# Patient Record
Sex: MALE | Race: WHITE | NOT HISPANIC OR LATINO | URBAN - METROPOLITAN AREA
[De-identification: names, ages, dates, MRNs, and addresses within clinical notes are randomized per-mention and may not be internally consistent; named-entity substitution may affect disease eponyms.]

---

## 2017-05-08 ENCOUNTER — OUTPATIENT (OUTPATIENT)
Dept: OUTPATIENT SERVICES | Facility: HOSPITAL | Age: 77
LOS: 1 days | Discharge: HOME | End: 2017-05-08

## 2017-06-28 DIAGNOSIS — N18.3 CHRONIC KIDNEY DISEASE, STAGE 3 (MODERATE): ICD-10-CM

## 2017-06-28 DIAGNOSIS — E11.22 TYPE 2 DIABETES MELLITUS WITH DIABETIC CHRONIC KIDNEY DISEASE: ICD-10-CM

## 2017-07-27 ENCOUNTER — OUTPATIENT (OUTPATIENT)
Dept: OUTPATIENT SERVICES | Facility: HOSPITAL | Age: 77
LOS: 1 days | Discharge: HOME | End: 2017-07-27

## 2017-07-27 DIAGNOSIS — Z79.899 OTHER LONG TERM (CURRENT) DRUG THERAPY: ICD-10-CM

## 2017-07-27 DIAGNOSIS — I26.99 OTHER PULMONARY EMBOLISM WITHOUT ACUTE COR PULMONALE: ICD-10-CM

## 2017-07-27 DIAGNOSIS — N17.9 ACUTE KIDNEY FAILURE, UNSPECIFIED: ICD-10-CM

## 2017-07-27 DIAGNOSIS — E78.00 PURE HYPERCHOLESTEROLEMIA, UNSPECIFIED: ICD-10-CM

## 2017-07-27 DIAGNOSIS — D68.9 COAGULATION DEFECT, UNSPECIFIED: ICD-10-CM

## 2017-07-27 DIAGNOSIS — I25.10 ATHEROSCLEROTIC HEART DISEASE OF NATIVE CORONARY ARTERY WITHOUT ANGINA PECTORIS: ICD-10-CM

## 2017-08-09 ENCOUNTER — OUTPATIENT (OUTPATIENT)
Dept: OUTPATIENT SERVICES | Facility: HOSPITAL | Age: 77
LOS: 1 days | Discharge: HOME | End: 2017-08-09

## 2017-08-09 DIAGNOSIS — R97.20 ELEVATED PROSTATE SPECIFIC ANTIGEN [PSA]: ICD-10-CM

## 2017-08-09 DIAGNOSIS — D68.9 COAGULATION DEFECT, UNSPECIFIED: ICD-10-CM

## 2017-08-09 DIAGNOSIS — I26.99 OTHER PULMONARY EMBOLISM WITHOUT ACUTE COR PULMONALE: ICD-10-CM

## 2017-08-09 DIAGNOSIS — N17.9 ACUTE KIDNEY FAILURE, UNSPECIFIED: ICD-10-CM

## 2017-09-20 ENCOUNTER — OUTPATIENT (OUTPATIENT)
Dept: OUTPATIENT SERVICES | Facility: HOSPITAL | Age: 77
LOS: 1 days | Discharge: HOME | End: 2017-09-20

## 2017-09-20 DIAGNOSIS — N17.9 ACUTE KIDNEY FAILURE, UNSPECIFIED: ICD-10-CM

## 2017-09-20 DIAGNOSIS — I26.99 OTHER PULMONARY EMBOLISM WITHOUT ACUTE COR PULMONALE: ICD-10-CM

## 2017-09-20 DIAGNOSIS — D68.9 COAGULATION DEFECT, UNSPECIFIED: ICD-10-CM

## 2017-09-20 DIAGNOSIS — N18.3 CHRONIC KIDNEY DISEASE, STAGE 3 (MODERATE): ICD-10-CM

## 2017-09-20 DIAGNOSIS — E11.22 TYPE 2 DIABETES MELLITUS WITH DIABETIC CHRONIC KIDNEY DISEASE: ICD-10-CM

## 2018-01-15 ENCOUNTER — TRANSCRIPTION ENCOUNTER (OUTPATIENT)
Age: 78
End: 2018-01-15

## 2018-01-20 ENCOUNTER — OUTPATIENT (OUTPATIENT)
Dept: OUTPATIENT SERVICES | Facility: HOSPITAL | Age: 78
LOS: 1 days | Discharge: HOME | End: 2018-01-20

## 2018-01-20 DIAGNOSIS — E78.00 PURE HYPERCHOLESTEROLEMIA, UNSPECIFIED: ICD-10-CM

## 2018-01-20 DIAGNOSIS — N18.3 CHRONIC KIDNEY DISEASE, STAGE 3 (MODERATE): ICD-10-CM

## 2018-01-20 DIAGNOSIS — I25.10 ATHEROSCLEROTIC HEART DISEASE OF NATIVE CORONARY ARTERY WITHOUT ANGINA PECTORIS: ICD-10-CM

## 2018-01-20 DIAGNOSIS — E78.4 OTHER HYPERLIPIDEMIA: ICD-10-CM

## 2018-01-20 DIAGNOSIS — E11.22 TYPE 2 DIABETES MELLITUS WITH DIABETIC CHRONIC KIDNEY DISEASE: ICD-10-CM

## 2018-01-20 DIAGNOSIS — Z79.899 OTHER LONG TERM (CURRENT) DRUG THERAPY: ICD-10-CM

## 2018-02-04 DIAGNOSIS — I26.99 OTHER PULMONARY EMBOLISM WITHOUT ACUTE COR PULMONALE: ICD-10-CM

## 2018-02-04 DIAGNOSIS — D68.9 COAGULATION DEFECT, UNSPECIFIED: ICD-10-CM

## 2018-02-04 DIAGNOSIS — N17.9 ACUTE KIDNEY FAILURE, UNSPECIFIED: ICD-10-CM

## 2018-02-20 ENCOUNTER — OUTPATIENT (OUTPATIENT)
Dept: OUTPATIENT SERVICES | Facility: HOSPITAL | Age: 78
LOS: 1 days | Discharge: HOME | End: 2018-02-20

## 2018-02-20 DIAGNOSIS — C61 MALIGNANT NEOPLASM OF PROSTATE: ICD-10-CM

## 2018-05-02 ENCOUNTER — OUTPATIENT (OUTPATIENT)
Dept: OUTPATIENT SERVICES | Facility: HOSPITAL | Age: 78
LOS: 1 days | Discharge: HOME | End: 2018-05-02

## 2018-05-02 DIAGNOSIS — N20.0 CALCULUS OF KIDNEY: ICD-10-CM

## 2018-05-02 DIAGNOSIS — N18.9 CHRONIC KIDNEY DISEASE, UNSPECIFIED: ICD-10-CM

## 2018-06-12 ENCOUNTER — OUTPATIENT (OUTPATIENT)
Dept: OUTPATIENT SERVICES | Facility: HOSPITAL | Age: 78
LOS: 1 days | Discharge: HOME | End: 2018-06-12

## 2018-06-12 DIAGNOSIS — N18.3 CHRONIC KIDNEY DISEASE, STAGE 3 (MODERATE): ICD-10-CM

## 2018-06-12 DIAGNOSIS — E11.22 TYPE 2 DIABETES MELLITUS WITH DIABETIC CHRONIC KIDNEY DISEASE: ICD-10-CM

## 2018-08-07 ENCOUNTER — OUTPATIENT (OUTPATIENT)
Dept: OUTPATIENT SERVICES | Facility: HOSPITAL | Age: 78
LOS: 1 days | Discharge: HOME | End: 2018-08-07

## 2018-08-07 DIAGNOSIS — N20.0 CALCULUS OF KIDNEY: ICD-10-CM

## 2018-08-17 ENCOUNTER — OUTPATIENT (OUTPATIENT)
Dept: OUTPATIENT SERVICES | Facility: HOSPITAL | Age: 78
LOS: 1 days | Discharge: HOME | End: 2018-08-17

## 2018-08-17 DIAGNOSIS — Z79.899 OTHER LONG TERM (CURRENT) DRUG THERAPY: ICD-10-CM

## 2018-08-17 DIAGNOSIS — I25.10 ATHEROSCLEROTIC HEART DISEASE OF NATIVE CORONARY ARTERY WITHOUT ANGINA PECTORIS: ICD-10-CM

## 2018-08-17 DIAGNOSIS — E78.00 PURE HYPERCHOLESTEROLEMIA, UNSPECIFIED: ICD-10-CM

## 2018-09-20 ENCOUNTER — OUTPATIENT (OUTPATIENT)
Dept: OUTPATIENT SERVICES | Facility: HOSPITAL | Age: 78
LOS: 1 days | Discharge: HOME | End: 2018-09-20

## 2018-09-20 DIAGNOSIS — E11.9 TYPE 2 DIABETES MELLITUS WITHOUT COMPLICATIONS: ICD-10-CM

## 2018-09-20 DIAGNOSIS — E56.9 VITAMIN DEFICIENCY, UNSPECIFIED: ICD-10-CM

## 2018-09-26 ENCOUNTER — OUTPATIENT (OUTPATIENT)
Dept: OUTPATIENT SERVICES | Facility: HOSPITAL | Age: 78
LOS: 1 days | Discharge: HOME | End: 2018-09-26

## 2018-09-26 DIAGNOSIS — I10 ESSENTIAL (PRIMARY) HYPERTENSION: ICD-10-CM

## 2018-10-17 ENCOUNTER — OUTPATIENT (OUTPATIENT)
Dept: OUTPATIENT SERVICES | Facility: HOSPITAL | Age: 78
LOS: 1 days | Discharge: HOME | End: 2018-10-17

## 2018-10-17 DIAGNOSIS — E11.22 TYPE 2 DIABETES MELLITUS WITH DIABETIC CHRONIC KIDNEY DISEASE: ICD-10-CM

## 2018-10-17 DIAGNOSIS — N18.3 CHRONIC KIDNEY DISEASE, STAGE 3 (MODERATE): ICD-10-CM

## 2018-10-17 DIAGNOSIS — E78.5 HYPERLIPIDEMIA, UNSPECIFIED: ICD-10-CM

## 2018-12-29 ENCOUNTER — TRANSCRIPTION ENCOUNTER (OUTPATIENT)
Age: 78
End: 2018-12-29

## 2019-01-14 ENCOUNTER — OUTPATIENT (OUTPATIENT)
Dept: OUTPATIENT SERVICES | Facility: HOSPITAL | Age: 79
LOS: 1 days | Discharge: HOME | End: 2019-01-14

## 2019-01-14 DIAGNOSIS — N20.0 CALCULUS OF KIDNEY: ICD-10-CM

## 2019-01-23 ENCOUNTER — OUTPATIENT (OUTPATIENT)
Dept: OUTPATIENT SERVICES | Facility: HOSPITAL | Age: 79
LOS: 1 days | Discharge: HOME | End: 2019-01-23

## 2019-01-23 DIAGNOSIS — I48.91 UNSPECIFIED ATRIAL FIBRILLATION: ICD-10-CM

## 2019-01-23 DIAGNOSIS — Z01.818 ENCOUNTER FOR OTHER PREPROCEDURAL EXAMINATION: ICD-10-CM

## 2019-02-19 ENCOUNTER — OUTPATIENT (OUTPATIENT)
Dept: OUTPATIENT SERVICES | Facility: HOSPITAL | Age: 79
LOS: 1 days | Discharge: HOME | End: 2019-02-19

## 2019-02-19 DIAGNOSIS — N20.0 CALCULUS OF KIDNEY: ICD-10-CM

## 2019-02-19 DIAGNOSIS — E11.22 TYPE 2 DIABETES MELLITUS WITH DIABETIC CHRONIC KIDNEY DISEASE: ICD-10-CM

## 2019-02-19 DIAGNOSIS — C61 MALIGNANT NEOPLASM OF PROSTATE: ICD-10-CM

## 2019-02-19 DIAGNOSIS — N18.3 CHRONIC KIDNEY DISEASE, STAGE 3 (MODERATE): ICD-10-CM

## 2019-04-02 ENCOUNTER — OUTPATIENT (OUTPATIENT)
Dept: OUTPATIENT SERVICES | Facility: HOSPITAL | Age: 79
LOS: 1 days | Discharge: HOME | End: 2019-04-02

## 2019-04-02 DIAGNOSIS — C61 MALIGNANT NEOPLASM OF PROSTATE: ICD-10-CM

## 2019-04-02 DIAGNOSIS — R97.20 ELEVATED PROSTATE SPECIFIC ANTIGEN [PSA]: ICD-10-CM

## 2019-04-02 DIAGNOSIS — N20.0 CALCULUS OF KIDNEY: ICD-10-CM

## 2019-04-12 ENCOUNTER — RECORD ABSTRACTING (OUTPATIENT)
Age: 79
End: 2019-04-12

## 2019-04-12 DIAGNOSIS — Z78.9 OTHER SPECIFIED HEALTH STATUS: ICD-10-CM

## 2019-04-12 DIAGNOSIS — I10 ESSENTIAL (PRIMARY) HYPERTENSION: ICD-10-CM

## 2019-04-12 RX ORDER — APIXABAN 5 MG/1
5 TABLET, FILM COATED ORAL
Refills: 0 | Status: ACTIVE | COMMUNITY

## 2019-04-25 ENCOUNTER — OUTPATIENT (OUTPATIENT)
Dept: OUTPATIENT SERVICES | Facility: HOSPITAL | Age: 79
LOS: 1 days | Discharge: HOME | End: 2019-04-25

## 2019-04-25 DIAGNOSIS — M54.2 CERVICALGIA: ICD-10-CM

## 2019-04-25 DIAGNOSIS — E11.65 TYPE 2 DIABETES MELLITUS WITH HYPERGLYCEMIA: ICD-10-CM

## 2019-04-25 DIAGNOSIS — N23 UNSPECIFIED RENAL COLIC: ICD-10-CM

## 2019-04-25 DIAGNOSIS — M18.9 OSTEOARTHRITIS OF FIRST CARPOMETACARPAL JOINT, UNSPECIFIED: ICD-10-CM

## 2019-05-06 ENCOUNTER — APPOINTMENT (OUTPATIENT)
Dept: CARDIOLOGY | Facility: CLINIC | Age: 79
End: 2019-05-06
Payer: MEDICARE

## 2019-05-06 PROCEDURE — 93306 TTE W/DOPPLER COMPLETE: CPT

## 2019-05-07 ENCOUNTER — LABORATORY RESULT (OUTPATIENT)
Age: 79
End: 2019-05-07

## 2019-05-07 ENCOUNTER — OUTPATIENT (OUTPATIENT)
Dept: OUTPATIENT SERVICES | Facility: HOSPITAL | Age: 79
LOS: 1 days | Discharge: HOME | End: 2019-05-07

## 2019-05-07 DIAGNOSIS — I25.10 ATHEROSCLEROTIC HEART DISEASE OF NATIVE CORONARY ARTERY WITHOUT ANGINA PECTORIS: ICD-10-CM

## 2019-05-07 DIAGNOSIS — Z79.899 OTHER LONG TERM (CURRENT) DRUG THERAPY: ICD-10-CM

## 2019-05-07 DIAGNOSIS — E78.00 PURE HYPERCHOLESTEROLEMIA, UNSPECIFIED: ICD-10-CM

## 2019-05-14 ENCOUNTER — APPOINTMENT (OUTPATIENT)
Dept: CARDIOLOGY | Facility: CLINIC | Age: 79
End: 2019-05-14
Payer: MEDICARE

## 2019-05-14 ENCOUNTER — OUTPATIENT (OUTPATIENT)
Dept: OUTPATIENT SERVICES | Facility: HOSPITAL | Age: 79
LOS: 1 days | Discharge: HOME | End: 2019-05-14

## 2019-05-14 DIAGNOSIS — Q61.01 CONGENITAL SINGLE RENAL CYST: ICD-10-CM

## 2019-05-14 DIAGNOSIS — C61 MALIGNANT NEOPLASM OF PROSTATE: ICD-10-CM

## 2019-05-14 DIAGNOSIS — N20.0 CALCULUS OF KIDNEY: ICD-10-CM

## 2019-05-14 PROCEDURE — 93000 ELECTROCARDIOGRAM COMPLETE: CPT

## 2019-05-14 PROCEDURE — 99214 OFFICE O/P EST MOD 30 MIN: CPT

## 2019-06-14 ENCOUNTER — OUTPATIENT (OUTPATIENT)
Dept: OUTPATIENT SERVICES | Facility: HOSPITAL | Age: 79
LOS: 1 days | Discharge: HOME | End: 2019-06-14

## 2019-06-14 DIAGNOSIS — N18.3 CHRONIC KIDNEY DISEASE, STAGE 3 (MODERATE): ICD-10-CM

## 2019-06-14 DIAGNOSIS — N25.81 SECONDARY HYPERPARATHYROIDISM OF RENAL ORIGIN: ICD-10-CM

## 2019-06-14 DIAGNOSIS — E11.22 TYPE 2 DIABETES MELLITUS WITH DIABETIC CHRONIC KIDNEY DISEASE: ICD-10-CM

## 2019-09-11 ENCOUNTER — OUTPATIENT (OUTPATIENT)
Dept: OUTPATIENT SERVICES | Facility: HOSPITAL | Age: 79
LOS: 1 days | Discharge: HOME | End: 2019-09-11

## 2019-09-11 DIAGNOSIS — N20.0 CALCULUS OF KIDNEY: ICD-10-CM

## 2019-09-11 DIAGNOSIS — N18.9 CHRONIC KIDNEY DISEASE, UNSPECIFIED: ICD-10-CM

## 2019-09-11 DIAGNOSIS — C61 MALIGNANT NEOPLASM OF PROSTATE: ICD-10-CM

## 2019-10-18 ENCOUNTER — APPOINTMENT (OUTPATIENT)
Dept: CARDIOLOGY | Facility: CLINIC | Age: 79
End: 2019-10-18
Payer: MEDICARE

## 2019-10-18 PROCEDURE — 93880 EXTRACRANIAL BILAT STUDY: CPT

## 2019-10-28 ENCOUNTER — OUTPATIENT (OUTPATIENT)
Dept: OUTPATIENT SERVICES | Facility: HOSPITAL | Age: 79
LOS: 1 days | Discharge: HOME | End: 2019-10-28

## 2019-10-28 DIAGNOSIS — N18.3 CHRONIC KIDNEY DISEASE, STAGE 3 (MODERATE): ICD-10-CM

## 2019-10-28 DIAGNOSIS — E11.22 TYPE 2 DIABETES MELLITUS WITH DIABETIC CHRONIC KIDNEY DISEASE: ICD-10-CM

## 2019-11-05 ENCOUNTER — OUTPATIENT (OUTPATIENT)
Dept: OUTPATIENT SERVICES | Facility: HOSPITAL | Age: 79
LOS: 1 days | Discharge: HOME | End: 2019-11-05

## 2019-11-05 ENCOUNTER — LABORATORY RESULT (OUTPATIENT)
Age: 79
End: 2019-11-05

## 2019-11-05 DIAGNOSIS — E78.00 PURE HYPERCHOLESTEROLEMIA, UNSPECIFIED: ICD-10-CM

## 2019-11-05 DIAGNOSIS — Z79.899 OTHER LONG TERM (CURRENT) DRUG THERAPY: ICD-10-CM

## 2019-11-05 DIAGNOSIS — I25.10 ATHEROSCLEROTIC HEART DISEASE OF NATIVE CORONARY ARTERY WITHOUT ANGINA PECTORIS: ICD-10-CM

## 2019-11-06 ENCOUNTER — APPOINTMENT (OUTPATIENT)
Dept: CARDIOLOGY | Facility: CLINIC | Age: 79
End: 2019-11-06

## 2019-11-12 ENCOUNTER — APPOINTMENT (OUTPATIENT)
Dept: CARDIOLOGY | Facility: CLINIC | Age: 79
End: 2019-11-12
Payer: MEDICARE

## 2019-11-12 PROCEDURE — 93000 ELECTROCARDIOGRAM COMPLETE: CPT

## 2019-11-12 PROCEDURE — 99214 OFFICE O/P EST MOD 30 MIN: CPT

## 2020-06-02 ENCOUNTER — RESULT REVIEW (OUTPATIENT)
Age: 80
End: 2020-06-02

## 2020-07-24 ENCOUNTER — RECORD ABSTRACTING (OUTPATIENT)
Age: 80
End: 2020-07-24

## 2020-07-27 ENCOUNTER — APPOINTMENT (OUTPATIENT)
Dept: CARDIOLOGY | Facility: CLINIC | Age: 80
End: 2020-07-27
Payer: MEDICARE

## 2020-07-27 ENCOUNTER — NON-APPOINTMENT (OUTPATIENT)
Age: 80
End: 2020-07-27

## 2020-07-27 PROCEDURE — 93306 TTE W/DOPPLER COMPLETE: CPT

## 2020-08-03 ENCOUNTER — LABORATORY RESULT (OUTPATIENT)
Age: 80
End: 2020-08-03

## 2020-08-11 ENCOUNTER — APPOINTMENT (OUTPATIENT)
Dept: CARDIOLOGY | Facility: CLINIC | Age: 80
End: 2020-08-11
Payer: MEDICARE

## 2020-08-11 VITALS
TEMPERATURE: 98.8 F | BODY MASS INDEX: 39.42 KG/M2 | WEIGHT: 291 LBS | HEIGHT: 72 IN | HEART RATE: 64 BPM | SYSTOLIC BLOOD PRESSURE: 140 MMHG | DIASTOLIC BLOOD PRESSURE: 80 MMHG

## 2020-08-11 PROCEDURE — 93000 ELECTROCARDIOGRAM COMPLETE: CPT

## 2020-08-11 PROCEDURE — 99214 OFFICE O/P EST MOD 30 MIN: CPT

## 2020-08-13 RX ORDER — ROSUVASTATIN CALCIUM 20 MG/1
20 TABLET, FILM COATED ORAL DAILY
Qty: 90 | Refills: 3 | Status: ACTIVE | COMMUNITY
Start: 2020-08-13 | End: 1900-01-01

## 2020-12-04 ENCOUNTER — RESULT REVIEW (OUTPATIENT)
Age: 80
End: 2020-12-04

## 2021-03-12 ENCOUNTER — INPATIENT (INPATIENT)
Facility: HOSPITAL | Age: 81
LOS: 38 days | Discharge: NURSING, REHAB OR LONG-TERM CARE | End: 2021-04-20
Attending: INTERNAL MEDICINE | Admitting: INTERNAL MEDICINE
Payer: MEDICARE

## 2021-03-12 VITALS
HEART RATE: 101 BPM | TEMPERATURE: 101 F | OXYGEN SATURATION: 95 % | SYSTOLIC BLOOD PRESSURE: 132 MMHG | DIASTOLIC BLOOD PRESSURE: 66 MMHG | RESPIRATION RATE: 20 BRPM

## 2021-03-12 DIAGNOSIS — Z96.653 PRESENCE OF ARTIFICIAL KNEE JOINT, BILATERAL: Chronic | ICD-10-CM

## 2021-03-12 DIAGNOSIS — Z90.6 ACQUIRED ABSENCE OF OTHER PARTS OF URINARY TRACT: Chronic | ICD-10-CM

## 2021-03-12 DIAGNOSIS — R09.89 OTHER SPECIFIED SYMPTOMS AND SIGNS INVOLVING THE CIRCULATORY AND RESPIRATORY SYSTEMS: ICD-10-CM

## 2021-03-12 DIAGNOSIS — Z96.643 PRESENCE OF ARTIFICIAL HIP JOINT, BILATERAL: Chronic | ICD-10-CM

## 2021-03-12 LAB
ALBUMIN SERPL ELPH-MCNC: 3.5 G/DL — SIGNIFICANT CHANGE UP (ref 3.5–5.2)
ALP SERPL-CCNC: 47 U/L — SIGNIFICANT CHANGE UP (ref 30–115)
ALT FLD-CCNC: 90 U/L — HIGH (ref 0–41)
ANION GAP SERPL CALC-SCNC: 14 MMOL/L — SIGNIFICANT CHANGE UP (ref 7–14)
APTT BLD: 35.1 SEC — SIGNIFICANT CHANGE UP (ref 27–39.2)
AST SERPL-CCNC: 86 U/L — HIGH (ref 0–41)
BASE EXCESS BLDV CALC-SCNC: 3.6 MMOL/L — HIGH (ref -2–2)
BASOPHILS # BLD AUTO: 0.02 K/UL — SIGNIFICANT CHANGE UP (ref 0–0.2)
BASOPHILS NFR BLD AUTO: 0.2 % — SIGNIFICANT CHANGE UP (ref 0–1)
BILIRUB SERPL-MCNC: 0.9 MG/DL — SIGNIFICANT CHANGE UP (ref 0.2–1.2)
BUN SERPL-MCNC: 33 MG/DL — HIGH (ref 10–20)
CA-I SERPL-SCNC: 1.18 MMOL/L — SIGNIFICANT CHANGE UP (ref 1.12–1.3)
CALCIUM SERPL-MCNC: 8.9 MG/DL — SIGNIFICANT CHANGE UP (ref 8.5–10.1)
CHLORIDE SERPL-SCNC: 97 MMOL/L — LOW (ref 98–110)
CO2 SERPL-SCNC: 25 MMOL/L — SIGNIFICANT CHANGE UP (ref 17–32)
CREAT SERPL-MCNC: 1.7 MG/DL — HIGH (ref 0.7–1.5)
D DIMER BLD IA.RAPID-MCNC: 1721 NG/ML DDU — HIGH (ref 0–230)
EOSINOPHIL # BLD AUTO: 0 K/UL — SIGNIFICANT CHANGE UP (ref 0–0.7)
EOSINOPHIL NFR BLD AUTO: 0 % — SIGNIFICANT CHANGE UP (ref 0–8)
GAS PNL BLDV: 133 MMOL/L — LOW (ref 136–145)
GAS PNL BLDV: SIGNIFICANT CHANGE UP
GLUCOSE BLDC GLUCOMTR-MCNC: 226 MG/DL — HIGH (ref 70–99)
GLUCOSE BLDC GLUCOMTR-MCNC: 285 MG/DL — HIGH (ref 70–99)
GLUCOSE SERPL-MCNC: 214 MG/DL — HIGH (ref 70–99)
HCO3 BLDV-SCNC: 29 MMOL/L — SIGNIFICANT CHANGE UP (ref 22–29)
HCT VFR BLD CALC: 45 % — SIGNIFICANT CHANGE UP (ref 42–52)
HCT VFR BLDA CALC: 48.1 % — HIGH (ref 34–44)
HGB BLD CALC-MCNC: 15.7 G/DL — SIGNIFICANT CHANGE UP (ref 14–18)
HGB BLD-MCNC: 14.3 G/DL — SIGNIFICANT CHANGE UP (ref 14–18)
IMM GRANULOCYTES NFR BLD AUTO: 0.6 % — HIGH (ref 0.1–0.3)
INR BLD: 1.49 RATIO — HIGH (ref 0.65–1.3)
LACTATE BLDV-MCNC: 2.5 MMOL/L — HIGH (ref 0.5–1.6)
LACTATE SERPL-SCNC: 1.8 MMOL/L — SIGNIFICANT CHANGE UP (ref 0.7–2)
LYMPHOCYTES # BLD AUTO: 0.63 K/UL — LOW (ref 1.2–3.4)
LYMPHOCYTES # BLD AUTO: 7.4 % — LOW (ref 20.5–51.1)
MCHC RBC-ENTMCNC: 27.4 PG — SIGNIFICANT CHANGE UP (ref 27–31)
MCHC RBC-ENTMCNC: 31.8 G/DL — LOW (ref 32–37)
MCV RBC AUTO: 86.2 FL — SIGNIFICANT CHANGE UP (ref 80–94)
MONOCYTES # BLD AUTO: 0.59 K/UL — SIGNIFICANT CHANGE UP (ref 0.1–0.6)
MONOCYTES NFR BLD AUTO: 6.9 % — SIGNIFICANT CHANGE UP (ref 1.7–9.3)
NEUTROPHILS # BLD AUTO: 7.23 K/UL — HIGH (ref 1.4–6.5)
NEUTROPHILS NFR BLD AUTO: 84.9 % — HIGH (ref 42.2–75.2)
NRBC # BLD: 0 /100 WBCS — SIGNIFICANT CHANGE UP (ref 0–0)
NT-PROBNP SERPL-SCNC: 173 PG/ML — SIGNIFICANT CHANGE UP (ref 0–300)
PCO2 BLDV: 47 MMHG — SIGNIFICANT CHANGE UP (ref 41–51)
PH BLDV: 7.4 — SIGNIFICANT CHANGE UP (ref 7.26–7.43)
PLATELET # BLD AUTO: 153 K/UL — SIGNIFICANT CHANGE UP (ref 130–400)
PO2 BLDV: 22 MMHG — SIGNIFICANT CHANGE UP (ref 20–40)
POTASSIUM BLDV-SCNC: 4 MMOL/L — SIGNIFICANT CHANGE UP (ref 3.3–5.6)
POTASSIUM SERPL-MCNC: 4.1 MMOL/L — SIGNIFICANT CHANGE UP (ref 3.5–5)
POTASSIUM SERPL-SCNC: 4.1 MMOL/L — SIGNIFICANT CHANGE UP (ref 3.5–5)
PROT SERPL-MCNC: 6.5 G/DL — SIGNIFICANT CHANGE UP (ref 6–8)
PROTHROM AB SERPL-ACNC: 17.1 SEC — HIGH (ref 9.95–12.87)
RBC # BLD: 5.22 M/UL — SIGNIFICANT CHANGE UP (ref 4.7–6.1)
RBC # FLD: 14.8 % — HIGH (ref 11.5–14.5)
SAO2 % BLDV: 35 % — SIGNIFICANT CHANGE UP
SARS-COV-2 RNA SPEC QL NAA+PROBE: DETECTED
SODIUM SERPL-SCNC: 136 MMOL/L — SIGNIFICANT CHANGE UP (ref 135–146)
TROPONIN T SERPL-MCNC: 0.02 NG/ML — HIGH
TROPONIN T SERPL-MCNC: <0.01 NG/ML — SIGNIFICANT CHANGE UP
WBC # BLD: 8.52 K/UL — SIGNIFICANT CHANGE UP (ref 4.8–10.8)
WBC # FLD AUTO: 8.52 K/UL — SIGNIFICANT CHANGE UP (ref 4.8–10.8)

## 2021-03-12 PROCEDURE — 71045 X-RAY EXAM CHEST 1 VIEW: CPT | Mod: 26

## 2021-03-12 PROCEDURE — 93010 ELECTROCARDIOGRAM REPORT: CPT

## 2021-03-12 PROCEDURE — 99291 CRITICAL CARE FIRST HOUR: CPT

## 2021-03-12 RX ORDER — AMLODIPINE BESYLATE 2.5 MG/1
10 TABLET ORAL DAILY
Refills: 0 | Status: DISCONTINUED | OUTPATIENT
Start: 2021-03-12 | End: 2021-03-29

## 2021-03-12 RX ORDER — CHLORHEXIDINE GLUCONATE 213 G/1000ML
1 SOLUTION TOPICAL
Refills: 0 | Status: DISCONTINUED | OUTPATIENT
Start: 2021-03-12 | End: 2021-04-20

## 2021-03-12 RX ORDER — INSULIN GLARGINE 100 [IU]/ML
40 INJECTION, SOLUTION SUBCUTANEOUS AT BEDTIME
Refills: 0 | Status: DISCONTINUED | OUTPATIENT
Start: 2021-03-12 | End: 2021-03-14

## 2021-03-12 RX ORDER — DEXAMETHASONE 0.5 MG/5ML
6 ELIXIR ORAL ONCE
Refills: 0 | Status: COMPLETED | OUTPATIENT
Start: 2021-03-12 | End: 2021-03-12

## 2021-03-12 RX ORDER — LANOLIN ALCOHOL/MO/W.PET/CERES
5 CREAM (GRAM) TOPICAL AT BEDTIME
Refills: 0 | Status: DISCONTINUED | OUTPATIENT
Start: 2021-03-12 | End: 2021-04-20

## 2021-03-12 RX ORDER — INFLUENZA VIRUS VACCINE 15; 15; 15; 15 UG/.5ML; UG/.5ML; UG/.5ML; UG/.5ML
0.5 SUSPENSION INTRAMUSCULAR ONCE
Refills: 0 | Status: DISCONTINUED | OUTPATIENT
Start: 2021-03-12 | End: 2021-04-20

## 2021-03-12 RX ORDER — INSULIN LISPRO 100/ML
5 VIAL (ML) SUBCUTANEOUS
Refills: 0 | Status: DISCONTINUED | OUTPATIENT
Start: 2021-03-12 | End: 2021-03-14

## 2021-03-12 RX ORDER — INSULIN LISPRO 100/ML
VIAL (ML) SUBCUTANEOUS
Refills: 0 | Status: DISCONTINUED | OUTPATIENT
Start: 2021-03-12 | End: 2021-04-20

## 2021-03-12 RX ORDER — ACETAMINOPHEN 500 MG
650 TABLET ORAL ONCE
Refills: 0 | Status: COMPLETED | OUTPATIENT
Start: 2021-03-12 | End: 2021-03-12

## 2021-03-12 RX ORDER — ATORVASTATIN CALCIUM 80 MG/1
80 TABLET, FILM COATED ORAL AT BEDTIME
Refills: 0 | Status: DISCONTINUED | OUTPATIENT
Start: 2021-03-12 | End: 2021-04-20

## 2021-03-12 RX ORDER — OXYCODONE HYDROCHLORIDE 5 MG/1
5 TABLET ORAL EVERY 6 HOURS
Refills: 0 | Status: DISCONTINUED | OUTPATIENT
Start: 2021-03-12 | End: 2021-03-18

## 2021-03-12 RX ORDER — METOPROLOL TARTRATE 50 MG
100 TABLET ORAL DAILY
Refills: 0 | Status: DISCONTINUED | OUTPATIENT
Start: 2021-03-12 | End: 2021-04-06

## 2021-03-12 RX ORDER — SODIUM CHLORIDE 9 MG/ML
1000 INJECTION, SOLUTION INTRAVENOUS ONCE
Refills: 0 | Status: COMPLETED | OUTPATIENT
Start: 2021-03-12 | End: 2021-03-12

## 2021-03-12 RX ORDER — APIXABAN 2.5 MG/1
5 TABLET, FILM COATED ORAL
Refills: 0 | Status: DISCONTINUED | OUTPATIENT
Start: 2021-03-12 | End: 2021-03-25

## 2021-03-12 RX ORDER — DEXAMETHASONE 0.5 MG/5ML
6 ELIXIR ORAL DAILY
Refills: 0 | Status: COMPLETED | OUTPATIENT
Start: 2021-03-13 | End: 2021-03-21

## 2021-03-12 RX ADMIN — Medication 101.2 MILLIGRAM(S): at 13:08

## 2021-03-12 RX ADMIN — INSULIN GLARGINE 40 UNIT(S): 100 INJECTION, SOLUTION SUBCUTANEOUS at 22:06

## 2021-03-12 RX ADMIN — Medication 650 MILLIGRAM(S): at 13:08

## 2021-03-12 RX ADMIN — SODIUM CHLORIDE 1000 MILLILITER(S): 9 INJECTION, SOLUTION INTRAVENOUS at 13:09

## 2021-03-12 RX ADMIN — APIXABAN 5 MILLIGRAM(S): 2.5 TABLET, FILM COATED ORAL at 22:04

## 2021-03-12 RX ADMIN — Medication 5 UNIT(S): at 17:20

## 2021-03-12 RX ADMIN — ATORVASTATIN CALCIUM 80 MILLIGRAM(S): 80 TABLET, FILM COATED ORAL at 22:04

## 2021-03-12 RX ADMIN — Medication 4: at 17:20

## 2021-03-12 NOTE — H&P ADULT - ASSESSMENT
#Acute hypoxemic respiratory failure secondary to COVID-19 pneumonia  - COVID-19 PCR positive 3/4/2021  - Isolation precautions (contact, droplet, airborne)  - Chest X-ray:  - Patient is saturating ___ % on ___ % FiO2 via ___  - Baseline CBC with diff, CMP, LDH, procalcitonin, creatine kinase, troponin, CRP, ferritin, D-dimer, PTT/PT/INR, ECG, and chest X-ray  - Repeat inflammatory markers (D-dimer, CRP, ferritin) Q48-72H if clinically worsening  - ID evaluation for possible remdesivir/plasma  - Active type and screen and COVID antibodies in case of plasma  - Incentive spirometry at bedside  - Start dexamethasone 6mg IV once daily for 10 days  - DVT prophylaxis per protocol  - Titrate supplemental O2 to maintain SpO2 92-96%  - If CRP >7.5 and procalcitonin <0.5,--> Tocilizumab x1 (Wt <65 kg= 400 mg, 65-90 kg =600 mg, >90 kg =800 mg)  Patient is a 81yo male with PMH of hypertension, hyperlipidemia, diabetes mellitus type 2, atrial fibrillation on Eliquis, CKD stage 3A, bladder cancer s/p cystectomy with urostomy, and bilateral hip and knee arthroplasties who was brought in by EMS for hypoxia. Patient had tested positive for COVID-19 on 3/4/2021, but symptoms did not begin until 3/6/2021. He was found to be hypoxic to 85% on room air.    #Acute hypoxemic respiratory failure secondary to COVID-19 pneumonia  - COVID-19 PCR positive 3/4/2021  - Isolation precautions (contact, droplet, airborne)  - Chest X-ray: some bibasilar opacities  - Patient is saturating 9% on 2L O2 via nasal cannula   - Baseline CBC with diff, CMP, LDH, procalcitonin, creatine kinase, troponin, CRP, ferritin, D-dimer, PTT/PT/INR, ECG, and chest X-ray  - Repeat inflammatory markers (D-dimer, CRP, ferritin) Q48-72H if clinically worsening  - Patient is outside the window for remdesivir  - Active type and screen and COVID antibodies in case of plasma  - Incentive spirometry at bedside  - Continue with dexamethasone 6mg IV once daily for 10 days  - DVT prophylaxis per protocol  - Titrate supplemental O2 to maintain SpO2 92-96%  - If CRP >7.5 and procalcitonin <0.5,--> Tocilizumab x1 (Wt <65 kg= 400 mg, 65-90 kg =600 mg, >90 kg =800 mg)     #Mild hepatocellular transaminitis  - Likely secondary to underlying COVID-19 infection  - Trend LFTs    #Lymphopenia  - Likely secondary to underlying COVID-19 infection    #Elevated D-dimer  - With history of bilateral TKR and THR, underlying COVID-19 infection, and worsening oxygen requirements, will order venous duplex    #Elevated troponin, likely type 2 demand ischemia secondary to hypoxia  - Outpatient nuclear scan 2019 showed EF 55% with no wall motion abnormalities or reversible defects    #Acute kidney injury on CKD stage 3A  - Baseline creatinine: 1.4 (as of 1/2021)  - Creatinine today: 1.7  - Likely prerenal azotemia in setting of COVID-19 infection  - Follow urinalysis, urine electrolytes, protein/creatinine ratio, and eosinophil count  - Avoid nephrotoxic agents  - Monitor BUN/creatinine   - Hold home medication benazepril given LORRAINE    #Lactic acidosis  - Lactate: 2.3  - S/p 1L bolus of LR in the ED  - Follow lactate in AM     #Paroxysmal atrial fibrillation  - ECG: normal sinus rhythm  - [id number]: 4 (age>75, DM, HTN)  - HAS-BLED: 1 (age>65)  - Continue with Eliquis 5mg PO twice daily and metoprolol succinate 100mg PO once daily     #Diabetes mellitus type 2  - Hemoglobin A1c 1/20/2021: 7.9  - Hold home medications glimepiride and Ozempic  - Continue with insulin glargine 40 units SQ QHS  - Start insulin lispro 5 units SQ TID  - Insulin sliding scale coverage  - Monitor fingerstick glucose, especially while on steroids    #Hyperlipidemia  - Convert home medication rosuvastatin to atorvastatin 80mg PO at bedtime     #Hypertension  - Continue with amlodipine 10mg PO once daily and metoprolol succinate 100mg PO once daily   - Hold home medication benazepril given LORRAINE    #S/p bilateral TKR and THR  - Continue with oxycodone IR 5mg PO Q6H PRN    #Bladder cancer s/p cystectomy with urostomy  - Follow outpatient with urology and oncology  - Urostomy care PRN    #Misc  - DVT Prophylaxis: Eliquis 5mg PO twice daily   - GI Prophylaxis: not indicated   - Diet: DASH/TLC, consistent carbohydrate  - Activity: increase as tolerated  - IV Fluids: not indicated   - Code Status: Full Code    Dispo: admit to medicine Patient is a 81yo male with PMH of hypertension, hyperlipidemia, diabetes mellitus type 2, atrial fibrillation on Eliquis, CKD stage 3A, bladder cancer s/p cystectomy with urostomy, and bilateral hip and knee arthroplasties who was brought in by EMS for hypoxia. Patient had tested positive for COVID-19 on 3/4/2021, but symptoms did not begin until 3/6/2021. He was found to be hypoxic to 85% on room air.    #Acute hypoxemic respiratory failure secondary to COVID-19 pneumonia  - COVID-19 PCR positive 3/4/2021  - Isolation precautions (contact, droplet, airborne)  - Chest X-ray: some bibasilar opacities  - Patient is saturating 9% on 2L O2 via nasal cannula   - Baseline CBC with diff, CMP, LDH, procalcitonin, creatine kinase, troponin, CRP, ferritin, D-dimer, PTT/PT/INR, ECG, and chest X-ray  - Repeat inflammatory markers (D-dimer, CRP, ferritin) Q48-72H if clinically worsening  - Patient is outside the window for remdesivir  - Active type and screen and COVID antibodies in case of plasma  - Incentive spirometry at bedside  - Continue with dexamethasone 6mg IV once daily for 10 days  - DVT prophylaxis per protocol  - Titrate supplemental O2 to maintain SpO2 92-96%  - If CRP >7.5 and procalcitonin <0.5,--> Tocilizumab x1 (Wt <65 kg= 400 mg, 65-90 kg =600 mg, >90 kg =800 mg)     #Mild hepatocellular transaminitis  - Likely secondary to underlying COVID-19 infection  - Trend LFTs    #Lymphopenia  - Likely secondary to underlying COVID-19 infection    #Elevated D-dimer  - With history of bilateral TKR and THR, underlying COVID-19 infection, and worsening oxygen requirements, will order venous duplex    #Elevated troponin, likely type 2 demand ischemia secondary to hypoxia  - Outpatient nuclear scan 2019 showed EF 55% with no wall motion abnormalities or reversible defects  - Trend LFTs    #Acute kidney injury on CKD stage 3A  - Baseline creatinine: 1.4 (as of 1/2021)  - Creatinine today: 1.7  - Likely prerenal azotemia in setting of COVID-19 infection  - Follow urinalysis, urine electrolytes, protein/creatinine ratio, and eosinophil count  - Avoid nephrotoxic agents  - Monitor BUN/creatinine   - Hold home medication benazepril given LORRAINE    #Lactic acidosis  - Lactate: 2.3  - S/p 1L bolus of LR in the ED  - Follow lactate in AM     #Paroxysmal atrial fibrillation  - ECG: normal sinus rhythm  - [id number]: 4 (age>75, DM, HTN)  - HAS-BLED: 1 (age>65)  - Continue with Eliquis 5mg PO twice daily and metoprolol succinate 100mg PO once daily     #Diabetes mellitus type 2  - Hemoglobin A1c 1/20/2021: 7.9  - Hold home medications glimepiride and Ozempic  - Continue with insulin glargine 40 units SQ QHS  - Start insulin lispro 5 units SQ TID  - Insulin sliding scale coverage  - Monitor fingerstick glucose, especially while on steroids    #Hyperlipidemia  - Convert home medication rosuvastatin to atorvastatin 80mg PO at bedtime     #Hypertension  - Continue with amlodipine 10mg PO once daily and metoprolol succinate 100mg PO once daily   - Hold home medication benazepril given LORRAINE    #S/p bilateral TKR and THR  - Continue with oxycodone IR 5mg PO Q6H PRN    #Bladder cancer s/p cystectomy with urostomy  - Follow outpatient with urology and oncology  - Urostomy care PRN    #Misc  - DVT Prophylaxis: Eliquis 5mg PO twice daily   - GI Prophylaxis: not indicated   - Diet: DASH/TLC, consistent carbohydrate  - Activity: increase as tolerated  - IV Fluids: not indicated   - Code Status: Full Code    Dispo: admit to medicine

## 2021-03-12 NOTE — H&P ADULT - NSHPOUTPATIENTPROVIDERS_GEN_ALL_CORE
Dr. Evan Bull PCP: Dr. Evan Bull  Nephrologist: Dr. Tony  Urologist: Dr. Banks  Cardiologist: Dr. Carey

## 2021-03-12 NOTE — H&P ADULT - HISTORY OF PRESENT ILLNESS
Patient is a 81yo male with PMH of hypertension, hyperlipidemia, diabetes mellitus type 2, atrial fibrillation on Eliquis, CKD stage 3A, bladder cancer s/p cystectomy with urostomy, and bilateral hip and knee arthroplasties who was brought in by EMS for hypoxia.  Patient is a 81yo male with PMH of hypertension, hyperlipidemia, diabetes mellitus type 2, atrial fibrillation on Eliquis, CKD stage 3A, bladder cancer s/p cystectomy with urostomy, and bilateral hip and knee arthroplasties who was brought in by EMS for hypoxia. The patient states that his daughter and her boyfriend who reside in the same home as him and his wife recently tested positive for COVID-19. He and his wife tested positive for COVID-19 on 3/4/2021, but their symptoms did not start until 3/6/2021. He started feeling "beat down" and "tired all the time" to the point where he had difficulty getting up. He also endorses cough, shortness of breath, and body aches. He denies chest pain, abdominal pain, nausea, vomiting, constipation, or diarrhea. He was told to come to the ED by his daughter after they noticed his wife was "not doing so well."    In the ED, vital signs were Tmax 100.9F, , /66, RR 20, and SpO2 95% on 4L. Labs were significant for creatinine 1.7 (baseline 1.4), troponin 0.02, lactate 2.3, elevated LFTs, and D-dimer 1721. Patient was given dexamethasone 6mg IV, 1L bolus of LR, and acetaminophen 650mg.

## 2021-03-12 NOTE — ED PROVIDER NOTE - PROGRESS NOTE DETAILS
DC: 5L NC, 95%. ATTENDING NOTE: I personally evaluated the patient. I reviewed the Resident’s note (as assigned above), and agree with the findings and plan except as documented in my note. 79 y/o M PMH AFib on Eliquis, bladder CA, HTN, DM, not immunized against Covid-19 now with + Covid test on March 4th. Pt’s wife is in the hospital for the same. Today, presents with fever, cough, SOB, and hypoxia, 84% on room air. On exam Pt is in mild respiratory distress. (+) Obese male. (+) Decreased breath sounds b/l. (+) Diffuse crackles, no edema. Plan: Labs, imaging and likely admit.

## 2021-03-12 NOTE — ED PROVIDER NOTE - NS ED ROS FT
Constitutional: + fevers.   Eyes:  No visual changes, eye pain or discharge.  ENMT:  No sore throat or runny nose.  Cardiac:  +SOB.   Respiratory: +Cough. +hypoxia.   GI:  No nausea, vomiting, diarrhea or abdominal pain.  :  +hx of cystectomy.   MS:  +myalgias.   Neuro:  +diffuse weakness.   Skin:  No skin rash.   Endocrine: hx of DM.

## 2021-03-12 NOTE — ED PROVIDER NOTE - CARE PLAN
Principal Discharge DX:	COVID-19  Secondary Diagnosis:	Hypoxia  Secondary Diagnosis:	Elevated troponin

## 2021-03-12 NOTE — ED ADULT NURSE NOTE - NSIMPLEMENTINTERV_GEN_ALL_ED
Implemented All Fall with Harm Risk Interventions:  [place] to call system. Call bell, personal items and telephone within reach. Instruct patient to call for assistance. Room bathroom lighting operational. Non-slip footwear when patient is off stretcher. Physically safe environment: no spills, clutter or unnecessary equipment. Stretcher in lowest position, wheels locked, appropriate side rails in place. Provide visual cue, wrist band, yellow gown, etc. Monitor gait and stability. Monitor for mental status changes and reorient to person, place, and time. Review medications for side effects contributing to fall risk. Reinforce activity limits and safety measures with patient and family. Provide visual clues: red socks.

## 2021-03-12 NOTE — ED PROVIDER NOTE - PHYSICAL EXAMINATION
CONSTITUTIONAL: Well-developed; obese; in no acute distress.   SKIN: warm, dry.  HEAD: Normocephalic; atraumatic.  EYES: PERRL, EOMI, no conjunctival erythema.  ENT: No nasal discharge; airway clear.  NECK: Supple; non tender.  CARD: S1, S2 normal; no murmurs, gallops, or rubs. tachycardic.   RESP: tachypneic to 50; crackles diffusely.   ABD: soft ntnd. urostomy in place without surrounding erythema.   EXT: Normal ROM.  No clubbing, cyanosis or edema.  NEURO: Alert, oriented, grossly unremarkable.  PSYCH: Cooperative, appropriate.

## 2021-03-12 NOTE — ED PROVIDER NOTE - OBJECTIVE STATEMENT
Patient is a 81 yo M w/ hx of A-fib on eliquis, bladder cancer s/p cystectomy, HLD, DM BIBEMS for hypoxia. Patient tested +COVID on 3/4; however, symptoms only began 2 days prior. Patient endorses fevers, cough, SOB, body aches x 2 days. Denies chest pain, N/V/D. Patient tachypneic and hypoxic to 84% with minimal exertion on assessment.

## 2021-03-12 NOTE — H&P ADULT - ATTENDING COMMENTS
HPI:  81 yo gentleman with a PMH of hypertension, hyperlipidemia, diabetes mellitus type 2, atrial fibrillation on Eliquis, CKD stage 3A, bladder cancer s/p cystectomy with urostomy, and bilateral hip and knee arthroplasties who was brought in by EMS for hypoxia. Family has COVID, he was diagnosed with COVID on 3/4, over the last couple of days he has noted severe shortness of breath with minimal exertion and his family sent him to ED. he also reports severe days of watery diarrhea with some decreased appetite.     REVIEW OF SYSTEMS:  CONSTITUTIONAL:  + weakness, malaise, body aches fevers, chills, no night sweats, weight loss  EYES/ENT: No visual changes. No vertigo or dysphagia  NECK: No neck pain or stiffness  RESPIRATORY: +cough, no wheezing, hemoptysis. +shortness of breath  CARDIOVASCULAR: No chest pain or palpitations. No lower extremity edema  GASTROINTESTINAL: No abdominal pain. No nausea, vomiting, diarrhea, or hematemesis  GENITOURINARY: No dysuria or hematuria   NEUROLOGICAL: No focal numbness or weakness  SKIN: No rashes or itching  HEMATOLOGIC: No easy bruising or prolonged bleeding.      PHYSICAL EXAM:  GENERAL: NAD, morbidly obese, Non-toxic, stated age   HEAD:  Atraumatic, Normocephalic  EYES: EOMI, Sclera White   NECK: Supple, No JVD  CHEST/LUNG: b/l crackles, No wheezing, rhonchi, or crackles  HEART: Regular rate and rhythm; s1, s2, No murmurs, rubs, or gallops  ABDOMEN: Soft, Nontender, Nondistended; Bowel sounds present, No rebound or guarding noted   EXTREMITIES:  No lower extremity edema or calf tenderness to palpation.  No clubbing or cyanosis  PSYCH: AAOx3, pleasant, cooperative, not anxious  NEUROLOGY: non-focal  SKIN: No rashes or lesions      ASSESSMENT AND PLAN:  Acute Hypoxic Respiratory Failure secondary to COVID-19 Pneumonia: SIRS present on admission. ID consulted, on Remdesivir 200mg x1, 100mg qD, Dexamethasone 6mg qD. Follow up inflammatory markers (Ferritin, LDH, CRP, ESR, D-Dimer) and procalcitonin. Doubt superimposed bacterial pneumonia at this time. Supplemental O2 as needed. Check type and screen and COVID antibodies for possible plasma. Consider Tocilizumab    Significantly elevated D-Dimer: Check LE venous duplex. On eliquis so risk of DVT is less.     LORRAINE on CKD III, suspected pre-renal:  IV fluids given in the ED. Trend Cr, monitor strict I/O, check Urine Na, Cl, urea, urine Pr:Cr, and Renal US. If no improvement may need a Nephrology consult. Avoid nephrotoxic medications.     Transaminitis: Likely secondary to COVID.     Elevated trop: likely demand from current illness + LORRAINE. Trend trops, AM EKG. Currently asymptomatic now.     Paroxsymal A-Fib on eliquis: currently rate controlled, cont with eliquis     HTN /HLD: cont home medications     Diabetes: Basal bolus insulin, keep fingerstick glucose <180.     Chronic Knee pain: cont home percocet.     Bladder cancer s/p cystoscopy: follow up with urology as out patient.     DVT ppx: on eliquis.   GI ppx: Not indicated  GOC: Full code.      My note supersedes the residents in the event of a discrepancy.

## 2021-03-12 NOTE — H&P ADULT - NSICDXPASTSURGICALHX_GEN_ALL_CORE_FT
PAST SURGICAL HISTORY:  History of total cystectomy with resultant urostomy    History of total hip replacement, bilateral     History of total knee replacement, bilateral

## 2021-03-12 NOTE — H&P ADULT - NSICDXPASTMEDICALHX_GEN_ALL_CORE_FT
PAST MEDICAL HISTORY:  Bladder cancer secondary to exposure at [employer] 9/11 s/p cystectomy with urostomy    Chronic kidney disease (CKD) stage 3A, baseline creatinine 1.4    Diabetes mellitus, type 2     History of atrial fibrillation     Hyperlipidemia     Hypertension

## 2021-03-12 NOTE — H&P ADULT - NSHPSOCIALHISTORY_GEN_ALL_CORE
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

## 2021-03-13 LAB
A1C WITH ESTIMATED AVERAGE GLUCOSE RESULT: 8.1 % — HIGH (ref 4–5.6)
ALBUMIN SERPL ELPH-MCNC: 3.3 G/DL — LOW (ref 3.5–5.2)
ALP SERPL-CCNC: 47 U/L — SIGNIFICANT CHANGE UP (ref 30–115)
ALT FLD-CCNC: 70 U/L — HIGH (ref 0–41)
ANION GAP SERPL CALC-SCNC: 11 MMOL/L — SIGNIFICANT CHANGE UP (ref 7–14)
AST SERPL-CCNC: 63 U/L — HIGH (ref 0–41)
BASOPHILS # BLD AUTO: 0.01 K/UL — SIGNIFICANT CHANGE UP (ref 0–0.2)
BASOPHILS NFR BLD AUTO: 0.1 % — SIGNIFICANT CHANGE UP (ref 0–1)
BILIRUB SERPL-MCNC: 0.9 MG/DL — SIGNIFICANT CHANGE UP (ref 0.2–1.2)
BLD GP AB SCN SERPL QL: SIGNIFICANT CHANGE UP
BUN SERPL-MCNC: 31 MG/DL — HIGH (ref 10–20)
CALCIUM SERPL-MCNC: 8.9 MG/DL — SIGNIFICANT CHANGE UP (ref 8.5–10.1)
CHLORIDE SERPL-SCNC: 97 MMOL/L — LOW (ref 98–110)
CK MB CFR SERPL CALC: 6.3 NG/ML — SIGNIFICANT CHANGE UP (ref 0.6–6.3)
CK SERPL-CCNC: 874 U/L — HIGH (ref 0–225)
CO2 SERPL-SCNC: 26 MMOL/L — SIGNIFICANT CHANGE UP (ref 17–32)
CREAT SERPL-MCNC: 1.3 MG/DL — SIGNIFICANT CHANGE UP (ref 0.7–1.5)
CRP SERPL-MCNC: 80 MG/L — HIGH
EOSINOPHIL # BLD AUTO: 0 K/UL — SIGNIFICANT CHANGE UP (ref 0–0.7)
EOSINOPHIL NFR BLD AUTO: 0 % — SIGNIFICANT CHANGE UP (ref 0–8)
ESTIMATED AVERAGE GLUCOSE: 186 MG/DL — HIGH (ref 68–114)
FERRITIN SERPL-MCNC: 535 NG/ML — HIGH (ref 30–400)
GLUCOSE BLDC GLUCOMTR-MCNC: 250 MG/DL — HIGH (ref 70–99)
GLUCOSE BLDC GLUCOMTR-MCNC: 255 MG/DL — HIGH (ref 70–99)
GLUCOSE BLDC GLUCOMTR-MCNC: 257 MG/DL — HIGH (ref 70–99)
GLUCOSE BLDC GLUCOMTR-MCNC: 297 MG/DL — HIGH (ref 70–99)
GLUCOSE BLDC GLUCOMTR-MCNC: 315 MG/DL — HIGH (ref 70–99)
GLUCOSE SERPL-MCNC: 258 MG/DL — HIGH (ref 70–99)
HCT VFR BLD CALC: 43.1 % — SIGNIFICANT CHANGE UP (ref 42–52)
HGB BLD-MCNC: 13.9 G/DL — LOW (ref 14–18)
IMM GRANULOCYTES NFR BLD AUTO: 0.8 % — HIGH (ref 0.1–0.3)
LYMPHOCYTES # BLD AUTO: 0.43 K/UL — LOW (ref 1.2–3.4)
LYMPHOCYTES # BLD AUTO: 4.5 % — LOW (ref 20.5–51.1)
MAGNESIUM SERPL-MCNC: 2 MG/DL — SIGNIFICANT CHANGE UP (ref 1.8–2.4)
MCHC RBC-ENTMCNC: 27.6 PG — SIGNIFICANT CHANGE UP (ref 27–31)
MCHC RBC-ENTMCNC: 32.3 G/DL — SIGNIFICANT CHANGE UP (ref 32–37)
MCV RBC AUTO: 85.5 FL — SIGNIFICANT CHANGE UP (ref 80–94)
MONOCYTES # BLD AUTO: 0.53 K/UL — SIGNIFICANT CHANGE UP (ref 0.1–0.6)
MONOCYTES NFR BLD AUTO: 5.6 % — SIGNIFICANT CHANGE UP (ref 1.7–9.3)
NEUTROPHILS # BLD AUTO: 8.41 K/UL — HIGH (ref 1.4–6.5)
NEUTROPHILS NFR BLD AUTO: 89 % — HIGH (ref 42.2–75.2)
NRBC # BLD: 0 /100 WBCS — SIGNIFICANT CHANGE UP (ref 0–0)
PHOSPHATE SERPL-MCNC: 3.4 MG/DL — SIGNIFICANT CHANGE UP (ref 2.1–4.9)
PLATELET # BLD AUTO: 161 K/UL — SIGNIFICANT CHANGE UP (ref 130–400)
POTASSIUM SERPL-MCNC: 5.1 MMOL/L — HIGH (ref 3.5–5)
POTASSIUM SERPL-SCNC: 5.1 MMOL/L — HIGH (ref 3.5–5)
PROCALCITONIN SERPL-MCNC: 0.26 NG/ML — HIGH (ref 0.02–0.1)
PROT SERPL-MCNC: 6.3 G/DL — SIGNIFICANT CHANGE UP (ref 6–8)
RBC # BLD: 5.04 M/UL — SIGNIFICANT CHANGE UP (ref 4.7–6.1)
RBC # FLD: 14.8 % — HIGH (ref 11.5–14.5)
SARS-COV-2 IGG SERPL QL IA: NEGATIVE — SIGNIFICANT CHANGE UP
SARS-COV-2 IGM SERPL IA-ACNC: 0.43 INDEX — SIGNIFICANT CHANGE UP
SODIUM SERPL-SCNC: 134 MMOL/L — LOW (ref 135–146)
TROPONIN T SERPL-MCNC: <0.01 NG/ML — SIGNIFICANT CHANGE UP
WBC # BLD: 9.46 K/UL — SIGNIFICANT CHANGE UP (ref 4.8–10.8)
WBC # FLD AUTO: 9.46 K/UL — SIGNIFICANT CHANGE UP (ref 4.8–10.8)

## 2021-03-13 PROCEDURE — 99223 1ST HOSP IP/OBS HIGH 75: CPT

## 2021-03-13 PROCEDURE — 93970 EXTREMITY STUDY: CPT | Mod: 26

## 2021-03-13 RX ORDER — SENNA PLUS 8.6 MG/1
2 TABLET ORAL AT BEDTIME
Refills: 0 | Status: DISCONTINUED | OUTPATIENT
Start: 2021-03-13 | End: 2021-04-20

## 2021-03-13 RX ORDER — ACETAMINOPHEN 500 MG
650 TABLET ORAL EVERY 6 HOURS
Refills: 0 | Status: DISCONTINUED | OUTPATIENT
Start: 2021-03-13 | End: 2021-03-29

## 2021-03-13 RX ORDER — INSULIN LISPRO 100/ML
4 VIAL (ML) SUBCUTANEOUS ONCE
Refills: 0 | Status: COMPLETED | OUTPATIENT
Start: 2021-03-13 | End: 2021-03-13

## 2021-03-13 RX ORDER — SODIUM POLYSTYRENE SULFONATE 4.1 MEQ/G
30 POWDER, FOR SUSPENSION ORAL ONCE
Refills: 0 | Status: COMPLETED | OUTPATIENT
Start: 2021-03-13 | End: 2021-03-13

## 2021-03-13 RX ORDER — GUAIFENESIN/DEXTROMETHORPHAN 600MG-30MG
10 TABLET, EXTENDED RELEASE 12 HR ORAL EVERY 6 HOURS
Refills: 0 | Status: DISCONTINUED | OUTPATIENT
Start: 2021-03-13 | End: 2021-04-20

## 2021-03-13 RX ADMIN — Medication 5 UNIT(S): at 12:07

## 2021-03-13 RX ADMIN — AMLODIPINE BESYLATE 10 MILLIGRAM(S): 2.5 TABLET ORAL at 06:01

## 2021-03-13 RX ADMIN — CHLORHEXIDINE GLUCONATE 1 APPLICATION(S): 213 SOLUTION TOPICAL at 06:02

## 2021-03-13 RX ADMIN — SODIUM POLYSTYRENE SULFONATE 30 GRAM(S): 4.1 POWDER, FOR SUSPENSION ORAL at 17:04

## 2021-03-13 RX ADMIN — Medication 5 MILLIGRAM(S): at 00:32

## 2021-03-13 RX ADMIN — Medication 8: at 12:08

## 2021-03-13 RX ADMIN — Medication 4: at 17:01

## 2021-03-13 RX ADMIN — APIXABAN 5 MILLIGRAM(S): 2.5 TABLET, FILM COATED ORAL at 17:04

## 2021-03-13 RX ADMIN — SENNA PLUS 2 TABLET(S): 8.6 TABLET ORAL at 22:29

## 2021-03-13 RX ADMIN — Medication 5 UNIT(S): at 17:01

## 2021-03-13 RX ADMIN — INSULIN GLARGINE 40 UNIT(S): 100 INJECTION, SOLUTION SUBCUTANEOUS at 22:29

## 2021-03-13 RX ADMIN — Medication 100 MILLIGRAM(S): at 06:01

## 2021-03-13 RX ADMIN — Medication 6: at 08:08

## 2021-03-13 RX ADMIN — Medication 4 UNIT(S): at 22:44

## 2021-03-13 RX ADMIN — Medication 5 UNIT(S): at 08:08

## 2021-03-13 RX ADMIN — Medication 5 MILLIGRAM(S): at 22:29

## 2021-03-13 RX ADMIN — ATORVASTATIN CALCIUM 80 MILLIGRAM(S): 80 TABLET, FILM COATED ORAL at 22:29

## 2021-03-13 RX ADMIN — Medication 6 MILLIGRAM(S): at 06:01

## 2021-03-13 RX ADMIN — APIXABAN 5 MILLIGRAM(S): 2.5 TABLET, FILM COATED ORAL at 06:01

## 2021-03-13 NOTE — CHART NOTE - NSCHARTNOTEFT_GEN_A_CORE
feels better while on the NC    vss  obese  ostomy  thick neck  rrr no murmur  bilateral rhonchi, on 6L NC  euvolemic appearing    81yo M c PMHx chronic afib on noac, htn, dm2, ckd3 bladder ca s/p urostomy, b/l hip/ knee replacements obesity, here with acute hypoxic respiratory failure 2/2 severe covid 19 viral pneumonia, loli on ckd3 now better, elevated ddimer    iv dexamethasone  c/w home noac  us duplex LE's  monitor o2 requirements which are climbing-> 6L now at rest  inflammatory markers and cxr  at high risk for ards and worsening respiratory failure/ CRS  monitor as an inpatient for now

## 2021-03-13 NOTE — CONSULT NOTE ADULT - SUBJECTIVE AND OBJECTIVE BOX
ROSA MARIA CASEY  80y, Male  Allergy: No Known Allergies      All historical available data reviewed.    HPI:  Patient is a 81yo male with PMH of hypertension, hyperlipidemia, diabetes mellitus type 2, atrial fibrillation on Eliquis, CKD stage 3A, bladder cancer s/p cystectomy with urostomy, and bilateral hip and knee arthroplasties who was brought in by EMS for hypoxia. The patient states that his daughter and her boyfriend who reside in the same home as him and his wife recently tested positive for COVID-19. He and his wife tested positive for COVID-19 on 3/4/2021, but their symptoms did not start until 3/6/2021. He started feeling "beat down" and "tired all the time" to the point where he had difficulty getting up. He also endorses cough, shortness of breath, and body aches. He denies chest pain, abdominal pain, nausea, vomiting, constipation, or diarrhea. He was told to come to the ED by his daughter after they noticed his wife was "not doing so well."    In the ED, vital signs were Tmax 100.9F, , /66, RR 20, and SpO2 95% on 4L. Labs were significant for creatinine 1.7 (baseline 1.4), troponin 0.02, lactate 2.3, elevated LFTs, and D-dimer 1721. Patient was given dexamethasone 6mg IV, 1L bolus of LR, and acetaminophen 650mg. (12 Mar 2021 15:20)    FAMILY HISTORY:    PAST MEDICAL & SURGICAL HISTORY:  Chronic kidney disease (CKD)  stage 3A, baseline creatinine 1.4    Bladder cancer  secondary to exposure at [employer] 9/11 s/p cystectomy with urostomy    History of atrial fibrillation    Diabetes mellitus, type 2    Hyperlipidemia    Hypertension    History of total cystectomy  with resultant urostomy    History of total knee replacement, bilateral    History of total hip replacement, bilateral          VITALS:  T(F): 96.5, Max: 100.9 (03-12-21 @ 12:11)  HR: 75  BP: 121/64  RR: 18Vital Signs Last 24 Hrs  T(C): 35.8 (13 Mar 2021 06:12), Max: 38.3 (12 Mar 2021 12:11)  T(F): 96.5 (13 Mar 2021 06:12), Max: 100.9 (12 Mar 2021 12:11)  HR: 75 (13 Mar 2021 06:12) (75 - 101)  BP: 121/64 (13 Mar 2021 06:12) (121/64 - 141/96)  BP(mean): --  RR: 18 (13 Mar 2021 06:12) (18 - 20)  SpO2: 93% (13 Mar 2021 06:12) (93% - 95%)    TESTS & MEASUREMENTS:                        13.9   9.46  )-----------( 161      ( 13 Mar 2021 05:29 )             43.1     03-13    134<L>  |  97<L>  |  31<H>  ----------------------------<  258<H>  5.1<H>   |  26  |  1.3    Ca    8.9      13 Mar 2021 05:29  Phos  3.4     03-13  Mg     2.0     03-13    TPro  6.3  /  Alb  3.3<L>  /  TBili  0.9  /  DBili  x   /  AST  63<H>  /  ALT  70<H>  /  AlkPhos  47  03-13    LIVER FUNCTIONS - ( 13 Mar 2021 05:29 )  Alb: 3.3 g/dL / Pro: 6.3 g/dL / ALK PHOS: 47 U/L / ALT: 70 U/L / AST: 63 U/L / GGT: x                   RADIOLOGY & ADDITIONAL TESTS:  Personal review of radiological diagnostics performed  Echo and EKG results noted when applicable.     MEDICATIONS:  acetaminophen   Tablet .. 650 milliGRAM(s) Oral every 6 hours PRN  acetaminophen   Tablet .. 650 milliGRAM(s) Oral every 6 hours PRN  amLODIPine   Tablet 10 milliGRAM(s) Oral daily  apixaban 5 milliGRAM(s) Oral two times a day  atorvastatin 80 milliGRAM(s) Oral at bedtime  chlorhexidine 4% Liquid 1 Application(s) Topical <User Schedule>  dexAMETHasone  Injectable 6 milliGRAM(s) IV Push daily  guaifenesin/dextromethorphan  Syrup 10 milliLiter(s) Oral every 6 hours PRN  influenza   Vaccine 0.5 milliLiter(s) IntraMuscular once  insulin glargine Injectable (LANTUS) 40 Unit(s) SubCutaneous at bedtime  insulin lispro (ADMELOG) corrective regimen sliding scale   SubCutaneous three times a day before meals  insulin lispro Injectable (ADMELOG) 5 Unit(s) SubCutaneous three times a day before meals  melatonin 5 milliGRAM(s) Oral at bedtime  metoprolol succinate  milliGRAM(s) Oral daily  oxyCODONE    IR 5 milliGRAM(s) Oral every 6 hours PRN      ANTIBIOTICS:

## 2021-03-13 NOTE — CONSULT NOTE ADULT - ASSESSMENT
79yo male with PMH of hypertension, hyperlipidemia, diabetes mellitus type 2, atrial fibrillation on Eliquis, CKD stage 3A, bladder cancer s/p cystectomy with urostomy, and bilateral hip and knee arthroplasties who was brought in by EMS for hypoxia. Patient had tested positive for COVID-19 on 3/4/2021, but symptoms did not begin until 3/6/2021. He was found to be hypoxic to 85% on room air.    IMPRESSION;  COVID 19 with severe illness. SpO2 < 94% on RA and need for supplemental O2.  Pt is in the late inflammatory response phase ot the illness based on the onset of symptoms.  CXR opacities b/l  Ddimers 362    RECOMMENDATIONS;  f/u ferritin, CRP, procalcitonin  Dexamethasone 6 mg iv q24h for 10 days.  Monitor for side effects: hyperglycemia, neurological ( agitation/confusion), adrenal suppression, bacterial and fungal infections  Could finish course of steroids with po prednisone 40 mg q24 for a total of 10 days  No RDV  Could transfer to [facility]  for further management.   recall prn please

## 2021-03-14 LAB
ALBUMIN SERPL ELPH-MCNC: 3.2 G/DL — LOW (ref 3.5–5.2)
ALP SERPL-CCNC: 43 U/L — SIGNIFICANT CHANGE UP (ref 30–115)
ALT FLD-CCNC: 53 U/L — HIGH (ref 0–41)
ANION GAP SERPL CALC-SCNC: 15 MMOL/L — HIGH (ref 7–14)
APPEARANCE UR: ABNORMAL
AST SERPL-CCNC: 45 U/L — HIGH (ref 0–41)
BACTERIA # UR AUTO: ABNORMAL
BASOPHILS # BLD AUTO: 0 K/UL — SIGNIFICANT CHANGE UP (ref 0–0.2)
BASOPHILS NFR BLD AUTO: 0 % — SIGNIFICANT CHANGE UP (ref 0–1)
BILIRUB SERPL-MCNC: 0.9 MG/DL — SIGNIFICANT CHANGE UP (ref 0.2–1.2)
BILIRUB UR-MCNC: NEGATIVE — SIGNIFICANT CHANGE UP
BUN SERPL-MCNC: 33 MG/DL — HIGH (ref 10–20)
CALCIUM SERPL-MCNC: 8.8 MG/DL — SIGNIFICANT CHANGE UP (ref 8.5–10.1)
CHLORIDE SERPL-SCNC: 103 MMOL/L — SIGNIFICANT CHANGE UP (ref 98–110)
CO2 SERPL-SCNC: 22 MMOL/L — SIGNIFICANT CHANGE UP (ref 17–32)
COLOR SPEC: YELLOW — SIGNIFICANT CHANGE UP
CREAT ?TM UR-MCNC: 96 MG/DL — SIGNIFICANT CHANGE UP
CREAT SERPL-MCNC: 1.1 MG/DL — SIGNIFICANT CHANGE UP (ref 0.7–1.5)
DIFF PNL FLD: SIGNIFICANT CHANGE UP
EOSINOPHIL # BLD AUTO: 0 K/UL — SIGNIFICANT CHANGE UP (ref 0–0.7)
EOSINOPHIL NFR BLD AUTO: 0 % — SIGNIFICANT CHANGE UP (ref 0–8)
EPI CELLS # UR: 0 /HPF — SIGNIFICANT CHANGE UP (ref 0–5)
GLUCOSE BLDC GLUCOMTR-MCNC: 213 MG/DL — HIGH (ref 70–99)
GLUCOSE BLDC GLUCOMTR-MCNC: 235 MG/DL — HIGH (ref 70–99)
GLUCOSE BLDC GLUCOMTR-MCNC: 270 MG/DL — HIGH (ref 70–99)
GLUCOSE BLDC GLUCOMTR-MCNC: 275 MG/DL — HIGH (ref 70–99)
GLUCOSE BLDC GLUCOMTR-MCNC: 311 MG/DL — HIGH (ref 70–99)
GLUCOSE SERPL-MCNC: 231 MG/DL — HIGH (ref 70–99)
GLUCOSE UR QL: NEGATIVE — SIGNIFICANT CHANGE UP
HCT VFR BLD CALC: 40.8 % — LOW (ref 42–52)
HGB BLD-MCNC: 13.3 G/DL — LOW (ref 14–18)
HYALINE CASTS # UR AUTO: 18 /LPF — HIGH (ref 0–7)
IMM GRANULOCYTES NFR BLD AUTO: 0.8 % — HIGH (ref 0.1–0.3)
KETONES UR-MCNC: NEGATIVE — SIGNIFICANT CHANGE UP
LEUKOCYTE ESTERASE UR-ACNC: ABNORMAL
LYMPHOCYTES # BLD AUTO: 0.41 K/UL — LOW (ref 1.2–3.4)
LYMPHOCYTES # BLD AUTO: 3.7 % — LOW (ref 20.5–51.1)
MAGNESIUM SERPL-MCNC: 2 MG/DL — SIGNIFICANT CHANGE UP (ref 1.8–2.4)
MCHC RBC-ENTMCNC: 27.7 PG — SIGNIFICANT CHANGE UP (ref 27–31)
MCHC RBC-ENTMCNC: 32.6 G/DL — SIGNIFICANT CHANGE UP (ref 32–37)
MCV RBC AUTO: 84.8 FL — SIGNIFICANT CHANGE UP (ref 80–94)
MONOCYTES # BLD AUTO: 0.55 K/UL — SIGNIFICANT CHANGE UP (ref 0.1–0.6)
MONOCYTES NFR BLD AUTO: 4.9 % — SIGNIFICANT CHANGE UP (ref 1.7–9.3)
NEUTROPHILS # BLD AUTO: 10.11 K/UL — HIGH (ref 1.4–6.5)
NEUTROPHILS NFR BLD AUTO: 90.6 % — HIGH (ref 42.2–75.2)
NITRITE UR-MCNC: NEGATIVE — SIGNIFICANT CHANGE UP
NRBC # BLD: 0 /100 WBCS — SIGNIFICANT CHANGE UP (ref 0–0)
OSMOLALITY UR: 571 MOS/KG — SIGNIFICANT CHANGE UP (ref 50–1200)
PH UR: 6.5 — SIGNIFICANT CHANGE UP (ref 5–8)
PLATELET # BLD AUTO: 202 K/UL — SIGNIFICANT CHANGE UP (ref 130–400)
POTASSIUM SERPL-MCNC: 4.2 MMOL/L — SIGNIFICANT CHANGE UP (ref 3.5–5)
POTASSIUM SERPL-SCNC: 4.2 MMOL/L — SIGNIFICANT CHANGE UP (ref 3.5–5)
POTASSIUM UR-SCNC: 22 MMOL/L — SIGNIFICANT CHANGE UP
PROT ?TM UR-MCNC: 39 MG/DLG/24H — SIGNIFICANT CHANGE UP
PROT SERPL-MCNC: 5.9 G/DL — LOW (ref 6–8)
PROT UR-MCNC: ABNORMAL
PROT/CREAT UR-RTO: 0.4 RATIO — HIGH (ref 0–0.2)
RBC # BLD: 4.81 M/UL — SIGNIFICANT CHANGE UP (ref 4.7–6.1)
RBC # FLD: 14.8 % — HIGH (ref 11.5–14.5)
RBC CASTS # UR COMP ASSIST: 5 /HPF — HIGH (ref 0–4)
SODIUM SERPL-SCNC: 140 MMOL/L — SIGNIFICANT CHANGE UP (ref 135–146)
SODIUM UR-SCNC: 26 MMOL/L — SIGNIFICANT CHANGE UP
SP GR SPEC: 1.02 — SIGNIFICANT CHANGE UP (ref 1.01–1.03)
UROBILINOGEN FLD QL: ABNORMAL
WBC # BLD: 11.16 K/UL — HIGH (ref 4.8–10.8)
WBC # FLD AUTO: 11.16 K/UL — HIGH (ref 4.8–10.8)
WBC UR QL: 90 /HPF — HIGH (ref 0–5)

## 2021-03-14 PROCEDURE — 99233 SBSQ HOSP IP/OBS HIGH 50: CPT

## 2021-03-14 RX ORDER — INSULIN LISPRO 100/ML
8 VIAL (ML) SUBCUTANEOUS
Refills: 0 | Status: DISCONTINUED | OUTPATIENT
Start: 2021-03-14 | End: 2021-03-29

## 2021-03-14 RX ORDER — INSULIN GLARGINE 100 [IU]/ML
45 INJECTION, SOLUTION SUBCUTANEOUS AT BEDTIME
Refills: 0 | Status: DISCONTINUED | OUTPATIENT
Start: 2021-03-14 | End: 2021-03-29

## 2021-03-14 RX ORDER — INSULIN LISPRO 100/ML
4 VIAL (ML) SUBCUTANEOUS ONCE
Refills: 0 | Status: COMPLETED | OUTPATIENT
Start: 2021-03-14 | End: 2021-03-14

## 2021-03-14 RX ADMIN — AMLODIPINE BESYLATE 10 MILLIGRAM(S): 2.5 TABLET ORAL at 05:56

## 2021-03-14 RX ADMIN — SENNA PLUS 2 TABLET(S): 8.6 TABLET ORAL at 21:17

## 2021-03-14 RX ADMIN — Medication 4 UNIT(S): at 01:54

## 2021-03-14 RX ADMIN — Medication 8: at 11:43

## 2021-03-14 RX ADMIN — INSULIN GLARGINE 45 UNIT(S): 100 INJECTION, SOLUTION SUBCUTANEOUS at 21:17

## 2021-03-14 RX ADMIN — APIXABAN 5 MILLIGRAM(S): 2.5 TABLET, FILM COATED ORAL at 17:05

## 2021-03-14 RX ADMIN — APIXABAN 5 MILLIGRAM(S): 2.5 TABLET, FILM COATED ORAL at 05:56

## 2021-03-14 RX ADMIN — Medication 5 MILLIGRAM(S): at 21:17

## 2021-03-14 RX ADMIN — Medication 100 MILLIGRAM(S): at 05:56

## 2021-03-14 RX ADMIN — CHLORHEXIDINE GLUCONATE 1 APPLICATION(S): 213 SOLUTION TOPICAL at 05:56

## 2021-03-14 RX ADMIN — Medication 8 UNIT(S): at 17:00

## 2021-03-14 RX ADMIN — Medication 6: at 17:00

## 2021-03-14 RX ADMIN — Medication 6 MILLIGRAM(S): at 05:56

## 2021-03-14 RX ADMIN — Medication 4: at 08:09

## 2021-03-14 RX ADMIN — Medication 8 UNIT(S): at 11:42

## 2021-03-14 RX ADMIN — ATORVASTATIN CALCIUM 80 MILLIGRAM(S): 80 TABLET, FILM COATED ORAL at 21:17

## 2021-03-14 NOTE — PROGRESS NOTE ADULT - SUBJECTIVE AND OBJECTIVE BOX
TIANNAROSA MARIA CUMMINS  80y  Male      Patient is a 80y old  Male who presents with a chief complaint of acute hypoxemic respiratory failure secondary to COVID-19 pneumonia (14 Mar 2021 07:11)      INTERVAL HPI/OVERNIGHT EVENTS: breathing is stable from yesterday to today. no new complaints      REVIEW OF SYSTEMS:  as above  All other review of systems negative    T(C): 35.7 (21 @ 14:26), Max: 36.1 (21 @ 20:48)  HR: 68 (21 @ 14:26) (68 - 71)  BP: 124/75 (21 @ 14:26) (123/81 - 126/76)  RR: 18 (21 @ 14:26) (18 - 18)  SpO2: 93% (21 @ 14:26) (93% - 96%)  Wt(kg): --Vital Signs Last 24 Hrs  T(C): 35.7 (14 Mar 2021 14:26), Max: 36.1 (13 Mar 2021 20:48)  T(F): 96.2 (14 Mar 2021 14:26), Max: 97 (14 Mar 2021 05:20)  HR: 68 (14 Mar 2021 14:26) (68 - 71)  BP: 124/75 (14 Mar 2021 14:26) (123/81 - 126/76)  BP(mean): --  RR: 18 (14 Mar 2021 14:26) (18 - 18)  SpO2: 93% (14 Mar 2021 14:26) (93% - 96%)      21 @ 06:01  -  21 @ 07:00  --------------------------------------------------------  IN: 0 mL / OUT: 1550 mL / NET: -1550 mL    21 @ 07:01  -  21 @ 15:28  --------------------------------------------------------  IN: 0 mL / OUT: 400 mL / NET: -400 mL        PHYSICAL EXAM:  GENERAL: NAD  HEEN thick neck  PSYCH: no agitation, baseline mentation  NERVOUS SYSTEM:  Alert & Oriented X3, no new focal deficits  PULMONARY: bilateral rhonchi no change from prior 6L NC  CARDIOVASCULAR: Regular rate and rhythm; No murmurs, rubs, or gallops  GI: Soft, Nontender, Nondistended; Bowel sounds present obese, ostomy cdi  EXTREMITIES:  2+ Peripheral Pulses, No clubbing, cyanosis, or edema    Consultant(s) Notes Reviewed:  [x ] YES  [ ] NO    Discussed with Consultants/Other Providers [ x] YES     LABS                          13.3   11.16 )-----------( 202      ( 14 Mar 2021 07:27 )             40.8     03-14    140  |  103  |  33<H>  ----------------------------<  231<H>  4.2   |  22  |  1.1    Ca    8.8      14 Mar 2021 07:27  Phos  3.4     03-13  Mg     2.0     03-14    TPro  5.9<L>  /  Alb  3.2<L>  /  TBili  0.9  /  DBili  x   /  AST  45<H>  /  ALT  53<H>  /  AlkPhos  43  03-14      Urinalysis Basic - ( 12 Mar 2021 16:38 )    Color: Yellow / Appearance: Slightly Turbid / S.020 / pH: x  Gluc: x / Ketone: Negative  / Bili: Negative / Urobili: 3 mg/dL   Blood: x / Protein: 30 mg/dL / Nitrite: Negative   Leuk Esterase: Moderate / RBC: 5 /HPF / WBC 90 /HPF   Sq Epi: x / Non Sq Epi: 0 /HPF / Bacteria: Many        Lactate Trend   @ 18:21 Lactate:1.8     CARDIAC MARKERS ( 13 Mar 2021 05:29 )  x     / <0.01 ng/mL / 874 U/L / x     / 6.3 ng/mL  CARDIAC MARKERS ( 12 Mar 2021 18:21 )  x     / <0.01 ng/mL / x     / x     / x          CAPILLARY BLOOD GLUCOSE      POCT Blood Glucose.: 311 mg/dL (14 Mar 2021 11:32)        RADIOLOGY & ADDITIONAL TESTS:    Imaging Personally Reviewed:  [ ] YES  [ ] NO    HEALTH ISSUES - PROBLEM Dx:  Suspected deep vein thrombosis (DVT)

## 2021-03-14 NOTE — PROGRESS NOTE ADULT - ASSESSMENT
Patient is a 81yo male with PMH of hypertension, hyperlipidemia, diabetes mellitus type 2, atrial fibrillation on Eliquis, CKD stage 3A, bladder cancer s/p cystectomy with urostomy, and bilateral hip and knee arthroplasties who was brought in by EMS for hypoxia. Patient had tested positive for COVID-19 on 3/4/2021, but symptoms did not begin until 3/6/2021. He was found to be hypoxic to 85% on room air.    #Acute hypoxemic respiratory failure secondary to COVID-19 pneumonia  - COVID-19 PCR positive 3/4/2021  - Chest X-ray: some bibasilar opacities  - Patient is saturating 96% on 6L NC. Try to wean off O2.    - Ferritin = 535; d-dimer = 1721; CRP = 80; Procal = 0.26 noted.  - Duplex negative for DVT.  - Repeat inflammatory markers (D-dimer, CRP, ferritin) Q48-72H if clinically worsening  - Patient is outside the window for remdesivir  - COVID ABs negative. will offer 1 unit convalescent plasma.  - Incentive spirometry at bedside  - Continue with dexamethasone 6mg IV once daily for 10 days (started on 3/12)  - DVT prophylaxis per protocol. on eliquis for Paroxysmal Afib  - Titrate supplemental O2 to maintain SpO2 92-96%  - Labs noted for CRP >7.5 and procalcitonin <0.5,--> Tocilizumab x1 (Wt <65 kg= 400 mg, 65-90 kg =600 mg, >90 kg =800 mg). ID f/u for ?Toci    #Mild hepatocellular transaminitis  - Likely secondary to underlying COVID-19 infection  - Trend LFTs    #Lymphopenia  - Likely secondary to underlying COVID-19 infection    #Elevated D-dimer  - With history of bilateral TKR and THR, underlying COVID-19 infection, and worsening oxygen requirements, will order venous duplex    #Elevated troponin, likely type 2 demand ischemia secondary to hypoxia  - Outpatient nuclear scan 2019 showed EF 55% with no wall motion abnormalities or reversible defects  - Trend LFTs    #Acute kidney injury on CKD stage 3A, resolved.  - Baseline creatinine: 1.4 (as of 1/2021)  - Creatinine today: 1.3 <- 1.7    #Lactic acidosis  - Lactate: 2.1 <- 2.3; improving.  - S/p 1L bolus of LR in the ED  - follow lactate level.    #Paroxysmal atrial fibrillation  - ECG: normal sinus rhythm  - [id number]: 4 (age>75, DM, HTN)  - HAS-BLED: 1 (age>65)  - Continue with Eliquis 5mg PO twice daily and metoprolol succinate 100mg PO once daily     #Diabetes mellitus type 2  - Hemoglobin A1c 1/20/2021: 7.9  - Hold home medications glimepiride and Ozempic  - Patient required total 26 units of corrective Lispro over past 24 hrs.  - will increase today insulin glargine to 45 units SQ QHS  - will increase today insulin lispro 8 units SQ TID  - Insulin sliding scale coverage  - Monitor fingerstick glucose, especially while on steroids    #Hyperlipidemia  - Convert home medication rosuvastatin to atorvastatin 80mg PO at bedtime     #Hypertension  - Continue with amlodipine 10mg PO once daily and metoprolol succinate 100mg PO once daily   - Hold home medication benazepril given LORRAINE    #S/p bilateral TKR and THR  - Continue with oxycodone IR 5mg PO Q6H PRN    #Bladder cancer s/p cystectomy with urostomy  - Follow outpatient with urology and oncology  - Urostomy care PRN    #Misc  - DVT Prophylaxis: Eliquis 5mg PO twice daily   - GI Prophylaxis: not indicated   - Diet: DASH/TLC, consistent carbohydrate  - Activity: increase as tolerated  - IV Fluids: not indicated   - Code Status: Full Code  - Dispo: Acute. Pending clinical improvement.

## 2021-03-14 NOTE — PROGRESS NOTE ADULT - ASSESSMENT
79yo M c PMHx chronic afib on noac, htn, dm2, ckd3 bladder ca s/p urostomy, b/l hip/ knee replacements obesity, here with acute hypoxic respiratory failure 2/2 severe covid 19 viral pneumonia, loli on ckd3 now better, elevated ddimer    iv dexamethasone  c/w home noac  us duplex LE's- prelim negative for DVT  monitor o2 requirements  6L now at rest  inflammatory markers and cxr  at high risk for ards and worsening respiratory failure/ CRS  after counselling- patient undecided about plasma at this moment  monitor as an inpatient for now    Provider Hand off  pending- us duplex final read, follow o2 requirements/ inflammatory markers  discussed case with patient who is aaox3, call made to [facility] service on patient's behalf  Dispo- o2 requirements too high for dispo planning at this moment

## 2021-03-14 NOTE — PROGRESS NOTE ADULT - SUBJECTIVE AND OBJECTIVE BOX
SUBJECTIVE:    Patient is a 80y old Male who presents with a chief complaint of acute hypoxemic respiratory failure secondary to COVID-19 pneumonia (13 Mar 2021 08:33)    Currently admitted to medicine with the primary diagnosis of COVID-19       Today is hospital day 2d. This morning he is resting comfortably in bed and reports no new issues or overnight events.     Admit Diagnosis:  COVID 19 HYPOXIA ELEVATED TROPONIN        PAST MEDICAL & SURGICAL HISTORY  Chronic kidney disease (CKD)  stage 3A, baseline creatinine 1.4    Bladder cancer  secondary to exposure at [employer] 9/11 s/p cystectomy with urostomy    History of atrial fibrillation    Diabetes mellitus, type 2    Hyperlipidemia    Hypertension    History of total cystectomy  with resultant urostomy    History of total knee replacement, bilateral    History of total hip replacement, bilateral    Chronic kidney disease (CKD)    Bladder cancer    History of atrial fibrillation    Diabetes mellitus, type 2    Hyperlipidemia    Hypertension    History of total cystectomy    History of total knee replacement, bilateral    History of total hip replacement, bilateral        SOCIAL HISTORY:  Negative for smoking/alcohol/drug use.     ALLERGIES:  No Known Allergies    MEDICATIONS:  STANDING MEDICATIONS  amLODIPine   Tablet 10 milliGRAM(s) Oral daily  apixaban 5 milliGRAM(s) Oral two times a day  atorvastatin 80 milliGRAM(s) Oral at bedtime  chlorhexidine 4% Liquid 1 Application(s) Topical <User Schedule>  dexAMETHasone  Injectable 6 milliGRAM(s) IV Push daily  influenza   Vaccine 0.5 milliLiter(s) IntraMuscular once  insulin glargine Injectable (LANTUS) 40 Unit(s) SubCutaneous at bedtime  insulin lispro (ADMELOG) corrective regimen sliding scale   SubCutaneous three times a day before meals  insulin lispro Injectable (ADMELOG) 5 Unit(s) SubCutaneous three times a day before meals  melatonin 5 milliGRAM(s) Oral at bedtime  metoprolol succinate  milliGRAM(s) Oral daily  senna 2 Tablet(s) Oral at bedtime    PRN MEDICATIONS  acetaminophen   Tablet .. 650 milliGRAM(s) Oral every 6 hours PRN  acetaminophen   Tablet .. 650 milliGRAM(s) Oral every 6 hours PRN  guaifenesin/dextromethorphan  Syrup 10 milliLiter(s) Oral every 6 hours PRN  oxyCODONE    IR 5 milliGRAM(s) Oral every 6 hours PRN    VITALS:   T(F): 97  HR: 71  BP: 123/81  RR: 18  SpO2: 96%    I&Os:  03-13-21 @ 06:01  -  03-14-21 @ 07:00  --------------------------------------------------------  IN: 0 mL / OUT: 1550 mL / NET: -1550 mL        PHYSICAL EXAM:  GEN: No acute distress  LUNGS: Clear to auscultation bilaterally   HEART: S1/S2  ABD: Soft, NT/ND. BS +  EXT: no cyanosis/edema  NEURO: AAOX3    LABS:                        13.9   9.46  )-----------( 161      ( 13 Mar 2021 05:29 )             43.1     03-13    134<L>  |  97<L>  |  31<H>  ----------------------------<  258<H>  5.1<H>   |  26  |  1.3    Ca    8.9      13 Mar 2021 05:29  Phos  3.4     03-13  Mg     2.0     03-13    TPro  6.3  /  Alb  3.3<L>  /  TBili  0.9  /  DBili  x   /  AST  63<H>  /  ALT  70<H>  /  AlkPhos  47  03-13    Creatinine trend: 1.3<--, 1.7<--  PT/INR - ( 12 Mar 2021 12:50 )   PT: 17.10 sec;   INR: 1.49 ratio         PTT - ( 12 Mar 2021 12:50 )  PTT:35.1 sec          CARDIAC MARKERS ( 13 Mar 2021 05:29 )  x     / <0.01 ng/mL / 874 U/L / x     / 6.3 ng/mL  CARDIAC MARKERS ( 12 Mar 2021 18:21 )  x     / <0.01 ng/mL / x     / x     / x      CARDIAC MARKERS ( 12 Mar 2021 12:50 )  x     / 0.02 ng/mL / x     / x     / x          COVID-19 PCR: Detected (03-12-21 @ 12:45)    Ferritin, Serum: 535 ng/mL (03-12-21 @ 12:50)    D-Dimer Assay, Quantitative: 1721 ng/mL DDU (03-12-21 @ 12:50)    C-Reactive Protein, Serum: 80 mg/L (03-12-21 @ 12:50)    Procalcitonin, Serum: 0.26 ng/mL (03-12-21 @ 12:50)    COVID-19 IgG Antibody Interpretation: Negative (03-13-21 @ 05:29)      RADIOLOGY:  < from: [facility] Duplex Lower Ext Vein Scan, Bilat (03.13.21 @ 12:31) >  Impression:    No evidence of deep venous thrombosis or superficial thrombophlebitis in the bilateral lower extremities.    < end of copied text >    < from: Xray Chest 1 View-PORTABLE IMMEDIATE (03.12.21 @ 14:53) >  IMPRESSION:    Increased bilateral interstitial opacities. Recommend continued follow-up.    < end of copied text >

## 2021-03-14 NOTE — CHART NOTE - NSCHARTNOTEFT_GEN_A_CORE
Discussed with patient in detail about convalescent plasma infusion. Explained benefits and risks in detail.  Patient wants to discuss plasma infusion treatment with his primary doctor, Dr. Evan Bull.    Dr. Bull's office called and left message for call back.

## 2021-03-15 LAB
ALBUMIN SERPL ELPH-MCNC: 3.3 G/DL — LOW (ref 3.5–5.2)
ALP SERPL-CCNC: 59 U/L — SIGNIFICANT CHANGE UP (ref 30–115)
ALT FLD-CCNC: 47 U/L — HIGH (ref 0–41)
ANION GAP SERPL CALC-SCNC: 13 MMOL/L — SIGNIFICANT CHANGE UP (ref 7–14)
AST SERPL-CCNC: 40 U/L — SIGNIFICANT CHANGE UP (ref 0–41)
BASOPHILS # BLD AUTO: 0.03 K/UL — SIGNIFICANT CHANGE UP (ref 0–0.2)
BASOPHILS NFR BLD AUTO: 0.3 % — SIGNIFICANT CHANGE UP (ref 0–1)
BILIRUB SERPL-MCNC: 0.9 MG/DL — SIGNIFICANT CHANGE UP (ref 0.2–1.2)
BUN SERPL-MCNC: 35 MG/DL — HIGH (ref 10–20)
CALCIUM SERPL-MCNC: 8.6 MG/DL — SIGNIFICANT CHANGE UP (ref 8.5–10.1)
CHLORIDE SERPL-SCNC: 102 MMOL/L — SIGNIFICANT CHANGE UP (ref 98–110)
CO2 SERPL-SCNC: 25 MMOL/L — SIGNIFICANT CHANGE UP (ref 17–32)
CREAT SERPL-MCNC: 1.1 MG/DL — SIGNIFICANT CHANGE UP (ref 0.7–1.5)
CRP SERPL-MCNC: 25 MG/L — HIGH
D DIMER BLD IA.RAPID-MCNC: 3761 NG/ML DDU — HIGH (ref 0–230)
EOSINOPHIL # BLD AUTO: 0 K/UL — SIGNIFICANT CHANGE UP (ref 0–0.7)
EOSINOPHIL NFR BLD AUTO: 0 % — SIGNIFICANT CHANGE UP (ref 0–8)
GLUCOSE BLDC GLUCOMTR-MCNC: 191 MG/DL — HIGH (ref 70–99)
GLUCOSE BLDC GLUCOMTR-MCNC: 221 MG/DL — HIGH (ref 70–99)
GLUCOSE BLDC GLUCOMTR-MCNC: 237 MG/DL — HIGH (ref 70–99)
GLUCOSE BLDC GLUCOMTR-MCNC: 253 MG/DL — HIGH (ref 70–99)
GLUCOSE SERPL-MCNC: 187 MG/DL — HIGH (ref 70–99)
HCT VFR BLD CALC: 42.3 % — SIGNIFICANT CHANGE UP (ref 42–52)
HGB BLD-MCNC: 13.5 G/DL — LOW (ref 14–18)
IMM GRANULOCYTES NFR BLD AUTO: 0.9 % — HIGH (ref 0.1–0.3)
LYMPHOCYTES # BLD AUTO: 0.64 K/UL — LOW (ref 1.2–3.4)
LYMPHOCYTES # BLD AUTO: 6.8 % — LOW (ref 20.5–51.1)
MAGNESIUM SERPL-MCNC: 2 MG/DL — SIGNIFICANT CHANGE UP (ref 1.8–2.4)
MCHC RBC-ENTMCNC: 27.2 PG — SIGNIFICANT CHANGE UP (ref 27–31)
MCHC RBC-ENTMCNC: 31.9 G/DL — LOW (ref 32–37)
MCV RBC AUTO: 85.3 FL — SIGNIFICANT CHANGE UP (ref 80–94)
MONOCYTES # BLD AUTO: 0.55 K/UL — SIGNIFICANT CHANGE UP (ref 0.1–0.6)
MONOCYTES NFR BLD AUTO: 5.9 % — SIGNIFICANT CHANGE UP (ref 1.7–9.3)
NEUTROPHILS # BLD AUTO: 8.05 K/UL — HIGH (ref 1.4–6.5)
NEUTROPHILS NFR BLD AUTO: 86.1 % — HIGH (ref 42.2–75.2)
NRBC # BLD: 0 /100 WBCS — SIGNIFICANT CHANGE UP (ref 0–0)
PLATELET # BLD AUTO: 241 K/UL — SIGNIFICANT CHANGE UP (ref 130–400)
POTASSIUM SERPL-MCNC: 4.1 MMOL/L — SIGNIFICANT CHANGE UP (ref 3.5–5)
POTASSIUM SERPL-SCNC: 4.1 MMOL/L — SIGNIFICANT CHANGE UP (ref 3.5–5)
PROCALCITONIN SERPL-MCNC: 0.07 NG/ML — SIGNIFICANT CHANGE UP (ref 0.02–0.1)
PROT SERPL-MCNC: 6.1 G/DL — SIGNIFICANT CHANGE UP (ref 6–8)
RBC # BLD: 4.96 M/UL — SIGNIFICANT CHANGE UP (ref 4.7–6.1)
RBC # FLD: 14.8 % — HIGH (ref 11.5–14.5)
SODIUM SERPL-SCNC: 140 MMOL/L — SIGNIFICANT CHANGE UP (ref 135–146)
WBC # BLD: 9.35 K/UL — SIGNIFICANT CHANGE UP (ref 4.8–10.8)
WBC # FLD AUTO: 9.35 K/UL — SIGNIFICANT CHANGE UP (ref 4.8–10.8)

## 2021-03-15 PROCEDURE — 71045 X-RAY EXAM CHEST 1 VIEW: CPT | Mod: 26

## 2021-03-15 RX ADMIN — Medication 4: at 18:17

## 2021-03-15 RX ADMIN — Medication 5 MILLIGRAM(S): at 21:29

## 2021-03-15 RX ADMIN — Medication 100 MILLIGRAM(S): at 05:33

## 2021-03-15 RX ADMIN — APIXABAN 5 MILLIGRAM(S): 2.5 TABLET, FILM COATED ORAL at 18:17

## 2021-03-15 RX ADMIN — SENNA PLUS 2 TABLET(S): 8.6 TABLET ORAL at 21:30

## 2021-03-15 RX ADMIN — INSULIN GLARGINE 45 UNIT(S): 100 INJECTION, SOLUTION SUBCUTANEOUS at 21:29

## 2021-03-15 RX ADMIN — ATORVASTATIN CALCIUM 80 MILLIGRAM(S): 80 TABLET, FILM COATED ORAL at 21:29

## 2021-03-15 RX ADMIN — Medication 8 UNIT(S): at 08:05

## 2021-03-15 RX ADMIN — Medication 6: at 11:15

## 2021-03-15 RX ADMIN — APIXABAN 5 MILLIGRAM(S): 2.5 TABLET, FILM COATED ORAL at 05:33

## 2021-03-15 RX ADMIN — Medication 2: at 08:05

## 2021-03-15 RX ADMIN — Medication 8 UNIT(S): at 11:15

## 2021-03-15 RX ADMIN — CHLORHEXIDINE GLUCONATE 1 APPLICATION(S): 213 SOLUTION TOPICAL at 05:33

## 2021-03-15 RX ADMIN — Medication 8 UNIT(S): at 18:18

## 2021-03-15 RX ADMIN — AMLODIPINE BESYLATE 10 MILLIGRAM(S): 2.5 TABLET ORAL at 05:33

## 2021-03-15 RX ADMIN — Medication 6 MILLIGRAM(S): at 05:33

## 2021-03-15 NOTE — PROGRESS NOTE ADULT - ASSESSMENT
79yo M c PMHx chronic afib on noac, htn, dm2, ckd3 bladder ca s/p urostomy, b/l hip/ knee replacements obesity, here with acute hypoxic respiratory failure 2/2 severe covid 19 viral pneumonia, loli on ckd3 now better, elevated ddimer    iv dexamethasone  us duplex LE's- prelim negative for DVT  monitor o2 requirements  6L now at rest 96% pulse ox  inflammatory markers and cxr  at high risk for ards and worsening respiratory failure/ CRS  monitor as an inpatient for now  ID consult  Rehab

## 2021-03-15 NOTE — CHART NOTE - NSCHARTNOTEFT_GEN_A_CORE
I made rounds today with the treatment team including the hospitalist, residents,  nurses and  and discussed the patient's current medical status and discharge  planning needs, and reviewed the chart.    T(C): 35.9 (03-15-21 @ 05:30), Max: 35.9 (03-14-21 @ 19:56)  HR: 62 (03-15-21 @ 05:30) (62 - 68)  BP: 150/68 (03-15-21 @ 05:30) (124/75 - 150/68)  RR: 18 (03-15-21 @ 05:30) (18 - 18)  SpO2: 96% (03-15-21 @ 05:30) (93% - 96%)          I reached out to the patient's health care proxy/ responsible family member-           [     ]  I reached                                     and discussed the patient's medical condition,                   family concerns, and discharge planning           [     ]  I left a message with family               [     ]  I personally participated in rounds with the medical team and my resident and discussed the case. My resident reached                   family member/ HCP                                under my direction and supervision  and we reviewed the conversation          [  x   ]  My resident tried to leave a message with family under my direction and supervision - Rain ( daughter ) [phone] - mailbox full               [     ]   My resident attended rounds and called the family     The following was discussed:          [ x    ] I spent 5-10 minutes on the above discussing medical issues with team members and family and/ or my resident    [     ] I spent 11-20 minutes on the above discussing medical issues with team members and family and/ or my resident    [     ] I spent 21-30 minutes on the above discussing medical issues with team members and family and/ or my resident

## 2021-03-15 NOTE — PROGRESS NOTE ADULT - SUBJECTIVE AND OBJECTIVE BOX
ROSA MARIA CASEY  80y  Male      Patient is a 80y old  Male who presents with a chief complaint of acute hypoxemic respiratory failure secondary to COVID-19 pneumonia (14 Mar 2021 07:11)      INTERVAL HPI/OVERNIGHT EVENTS: pt remains on 6 L NC, on IV DEXA    T:  96.7  HR:   62  BP:  150/68  RR: 18  Pulse ox:  96% NC        PHYSICAL EXAM:  GENERAL: A&O, chronically ill looking but in nAD  Neck:   thick neck, supple, no JVD, no bruit, no stridor  Lungs:  shallow resp, scattered rhonchi, no wheezing  Abd:  sl distended soft and benign, + BS  Urinary:  no phillips  Ext:  arthritic changes, moves all limbs  Skin:  no rash  Psych stable    LABS                          13.5  0.3)-----------( 241             42.3      140  |  102  |  35  ----------------------------<187  4.1   |  25  |  1.1  GFR 64  Ca    8.8       Phos  3.4      Mg     2.0        MB 63  trop <0.01 x2  ferritin 535  procal 0.26  TPro  5.9<L>  /  Alb  3.2<L>  /  TBili  0.9  /  DBili  x   /  AST  45<H>  /  ALT  53<H>  /  AlkPhos  43  03-14      Urinalysis Basic - ( 12 Mar 2021 16:38 )    Color: Yellow / Appearance: Slightly Turbid / S.020 / pH: x  Gluc: x / Ketone: Negative  / Bili: Negative / Urobili: 3 mg/dL   Blood: x / Protein: 30 mg/dL / Nitrite: Negative   Leuk Esterase: Moderate / RBC: 5 /HPF / WBC 90 /HPF   Sq Epi: x / Non Sq Epi: 0 /HPF / Bacteria: Ma       RADIOLOGY & ADDITIONAL TESTS:  Venous duplex:  negative for DVT           ROSA MARIA CASEY  80y  Male      Patient is a 80y old  Male who presents with a chief complaint of acute hypoxemic respiratory failure secondary to COVID-19 pneumonia (14 Mar 2021 07:11)      INTERVAL HPI/OVERNIGHT EVENTS: pt remains on 6 L NC, on IV DEXA    T:  96.7  HR:   62  BP:  150/68  RR: 18  Pulse ox:  96% NC        PHYSICAL EXAM:  GENERAL: A&O, chronically ill looking but in nAD  Neck:   thick neck, supple, no JVD, no bruit, no stridor  Lungs:  shallow resp, scattered rhonchi, no wheezing  Abd:  sl distended soft and benign, + BS  Urinary:  no phillpis  Ext:  arthritic changes, moves all limbs  Skin:  no rash  Psych stable    LABS                          13.5  9.3)-----------( 241             42.3      140  |  102  |  35  ----------------------------<187  4.1   |  25  |  1.1  GFR 64  Ca    8.8       Phos  3.4      Mg     2.0        MB 63  trop <0.01 x2  ferritin 535  procal 0.26  TPro  5.9<L>  /  Alb  3.2<L>  /  TBili  0.9  /  DBili  x   /  AST  45<H>  /  ALT  53<H>  /  AlkPhos  43  03-14      Urinalysis Basic - ( 12 Mar 2021 16:38 )    Color: Yellow / Appearance: Slightly Turbid / S.020 / pH: x  Gluc: x / Ketone: Negative  / Bili: Negative / Urobili: 3 mg/dL   Blood: x / Protein: 30 mg/dL / Nitrite: Negative   Leuk Esterase: Moderate / RBC: 5 /HPF / WBC 90 /HPF   Sq Epi: x / Non Sq Epi: 0 /HPF / Bacteria: Ma       RADIOLOGY & ADDITIONAL TESTS:  Venous duplex:  negative for DVT

## 2021-03-15 NOTE — PROGRESS NOTE ADULT - SUBJECTIVE AND OBJECTIVE BOX
SUBJECTIVE:    Patient is a 80y old Male who presents with a chief complaint of acute hypoxemic respiratory failure secondary to COVID-19 pneumonia (14 Mar 2021 15:28)    Currently admitted to medicine with the primary diagnosis of COVID-19       Today is hospital day 3d. This morning he is resting comfortably in bed and reports no new issues or overnight events.     PAST MEDICAL & SURGICAL HISTORY  Chronic kidney disease (CKD)  stage 3A, baseline creatinine 1.4    Bladder cancer  secondary to exposure at [employer] 9/11 s/p cystectomy with urostomy    History of atrial fibrillation    Diabetes mellitus, type 2    Hyperlipidemia    Hypertension    History of total cystectomy  with resultant urostomy    History of total knee replacement, bilateral    History of total hip replacement, bilateral      SOCIAL HISTORY:  Negative for smoking/alcohol/drug use.     ALLERGIES:  No Known Allergies    MEDICATIONS:  STANDING MEDICATIONS  amLODIPine   Tablet 10 milliGRAM(s) Oral daily  apixaban 5 milliGRAM(s) Oral two times a day  atorvastatin 80 milliGRAM(s) Oral at bedtime  chlorhexidine 4% Liquid 1 Application(s) Topical <User Schedule>  dexAMETHasone  Injectable 6 milliGRAM(s) IV Push daily  influenza   Vaccine 0.5 milliLiter(s) IntraMuscular once  insulin glargine Injectable (LANTUS) 45 Unit(s) SubCutaneous at bedtime  insulin lispro (ADMELOG) corrective regimen sliding scale   SubCutaneous three times a day before meals  insulin lispro Injectable (ADMELOG) 8 Unit(s) SubCutaneous three times a day before meals  melatonin 5 milliGRAM(s) Oral at bedtime  metoprolol succinate  milliGRAM(s) Oral daily  senna 2 Tablet(s) Oral at bedtime    PRN MEDICATIONS  acetaminophen   Tablet .. 650 milliGRAM(s) Oral every 6 hours PRN  acetaminophen   Tablet .. 650 milliGRAM(s) Oral every 6 hours PRN  guaifenesin/dextromethorphan  Syrup 10 milliLiter(s) Oral every 6 hours PRN  oxyCODONE    IR 5 milliGRAM(s) Oral every 6 hours PRN    VITALS:   T(F): 96.7  HR: 62  BP: 150/68  RR: 18  SpO2: 96%    LABS:                        13.5   9.35  )-----------( 241      ( 15 Mar 2021 06:00 )             42.3     03-15    140  |  102  |  35<H>  ----------------------------<  187<H>  4.1   |  25  |  1.1    Ca    8.6      15 Mar 2021 06:00  Mg     2.0     03-15    TPro  6.1  /  Alb  3.3<L>  /  TBili  0.9  /  DBili  x   /  AST  40  /  ALT  47<H>  /  AlkPhos  59  03-15                  RADIOLOGY:    PHYSICAL EXAM:  GEN: No acute distress  LUNGS: Clear to auscultation bilaterally   HEART: S1/S2 present. RRR.   ABD: Soft, non-tender, non-distended. Bowel sounds present  EXT: NC/NC/NE/2+PP/CASON/Skin Intact.   NEURO: AAOX3    Intravenous access:   NG tube:   Chavez Catheter:

## 2021-03-15 NOTE — PROGRESS NOTE ADULT - ASSESSMENT
Patient is a 81yo male with PMH of hypertension, hyperlipidemia, diabetes mellitus type 2, atrial fibrillation on Eliquis, CKD stage 3A, bladder cancer s/p cystectomy with urostomy, and bilateral hip and knee arthroplasties who was brought in by EMS for hypoxia. Patient had tested positive for COVID-19 on 3/4/2021, but symptoms did not begin until 3/6/2021. He was found to be hypoxic to 85% on room air.    #Acute hypoxemic respiratory failure secondary to COVID-19 pneumonia  - COVID-19 PCR positive 3/4/2021  - Chest X-ray: some bibasilar opacities  - Patient is saturating 96% on 6L NC. Try to wean off O2.    - Ferritin = 535; d-dimer = 1721; CRP = 80; Procal = 0.26 noted.  - Duplex negative for DVT.  - Repeat inflammatory markers (D-dimer, CRP, ferritin) Q48-72H if clinically worsening  - Patient is outside the window for remdesivir  - COVID ABs negative.   sp plasma.  - Incentive spirometry at bedside  - Continue with dexamethasone 6mg IV once daily for 10 days (started on 3/12)  -  on eliquis for Paroxysmal Afib  - Titrate supplemental O2 to maintain SpO2 92-96%      #Mild hepatocellular transaminitis  - Likely secondary to underlying COVID-19 infection  - Trend LFTs    #Lymphopenia  - Likely secondary to underlying COVID-19 infection    #Elevated D-dimer  - With history of bilateral TKR and THR, underlying COVID-19 infection, and worsening oxygen requirements, will order venous duplex    #Elevated troponin, likely type 2 demand ischemia secondary to hypoxia  - Outpatient nuclear scan 2019 showed EF 55% with no wall motion abnormalities or reversible defects  - Trend LFTs    #Acute kidney injury on CKD stage 3A, resolved.  - Baseline creatinine: 1.4 (as of 1/2021)      #Lactic acidosis  - Lactate:  improving.  - S/p 1L bolus of LR in the ED      #Paroxysmal atrial fibrillation  - ECG: normal sinus rhythm  - [id number]: 4 (age>75, DM, HTN)  - HAS-BLED: 1 (age>65)  - Continue with Eliquis 5mg PO twice daily and metoprolol succinate 100mg PO once daily         #Hyperlipidemia  - Convert home medication rosuvastatin to atorvastatin 80mg PO at bedtime     #Hypertension  - Continue with amlodipine 10mg PO once daily and metoprolol succinate 100mg PO once daily   - Hold home medication benazepril given LORRAINE    #S/p bilateral TKR and THR  - Continue with oxycodone IR 5mg PO Q6H PRN    #Bladder cancer s/p cystectomy with urostomy  - Follow outpatient with urology and oncology  - Urostomy care PRN    #Misc  - DVT Prophylaxis: Eliquis 5mg PO twice daily   - GI Prophylaxis: not indicated   - Diet: DASH/TLC, consistent carbohydrate  - Activity: increase as tolerated  - IV Fluids: not indicated   - Code Status: Full Code  - Dispo: Acute. Pending clinical improvement.

## 2021-03-16 LAB
ALBUMIN SERPL ELPH-MCNC: 3.4 G/DL — LOW (ref 3.5–5.2)
ALP SERPL-CCNC: 54 U/L — SIGNIFICANT CHANGE UP (ref 30–115)
ALT FLD-CCNC: 45 U/L — HIGH (ref 0–41)
ANION GAP SERPL CALC-SCNC: 14 MMOL/L — SIGNIFICANT CHANGE UP (ref 7–14)
AST SERPL-CCNC: 44 U/L — HIGH (ref 0–41)
BILIRUB SERPL-MCNC: 0.9 MG/DL — SIGNIFICANT CHANGE UP (ref 0.2–1.2)
BUN SERPL-MCNC: 34 MG/DL — HIGH (ref 10–20)
CALCIUM SERPL-MCNC: 8.7 MG/DL — SIGNIFICANT CHANGE UP (ref 8.5–10.1)
CHLORIDE SERPL-SCNC: 103 MMOL/L — SIGNIFICANT CHANGE UP (ref 98–110)
CO2 SERPL-SCNC: 25 MMOL/L — SIGNIFICANT CHANGE UP (ref 17–32)
CREAT SERPL-MCNC: 1.2 MG/DL — SIGNIFICANT CHANGE UP (ref 0.7–1.5)
FERRITIN SERPL-MCNC: 462 NG/ML — HIGH (ref 30–400)
FERRITIN SERPL-MCNC: 527 NG/ML — HIGH (ref 30–400)
GLUCOSE BLDC GLUCOMTR-MCNC: 118 MG/DL — HIGH (ref 70–99)
GLUCOSE BLDC GLUCOMTR-MCNC: 154 MG/DL — HIGH (ref 70–99)
GLUCOSE BLDC GLUCOMTR-MCNC: 266 MG/DL — HIGH (ref 70–99)
GLUCOSE BLDC GLUCOMTR-MCNC: 293 MG/DL — HIGH (ref 70–99)
GLUCOSE SERPL-MCNC: 125 MG/DL — HIGH (ref 70–99)
HCT VFR BLD CALC: 43.1 % — SIGNIFICANT CHANGE UP (ref 42–52)
HGB BLD-MCNC: 13.7 G/DL — LOW (ref 14–18)
MCHC RBC-ENTMCNC: 27.3 PG — SIGNIFICANT CHANGE UP (ref 27–31)
MCHC RBC-ENTMCNC: 31.8 G/DL — LOW (ref 32–37)
MCV RBC AUTO: 85.9 FL — SIGNIFICANT CHANGE UP (ref 80–94)
NRBC # BLD: 0 /100 WBCS — SIGNIFICANT CHANGE UP (ref 0–0)
PLATELET # BLD AUTO: 291 K/UL — SIGNIFICANT CHANGE UP (ref 130–400)
POTASSIUM SERPL-MCNC: 3.9 MMOL/L — SIGNIFICANT CHANGE UP (ref 3.5–5)
POTASSIUM SERPL-SCNC: 3.9 MMOL/L — SIGNIFICANT CHANGE UP (ref 3.5–5)
PROT SERPL-MCNC: 6.2 G/DL — SIGNIFICANT CHANGE UP (ref 6–8)
RBC # BLD: 5.02 M/UL — SIGNIFICANT CHANGE UP (ref 4.7–6.1)
RBC # FLD: 14.8 % — HIGH (ref 11.5–14.5)
SODIUM SERPL-SCNC: 142 MMOL/L — SIGNIFICANT CHANGE UP (ref 135–146)
WBC # BLD: 8.1 K/UL — SIGNIFICANT CHANGE UP (ref 4.8–10.8)
WBC # FLD AUTO: 8.1 K/UL — SIGNIFICANT CHANGE UP (ref 4.8–10.8)

## 2021-03-16 RX ADMIN — ATORVASTATIN CALCIUM 80 MILLIGRAM(S): 80 TABLET, FILM COATED ORAL at 22:05

## 2021-03-16 RX ADMIN — Medication 8 UNIT(S): at 11:41

## 2021-03-16 RX ADMIN — Medication 8 UNIT(S): at 08:12

## 2021-03-16 RX ADMIN — INSULIN GLARGINE 45 UNIT(S): 100 INJECTION, SOLUTION SUBCUTANEOUS at 22:05

## 2021-03-16 RX ADMIN — AMLODIPINE BESYLATE 10 MILLIGRAM(S): 2.5 TABLET ORAL at 06:02

## 2021-03-16 RX ADMIN — APIXABAN 5 MILLIGRAM(S): 2.5 TABLET, FILM COATED ORAL at 06:06

## 2021-03-16 RX ADMIN — Medication 6 MILLIGRAM(S): at 06:06

## 2021-03-16 RX ADMIN — Medication 8 UNIT(S): at 16:40

## 2021-03-16 RX ADMIN — CHLORHEXIDINE GLUCONATE 1 APPLICATION(S): 213 SOLUTION TOPICAL at 06:06

## 2021-03-16 RX ADMIN — Medication 100 MILLIGRAM(S): at 06:05

## 2021-03-16 RX ADMIN — APIXABAN 5 MILLIGRAM(S): 2.5 TABLET, FILM COATED ORAL at 17:11

## 2021-03-16 RX ADMIN — Medication 6: at 16:41

## 2021-03-16 RX ADMIN — Medication 5 MILLIGRAM(S): at 22:05

## 2021-03-16 RX ADMIN — Medication 6: at 11:41

## 2021-03-16 NOTE — PROGRESS NOTE ADULT - SUBJECTIVE AND OBJECTIVE BOX
ROSA MARIA CASEY  80y  Male      Patient is a 80y old  Male who presents with a chief complaint of acute hypoxemic respiratory failure secondary to COVID-19 pneumonia (14 Mar 2021 07:11)      INTERVAL HPI/OVERNIGHT EVENTS: pt remains on 6 L NC, on IV DEXA, sat at 93%, electrolytes and renal parameters WNL, WBC 8, no acute events    T:  97.7  HR:   79  BP:  131/73  RR: 18  Pulse ox:  93% NC        PHYSICAL EXAM:  GENERAL: A&O, chronically ill looking but in NAD  Neck:   thick neck, supple, no JVD, no bruit, no stridor  Lungs:  shallow resp, scattered rhonchi, no wheezing  Abd:  sl distended soft and benign, + BS  Urinary:  no phillips  Ext:  arthritic changes, moves all limbs  Skin:  no rash  Psych stable    LABS                          13  8)-----------( 291             43      142  |  103  |  34  ----------------------------<125  3.9   |  25  |  1.2  GFR 64,  58  Ca    8.8       Phos  3.4      Mg     2.0        MB 63  trop <0.01 x2  ferritin 535, 462  procal 0.26  CRP 25  TPro  5.9<L>  /  Alb  3.2<L>  /  TBili  0.9  /  DBili  x   /  AST  45<H>  /  ALT  53<H>  /  AlkPhos  43  03-14      Urinalysis Basic - ( 12 Mar 2021 16:38 )    Color: Yellow / Appearance: Slightly Turbid / S.020 / pH: x  Gluc: x / Ketone: Negative  / Bili: Negative / Urobili: 3 mg/dL   Blood: x / Protein: 30 mg/dL / Nitrite: Negative   Leuk Esterase: Moderate / RBC: 5 /HPF / WBC 90 /HPF   Sq Epi: x / Non Sq Epi: 0 /HPF / Bacteria: Ma       RADIOLOGY & ADDITIONAL TESTS:  Venous duplex:  negative for DVT  CXR:  BL opacities

## 2021-03-16 NOTE — PROGRESS NOTE ADULT - SUBJECTIVE AND OBJECTIVE BOX
SUBJECTIVE:    Patient is a 80y old Male who presents with a chief complaint of acute hypoxemic respiratory failure secondary to COVID-19 pneumonia (15 Mar 2021 22:54)    Currently admitted to medicine with the primary diagnosis of COVID-19       Today is hospital day 4d. This morning he is resting comfortably in bed and reports no new issues or overnight events.     PAST MEDICAL & SURGICAL HISTORY  Chronic kidney disease (CKD)  stage 3A, baseline creatinine 1.4    Bladder cancer  secondary to exposure at [employer] 9/11 s/p cystectomy with urostomy    History of atrial fibrillation    Diabetes mellitus, type 2    Hyperlipidemia    Hypertension    History of total cystectomy  with resultant urostomy    History of total knee replacement, bilateral    History of total hip replacement, bilateral      SOCIAL HISTORY:  Negative for smoking/alcohol/drug use.     ALLERGIES:  No Known Allergies    MEDICATIONS:  STANDING MEDICATIONS  amLODIPine   Tablet 10 milliGRAM(s) Oral daily  apixaban 5 milliGRAM(s) Oral two times a day  atorvastatin 80 milliGRAM(s) Oral at bedtime  chlorhexidine 4% Liquid 1 Application(s) Topical <User Schedule>  dexAMETHasone  Injectable 6 milliGRAM(s) IV Push daily  influenza   Vaccine 0.5 milliLiter(s) IntraMuscular once  insulin glargine Injectable (LANTUS) 45 Unit(s) SubCutaneous at bedtime  insulin lispro (ADMELOG) corrective regimen sliding scale   SubCutaneous three times a day before meals  insulin lispro Injectable (ADMELOG) 8 Unit(s) SubCutaneous three times a day before meals  melatonin 5 milliGRAM(s) Oral at bedtime  metoprolol succinate  milliGRAM(s) Oral daily  senna 2 Tablet(s) Oral at bedtime    PRN MEDICATIONS  acetaminophen   Tablet .. 650 milliGRAM(s) Oral every 6 hours PRN  acetaminophen   Tablet .. 650 milliGRAM(s) Oral every 6 hours PRN  guaifenesin/dextromethorphan  Syrup 10 milliLiter(s) Oral every 6 hours PRN  oxyCODONE    IR 5 milliGRAM(s) Oral every 6 hours PRN    VITALS:   T(F): 97.1  HR: 68  BP: 146/78  RR: 18  SpO2: 91%    LABS:                        13.7   8.10  )-----------( 291      ( 16 Mar 2021 07:07 )             43.1     03-16    142  |  103  |  34<H>  ----------------------------<  125<H>  3.9   |  25  |  1.2    Ca    8.7      16 Mar 2021 07:07  Mg     2.0     03-15    TPro  6.2  /  Alb  3.4<L>  /  TBili  0.9  /  DBili  x   /  AST  44<H>  /  ALT  45<H>  /  AlkPhos  54  03-16                  RADIOLOGY:    PHYSICAL EXAM:  GEN: No acute distress  LUNGS: Clear to auscultation bilaterally   HEART: S1/S2 present. RRR.   ABD: Soft, non-tender, non-distended. Bowel sounds present  EXT: NC/NC/NE/2+PP/CASON/Skin Intact.   NEURO: AAOX3    Intravenous access:   NG tube:   Chavez Catheter:

## 2021-03-16 NOTE — PROGRESS NOTE ADULT - ASSESSMENT
· Assessment	  Patient is a 81yo male with PMH of hypertension, hyperlipidemia, diabetes mellitus type 2, atrial fibrillation on Eliquis, CKD stage 3A, bladder cancer s/p cystectomy with urostomy, and bilateral hip and knee arthroplasties who was brought in by EMS for hypoxia. Patient had tested positive for COVID-19 on 3/4/2021, but symptoms did not begin until 3/6/2021. He was found to be hypoxic to 85% on room air.    #Acute hypoxemic respiratory failure secondary to COVID-19 pneumonia  - COVID-19 PCR positive 3/4/2021  - Chest X-ray: some bibasilar opacities  - Patient is saturating 96% on 6L NC. Try to wean off O2.    - Ferritin = 535; d-dimer = 1721; CRP = 80; Procal = 0.26 noted.  - Duplex negative for DVT.  - Repeat inflammatory markers (D-dimer, CRP, ferritin) Q48-72H if clinically worsening  - Patient is outside the window for remdesivir  - COVID ABs negative.   sp plasma.  - Incentive spirometry at bedside  - Continue with dexamethasone 6mg IV once daily for 10 days (started on 3/12)  -  on eliquis for Paroxysmal Afib  - Titrate supplemental O2 to maintain SpO2 92-96%  no need for toci per id   monitor markers       #Mild hepatocellular transaminitis  - Likely secondary to underlying COVID-19 infection  - Trend LFTs    #Lymphopenia  - Likely secondary to underlying COVID-19 infection    #Elevated D-dimer  - With history of bilateral TKR and THR, underlying COVID-19 infection, and worsening oxygen requirements, will order venous duplex    #Elevated troponin, likely type 2 demand ischemia secondary to hypoxia  - Outpatient nuclear scan 2019 showed EF 55% with no wall motion abnormalities or reversible defects  - Trend LFTs    #Acute kidney injury on CKD stage 3A, resolved.  - Baseline creatinine: 1.4 (as of 1/2021)      #Lactic acidosis  - Lactate:  improving.  - S/p 1L bolus of LR in the ED      #Paroxysmal atrial fibrillation  - ECG: normal sinus rhythm  - [id number]: 4 (age>75, DM, HTN)  - HAS-BLED: 1 (age>65)  - Continue with Eliquis 5mg PO twice daily and metoprolol succinate 100mg PO once daily         #Hyperlipidemia  - Convert home medication rosuvastatin to atorvastatin 80mg PO at bedtime     #Hypertension  - Continue with amlodipine 10mg PO once daily and metoprolol succinate 100mg PO once daily   - Hold home medication benazepril given LORRAINE    #S/p bilateral TKR and THR  - Continue with oxycodone IR 5mg PO Q6H PRN    #Bladder cancer s/p cystectomy with urostomy  - Follow outpatient with urology and oncology  - Urostomy care PRN    #Misc  - DVT Prophylaxis: Eliquis 5mg PO twice daily   - GI Prophylaxis: not indicated   - Diet: DASH/TLC, consistent carbohydrate  - Activity: increase as tolerated  - IV Fluids: not indicated   - Code Status: Full Code  - Dispo: Acute. Pending clinical improvement.

## 2021-03-16 NOTE — PROGRESS NOTE ADULT - ASSESSMENT
79yo M c PMHx chronic afib on noac, htn, dm2, ckd3 bladder ca s/p urostomy, b/l hip/ knee replacements obesity, here with acute hypoxic respiratory failure 2/2 severe covid 19 viral pneumonia, loli on ckd3 now better, elevated ddimer    iv dexamethasone  us duplex LE's- prelim negative for DVT  monitor o2 requirements  6L now at rest 93% pulse ox  inflammatory markers and cxr being monitored  at high risk for ards and worsening respiratory failure/ CRS  pt remains acute  ID consult  Rehab

## 2021-03-16 NOTE — CHART NOTE - NSCHARTNOTEFT_GEN_A_CORE
I made rounds today with the treatment team including the hospitalist, residents,  nurses and  and discussed the patient's current medical status and discharge  planning needs, and reviewed the chart.    T(C): 36.2 (03-16-21 @ 05:35), Max: 36.2 (03-15-21 @ 21:04)  HR: 68 (03-16-21 @ 05:35) (64 - 72)  BP: 146/78 (03-16-21 @ 05:35) (132/75 - 150/67)  RR: 18 (03-16-21 @ 05:35) (18 - 18)  SpO2: 91% (03-16-21 @ 05:35) (90% - 91%)          I reached out to the patient's health care proxy/ responsible family member-           [     ]  I reached                                     and discussed the patient's medical condition,                   family concerns, and discharge planning           [     ]  I left a message with family               [   x  ]  I personally participated in rounds with the medical team and my resident and discussed the case. My resident reached                   family member/ HCP   Rain ( daughter ) [phone]        under my direction and supervision  and we reviewed the conversation          [     ]  My resident left a message with family under my direction and supervision                [     ]   My resident attended rounds and called the family     The following was discussed: updated medical status           [     ] I spent 5-10 minutes on the above discussing medical issues with team members and family and/ or my resident    [  x   ] I spent 11-20 minutes on the above discussing medical issues with team members and family and/ or my resident    [     ] I spent 21-30 minutes on the above discussing medical issues with team members and family and/ or my resident

## 2021-03-17 LAB
ALBUMIN SERPL ELPH-MCNC: 3.2 G/DL — LOW (ref 3.5–5.2)
ALP SERPL-CCNC: 57 U/L — SIGNIFICANT CHANGE UP (ref 30–115)
ALT FLD-CCNC: 52 U/L — HIGH (ref 0–41)
ANION GAP SERPL CALC-SCNC: 12 MMOL/L — SIGNIFICANT CHANGE UP (ref 7–14)
AST SERPL-CCNC: 55 U/L — HIGH (ref 0–41)
BILIRUB SERPL-MCNC: 1.2 MG/DL — SIGNIFICANT CHANGE UP (ref 0.2–1.2)
BUN SERPL-MCNC: 28 MG/DL — HIGH (ref 10–20)
CALCIUM SERPL-MCNC: 8.6 MG/DL — SIGNIFICANT CHANGE UP (ref 8.5–10.1)
CHLORIDE SERPL-SCNC: 104 MMOL/L — SIGNIFICANT CHANGE UP (ref 98–110)
CO2 SERPL-SCNC: 25 MMOL/L — SIGNIFICANT CHANGE UP (ref 17–32)
CREAT SERPL-MCNC: 1.1 MG/DL — SIGNIFICANT CHANGE UP (ref 0.7–1.5)
GLUCOSE BLDC GLUCOMTR-MCNC: 196 MG/DL — HIGH (ref 70–99)
GLUCOSE BLDC GLUCOMTR-MCNC: 255 MG/DL — HIGH (ref 70–99)
GLUCOSE BLDC GLUCOMTR-MCNC: 263 MG/DL — HIGH (ref 70–99)
GLUCOSE BLDC GLUCOMTR-MCNC: 98 MG/DL — SIGNIFICANT CHANGE UP (ref 70–99)
GLUCOSE SERPL-MCNC: 108 MG/DL — HIGH (ref 70–99)
HCT VFR BLD CALC: 40.2 % — LOW (ref 42–52)
HGB BLD-MCNC: 13.1 G/DL — LOW (ref 14–18)
MCHC RBC-ENTMCNC: 28 PG — SIGNIFICANT CHANGE UP (ref 27–31)
MCHC RBC-ENTMCNC: 32.6 G/DL — SIGNIFICANT CHANGE UP (ref 32–37)
MCV RBC AUTO: 85.9 FL — SIGNIFICANT CHANGE UP (ref 80–94)
NRBC # BLD: 0 /100 WBCS — SIGNIFICANT CHANGE UP (ref 0–0)
PLATELET # BLD AUTO: 343 K/UL — SIGNIFICANT CHANGE UP (ref 130–400)
POTASSIUM SERPL-MCNC: 3.9 MMOL/L — SIGNIFICANT CHANGE UP (ref 3.5–5)
POTASSIUM SERPL-SCNC: 3.9 MMOL/L — SIGNIFICANT CHANGE UP (ref 3.5–5)
PROT SERPL-MCNC: 6.2 G/DL — SIGNIFICANT CHANGE UP (ref 6–8)
RBC # BLD: 4.68 M/UL — LOW (ref 4.7–6.1)
RBC # FLD: 14.8 % — HIGH (ref 11.5–14.5)
SODIUM SERPL-SCNC: 141 MMOL/L — SIGNIFICANT CHANGE UP (ref 135–146)
WBC # BLD: 9.53 K/UL — SIGNIFICANT CHANGE UP (ref 4.8–10.8)
WBC # FLD AUTO: 9.53 K/UL — SIGNIFICANT CHANGE UP (ref 4.8–10.8)

## 2021-03-17 PROCEDURE — 71045 X-RAY EXAM CHEST 1 VIEW: CPT | Mod: 26

## 2021-03-17 RX ORDER — PANTOPRAZOLE SODIUM 20 MG/1
40 TABLET, DELAYED RELEASE ORAL DAILY
Refills: 0 | Status: DISCONTINUED | OUTPATIENT
Start: 2021-03-17 | End: 2021-03-17

## 2021-03-17 RX ORDER — PANTOPRAZOLE SODIUM 20 MG/1
40 TABLET, DELAYED RELEASE ORAL
Refills: 0 | Status: DISCONTINUED | OUTPATIENT
Start: 2021-03-17 | End: 2021-03-29

## 2021-03-17 RX ADMIN — Medication 6: at 17:11

## 2021-03-17 RX ADMIN — Medication 5 MILLIGRAM(S): at 21:50

## 2021-03-17 RX ADMIN — ATORVASTATIN CALCIUM 80 MILLIGRAM(S): 80 TABLET, FILM COATED ORAL at 21:49

## 2021-03-17 RX ADMIN — Medication 8 UNIT(S): at 11:54

## 2021-03-17 RX ADMIN — CHLORHEXIDINE GLUCONATE 1 APPLICATION(S): 213 SOLUTION TOPICAL at 05:25

## 2021-03-17 RX ADMIN — PANTOPRAZOLE SODIUM 40 MILLIGRAM(S): 20 TABLET, DELAYED RELEASE ORAL at 13:13

## 2021-03-17 RX ADMIN — Medication 8 UNIT(S): at 17:12

## 2021-03-17 RX ADMIN — INSULIN GLARGINE 45 UNIT(S): 100 INJECTION, SOLUTION SUBCUTANEOUS at 21:50

## 2021-03-17 RX ADMIN — Medication 100 MILLIGRAM(S): at 05:24

## 2021-03-17 RX ADMIN — SENNA PLUS 2 TABLET(S): 8.6 TABLET ORAL at 21:50

## 2021-03-17 RX ADMIN — Medication 2: at 11:54

## 2021-03-17 RX ADMIN — APIXABAN 5 MILLIGRAM(S): 2.5 TABLET, FILM COATED ORAL at 05:24

## 2021-03-17 RX ADMIN — APIXABAN 5 MILLIGRAM(S): 2.5 TABLET, FILM COATED ORAL at 17:11

## 2021-03-17 RX ADMIN — AMLODIPINE BESYLATE 10 MILLIGRAM(S): 2.5 TABLET ORAL at 05:24

## 2021-03-17 RX ADMIN — Medication 6 MILLIGRAM(S): at 05:24

## 2021-03-17 NOTE — CHART NOTE - NSCHARTNOTEFT_GEN_A_CORE
I made rounds today with the treatment team including the hospitalist, residents,  nurses and  and discussed the patient's current medical status and discharge  planning needs, and reviewed the chart.    T(C): 37.3 (03-17-21 @ 05:16), Max: 37.3 (03-17-21 @ 05:16)  HR: 78 (03-17-21 @ 05:16) (70 - 78)  BP: 147/70 (03-17-21 @ 05:16) (125/66 - 147/70)  RR: 18 (03-17-21 @ 05:16) (18 - 18)  SpO2: 93% (03-17-21 @ 05:50) (86% - 93%)          I reached out to the patient's health care proxy/ responsible family member-           [     ]  I reached                                     and discussed the patient's medical condition,                   family concerns, and discharge planning           [     ]  I left a message with family               [  x   ]  I personally participated in rounds with the medical team and my resident and discussed the case. My resident reached                   family member/ HCP  Rain ( daughter ) [phone]    under my direction and supervision  and we reviewed the conversation          [     ]  My resident left a message with family under my direction and supervision                [     ]   My resident attended rounds and called the family     The following was discussed: updated medical status           [     ] I spent 5-10 minutes on the above discussing medical issues with team members and family and/ or my resident    [  x   ] I spent 11-20 minutes on the above discussing medical issues with team members and family and/ or my resident    [     ] I spent 21-30 minutes on the above discussing medical issues with team members and family and/ or my resident

## 2021-03-17 NOTE — PROGRESS NOTE ADULT - SUBJECTIVE AND OBJECTIVE BOX
SUBJECTIVE:    Patient is a 80y old Male who presents with a chief complaint of acute hypoxemic respiratory failure secondary to COVID-19 pneumonia (16 Mar 2021 22:46)    Currently admitted to medicine with the primary diagnosis of COVID-19       Today is hospital day 5d. This morning he is resting comfortably in bed and reports no new issues or overnight events.     PAST MEDICAL & SURGICAL HISTORY  Chronic kidney disease (CKD)  stage 3A, baseline creatinine 1.4    Bladder cancer  secondary to exposure at [employer] 9/11 s/p cystectomy with urostomy    History of atrial fibrillation    Diabetes mellitus, type 2    Hyperlipidemia    Hypertension    History of total cystectomy  with resultant urostomy    History of total knee replacement, bilateral    History of total hip replacement, bilateral      SOCIAL HISTORY:  Negative for smoking/alcohol/drug use.     ALLERGIES:  No Known Allergies    MEDICATIONS:  STANDING MEDICATIONS  amLODIPine   Tablet 10 milliGRAM(s) Oral daily  apixaban 5 milliGRAM(s) Oral two times a day  atorvastatin 80 milliGRAM(s) Oral at bedtime  chlorhexidine 4% Liquid 1 Application(s) Topical <User Schedule>  dexAMETHasone  Injectable 6 milliGRAM(s) IV Push daily  influenza   Vaccine 0.5 milliLiter(s) IntraMuscular once  insulin glargine Injectable (LANTUS) 45 Unit(s) SubCutaneous at bedtime  insulin lispro (ADMELOG) corrective regimen sliding scale   SubCutaneous three times a day before meals  insulin lispro Injectable (ADMELOG) 8 Unit(s) SubCutaneous three times a day before meals  melatonin 5 milliGRAM(s) Oral at bedtime  metoprolol succinate  milliGRAM(s) Oral daily  senna 2 Tablet(s) Oral at bedtime    PRN MEDICATIONS  acetaminophen   Tablet .. 650 milliGRAM(s) Oral every 6 hours PRN  acetaminophen   Tablet .. 650 milliGRAM(s) Oral every 6 hours PRN  guaifenesin/dextromethorphan  Syrup 10 milliLiter(s) Oral every 6 hours PRN  oxyCODONE    IR 5 milliGRAM(s) Oral every 6 hours PRN    VITALS:   T(F): 99.2  HR: 78  BP: 147/70  RR: 18  SpO2: 93%    LABS:                        13.1   9.53  )-----------( 343      ( 17 Mar 2021 07:55 )             40.2     03-17    141  |  104  |  28<H>  ----------------------------<  108<H>  3.9   |  25  |  1.1    Ca    8.6      17 Mar 2021 07:55    TPro  6.2  /  Alb  3.2<L>  /  TBili  1.2  /  DBili  x   /  AST  55<H>  /  ALT  52<H>  /  AlkPhos  57  03-17                  RADIOLOGY:    PHYSICAL EXAM:  GEN: No acute distress  LUNGS: Clear to auscultation bilaterally   HEART: S1/S2 present. RRR.   ABD: Soft, non-tender, non-distended. Bowel sounds present  EXT: NC/NC/NE/2+PP/CASON/Skin Intact.   NEURO: AAOX3    Intravenous access:   NG tube:   Chavez Catheter:

## 2021-03-17 NOTE — PROGRESS NOTE ADULT - ASSESSMENT
81yo M c PMHx chronic afib on noac, htn, dm2, ckd3 bladder ca s/p urostomy, b/l hip/ knee replacements obesity, here with acute hypoxic respiratory failure 2/2 severe covid 19 viral pneumonia, loli on ckd3 now better, elevated ddimer    iv dexamethasone  us duplex LE'snegative for DVT  monitor o2 requirements  6L now at rest 93% pulse ox  inflammatory markers and cxr being monitored  at high risk for ards and worsening respiratory failure/ CRS  pt remains acute  ID consult  Rehab

## 2021-03-17 NOTE — PROGRESS NOTE ADULT - ASSESSMENT
· Assessment	  Patient is a 81yo male with PMH of hypertension, hyperlipidemia, diabetes mellitus type 2, atrial fibrillation on Eliquis, CKD stage 3A, bladder cancer s/p cystectomy with urostomy, and bilateral hip and knee arthroplasties who was brought in by EMS for hypoxia. Patient had tested positive for COVID-19 on 3/4/2021, but symptoms did not begin until 3/6/2021. He was found to be hypoxic to 85% on room air.    #Acute hypoxemic respiratory failure secondary to COVID-19 pneumonia  - COVID-19 PCR positive 3/4/2021  - Chest X-ray: some bibasilar opacities  - Duplex negative for DVT.  - Repeat inflammatory markers (D-dimer, CRP, ferritin) Q48-72H if clinically worsening  - Patient is outside the window for remdesivir  - COVID ABs negative.   sp plasma.  - Incentive spirometry at bedside  - Continue with dexamethasone 6mg IV once daily for 10 days (started on 3/12)  -  on eliquis for Paroxysmal Afib  - Titrate supplemental O2 to maintain SpO2 92-96%  no need for toci per id   he was on nrm last night , will switch back to 6 liter nc       #Mild hepatocellular transaminitis  - Likely secondary to underlying COVID-19 infection  - Trend LFTs    #Lymphopenia  - Likely secondary to underlying COVID-19 infection    #Elevated D-dimer  - With history of bilateral TKR and THR, underlying COVID-19 infection, and worsening oxygen requirements, will order venous duplex    #Elevated troponin, likely type 2 demand ischemia secondary to hypoxia  - Outpatient nuclear scan 2019 showed EF 55% with no wall motion abnormalities or reversible defects  - Trend LFTs    #Acute kidney injury on CKD stage 3A, resolved.  - Baseline creatinine: 1.4 (as of 1/2021)      #Lactic acidosis  - Lactate:  improving.  - S/p 1L bolus of LR in the ED      #Paroxysmal atrial fibrillation  - ECG: normal sinus rhythm  - [id number]: 4 (age>75, DM, HTN)  - HAS-BLED: 1 (age>65)  - Continue with Eliquis 5mg PO twice daily and metoprolol succinate 100mg PO once daily         #Hyperlipidemia  - Convert home medication rosuvastatin to atorvastatin 80mg PO at bedtime     #Hypertension  - Continue with amlodipine 10mg PO once daily and metoprolol succinate 100mg PO once daily   - Hold home medication benazepril given LORRAINE    #S/p bilateral TKR and THR  - Continue with oxycodone IR 5mg PO Q6H PRN    #Bladder cancer s/p cystectomy with urostomy  - Follow outpatient with urology and oncology  - Urostomy care PRN    #Misc  - DVT Prophylaxis: Eliquis 5mg PO twice daily   - Diet: DASH/TLC, consistent carbohydrate  - Activity: increase as tolerated  - IV Fluids: not indicated   - Code Status: Full Code  - Dispo: Acute. Pending clinical improvement.

## 2021-03-17 NOTE — PROGRESS NOTE ADULT - SUBJECTIVE AND OBJECTIVE BOX
ROSA MARIA CASEY  80y  Male      Patient is a 80y old  Male who presents with a chief complaint of acute hypoxemic respiratory failure secondary to COVID-19 pneumonia (14 Mar 2021 07:11)      INTERVAL HPI/OVERNIGHT EVENTS: pt remains on 6 L NC, on IV DEXA, sat at 93%, stable, electrolytes and renal parameters WNL, WBC 8, no acute events    T:  99.2  HR:   79  BP:  147/70  RR: 16  Pulse ox:  93% NC        PHYSICAL EXAM:  GENERAL: A&O, chronically ill looking but in NAD  Neck:   thick neck, supple, no JVD, no bruit, no stridor  Lungs:  shallow resp, scattered rhonchi, no wheezing  Abd:  sl distended soft and benign, + BS  Urinary:  no phillips  Ext:  arthritic changes, moves all limbs  Skin:  no rash  Psych stable    LABS                          13  9.5)-----------( 343             40      141  |  104  |  28  ----------------------------<108  3.9   |  25  |  1.1  GFR 64  Ca    8.8       Phos  3.4      Mg     2.0        MB 63  trop <0.01 x2  ferritin 535, 462  procal 0.26  CRP 25  TPro  5.9<L>  /  Alb  3.2<L>  /  TBili  0.9  /  DBili  x   /  AST  45<H>  /  ALT  53<H>  /  AlkPhos  43  03-14      Urinalysis Basic - ( 12 Mar 2021 16:38 )    Color: Yellow / Appearance: Slightly Turbid / S.020 / pH: x  Gluc: x / Ketone: Negative  / Bili: Negative / Urobili: 3 mg/dL   Blood: x / Protein: 30 mg/dL / Nitrite: Negative   Leuk Esterase: Moderate / RBC: 5 /HPF / WBC 90 /HPF   Sq Epi: x / Non Sq Epi: 0 /HPF / Bacteria: Ma       RADIOLOGY & ADDITIONAL TESTS:  Venous duplex:  negative for DVT  CXR:  BL opacities L>R

## 2021-03-18 LAB
ALBUMIN SERPL ELPH-MCNC: 3.1 G/DL — LOW (ref 3.5–5.2)
ALP SERPL-CCNC: 65 U/L — SIGNIFICANT CHANGE UP (ref 30–115)
ALT FLD-CCNC: 72 U/L — HIGH (ref 0–41)
ANION GAP SERPL CALC-SCNC: 11 MMOL/L — SIGNIFICANT CHANGE UP (ref 7–14)
AST SERPL-CCNC: 70 U/L — HIGH (ref 0–41)
BILIRUB SERPL-MCNC: 1.1 MG/DL — SIGNIFICANT CHANGE UP (ref 0.2–1.2)
BUN SERPL-MCNC: 26 MG/DL — HIGH (ref 10–20)
CALCIUM SERPL-MCNC: 8.6 MG/DL — SIGNIFICANT CHANGE UP (ref 8.5–10.1)
CHLORIDE SERPL-SCNC: 105 MMOL/L — SIGNIFICANT CHANGE UP (ref 98–110)
CO2 SERPL-SCNC: 25 MMOL/L — SIGNIFICANT CHANGE UP (ref 17–32)
CREAT SERPL-MCNC: 1.1 MG/DL — SIGNIFICANT CHANGE UP (ref 0.7–1.5)
GLUCOSE BLDC GLUCOMTR-MCNC: 117 MG/DL — HIGH (ref 70–99)
GLUCOSE BLDC GLUCOMTR-MCNC: 123 MG/DL — HIGH (ref 70–99)
GLUCOSE BLDC GLUCOMTR-MCNC: 134 MG/DL — HIGH (ref 70–99)
GLUCOSE BLDC GLUCOMTR-MCNC: 218 MG/DL — HIGH (ref 70–99)
GLUCOSE SERPL-MCNC: 141 MG/DL — HIGH (ref 70–99)
HCT VFR BLD CALC: 42.8 % — SIGNIFICANT CHANGE UP (ref 42–52)
HGB BLD-MCNC: 13.5 G/DL — LOW (ref 14–18)
MCHC RBC-ENTMCNC: 27.5 PG — SIGNIFICANT CHANGE UP (ref 27–31)
MCHC RBC-ENTMCNC: 31.5 G/DL — LOW (ref 32–37)
MCV RBC AUTO: 87.2 FL — SIGNIFICANT CHANGE UP (ref 80–94)
NRBC # BLD: 0 /100 WBCS — SIGNIFICANT CHANGE UP (ref 0–0)
PLATELET # BLD AUTO: 344 K/UL — SIGNIFICANT CHANGE UP (ref 130–400)
POTASSIUM SERPL-MCNC: 4.3 MMOL/L — SIGNIFICANT CHANGE UP (ref 3.5–5)
POTASSIUM SERPL-SCNC: 4.3 MMOL/L — SIGNIFICANT CHANGE UP (ref 3.5–5)
PROT SERPL-MCNC: 6.3 G/DL — SIGNIFICANT CHANGE UP (ref 6–8)
RBC # BLD: 4.91 M/UL — SIGNIFICANT CHANGE UP (ref 4.7–6.1)
RBC # FLD: 14.9 % — HIGH (ref 11.5–14.5)
SODIUM SERPL-SCNC: 141 MMOL/L — SIGNIFICANT CHANGE UP (ref 135–146)
WBC # BLD: 10.67 K/UL — SIGNIFICANT CHANGE UP (ref 4.8–10.8)
WBC # FLD AUTO: 10.67 K/UL — SIGNIFICANT CHANGE UP (ref 4.8–10.8)

## 2021-03-18 RX ADMIN — INSULIN GLARGINE 45 UNIT(S): 100 INJECTION, SOLUTION SUBCUTANEOUS at 21:53

## 2021-03-18 RX ADMIN — AMLODIPINE BESYLATE 10 MILLIGRAM(S): 2.5 TABLET ORAL at 06:23

## 2021-03-18 RX ADMIN — Medication 8 UNIT(S): at 11:44

## 2021-03-18 RX ADMIN — Medication 5 MILLIGRAM(S): at 21:52

## 2021-03-18 RX ADMIN — PANTOPRAZOLE SODIUM 40 MILLIGRAM(S): 20 TABLET, DELAYED RELEASE ORAL at 06:24

## 2021-03-18 RX ADMIN — Medication 100 MILLIGRAM(S): at 06:24

## 2021-03-18 RX ADMIN — Medication 8 UNIT(S): at 17:13

## 2021-03-18 RX ADMIN — Medication 6 MILLIGRAM(S): at 06:24

## 2021-03-18 RX ADMIN — APIXABAN 5 MILLIGRAM(S): 2.5 TABLET, FILM COATED ORAL at 17:22

## 2021-03-18 RX ADMIN — Medication 8 UNIT(S): at 07:51

## 2021-03-18 RX ADMIN — SENNA PLUS 2 TABLET(S): 8.6 TABLET ORAL at 21:52

## 2021-03-18 RX ADMIN — Medication 4: at 17:14

## 2021-03-18 RX ADMIN — APIXABAN 5 MILLIGRAM(S): 2.5 TABLET, FILM COATED ORAL at 06:24

## 2021-03-18 RX ADMIN — CHLORHEXIDINE GLUCONATE 1 APPLICATION(S): 213 SOLUTION TOPICAL at 06:26

## 2021-03-18 RX ADMIN — ATORVASTATIN CALCIUM 80 MILLIGRAM(S): 80 TABLET, FILM COATED ORAL at 21:52

## 2021-03-18 NOTE — CHART NOTE - NSCHARTNOTEFT_GEN_A_CORE
I made rounds today with the treatment team including the hospitalist, residents,  nurses and  and discussed the patient's current medical status and discharge  planning needs, and reviewed the chart.    T(C): 36.1 (03-18-21 @ 05:24), Max: 36.9 (03-17-21 @ 14:20)  HR: 68 (03-18-21 @ 05:24) (68 - 71)  BP: 128/62 (03-18-21 @ 05:24) (121/64 - 128/62)  RR: 18 (03-18-21 @ 05:24) (18 - 18)  SpO2: 93% (03-18-21 @ 08:03) (93% - 95%)          I reached out to the patient's health care proxy/ responsible family member-           [     ]  I reached                                     and discussed the patient's medical condition,                   family concerns, and discharge planning           [     ]  I left a message with family               [     ]  I personally participated in rounds with the medical team and my resident and discussed the case. My resident reached                   family member/ HCP   Rain ( daughter ) [phone]     under my direction and supervision  and we reviewed the conversation          [     ]  My resident left a message with family under my direction and supervision                [     ]   My resident attended rounds and called the family     The following was discussed: updated medical status           [     ] I spent 5-10 minutes on the above discussing medical issues with team members and family and/ or my resident    [  x   ] I spent 11-20 minutes on the above discussing medical issues with team members and family and/ or my resident    [     ] I spent 21-30 minutes on the above discussing medical issues with team members and family and/ or my resident

## 2021-03-18 NOTE — PROGRESS NOTE ADULT - SUBJECTIVE AND OBJECTIVE BOX
ROSA MARIA CASEY  80y  Male      Patient is a 80y old  Male who presents with a chief complaint of acute hypoxemic respiratory failure secondary to COVID-19 pneumonia (14 Mar 2021 07:11)      INTERVAL HPI/OVERNIGHT EVENTS: pt req overnight NRM alt with daytime 6 L NC, on IV DEXA, sat at 93%, stable, electrolytes and renal parameters WNL, WBC 8, no acute events    T:  99.2  HR:   79  BP:  147/70  RR: 16  Pulse ox:  93% NC        PHYSICAL EXAM:  GENERAL: A&O, chronically ill looking but in NAD  Neck:   thick neck, supple, no JVD, no bruit, no stridor  Lungs:  shallow resp, scattered rhonchi, no wheezing  Abd:  sl distended soft and benign, + BS  Urinary:  no phillips  Ext:  arthritic changes, moves all limbs  Skin:  no rash  Psych stable    LABS                          13  9.5)-----------( 343             40      141  |  104  |  28  ----------------------------<108  3.9   |  25  |  1.1  GFR 64  Ca    8.8       Phos  3.4      Mg     2.0        MB 63  trop <0.01 x2  ferritin 535, 462  procal 0.26  CRP 25  TPro  5.9<L>  /  Alb  3.2<L>  /  TBili  0.9  /  DBili  x   /  AST  45<H>  /  ALT  53<H>  /  AlkPhos  43  03-14      Urinalysis Basic - ( 12 Mar 2021 16:38 )    Color: Yellow / Appearance: Slightly Turbid / S.020 / pH: x  Gluc: x / Ketone: Negative  / Bili: Negative / Urobili: 3 mg/dL   Blood: x / Protein: 30 mg/dL / Nitrite: Negative   Leuk Esterase: Moderate / RBC: 5 /HPF / WBC 90 /HPF   Sq Epi: x / Non Sq Epi: 0 /HPF / Bacteria: Ma       RADIOLOGY & ADDITIONAL TESTS:  Venous duplex:  negative for DVT  CXR:  BL opacities L>R           ROSA MARIA CASEY  80y  Male      Patient is a 80y old  Male who presents with a chief complaint of acute hypoxemic respiratory failure secondary to COVID-19 pneumonia (14 Mar 2021 07:11)      INTERVAL HPI/OVERNIGHT EVENTS: pt req overnight NRM alt with daytime 6 L NC, on IV DEXA, sat at 01- 93%, stable, electrolytes and renal parameters WNL, WBC 8, no acute events    T:  97.6  HR:   69  BP:  123/60  RR: 18  Pulse ox:  91-93% NC        PHYSICAL EXAM:  GENERAL: A&O, overweight, chronically ill looking but in NAD, NC 6L  Neck:  short,  thick but supple, no JVD, no bruit, no stridor  Lungs:  shallow resp, scattered rhonchi, no wheezing  Abd:  sl distended soft and benign, + BS  Urinary:  RLQ urostomy  Ext:  arthritic changes, moves all limbs, nonpitting edema  Skin:  no rash  Psych stable    LABS                          13  10.6)-----------( 344             42      141  |  105  |  26  ----------------------------<141  4.3   |  25  |  1.1  GFR 64  Ca    8.8       Phos  3.4      Mg     2.0        MB 63  trop <0.01 x2  ferritin 535, 462  procal 0.26  CRP 25  TPro  5.9<L>  /  Alb  3.2<L>  /  TBili  0.9  /  DBili  x   /  AST  45<H>  /  ALT  53<H>  /  AlkPhos  43  03-14      Urinalysis Basic - ( 12 Mar 2021 16:38 )    Color: Yellow / Appearance: Slightly Turbid / S.020 / pH: x  Gluc: x / Ketone: Negative  / Bili: Negative / Urobili: 3 mg/dL   Blood: x / Protein: 30 mg/dL / Nitrite: Negative   Leuk Esterase: Moderate / RBC: 5 /HPF / WBC 90 /HPF   Sq Epi: x / Non Sq Epi: 0 /HPF / Bacteria: Ma       RADIOLOGY & ADDITIONAL TESTS:  Venous duplex:  negative for DVT  CXR:  BL opacities L>R

## 2021-03-18 NOTE — PROGRESS NOTE ADULT - ASSESSMENT
81yo M c PMHx chronic afib on noac, htn, dm2, ckd3 bladder ca s/p urostomy, b/l hip/ knee replacements obesity, here with acute hypoxic respiratory failure 2/2 severe covid 19 viral pneumonia, loli on ckd3 now better, elevated ddimer    iv dexamethasone  us duplex LE'snegative for DVT  monitor o2 requirements  6L now at rest 93% pulse ox  inflammatory markers and cxr being monitored  at high risk for ards and worsening respiratory failure/ CRS  pt remains acute  ID consult  Rehab 81yo M c PMHx chronic afib on noac, htn, dm2, ckd3 bladder ca s/p urostomy, b/l hip/ knee replacements obesity, here with acute hypoxic respiratory failure 2/2 severe covid 19 viral pneumonia, lorraine on ckd3 now better, elevated ddimer    Covid viral PNA. acute hypoxic RF  probable underlying JARETH, [place]  LORRAINE on CKD  Hx of HTN, ASHD, Afib/Eliquis  DM II, CKD  DLD  Bladder ca, sp urostomy  OA, DDD, DJD, sp BL hip and knee surgeries, mobility dysfunction    CXR show BL opacities  iv dexamethasone  us duplex LE'snegative for DVT  monitor o2 requirements  6L daytime alt with NRM/BIPAP at HS  inflammatory markers and cxr being monitored  at high risk for ards and worsening respiratory failure/ CRS  pt remains acute  ID consult  Rehab

## 2021-03-18 NOTE — PROGRESS NOTE ADULT - ASSESSMENT
81 yo M with PMHx of HTN, HLD, DMII, AFib on Eliquis, CKD IIIA, Bladder Ca s/p cystectomy with urostomy, bilateral hip and knee arthroplasties admitted for acute COVID PNA.    #Acute hypoxemic respiratory failure secondary to COVID-19 pneumonia  #Mild transaminitis - resolved  - COVID-19 PCR positive  - Isolation precautions (contact, droplet, airborne)  - Chest X-ray: bibasilar opacities  - Patient is saturating 92% on NRB  - Ferritin, Serum: 462 ng/mL, Procalcitonin, Serum: 0.07 ng/mL, C-Reactive Protein, Serum: 25 mg/L, D-Dimer Assay, Quantitative: 3761 ng/mL DDU   - LE Duplex negative  - C/w Dexamethasone, not candidate for RDV  - DVT prophylaxis per protocol  - Titrate supplemental O2 to maintain SpO2 92-96%    #LORRAINE on CKDIIIA - resolved    #Paroxysmal AFib - rate controlled  - CHADVASC 4  - C/w Eliquis 5 mg PO BID  - C/w Metoprolol succinate 100 mg qd    #HLD  - Takes Rosuvastatin, c/w Atorvastatin while here    #HTN  - C/w Amlodipine  - Home Benazapril on hold due to LORRAINE, resume if BP significantly elevated    #Hx of bilateral TKR, THR - pain well controlled off medication    #Bladder Ca s/p cystectomy with urostomy  - Urostomy care PRN  - Outpt f/u with Urology    DVT ppx: Eliquis  GI ppx: PPI  Diet CC  Activity: AAT  Full code  Dispo: Pending improvement of oxygen requirements

## 2021-03-18 NOTE — PROGRESS NOTE ADULT - SUBJECTIVE AND OBJECTIVE BOX
Patient Information:  ROSA MARIA CASEY / 80y / Male / MRN#:[id number]    Hospital Day: 6d    Interval History:  Patient seen and examined at bedside. Pt remained on NRB overnight, will try to switch to NC 6L during the day. Pt denies any complaints, appears comfortable, asking for water. Pt needs to be on CPAP for JARETH overnight.    Past Medical History:  Chronic kidney disease (CKD)    Bladder cancer    History of atrial fibrillation    Diabetes mellitus, type 2    Hyperlipidemia    Hypertension      Past Surgical History:  History of total cystectomy    History of total knee replacement, bilateral    History of total hip replacement, bilateral      Allergies:  No Known Allergies    Medications:  PRN:  acetaminophen   Tablet .. 650 milliGRAM(s) Oral every 6 hours PRN Temp greater or equal to 38C (100.4F)  acetaminophen   Tablet .. 650 milliGRAM(s) Oral every 6 hours PRN Mild Pain (1 - 3)  guaifenesin/dextromethorphan  Syrup 10 milliLiter(s) Oral every 6 hours PRN Cough    Standing:  amLODIPine   Tablet 10 milliGRAM(s) Oral daily  apixaban 5 milliGRAM(s) Oral two times a day  atorvastatin 80 milliGRAM(s) Oral at bedtime  chlorhexidine 4% Liquid 1 Application(s) Topical <User Schedule>  dexAMETHasone  Injectable 6 milliGRAM(s) IV Push daily  influenza   Vaccine 0.5 milliLiter(s) IntraMuscular once  insulin glargine Injectable (LANTUS) 45 Unit(s) SubCutaneous at bedtime  insulin lispro (ADMELOG) corrective regimen sliding scale   SubCutaneous three times a day before meals  insulin lispro Injectable (ADMELOG) 8 Unit(s) SubCutaneous three times a day before meals  melatonin 5 milliGRAM(s) Oral at bedtime  metoprolol succinate  milliGRAM(s) Oral daily  pantoprazole    Tablet 40 milliGRAM(s) Oral before breakfast  senna 2 Tablet(s) Oral at bedtime    Home:  amlodipine-benazepril 10 mg-40 mg oral capsule: 1 cap(s) orally once a day  Eliquis 5 mg oral tablet: 1 tab(s) orally 2 times a day  glimepiride 2 mg oral tablet: 1 tab(s) orally once a day  Lantus 100 units/mL subcutaneous solution: 40 unit(s) subcutaneous once a day  metoprolol succinate 100 mg oral tablet, extended release: 1 tab(s) orally once a day  Ozempic (1 mg dose) 2 mg/1.5 mL subcutaneous solution:   Percocet 7.5/325 oral tablet: 0.5 tab(s) orally once a day (at bedtime)  potassium citrate 10 mEq oral tablet, extended release: 1 tab(s) orally once a day  rosuvastatin 20 mg oral tablet: 1 tab(s) orally once a day    Vitals:  T(C): 36.1, Max: 36.9 (03-17-21 @ 14:20)  T(F): 97, Max: 98.4 (03-17-21 @ 14:20)  HR: 68 (68 - 71)  BP: 128/62 (121/64 - 128/62)  RR: 18 (18 - 18)  SpO2: 93% (93% - 95%)    Physical Exam:  General: Awake, alert, NAD, resting comfortably in bed, on NRB  HEENT: Head NC/AT  Heart: RRR; S1/S2; no rubs, murmurs appreciated  Lungs: Dec breath sounds in bilateral bases  Abdomen: Soft, nontender, nondistended, BS+  Extremities: No edema, clubbing, cyanosis in upper or lower extremities  Neuro: AAOx3, NFD    Labs:  CBC (03-18 @ 09:11)                        Hgb: 13.5   WBC: 10.67 )-----------------( Plts: 344                              Hct: 42.8     Chem (03-18 @ 09:11)  Na: 141  |     Cl: 105     |  BUN: 26  -----------------------------------------< Gluc: 141    K: 4.3   |    HCO3: 25  |  Cr: 1.1    Ca 8.6 (03-18 @ 09:11)    LFTs (03-18 @ 09:11)  TPro 6.3  /  Alb 3.1  TBili 1.1  /  DBili     AST 70  /  ALT 72  /  AlkPhos 65            Microbiology:    Radiology:

## 2021-03-19 LAB
ALBUMIN SERPL ELPH-MCNC: 3 G/DL — LOW (ref 3.5–5.2)
ALP SERPL-CCNC: 63 U/L — SIGNIFICANT CHANGE UP (ref 30–115)
ALT FLD-CCNC: 93 U/L — HIGH (ref 0–41)
ANION GAP SERPL CALC-SCNC: 13 MMOL/L — SIGNIFICANT CHANGE UP (ref 7–14)
AST SERPL-CCNC: 88 U/L — HIGH (ref 0–41)
BASOPHILS # BLD AUTO: 0.01 K/UL — SIGNIFICANT CHANGE UP (ref 0–0.2)
BASOPHILS NFR BLD AUTO: 0.1 % — SIGNIFICANT CHANGE UP (ref 0–1)
BILIRUB SERPL-MCNC: 1.3 MG/DL — HIGH (ref 0.2–1.2)
BUN SERPL-MCNC: 27 MG/DL — HIGH (ref 10–20)
CALCIUM SERPL-MCNC: 8.5 MG/DL — SIGNIFICANT CHANGE UP (ref 8.5–10.1)
CHLORIDE SERPL-SCNC: 103 MMOL/L — SIGNIFICANT CHANGE UP (ref 98–110)
CO2 SERPL-SCNC: 24 MMOL/L — SIGNIFICANT CHANGE UP (ref 17–32)
CREAT SERPL-MCNC: 1.1 MG/DL — SIGNIFICANT CHANGE UP (ref 0.7–1.5)
EOSINOPHIL # BLD AUTO: 0.11 K/UL — SIGNIFICANT CHANGE UP (ref 0–0.7)
EOSINOPHIL NFR BLD AUTO: 1.1 % — SIGNIFICANT CHANGE UP (ref 0–8)
GLUCOSE BLDC GLUCOMTR-MCNC: 101 MG/DL — HIGH (ref 70–99)
GLUCOSE BLDC GLUCOMTR-MCNC: 152 MG/DL — HIGH (ref 70–99)
GLUCOSE BLDC GLUCOMTR-MCNC: 158 MG/DL — HIGH (ref 70–99)
GLUCOSE BLDC GLUCOMTR-MCNC: 78 MG/DL — SIGNIFICANT CHANGE UP (ref 70–99)
GLUCOSE SERPL-MCNC: 75 MG/DL — SIGNIFICANT CHANGE UP (ref 70–99)
HCT VFR BLD CALC: 40.2 % — LOW (ref 42–52)
HGB BLD-MCNC: 13.1 G/DL — LOW (ref 14–18)
IMM GRANULOCYTES NFR BLD AUTO: 1.6 % — HIGH (ref 0.1–0.3)
LYMPHOCYTES # BLD AUTO: 0.32 K/UL — LOW (ref 1.2–3.4)
LYMPHOCYTES # BLD AUTO: 3.3 % — LOW (ref 20.5–51.1)
MCHC RBC-ENTMCNC: 27.8 PG — SIGNIFICANT CHANGE UP (ref 27–31)
MCHC RBC-ENTMCNC: 32.6 G/DL — SIGNIFICANT CHANGE UP (ref 32–37)
MCV RBC AUTO: 85.4 FL — SIGNIFICANT CHANGE UP (ref 80–94)
MONOCYTES # BLD AUTO: 0.26 K/UL — SIGNIFICANT CHANGE UP (ref 0.1–0.6)
MONOCYTES NFR BLD AUTO: 2.7 % — SIGNIFICANT CHANGE UP (ref 1.7–9.3)
NEUTROPHILS # BLD AUTO: 8.89 K/UL — HIGH (ref 1.4–6.5)
NEUTROPHILS NFR BLD AUTO: 91.2 % — HIGH (ref 42.2–75.2)
NRBC # BLD: 0 /100 WBCS — SIGNIFICANT CHANGE UP (ref 0–0)
PLATELET # BLD AUTO: 373 K/UL — SIGNIFICANT CHANGE UP (ref 130–400)
POTASSIUM SERPL-MCNC: 4.2 MMOL/L — SIGNIFICANT CHANGE UP (ref 3.5–5)
POTASSIUM SERPL-SCNC: 4.2 MMOL/L — SIGNIFICANT CHANGE UP (ref 3.5–5)
PROT SERPL-MCNC: 5.9 G/DL — LOW (ref 6–8)
RBC # BLD: 4.71 M/UL — SIGNIFICANT CHANGE UP (ref 4.7–6.1)
RBC # FLD: 14.7 % — HIGH (ref 11.5–14.5)
SODIUM SERPL-SCNC: 140 MMOL/L — SIGNIFICANT CHANGE UP (ref 135–146)
WBC # BLD: 9.75 K/UL — SIGNIFICANT CHANGE UP (ref 4.8–10.8)
WBC # FLD AUTO: 9.75 K/UL — SIGNIFICANT CHANGE UP (ref 4.8–10.8)

## 2021-03-19 RX ORDER — INSULIN GLARGINE 100 [IU]/ML
22 INJECTION, SOLUTION SUBCUTANEOUS ONCE
Refills: 0 | Status: COMPLETED | OUTPATIENT
Start: 2021-03-19 | End: 2021-03-19

## 2021-03-19 RX ADMIN — Medication 8 UNIT(S): at 08:03

## 2021-03-19 RX ADMIN — ATORVASTATIN CALCIUM 80 MILLIGRAM(S): 80 TABLET, FILM COATED ORAL at 22:02

## 2021-03-19 RX ADMIN — Medication 2: at 17:25

## 2021-03-19 RX ADMIN — APIXABAN 5 MILLIGRAM(S): 2.5 TABLET, FILM COATED ORAL at 17:26

## 2021-03-19 RX ADMIN — PANTOPRAZOLE SODIUM 40 MILLIGRAM(S): 20 TABLET, DELAYED RELEASE ORAL at 05:32

## 2021-03-19 RX ADMIN — Medication 8 UNIT(S): at 17:25

## 2021-03-19 RX ADMIN — APIXABAN 5 MILLIGRAM(S): 2.5 TABLET, FILM COATED ORAL at 05:31

## 2021-03-19 RX ADMIN — Medication 5 MILLIGRAM(S): at 22:02

## 2021-03-19 RX ADMIN — AMLODIPINE BESYLATE 10 MILLIGRAM(S): 2.5 TABLET ORAL at 05:31

## 2021-03-19 RX ADMIN — Medication 8 UNIT(S): at 12:00

## 2021-03-19 RX ADMIN — INSULIN GLARGINE 22 UNIT(S): 100 INJECTION, SOLUTION SUBCUTANEOUS at 22:02

## 2021-03-19 RX ADMIN — Medication 100 MILLIGRAM(S): at 05:31

## 2021-03-19 RX ADMIN — CHLORHEXIDINE GLUCONATE 1 APPLICATION(S): 213 SOLUTION TOPICAL at 05:32

## 2021-03-19 RX ADMIN — SENNA PLUS 2 TABLET(S): 8.6 TABLET ORAL at 22:03

## 2021-03-19 RX ADMIN — Medication 2: at 12:00

## 2021-03-19 RX ADMIN — Medication 6 MILLIGRAM(S): at 05:32

## 2021-03-19 NOTE — PROGRESS NOTE ADULT - SUBJECTIVE AND OBJECTIVE BOX
Patient Information:  ROSA MARIA CASEY / 80y / Male / MRN#:[id number]    Hospital Day: 7d    Interval History:  Patient seen and examined at bedside. No acute events overnight. Pt remained on O2 NC 6L overnight, feels well but wishes to ambulate with PT.    Past Medical History:  Chronic kidney disease (CKD)    Bladder cancer    History of atrial fibrillation    Diabetes mellitus, type 2    Hyperlipidemia    Hypertension      Past Surgical History:  History of total cystectomy    History of total knee replacement, bilateral    History of total hip replacement, bilateral      Allergies:  No Known Allergies    Medications:  PRN:  acetaminophen   Tablet .. 650 milliGRAM(s) Oral every 6 hours PRN Temp greater or equal to 38C (100.4F)  acetaminophen   Tablet .. 650 milliGRAM(s) Oral every 6 hours PRN Mild Pain (1 - 3)  guaifenesin/dextromethorphan  Syrup 10 milliLiter(s) Oral every 6 hours PRN Cough    Standing:  amLODIPine   Tablet 10 milliGRAM(s) Oral daily  apixaban 5 milliGRAM(s) Oral two times a day  atorvastatin 80 milliGRAM(s) Oral at bedtime  chlorhexidine 4% Liquid 1 Application(s) Topical <User Schedule>  dexAMETHasone  Injectable 6 milliGRAM(s) IV Push daily  influenza   Vaccine 0.5 milliLiter(s) IntraMuscular once  insulin glargine Injectable (LANTUS) 45 Unit(s) SubCutaneous at bedtime  insulin lispro (ADMELOG) corrective regimen sliding scale   SubCutaneous three times a day before meals  insulin lispro Injectable (ADMELOG) 8 Unit(s) SubCutaneous three times a day before meals  melatonin 5 milliGRAM(s) Oral at bedtime  metoprolol succinate  milliGRAM(s) Oral daily  pantoprazole    Tablet 40 milliGRAM(s) Oral before breakfast  senna 2 Tablet(s) Oral at bedtime    Home:  amlodipine-benazepril 10 mg-40 mg oral capsule: 1 cap(s) orally once a day  Eliquis 5 mg oral tablet: 1 tab(s) orally 2 times a day  glimepiride 2 mg oral tablet: 1 tab(s) orally once a day  Lantus 100 units/mL subcutaneous solution: 40 unit(s) subcutaneous once a day  metoprolol succinate 100 mg oral tablet, extended release: 1 tab(s) orally once a day  Ozempic (1 mg dose) 2 mg/1.5 mL subcutaneous solution:   Percocet 7.5/325 oral tablet: 0.5 tab(s) orally once a day (at bedtime)  potassium citrate 10 mEq oral tablet, extended release: 1 tab(s) orally once a day  rosuvastatin 20 mg oral tablet: 1 tab(s) orally once a day    Vitals:  T(C): 37, Max: 37 (03-19-21 @ 05:00)  T(F): 98.6, Max: 98.6 (03-19-21 @ 05:00)  HR: 68 (68 - 70)  BP: 121/57 (121/57 - 128/64)  RR: 18 (18 - 18)  SpO2: 93% (91% - 94%)    Physical Exam:  General: Awake, alert, NAD, resting comfortably in bed, on O2 NC 6L  HEENT: Head NC/AT  Heart: RRR; S1/S2; no rubs, murmurs appreciated  Lungs: Dec breath sounds in bilateral bases  Abdomen: Soft, nontender, nondistended, BS+  Extremities: No edema, clubbing, cyanosis in upper or lower extremities  Neuro: AAOx3, NFD    Labs:  CBC (03-19 @ 07:32)                        Hgb: 13.1   WBC: 9.75  )-----------------( Plts: 373                              Hct: 40.2     Chem (03-19 @ 07:32)  Na:     |     Cl:        |  BUN: 27  -----------------------------------------< Gluc: 75    K:      |    HCO3: 24  |  Cr: 1.1    Ca 8.5 (03-19 @ 07:32)    LFTs (03-19 @ 07:32)  TPro 5.9  /  Alb 3.0  TBili 1.3  /  DBili     AST 88  /  ALT 93  /  AlkPhos 63

## 2021-03-19 NOTE — CHART NOTE - NSCHARTNOTEFT_GEN_A_CORE
I made rounds today with the treatment team including the hospitalist, residents,  nurses and  and discussed the patient's current medical status and discharge  planning needs, and reviewed the chart.    T(C): 37 (03-19-21 @ 05:00), Max: 37 (03-19-21 @ 05:00)  HR: 68 (03-19-21 @ 05:00) (68 - 70)  BP: 121/57 (03-19-21 @ 05:00) (121/57 - 128/64)  RR: 18 (03-19-21 @ 05:00) (18 - 18)  SpO2: 93% (03-19-21 @ 07:59) (91% - 94%)          I reached out to the patient's health care proxy/ responsible family member-           [     ]  I reached                                     and discussed the patient's medical condition,                   family concerns, and discharge planning           [     ]  I left a message with family               [   x  ]  I personally participated in rounds with the medical team and my resident and discussed the case. My resident reached                   family member/ HCP   Rain ( daughter ) [phone]    under my direction and supervision  and we reviewed the conversation          [     ]  My resident left a message with family under my direction and supervision                [     ]   My resident attended rounds and called the family     The following was discussed: updated medical status           [     ] I spent 5-10 minutes on the above discussing medical issues with team members and family and/ or my resident    [  x   ] I spent 11-20 minutes on the above discussing medical issues with team members and family and/ or my resident    [     ] I spent 21-30 minutes on the above discussing medical issues with team members and family and/ or my resident

## 2021-03-19 NOTE — DIETITIAN INITIAL EVALUATION ADULT. - OTHER INFO
Acute hypoxemic respiratory failure secondary to COVID-19 pneumonia. Mild transaminitis resolved. LORRAINE on CKDIIIA - resolved. Paroxysmal AFib - rate controlled. Bladder Ca s/p cystectomy with urostomy

## 2021-03-19 NOTE — PHYSICAL THERAPY INITIAL EVALUATION ADULT - IMPAIRMENTS FOUND, PT EVAL
aerobic capacity/endurance/ergonomics and body mechanics/fine motor/gait, locomotion, and balance/gross motor/integumentary integrity/muscle strength

## 2021-03-19 NOTE — DIETITIAN INITIAL EVALUATION ADULT. - OTHER CALCULATIONS
Calories: 2048 - 2252 kcals (MSJ x 1.0 - 1.1 AF obesity) Protein: 84 - 100 gms (1.0 - 1.2 gm/kg IBW obesity) Fluid: 1ml/kcal or per LIP symmetrical

## 2021-03-19 NOTE — PHYSICAL THERAPY INITIAL EVALUATION ADULT - GENERAL OBSERVATIONS, REHAB EVAL
Pt seen [phone] for a total of 50 minutes. Pt encountered and left supine in bed, in no apparent distress, +bed alarm, call bell/tv/phone in reach, all needs met. +6L O2 NC, +urostomy bag vd31=46-34% on 6L. RAHEL Goldberg aware to continue checking pts o2 levels. Throughout session, sp02 remained around 88-89%, although after walking a few feet to the side, sp02 dropped to 82-84%. RAHEL Vogel made aware

## 2021-03-19 NOTE — DIETITIAN INITIAL EVALUATION ADULT. - ORAL INTAKE PTA/DIET HISTORY
Spoke to pt via phone. Reports good appetite PTA. Enjoying the food here. Mostly followed DM diet and his wife cooks for him. NKFA. no chewing/swallowing issues. No cultural/Caodaism food restrictions. No nutrition supplement use PTA.

## 2021-03-19 NOTE — PHYSICAL THERAPY INITIAL EVALUATION ADULT - ADDITIONAL COMMENTS
Pt was fully independent prior to admission. He was driving as well. Pt notes 0 steps to enter + Flight inside home.

## 2021-03-19 NOTE — PHYSICAL THERAPY INITIAL EVALUATION ADULT - GAIT TRAINING, PT EVAL
By discharge, pt will be able to ambulate 100ft with rolling walker and supervision assistance. By discharge, pt will be able to negotiate 12 steps with 1 HR and contact guard assistance.

## 2021-03-19 NOTE — DIETITIAN INITIAL EVALUATION ADULT. - PERTINENT MEDS FT
MEDICATIONS  (STANDING):  amLODIPine   Tablet 10 milliGRAM(s) Oral daily  apixaban 5 milliGRAM(s) Oral two times a day  atorvastatin 80 milliGRAM(s) Oral at bedtime  insulin glargine Injectable (LANTUS) 45 Unit(s) SubCutaneous at bedtime  insulin lispro (ADMELOG) corrective regimen sliding scale   SubCutaneous three times a day before meals  insulin lispro Injectable (ADMELOG) 8 Unit(s) SubCutaneous three times a day before meals  pantoprazole    Tablet 40 milliGRAM(s) Oral before breakfast  senna 2 Tablet(s) Oral at bedtime

## 2021-03-19 NOTE — PHYSICAL THERAPY INITIAL EVALUATION ADULT - WEIGHT-BEARING RESTRICTIONS: SIT/STAND, REHAB EVAL
Bedside and Verbal shift change report given to [address] [place] [address] (oncoming nurse) by Rowan Donahue RN (offgoing nurse). Report included the following information SBAR, Kardex, Intake/Output, MAR, Recent Results and Cardiac Rhythm NSR. full weight-bearing

## 2021-03-19 NOTE — DIETITIAN INITIAL EVALUATION ADULT. - PERSON TAUGHT/METHOD
encouraged po intake. aware of DM diet but explained diet order. declines written materials. agrees w/ RD plan/verbal instruction/patient instructed

## 2021-03-19 NOTE — PROGRESS NOTE ADULT - ASSESSMENT
81 yo M with PMHx of HTN, HLD, DMII, AFib on Eliquis, CKD IIIA, Bladder Ca s/p cystectomy with urostomy, bilateral hip and knee arthroplasties admitted for acute COVID PNA.    #Acute hypoxemic respiratory failure secondary to COVID-19 pneumonia  #Mild transaminitis - resolved  - COVID-19 PCR positive  - Isolation precautions (contact, droplet, airborne)  - Chest X-ray: bibasilar opacities  - Patient is saturating 94% on O2 NC 6L  - Ferritin, Serum: 462 ng/mL, Procalcitonin, Serum: 0.07 ng/mL, C-Reactive Protein, Serum: 25 mg/L, D-Dimer Assay, Quantitative: 3761 ng/mL DDU   - LE Duplex negative  - C/w Dexamethasone, not candidate for RDV  - DVT prophylaxis per protocol  - Titrate supplemental O2 to maintain SpO2 92-96%    #LORRAINE on CKDIIIA - resolved    #Paroxysmal AFib - rate controlled  - CHADVASC 4  - C/w Eliquis 5 mg PO BID  - C/w Metoprolol succinate 100 mg qd    #HLD  - Takes Rosuvastatin, c/w Atorvastatin while here    #HTN  - C/w Amlodipine  - Home Benazapril on hold due to LORRAINE, resume if BP significantly elevated    #Hx of bilateral TKR, THR   - Pain well controlled off medication  - PT eval    #Bladder Ca s/p cystectomy with urostomy  - Urostomy care PRN  - Outpt f/u with Urology    DVT ppx: Eliquis  GI ppx: PPI  Diet CC  Activity: AAT  Full code  Dispo: Pending improvement of oxygen requirements

## 2021-03-20 LAB
ALBUMIN SERPL ELPH-MCNC: 3.1 G/DL — LOW (ref 3.5–5.2)
ALP SERPL-CCNC: 63 U/L — SIGNIFICANT CHANGE UP (ref 30–115)
ALT FLD-CCNC: 99 U/L — HIGH (ref 0–41)
ANION GAP SERPL CALC-SCNC: 12 MMOL/L — SIGNIFICANT CHANGE UP (ref 7–14)
AST SERPL-CCNC: 88 U/L — HIGH (ref 0–41)
BASOPHILS # BLD AUTO: 0.02 K/UL — SIGNIFICANT CHANGE UP (ref 0–0.2)
BASOPHILS NFR BLD AUTO: 0.2 % — SIGNIFICANT CHANGE UP (ref 0–1)
BILIRUB SERPL-MCNC: 1.3 MG/DL — HIGH (ref 0.2–1.2)
BUN SERPL-MCNC: 30 MG/DL — HIGH (ref 10–20)
CALCIUM SERPL-MCNC: 8.7 MG/DL — SIGNIFICANT CHANGE UP (ref 8.5–10.1)
CHLORIDE SERPL-SCNC: 102 MMOL/L — SIGNIFICANT CHANGE UP (ref 98–110)
CO2 SERPL-SCNC: 25 MMOL/L — SIGNIFICANT CHANGE UP (ref 17–32)
CREAT SERPL-MCNC: 0.9 MG/DL — SIGNIFICANT CHANGE UP (ref 0.7–1.5)
D DIMER BLD IA.RAPID-MCNC: 7756 NG/ML DDU — HIGH (ref 0–230)
EOSINOPHIL # BLD AUTO: 0.13 K/UL — SIGNIFICANT CHANGE UP (ref 0–0.7)
EOSINOPHIL NFR BLD AUTO: 1.3 % — SIGNIFICANT CHANGE UP (ref 0–8)
GLUCOSE BLDC GLUCOMTR-MCNC: 172 MG/DL — HIGH (ref 70–99)
GLUCOSE BLDC GLUCOMTR-MCNC: 190 MG/DL — HIGH (ref 70–99)
GLUCOSE BLDC GLUCOMTR-MCNC: 204 MG/DL — HIGH (ref 70–99)
GLUCOSE BLDC GLUCOMTR-MCNC: 87 MG/DL — SIGNIFICANT CHANGE UP (ref 70–99)
GLUCOSE SERPL-MCNC: 76 MG/DL — SIGNIFICANT CHANGE UP (ref 70–99)
HCT VFR BLD CALC: 39.8 % — LOW (ref 42–52)
HGB BLD-MCNC: 13.2 G/DL — LOW (ref 14–18)
IMM GRANULOCYTES NFR BLD AUTO: 0.9 % — HIGH (ref 0.1–0.3)
LYMPHOCYTES # BLD AUTO: 0.61 K/UL — LOW (ref 1.2–3.4)
LYMPHOCYTES # BLD AUTO: 6 % — LOW (ref 20.5–51.1)
MCHC RBC-ENTMCNC: 28 PG — SIGNIFICANT CHANGE UP (ref 27–31)
MCHC RBC-ENTMCNC: 33.2 G/DL — SIGNIFICANT CHANGE UP (ref 32–37)
MCV RBC AUTO: 84.3 FL — SIGNIFICANT CHANGE UP (ref 80–94)
MONOCYTES # BLD AUTO: 0.24 K/UL — SIGNIFICANT CHANGE UP (ref 0.1–0.6)
MONOCYTES NFR BLD AUTO: 2.4 % — SIGNIFICANT CHANGE UP (ref 1.7–9.3)
NEUTROPHILS # BLD AUTO: 9.06 K/UL — HIGH (ref 1.4–6.5)
NEUTROPHILS NFR BLD AUTO: 89.2 % — HIGH (ref 42.2–75.2)
NRBC # BLD: 0 /100 WBCS — SIGNIFICANT CHANGE UP (ref 0–0)
PLATELET # BLD AUTO: 388 K/UL — SIGNIFICANT CHANGE UP (ref 130–400)
POTASSIUM SERPL-MCNC: 4.2 MMOL/L — SIGNIFICANT CHANGE UP (ref 3.5–5)
POTASSIUM SERPL-SCNC: 4.2 MMOL/L — SIGNIFICANT CHANGE UP (ref 3.5–5)
PROT SERPL-MCNC: 6.1 G/DL — SIGNIFICANT CHANGE UP (ref 6–8)
RBC # BLD: 4.72 M/UL — SIGNIFICANT CHANGE UP (ref 4.7–6.1)
RBC # FLD: 14.7 % — HIGH (ref 11.5–14.5)
SODIUM SERPL-SCNC: 139 MMOL/L — SIGNIFICANT CHANGE UP (ref 135–146)
WBC # BLD: 10.15 K/UL — SIGNIFICANT CHANGE UP (ref 4.8–10.8)
WBC # FLD AUTO: 10.15 K/UL — SIGNIFICANT CHANGE UP (ref 4.8–10.8)

## 2021-03-20 PROCEDURE — 71045 X-RAY EXAM CHEST 1 VIEW: CPT | Mod: 26

## 2021-03-20 RX ADMIN — Medication 8 UNIT(S): at 17:28

## 2021-03-20 RX ADMIN — APIXABAN 5 MILLIGRAM(S): 2.5 TABLET, FILM COATED ORAL at 17:28

## 2021-03-20 RX ADMIN — Medication 6 MILLIGRAM(S): at 05:35

## 2021-03-20 RX ADMIN — Medication 100 MILLIGRAM(S): at 05:35

## 2021-03-20 RX ADMIN — CHLORHEXIDINE GLUCONATE 1 APPLICATION(S): 213 SOLUTION TOPICAL at 05:36

## 2021-03-20 RX ADMIN — INSULIN GLARGINE 45 UNIT(S): 100 INJECTION, SOLUTION SUBCUTANEOUS at 21:39

## 2021-03-20 RX ADMIN — PANTOPRAZOLE SODIUM 40 MILLIGRAM(S): 20 TABLET, DELAYED RELEASE ORAL at 05:35

## 2021-03-20 RX ADMIN — APIXABAN 5 MILLIGRAM(S): 2.5 TABLET, FILM COATED ORAL at 05:35

## 2021-03-20 RX ADMIN — Medication 4: at 12:05

## 2021-03-20 RX ADMIN — ATORVASTATIN CALCIUM 80 MILLIGRAM(S): 80 TABLET, FILM COATED ORAL at 21:39

## 2021-03-20 RX ADMIN — Medication 8 UNIT(S): at 12:04

## 2021-03-20 RX ADMIN — Medication 8 UNIT(S): at 07:59

## 2021-03-20 RX ADMIN — Medication 5 MILLIGRAM(S): at 21:39

## 2021-03-20 RX ADMIN — Medication 2: at 17:27

## 2021-03-20 RX ADMIN — AMLODIPINE BESYLATE 10 MILLIGRAM(S): 2.5 TABLET ORAL at 05:35

## 2021-03-20 RX ADMIN — SENNA PLUS 2 TABLET(S): 8.6 TABLET ORAL at 21:39

## 2021-03-20 NOTE — PROGRESS NOTE ADULT - ASSESSMENT
81yo M c PMHx chronic afib on noac, htn, dm2, ckd3 bladder ca s/p urostomy, b/l hip/ knee replacements obesity, here with acute hypoxic respiratory failure 2/2 severe covid 19 viral pneumonia, lorraine on ckd3 now better, elevated ddimer    Covid viral PNA. acute hypoxic RF  probable underlying JARETH, [place]  LORRAINE on CKD  Transaminitis  Hx of HTN, ASHD, Afib/Eliquis  DM II, CKD  DLD  Bladder ca, sp urostomy  OA, DDD, DJD, sp BL hip and knee surgeries, mobility dysfunction    CXR show BL opacities  iv dexamethasone  us duplex LE's negative for DVT  monitor o2 requirements  6L switched to NRM due to drop in O2 sat, may req BIPAP at HS  inflammatory markers and cxr being monitored  LFTs trending down  pt remains acute  ID consult  Rehab consult  pt is a full code

## 2021-03-20 NOTE — PROGRESS NOTE ADULT - ASSESSMENT
79yo M c PMHx chronic afib on noac, htn, dm2, ckd3 bladder ca s/p urostomy, b/l hip/ knee replacements obesity, here with acute hypoxic respiratory failure 2/2 severe covid 19 viral pneumonia, lorraine on ckd3 now better, elevated ddimer    Covid viral PNA. acute hypoxic RF  probable underlying JARETH, [place]  LORRAINE on CKD  Transaminitis  Hx of HTN, ASHD, Afib/Eliquis  DM II, CKD  DLD  Bladder ca, sp urostomy  OA, DDD, DJD, sp BL hip and knee surgeries, mobility dysfunction    CXR show BL opacities  iv dexamethasone  us duplex LE's negative for DVT  monitor o2 requirements  6L switched to NRM due to drop in O2 sat, may req BIPAP at HS  inflammatory markers and cxr being monitored  LFTs trending down  pt remains acute  ID consult  Rehab

## 2021-03-20 NOTE — PROGRESS NOTE ADULT - SUBJECTIVE AND OBJECTIVE BOX
ROSA MARIA CASEY  80y  Male      Patient is a 80y old  Male who presents with a chief complaint of acute hypoxemic respiratory failure secondary to COVID-19 pneumonia (14 Mar 2021 07:11). Pt with MO and probable underlying JARETH.      INTERVAL HPI/OVERNIGHT EVENTS: pt afebrile, CXR show stable BL lung opacities, had to be inc to NRM yesterday, + mild transaminitis    T:  97.8  HR:   97  BP:  118/71    RR: 18  Pulse ox:  88% 6L NC changed to NRM 12 L 92%        PHYSICAL EXAM:  GENERAL: A&O, overweight, chronically ill looking but in NAD, NRM  Neck:  short,  thick but supple, no JVD, no bruit, no stridor  Lungs:  shallow resp, scattered rhonchi, no wheezing  Abd:   distended soft and benign, + BS  Urinary:  RLQ urostomy  Ext:  arthritic changes, moves all limbs, nonpitting edema  Skin:  no rash  Psych stable    LABS                          13  10)-----------( 388             39      139  |  102  |  30  ----------------------------<76  4.2   |  25  |  0.9    GFR 64, 82  Ca    8.8       Phos  3.4      Mg     2.0        MB 63  trop <0.01 x2  ferritin 535, 462  procal 0.26  CRP 25  TPro  5.9<L>  /  Alb  3.2<L>  /  TBili  0.9  /  DBili  x   /  AST  45, 88, 88  /  ALT  53, 93, 99  /  AlkPhos  43  03-14      Urinalysis Basic - ( 12 Mar 2021 16:38 )    Color: Yellow / Appearance: Slightly Turbid / S.020 / pH: x  Gluc: x / Ketone: Negative  / Bili: Negative / Urobili: 3 mg/dL   Blood: x / Protein: 30 mg/dL / Nitrite: Negative   Leuk Esterase: Moderate / RBC: 5 /HPF / WBC 90 /HPF   Sq Epi: x / Non Sq Epi: 0 /HPF / Bacteria: Ma       RADIOLOGY & ADDITIONAL TESTS:  Venous duplex:  negative for DVT  CXR:  BL opacities L>R  CXR:  stable BL opacities

## 2021-03-20 NOTE — PROGRESS NOTE ADULT - SUBJECTIVE AND OBJECTIVE BOX
ROSA MARIA CASEY  80y  Male      Patient is a 80y old  Male who presents with a chief complaint of acute hypoxemic respiratory failure secondary to COVID-19 pneumonia (14 Mar 2021 07:11)      INTERVAL HPI/OVERNIGHT EVENTS: pt afebrile, no sig untoward events overnight but  noted to have dec pulse ox on 6L to 88 andplaced on BNRM with 94% improved oxygenation    T:  96.7  HR:   67  BP:  131/89  RR: 18  Pulse ox:  88% 6L NC changed to NRM 12L 94%        PHYSICAL EXAM:  GENERAL: A&O, overweight, chronically ill looking but in NAD, NRM  Neck:  short,  thick but supple, no JVD, no bruit, no stridor  Lungs:  shallow resp, scattered rhonchi, no wheezing  Abd:  sl distended soft and benign, + BS  Urinary:  RLQ urostomy  Ext:  arthritic changes, moves all limbs, nonpitting edema  Skin:  no rash  Psych stable    LABS                          13  9.7)-----------( 373             40      140  |  103  |  27  ----------------------------<75  4.2   |  24  |  1.1    GFR 64  Ca    8.8       Phos  3.4      Mg     2.0        MB 63  trop <0.01 x2  ferritin 535, 462  procal 0.26  CRP 25  TPro  5.9<L>  /  Alb  3.2<L>  /  TBili  0.9  /  DBili  x   /  AST  45, 88  /  ALT  53, 93  /  AlkPhos  43  03-14      Urinalysis Basic - ( 12 Mar 2021 16:38 )    Color: Yellow / Appearance: Slightly Turbid / S.020 / pH: x  Gluc: x / Ketone: Negative  / Bili: Negative / Urobili: 3 mg/dL   Blood: x / Protein: 30 mg/dL / Nitrite: Negative   Leuk Esterase: Moderate / RBC: 5 /HPF / WBC 90 /HPF   Sq Epi: x / Non Sq Epi: 0 /HPF / Bacteria: Ma       RADIOLOGY & ADDITIONAL TESTS:  Venous duplex:  negative for DVT  CXR:  BL opacities L>R

## 2021-03-21 LAB
ALBUMIN SERPL ELPH-MCNC: 3 G/DL — LOW (ref 3.5–5.2)
ALP SERPL-CCNC: 68 U/L — SIGNIFICANT CHANGE UP (ref 30–115)
ALT FLD-CCNC: 83 U/L — HIGH (ref 0–41)
ANION GAP SERPL CALC-SCNC: 15 MMOL/L — HIGH (ref 7–14)
AST SERPL-CCNC: 65 U/L — HIGH (ref 0–41)
BASOPHILS # BLD AUTO: 0.01 K/UL — SIGNIFICANT CHANGE UP (ref 0–0.2)
BASOPHILS NFR BLD AUTO: 0.1 % — SIGNIFICANT CHANGE UP (ref 0–1)
BILIRUB SERPL-MCNC: 1.2 MG/DL — SIGNIFICANT CHANGE UP (ref 0.2–1.2)
BUN SERPL-MCNC: 34 MG/DL — HIGH (ref 10–20)
CALCIUM SERPL-MCNC: 8.9 MG/DL — SIGNIFICANT CHANGE UP (ref 8.5–10.1)
CHLORIDE SERPL-SCNC: 102 MMOL/L — SIGNIFICANT CHANGE UP (ref 98–110)
CO2 SERPL-SCNC: 24 MMOL/L — SIGNIFICANT CHANGE UP (ref 17–32)
CREAT SERPL-MCNC: 1.1 MG/DL — SIGNIFICANT CHANGE UP (ref 0.7–1.5)
EOSINOPHIL # BLD AUTO: 0.12 K/UL — SIGNIFICANT CHANGE UP (ref 0–0.7)
EOSINOPHIL NFR BLD AUTO: 1.2 % — SIGNIFICANT CHANGE UP (ref 0–8)
GLUCOSE BLDC GLUCOMTR-MCNC: 141 MG/DL — HIGH (ref 70–99)
GLUCOSE BLDC GLUCOMTR-MCNC: 72 MG/DL — SIGNIFICANT CHANGE UP (ref 70–99)
GLUCOSE BLDC GLUCOMTR-MCNC: 95 MG/DL — SIGNIFICANT CHANGE UP (ref 70–99)
GLUCOSE BLDC GLUCOMTR-MCNC: 96 MG/DL — SIGNIFICANT CHANGE UP (ref 70–99)
GLUCOSE SERPL-MCNC: 64 MG/DL — LOW (ref 70–99)
HCT VFR BLD CALC: 42.7 % — SIGNIFICANT CHANGE UP (ref 42–52)
HGB BLD-MCNC: 13.6 G/DL — LOW (ref 14–18)
IMM GRANULOCYTES NFR BLD AUTO: 0.6 % — HIGH (ref 0.1–0.3)
LYMPHOCYTES # BLD AUTO: 0.5 K/UL — LOW (ref 1.2–3.4)
LYMPHOCYTES # BLD AUTO: 5 % — LOW (ref 20.5–51.1)
MCHC RBC-ENTMCNC: 27.3 PG — SIGNIFICANT CHANGE UP (ref 27–31)
MCHC RBC-ENTMCNC: 31.9 G/DL — LOW (ref 32–37)
MCV RBC AUTO: 85.6 FL — SIGNIFICANT CHANGE UP (ref 80–94)
MONOCYTES # BLD AUTO: 0.22 K/UL — SIGNIFICANT CHANGE UP (ref 0.1–0.6)
MONOCYTES NFR BLD AUTO: 2.2 % — SIGNIFICANT CHANGE UP (ref 1.7–9.3)
NEUTROPHILS # BLD AUTO: 9.12 K/UL — HIGH (ref 1.4–6.5)
NEUTROPHILS NFR BLD AUTO: 90.9 % — HIGH (ref 42.2–75.2)
NRBC # BLD: 0 /100 WBCS — SIGNIFICANT CHANGE UP (ref 0–0)
PLATELET # BLD AUTO: 361 K/UL — SIGNIFICANT CHANGE UP (ref 130–400)
POTASSIUM SERPL-MCNC: 4 MMOL/L — SIGNIFICANT CHANGE UP (ref 3.5–5)
POTASSIUM SERPL-SCNC: 4 MMOL/L — SIGNIFICANT CHANGE UP (ref 3.5–5)
PROT SERPL-MCNC: 6.1 G/DL — SIGNIFICANT CHANGE UP (ref 6–8)
RBC # BLD: 4.99 M/UL — SIGNIFICANT CHANGE UP (ref 4.7–6.1)
RBC # FLD: 14.8 % — HIGH (ref 11.5–14.5)
SODIUM SERPL-SCNC: 141 MMOL/L — SIGNIFICANT CHANGE UP (ref 135–146)
WBC # BLD: 10.03 K/UL — SIGNIFICANT CHANGE UP (ref 4.8–10.8)
WBC # FLD AUTO: 10.03 K/UL — SIGNIFICANT CHANGE UP (ref 4.8–10.8)

## 2021-03-21 RX ADMIN — Medication 650 MILLIGRAM(S): at 14:02

## 2021-03-21 RX ADMIN — CHLORHEXIDINE GLUCONATE 1 APPLICATION(S): 213 SOLUTION TOPICAL at 05:56

## 2021-03-21 RX ADMIN — Medication 8 UNIT(S): at 07:59

## 2021-03-21 RX ADMIN — ATORVASTATIN CALCIUM 80 MILLIGRAM(S): 80 TABLET, FILM COATED ORAL at 21:27

## 2021-03-21 RX ADMIN — Medication 100 MILLIGRAM(S): at 05:55

## 2021-03-21 RX ADMIN — AMLODIPINE BESYLATE 10 MILLIGRAM(S): 2.5 TABLET ORAL at 05:55

## 2021-03-21 RX ADMIN — PANTOPRAZOLE SODIUM 40 MILLIGRAM(S): 20 TABLET, DELAYED RELEASE ORAL at 06:09

## 2021-03-21 RX ADMIN — Medication 5 MILLIGRAM(S): at 21:27

## 2021-03-21 RX ADMIN — Medication 650 MILLIGRAM(S): at 14:35

## 2021-03-21 RX ADMIN — APIXABAN 5 MILLIGRAM(S): 2.5 TABLET, FILM COATED ORAL at 17:54

## 2021-03-21 RX ADMIN — Medication 6 MILLIGRAM(S): at 05:56

## 2021-03-21 RX ADMIN — APIXABAN 5 MILLIGRAM(S): 2.5 TABLET, FILM COATED ORAL at 05:55

## 2021-03-21 RX ADMIN — INSULIN GLARGINE 45 UNIT(S): 100 INJECTION, SOLUTION SUBCUTANEOUS at 21:28

## 2021-03-21 RX ADMIN — Medication 8 UNIT(S): at 17:53

## 2021-03-21 RX ADMIN — SENNA PLUS 2 TABLET(S): 8.6 TABLET ORAL at 21:28

## 2021-03-21 NOTE — CONSULT NOTE ADULT - SUBJECTIVE AND OBJECTIVE BOX
HPI:  Patient is a 81yo male with PMH of hypertension, hyperlipidemia, diabetes mellitus type 2, atrial fibrillation on Eliquis, CKD stage 3A, bladder cancer s/p cystectomy with urostomy, and bilateral hip and knee arthroplasties who was brought in by EMS for hypoxia. The patient states that his daughter and her boyfriend who reside in the same home as him and his wife recently tested positive for COVID-19. He and his wife tested positive for COVID-19 on 3/4/2021, but their symptoms did not start until 3/6/2021. He started feeling "beat down" and "tired all the time" to the point where he had difficulty getting up. He also endorses cough, shortness of breath, and body aches. He denies chest pain, abdominal pain, nausea, vomiting, constipation, or diarrhea. He was told to come to the ED by his daughter after they noticed his wife was "not doing so well."    In the ED, vital signs were Tmax 100.9F, , /66, RR 20, and SpO2 95% on 4L. Labs were significant for creatinine 1.7 (baseline 1.4), troponin 0.02, lactate 2.3, elevated LFTs, and D-dimer 1721. Patient was given dexamethasone 6mg IV, 1L bolus of LR, and acetaminophen 650mg. (12 Mar 2021 15:20)      PAST MEDICAL & SURGICAL HISTORY:  Chronic kidney disease (CKD)  stage 3A, baseline creatinine 1.4    Bladder cancer  secondary to exposure at [employer] 9/11 s/p cystectomy with urostomy    History of atrial fibrillation    Diabetes mellitus, type 2    Hyperlipidemia    Hypertension    History of total cystectomy  with resultant urostomy    History of total knee replacement, bilateral    History of total hip replacement, bilateral        Hospital Course:    TODAY'S SUBJECTIVE & REVIEW OF systems  no CP, no SOB see HPI other ROS unchanged    MEDICATIONS  (STANDING):  amLODIPine   Tablet 10 milliGRAM(s) Oral daily  apixaban 5 milliGRAM(s) Oral two times a day  atorvastatin 80 milliGRAM(s) Oral at bedtime  chlorhexidine 4% Liquid 1 Application(s) Topical <User Schedule>  influenza   Vaccine 0.5 milliLiter(s) IntraMuscular once  insulin glargine Injectable (LANTUS) 45 Unit(s) SubCutaneous at bedtime  insulin lispro (ADMELOG) corrective regimen sliding scale   SubCutaneous three times a day before meals  insulin lispro Injectable (ADMELOG) 8 Unit(s) SubCutaneous three times a day before meals  melatonin 5 milliGRAM(s) Oral at bedtime  metoprolol succinate  milliGRAM(s) Oral daily  pantoprazole    Tablet 40 milliGRAM(s) Oral before breakfast  senna 2 Tablet(s) Oral at bedtime    MEDICATIONS  (PRN):  acetaminophen   Tablet .. 650 milliGRAM(s) Oral every 6 hours PRN Temp greater or equal to 38C (100.4F)  acetaminophen   Tablet .. 650 milliGRAM(s) Oral every 6 hours PRN Mild Pain (1 - 3)  guaifenesin/dextromethorphan  Syrup 10 milliLiter(s) Oral every 6 hours PRN Cough      FAMILY HISTORY:      Allergies    No Known Allergies    Intolerances        SOCIAL HISTORY:    [  ] Etoh  [  ] Smoking  [  ] Substance abuse     Home Environment:  [  ] Home Alone  [  ] Lives with Family  [  ] Home Health Aid    Dwelling:  [  ] Apartment  [  ] Private House  [  ] Adult Home  [  ] Skilled Nursing Facility      [  ] Short Term  [  ] Long Term  [  ] Stairs       Elevator [  ]    FUNCTIONAL STATUS PTA: (Check all that apply)  Ambulation: [   ]Independent   [   ] Requires Assistance   [  ] Dependent     [  ] Non-Ambulatory       Assistive Device: [  ] SA Cane  [  ]  Q Cane  [  ] Walker  [  ]  Wheelchair  ADL : [  ] Independent    [   ] Requires Assistance    [  ]  Dependent       Vital Signs Last 24 Hrs  T(C): 39.1 (21 Mar 2021 14:28), Max: 39.1 (21 Mar 2021 14:02)  T(F): 102.3 (21 Mar 2021 14:28), Max: 102.3 (21 Mar 2021 14:02)  HR: 86 (21 Mar 2021 14:28) (68 - 86)  BP: 126/65 (21 Mar 2021 14:28) (117/61 - 126/65)  BP(mean): --  RR: 20 (21 Mar 2021 14:28) (18 - 20)  SpO2: 90% (21 Mar 2021 14:28) (90% - 92%)      PHYSICAL EXAM: Alert & Oriented X3  GENERAL: NAD, well-groomed, well-developed  HEAD:  Atraumatic, Normocephalic  EYES: EOMI, PERRL  NECK: Supple  CHEST/LUNG: BL chest expansion  HEART: S1S2  ABDOMEN: Soft, Nontender,   EXTREMITIES:  No clubbing, cyanosis, or edema  ROM:  [   ] WFL all extremities  [  ] Abnormal   (  ) phillips  NERVOUS SYSTEM:   Cranial Nerves 2-12: [   ] intact  [  ] Abnormal   Motor Strength: [   ] WFL all extremities   [  ] Abnormal   Sensation: [   ] intact to light touch  [  ]    Reflexes: [   ] Symmetric  [  ]  Abnormal     FUNCTIONAL STATUS: see therapy        LABS:                        13.6   10.03 )-----------( 361      ( 21 Mar 2021 06:49 )             42.7     03-21    141  |  102  |  34<H>  ----------------------------<  64<L>  4.0   |  24  |  1.1    Ca    8.9      21 Mar 2021 06:49    TPro  6.1  /  Alb  3.0<L>  /  TBili  1.2  /  DBili  x   /  AST  65<H>  /  ALT  83<H>  /  AlkPhos  68  03-21          RADIOLOGY              HPI:  Patient is a 81yo male with PMH of hypertension, hyperlipidemia, diabetes mellitus type 2, atrial fibrillation on Eliquis, CKD stage 3A, bladder cancer s/p cystectomy with urostomy, and bilateral hip and knee arthroplasties who was brought in by EMS for hypoxia. The patient states that his daughter and her boyfriend who reside in the same home as him and his wife recently tested positive for COVID-19. He and his wife tested positive for COVID-19 on 3/4/2021, but their symptoms did not start until 3/6/2021. He started feeling "beat down" and "tired all the time" to the point where he had difficulty getting up. He also endorses cough, shortness of breath, and body aches. He denies chest pain, abdominal pain, nausea, vomiting, constipation, or diarrhea. He was told to come to the ED by his daughter after they noticed his wife was "not doing so well."    In the ED, vital signs were Tmax 100.9F, , /66, RR 20, and SpO2 95% on 4L. Labs were significant for creatinine 1.7 (baseline 1.4), troponin 0.02, lactate 2.3, elevated LFTs, and D-dimer 1721. Patient was given dexamethasone 6mg IV, 1L bolus of LR, and acetaminophen 650mg. (12 Mar 2021 15:20)      PAST MEDICAL & SURGICAL HISTORY:  Chronic kidney disease (CKD)  stage 3A, baseline creatinine 1.4    Bladder cancer  secondary to exposure at [employer] 9/11 s/p cystectomy with urostomy    History of atrial fibrillation    Diabetes mellitus, type 2    Hyperlipidemia    Hypertension    History of total cystectomy  with resultant urostomy    History of total knee replacement, bilateral    History of total hip replacement, bilateral        Hospital Course: getting tx for covid        MEDICATIONS  (STANDING):  amLODIPine   Tablet 10 milliGRAM(s) Oral daily  apixaban 5 milliGRAM(s) Oral two times a day  atorvastatin 80 milliGRAM(s) Oral at bedtime  chlorhexidine 4% Liquid 1 Application(s) Topical <User Schedule>  influenza   Vaccine 0.5 milliLiter(s) IntraMuscular once  insulin glargine Injectable (LANTUS) 45 Unit(s) SubCutaneous at bedtime  insulin lispro (ADMELOG) corrective regimen sliding scale   SubCutaneous three times a day before meals  insulin lispro Injectable (ADMELOG) 8 Unit(s) SubCutaneous three times a day before meals  melatonin 5 milliGRAM(s) Oral at bedtime  metoprolol succinate  milliGRAM(s) Oral daily  pantoprazole    Tablet 40 milliGRAM(s) Oral before breakfast  senna 2 Tablet(s) Oral at bedtime    MEDICATIONS  (PRN):  acetaminophen   Tablet .. 650 milliGRAM(s) Oral every 6 hours PRN Temp greater or equal to 38C (100.4F)  acetaminophen   Tablet .. 650 milliGRAM(s) Oral every 6 hours PRN Mild Pain (1 - 3)  guaifenesin/dextromethorphan  Syrup 10 milliLiter(s) Oral every 6 hours PRN Cough      FAMILY HISTORY:      Allergies    No Known Allergies    Intolerances        SOCIAL HISTORY: see  CM notes    [  ] Etoh  [  ] Smoking  [  ] Substance abuse     Home Environment:  [  ] Home Alone  [  ] Lives with Family  [  ] Home Health Aid    Dwelling:  [  ] Apartment  [  ] Private House  [  ] Adult Home  [  ] Skilled Nursing Facility      [  ] Short Term  [  ] Long Term  [  ] Stairs       Elevator [  ]    FUNCTIONAL STATUS PTA: (Check all that apply) see PT  Ambulation: [   ]Independent   [   ] Requires Assistance   [  ] Dependent     [  ] Non-Ambulatory       Assistive Device: [  ] SA Cane  [  ]  Q Cane  [  ] Walker  [  ]  Wheelchair  ADL : [  ] Independent    [   ] Requires Assistance    [  ]  Dependent       Vital Signs Last 24 Hrs + temp  T(C): 39.1 (21 Mar 2021 14:28), Max: 39.1 (21 Mar 2021 14:02)  T(F): 102.3 (21 Mar 2021 14:28), Max: 102.3 (21 Mar 2021 14:02)  HR: 86 (21 Mar 2021 14:28) (68 - 86)  BP: 126/65 (21 Mar 2021 14:28) (117/61 - 126/65)  BP(mean): --  RR: 20 (21 Mar 2021 14:28) (18 - 20)  SpO2: 90% (21 Mar 2021 14:28) (90% - 92%)    PE referenced K Terrance 3 21 21  GENERAL: A&O, overweight, chronically ill looking but in NAD, NRM  Neck:  short,  thick but supple, no JVD, no bruit, no stridor  Lungs:  shallow resp, scattered rhonchi, no wheezing  Abd:   distended soft and benign, + BS  Urinary:  RLQ urostomy  Ext:  arthritic changes, moves all limbs, nonpitting edema  Skin:  no rash  Psych stable  FUNCTIONAL STATUS: see therapy        LABS:                        13.6   10.03 )-----------( 361      ( 21 Mar 2021 06:49 )             42.7     03-21    141  |  102  |  34<H>  ----------------------------<  64<L>  4.0   |  24  |  1.1    Ca    8.9      21 Mar 2021 06:49    TPro  6.1  /  Alb  3.0<L>  /  TBili  1.2  /  DBili  x   /  AST  65<H>  /  ALT  83<H>  /  AlkPhos  68  03-21          RADIOLOGY   < from: Xray Chest 1 View- PORTABLE-Routine (Xray Chest 1 View- PORTABLE-Routine .) (03.20.21 @ 09:48) >  EXAM:  XR CHEST PORTABLE ROUTINE 1V            PROCEDURE DATE:  03/20/2021            INTERPRETATION:  Clinical History / Reason for exam: Covid    Comparison : Chest radiograph March 17, 2021.    Technique/Positioning: Frontal chest.    Findings:    Support devices: None.    Cardiac/mediastinum/hilum: Heart size within normal limits, thoracic aortic calcification.    Lung parenchyma/Pleura: Bilateral opacities.    Skeleton/soft tissues: Stable.    Impression:    Bilateral opacities, stable.    < end of copied text >

## 2021-03-21 NOTE — PROGRESS NOTE ADULT - ASSESSMENT
81 yo M with PMHx of HTN, HLD, DMII, AFib on Eliquis, CKD IIIA, Bladder Ca s/p cystectomy with urostomy, bilateral hip and knee arthroplasties admitted for acute COVID PNA.    #Acute hypoxemic respiratory failure secondary to COVID-19 pneumonia  #Mild transaminitis likely due to COVID  - COVID-19 PCR positive  - Isolation precautions (contact, droplet, airborne)  - Chest X-ray: bibasilar opacities  - Patient is saturating 90-92% on NRB  - Ferritin, Serum: 462 ng/mL, Procalcitonin, Serum: 0.07 ng/mL, C-Reactive Protein, Serum: 25 mg/L, D-Dimer Assay, Quantitative: 7756 ng/mL DDU   - LE Duplex negative  - C/w Dexamethasone, not candidate for RDV  - DVT prophylaxis per protocol  - Titrate supplemental O2 to maintain SpO2 92-96%    #LORRAINE on CKDIIIA - resolved    #Paroxysmal AFib - rate controlled  - CHADVASC 4  - C/w Eliquis 5 mg PO BID  - C/w Metoprolol succinate 100 mg qd    #HLD  - Takes Rosuvastatin, c/w Atorvastatin while here    #HTN  - C/w Amlodipine  - Home Benazapril on hold due to LORRAINE, resume if BP significantly elevated    #Hx of bilateral TKR, THR   - Pain well controlled off medication  - PT ambulated with pt, only several feet  - Physiatry pending    #Bladder Ca s/p cystectomy with urostomy  - Urostomy care PRN  - Outpt f/u with Urology    DVT ppx: Eliquis  GI ppx: PPI  Diet CC  Activity: AAT  Full code  Dispo: Pending improvement of oxygen requirements     81 yo M with PMHx of HTN, HLD, DMII, AFib on Eliquis, CKD IIIA, Bladder Ca s/p cystectomy with urostomy, bilateral hip and knee arthroplasties admitted for acute COVID PNA.    #Acute hypoxemic respiratory failure secondary to COVID-19 pneumonia  #Mild transaminitis likely due to COVID  - COVID-19 PCR positive  - Isolation precautions (contact, droplet, airborne)  - Chest X-ray: bibasilar opacities  - Patient is saturating 90-92% on NRB  - Ferritin, Serum: 462 ng/mL, Procalcitonin, Serum: 0.07 ng/mL, C-Reactive Protein, Serum: 25 mg/L, D-Dimer Assay, Quantitative: 7756 ng/mL DDU   - LE Duplex negative  - C/w Dexamethasone, not candidate for RDV  - DVT prophylaxis per protocol  - Titrate supplemental O2 to maintain SpO2 92-96%  - Pt likely has underlying JARETH, CPAP was ordered and pt refused, needs encouragement to use at night     #LORRAINE on CKDIIIA - resolved    #Paroxysmal AFib - rate controlled  - CHADVASC 4  - C/w Eliquis 5 mg PO BID  - C/w Metoprolol succinate 100 mg qd    #HLD  - Takes Rosuvastatin, c/w Atorvastatin while here    #HTN  - C/w Amlodipine  - Home Benazapril on hold due to LORRAINE, resume if BP significantly elevated    #Hx of bilateral TKR, THR   - Pain well controlled off medication  - PT ambulated with pt, only several feet  - Physiatry pending    #Bladder Ca s/p cystectomy with urostomy  - Urostomy care PRN  - Outpt f/u with Urology    DVT ppx: Eliquis  GI ppx: PPI  Diet CC  Activity: AAT  Full code  Dispo: Pending improvement of oxygen requirements     79 yo M with PMHx of HTN, HLD, DMII, AFib on Eliquis, CKD IIIA, Bladder Ca s/p cystectomy with urostomy, bilateral hip and knee arthroplasties admitted for acute COVID PNA.    #Acute hypoxemic respiratory failure secondary to COVID-19 pneumonia  #Mild transaminitis likely due to COVID  - COVID-19 PCR positive  - Isolation precautions (contact, droplet, airborne)  - Chest X-ray: bibasilar opacities  - Patient is saturating 90-92% on NRB  - Ferritin, Serum: 462 ng/mL, Procalcitonin, Serum: 0.07 ng/mL, C-Reactive Protein, Serum: 25 mg/L, D-Dimer Assay, Quantitative: 7756 ng/mL DDU   - LE Duplex negative  - C/w Dexamethasone, not candidate for RDV  - DVT prophylaxis per protocol  - Titrate supplemental O2 to maintain SpO2 92-96%  - Pt likely has underlying JARETH, CPAP was ordered and pt refused    #LORRAINE on CKDIIIA - resolved    #Paroxysmal AFib - rate controlled  - CHADVASC 4  - C/w Eliquis 5 mg PO BID  - C/w Metoprolol succinate 100 mg qd    #HLD  - Takes Rosuvastatin, c/w Atorvastatin while here    #HTN  - C/w Amlodipine  - Home Benazapril on hold due to LORRAINE, resume if BP significantly elevated    #Hx of bilateral TKR, THR   - Pain well controlled off medication  - PT ambulated with pt, only several feet  - Physiatry pending    #Bladder Ca s/p cystectomy with urostomy  - Urostomy care PRN  - Outpt f/u with Urology    DVT ppx: Eliquis  GI ppx: PPI  Diet CC  Activity: AAT  Full code  Dispo: Pending improvement of oxygen requirements

## 2021-03-21 NOTE — PROGRESS NOTE ADULT - SUBJECTIVE AND OBJECTIVE BOX
Patient Information:  ROSA MARIA CASEY / 80y / Male / MRN#:[id number]    Hospital Day: 9d    Interval History:  Patient seen and examined at bedside. No acute events overnight.    Past Medical History:  Chronic kidney disease (CKD)    Bladder cancer    History of atrial fibrillation    Diabetes mellitus, type 2    Hyperlipidemia    Hypertension      Past Surgical History:  History of total cystectomy    History of total knee replacement, bilateral    History of total hip replacement, bilateral      Allergies:  No Known Allergies    Medications:  PRN:  acetaminophen   Tablet .. 650 milliGRAM(s) Oral every 6 hours PRN Temp greater or equal to 38C (100.4F)  acetaminophen   Tablet .. 650 milliGRAM(s) Oral every 6 hours PRN Mild Pain (1 - 3)  guaifenesin/dextromethorphan  Syrup 10 milliLiter(s) Oral every 6 hours PRN Cough    Standing:  amLODIPine   Tablet 10 milliGRAM(s) Oral daily  apixaban 5 milliGRAM(s) Oral two times a day  atorvastatin 80 milliGRAM(s) Oral at bedtime  chlorhexidine 4% Liquid 1 Application(s) Topical <User Schedule>  dexAMETHasone  Injectable 6 milliGRAM(s) IV Push daily  influenza   Vaccine 0.5 milliLiter(s) IntraMuscular once  insulin glargine Injectable (LANTUS) 45 Unit(s) SubCutaneous at bedtime  insulin lispro (ADMELOG) corrective regimen sliding scale   SubCutaneous three times a day before meals  insulin lispro Injectable (ADMELOG) 8 Unit(s) SubCutaneous three times a day before meals  melatonin 5 milliGRAM(s) Oral at bedtime  metoprolol succinate  milliGRAM(s) Oral daily  pantoprazole    Tablet 40 milliGRAM(s) Oral before breakfast  senna 2 Tablet(s) Oral at bedtime    Home:  amlodipine-benazepril 10 mg-40 mg oral capsule: 1 cap(s) orally once a day  Eliquis 5 mg oral tablet: 1 tab(s) orally 2 times a day  glimepiride 2 mg oral tablet: 1 tab(s) orally once a day  Lantus 100 units/mL subcutaneous solution: 40 unit(s) subcutaneous once a day  metoprolol succinate 100 mg oral tablet, extended release: 1 tab(s) orally once a day  Ozempic (1 mg dose) 2 mg/1.5 mL subcutaneous solution:   Percocet 7.5/325 oral tablet: 0.5 tab(s) orally once a day (at bedtime)  potassium citrate 10 mEq oral tablet, extended release: 1 tab(s) orally once a day  rosuvastatin 20 mg oral tablet: 1 tab(s) orally once a day    Vitals:  T(C): 36.1, Max: 36.6 (03-20-21 @ 12:37)  T(F): 97, Max: 97.8 (03-20-21 @ 12:37)  HR: 76 (68 - 78)  BP: 123/66 (118/71 - 125/68)  RR: 18 (18 - 18)  SpO2: 90% (90% - 92%)    Physical Exam:  General: Awake, alert, NAD, resting comfortably in bed, on NRB  HEENT: Head NC/AT  Heart: RRR; S1/S2; no rubs, murmurs appreciated  Lungs: Dec breath sounds in bilateral bases  Abdomen: Soft, nontender, nondistended, BS+  Extremities: No edema, clubbing, cyanosis in upper or lower extremities  Neuro: AAOx3    Labs:  CBC (03-20 @ 06:14)                        Hgb: 13.2   WBC: 10.15 )-----------------( Plts: 388                              Hct: 39.8     Chem (03-20 @ 06:14)  Na: 139  |     Cl: 102     |  BUN: 30  -----------------------------------------< Gluc: 76    K: 4.2   |    HCO3: 25  |  Cr: 0.9    Ca 8.7 (03-20 @ 06:14)    LFTs (03-20 @ 06:14)  TPro 6.1  /  Alb 3.1  TBili 1.3  /  DBili     AST 88  /  ALT 99  /  AlkPhos 63

## 2021-03-21 NOTE — CONSULT NOTE ADULT - ASSESSMENT
IMPRESSION: Rehab of gait disorder, debility due to    PRECAUTIONS: [    ] Cardiac  [    ] Respiratory  [    ] Seizures [    ] Contact Isolation  [    ] Droplet Isolation  [    ] falls   (    ) phillips [  ]TLSO OOB  [   ] cervical collar== OOB,  [  ] and in bed  Weight Bearing Status: WBAT  RECOMMENDATION:  ( ) podiatry    Out of Bed to Chair     DVT/Decubiti Prophylaxis    REHAB PLAN:     [   x  ] Bedside P/T 3-5 times a week   [     ] Bedside O/T  2-3 times a week   [     ] No Rehab Therapy Indicated   [     ]  Speech Therapy   Conditioning/ROM                                 ADL  Bed Mobility                                            Conditioning/ROM  Transfers                                                  Bed Mobility  Sitting /Standing Balance                      Transfers                                        Gait Training                                            Sitting/Standing Balance  Stair Training [   ]Applicable                 Home equipment Eval                                                                     Splinting  [   ] Only      GOALS:    as per therapy    DISCHARGE PLAN:   [     ]  Good candidate for Intensive Rehabilitation/Hospital based-4A [facility]                                             Will tolerate 3hrs Intensive Rehab Daily                                       [   x   ]  Short Term Rehab in Skilled Nursing Facility  [facility]                                     [   x   ]  Home with Outpatient or VN services                                         [      ]  Possible Candidate for Intensive Hospital based Rehab                                        IMPRESSION: Rehab of gait disorder, debility due to COVID    PRECAUTIONS: [    ] Cardiac  [    ] Respiratory  [    ] Seizures [    ] Contact Isolation  [  x  ] Droplet Isolation  [    ] falls   (    ) phillips [  ]TLSO OOB  [   ] cervical collar== OOB,  [  ] and in bed  Weight Bearing Status: WBAT  RECOMMENDATION:  ( ) podiatry    Out of Bed to Chair     DVT/Decubiti Prophylaxis    REHAB PLAN:     [   x  ] Bedside P/T 3-5 times a week   [  x   ] Bedside O/T  2-3 times a week   [     ] No Rehab Therapy Indicated   [     ]  Speech Therapy   Conditioning/ROM                                 ADL  Bed Mobility                                            Conditioning/ROM  Transfers                                                  Bed Mobility  Sitting /Standing Balance                      Transfers                                        Gait Training                                            Sitting/Standing Balance  Stair Training [   ]Applicable                 Home equipment Eval                                                                     Splinting  [   ] Only      GOALS:    as per therapy    DISCHARGE PLAN:   [     ]  Good candidate for Intensive Rehabilitation/Hospital based-4A [facility]                                             Will tolerate 3hrs Intensive Rehab Daily                                       [   x   ]  Short Term Rehab in Skilled Nursing Facility  [facility]                                     [   x   ]  Home with Outpatient or VN services                                         [      ]  Possible Candidate for Intensive Hospital based Rehab

## 2021-03-21 NOTE — PROGRESS NOTE ADULT - SUBJECTIVE AND OBJECTIVE BOX
ROSA MARIA CASEY  80y  Male      Patient is a 80y old  Male who presents with a chief complaint of acute hypoxemic respiratory failure secondary to COVID-19 pneumonia (14 Mar 2021 07:11). Pt with MO and probable underlying JARETH.      INTERVAL HPI/OVERNIGHT EVENTS: pt afebrile, CXR show stable BL lung opacities,  NRM 92%, + mild transaminitis    T:  96.8  HR:   68  BP:  117/61    RR: 18  Pulse ox: NRM 12 L 92%        PHYSICAL EXAM:  GENERAL: A&O, overweight, chronically ill looking but in NAD, NRM  Neck:  short,  thick but supple, no JVD, no bruit, no stridor  Lungs:  shallow resp, scattered rhonchi, no wheezing  Abd:   distended soft and benign, + BS  Urinary:  RLQ urostomy  Ext:  arthritic changes, moves all limbs, nonpitting edema  Skin:  no rash  Psych stable    LABS                          13  10)-----------( 361             42      141 |  102  |  34  ----------------------------<64  4.0   |  24  |  1.1    GFR 64,  Ca    8.8       Phos  3.4      Mg     2.0        MB 63  trop <0.01 x2  ferritin 535, 462  procal 0.26  CRP 25  TPro  5.9<L>  /  Alb  3.2<L>  /  TBili  0.9  /  DBili  x   /  AST  45, 88, 88  /  ALT  53, 93, 99  /  AlkPhos  43  03-14      Urinalysis Basic - ( 12 Mar 2021 16:38 )    Color: Yellow / Appearance: Slightly Turbid / S.020 / pH: x  Gluc: x / Ketone: Negative  / Bili: Negative / Urobili: 3 mg/dL   Blood: x / Protein: 30 mg/dL / Nitrite: Negative   Leuk Esterase: Moderate / RBC: 5 /HPF / WBC 90 /HPF   Sq Epi: x / Non Sq Epi: 0 /HPF / Bacteria: Ma       RADIOLOGY & ADDITIONAL TESTS:  Venous duplex:  negative for DVT  CXR:  BL opacities L>R  CXR:  stable BL opacities

## 2021-03-22 LAB
ALBUMIN SERPL ELPH-MCNC: 2.8 G/DL — LOW (ref 3.5–5.2)
ALP SERPL-CCNC: 70 U/L — SIGNIFICANT CHANGE UP (ref 30–115)
ALT FLD-CCNC: 71 U/L — HIGH (ref 0–41)
ANION GAP SERPL CALC-SCNC: 13 MMOL/L — SIGNIFICANT CHANGE UP (ref 7–14)
AST SERPL-CCNC: 54 U/L — HIGH (ref 0–41)
BASOPHILS # BLD AUTO: 0.01 K/UL — SIGNIFICANT CHANGE UP (ref 0–0.2)
BASOPHILS NFR BLD AUTO: 0.1 % — SIGNIFICANT CHANGE UP (ref 0–1)
BILIRUB SERPL-MCNC: 1.4 MG/DL — HIGH (ref 0.2–1.2)
BUN SERPL-MCNC: 31 MG/DL — HIGH (ref 10–20)
CALCIUM SERPL-MCNC: 8.7 MG/DL — SIGNIFICANT CHANGE UP (ref 8.5–10.1)
CHLORIDE SERPL-SCNC: 103 MMOL/L — SIGNIFICANT CHANGE UP (ref 98–110)
CO2 SERPL-SCNC: 25 MMOL/L — SIGNIFICANT CHANGE UP (ref 17–32)
CREAT SERPL-MCNC: 1.3 MG/DL — SIGNIFICANT CHANGE UP (ref 0.7–1.5)
D DIMER BLD IA.RAPID-MCNC: 5311 NG/ML DDU — HIGH (ref 0–230)
EOSINOPHIL # BLD AUTO: 0.22 K/UL — SIGNIFICANT CHANGE UP (ref 0–0.7)
EOSINOPHIL NFR BLD AUTO: 2 % — SIGNIFICANT CHANGE UP (ref 0–8)
GLUCOSE BLDC GLUCOMTR-MCNC: 122 MG/DL — HIGH (ref 70–99)
GLUCOSE BLDC GLUCOMTR-MCNC: 123 MG/DL — HIGH (ref 70–99)
GLUCOSE BLDC GLUCOMTR-MCNC: 60 MG/DL — LOW (ref 70–99)
GLUCOSE BLDC GLUCOMTR-MCNC: 62 MG/DL — LOW (ref 70–99)
GLUCOSE SERPL-MCNC: 67 MG/DL — LOW (ref 70–99)
HCT VFR BLD CALC: 40.6 % — LOW (ref 42–52)
HGB BLD-MCNC: 13.1 G/DL — LOW (ref 14–18)
IMM GRANULOCYTES NFR BLD AUTO: 0.7 % — HIGH (ref 0.1–0.3)
LYMPHOCYTES # BLD AUTO: 0.44 K/UL — LOW (ref 1.2–3.4)
LYMPHOCYTES # BLD AUTO: 4.1 % — LOW (ref 20.5–51.1)
MCHC RBC-ENTMCNC: 27.8 PG — SIGNIFICANT CHANGE UP (ref 27–31)
MCHC RBC-ENTMCNC: 32.3 G/DL — SIGNIFICANT CHANGE UP (ref 32–37)
MCV RBC AUTO: 86.2 FL — SIGNIFICANT CHANGE UP (ref 80–94)
MONOCYTES # BLD AUTO: 0.31 K/UL — SIGNIFICANT CHANGE UP (ref 0.1–0.6)
MONOCYTES NFR BLD AUTO: 2.9 % — SIGNIFICANT CHANGE UP (ref 1.7–9.3)
NEUTROPHILS # BLD AUTO: 9.76 K/UL — HIGH (ref 1.4–6.5)
NEUTROPHILS NFR BLD AUTO: 90.2 % — HIGH (ref 42.2–75.2)
NRBC # BLD: 0 /100 WBCS — SIGNIFICANT CHANGE UP (ref 0–0)
PLATELET # BLD AUTO: 340 K/UL — SIGNIFICANT CHANGE UP (ref 130–400)
POTASSIUM SERPL-MCNC: 4.5 MMOL/L — SIGNIFICANT CHANGE UP (ref 3.5–5)
POTASSIUM SERPL-SCNC: 4.5 MMOL/L — SIGNIFICANT CHANGE UP (ref 3.5–5)
PROT SERPL-MCNC: 6 G/DL — SIGNIFICANT CHANGE UP (ref 6–8)
RBC # BLD: 4.71 M/UL — SIGNIFICANT CHANGE UP (ref 4.7–6.1)
RBC # FLD: 14.8 % — HIGH (ref 11.5–14.5)
SODIUM SERPL-SCNC: 141 MMOL/L — SIGNIFICANT CHANGE UP (ref 135–146)
WBC # BLD: 10.82 K/UL — HIGH (ref 4.8–10.8)
WBC # FLD AUTO: 10.82 K/UL — HIGH (ref 4.8–10.8)

## 2021-03-22 PROCEDURE — 71275 CT ANGIOGRAPHY CHEST: CPT | Mod: 26

## 2021-03-22 PROCEDURE — 71045 X-RAY EXAM CHEST 1 VIEW: CPT | Mod: 26

## 2021-03-22 RX ADMIN — Medication 5 MILLIGRAM(S): at 22:48

## 2021-03-22 RX ADMIN — ATORVASTATIN CALCIUM 80 MILLIGRAM(S): 80 TABLET, FILM COATED ORAL at 22:48

## 2021-03-22 RX ADMIN — Medication 0.5 MILLIGRAM(S): at 16:31

## 2021-03-22 RX ADMIN — Medication 8 UNIT(S): at 12:28

## 2021-03-22 RX ADMIN — APIXABAN 5 MILLIGRAM(S): 2.5 TABLET, FILM COATED ORAL at 17:19

## 2021-03-22 RX ADMIN — CHLORHEXIDINE GLUCONATE 1 APPLICATION(S): 213 SOLUTION TOPICAL at 05:13

## 2021-03-22 RX ADMIN — APIXABAN 5 MILLIGRAM(S): 2.5 TABLET, FILM COATED ORAL at 05:13

## 2021-03-22 RX ADMIN — Medication 8 UNIT(S): at 17:19

## 2021-03-22 RX ADMIN — PANTOPRAZOLE SODIUM 40 MILLIGRAM(S): 20 TABLET, DELAYED RELEASE ORAL at 06:23

## 2021-03-22 RX ADMIN — SENNA PLUS 2 TABLET(S): 8.6 TABLET ORAL at 22:48

## 2021-03-22 RX ADMIN — Medication 100 MILLIGRAM(S): at 05:13

## 2021-03-22 RX ADMIN — AMLODIPINE BESYLATE 10 MILLIGRAM(S): 2.5 TABLET ORAL at 05:13

## 2021-03-22 NOTE — PROGRESS NOTE ADULT - ASSESSMENT
81yo M c PMHx chronic afib on noac, htn, dm2, ckd3 bladder ca s/p urostomy, b/l hip/ knee replacements obesity, here with acute hypoxic respiratory failure 2/2 severe covid 19 viral pneumonia, lorraine on ckd3 now better, elevated ddimer    Covid viral PNA. acute hypoxic RF  probable underlying JARETH, MO  LORRAINE on CKD  Transaminitis  Hx of HTN, ASHD, Afib/Eliquis  DM II, CKD  DLD  Bladder ca, sp urostomy  OA, DDD, DJD, sp BL hip and knee surgeries, mobility dysfunction    CXR show BL opacities  iv dexamethasone  us duplex LE's negative for DVT  monitor o2 requirements  6L switched to NRM due to drop in O2 sat, may req BIPAP at HS  inflammatory markers and cxr being monitored  LFTs trending down  pt remains acute  ID consult  Rehab consult  pt is a full code 79yo M c PMHx chronic afib on noac, htn, dm2, ckd3 bladder ca s/p urostomy, b/l hip/ knee replacements obesity, here with acute hypoxic respiratory failure 2/2 severe covid 19 viral pneumonia, lorraine on ckd3 now better, elevated ddimer    Covid viral PNA. acute hypoxic RF  probable underlying JARETH, MO  LORRAINE on CKD  Transaminitis  Hx of HTN, ASHD, Afib/Eliquis  DM II, CKD  DLD  Bladder ca, sp urostomy  OA, DDD, DJD, sp BL hip and knee surgeries, mobility dysfunction    CXR show BL opacities  iv dexamethasone  us duplex LE's negative for DVT  elevated DDIMER, CT angio negative for PE  monitor infla parameters  check Covid Abs  monitor o2 requirements, pt on NRM, refuses CPAP at HS  mild transaminitis  pt remains acute  ID consult:  check Covid Abs if negative give 1u of CP, repeat ferritin, CRP if CRP > 75 give tocilizumab, for 90kg give 800mg x1  Rehab consult  OOB to chair  pt is a full code

## 2021-03-22 NOTE — PROGRESS NOTE ADULT - ASSESSMENT
81 yo M with PMHx of HTN, HLD, DMII, AFib on Eliquis, CKD IIIA, Bladder Ca s/p cystectomy with urostomy, bilateral hip and knee arthroplasties admitted for acute COVID PNA.    #Acute hypoxemic respiratory failure secondary to COVID-19 pneumonia complicated by JARETH  #Mild transaminitis likely due to COVID  - febrile overnight. desaturated to 70% onr NRB upon activity  - Patient is saturating 90-92% on NRB- will transition to HFNC   - Chest X-ray: bibasilar opacities  - Ferritin, 3/16 : 462 ng/mL,   - Procalcitonin, 3/15 : 0.07 ng/mL,   - C-Reactive Protein, 3/15 : 25 mg/L,   - D-Dimer Assay, 3/20 : 7756 ng/mL   - Fu IFM   - FU CXR  - LE Duplex 3/13 negative  - C/w Dexamethasone, not candidate for RDV  - continue eliquis 5 mg q12  - Pt likely has underlying JARETH, CPAP was ordered and pt refused  - ID reconsulted      #LORRAINE on CKDIIIA - resolved    #Paroxysmal AFib - rate controlled  - CHADVASC 4  - C/w Eliquis 5 mg PO BID  - C/w Metoprolol succinate 100 mg qd    #HLD  - Takes Rosuvastatin, c/w Atorvastatin while here    #HTN  - C/w Amlodipine  - Home Benazapril on hold due to LORRAINE, resume if BP significantly elevated    #Hx of bilateral TKR, THR   - Pain well controlled off medication    #Bladder Ca s/p cystectomy with urostomy  - Urostomy care PRN  - Outpt f/u with Urology    DVT ppx: Eliquis  GI ppx: PPI  Diet CC  Activity: AAT  Full code  Dispo: physiatry -> home pt vs STR

## 2021-03-22 NOTE — OCCUPATIONAL THERAPY INITIAL EVALUATION ADULT - PATIENT PROFILE REVIEW, REHAB EVAL
OT reviewed pt's chart prior to evaluation. Pt agreeable to OT evaluation . Pt seen from 7:30-8:30am/yes

## 2021-03-22 NOTE — PROGRESS NOTE ADULT - SUBJECTIVE AND OBJECTIVE BOX
CARMELAROSA MARIA  80y, Male  Allergy: No Known Allergies      LOS  10d    CHIEF COMPLAINT: acute hypoxemic respiratory failure secondary to COVID-19 pneumonia (22 Mar 2021 11:12)      INTERVAL EVENTS/HPI  - ID reconsulted for worsening hypoxemia , now on HFNC  - T(F): , Max: 100.2 (03-21-21 @ 20:37)  - WBC Count: 10.82 (03-22-21 @ 07:25)  WBC Count: 10.03 (03-21-21 @ 06:49)     - Creatinine, Serum: 1.3 (03-22-21 @ 07:25)  Creatinine, Serum: 1.1 (03-21-21 @ 06:49)       COVID-19 IgG Antibody Interpretation: Negative (03-13-21 @ 05:29)      ROS:  9-point ROS performed and negative except as per above    VITALS:  T(F): 96.2, Max: 100.2 (03-21-21 @ 20:37)  HR: 74  BP: 133/77  RR: 18Vital Signs Last 24 Hrs  T(C): 35.7 (22 Mar 2021 14:04), Max: 37.9 (21 Mar 2021 20:37)  T(F): 96.2 (22 Mar 2021 14:04), Max: 100.2 (21 Mar 2021 20:37)  HR: 74 (22 Mar 2021 14:04) (74 - 86)  BP: 133/77 (22 Mar 2021 14:04) (128/70 - 133/77)  BP(mean): --  RR: 18 (22 Mar 2021 14:04) (18 - 20)  SpO2: 98% (22 Mar 2021 14:04) (90% - 98%)    FH: Non-contributory  Social Hx: Non-contributory    TESTS & MEASUREMENTS:                        13.1   10.82 )-----------( 340      ( 22 Mar 2021 07:25 )             40.6     03-22    141  |  103  |  31<H>  ----------------------------<  67<L>  4.5   |  25  |  1.3    Ca    8.7      22 Mar 2021 07:25    TPro  6.0  /  Alb  2.8<L>  /  TBili  1.4<H>  /  DBili  x   /  AST  54<H>  /  ALT  71<H>  /  AlkPhos  70  03-22    eGFR if Non African American: 52 mL/min/1.73M2 (03-22-21 @ 07:25)  eGFR if : 61 mL/min/1.73M2 (03-22-21 @ 07:25)    LIVER FUNCTIONS - ( 22 Mar 2021 07:25 )  Alb: 2.8 g/dL / Pro: 6.0 g/dL / ALK PHOS: 70 U/L / ALT: 71 U/L / AST: 54 U/L / GGT: x                     INFECTIOUS DISEASES TESTING  Procalcitonin, Serum: 0.07 (03-15-21 @ 06:00)  Procalcitonin, Serum: 0.26 (03-12-21 @ 12:50)  COVID-19 PCR: Detected (03-12-21 @ 12:45)      INFLAMMATORY MARKERS  C-Reactive Protein, Serum: 25 mg/L (03-15-21 @ 06:00)  C-Reactive Protein, Serum: 80 mg/L (03-12-21 @ 12:50)      RADIOLOGY & ADDITIONAL TESTS:  I have personally reviewed the last available Chest xray  CXR      CT      CARDIOLOGY TESTING  12 Lead ECG:   Ventricular Rate 93 BPM    Atrial Rate 93 BPM    P-R Interval 166 ms    QRS Duration 108 ms    Q-T Interval 348 ms    QTC Calculation(Bazett) 432 ms    P Axis -25 degrees    R Axis 83 degrees    T Axis 1 degrees    Diagnosis Line Normal sinus rhythm  Inferior infarct , age undetermined  Possible Anterolateral infarct , age undetermined  Abnormal ECG    Confirmed by JEREMÍAS LEMUS MD ([id number]) on 3/12/2021 2:38:31 PM (03-12-21 @ 13:33)      MEDICATIONS  amLODIPine   Tablet 10 Oral daily  apixaban 5 Oral two times a day  atorvastatin 80 Oral at bedtime  chlorhexidine 4% Liquid 1 Topical <User Schedule>  influenza   Vaccine 0.5 IntraMuscular once  insulin glargine Injectable (LANTUS) 45 SubCutaneous at bedtime  insulin lispro (ADMELOG) corrective regimen sliding scale  SubCutaneous three times a day before meals  insulin lispro Injectable (ADMELOG) 8 SubCutaneous three times a day before meals  LORazepam     Tablet 0.5 Oral once  melatonin 5 Oral at bedtime  metoprolol succinate  Oral daily  pantoprazole    Tablet 40 Oral before breakfast  senna 2 Oral at bedtime      WEIGHT  Weight (kg): 127 (03-12-21 @ 17:40)  Creatinine, Serum: 1.3 mg/dL (03-22-21 @ 07:25)      ANTIBIOTICS:      All available historical records have been reviewed

## 2021-03-22 NOTE — CHART NOTE - NSCHARTNOTEFT_GEN_A_CORE
I made rounds today with the treatment team including the hospitalist, residents,  nurses and  and discussed the patient's current medical status and discharge  planning needs, and reviewed the chart.    T(C): 36.1 (03-22-21 @ 05:00), Max: 39.1 (03-21-21 @ 14:02)  HR: 74 (03-22-21 @ 05:00) (74 - 86)  BP: 130/60 (03-22-21 @ 05:00) (126/65 - 130/60)  RR: 20 (03-22-21 @ 05:00) (20 - 20)  SpO2: 97% (03-22-21 @ 12:38) (90% - 97%)          I reached out to the patient's health care proxy/ responsible family member-           [   x  ]  I reached  Rain ( daughter ) [phone]     and discussed the patient's medical condition,                   family concerns, and discharge planning           [     ]  I left a message with family               [     ]  I personally participated in rounds with the medical team and my resident and discussed the case. My resident reached                   family member/ HCP                                under my direction and supervision  and we reviewed the conversation          [     ]  My resident left a message with family under my direction and supervision                [     ]   My resident attended rounds and called the family     The following was discussed: vitals / respiratory status, needs high flow O2 / medications / repeat CXR showed increased opacity          [     ] I spent 5-10 minutes on the above discussing medical issues with team members and family and/ or my resident    [     ] I spent 11-20 minutes on the above discussing medical issues with team members and family and/ or my resident    [  x   ] I spent 21-30 minutes on the above discussing medical issues with team members and family and/ or my resident I made rounds today with the treatment team including the hospitalist, residents,  nurses and  and discussed the patient's current medical status and discharge  planning needs, and reviewed the chart.    T(C): 36.1 (03-22-21 @ 05:00), Max: 39.1 (03-21-21 @ 14:02)  HR: 74 (03-22-21 @ 05:00) (74 - 86)  BP: 130/60 (03-22-21 @ 05:00) (126/65 - 130/60)  RR: 20 (03-22-21 @ 05:00) (20 - 20)  SpO2: 97% (03-22-21 @ 12:38) (90% - 97%)          I reached out to the patient's health care proxy/ responsible family member-           [   x  ]  I reached  Rain ( daughter ) [phone]     and discussed the patient's medical condition,                   family concerns, and discharge planning           [     ]  I left a message with family               [     ]  I personally participated in rounds with the medical team and my resident and discussed the case. My resident reached                   family member/ HCP                                under my direction and supervision  and we reviewed the conversation          [     ]  My resident left a message with family under my direction and supervision                [     ]   My resident attended rounds and called the family     The following was discussed: vitals / respiratory status, needs high flow O2 / medications / ID f/u          [     ] I spent 5-10 minutes on the above discussing medical issues with team members and family and/ or my resident    [     ] I spent 11-20 minutes on the above discussing medical issues with team members and family and/ or my resident    [  x   ] I spent 21-30 minutes on the above discussing medical issues with team members and family and/ or my resident

## 2021-03-22 NOTE — PROGRESS NOTE ADULT - SUBJECTIVE AND OBJECTIVE BOX
ROSA MARIA CASEY  80y  Male      Patient is a 80y old  Male who presents with a chief complaint of acute hypoxemic respiratory failure secondary to COVID-19 pneumonia (14 Mar 2021 07:11). Pt with MO and probable underlying JARETH.      INTERVAL HPI/OVERNIGHT EVENTS: pt afebrile, CXR show stable BL lung opacities,  NRM 92%, + mild transaminitis    T:  96.8  HR:   68  BP:  117/61    RR: 18  Pulse ox: NRM 12 L 92%        PHYSICAL EXAM:  GENERAL: A&O, overweight, chronically ill looking but in NAD, NRM  Neck:  short,  thick but supple, no JVD, no bruit, no stridor  Lungs:  shallow resp, scattered rhonchi, no wheezing  Abd:   distended soft and benign, + BS  Urinary:  RLQ urostomy  Ext:  arthritic changes, moves all limbs, nonpitting edema  Skin:  no rash  Psych stable    LABS                          13  10)-----------( 361             42      141 |  102  |  34  ----------------------------<64  4.0   |  24  |  1.1    GFR 64,  Ca    8.8       Phos  3.4      Mg     2.0        MB 63  trop <0.01 x2  ferritin 535, 462  procal 0.26  CRP 25  TPro  5.9<L>  /  Alb  3.2<L>  /  TBili  0.9  /  DBili  x   /  AST  45, 88, 88  /  ALT  53, 93, 99  /  AlkPhos  43  03-14      Urinalysis Basic - ( 12 Mar 2021 16:38 )    Color: Yellow / Appearance: Slightly Turbid / S.020 / pH: x  Gluc: x / Ketone: Negative  / Bili: Negative / Urobili: 3 mg/dL   Blood: x / Protein: 30 mg/dL / Nitrite: Negative   Leuk Esterase: Moderate / RBC: 5 /HPF / WBC 90 /HPF   Sq Epi: x / Non Sq Epi: 0 /HPF / Bacteria: Ma       RADIOLOGY & ADDITIONAL TESTS:  Venous duplex:  negative for DVT  CXR:  BL opacities L>R  CXR:  stable BL opacities           ROSA MARIA CASEY  80y  Male      Patient is a 80y old  Male who presents with a chief complaint of acute hypoxemic respiratory failure secondary to COVID-19 pneumonia (14 Mar 2021 07:11). Pt with MO and probable underlying JARETH.      INTERVAL HPI/OVERNIGHT EVENTS: pt afebrile, but req NRM 93%, + mild transaminitis, high DDIMER, CT angio negative for PE,  (NB pt on Eliquis for cardiac ) will check Covid Ab if neg then, C plasma, monitor  ferritin and CRP, if CRP>75 give toclizumab 800mg x 1    T:  97  HR:   74  BP:  130/60,     RR: 20  Pulse ox: NRM  93%        PHYSICAL EXAM:  GENERAL: A&O, overweight, chronically ill looking but in NAD, NRM  Neck:  short,  thick but supple, no JVD, no bruit, no stridor  Lungs:  shallow resp, scattered rhonchi, no wheezing  Abd:   distended soft and benign, + BS  Urinary:  RLQ urostomy  Ext:  arthritic changes, moves all limbs, nonpitting edema  Skin:  no rash  Psych stable    LABS                          13  10)-----------( 340             40      141 |  103  |  31  ----------------------------<67  4.5   |  25  |  1.3    GFR 64,  Ca    8.8       Phos  3.4      Mg     2.0        MB 63  trop <0.01 x2  ferritin 535, 462  procal 0.26  CRP 25  TPro  5.9<L>  /  Alb  3.2<L>  /  TBili  0.9  /  DBili  x   /  AST  45, 88, 88  /  ALT  53, 93, 99  /  AlkPhos  43  03-14      Urinalysis Basic - ( 12 Mar 2021 16:38 )    Color: Yellow / Appearance: Slightly Turbid / S.020 / pH: x  Gluc: x / Ketone: Negative  / Bili: Negative / Urobili: 3 mg/dL   Blood: x / Protein: 30 mg/dL / Nitrite: Negative   Leuk Esterase: Moderate / RBC: 5 /HPF / WBC 90 /HPF   Sq Epi: x / Non Sq Epi: 0 /HPF / Bacteria: Ma       RADIOLOGY & ADDITIONAL TESTS:  Venous duplex:  negative for DVT  CXR:  BL opacities L>R  CXR:  stable BL opacities    CT angio:  neg for central PE

## 2021-03-22 NOTE — PROGRESS NOTE ADULT - ASSESSMENT
81yo male with PMH of hypertension, hyperlipidemia, diabetes mellitus type 2, atrial fibrillation on Eliquis, CKD stage 3A, bladder cancer s/p cystectomy with urostomy, and bilateral hip and knee arthroplasties who was brought in by EMS for hypoxia. Patient had tested positive for COVID-19 on 3/4/2021, but symptoms did not begin until 3/6/2021. He was found to be hypoxic to 85% on room air.    IMPRESSION;  #COVID 19 with severe illness. SpO2 < 94% on RA and need for supplemental O2.    Pt is in the late inflammatory response phase ot the illness based on the onset of symptoms.    CXR opacities b/l    D-Dimer Assay, Quantitative: 7756 ng/mL DDU (03-20-21 @ 11:57) D-Dimer Assay, Quantitative: 3761:(03.15.21 @ 06:00)   C-Reactive Protein, Serum: 25 mg/L (03-15-21 @ 06:00) C-Reactive Protein, Serum: 80 mg/L (03-12-21 @ 12:50)   Ferritin, Serum: 462 ng/mL (03.16.21 @ 07:07)      RECOMMENDATIONS;  - Ab was NEGATIVE 3/13 but did not get plasma, RECHECK Ab  - As increasing ddimer- rule out PE  - repeat Ferritin, CRP, if increasing --> If CRP >75 and requiring HFNC or BIPAP--> Tocilizumab >90kg 800mg x1   - monitor off abx    Please call with any questions or send a message on [employer] Teams  Spectra [phone]

## 2021-03-22 NOTE — OCCUPATIONAL THERAPY INITIAL EVALUATION ADULT - PERTINENT HX OF CURRENT PROBLEM, REHAB EVAL
Patient is a 79yo male with PMH of hypertension, hyperlipidemia, diabetes mellitus type 2, atrial fibrillation on Eliquis, CKD stage 3A, bladder cancer s/p cystectomy with urostomy, and bilateral hip and knee arthroplasties who was brought in by EMS for hypoxia. The patient states that his daughter and her boyfriend who reside in the same home as him and his wife recently tested positive for COVID-19.

## 2021-03-22 NOTE — OCCUPATIONAL THERAPY INITIAL EVALUATION ADULT - GENERAL OBSERVATIONS, REHAB EVAL
Pt received supine in bed with HOB elevated, +call bell, +bed alarm, +IV lock, +Urostomy, +NRB in NAD. Pt left as received with all lines intact. During Therapy pt's SPO2 level decreased to 79%. Pt transferred back to bed and after 10 mins pt's SPO2 level increased to 90%. RN Shaunna and PCA Linda made aware

## 2021-03-22 NOTE — PROGRESS NOTE ADULT - SUBJECTIVE AND OBJECTIVE BOX
SUBJECTIVE:    Patient is a 80y old Male who presents with a chief complaint of acute hypoxemic respiratory failure secondary to COVID-19 pneumonia (21 Mar 2021 17:19)    Currently admitted to medicine with the primary diagnosis of: COVID    Today is hospital day 10d.     Overnight Events:     patient was moved from bed to chair and desaturated on NRB to 70%.  Patient was febrile overnight    PAST MEDICAL & SURGICAL HISTORY  Chronic kidney disease (CKD)  stage 3A, baseline creatinine 1.4    Bladder cancer  secondary to exposure at [facility] 9/11 s/p cystectomy with urostomy    History of atrial fibrillation    Diabetes mellitus, type 2    Hyperlipidemia    Hypertension    History of total cystectomy  with resultant urostomy    History of total knee replacement, bilateral    History of total hip replacement, bilateral      ALLERGIES:  No Known Allergies    MEDICATIONS:  STANDING MEDICATIONS  amLODIPine   Tablet 10 milliGRAM(s) Oral daily  apixaban 5 milliGRAM(s) Oral two times a day  atorvastatin 80 milliGRAM(s) Oral at bedtime  chlorhexidine 4% Liquid 1 Application(s) Topical <User Schedule>  influenza   Vaccine 0.5 milliLiter(s) IntraMuscular once  insulin glargine Injectable (LANTUS) 45 Unit(s) SubCutaneous at bedtime  insulin lispro (ADMELOG) corrective regimen sliding scale   SubCutaneous three times a day before meals  insulin lispro Injectable (ADMELOG) 8 Unit(s) SubCutaneous three times a day before meals  melatonin 5 milliGRAM(s) Oral at bedtime  metoprolol succinate  milliGRAM(s) Oral daily  pantoprazole    Tablet 40 milliGRAM(s) Oral before breakfast  senna 2 Tablet(s) Oral at bedtime    PRN MEDICATIONS  acetaminophen   Tablet .. 650 milliGRAM(s) Oral every 6 hours PRN  acetaminophen   Tablet .. 650 milliGRAM(s) Oral every 6 hours PRN  guaifenesin/dextromethorphan  Syrup 10 milliLiter(s) Oral every 6 hours PRN    VITALS:   ICU Vital Signs Last 24 Hrs  T(C): 36.1 (22 Mar 2021 05:00), Max: 39.1 (21 Mar 2021 14:02)  T(F): 97 (22 Mar 2021 05:00), Max: 102.3 (21 Mar 2021 14:02)  HR: 74 (22 Mar 2021 05:00) (74 - 86)  BP: 130/60 (22 Mar 2021 05:00) (126/65 - 130/60)  RR: 20 (22 Mar 2021 05:00) (20 - 20)  SpO2: 93% (22 Mar 2021 08:38) (90% - 93%)      LABS:                        13.1   10.82 )-----------( 340      ( 22 Mar 2021 07:25 )             40.6     03-22    141  |  103  |  31<H>  ----------------------------<  67<L>  4.5   |  25  |  1.3    Ca    8.7      22 Mar 2021 07:25    TPro  6.0  /  Alb  2.8<L>  /  TBili  1.4<H>  /  DBili  x   /  AST  54<H>  /  ALT  71<H>  /  AlkPhos  70  03-22    Procalcitonin, Serum: 0.07 (03-15)  Procalcitonin, Serum: 0.26 (03-12)    C-Reactive Protein, Serum: 25 (03-15)  C-Reactive Protein, Serum: 80 (03-12)    Ferritin, Serum: 462 (03-16)  Ferritin, Serum: 527 (03-15)  Ferritin, Serum: 535 (03-12)      D-Dimer Assay, Quantitative: 7756 (03-20)  D-Dimer Assay, Quantitative: 3761 (03-15)  D-Dimer Assay, Quantitative: 1721 (03-12)      RADIOLOGY:    < from: Xray Chest 1 View- PORTABLE-Routine (Xray Chest 1 View- PORTABLE-Routine .) (03.20.21 @ 09:48) >  mpression:    Bilateral opacities, stable.    < end of copied text >    < from: VA Duplex Lower Ext Vein Scan, Bilat (03.13.21 @ 12:31) >  Impression:    No evidence of deep venous thrombosis or superficial thrombophlebitis in the bilateral lower extremities.    ICD-10:R06.02    < end of copied text >      PHYSICAL EXAM:    GENERAL: NAD  CHEST/LUNG: coarse bs b/l  HEART: Regular rate and rhythm; No murmurs, rubs, or gallops  ABDOMEN: Bowel sounds present; Soft, Nontender, Nondistended. EXTREMITIES:  no edema  NERVOUS SYSTEM:  Alert & Oriented X3, speech clear. No deficits

## 2021-03-23 LAB
ALBUMIN SERPL ELPH-MCNC: 2.8 G/DL — LOW (ref 3.5–5.2)
ALP SERPL-CCNC: 78 U/L — SIGNIFICANT CHANGE UP (ref 30–115)
ALT FLD-CCNC: 68 U/L — HIGH (ref 0–41)
ANION GAP SERPL CALC-SCNC: 11 MMOL/L — SIGNIFICANT CHANGE UP (ref 7–14)
AST SERPL-CCNC: 56 U/L — HIGH (ref 0–41)
BASOPHILS # BLD AUTO: 0.02 K/UL — SIGNIFICANT CHANGE UP (ref 0–0.2)
BASOPHILS NFR BLD AUTO: 0.2 % — SIGNIFICANT CHANGE UP (ref 0–1)
BILIRUB SERPL-MCNC: 1.4 MG/DL — HIGH (ref 0.2–1.2)
BUN SERPL-MCNC: 27 MG/DL — HIGH (ref 10–20)
CALCIUM SERPL-MCNC: 8.7 MG/DL — SIGNIFICANT CHANGE UP (ref 8.5–10.1)
CHLORIDE SERPL-SCNC: 103 MMOL/L — SIGNIFICANT CHANGE UP (ref 98–110)
CO2 SERPL-SCNC: 25 MMOL/L — SIGNIFICANT CHANGE UP (ref 17–32)
COVID-19 NUCLEOCAPSID GAM AB INTERP: POSITIVE
COVID-19 NUCLEOCAPSID TOTAL GAM ANTIBODY RESULT: 57.5 INDEX — HIGH
CREAT SERPL-MCNC: 1.2 MG/DL — SIGNIFICANT CHANGE UP (ref 0.7–1.5)
CRP SERPL-MCNC: 180 MG/L — HIGH
EOSINOPHIL # BLD AUTO: 0.2 K/UL — SIGNIFICANT CHANGE UP (ref 0–0.7)
EOSINOPHIL NFR BLD AUTO: 2 % — SIGNIFICANT CHANGE UP (ref 0–8)
FERRITIN SERPL-MCNC: 737 NG/ML — HIGH (ref 30–400)
GLUCOSE BLDC GLUCOMTR-MCNC: 143 MG/DL — HIGH (ref 70–99)
GLUCOSE BLDC GLUCOMTR-MCNC: 204 MG/DL — HIGH (ref 70–99)
GLUCOSE BLDC GLUCOMTR-MCNC: 266 MG/DL — HIGH (ref 70–99)
GLUCOSE BLDC GLUCOMTR-MCNC: 87 MG/DL — SIGNIFICANT CHANGE UP (ref 70–99)
GLUCOSE SERPL-MCNC: 92 MG/DL — SIGNIFICANT CHANGE UP (ref 70–99)
HCT VFR BLD CALC: 40.8 % — LOW (ref 42–52)
HGB BLD-MCNC: 12.9 G/DL — LOW (ref 14–18)
IMM GRANULOCYTES NFR BLD AUTO: 0.7 % — HIGH (ref 0.1–0.3)
LYMPHOCYTES # BLD AUTO: 0.55 K/UL — LOW (ref 1.2–3.4)
LYMPHOCYTES # BLD AUTO: 5.5 % — LOW (ref 20.5–51.1)
MCHC RBC-ENTMCNC: 27.6 PG — SIGNIFICANT CHANGE UP (ref 27–31)
MCHC RBC-ENTMCNC: 31.6 G/DL — LOW (ref 32–37)
MCV RBC AUTO: 87.4 FL — SIGNIFICANT CHANGE UP (ref 80–94)
MONOCYTES # BLD AUTO: 0.31 K/UL — SIGNIFICANT CHANGE UP (ref 0.1–0.6)
MONOCYTES NFR BLD AUTO: 3.1 % — SIGNIFICANT CHANGE UP (ref 1.7–9.3)
NEUTROPHILS # BLD AUTO: 8.9 K/UL — HIGH (ref 1.4–6.5)
NEUTROPHILS NFR BLD AUTO: 88.5 % — HIGH (ref 42.2–75.2)
NRBC # BLD: 0 /100 WBCS — SIGNIFICANT CHANGE UP (ref 0–0)
PLATELET # BLD AUTO: 312 K/UL — SIGNIFICANT CHANGE UP (ref 130–400)
POTASSIUM SERPL-MCNC: 4.8 MMOL/L — SIGNIFICANT CHANGE UP (ref 3.5–5)
POTASSIUM SERPL-SCNC: 4.8 MMOL/L — SIGNIFICANT CHANGE UP (ref 3.5–5)
PROCALCITONIN SERPL-MCNC: 0.11 NG/ML — HIGH (ref 0.02–0.1)
PROT SERPL-MCNC: 6 G/DL — SIGNIFICANT CHANGE UP (ref 6–8)
RBC # BLD: 4.67 M/UL — LOW (ref 4.7–6.1)
RBC # FLD: 15 % — HIGH (ref 11.5–14.5)
SARS-COV-2 IGG+IGM SERPL QL IA: 57.5 INDEX — HIGH
SARS-COV-2 IGG+IGM SERPL QL IA: POSITIVE
SODIUM SERPL-SCNC: 139 MMOL/L — SIGNIFICANT CHANGE UP (ref 135–146)
WBC # BLD: 10.05 K/UL — SIGNIFICANT CHANGE UP (ref 4.8–10.8)
WBC # FLD AUTO: 10.05 K/UL — SIGNIFICANT CHANGE UP (ref 4.8–10.8)

## 2021-03-23 PROCEDURE — 99222 1ST HOSP IP/OBS MODERATE 55: CPT

## 2021-03-23 RX ORDER — DEXAMETHASONE 0.5 MG/5ML
6 ELIXIR ORAL DAILY
Refills: 0 | Status: DISCONTINUED | OUTPATIENT
Start: 2021-03-23 | End: 2021-03-24

## 2021-03-23 RX ORDER — CEFEPIME 1 G/1
2000 INJECTION, POWDER, FOR SOLUTION INTRAMUSCULAR; INTRAVENOUS ONCE
Refills: 0 | Status: COMPLETED | OUTPATIENT
Start: 2021-03-23 | End: 2021-03-23

## 2021-03-23 RX ORDER — CEFEPIME 1 G/1
INJECTION, POWDER, FOR SOLUTION INTRAMUSCULAR; INTRAVENOUS
Refills: 0 | Status: DISCONTINUED | OUTPATIENT
Start: 2021-03-23 | End: 2021-03-25

## 2021-03-23 RX ORDER — CEFEPIME 1 G/1
2000 INJECTION, POWDER, FOR SOLUTION INTRAMUSCULAR; INTRAVENOUS EVERY 12 HOURS
Refills: 0 | Status: DISCONTINUED | OUTPATIENT
Start: 2021-03-23 | End: 2021-03-25

## 2021-03-23 RX ORDER — TOCILIZUMAB 20 MG/ML
800 INJECTION, SOLUTION, CONCENTRATE INTRAVENOUS ONCE
Refills: 0 | Status: COMPLETED | OUTPATIENT
Start: 2021-03-23 | End: 2021-03-23

## 2021-03-23 RX ADMIN — Medication 6 MILLIGRAM(S): at 11:43

## 2021-03-23 RX ADMIN — SENNA PLUS 2 TABLET(S): 8.6 TABLET ORAL at 22:47

## 2021-03-23 RX ADMIN — TOCILIZUMAB 100 MILLIGRAM(S): 20 INJECTION, SOLUTION, CONCENTRATE INTRAVENOUS at 11:42

## 2021-03-23 RX ADMIN — Medication 5 MILLIGRAM(S): at 22:44

## 2021-03-23 RX ADMIN — CEFEPIME 100 MILLIGRAM(S): 1 INJECTION, POWDER, FOR SOLUTION INTRAMUSCULAR; INTRAVENOUS at 10:59

## 2021-03-23 RX ADMIN — CEFEPIME 100 MILLIGRAM(S): 1 INJECTION, POWDER, FOR SOLUTION INTRAMUSCULAR; INTRAVENOUS at 17:04

## 2021-03-23 RX ADMIN — Medication 100 MILLIGRAM(S): at 05:04

## 2021-03-23 RX ADMIN — Medication 8 UNIT(S): at 12:10

## 2021-03-23 RX ADMIN — APIXABAN 5 MILLIGRAM(S): 2.5 TABLET, FILM COATED ORAL at 05:04

## 2021-03-23 RX ADMIN — Medication 8 UNIT(S): at 17:04

## 2021-03-23 RX ADMIN — CHLORHEXIDINE GLUCONATE 1 APPLICATION(S): 213 SOLUTION TOPICAL at 06:04

## 2021-03-23 RX ADMIN — INSULIN GLARGINE 45 UNIT(S): 100 INJECTION, SOLUTION SUBCUTANEOUS at 22:43

## 2021-03-23 RX ADMIN — ATORVASTATIN CALCIUM 80 MILLIGRAM(S): 80 TABLET, FILM COATED ORAL at 22:44

## 2021-03-23 RX ADMIN — Medication 4: at 17:04

## 2021-03-23 RX ADMIN — APIXABAN 5 MILLIGRAM(S): 2.5 TABLET, FILM COATED ORAL at 17:05

## 2021-03-23 RX ADMIN — PANTOPRAZOLE SODIUM 40 MILLIGRAM(S): 20 TABLET, DELAYED RELEASE ORAL at 08:00

## 2021-03-23 RX ADMIN — AMLODIPINE BESYLATE 10 MILLIGRAM(S): 2.5 TABLET ORAL at 05:05

## 2021-03-23 NOTE — CHART NOTE - NSCHARTNOTEFT_GEN_A_CORE
I made rounds today with the treatment team including the hospitalist, residents,  nurses and  and discussed the patient's current medical status and discharge  planning needs, and reviewed the chart.    T(C): 35.6 (03-23-21 @ 05:18), Max: 35.7 (03-22-21 @ 14:04)  HR: 63 (03-23-21 @ 05:18) (63 - 79)  BP: 139/72 (03-23-21 @ 05:18) (133/77 - 139/72)  RR: 18 (03-23-21 @ 05:18) (18 - 18)  SpO2: 95% (03-23-21 @ 05:18) (95% - 98%)          I reached out to the patient's health care proxy/ responsible family member-           [  x   ]  I reached  Rain ( daughter ) / son [phone]      and discussed the patient's medical condition,                   family concerns, and discharge planning           [     ]  I left a message with family               [     ]  I personally participated in rounds with the medical team and my resident and discussed the case. My resident reached                   family member/ HCP                                under my direction and supervision  and we reviewed the conversation          [     ]  My resident left a message with family under my direction and supervision                [     ]   My resident attended rounds and called the family     The following was discussed: vitals / respiratory status / medications / CTA r/o PE / covid antibody pending, if negative can start convalescent plasma          [     ] I spent 5-10 minutes on the above discussing medical issues with team members and family and/ or my resident    [     ] I spent 11-20 minutes on the above discussing medical issues with team members and family and/ or my resident    [   x  ] I spent 21-30 minutes on the above discussing medical issues with team members and family and/ or my resident

## 2021-03-23 NOTE — PROGRESS NOTE ADULT - ASSESSMENT
81 yo M with PMHx of HTN, HLD, DMII, AFib on Eliquis, CKD IIIA, Bladder Ca s/p cystectomy with urostomy, bilateral hip and knee arthroplasties admitted for acute COVID PNA.    #Acute hypoxemic respiratory failure secondary to COVID-19 pneumonia complicated by JARETH  #Mild transaminitis likely due to COVID  - on HFNC 80/50 95%  - Chest X-ray: bibasilar opacities  - CTA - 3/22  no PE  - Ferritin, 3/16 : 462 ng/mL,   - Procalcitonin, 3/22 : 0.07 ng/mL, > .11  - C-Reactive Protein, 3/15 : 25 mg/L,   - D-Dimer Assay, 3/22 : 7756 ng/mL >5311  - LE Duplex 3/13 negative  - continue Dexamethasone, not candidate for RDV  - continue eliquis 5 mg q12  - Pt likely has underlying JARETH, CPAP was ordered and pt refused  - ID - repeat ab if negative give plasma. if CRP > 75 give toci 800 mg  - Fu CRP  - pulm consult- upgrade to SDU    #LORRAINE on CKDIIIA - resolved    #Paroxysmal AFib - rate controlled  - CHADVASC 4  - C/w Eliquis 5 mg PO BID  - C/w Metoprolol succinate 100 mg qd    #HLD  - Takes Rosuvastatin, c/w Atorvastatin while here    #HTN  - C/w Amlodipine  - Home Benazapril on hold due to LORRAINE, resume if BP significantly elevated    #Hx of bilateral TKR, THR   - Pain well controlled off medication    #Bladder Ca s/p cystectomy with urostomy  - Urostomy care PRN  - Outpt f/u with Urology    DVT ppx: Eliquis  GI ppx: PPI  Diet CC  Activity: AAT  Full code  Dispo: acute

## 2021-03-23 NOTE — PROGRESS NOTE ADULT - SUBJECTIVE AND OBJECTIVE BOX
ROSA MARIA CASEY  80y  Male      Patient is a 80y old  Male who presents with a chief complaint of acute hypoxemic respiratory failure secondary to COVID-19 pneumonia (14 Mar 2021 07:11). Pt with MO and probable underlying JARETH.      INTERVAL HPI/OVERNIGHT EVENTS: pt afebrile, but req NRM 93%, + mild transaminitis, high DDIMER, CT angio negative for PE,  (NB pt on Eliquis for cardiac ) will check Covid Ab if neg then, C plasma, monitor  ferritin and CRP, if CRP>75 give toclizumab 800mg x 1    T:  97  HR:   74  BP:  130/60,     RR: 20  Pulse ox: NRM  93%        PHYSICAL EXAM:  GENERAL: A&O, overweight, chronically ill looking but in NAD, NRM  Neck:  short,  thick but supple, no JVD, no bruit, no stridor  Lungs:  shallow resp, scattered rhonchi, no wheezing  Abd:   distended soft and benign, + BS  Urinary:  RLQ urostomy  Ext:  arthritic changes, moves all limbs, nonpitting edema  Skin:  no rash  Psych stable    LABS                          13  10)-----------( 340             40      141 |  103  |  31  ----------------------------<67  4.5   |  25  |  1.3    GFR 64,  Ca    8.8       Phos  3.4      Mg     2.0        MB 63  trop <0.01 x2  ferritin 535, 462  procal 0.26  CRP 25  TPro  5.9<L>  /  Alb  3.2<L>  /  TBili  0.9  /  DBili  x   /  AST  45, 88, 88  /  ALT  53, 93, 99  /  AlkPhos  43  03-14      Urinalysis Basic - ( 12 Mar 2021 16:38 )    Color: Yellow / Appearance: Slightly Turbid / S.020 / pH: x  Gluc: x / Ketone: Negative  / Bili: Negative / Urobili: 3 mg/dL   Blood: x / Protein: 30 mg/dL / Nitrite: Negative   Leuk Esterase: Moderate / RBC: 5 /HPF / WBC 90 /HPF   Sq Epi: x / Non Sq Epi: 0 /HPF / Bacteria: Ma       RADIOLOGY & ADDITIONAL TESTS:  Venous duplex:  negative for DVT  CXR:  BL opacities L>R  CXR:  stable BL opacities    CT angio:  neg for central PE       ROSA MARIA CASEY  80y  Male      Patient is a 80y old  Male who presents with a chief complaint of acute hypoxic FRF due to Covid Viral syndrome. Underlying Hx of DMII, DLD, BMO, probable Tiana, bladder ca, sp urostomy.      INTERVAL HPI/OVERNIGHT EVENTS: pt afebrile, inc resp diff, on HFNC,  refuses CPAP at HS, cont on DEXA, high DDIMER, CT angio negative for PE,  (NB pt on Eliquis for cardiac ), CRP>75 given toclizumab 800mg x 1, pt upgraded to CEU, ff by pul-creitical care and ID    T:  97  HR:   77  BP:  124/72    RR: 24  Pulse ox:  HFNC 97%        PHYSICAL EXAM:  GENERAL: A&O, overweight, chronically ill looking,  HFNC  Neck:  short,  thick but supple, no JVD, no bruit, no stridor  Lungs:  shallow resp, scattered rhonchi, no wheezing  Abd:   distended soft and benign, + BS  Urinary:  RLQ urostomy  Ext:  arthritic changes, moves all limbs, nonpitting edema  Skin:  no rash  Psych stable    LABS                          12.9  10)-----------( 312             40      139 |  103  |  27  ----------------------------<97  4.8  |  25  |  1.2    GFR 64, 58  Ca    8.8       Phos  3.4      Mg     2.0        MB 63  trop <0.01 x2  ferritin 535, 462, 737  procal 0.26, 0.11  CRP 25, 180  TPro  5.9<L>  /  Alb  3.2<L>  /  TBili  0.9  /  DBili  x   /  AST  45, 88, 88  /  ALT  53, 93, 99  /  AlkPhos  43  03-14      Urinalysis Basic - ( 12 Mar 2021 16:38 )    Color: Yellow / Appearance: Slightly Turbid / S.020 / pH: x  Gluc: x / Ketone: Negative  / Bili: Negative / Urobili: 3 mg/dL   Blood: x / Protein: 30 mg/dL / Nitrite: Negative   Leuk Esterase: Moderate / RBC: 5 /HPF / WBC 90 /HPF   Sq Epi: x / Non Sq Epi: 0 /HPF / Bacteria: Ma       RADIOLOGY & ADDITIONAL TESTS:  Venous duplex:  negative for DVT  CXR:  BL opacities L>R  CXR:  stable BL opacities    CT angio:  neg for central PE

## 2021-03-23 NOTE — CHART NOTE - NSCHARTNOTEFT_GEN_A_CORE
MICU Transfer Note    Transfer from: Floor  Transfer to: SDU      Hospital COURSE:    Patient is a 79yo male with PMH of hypertension, hyperlipidemia, diabetes mellitus type 2, atrial fibrillation on Eliquis, CKD stage 3A, bladder cancer s/p cystectomy with urostomy, and bilateral hip and knee arthroplasties who was brought in by EMS for hypoxia. Patient had tested positive for COVID-19 on 3/4/2021, but symptoms did not begin until 3/6/2021. He was found to be hypoxic to 85% on room air.    Currently admiteed for ARF 2/2 COVID- 19. On HFNC 80/50 sating 95%. Labs significant for elevated D-dimer (CTA and Duplex negative). Was not a candidate for RDV given timeline. Currently receiving dexamethasone. Candidate for TOCI but pending CRP results. Candidate for plasma but awaiting AB results. Upgraded to SDU due to increasing oxygen demand.     ASSESSMENT & PLAN:     79 yo M with PMHx of HTN, HLD, DMII, AFib on Eliquis, CKD IIIA, Bladder Ca s/p cystectomy with urostomy, bilateral hip and knee arthroplasties admitted for acute COVID PNA.    #Acute hypoxemic respiratory failure secondary to COVID-19 pneumonia complicated by JARETH  #Mild transaminitis likely due to COVID  - on HFNC 80/50 95%  - Chest X-ray: bibasilar opacities  - CTA - 3/22  no PE  - Ferritin, 3/16 : 462 ng/mL,   - Procalcitonin, 3/22 : 0.07 ng/mL, > .11  - C-Reactive Protein, 3/15 : 25 mg/L,   - D-Dimer Assay, 3/22 : 7756 ng/mL >5311  - LE Duplex 3/13 negative  - C/w Dexamethasone, not candidate for RDV  - continue eliquis 5 mg q12  - Pt likely has underlying JARETH, CPAP was ordered and pt refused  - ID - repeat ab if negative give plasma. if CRP > 75 give toci 800 mg      #LORARINE on CKDIIIA - resolved    #Paroxysmal AFib - rate controlled  - CHADVASC 4  - C/w Eliquis 5 mg PO BID  - C/w Metoprolol succinate 100 mg qd    #HLD  - Takes Rosuvastatin, c/w Atorvastatin while here    #HTN  - C/w Amlodipine  - Home Benazapril on hold due to LORRAINE, resume if BP significantly elevated    #Hx of bilateral TKR, THR   - Pain well controlled off medication    #Bladder Ca s/p cystectomy with urostomy  - Urostomy care PRN  - Outpt f/u with Urology    DVT ppx: Eliquis  GI ppx: PPI  Diet CC  Activity: AAT  Full code  Dispo: physiatry -> home pt vs STR        For Follow-Up:    -repeat ab if negative give plasma.   -if CRP > 75 give toci 800 mg      Vital Signs Last 24 Hrs  T(C): 35.6 (23 Mar 2021 05:18), Max: 35.7 (22 Mar 2021 14:04)  T(F): 96 (23 Mar 2021 05:18), Max: 96.2 (22 Mar 2021 14:04)  HR: 63 (23 Mar 2021 05:18) (63 - 79)  BP: 139/72 (23 Mar 2021 05:18) (133/77 - 139/72)  BP(mean): --  RR: 18 (23 Mar 2021 05:18) (18 - 18)  SpO2: 95% (23 Mar 2021 05:18) (95% - 98%)  I&O's Summary    22 Mar 2021 07:01  -  23 Mar 2021 07:00  --------------------------------------------------------  IN: 720 mL / OUT: 1800 mL / NET: -1080 mL          MEDICATIONS  (STANDING):  amLODIPine   Tablet 10 milliGRAM(s) Oral daily  apixaban 5 milliGRAM(s) Oral two times a day  atorvastatin 80 milliGRAM(s) Oral at bedtime  chlorhexidine 4% Liquid 1 Application(s) Topical <User Schedule>  influenza   Vaccine 0.5 milliLiter(s) IntraMuscular once  insulin glargine Injectable (LANTUS) 45 Unit(s) SubCutaneous at bedtime  insulin lispro (ADMELOG) corrective regimen sliding scale   SubCutaneous three times a day before meals  insulin lispro Injectable (ADMELOG) 8 Unit(s) SubCutaneous three times a day before meals  melatonin 5 milliGRAM(s) Oral at bedtime  metoprolol succinate  milliGRAM(s) Oral daily  pantoprazole    Tablet 40 milliGRAM(s) Oral before breakfast  senna 2 Tablet(s) Oral at bedtime    MEDICATIONS  (PRN):  acetaminophen   Tablet .. 650 milliGRAM(s) Oral every 6 hours PRN Temp greater or equal to 38C (100.4F)  acetaminophen   Tablet .. 650 milliGRAM(s) Oral every 6 hours PRN Mild Pain (1 - 3)  guaifenesin/dextromethorphan  Syrup 10 milliLiter(s) Oral every 6 hours PRN Cough        LABS                                            12.9                  Neurophils% (auto):   88.5   (03-23 @ 06:47):    10.05)-----------(312          Lymphocytes% (auto):  5.5                                           40.8                   Eosinphils% (auto):   2.0      Manual%: Neutrophils x    ; Lymphocytes x    ; Eosinophils x    ; Bands%: x    ; Blasts x                                    139    |  103    |  27                  Calcium: 8.7   / iCa: x      (03-23 @ 06:47)    ----------------------------<  92        Magnesium: x                                4.8     |  25     |  1.2              Phosphorous: x        TPro  6.0    /  Alb  2.8    /  TBili  1.4    /  DBili  x      /  AST  56     /  ALT  68     /  AlkPhos  78     23 Mar 2021 06:47 MICU Transfer Note    Transfer from: Floor  Transfer to: SDU      Hospital COURSE:    Patient is a 81yo male with PMH of hypertension, hyperlipidemia, diabetes mellitus type 2, atrial fibrillation on Eliquis, CKD stage 3A, bladder cancer s/p cystectomy with urostomy, and bilateral hip and knee arthroplasties who was brought in by EMS for hypoxia. Patient had tested positive for COVID-19 on 3/4/2021, but symptoms did not begin until 3/6/2021. He was found to be hypoxic to 85% on room air.    Currently admiteed for ARF 2/2 COVID- 19. On HFNC 80/50 sating 95%. Labs significant for elevated D-dimer (CTA and Duplex negative). Was not a candidate for RDV given timeline. Currently receiving dexamethasone. TOCI 800 mg ordered Candidate for plasma but awaiting AB results. Upgraded to SDU due to increasing oxygen demand.     ASSESSMENT & PLAN:     81 yo M with PMHx of HTN, HLD, DMII, AFib on Eliquis, CKD IIIA, Bladder Ca s/p cystectomy with urostomy, bilateral hip and knee arthroplasties admitted for acute COVID PNA.    #Acute hypoxemic respiratory failure secondary to COVID-19 pneumonia complicated by JARETH  #Mild transaminitis likely due to COVID  - on HFNC 80/50 95%  - Chest X-ray: bibasilar opacities  - CTA - 3/22  no PE  - Ferritin, 3/16 : 462 ng/mL,   - Procalcitonin, 3/22 : 0.07 ng/mL, > .11  - C-Reactive Protein, 3/15 : 25 mg/L,   - D-Dimer Assay, 3/22 : 7756 ng/mL >5311  - LE Duplex 3/13 negative  - continue Dexamethasone, not candidate for RDV  - continue eliquis 5 mg q12  - start toci 800 mg   - Pt likely has underlying JARETH, CPAP was ordered and pt refused  - ID - repeat ab if negative give plasma.       #LORRAINE on CKDIIIA - resolved    #Paroxysmal AFib - rate controlled  - CHADVASC 4  - C/w Eliquis 5 mg PO BID  - C/w Metoprolol succinate 100 mg qd    #HLD  - Takes Rosuvastatin, c/w Atorvastatin while here    #HTN  - C/w Amlodipine  - Home Benazapril on hold due to LORRAINE, resume if BP significantly elevated    #Hx of bilateral TKR, THR   - Pain well controlled off medication    #Bladder Ca s/p cystectomy with urostomy  - Urostomy care PRN  - Outpt f/u with Urology    DVT ppx: Eliquis  GI ppx: PPI  Diet CC  Activity: AAT  Full code  Dispo: physiatry -> home pt vs STR        For Follow-Up:    -repeat ab if negative give plasma.       Vital Signs Last 24 Hrs  T(C): 35.6 (23 Mar 2021 05:18), Max: 35.7 (22 Mar 2021 14:04)  T(F): 96 (23 Mar 2021 05:18), Max: 96.2 (22 Mar 2021 14:04)  HR: 63 (23 Mar 2021 05:18) (63 - 79)  BP: 139/72 (23 Mar 2021 05:18) (133/77 - 139/72)  BP(mean): --  RR: 18 (23 Mar 2021 05:18) (18 - 18)  SpO2: 95% (23 Mar 2021 05:18) (95% - 98%)  I&O's Summary    22 Mar 2021 07:01  -  23 Mar 2021 07:00  --------------------------------------------------------  IN: 720 mL / OUT: 1800 mL / NET: -1080 mL          MEDICATIONS  (STANDING):  amLODIPine   Tablet 10 milliGRAM(s) Oral daily  apixaban 5 milliGRAM(s) Oral two times a day  atorvastatin 80 milliGRAM(s) Oral at bedtime  chlorhexidine 4% Liquid 1 Application(s) Topical <User Schedule>  influenza   Vaccine 0.5 milliLiter(s) IntraMuscular once  insulin glargine Injectable (LANTUS) 45 Unit(s) SubCutaneous at bedtime  insulin lispro (ADMELOG) corrective regimen sliding scale   SubCutaneous three times a day before meals  insulin lispro Injectable (ADMELOG) 8 Unit(s) SubCutaneous three times a day before meals  melatonin 5 milliGRAM(s) Oral at bedtime  metoprolol succinate  milliGRAM(s) Oral daily  pantoprazole    Tablet 40 milliGRAM(s) Oral before breakfast  senna 2 Tablet(s) Oral at bedtime    MEDICATIONS  (PRN):  acetaminophen   Tablet .. 650 milliGRAM(s) Oral every 6 hours PRN Temp greater or equal to 38C (100.4F)  acetaminophen   Tablet .. 650 milliGRAM(s) Oral every 6 hours PRN Mild Pain (1 - 3)  guaifenesin/dextromethorphan  Syrup 10 milliLiter(s) Oral every 6 hours PRN Cough        LABS                                            12.9                  Neurophils% (auto):   88.5   (03-23 @ 06:47):    10.05)-----------(312          Lymphocytes% (auto):  5.5                                           40.8                   Eosinphils% (auto):   2.0      Manual%: Neutrophils x    ; Lymphocytes x    ; Eosinophils x    ; Bands%: x    ; Blasts x                                    139    |  103    |  27                  Calcium: 8.7   / iCa: x      (03-23 @ 06:47)    ----------------------------<  92        Magnesium: x                                4.8     |  25     |  1.2              Phosphorous: x        TPro  6.0    /  Alb  2.8    /  TBili  1.4    /  DBili  x      /  AST  56     /  ALT  68     /  AlkPhos  78     23 Mar 2021 06:47 MICU Transfer Note    Transfer from: Floor  Transfer to: SDU      Hospital COURSE:    Patient is a 81yo male with PMH of hypertension, hyperlipidemia, diabetes mellitus type 2, atrial fibrillation on Eliquis, CKD stage 3A, bladder cancer s/p cystectomy with urostomy, and bilateral hip and knee arthroplasties who was brought in by EMS for hypoxia. Patient had tested positive for COVID-19 on 3/4/2021, but symptoms did not begin until 3/6/2021. He was found to be hypoxic to 85% on room air.    Currently admiteed for ARF 2/2 COVID- 19. On HFNC 80/50 sating 95%. Labs significant for elevated D-dimer (CTA and Duplex negative). Was not a candidate for RDV given timeline. Currently receiving dexamethasone. TOCI 800 mg ordered Candidate for plasma but awaiting AB results. Upgraded to SDU due to increasing oxygen demand.     ASSESSMENT & PLAN:     81 yo M with PMHx of HTN, HLD, DMII, AFib on Eliquis, CKD IIIA, Bladder Ca s/p cystectomy with urostomy, bilateral hip and knee arthroplasties admitted for acute COVID PNA.    #Acute hypoxemic respiratory failure secondary to COVID-19 pneumonia complicated by JARETH  #Mild transaminitis likely due to COVID  - on HFNC 80/50 95%  - Chest X-ray: bibasilar opacities  - CTA - 3/22  no PE  - Ferritin, 3/16 : 462 ng/mL,   - Procalcitonin, 3/22 : 0.07 ng/mL, > .11  - C-Reactive Protein, 3/15 : 25 mg/L,   - D-Dimer Assay, 3/22 : 7756 ng/mL >5311  - LE Duplex 3/13 negative  - continue Dexamethasone, not candidate for RDV  - continue eliquis 5 mg q12  - started cefipime and levofloxacin   - ordered pancx, strep and legionella urine ag  - start toci 800 mg once  - Pt likely has underlying JARETH, CPAP was ordered and pt refused  - ID - repeat ab if negative give plasma.       #LORRAINE on CKDIIIA - resolved    #Paroxysmal AFib - rate controlled  - CHADVASC 4  - C/w Eliquis 5 mg PO BID  - C/w Metoprolol succinate 100 mg qd    #HLD  - Takes Rosuvastatin, c/w Atorvastatin while here    #HTN  - C/w Amlodipine  - Home Benazapril on hold due to LORRAINE, resume if BP significantly elevated    #Hx of bilateral TKR, THR   - Pain well controlled off medication    #Bladder Ca s/p cystectomy with urostomy  - Urostomy care PRN  - Outpt f/u with Urology    DVT ppx: Eliquis  GI ppx: PPI  Diet CC  Activity: AAT  Full code  Dispo: physiatry -> home pt vs STR        For Follow-Up:    - repeat ab if negative give plasma.   - bcx, urine cx, sputum cx, urine legionella strep      Vital Signs Last 24 Hrs  T(C): 35.6 (23 Mar 2021 05:18), Max: 35.7 (22 Mar 2021 14:04)  T(F): 96 (23 Mar 2021 05:18), Max: 96.2 (22 Mar 2021 14:04)  HR: 63 (23 Mar 2021 05:18) (63 - 79)  BP: 139/72 (23 Mar 2021 05:18) (133/77 - 139/72)  BP(mean): --  RR: 18 (23 Mar 2021 05:18) (18 - 18)  SpO2: 95% (23 Mar 2021 05:18) (95% - 98%)  I&O's Summary    22 Mar 2021 07:01  -  23 Mar 2021 07:00  --------------------------------------------------------  IN: 720 mL / OUT: 1800 mL / NET: -1080 mL          MEDICATIONS  (STANDING):  amLODIPine   Tablet 10 milliGRAM(s) Oral daily  apixaban 5 milliGRAM(s) Oral two times a day  atorvastatin 80 milliGRAM(s) Oral at bedtime  chlorhexidine 4% Liquid 1 Application(s) Topical <User Schedule>  influenza   Vaccine 0.5 milliLiter(s) IntraMuscular once  insulin glargine Injectable (LANTUS) 45 Unit(s) SubCutaneous at bedtime  insulin lispro (ADMELOG) corrective regimen sliding scale   SubCutaneous three times a day before meals  insulin lispro Injectable (ADMELOG) 8 Unit(s) SubCutaneous three times a day before meals  melatonin 5 milliGRAM(s) Oral at bedtime  metoprolol succinate  milliGRAM(s) Oral daily  pantoprazole    Tablet 40 milliGRAM(s) Oral before breakfast  senna 2 Tablet(s) Oral at bedtime    MEDICATIONS  (PRN):  acetaminophen   Tablet .. 650 milliGRAM(s) Oral every 6 hours PRN Temp greater or equal to 38C (100.4F)  acetaminophen   Tablet .. 650 milliGRAM(s) Oral every 6 hours PRN Mild Pain (1 - 3)  guaifenesin/dextromethorphan  Syrup 10 milliLiter(s) Oral every 6 hours PRN Cough        LABS                                            12.9                  Neurophils% (auto):   88.5   (03-23 @ 06:47):    10.05)-----------(312          Lymphocytes% (auto):  5.5                                           40.8                   Eosinphils% (auto):   2.0      Manual%: Neutrophils x    ; Lymphocytes x    ; Eosinophils x    ; Bands%: x    ; Blasts x                                    139    |  103    |  27                  Calcium: 8.7   / iCa: x      (03-23 @ 06:47)    ----------------------------<  92        Magnesium: x                                4.8     |  25     |  1.2              Phosphorous: x        TPro  6.0    /  Alb  2.8    /  TBili  1.4    /  DBili  x      /  AST  56     /  ALT  68     /  AlkPhos  78     23 Mar 2021 06:47

## 2021-03-23 NOTE — PROGRESS NOTE ADULT - ASSESSMENT
81yo M c PMHx chronic afib on noac, htn, dm2, ckd3 bladder ca s/p urostomy, b/l hip/ knee replacements obesity, here with acute hypoxic respiratory failure 2/2 severe covid 19 viral pneumonia, lorraine on ckd3 now better, elevated ddimer    Covid viral PNA. acute hypoxic RF  probable underlying JARETH, MO  LORRAINE on CKD  Transaminitis  Hx of HTN, ASHD, Afib/Eliquis  DM II, CKD  DLD  Bladder ca, sp urostomy  OA, DDD, DJD, sp BL hip and knee surgeries, mobility dysfunction    CXR show BL opacities  iv dexamethasone  us duplex LE's negative for DVT  elevated DDIMER, CT angio negative for PE  monitor infla parameters  check Covid Abs  monitor o2 requirements, pt on NRM, refuses CPAP at HS  mild transaminitis  pt remains acute  ID consult:  check Covid Abs if negative give 1u of CP, repeat ferritin, CRP if CRP > 75 give tocilizumab, for 90kg give 800mg x1  Rehab consult  OOB to chair  pt is a full code 81yo M c PMHx chronic afib on noac, htn, dm2, ckd3 bladder ca s/p urostomy, b/l hip/ knee replacements obesity, here with acute hypoxic respiratory failure 2/2 severe covid 19 viral pneumonia, lorraine on ckd3 now better, elevated ddimer    Covid viral PNA. acute hypoxic RF  probable underlying JARETH, MO  LORRAINE on CKD  Transaminitis  Hx of HTN, ASHD, Afib/Eliquis  DM II, CKD  DLD  Bladder ca, sp urostomy  OA, DDD, DJD, sp BL hip and knee surgeries, mobility dysfunction    diff c  respiration today,  upgraded to CEU, placed on HFNC  pul-critical care  ID  CXR show BL opacities  iv dexamethasone, sp 800mg  Tocilizumab, Cefepime 2 gms q 12  us duplex LE's negative for DVT  elevated DDIMER, CT angio negative for PE  monittor infla parameters  check Covid Abs  monitor o2 requirements, pt refuses CPAP at HS, now on HFNC  mild transaminitis  pt remains acute, upgraded to CEU  ID consult:  check Covid Abs if negative give 1u of CP, repeat ferritin, CRP if CRP > 75 give tocilizumab, for 90kg give 800mg x1  Rehab consult  OOB to chair  pt is a full code

## 2021-03-23 NOTE — CONSULT NOTE ADULT - SUBJECTIVE AND OBJECTIVE BOX
Patient is a 80y old  Male who presents with a chief complaint of acute hypoxemic respiratory failure secondary to COVID-19 pneumonia (22 Mar 2021 23:20)      HPI:  Patient is a 79yo male with PMH of hypertension, hyperlipidemia, diabetes mellitus type 2, atrial fibrillation on Eliquis, CKD stage 3A, bladder cancer s/p cystectomy with urostomy, and bilateral hip and knee arthroplasties who was brought in by EMS for hypoxia. The patient states that his daughter and her boyfriend who reside in the same home as him and his wife recently tested positive for COVID-19. He and his wife tested positive for COVID-19 on 3/4/2021, but their symptoms did not start until 3/6/2021. He started feeling "beat down" and "tired all the time" to the point where he had difficulty getting up. He also endorses cough, shortness of breath, and body aches. He denies chest pain, abdominal pain, nausea, vomiting, constipation, or diarrhea. He was told to come to the ED by his daughter after they noticed his wife was "not doing so well."    In the ED, vital signs were Tmax 100.9F, , /66, RR 20, and SpO2 95% on 4L. Labs were significant for creatinine 1.7 (baseline 1.4), troponin 0.02, lactate 2.3, elevated LFTs, and D-dimer 1721. Patient was given dexamethasone 6mg IV, 1L bolus of LR, and acetaminophen 650mg. (12 Mar 2021 15:20)      PAST MEDICAL & SURGICAL HISTORY:  Chronic kidney disease (CKD)  stage 3A, baseline creatinine 1.4    Bladder cancer  secondary to exposure at [employer] 9/11 s/p cystectomy with urostomy    History of atrial fibrillation    Diabetes mellitus, type 2    Hyperlipidemia    Hypertension    History of total cystectomy  with resultant urostomy    History of total knee replacement, bilateral    History of total hip replacement, bilateral        SOCIAL HX:   Smoking    former smoker, quit 40 yrs ago, 1ppd for 4 yrs, from age [age]                     ETOH    denies                        Other denies illicit drug use, from home, ambulates independently, carries out ADL's independently, retired, used to work in [employer], denies travel outside country in past 6 months    FAMILY HISTORY:  :  No known cardiovascular family history     Review Of Systems:     All ROS are negative except per HPI       Allergies    No Known Allergies    Intolerances          PHYSICAL EXAM    ICU Vital Signs Last 24 Hrs  T(C): 35.6 (23 Mar 2021 05:18), Max: 35.7 (22 Mar 2021 14:04)  T(F): 96 (23 Mar 2021 05:18), Max: 96.2 (22 Mar 2021 14:04)  HR: 63 (23 Mar 2021 05:18) (63 - 79)  BP: 139/72 (23 Mar 2021 05:18) (133/77 - 139/72)  RR: 18 (23 Mar 2021 05:18) (18 - 18)  SpO2: 95% (23 Mar 2021 05:18) (95% - 98%)      CONSTITUTIONAL:  Obese. Well nourished.  NAD    ENT:   Airway patent,   Mouth with normal mucosa.   No thrush    EYES:   pupils equal,   round and reactive to light.    CARDIAC:   Normal rate,   Regular rhythm.    Heart sounds S1, S2.   No edema    Vascular:   normal systolic impulse  no bruits    RESPIRATORY:   STOPBANG-5  HHFNC, 50LPM-80%, satting 95%  Inspection- normal chest wall symmetry  Palpation- normal chest wall expansion  Auscultation- decreased breath sounds bilaterally   No wheezing   Normal chest expansion  No use of accessory muscles    GASTROINTESTINAL:  Abdomen soft   Non-tender,   No guarding,   + BS    MUSCULOSKELETAL:   Range of motion is not limited,  No clubbing, cyanosis    NEUROLOGICAL:   AOx4  Follows commands  Moves all extremities   Alert and oriented   No motor or sensory deficits.  Pertinent DTRs normal    SKIN:   Skin normal color for race,   Warm and dry  No evidence of rash.    PSYCHIATRIC:   Normal mood and affect.   No apparent risk to self or others.          03-22-21 @ 07:01  -  03-23-21 @ 07:00  --------------------------------------------------------  IN:    Oral Fluid: 720 mL  Total IN: 720 mL    OUT:    Urostomy (mL): 1800 mL  Total OUT: 1800 mL    Total NET: -1080 mL          LABS:                          12.9   10.05 )-----------( 312      ( 23 Mar 2021 06:47 )             40.8                                               03-23    139  |  103  |  27<H>  ----------------------------<  92  4.8   |  25  |  1.2    Ca    8.7      23 Mar 2021 06:47    TPro  6.0  /  Alb  2.8<L>  /  TBili  1.4<H>  /  DBili  x   /  AST  56<H>  /  ALT  68<H>  /  AlkPhos  78  03-23                                                                                        LIVER FUNCTIONS - ( 23 Mar 2021 06:47 )  Alb: 2.8 g/dL / Pro: 6.0 g/dL / ALK PHOS: 78 U/L / ALT: 68 U/L / AST: 56 U/L / GGT: x                                                                                                                                       X-Rays reviewed:                                                                                    ECHO not done    CXR interpreted by me: bilateral interstitial opacities    MEDICATIONS  (STANDING):  amLODIPine   Tablet 10 milliGRAM(s) Oral daily  apixaban 5 milliGRAM(s) Oral two times a day  atorvastatin 80 milliGRAM(s) Oral at bedtime  chlorhexidine 4% Liquid 1 Application(s) Topical <User Schedule>  influenza   Vaccine 0.5 milliLiter(s) IntraMuscular once  insulin glargine Injectable (LANTUS) 45 Unit(s) SubCutaneous at bedtime  insulin lispro (ADMELOG) corrective regimen sliding scale   SubCutaneous three times a day before meals  insulin lispro Injectable (ADMELOG) 8 Unit(s) SubCutaneous three times a day before meals  melatonin 5 milliGRAM(s) Oral at bedtime  metoprolol succinate  milliGRAM(s) Oral daily  pantoprazole    Tablet 40 milliGRAM(s) Oral before breakfast  senna 2 Tablet(s) Oral at bedtime    MEDICATIONS  (PRN):  acetaminophen   Tablet .. 650 milliGRAM(s) Oral every 6 hours PRN Temp greater or equal to 38C (100.4F)  acetaminophen   Tablet .. 650 milliGRAM(s) Oral every 6 hours PRN Mild Pain (1 - 3)  guaifenesin/dextromethorphan  Syrup 10 milliLiter(s) Oral every 6 hours PRN Cough

## 2021-03-23 NOTE — PROGRESS NOTE ADULT - SUBJECTIVE AND OBJECTIVE BOX
SUBJECTIVE:    Patient is a 80y old Male who presents with a chief complaint of acute hypoxemic respiratory failure secondary to COVID-19 pneumonia (22 Mar 2021 23:20)    Currently admitted to medicine with the primary diagnosis of:    Today is hospital day 11d.     Overnight Events:     Patient is tolerating HFNC well. CTA was negative for PE.     PAST MEDICAL & SURGICAL HISTORY  Chronic kidney disease (CKD)  stage 3A, baseline creatinine 1.4    Bladder cancer  secondary to exposure at [email or web address] 9/11 s/p cystectomy with urostomy    History of atrial fibrillation    Diabetes mellitus, type 2    Hyperlipidemia    Hypertension    History of total cystectomy  with resultant urostomy    History of total knee replacement, bilateral    History of total hip replacement, bilateral    ALLERGIES:  No Known Allergies    MEDICATIONS:  STANDING MEDICATIONS  amLODIPine   Tablet 10 milliGRAM(s) Oral daily  apixaban 5 milliGRAM(s) Oral two times a day  atorvastatin 80 milliGRAM(s) Oral at bedtime  chlorhexidine 4% Liquid 1 Application(s) Topical <User Schedule>  influenza   Vaccine 0.5 milliLiter(s) IntraMuscular once  insulin glargine Injectable (LANTUS) 45 Unit(s) SubCutaneous at bedtime  insulin lispro (ADMELOG) corrective regimen sliding scale   SubCutaneous three times a day before meals  insulin lispro Injectable (ADMELOG) 8 Unit(s) SubCutaneous three times a day before meals  melatonin 5 milliGRAM(s) Oral at bedtime  metoprolol succinate  milliGRAM(s) Oral daily  pantoprazole    Tablet 40 milliGRAM(s) Oral before breakfast  senna 2 Tablet(s) Oral at bedtime    PRN MEDICATIONS  acetaminophen   Tablet .. 650 milliGRAM(s) Oral every 6 hours PRN  acetaminophen   Tablet .. 650 milliGRAM(s) Oral every 6 hours PRN  guaifenesin/dextromethorphan  Syrup 10 milliLiter(s) Oral every 6 hours PRN    VITALS:   ICU Vital Signs Last 24 Hrs  T(C): 35.6 (23 Mar 2021 05:18), Max: 35.7 (22 Mar 2021 14:04)  T(F): 96 (23 Mar 2021 05:18), Max: 96.2 (22 Mar 2021 14:04)  HR: 63 (23 Mar 2021 05:18) (63 - 79)  BP: 139/72 (23 Mar 2021 05:18) (133/77 - 139/72)  RR: 18 (23 Mar 2021 05:18) (18 - 18)  SpO2: 95% (23 Mar 2021 05:18) (95% - 98%)      LABS:                        12.9   10.05 )-----------( 312      ( 23 Mar 2021 06:47 )             40.8     03-23    139  |  103  |  27<H>  ----------------------------<  92  4.8   |  25  |  1.2    Ca    8.7      23 Mar 2021 06:47    TPro  6.0  /  Alb  2.8<L>  /  TBili  1.4<H>  /  DBili  x   /  AST  56<H>  /  ALT  68<H>  /  AlkPhos  78  03-23    Procalcitonin, Serum: 0.11 (03-22)  Procalcitonin, Serum: 0.07 (03-15)  Procalcitonin, Serum: 0.26 (03-12)    C-Reactive Protein, Serum: 25 (03-15)  C-Reactive Protein, Serum: 80 (03-12)    Ferritin, Serum: 737 (03-22)  Ferritin, Serum: 462 (03-16)  Ferritin, Serum: 527 (03-15)  Ferritin, Serum: 535 (03-12)      D-Dimer Assay, Quantitative: 5311 (03-22)  D-Dimer Assay, Quantitative: 7756 (03-20)  D-Dimer Assay, Quantitative: 3761 (03-15)  D-Dimer Assay, Quantitative: 1721 (03-12)      RADIOLOGY:    < from: CT Angio Chest PE Protocol w/ IV Cont (03.22.21 @ 22:32) >  IMPRESSION:    No evidence of central pulmonary embolus. Evaluation of the segmental and subsegmental branches is limited by the motion artifact, therefore a small peripheral pulmonary embolism cannot be excluded.    There are scattered peripheral ground glass opacities and focal consolidations throughout both lung fields consistent with COVID-19 pneumonia.    < end of copied text >      < from: Xray Chest 1 View- PORTABLE-Urgent (Xray Chest 1 View- PORTABLE-Urgent .) (03.22.21 @ 13:53) >  Impression:    Slightly increased patchy left greater than right bilateral opacities.    < end of copied text >    PHYSICAL EXAM:    GENERAL: NAD, lying in bed comfortably  CHEST/LUNG: decreased bs b/l  ABDOMEN: Bowel sounds present; Soft, Nontender, Nondistended.   EXTREMITIES: dry. no edema  NERVOUS SYSTEM:  Alert & Oriented X3, speech clear. No deficits

## 2021-03-24 LAB
ALBUMIN SERPL ELPH-MCNC: 2.9 G/DL — LOW (ref 3.5–5.2)
ALP SERPL-CCNC: 87 U/L — SIGNIFICANT CHANGE UP (ref 30–115)
ALT FLD-CCNC: 72 U/L — HIGH (ref 0–41)
ANION GAP SERPL CALC-SCNC: 10 MMOL/L — SIGNIFICANT CHANGE UP (ref 7–14)
AST SERPL-CCNC: 58 U/L — HIGH (ref 0–41)
BASOPHILS # BLD AUTO: 0.01 K/UL — SIGNIFICANT CHANGE UP (ref 0–0.2)
BASOPHILS NFR BLD AUTO: 0.1 % — SIGNIFICANT CHANGE UP (ref 0–1)
BILIRUB SERPL-MCNC: 0.9 MG/DL — SIGNIFICANT CHANGE UP (ref 0.2–1.2)
BUN SERPL-MCNC: 36 MG/DL — HIGH (ref 10–20)
CALCIUM SERPL-MCNC: 9.2 MG/DL — SIGNIFICANT CHANGE UP (ref 8.5–10.1)
CHLORIDE SERPL-SCNC: 101 MMOL/L — SIGNIFICANT CHANGE UP (ref 98–110)
CO2 SERPL-SCNC: 23 MMOL/L — SIGNIFICANT CHANGE UP (ref 17–32)
CREAT SERPL-MCNC: 1.2 MG/DL — SIGNIFICANT CHANGE UP (ref 0.7–1.5)
D DIMER BLD IA.RAPID-MCNC: 3121 NG/ML DDU — HIGH (ref 0–230)
EOSINOPHIL # BLD AUTO: 0.01 K/UL — SIGNIFICANT CHANGE UP (ref 0–0.7)
EOSINOPHIL NFR BLD AUTO: 0.1 % — SIGNIFICANT CHANGE UP (ref 0–8)
GLUCOSE BLDC GLUCOMTR-MCNC: 268 MG/DL — HIGH (ref 70–99)
GLUCOSE BLDC GLUCOMTR-MCNC: 269 MG/DL — HIGH (ref 70–99)
GLUCOSE BLDC GLUCOMTR-MCNC: 272 MG/DL — HIGH (ref 70–99)
GLUCOSE SERPL-MCNC: 145 MG/DL — HIGH (ref 70–99)
HCT VFR BLD CALC: 39.7 % — LOW (ref 42–52)
HGB BLD-MCNC: 13.2 G/DL — LOW (ref 14–18)
IMM GRANULOCYTES NFR BLD AUTO: 0.4 % — HIGH (ref 0.1–0.3)
LYMPHOCYTES # BLD AUTO: 0.32 K/UL — LOW (ref 1.2–3.4)
LYMPHOCYTES # BLD AUTO: 3.2 % — LOW (ref 20.5–51.1)
MCHC RBC-ENTMCNC: 28.1 PG — SIGNIFICANT CHANGE UP (ref 27–31)
MCHC RBC-ENTMCNC: 33.2 G/DL — SIGNIFICANT CHANGE UP (ref 32–37)
MCV RBC AUTO: 84.6 FL — SIGNIFICANT CHANGE UP (ref 80–94)
MONOCYTES # BLD AUTO: 0.25 K/UL — SIGNIFICANT CHANGE UP (ref 0.1–0.6)
MONOCYTES NFR BLD AUTO: 2.5 % — SIGNIFICANT CHANGE UP (ref 1.7–9.3)
NEUTROPHILS # BLD AUTO: 9.23 K/UL — HIGH (ref 1.4–6.5)
NEUTROPHILS NFR BLD AUTO: 93.7 % — HIGH (ref 42.2–75.2)
NRBC # BLD: 0 /100 WBCS — SIGNIFICANT CHANGE UP (ref 0–0)
PLATELET # BLD AUTO: 310 K/UL — SIGNIFICANT CHANGE UP (ref 130–400)
POTASSIUM SERPL-MCNC: 4.8 MMOL/L — SIGNIFICANT CHANGE UP (ref 3.5–5)
POTASSIUM SERPL-SCNC: 4.8 MMOL/L — SIGNIFICANT CHANGE UP (ref 3.5–5)
PROT SERPL-MCNC: 6.3 G/DL — SIGNIFICANT CHANGE UP (ref 6–8)
RBC # BLD: 4.69 M/UL — LOW (ref 4.7–6.1)
RBC # FLD: 14.6 % — HIGH (ref 11.5–14.5)
SODIUM SERPL-SCNC: 134 MMOL/L — LOW (ref 135–146)
WBC # BLD: 9.86 K/UL — SIGNIFICANT CHANGE UP (ref 4.8–10.8)
WBC # FLD AUTO: 9.86 K/UL — SIGNIFICANT CHANGE UP (ref 4.8–10.8)

## 2021-03-24 PROCEDURE — 71045 X-RAY EXAM CHEST 1 VIEW: CPT | Mod: 26

## 2021-03-24 RX ORDER — DEXAMETHASONE 0.5 MG/5ML
8 ELIXIR ORAL EVERY 8 HOURS
Refills: 0 | Status: DISCONTINUED | OUTPATIENT
Start: 2021-03-24 | End: 2021-03-30

## 2021-03-24 RX ORDER — DEXAMETHASONE 0.5 MG/5ML
8 ELIXIR ORAL EVERY 8 HOURS
Refills: 0 | Status: DISCONTINUED | OUTPATIENT
Start: 2021-03-24 | End: 2021-03-24

## 2021-03-24 RX ADMIN — Medication 100 MILLIGRAM(S): at 05:37

## 2021-03-24 RX ADMIN — INSULIN GLARGINE 45 UNIT(S): 100 INJECTION, SOLUTION SUBCUTANEOUS at 21:20

## 2021-03-24 RX ADMIN — CEFEPIME 100 MILLIGRAM(S): 1 INJECTION, POWDER, FOR SOLUTION INTRAMUSCULAR; INTRAVENOUS at 05:36

## 2021-03-24 RX ADMIN — Medication 8 UNIT(S): at 16:45

## 2021-03-24 RX ADMIN — ATORVASTATIN CALCIUM 80 MILLIGRAM(S): 80 TABLET, FILM COATED ORAL at 21:19

## 2021-03-24 RX ADMIN — APIXABAN 5 MILLIGRAM(S): 2.5 TABLET, FILM COATED ORAL at 18:07

## 2021-03-24 RX ADMIN — Medication 6 MILLIGRAM(S): at 05:37

## 2021-03-24 RX ADMIN — Medication 101.6 MILLIGRAM(S): at 15:00

## 2021-03-24 RX ADMIN — Medication 5 MILLIGRAM(S): at 21:19

## 2021-03-24 RX ADMIN — PANTOPRAZOLE SODIUM 40 MILLIGRAM(S): 20 TABLET, DELAYED RELEASE ORAL at 06:10

## 2021-03-24 RX ADMIN — AMLODIPINE BESYLATE 10 MILLIGRAM(S): 2.5 TABLET ORAL at 05:36

## 2021-03-24 RX ADMIN — APIXABAN 5 MILLIGRAM(S): 2.5 TABLET, FILM COATED ORAL at 05:36

## 2021-03-24 RX ADMIN — CEFEPIME 100 MILLIGRAM(S): 1 INJECTION, POWDER, FOR SOLUTION INTRAMUSCULAR; INTRAVENOUS at 18:07

## 2021-03-24 RX ADMIN — Medication 101.6 MILLIGRAM(S): at 21:58

## 2021-03-24 RX ADMIN — Medication 6: at 16:44

## 2021-03-24 RX ADMIN — Medication 6: at 12:34

## 2021-03-24 RX ADMIN — Medication 8 UNIT(S): at 12:33

## 2021-03-24 NOTE — PROGRESS NOTE ADULT - ASSESSMENT
81yo M c PMHx chronic afib on noac, htn, dm2, ckd3 bladder ca s/p urostomy, b/l hip/ knee replacements obesity, here with acute hypoxic respiratory failure 2/2 severe covid 19 viral pneumonia, lorraine on ckd3 now better, elevated ddimer    Covid viral PNA. acute hypoxic RF  probable underlying JARETH, [place]  LORRAINE on CKD  Transaminitis  Hx of HTN, ASHD, Afib/Eliquis  DM II, CKD  DLD  Bladder ca, sp urostomy  OA, DDD, DJD, sp BL hip and knee surgeries, mobility dysfunction    pt in CEU, on HNFLO O2, on DEXA, sp TOCI and Cefepime 2gms q 12  pt states he kevin try alex CPAP at bedtime  Covid ABs negative  pul-critical care  ID  CXR show BL opacities    us duplex LE's negative for DVT  elevated DDIMER, CT angio negative for PE  monittor infla parameters q 72 hrs    mild transaminitis    ID consult appreciated  Rehab consult  OOB to chair as tolerated  pt is a full code

## 2021-03-24 NOTE — PROGRESS NOTE ADULT - ASSESSMENT
81 yo M with PMHx of HTN, HLD, DMII, AFib on Eliquis, CKD IIIA, Bladder Ca s/p cystectomy with urostomy, bilateral hip and knee arthroplasties admitted for acute COVID PNA.    # AHRF 2/2 COVID-19 pneumonia complicated by JARETH  - Mild transaminitis likely due to COVID  - on HFNC 50/80, sat'ing 94-96%  - CXR 3/24: Low lung volumes. Increasing R, stable L opacities. No pneumothorax  - CTA - 3/22  no PE  - Markers stable, f/u repeat q72 while requiring HiFlo  - D-dimer 3100 (from 5k)  - LE Duplex 3/13 negative  - c/w Dexamethasone, was not candidate for RDV  - c/w Eliquis 5 mg q12  - Pt likely has underlying JARETH, CPAP was ordered and pt refused  - ID - repeat Ab neg ---> given 1u plasma  - f/u CRP --> if CRP > 75 give Toci 800 mg    #LORRAINE on CKDIIIA - resolved    #Paroxysmal AFib - rate controlled  - CHADVASC 4  - C/w Eliquis 5 mg PO BID  - C/w Metoprolol succinate 100 mg qd    #HLD  - Takes Rosuvastatin, c/w Atorvastatin while here    #HTN  - C/w Amlodipine  - Home Benazapril on hold due to LORRAINE, resume if BP significantly elevated    #Hx of bilateral TKR, THR   - Pain well controlled off medication    #Bladder Ca s/p cystectomy with urostomy  - Urostomy care PRN  - Outpt f/u with Urology    ---------------------------------------------------------------------------  DVT ppx: Eliquis  GI ppx: PPI  Diet: CC  Activity: AAT  CODE status: FULL   Dispo: acute

## 2021-03-24 NOTE — CHART NOTE - NSCHARTNOTEFT_GEN_A_CORE
Spoke with Ellis Gambino at [phone] and updated him on his father status. All questions answered

## 2021-03-24 NOTE — PROGRESS NOTE ADULT - SUBJECTIVE AND OBJECTIVE BOX
ROSA MARIA CASEY  80y  Male      Patient is a 80y old  Male who presents with a chief complaint of acute hypoxic FRF due to Covid Viral syndrome. Underlying Hx of DMII, DLD, BMO, probable Tiana, bladder ca, sp urostomy.      INTERVAL HPI/OVERNIGHT EVENTS: pt afebrile,on HFNC,  cont on DEXA,  CRP>75 given tocilizumab 800mg x 1, pt upgraded to CEU, ff by pul-creitical care and ID, D 12 of hosp    T:  96.1  HR:   69  BP:  131/67    RR: 20  Pulse ox:  HFNC 93%        PHYSICAL EXAM:  GENERAL: A&O, overweight, chronically ill looking,  HFNC  Neck:  short,  thick but supple, no JVD, no bruit, no stridor  Lungs:  shallow resp, scattered rhonchi, no wheezing  Abd:   distended soft and benign, + BS  Urinary:  RLQ urostomy  Ext:  arthritic changes, moves all limbs, nonpitting edema  Skin:  no rash  Psych stable    LABS                          13  9.8)-----------( 310             39      134 |  101  |  36  ----------------------------<145  4.8  |  23  |  1.2    GFR 64, 58  Ca    8.8       Phos  3.4      Mg     2.0        MB 63  trop <0.01 x2  ferritin 535, 462, 737  procal 0.26, 0.11  CRP 25, 180  COVID AB + 57.50    TPro  5.9<L>  /  Alb  3.2<L>  /  TBili  0.9  /  DBili  x   /  AST  45, 88, 88  /  ALT  53, 93, 99  /  AlkPhos  43  03-14      Urinalysis Basic - ( 12 Mar 2021 16:38 )    Color: Yellow / Appearance: Slightly Turbid / S.020 / pH: x  Gluc: x / Ketone: Negative  / Bili: Negative / Urobili: 3 mg/dL   Blood: x / Protein: 30 mg/dL / Nitrite: Negative   Leuk Esterase: Moderate / RBC: 5 /HPF / WBC 90 /HPF   Sq Epi: x / Non Sq Epi: 0 /HPF / Bacteria: Ma       RADIOLOGY & ADDITIONAL TESTS:  Venous duplex:  negative for DVT  CXR:  BL opacities L>R  CXR:  stable BL opacities    CT angio:  neg for central PE

## 2021-03-24 NOTE — PROGRESS NOTE ADULT - SUBJECTIVE AND OBJECTIVE BOX
HPI:  Patient is a 79yo male with PMH of hypertension, hyperlipidemia, diabetes mellitus type 2, atrial fibrillation on Eliquis, CKD stage 3A, bladder cancer s/p cystectomy with urostomy, and bilateral hip and knee arthroplasties who was brought in by EMS for hypoxia. The patient states that his daughter and her boyfriend who reside in the same home as him and his wife recently tested positive for COVID-19. He and his wife tested positive for COVID-19 on 3/4/2021, but their symptoms did not start until 3/6/2021. He started feeling "beat down" and "tired all the time" to the point where he had difficulty getting up. He also endorses cough, shortness of breath, and body aches. He denies chest pain, abdominal pain, nausea, vomiting, constipation, or diarrhea. He was told to come to the ED by his daughter after they noticed his wife was "not doing so well."    In the ED, vital signs were Tmax 100.9F, , /66, RR 20, and SpO2 95% on 4L. Labs were significant for creatinine 1.7 (baseline 1.4), troponin 0.02, lactate 2.3, elevated LFTs, and D-dimer 1721. Patient was given dexamethasone 6mg IV, 1L bolus of LR, and acetaminophen 650mg.     Currently admitted to medicine with the primary diagnosis of COVID-19.    Today is hospital day 12.     INTERVAL HPI / OVERNIGHT EVENTS:  Patient was examined and seen at bedside. This morning he is resting comfortably in bed and reports no new issues or overnight events.     Updates:  - D-dimer 4k from 3k, stable  - Pt sat'ing 94-96% on 50/80 HiFlo   - f/u repeat CRP (last was 180)   - f/u CXR     ROS: Otherwise unremarkable     PAST MEDICAL & SURGICAL HISTORY  Chronic kidney disease (CKD)  stage 3A, baseline creatinine 1.4  Bladder cancer  secondary to exposure at [employer] 9/11 s/p cystectomy with urostomy  History of atrial fibrillation  Diabetes mellitus, type 2  Hyperlipidemia  Hypertension  History of total cystectomy  with resultant urostomy  History of total knee replacement, bilateral  History of total hip replacement, bilateral      ALLERGIES  No Known Allergies    MEDICATIONS  STANDING MEDICATIONS  amLODIPine   Tablet 10 milliGRAM(s) Oral daily  apixaban 5 milliGRAM(s) Oral two times a day  atorvastatin 80 milliGRAM(s) Oral at bedtime  cefepime   IVPB      cefepime   IVPB 2000 milliGRAM(s) IV Intermittent every 12 hours  chlorhexidine 4% Liquid 1 Application(s) Topical <User Schedule>  dexAMETHasone  IVPB 8 milliGRAM(s) IV Intermittent every 8 hours  influenza   Vaccine 0.5 milliLiter(s) IntraMuscular once  insulin glargine Injectable (LANTUS) 45 Unit(s) SubCutaneous at bedtime  insulin lispro (ADMELOG) corrective regimen sliding scale   SubCutaneous three times a day before meals  insulin lispro Injectable (ADMELOG) 8 Unit(s) SubCutaneous three times a day before meals  levoFLOXacin IVPB      levoFLOXacin IVPB 750 milliGRAM(s) IV Intermittent every 24 hours  melatonin 5 milliGRAM(s) Oral at bedtime  metoprolol succinate  milliGRAM(s) Oral daily  pantoprazole    Tablet 40 milliGRAM(s) Oral before breakfast  senna 2 Tablet(s) Oral at bedtime    PRN MEDICATIONS  acetaminophen   Tablet .. 650 milliGRAM(s) Oral every 6 hours PRN  acetaminophen   Tablet .. 650 milliGRAM(s) Oral every 6 hours PRN  guaifenesin/dextromethorphan  Syrup 10 milliLiter(s) Oral every 6 hours PRN    VITALS:  T(F): 96.6  HR: 75  BP: 131/63  RR: 20  SpO2: 94%    PHYSICAL EXAM:  GENERAL: NAD, lying in bed comfortably  CHEST/LUNG: decreased bs b/l  ABDOMEN: Bowel sounds present; Soft, Nontender, Nondistended.   EXTREMITIES: dry, no edema  NERVOUS SYSTEM:  Alert & Oriented X3, speech clear. No deficits     LABS                        13.2   9.86  )-----------( 310      ( 24 Mar 2021 07:54 )             39.7     03-24    134<L>  |  101  |  36<H>  ----------------------------<  145<H>  4.8   |  23  |  1.2    Ca    9.2      24 Mar 2021 07:54    TPro  6.3  /  Alb  2.9<L>  /  TBili  0.9  /  DBili  x   /  AST  58<H>  /  ALT  72<H>  /  AlkPhos  87  03-24      Culture - Blood (collected 22 Mar 2021 18:00)  Source: .Blood Blood  Preliminary Report (24 Mar 2021 02:20):    No growth to date.      RADIOLOGY

## 2021-03-25 DIAGNOSIS — R31.0 GROSS HEMATURIA: ICD-10-CM

## 2021-03-25 DIAGNOSIS — C67.9 MALIGNANT NEOPLASM OF BLADDER, UNSPECIFIED: ICD-10-CM

## 2021-03-25 LAB
ANION GAP SERPL CALC-SCNC: 10 MMOL/L — SIGNIFICANT CHANGE UP (ref 7–14)
APPEARANCE UR: ABNORMAL
BACTERIA # UR AUTO: NEGATIVE — SIGNIFICANT CHANGE UP
BASOPHILS # BLD AUTO: 0.01 K/UL — SIGNIFICANT CHANGE UP (ref 0–0.2)
BASOPHILS NFR BLD AUTO: 0.1 % — SIGNIFICANT CHANGE UP (ref 0–1)
BILIRUB UR-MCNC: NEGATIVE — SIGNIFICANT CHANGE UP
BUN SERPL-MCNC: 42 MG/DL — HIGH (ref 10–20)
CALCIUM SERPL-MCNC: 8.4 MG/DL — LOW (ref 8.5–10.1)
CHLORIDE SERPL-SCNC: 102 MMOL/L — SIGNIFICANT CHANGE UP (ref 98–110)
CO2 SERPL-SCNC: 23 MMOL/L — SIGNIFICANT CHANGE UP (ref 17–32)
COLOR SPEC: ABNORMAL
COVID-19 NUCLEOCAPSID GAM AB INTERP: POSITIVE
COVID-19 NUCLEOCAPSID TOTAL GAM ANTIBODY RESULT: 85.6 INDEX — HIGH
CREAT SERPL-MCNC: 1.2 MG/DL — SIGNIFICANT CHANGE UP (ref 0.7–1.5)
CRP SERPL-MCNC: 108 MG/L — HIGH
DIFF PNL FLD: ABNORMAL
EOSINOPHIL # BLD AUTO: 0 K/UL — SIGNIFICANT CHANGE UP (ref 0–0.7)
EOSINOPHIL NFR BLD AUTO: 0 % — SIGNIFICANT CHANGE UP (ref 0–8)
EPI CELLS # UR: 9 /HPF — HIGH (ref 0–5)
FERRITIN SERPL-MCNC: 807 NG/ML — HIGH (ref 30–400)
GLUCOSE BLDC GLUCOMTR-MCNC: 143 MG/DL — HIGH (ref 70–99)
GLUCOSE BLDC GLUCOMTR-MCNC: 207 MG/DL — HIGH (ref 70–99)
GLUCOSE BLDC GLUCOMTR-MCNC: 231 MG/DL — HIGH (ref 70–99)
GLUCOSE BLDC GLUCOMTR-MCNC: 251 MG/DL — HIGH (ref 70–99)
GLUCOSE BLDC GLUCOMTR-MCNC: 315 MG/DL — HIGH (ref 70–99)
GLUCOSE SERPL-MCNC: 255 MG/DL — HIGH (ref 70–99)
GLUCOSE UR QL: NEGATIVE — SIGNIFICANT CHANGE UP
HCT VFR BLD CALC: 37.4 % — LOW (ref 42–52)
HGB BLD-MCNC: 12.2 G/DL — LOW (ref 14–18)
HYALINE CASTS # UR AUTO: 18 /LPF — HIGH (ref 0–7)
IMM GRANULOCYTES NFR BLD AUTO: 0.7 % — HIGH (ref 0.1–0.3)
KETONES UR-MCNC: NEGATIVE — SIGNIFICANT CHANGE UP
LEUKOCYTE ESTERASE UR-ACNC: ABNORMAL
LYMPHOCYTES # BLD AUTO: 0.3 K/UL — LOW (ref 1.2–3.4)
LYMPHOCYTES # BLD AUTO: 3 % — LOW (ref 20.5–51.1)
MCHC RBC-ENTMCNC: 27.7 PG — SIGNIFICANT CHANGE UP (ref 27–31)
MCHC RBC-ENTMCNC: 32.6 G/DL — SIGNIFICANT CHANGE UP (ref 32–37)
MCV RBC AUTO: 85 FL — SIGNIFICANT CHANGE UP (ref 80–94)
MONOCYTES # BLD AUTO: 0.34 K/UL — SIGNIFICANT CHANGE UP (ref 0.1–0.6)
MONOCYTES NFR BLD AUTO: 3.5 % — SIGNIFICANT CHANGE UP (ref 1.7–9.3)
NEUTROPHILS # BLD AUTO: 9.12 K/UL — HIGH (ref 1.4–6.5)
NEUTROPHILS NFR BLD AUTO: 92.7 % — HIGH (ref 42.2–75.2)
NITRITE UR-MCNC: NEGATIVE — SIGNIFICANT CHANGE UP
NRBC # BLD: 0 /100 WBCS — SIGNIFICANT CHANGE UP (ref 0–0)
NT-PROBNP SERPL-SCNC: 191 PG/ML — SIGNIFICANT CHANGE UP (ref 0–300)
PH UR: 8 — SIGNIFICANT CHANGE UP (ref 5–8)
PLATELET # BLD AUTO: 287 K/UL — SIGNIFICANT CHANGE UP (ref 130–400)
POTASSIUM SERPL-MCNC: 5 MMOL/L — SIGNIFICANT CHANGE UP (ref 3.5–5)
POTASSIUM SERPL-SCNC: 5 MMOL/L — SIGNIFICANT CHANGE UP (ref 3.5–5)
PROCALCITONIN SERPL-MCNC: 0.06 NG/ML — SIGNIFICANT CHANGE UP (ref 0.02–0.1)
PROT UR-MCNC: ABNORMAL
RBC # BLD: 4.4 M/UL — LOW (ref 4.7–6.1)
RBC # FLD: 14.5 % — SIGNIFICANT CHANGE UP (ref 11.5–14.5)
RBC CASTS # UR COMP ASSIST: 82 /HPF — HIGH (ref 0–4)
SARS-COV-2 IGG+IGM SERPL QL IA: 85.6 INDEX — HIGH
SARS-COV-2 IGG+IGM SERPL QL IA: POSITIVE
SODIUM SERPL-SCNC: 135 MMOL/L — SIGNIFICANT CHANGE UP (ref 135–146)
SP GR SPEC: 1 — LOW (ref 1.01–1.03)
UROBILINOGEN FLD QL: SIGNIFICANT CHANGE UP
WBC # BLD: 9.84 K/UL — SIGNIFICANT CHANGE UP (ref 4.8–10.8)
WBC # FLD AUTO: 9.84 K/UL — SIGNIFICANT CHANGE UP (ref 4.8–10.8)
WBC UR QL: 15 /HPF — HIGH (ref 0–5)

## 2021-03-25 PROCEDURE — 99232 SBSQ HOSP IP/OBS MODERATE 35: CPT

## 2021-03-25 RX ORDER — ENOXAPARIN SODIUM 100 MG/ML
120 INJECTION SUBCUTANEOUS EVERY 12 HOURS
Refills: 0 | Status: DISCONTINUED | OUTPATIENT
Start: 2021-03-25 | End: 2021-03-29

## 2021-03-25 RX ADMIN — Medication 5 MILLIGRAM(S): at 21:05

## 2021-03-25 RX ADMIN — Medication 650 MILLIGRAM(S): at 00:31

## 2021-03-25 RX ADMIN — AMLODIPINE BESYLATE 10 MILLIGRAM(S): 2.5 TABLET ORAL at 05:35

## 2021-03-25 RX ADMIN — CHLORHEXIDINE GLUCONATE 1 APPLICATION(S): 213 SOLUTION TOPICAL at 05:33

## 2021-03-25 RX ADMIN — Medication 101.6 MILLIGRAM(S): at 21:05

## 2021-03-25 RX ADMIN — SENNA PLUS 2 TABLET(S): 8.6 TABLET ORAL at 21:05

## 2021-03-25 RX ADMIN — ATORVASTATIN CALCIUM 80 MILLIGRAM(S): 80 TABLET, FILM COATED ORAL at 21:05

## 2021-03-25 RX ADMIN — Medication 101.6 MILLIGRAM(S): at 14:18

## 2021-03-25 RX ADMIN — PANTOPRAZOLE SODIUM 40 MILLIGRAM(S): 20 TABLET, DELAYED RELEASE ORAL at 05:36

## 2021-03-25 RX ADMIN — Medication 4: at 07:48

## 2021-03-25 RX ADMIN — Medication 8 UNIT(S): at 17:30

## 2021-03-25 RX ADMIN — Medication 100 MILLIGRAM(S): at 05:35

## 2021-03-25 RX ADMIN — APIXABAN 5 MILLIGRAM(S): 2.5 TABLET, FILM COATED ORAL at 05:35

## 2021-03-25 RX ADMIN — Medication 8 UNIT(S): at 12:31

## 2021-03-25 RX ADMIN — Medication 650 MILLIGRAM(S): at 21:18

## 2021-03-25 RX ADMIN — ENOXAPARIN SODIUM 120 MILLIGRAM(S): 100 INJECTION SUBCUTANEOUS at 17:46

## 2021-03-25 RX ADMIN — Medication 8: at 12:31

## 2021-03-25 RX ADMIN — Medication 8 UNIT(S): at 07:48

## 2021-03-25 RX ADMIN — Medication 101.6 MILLIGRAM(S): at 05:35

## 2021-03-25 RX ADMIN — Medication 650 MILLIGRAM(S): at 21:05

## 2021-03-25 RX ADMIN — Medication 4: at 17:31

## 2021-03-25 RX ADMIN — CEFEPIME 100 MILLIGRAM(S): 1 INJECTION, POWDER, FOR SOLUTION INTRAMUSCULAR; INTRAVENOUS at 06:13

## 2021-03-25 RX ADMIN — INSULIN GLARGINE 45 UNIT(S): 100 INJECTION, SOLUTION SUBCUTANEOUS at 21:08

## 2021-03-25 NOTE — PROGRESS NOTE ADULT - SUBJECTIVE AND OBJECTIVE BOX
HPI:  Patient is a 81yo male with PMH of hypertension, hyperlipidemia, diabetes mellitus type 2, atrial fibrillation on Eliquis, CKD stage 3A, bladder cancer s/p cystectomy with urostomy, and bilateral hip and knee arthroplasties who was brought in by EMS for hypoxia. The patient states that his daughter and her boyfriend who reside in the same home as him and his wife recently tested positive for COVID-19. He and his wife tested positive for COVID-19 on 3/4/2021, but their symptoms did not start until 3/6/2021. He started feeling "beat down" and "tired all the time" to the point where he had difficulty getting up. He also endorses cough, shortness of breath, and body aches. He denies chest pain, abdominal pain, nausea, vomiting, constipation, or diarrhea. He was told to come to the ED by his daughter after they noticed his wife was "not doing so well."    In the ED, vital signs were Tmax 100.9F, , /66, RR 20, and SpO2 95% on 4L. Labs were significant for creatinine 1.7 (baseline 1.4), troponin 0.02, lactate 2.3, elevated LFTs, and D-dimer 1721. Patient was given dexamethasone 6mg IV, 1L bolus of LR, and acetaminophen 650mg.     Currently admitted to medicine with the primary diagnosis of COVID-19.    Today is hospital day 12.     INTERVAL HPI / OVERNIGHT EVENTS:  Patient was examined and seen at bedside. This morning he is resting comfortably in bed and reports no new issues or overnight events.     Updates:  - Pt sat'ing 94-96% on  HiFlo   - CXR 3/24: Low volumes.  Increasing right, stable left opacities. No pneumo  - f/u Urology reccs in re blood in Urostomy bag; Hgb stable  - d/c'd Abx d/t neg. cultures, no white count, no fevers    ROS: Otherwise unremarkable     PAST MEDICAL & SURGICAL HISTORY  Chronic kidney disease (CKD)  stage 3A, baseline creatinine 1.4  Bladder cancer  secondary to exposure at [employer]  s/p cystectomy with urostomy  History of atrial fibrillation  Diabetes mellitus, type 2  Hyperlipidemia  Hypertension  History of total cystectomy  with resultant urostomy  History of total knee replacement, bilateral  History of total hip replacement, bilateral    ALLERGIES  No Known Allergies    MEDICATIONS  STANDING MEDICATIONS  amLODIPine   Tablet 10 milliGRAM(s) Oral daily  apixaban 5 milliGRAM(s) Oral two times a day  atorvastatin 80 milliGRAM(s) Oral at bedtime  chlorhexidine 4% Liquid 1 Application(s) Topical <User Schedule>  dexAMETHasone  IVPB 8 milliGRAM(s) IV Intermittent every 8 hours  influenza   Vaccine 0.5 milliLiter(s) IntraMuscular once  insulin glargine Injectable (LANTUS) 45 Unit(s) SubCutaneous at bedtime  insulin lispro (ADMELOG) corrective regimen sliding scale   SubCutaneous three times a day before meals  insulin lispro Injectable (ADMELOG) 8 Unit(s) SubCutaneous three times a day before meals  melatonin 5 milliGRAM(s) Oral at bedtime  metoprolol succinate  milliGRAM(s) Oral daily  pantoprazole    Tablet 40 milliGRAM(s) Oral before breakfast  senna 2 Tablet(s) Oral at bedtime    PRN MEDICATIONS  acetaminophen   Tablet .. 650 milliGRAM(s) Oral every 6 hours PRN  acetaminophen   Tablet .. 650 milliGRAM(s) Oral every 6 hours PRN  guaifenesin/dextromethorphan  Syrup 10 milliLiter(s) Oral every 6 hours PRN    VITALS:  T(F): 96.7  HR: 88  BP: 132/62  RR: 20  SpO2: 95%    PHYSICAL EXAM:  GENERAL: NAD, lying in bed comfortably  CHEST/LUNG: decreased bs bilateral, no wheezing, no access. muscle use  ABDOMEN: Bowel sounds present; Soft, Nontender, Nondistended.   EXTREMITIES: dry, no edema  NERVOUS SYSTEM:  Alert & Oriented X3, speech clear. No deficits     LABS                        12.2   9.84  )-----------( 287      ( 25 Mar 2021 07:28 )             37.4     03-25    135  |  102  |  42<H>  ----------------------------<  255<H>  5.0   |  23  |  1.2    Ca    8.4<L>      25 Mar 2021 07:28    TPro  6.3  /  Alb  2.9<L>  /  TBili  0.9  /  DBili  x   /  AST  58<H>  /  ALT  72<H>  /  AlkPhos  87  03-24      Urinalysis Basic - ( 25 Mar 2021 00:30 )    Color: Light Red / Appearance: Slightly Turbid / S.005 / pH: x  Gluc: x / Ketone: Negative  / Bili: Negative / Urobili: <2 mg/dL   Blood: x / Protein: 100 mg/dL / Nitrite: Negative   Leuk Esterase: Moderate / RBC: 82 /HPF / WBC 15 /HPF   Sq Epi: x / Non Sq Epi: 9 /HPF / Bacteria: Negative    Culture - Blood (collected 22 Mar 2021 18:00)  Source: .Blood Blood  Preliminary Report (24 Mar 2021 02:20):    No growth to date.    RADIOLOGY    CXR 3/24  Low lung volumes.. Increasing right, stable left lung opacities. No pneumothorax

## 2021-03-25 NOTE — CONSULT NOTE ADULT - SUBJECTIVE AND OBJECTIVE BOX
Urology Consult:      Pt is a 81 yo M with a history of bladder ca s/p cystectomy, ileoureterostomy in  with Dr. Benton currently a/w COVID+ PNA; urology now consulted for hematuria. Pt states he first noted his urine was blood tinged about five days ago; states the urine looked darker however today it looks lighter tinged to him. Pt states he follows with Dr. Banks outpatient q3 months, however missed his last follow up appointment. Pt denies abdominal pain, flank pain.     PAST MEDICAL & SURGICAL HISTORY:  Chronic kidney disease (CKD)  Bladder cancer  secondary to exposure at [employer]  s/p cystectomy with urostomy  History of atrial fibrillation  Diabetes mellitus, type 2  Hyperlipidemia  Hypertension  History of total cystectomy  History of total knee replacement, bilateral  History of total hip replacement, bilateral    MEDICATIONS  (STANDING):  amLODIPine   Tablet 10 milliGRAM(s) Oral daily  atorvastatin 80 milliGRAM(s) Oral at bedtime  chlorhexidine 4% Liquid 1 Application(s) Topical <User Schedule>  dexAMETHasone  IVPB 8 milliGRAM(s) IV Intermittent every 8 hours  enoxaparin Injectable 120 milliGRAM(s) SubCutaneous every 12 hours  influenza   Vaccine 0.5 milliLiter(s) IntraMuscular once  insulin glargine Injectable (LANTUS) 45 Unit(s) SubCutaneous at bedtime  insulin lispro (ADMELOG) corrective regimen sliding scale   SubCutaneous three times a day before meals  insulin lispro Injectable (ADMELOG) 8 Unit(s) SubCutaneous three times a day before meals  melatonin 5 milliGRAM(s) Oral at bedtime  metoprolol succinate  milliGRAM(s) Oral daily  pantoprazole    Tablet 40 milliGRAM(s) Oral before breakfast  senna 2 Tablet(s) Oral at bedtime    MEDICATIONS  (PRN):  acetaminophen   Tablet .. 650 milliGRAM(s) Oral every 6 hours PRN Temp greater or equal to 38C (100.4F)  acetaminophen   Tablet .. 650 milliGRAM(s) Oral every 6 hours PRN Mild Pain (1 - 3)  guaifenesin/dextromethorphan  Syrup 10 milliLiter(s) Oral every 6 hours PRN Cough    Allergies  No Known Allergies    SOCIAL HISTORY: No illicit drug use    FAMILY HISTORY:    Review of Systems:  [X] A ten point review of systems was otherwise negative except as noted.    Vital Signs Last 24 Hrs  T(C): 36.1 (25 Mar 2021 16:15), Max: 36.1 (25 Mar 2021 16:15)  T(F): 96.9 (25 Mar 2021 16:15), Max: 96.9 (25 Mar 2021 16:15)  HR: 72 (25 Mar 2021 16:15) (63 - 88)  BP: 132/69 (25 Mar 2021 16:15) (130/59 - 151/66)  RR: 20 (25 Mar 2021 16:15) (20 - 20)  SpO2: 95% (25 Mar 2021 16:15) (89% - 98%)    PHYSICAL EXAM:  GEN: NAD  NEURO: Awake and alert  SKIN: Good color, non diaphoretic.  HEENT: NC/AT  RESP: +high flow in place   CARDIO: +S1/S2  ABDO: Soft, obese abdomen; no ttp; +urostomy bag to R abdomen in place with slightly blood tinged urine, area around urostomy non ttp, no erythema   BACK: No CVAT B/L    I&O's Summary    24 Mar 2021 07:01  -  25 Mar 2021 07:00  --------------------------------------------------------  IN: 320 mL / OUT: 2000 mL / NET: -1680 mL        LABS:                        12.2   9.84  )-----------( 287      ( 25 Mar 2021 07:28 )             37.4         135  |  102  |  42<H>  ----------------------------<  255<H>  5.0   |  23  |  1.2    Ca    8.4<L>      25 Mar 2021 07:28    TPro  6.3  /  Alb  2.9<L>  /  TBili  0.9  /  DBili  x   /  AST  58<H>  /  ALT  72<H>  /  AlkPhos  87  03-24      Urinalysis Basic - ( 25 Mar 2021 00:30 )  Color: Light Red / Appearance: Slightly Turbid / S.005 / pH: x  Gluc: x / Ketone: Negative  / Bili: Negative / Urobili: <2 mg/dL   Blood: x / Protein: 100 mg/dL / Nitrite: Negative   Leuk Esterase: Moderate / RBC: 82 /HPF / WBC 15 /HPF   Sq Epi: x / Non Sq Epi: 9 /HPF / Bacteria: Negative   Urology Consult:      Pt is a 81 yo M with a history of bladder ca s/p cystectomy, ileoureterostomy in  with Dr. Benton currently a/w COVID+ PNA; urology now consulted for hematuria. Pt states he first noted his urine was blood tinged about five days ago; states the urine looked darker however today it looks lighter tinged to him. Pt states he follows with Dr. Banks outpatient q3 months, however missed his last follow up appointment. Pt denies abdominal pain, flank pain.     PAST MEDICAL & SURGICAL HISTORY:  Chronic kidney disease (CKD)  Bladder cancer  secondary to exposure at [employer]  s/p cystectomy with urostomy  History of atrial fibrillation  Diabetes mellitus, type 2  Hyperlipidemia  Hypertension  History of total cystectomy  History of total knee replacement, bilateral  History of total hip replacement, bilateral    MEDICATIONS  (STANDING):  amLODIPine   Tablet 10 milliGRAM(s) Oral daily  atorvastatin 80 milliGRAM(s) Oral at bedtime  chlorhexidine 4% Liquid 1 Application(s) Topical <User Schedule>  dexAMETHasone  IVPB 8 milliGRAM(s) IV Intermittent every 8 hours  enoxaparin Injectable 120 milliGRAM(s) SubCutaneous every 12 hours  influenza   Vaccine 0.5 milliLiter(s) IntraMuscular once  insulin glargine Injectable (LANTUS) 45 Unit(s) SubCutaneous at bedtime  insulin lispro (ADMELOG) corrective regimen sliding scale   SubCutaneous three times a day before meals  insulin lispro Injectable (ADMELOG) 8 Unit(s) SubCutaneous three times a day before meals  melatonin 5 milliGRAM(s) Oral at bedtime  metoprolol succinate  milliGRAM(s) Oral daily  pantoprazole    Tablet 40 milliGRAM(s) Oral before breakfast  senna 2 Tablet(s) Oral at bedtime    MEDICATIONS  (PRN):  acetaminophen   Tablet .. 650 milliGRAM(s) Oral every 6 hours PRN Temp greater or equal to 38C (100.4F)  acetaminophen   Tablet .. 650 milliGRAM(s) Oral every 6 hours PRN Mild Pain (1 - 3)  guaifenesin/dextromethorphan  Syrup 10 milliLiter(s) Oral every 6 hours PRN Cough    Allergies  No Known Allergies    SOCIAL HISTORY: No illicit drug use    FAMILY HISTORY:    Review of Systems:  [X] A ten point review of systems was otherwise negative except as noted.    Vital Signs Last 24 Hrs  T(C): 36.1 (25 Mar 2021 16:15), Max: 36.1 (25 Mar 2021 16:15)  T(F): 96.9 (25 Mar 2021 16:15), Max: 96.9 (25 Mar 2021 16:15)  HR: 72 (25 Mar 2021 16:15) (63 - 88)  BP: 132/69 (25 Mar 2021 16:15) (130/59 - 151/66)  RR: 20 (25 Mar 2021 16:15) (20 - 20)  SpO2: 95% (25 Mar 2021 16:15) (89% - 98%)    PHYSICAL EXAM:  GEN: NAD  NEURO: Awake and alert  SKIN: Good color, non diaphoretic.  HEENT: NC/AT  RESP: +high flow in place   CARDIO: +S1/S2  ABDO: Soft, obese abdomen; no ttp; +urostomy bag to R abdomen in place with slightly blood tinged urine, area around urostomy non ttp, no erythema   BACK: No CVAT B/L    I&O's Summary    24 Mar 2021 07:01  -  25 Mar 2021 07:00  --------------------------------------------------------  IN: 320 mL / OUT: 2000 mL / NET: -1680 mL        LABS:                        12.2   9.84  )-----------( 287      ( 25 Mar 2021 07:28 )             37.4         135  |  102  |  42<H>  ----------------------------<  255<H>  5.0   |  23  |  1.2    Ca    8.4<L>      25 Mar 2021 07:28    TPro  6.3  /  Alb  2.9<L>  /  TBili  0.9  /  DBili  x   /  AST  58<H>  /  ALT  72<H>  /  AlkPhos  87  03-24    Urinalysis Basic - ( 25 Mar 2021 00:30 )  Color: Light Red / Appearance: Slightly Turbid / S.005 / pH: x  Gluc: x / Ketone: Negative  / Bili: Negative / Urobili: <2 mg/dL   Blood: x / Protein: 100 mg/dL / Nitrite: Negative   Leuk Esterase: Moderate / RBC: 82 /HPF / WBC 15 /HPF   Sq Epi: x / Non Sq Epi: 9 /HPF / Bacteria: Negative   Urology Consult:      Pt is a 81 yo M with a history of bladder ca s/p cystectomy, ileoureterostomy in  with Dr. Benton currently a/w COVID+ PNA; urology now consulted for hematuria. Pt states he first noted his urine was blood tinged about five days ago; states the urine looked darker however today it looks lighter tinged to him. Pt states he follows with Dr. Banks outpatient q3 months, however missed his last follow up appointment. Pt denies abdominal pain, flank pain.     PAST MEDICAL & SURGICAL HISTORY:  Chronic kidney disease (CKD)  Bladder cancer  secondary to exposure at [employer]  s/p cystectomy with urostomy  History of atrial fibrillation  Diabetes mellitus, type 2  Hyperlipidemia  Hypertension  History of total cystectomy  History of total knee replacement, bilateral  History of total hip replacement, bilateral    MEDICATIONS  (STANDING):  amLODIPine   Tablet 10 milliGRAM(s) Oral daily  atorvastatin 80 milliGRAM(s) Oral at bedtime  chlorhexidine 4% Liquid 1 Application(s) Topical <User Schedule>  dexAMETHasone  IVPB 8 milliGRAM(s) IV Intermittent every 8 hours  enoxaparin Injectable 120 milliGRAM(s) SubCutaneous every 12 hours  influenza   Vaccine 0.5 milliLiter(s) IntraMuscular once  insulin glargine Injectable (LANTUS) 45 Unit(s) SubCutaneous at bedtime  insulin lispro (ADMELOG) corrective regimen sliding scale   SubCutaneous three times a day before meals  insulin lispro Injectable (ADMELOG) 8 Unit(s) SubCutaneous three times a day before meals  melatonin 5 milliGRAM(s) Oral at bedtime  metoprolol succinate  milliGRAM(s) Oral daily  pantoprazole    Tablet 40 milliGRAM(s) Oral before breakfast  senna 2 Tablet(s) Oral at bedtime    MEDICATIONS  (PRN):  acetaminophen   Tablet .. 650 milliGRAM(s) Oral every 6 hours PRN Temp greater or equal to 38C (100.4F)  acetaminophen   Tablet .. 650 milliGRAM(s) Oral every 6 hours PRN Mild Pain (1 - 3)  guaifenesin/dextromethorphan  Syrup 10 milliLiter(s) Oral every 6 hours PRN Cough    Allergies  No Known Allergies    SOCIAL HISTORY: No illicit drug use    FAMILY HISTORY:    Review of Systems:  [X] A ten point review of systems was otherwise negative except as noted.    Vital Signs Last 24 Hrs  T(C): 36.1 (25 Mar 2021 16:15), Max: 36.1 (25 Mar 2021 16:15)  T(F): 96.9 (25 Mar 2021 16:15), Max: 96.9 (25 Mar 2021 16:15)  HR: 72 (25 Mar 2021 16:15) (63 - 88)  BP: 132/69 (25 Mar 2021 16:15) (130/59 - 151/66)  RR: 20 (25 Mar 2021 16:15) (20 - 20)  SpO2: 95% (25 Mar 2021 16:15) (89% - 98%)    PHYSICAL EXAM:  GEN: NAD  NEURO: Awake and alert  SKIN: Good color, non diaphoretic.  HEENT: NC/AT  RESP: +high flow in place   CARDIO: +S1/S2  ABDO: Soft, obese abdomen; no ttp; +urostomy bag to R abdomen in place with slightly blood tinged urine, area around urostomy non ttp, no erythema , no scrotal tenderness  BACK: No CVAT B/L    I&O's Summary    24 Mar 2021 07:01  -  25 Mar 2021 07:00  --------------------------------------------------------  IN: 320 mL / OUT: 2000 mL / NET: -1680 mL        LABS:                        12.2   9.84  )-----------( 287      ( 25 Mar 2021 07:28 )             37.4     03    135  |  102  |  42<H>  ----------------------------<  255<H>  5.0   |  23  |  1.2    Ca    8.4<L>      25 Mar 2021 07:28    TPro  6.3  /  Alb  2.9<L>  /  TBili  0.9  /  DBili  x   /  AST  58<H>  /  ALT  72<H>  /  AlkPhos  87  03-24    Urinalysis Basic - ( 25 Mar 2021 00:30 )  Color: Light Red / Appearance: Slightly Turbid / S.005 / pH: x  Gluc: x / Ketone: Negative  / Bili: Negative / Urobili: <2 mg/dL   Blood: x / Protein: 100 mg/dL / Nitrite: Negative   Leuk Esterase: Moderate / RBC: 82 /HPF / WBC 15 /HPF   Sq Epi: x / Non Sq Epi: 9 /HPF / Bacteria: Negative

## 2021-03-25 NOTE — PROGRESS NOTE ADULT - SUBJECTIVE AND OBJECTIVE BOX
Patient is a 80y old  Male who presents with a chief complaint of acute hypoxemic respiratory failure secondary to COVID-19 pneumonia (24 Mar 2021 22:42)        Over Night Events:  on HFNCO2.  Off pressors         ROS:     All ROS are negative except HPI         PHYSICAL EXAM    ICU Vital Signs Last 24 Hrs  T(C): 35.9 (25 Mar 2021 05:21), Max: 36.3 (24 Mar 2021 16:04)  T(F): 96.7 (25 Mar 2021 05:21), Max: 97.3 (24 Mar 2021 16:04)  HR: 63 (25 Mar 2021 05:21) (63 - 76)  BP: 142/63 (25 Mar 2021 05:21) (125/63 - 142/63)  BP(mean): --  ABP: --  ABP(mean): --  RR: 20 (25 Mar 2021 05:21) (20 - 20)  SpO2: 98% (25 Mar 2021 05:21) (93% - 98%)      CONSTITUTIONAL:  Well nourished.  NAD    ENT:   Airway patent,   Mouth with normal mucosa.   No thrush    EYES:   Pupils equal,   Round and reactive to light.    CARDIAC:   Normal rate,   Regular rhythm.    No edema      Vascular:  Normal systolic impulse  No Carotid bruits    RESPIRATORY:   No wheezing  Bilateral crackles   Normal chest expansion  Not tachypneic,  No use of accessory muscles    GASTROINTESTINAL:  Abdomen soft,   Non-tender,   No guarding,   + BS    MUSCULOSKELETAL:   Range of motion is not limited,  No clubbing, cyanosis    NEUROLOGICAL:   Alert and oriented   No motor  deficits.    SKIN:   Skin normal color for race,   Warm and dry and intact.   No evidence of rash.    PSYCHIATRIC:   Normal mood and affect.   No apparent risk to self or others.    HEMATOLOGICAL:  No cervical  lymphadenopathy.  no inguinal lymphadenopathy      21 @ 07:01  -  21 @ 07:00  --------------------------------------------------------  IN:    Oral Fluid: 350 mL  Total IN: 350 mL    OUT:    Urostomy (mL): 900 mL  Total OUT: 900 mL    Total NET: -550 mL      21 @ 07:01  -  03-25-21 @ 06:39  --------------------------------------------------------  IN:    IV PiggyBack: 200 mL    Oral Fluid: 120 mL  Total IN: 320 mL    OUT:    Urostomy (mL): 2000 mL  Total OUT: 2000 mL    Total NET: -1680 mL          LABS:                            13.2   9.86  )-----------( 310      ( 24 Mar 2021 07:54 )             39.7                                               03-24    134<L>  |  101  |  36<H>  ----------------------------<  145<H>  4.8   |  23  |  1.2    Ca    9.2      24 Mar 2021 07:54    TPro  6.3  /  Alb  2.9<L>  /  TBili  0.9  /  DBili  x   /  AST  58<H>  /  ALT  72<H>  /  AlkPhos  87  03-24                                             Urinalysis Basic - ( 25 Mar 2021 00:30 )    Color: Light Red / Appearance: Slightly Turbid / S.005 / pH: x  Gluc: x / Ketone: Negative  / Bili: Negative / Urobili: <2 mg/dL   Blood: x / Protein: 100 mg/dL / Nitrite: Negative   Leuk Esterase: Moderate / RBC: 82 /HPF / WBC 15 /HPF   Sq Epi: x / Non Sq Epi: 9 /HPF / Bacteria: Negative                                                  LIVER FUNCTIONS - ( 24 Mar 2021 07:54 )  Alb: 2.9 g/dL / Pro: 6.3 g/dL / ALK PHOS: 87 U/L / ALT: 72 U/L / AST: 58 U/L / GGT: x                                                  Culture - Blood (collected 22 Mar 2021 18:00)  Source: .Blood Blood  Preliminary Report (24 Mar 2021 02:20):    No growth to date.                                                                                           MEDICATIONS  (STANDING):  amLODIPine   Tablet 10 milliGRAM(s) Oral daily  apixaban 5 milliGRAM(s) Oral two times a day  atorvastatin 80 milliGRAM(s) Oral at bedtime  cefepime   IVPB      cefepime   IVPB 2000 milliGRAM(s) IV Intermittent every 12 hours  chlorhexidine 4% Liquid 1 Application(s) Topical <User Schedule>  dexAMETHasone  IVPB 8 milliGRAM(s) IV Intermittent every 8 hours  influenza   Vaccine 0.5 milliLiter(s) IntraMuscular once  insulin glargine Injectable (LANTUS) 45 Unit(s) SubCutaneous at bedtime  insulin lispro (ADMELOG) corrective regimen sliding scale   SubCutaneous three times a day before meals  insulin lispro Injectable (ADMELOG) 8 Unit(s) SubCutaneous three times a day before meals  levoFLOXacin IVPB 750 milliGRAM(s) IV Intermittent every 24 hours  levoFLOXacin IVPB      melatonin 5 milliGRAM(s) Oral at bedtime  metoprolol succinate  milliGRAM(s) Oral daily  pantoprazole    Tablet 40 milliGRAM(s) Oral before breakfast  senna 2 Tablet(s) Oral at bedtime    MEDICATIONS  (PRN):  acetaminophen   Tablet .. 650 milliGRAM(s) Oral every 6 hours PRN Temp greater or equal to 38C (100.4F)  acetaminophen   Tablet .. 650 milliGRAM(s) Oral every 6 hours PRN Mild Pain (1 - 3)  guaifenesin/dextromethorphan  Syrup 10 milliLiter(s) Oral every 6 hours PRN Cough      New X-rays reviewed:                                                                                  ECHO    CXR interpreted by me:  Bilateral infiltrates

## 2021-03-25 NOTE — PROGRESS NOTE ADULT - ASSESSMENT
81 yo M with PMHx of HTN, HLD, DMII, AFib on Eliquis, CKD IIIA, Bladder Ca s/p cystectomy with urostomy, bilateral hip and knee arthroplasties admitted for acute COVID PNA.    # AHRF 2/2 COVID-19 pneumonia complicated by JARETH  - Mild transaminitis likely due to COVID  - on HFNC 50/80, sat'ing 94-96%  - CXR 3/24: Low lung volumes. Increasing R, stable L opacities. No pneumothorax  - CTA - 3/22  no PE  - Markers stable, f/u repeat q72 while requiring HiFlo  - D-dimer 3100 (from 5k)  - LE Duplex 3/13 negative  - c/w Dexamethasone, was not candidate for RDV  - c/w Eliquis 5 mg q12  - Pt likely has underlying JARETH, CPAP was ordered and pt refused  - ID - repeat Ab neg ---> given 1u plasma  - s/p Toci 800 mg x1  - f/u repeat BNP to assess volume status  - Abx d/c'd; cx's neg, no fevers, no leukocytosis, procalc .11    #LORRAINE on CKDIIIA - resolved    #Paroxysmal AFib - rate controlled  - CHADVASC 4  - C/w Eliquis 5 mg PO BID  - C/w Metoprolol succinate 100 mg qd    #HLD  - Takes Rosuvastatin, c/w Atorvastatin while here    #HTN  - C/w Amlodipine  - Home Benazapril on hold due to LORRAINE, resume if BP significantly elevated    # Hx of bilateral TKR, THR   - Pain well controlled off medication    # Bladder Ca s/p cystectomy with urostomy  - f/u Urology reccs in re blood in urostomy bag  - Outpt f/u with Urology    ---------------------------------------------------------------------------  DVT ppx: Eliquis  GI ppx: PPI  Diet: CC  Activity: AAT  CODE status: FULL   Dispo: acute

## 2021-03-25 NOTE — PROGRESS NOTE ADULT - ASSESSMENT
IMPRESSION:  Sepsis present on admission  Acute hypoxemic respiratory failure  COVID-19 PNA s/p 10 days of dexa  Possible superimposed bacterial infection  LORRAINE on CKD 3a, resolved  Probable JARETH    PLAN:    CNS: Avoid CNS depressants    HEENT: Oral care    PULMONARY:  HOB @ 45 degrees.  Aspiration precautions. Target SpO2>94%, wean oxygen as tolerated.  Dexamethasone Increase to 8 mg Q8 .     CARDIOVASCULAR: Avoid volume overload. Repeat BNP.     GI: GI prophylaxis. Feeding as tolerated  Bowel regimen     RENAL:  Follow up lytes.  Correct as needed.     INFECTIOUS DISEASE: FU PanCx. Nasal MRSA, FU urine strep/ legionella. DC ABX if cultures negative.     HEMATOLOGICAL:  DVT prophylaxis. LMWH     ENDOCRINE:  Follow up FS.     MUSCULOSKELETAL: OOB to chair     Step Down Unit monitoring for now    Prognosis poor  IMPRESSION:  Sepsis present on admission  Acute hypoxemic respiratory failure  Severe COVID-19 PNA SP Toci   Doubt superimposed bacterial infection  LORRAINE on CKD 3a, resolved  Probable JARETH    PLAN:    CNS: Avoid CNS depressants    HEENT: Oral care    PULMONARY:  HOB @ 45 degrees.  Aspiration precautions. Target SpO2>94%, wean oxygen as tolerated.  Dexamethasone Increase to 8 mg Q8 .     CARDIOVASCULAR: Avoid volume overload. Repeat BNP.     GI: GI prophylaxis. Feeding as tolerated  Bowel regimen     RENAL:  Follow up lytes.  Correct as needed.     INFECTIOUS DISEASE: FU PanCx. Nasal MRSA, FU urine strep/ legionella. DC ABX if cultures negative.     HEMATOLOGICAL:  DVT prophylaxis. LMWH     ENDOCRINE:  Follow up FS.     MUSCULOSKELETAL: OOB to chair     Step Down Unit monitoring for now    Prognosis poor

## 2021-03-25 NOTE — PROGRESS NOTE ADULT - ASSESSMENT
79yo male with PMH of hypertension, hyperlipidemia, diabetes mellitus type 2, atrial fibrillation on Eliquis, CKD stage 3A, bladder cancer s/p cystectomy with urostomy, and bilateral hip and knee arthroplasties who was brought in by EMS for hypoxia. Patient had tested positive for COVID-19 on 3/4/2021, but symptoms did not begin until 3/6/2021. He was found to be hypoxic to 85% on room air.    IMPRESSION;  #COVID 19 with severe illness. SpO2 < 94% on RA and need for supplemental O2.    Pt is in the late inflammatory response phase ot the illness based on the onset of symptoms.    CXR opacities b/l    COVID-19 Nucleocapsid JAMSHID AB Interp: Positive (03-24-21 @ 11:00)  < from: CT Angio Chest PE Protocol w/ IV Cont (03.22.21 @ 22:32) >  No evidence of central pulmonary embolus. Evaluation of the segmental and subsegmental branches is limited by the motion artifact, therefore a small peripheral pulmonary embolism cannot be excluded.  There are scattered peripheral ground glass opacities and focal consolidations throughout both lung fields consistent with COVID-19 pneumonia.   Ferritin, Serum: 462 ng/mL (03.16.21 @ 07:07)      RECOMMENDATIONS;  - s/p 3/23 Tocilizumab >90kg 800mg x1   - low suspicion for bacterial PNA. no fever No leukocytosis. Consider D/C ABX   - Procalcitonin, Serum: 0.11 (03-22-21 @ 18:00). pt was started on abx by primary team    Please call with any questions or send a message on [employer] Teams  Spectra [phone]

## 2021-03-25 NOTE — ADVANCED PRACTICE NURSE CONSULT - ASSESSMENT
79yo male with PMH of hypertension, hyperlipidemia, diabetes mellitus type 2, atrial fibrillation on Eliquis, CKD stage 3A, bladder cancer s/p cystectomy with urostomy, and bilateral hip and knee arthroplasties who was brought in by EMS (3/12) for hypoxia. The patient states that his daughter and her boyfriend who reside in the same home as him and his wife recently tested positive for COVID-19. He and his wife tested positive for COVID-19 on 3/4/2021, but their symptoms did not start until 3/6/2021.  In the ED, vital signs were Tmax 100.9F, , /66, RR 20, and SpO2 95% on 4L. Labs were significant for creatinine 1.7 (baseline 1.4), troponin 0.02, lactate 2.3, elevated LFTs, and D-dimer 1721.   Currently admitted to medicine with the primary diagnosis of COVID-19. Patient in CEU, being managed for  AHRF 2/2 COVID-19 pneumonia complicated by JARETH; LORRANIE on CKDIIIA - resolved; Paroxysmal AFib - rate controlled; HLD; HTN; Hx of bilateral TKR, THR;  Bladder Ca s/p cystectomy with urostomy.    Received patient in CEU, sitting up in bed, awake, A&Ox3, HFNC in place, patient made aware of purpose of WOCN visit, agreeable to consult. Patient reports ileal conduit x 5yrs; performed at [facility]. Patient reports he independent in his ostomy care at home, knowledgeable about supplies needed for pouching, report occasional leakage issues at home. Patient states ileal conduit began leaking during this admission to [facility] ? r/t improper pouching supplies & pouch overfilling.  Ileal conduit to RUQ w/ Stella 1pc urostomy pouch in place, pouch filled w/ dark malaika-maroon colored urine, leakage present underneath barrier at 6 o'clock. Pouch gently removed from pt's abdomen w/ water moistened gauze.     Type of ostomy: ileal conduit/urostomy   Location: RUQ  Judd: n/a  Size: slightly oval in shape, able to be rounded out to 1 1/2"  Mucosa: red, moist, minimally budded, slightly friable during stoma cleansing convex test positive    Peristomal skin: brown hyperpigmentation w/ small nodule like bumps & macerated skin circumferentially presenting as pseudoverrucous    Mucocutaneous junction: intact   Abdominal contours: rotund, semi-soft   Output: 400cc dark malaika-maroon colored urine empties from removed pouch, bloody fruit-punch colored urine actively draining into newly applied pouch-Dr. Aldana made aware; recommended urology consult for further evaluation   Midline/lap sites/ drains: n/a    Peristomal skin crusted w/ Stella Adapt stoma powder #[id number]) & Cavilon no-sting barrier film (5926). Patient re-pouched w/ Honobia 2 1/4" CeraPlus convex barrier #[id number] (cut to 1 1/2"), Honobia Adapt flat CeraRing #[id number], and Stella  2 1/4” urostomy pouch #[id number], pouch connected to bedside drainage w/ Stella Adaptor #[id number].     Impression:   Ileal conduit/urostomy to RUQ-given pt's stomal characteristics and abdominal topography, pt requires convex pouching system    Patient's ostomy supplies brought from home-patient utilizes Honobia 1 3/4" cut to fit 2pc urostomy pouching system including flat barrier & Honobia Adapt flat ring at home; pt also reports utilizing ostomy powder & skin prep/barrier film w/ each pouch change; discussed w/ patient rationale for utilizing convex barrier at this time as opposed to flat barrier he was previously using at home; patient verbalized understanding of info given

## 2021-03-25 NOTE — PROGRESS NOTE ADULT - SUBJECTIVE AND OBJECTIVE BOX
ROSA MARIA CASEY  80y  Male      Patient is a 80y old  Male who presents with a chief complaint of acute hypoxic RF due to Covid Viral syndrome. Underlying Hx of DMII, DLD, MO,  JARETH, bladder ca, sp urostomy, Hx of 911 exposure.      INTERVAL HPI/OVERNIGHT EVENTS: pt afebrile, remains in CEU, on HFNC,  cont on DEXA, sp  tocilizumab 800mg x 1, Covid Abs +, off Abx, pt was told the unfortunate news that his wife passed due t the Covid, this was done in the presence of the son and grand-daughter, new issue :  ostomy nurse changed a leaky urostomy set up, + gross hematuria, will consult Uro, NB pt on Eliquis, so sig drop in H/H    T:  96.7  HR:   88  BP:  132/62    RR: 20  Pulse ox:  HFNC 95%        PHYSICAL EXAM:  GENERAL: A&O, overweight, chronically ill looking,  HFNC  Neck:  short,  thick but supple, no JVD, no bruit, no stridor  Lungs:  shallow resp, scattered rhonchi, no wheezing  Abd:   distended soft and benign, + BS  Urinary:  RLQ urostomy  Ext:  arthritic changes, moves all limbs, nonpitting edema  Skin:  no rash  Psych stable    LABS                          12  9.8)-----------( 287             37.4      135 |  102  |  42  ----------------------------<255  4.8  |  23  |  1.2    GFR 64, 58  Ca    8.8       Phos  3.4      Mg     2.0        MB 63  trop <0.01 x2  ferritin 535, 462, 737  procal 0.26, 0.11  CRP 25, 180  COVID AB + 85.60    TPro  5.9<L>  /  Alb  3.2<L>  /  TBili  0.9  /  DBili  x   /  AST  45, 88, 88  /  ALT  53, 93, 99  /  AlkPhos  43  03-14      Urinalysis Basic - ( 12 Mar 2021 16:38 )    Color: Yellow / Appearance: Slightly Turbid / S.020 / pH: x  Gluc: x / Ketone: Negative  / Bili: Negative / Urobili: 3 mg/dL   Blood: x / Protein: 30 mg/dL / Nitrite: Negative   Leuk Esterase: Moderate / RBC: 5 /HPF / WBC 90 /HPF   Sq Epi: x / Non Sq Epi: 0 /HPF / Bacteria: Ma       RADIOLOGY & ADDITIONAL TESTS:  Venous duplex:  negative for DVT  CXR:  BL opacities L>R  CXR:  stable BL opacities    CT angio:  neg for central PE

## 2021-03-25 NOTE — PROGRESS NOTE ADULT - SUBJECTIVE AND OBJECTIVE BOX
CARMELAROSA MARIA  80y, Male  Allergy: No Known Allergies      LOS  13d    CHIEF COMPLAINT: acute hypoxemic respiratory failure secondary to COVID-19 pneumonia (25 Mar 2021 06:39)      INTERVAL EVENTS/HPI  - HFNC  - T(F): , Max: 97.3 (21 @ 16:04)  - WBC Count: 9.84 (21 @ 07:28)  WBC Count: 9.86 (21 @ 07:54)     - Creatinine, Serum: 1.2 (21 @ 07:28)  Creatinine, Serum: 1.2 (21 @ 07:54)       COVID-19 Nucleocapsid JAMSHID AB Interp: Positive (21 @ 11:00)  COVID-19 Nucleocapsid JAMSHID AB Interp: Positive (21 @ 06:47)  COVID-19 IgG Antibody Interpretation: Negative (21 @ 05:29)      ROS:  9-point ROS performed and negative except as per above    VITALS:  T(F): 96.7, Max: 97.3 (21 @ 16:04)  HR: 84  BP: 142/63  RR: 20Vital Signs Last 24 Hrs  T(C): 35.9 (25 Mar 2021 05:21), Max: 36.3 (24 Mar 2021 16:04)  T(F): 96.7 (25 Mar 2021 05:21), Max: 97.3 (24 Mar 2021 16:04)  HR: 84 (25 Mar 2021 08:40) (63 - 84)  BP: 142/63 (25 Mar 2021 05:21) (125/63 - 142/63)  BP(mean): --  RR: 20 (25 Mar 2021 08:40) (20 - 20)  SpO2: 89% (25 Mar 2021 08:40) (89% - 98%)    FH: Non-contributory  Social Hx: Non-contributory    TESTS & MEASUREMENTS:                        12.2   9.84  )-----------( 287      ( 25 Mar 2021 07:28 )             37.4         135  |  102  |  42<H>  ----------------------------<  255<H>  5.0   |  23  |  1.2    Ca    8.4<L>      25 Mar 2021 07:28    TPro  6.3  /  Alb  2.9<L>  /  TBili  0.9  /  DBili  x   /  AST  58<H>  /  ALT  72<H>  /  AlkPhos  87      eGFR if Non African American: 58 mL/min/1.73M2 (21 @ 07:28)  eGFR if : 67 mL/min/1.73M2 (21 @ 07:28)    LIVER FUNCTIONS - ( 24 Mar 2021 07:54 )  Alb: 2.9 g/dL / Pro: 6.3 g/dL / ALK PHOS: 87 U/L / ALT: 72 U/L / AST: 58 U/L / GGT: x           Urinalysis Basic - ( 25 Mar 2021 00:30 )    Color: Light Red / Appearance: Slightly Turbid / S.005 / pH: x  Gluc: x / Ketone: Negative  / Bili: Negative / Urobili: <2 mg/dL   Blood: x / Protein: 100 mg/dL / Nitrite: Negative   Leuk Esterase: Moderate / RBC: 82 /HPF / WBC 15 /HPF   Sq Epi: x / Non Sq Epi: 9 /HPF / Bacteria: Negative        Culture - Blood (collected 21 @ 18:00)  Source: .Blood Blood  Preliminary Report (21 @ 02:20):    No growth to date.            INFECTIOUS DISEASES TESTING  Procalcitonin, Serum: 0.11 (21 @ 18:00)  Procalcitonin, Serum: 0.07 (03-15-21 @ 06:00)  Procalcitonin, Serum: 0.26 (21 @ 12:50)  COVID-19 PCR: Detected (21 @ 12:45)      INFLAMMATORY MARKERS  C-Reactive Protein, Serum: 180 mg/L (21 @ 18:00)  C-Reactive Protein, Serum: 25 mg/L (03-15-21 @ 06:00)  C-Reactive Protein, Serum: 80 mg/L (21 @ 12:50)      RADIOLOGY & ADDITIONAL TESTS:  I have personally reviewed the last available Chest xray  CXR  Xray Chest 1 View- PORTABLE-Urgent:   EXAM:  XR CHEST PORTABLE URGENT 1V            PROCEDURE DATE:  2021            INTERPRETATION:  Clinical History / Reason for exam: Covid    Comparison : Chest radiograph 3/20/2021.    Technique/Positioning: Frontal radiograph of the chest.    Findings:    Support devices: None.    Cardiac/mediastinum/hilum: Stable.    Lung parenchyma/Pleura: Slightly increased patchy left greater than right bilateral opacities. No visualized pneumothorax.    Skeleton/soft tissues: Stable.    Impression:    Slightly increased patchy left greater than right bilateral opacities.                  ASHLEY ACOSTA MD; Attending Radiologist  This document has been electronically signed. Mar 22 2021  3:45PM (21 @ 13:53)      CT  CT Angio Chest PE Protocol w/ IV Cont:   EXAM:  CT ANGIO CHEST PE PROTOCOL IC            PROCEDURE DATE:  2021            INTERPRETATION:  CLINICAL HISTORY / REASON FOR EXAM: SOB; Covid. Pulmonary embolus evaluation    TECHNIQUE:   Contiguous axial CT images were obtained from the thoracic inlet to the upper abdomen with intravenous contrast. Thin sections were reconstructed through the pulmonary vasculature. Reformatted images in the coronal and sagittal planes were acquired, as well as 3DMIP reconstructed images    Comparison:Chest CT dated      FINDINGS:    This study is limited secondary to motion artifact.    TUBES/LINES: None.    PULMONARY ARTERY CIRCULATION: No evidence of central pulmonary embolus. Evaluation of the segmental and subsegmental branches is limited by the motion artifact, therefore a small peripheral pulmonary embolism cannot be excluded.    GREAT VESSELS/CARDIAC STRUCTURES/PERICARDIUM: The heart size is within normal limits. No pericardial effusion. Aortic and coronary artery calcifications are noted. There is no evidence of aortic aneurysm or dissection. The brachiocephalic vessels are unremarkable. Normal caliber aorta and pulmonary artery.    LUNG PARENCHYMA/CENTRAL AIRWAYS/PLEURA: The central tracheobronchial tree is patent. There are scattered peripheral ground glass opacities and focal consolidations throughout both lung fields consistent with COVID-19 pneumonia.  No evidence of pleural effusion or pneumothorax    MEDIASTINUM/HILUM: There are no enlarged mediastinal, hilar or axillary lymph nodes. The visualized portion of the thyroid gland is unremarkable.    CHEST WALL/AXILLA: Unremarkable.    UPPER ABDOMEN: The partially visualized upper abdomen shows no acute pathology.    OSSEOUS STRUCTURES: Degenerative changes of the spineare noted.      IMPRESSION:    No evidence of central pulmonary embolus. Evaluation of the segmental and subsegmental branches is limited by the motion artifact, therefore a small peripheral pulmonary embolism cannot be excluded.    There are scattered peripheral ground glass opacities and focal consolidations throughout both lung fields consistent with COVID-19 pneumonia.              LEIGHANN SAEED MD; Attending Interventional Radiologist  This document has been electronically signed. Mar 22 2021 10:58PM (21 @ 22:32)      CARDIOLOGY TESTING  12 Lead ECG:   Ventricular Rate 93 BPM    Atrial Rate 93 BPM    P-R Interval 166 ms    QRS Duration 108 ms    Q-T Interval 348 ms    QTC Calculation(Bazett) 432 ms    P Axis -25 degrees    R Axis 83 degrees    T Axis 1 degrees    Diagnosis Line Normal sinus rhythm  Inferior infarct , age undetermined  Possible Anterolateral infarct , age undetermined  Abnormal ECG    Confirmed by JEREMÍAS LEMUS MD ([id number]) on 3/12/2021 2:38:31 PM (21 @ 13:33)      MEDICATIONS  amLODIPine   Tablet 10 Oral daily  apixaban 5 Oral two times a day  atorvastatin 80 Oral at bedtime  cefepime   IVPB     cefepime   IVPB 2000 IV Intermittent every 12 hours  chlorhexidine 4% Liquid 1 Topical <User Schedule>  dexAMETHasone  IVPB 8 IV Intermittent every 8 hours  influenza   Vaccine 0.5 IntraMuscular once  insulin glargine Injectable (LANTUS) 45 SubCutaneous at bedtime  insulin lispro (ADMELOG) corrective regimen sliding scale  SubCutaneous three times a day before meals  insulin lispro Injectable (ADMELOG) 8 SubCutaneous three times a day before meals  levoFLOXacin IVPB 750 IV Intermittent every 24 hours  levoFLOXacin IVPB     melatonin 5 Oral at bedtime  metoprolol succinate  Oral daily  pantoprazole    Tablet 40 Oral before breakfast  senna 2 Oral at bedtime      WEIGHT  Weight (kg): 127 (21 @ 17:40)  Creatinine, Serum: 1.2 mg/dL (21 @ 07:28)      ANTIBIOTICS:  cefepime   IVPB 2000 milliGRAM(s) IV Intermittent every 12 hours  cefepime   IVPB      levoFLOXacin IVPB 750 milliGRAM(s) IV Intermittent every 24 hours  levoFLOXacin IVPB          All available historical records have been reviewed

## 2021-03-25 NOTE — CONSULT NOTE ADULT - ASSESSMENT
Pt is a 81 yo M with a history of bladder ca s/p cystectomy, ileoureterostomy in 2015 with Dr. Benton currently a/w COVID+ PNA; urology now consulted for hematuria.     ·	 Pt is a 81 yo M with a history of bladder ca s/p cystectomy, ileoureterostomy in 2015 with Dr. Benton currently a/w COVID+ PNA; urology now consulted for hematuria.     ·	Urostomy bag with slightly blood tinged urine  ·	f/u UCx  ·	Trend h/h; transufse prn   ·	Will d/w attng  Pt is a 81 yo M with a history of bladder ca s/p cystectomy, ileoureterostomy in 2015 with Dr. Benton currently a/w COVID+ PNA; urology now consulted for hematuria.     ·	Urostomy bag with slightly blood tinged urine  ·	f/u UCx  ·	Recommend CT AP w con   ·	Trend h/h; transfuse prn   ·	Will d/w attng

## 2021-03-25 NOTE — PROGRESS NOTE ADULT - ASSESSMENT
81yo M c PMHx chronic afib on noac, htn, dm2, ckd3 bladder ca s/p urostomy, b/l hip/ knee replacements obesity, here with acute hypoxic respiratory failure 2/2 severe covid 19 viral pneumonia, lorraine on ckd3 now better, elevated ddimer    Covid viral PNA. acute hypoxic RF  underlying JARETH, MO  LORRAINE on CKD  Transaminitis  Hx of HTN, ASHD, Afib/Eliquis  DM II, CKD  DLD  Bladder ca, sp urostomy, exposure to 911  OA, DDD, DJD, sp BL hip and knee surgeries, mobility dysfunction      pt told th unfortunate news that his wife passed due to the Covid, son and grand-daughter present, ff by the     pt had change of ostomy bag, noted to have hematuria, NB pt on Eliis  Uro consult  send urine for C&S and FISH test    remains  in CEU, on HFNC O2, on DEXA, sp TOCI, Cefepime 2gms q 12 d/c  offer CPAP at HS    Covid ABs are + 85.6  pul-critical care ffup and care appreciated  ID ff up and care appreciated  CXR show BL opacities, low lung volumes  spirometry    us duplex LE's negative for DVT  elevated DDIMER, CT angio negative for PE  monitor infla parameters q 72 hrs    mild transaminitis  improving    Rehab consult  pt is a full code

## 2021-03-25 NOTE — ADVANCED PRACTICE NURSE CONSULT - RECOMMEDATIONS
Pouch change: 	  -Gently remove pouch water moistened gauze   -Cleanse stoma site with water moistened gauze, gently pat dry  -Peristomal skin-apply Stella Adapt stoma powder (#4620), dust off excess, then seal w/ Cavilon no-sting barrier film (#8532)  -Measure stoma, currently 1 1/2"  -Trace and cut current size on Stella 2 1/4” CeraPlus convex barrier (#92790)  -Stretch Stella Adapt CeraRing (#7288) onto back of barrier  -Apply barrier to patient's skin  -Attach [place] 2 1/4" urostomy pouch (#29408) onto barrier; connect pouch to bedside drainage via Stella urostomy adaptor (#0021); when/if pouch disconnected from bedside drainage, empty pouch when 1/3 to no more than ½ full of urine. Change pouching system q 3 - 4 days and prn for leaking. Next pouch change-Mon 3/29.     Plan of Care: Patient's d/c pending at this time. WOCN service to follow-up w/ patient on Monday 3/29  for pouch assessment & change. Questions or concerns or pouch w/ consistent leakage and unable to obtain seal, please consult WOCN, [facility] #[id number]. If pouch leaks after WOC service hours, please repouch using supplies and technique as indicated above and document where leakage occurred so system can be modified by WOCN if needed. Primary RN Simi made aware.

## 2021-03-26 LAB
ANION GAP SERPL CALC-SCNC: 14 MMOL/L — SIGNIFICANT CHANGE UP (ref 7–14)
BASOPHILS # BLD AUTO: 0 K/UL — SIGNIFICANT CHANGE UP (ref 0–0.2)
BASOPHILS NFR BLD AUTO: 0 % — SIGNIFICANT CHANGE UP (ref 0–1)
BUN SERPL-MCNC: 44 MG/DL — HIGH (ref 10–20)
CALCIUM SERPL-MCNC: 8.3 MG/DL — LOW (ref 8.5–10.1)
CHLORIDE SERPL-SCNC: 102 MMOL/L — SIGNIFICANT CHANGE UP (ref 98–110)
CO2 SERPL-SCNC: 17 MMOL/L — SIGNIFICANT CHANGE UP (ref 17–32)
CREAT SERPL-MCNC: 1.1 MG/DL — SIGNIFICANT CHANGE UP (ref 0.7–1.5)
EOSINOPHIL # BLD AUTO: 0 K/UL — SIGNIFICANT CHANGE UP (ref 0–0.7)
EOSINOPHIL NFR BLD AUTO: 0 % — SIGNIFICANT CHANGE UP (ref 0–8)
GLUCOSE BLDC GLUCOMTR-MCNC: 233 MG/DL — HIGH (ref 70–99)
GLUCOSE BLDC GLUCOMTR-MCNC: 235 MG/DL — HIGH (ref 70–99)
GLUCOSE BLDC GLUCOMTR-MCNC: 267 MG/DL — HIGH (ref 70–99)
GLUCOSE BLDC GLUCOMTR-MCNC: 339 MG/DL — HIGH (ref 70–99)
GLUCOSE SERPL-MCNC: 229 MG/DL — HIGH (ref 70–99)
HCT VFR BLD CALC: 40 % — LOW (ref 42–52)
HGB BLD-MCNC: 12.9 G/DL — LOW (ref 14–18)
IMM GRANULOCYTES NFR BLD AUTO: 0.5 % — HIGH (ref 0.1–0.3)
LYMPHOCYTES # BLD AUTO: 0.34 K/UL — LOW (ref 1.2–3.4)
LYMPHOCYTES # BLD AUTO: 4.1 % — LOW (ref 20.5–51.1)
MCHC RBC-ENTMCNC: 27.6 PG — SIGNIFICANT CHANGE UP (ref 27–31)
MCHC RBC-ENTMCNC: 32.3 G/DL — SIGNIFICANT CHANGE UP (ref 32–37)
MCV RBC AUTO: 85.5 FL — SIGNIFICANT CHANGE UP (ref 80–94)
MONOCYTES # BLD AUTO: 0.3 K/UL — SIGNIFICANT CHANGE UP (ref 0.1–0.6)
MONOCYTES NFR BLD AUTO: 3.6 % — SIGNIFICANT CHANGE UP (ref 1.7–9.3)
NEUTROPHILS # BLD AUTO: 7.67 K/UL — HIGH (ref 1.4–6.5)
NEUTROPHILS NFR BLD AUTO: 91.8 % — HIGH (ref 42.2–75.2)
NRBC # BLD: 0 /100 WBCS — SIGNIFICANT CHANGE UP (ref 0–0)
PLATELET # BLD AUTO: 181 K/UL — SIGNIFICANT CHANGE UP (ref 130–400)
POTASSIUM SERPL-MCNC: 6 MMOL/L — CRITICAL HIGH (ref 3.5–5)
POTASSIUM SERPL-SCNC: 6 MMOL/L — CRITICAL HIGH (ref 3.5–5)
RBC # BLD: 4.68 M/UL — LOW (ref 4.7–6.1)
RBC # FLD: 14.6 % — HIGH (ref 11.5–14.5)
SODIUM SERPL-SCNC: 133 MMOL/L — LOW (ref 135–146)
WBC # BLD: 8.35 K/UL — SIGNIFICANT CHANGE UP (ref 4.8–10.8)
WBC # FLD AUTO: 8.35 K/UL — SIGNIFICANT CHANGE UP (ref 4.8–10.8)

## 2021-03-26 PROCEDURE — 74177 CT ABD & PELVIS W/CONTRAST: CPT | Mod: 26

## 2021-03-26 PROCEDURE — 71045 X-RAY EXAM CHEST 1 VIEW: CPT | Mod: 26

## 2021-03-26 PROCEDURE — 99232 SBSQ HOSP IP/OBS MODERATE 35: CPT

## 2021-03-26 RX ORDER — SODIUM CHLORIDE 9 MG/ML
1000 INJECTION INTRAMUSCULAR; INTRAVENOUS; SUBCUTANEOUS
Refills: 0 | Status: DISCONTINUED | OUTPATIENT
Start: 2021-03-26 | End: 2021-03-29

## 2021-03-26 RX ADMIN — Medication 4: at 16:33

## 2021-03-26 RX ADMIN — Medication 101.6 MILLIGRAM(S): at 22:00

## 2021-03-26 RX ADMIN — CHLORHEXIDINE GLUCONATE 1 APPLICATION(S): 213 SOLUTION TOPICAL at 05:35

## 2021-03-26 RX ADMIN — Medication 100 MILLIGRAM(S): at 05:36

## 2021-03-26 RX ADMIN — ATORVASTATIN CALCIUM 80 MILLIGRAM(S): 80 TABLET, FILM COATED ORAL at 22:28

## 2021-03-26 RX ADMIN — INSULIN GLARGINE 45 UNIT(S): 100 INJECTION, SOLUTION SUBCUTANEOUS at 22:37

## 2021-03-26 RX ADMIN — Medication 8 UNIT(S): at 16:33

## 2021-03-26 RX ADMIN — ENOXAPARIN SODIUM 120 MILLIGRAM(S): 100 INJECTION SUBCUTANEOUS at 17:30

## 2021-03-26 RX ADMIN — Medication 5 MILLIGRAM(S): at 22:36

## 2021-03-26 RX ADMIN — PANTOPRAZOLE SODIUM 40 MILLIGRAM(S): 20 TABLET, DELAYED RELEASE ORAL at 05:36

## 2021-03-26 RX ADMIN — Medication 101.6 MILLIGRAM(S): at 13:20

## 2021-03-26 RX ADMIN — SENNA PLUS 2 TABLET(S): 8.6 TABLET ORAL at 22:35

## 2021-03-26 RX ADMIN — Medication 101.6 MILLIGRAM(S): at 05:36

## 2021-03-26 RX ADMIN — Medication 4: at 07:59

## 2021-03-26 RX ADMIN — Medication 8 UNIT(S): at 07:59

## 2021-03-26 RX ADMIN — AMLODIPINE BESYLATE 10 MILLIGRAM(S): 2.5 TABLET ORAL at 05:36

## 2021-03-26 RX ADMIN — SODIUM CHLORIDE 100 MILLILITER(S): 9 INJECTION INTRAMUSCULAR; INTRAVENOUS; SUBCUTANEOUS at 10:17

## 2021-03-26 RX ADMIN — ENOXAPARIN SODIUM 120 MILLIGRAM(S): 100 INJECTION SUBCUTANEOUS at 05:36

## 2021-03-26 NOTE — PROGRESS NOTE ADULT - ASSESSMENT
79 yo M with PMHx of HTN, HLD, DMII, AFib on Eliquis, CKD IIIA, Bladder Ca s/p cystectomy with urostomy, bilateral hip and knee arthroplasties admitted for acute COVID PNA.    # AHRF 2/2 COVID-19 pneumonia complicated by JARETH  - Mild transaminitis likely due to COVID  - on HFNC 50/80, sat'ing 94-96%  - CXR 3/24: Low lung volumes. Increasing R, stable L opacities. No pneumothorax  - CTA - 3/22  no PE  - Markers stable, f/u repeat q72 while requiring HiFlo  - D-dimer 3100 (from 5k)  - LE Duplex 3/13 negative  - c/w Dexamethasone 8 mg q8  - c/w Eliquis 5 mg q12  - Pt likely has underlying JARETH, CPAP was ordered and pt refused  - ID - repeat Ab neg ---> given 1u plasma  - s/p Toci 800 mg x1  - BNP 3/25 191, pt does not appear overloaded on exam  - Abx d/c'd 3/25; cx's neg, no fevers, no leukocytosis, procalc .11    #LORRAINE on CKDIIIA - resolved    #Paroxysmal AFib - rate controlled  - CHADVASC 4  - C/w Eliquis 5 mg PO BID  - C/w Metoprolol succinate 100 mg qd    #HLD  - Takes Rosuvastatin, c/w Atorvastatin while here    #HTN  - C/w Amlodipine  - Home Benazapril on hold due to LORRAINE, resume if BP significantly elevated    # Hx of bilateral TKR, THR   - Pain well controlled off medication    # Bladder Ca s/p cystectomy with urostomy  - f/u CT Abd/Pelvis as per Uro reccs to assess hematuria in urostomy   - Outpt f/u with Urology    ---------------------------------------------------------------------------  DVT ppx: Eliquis  GI ppx: PPI  Diet: CC  Activity: AAT  CODE status: FULL   Dispo: acute

## 2021-03-26 NOTE — PROGRESS NOTE ADULT - SUBJECTIVE AND OBJECTIVE BOX
HPI:  Patient is a 79yo male with PMH of hypertension, hyperlipidemia, diabetes mellitus type 2, atrial fibrillation on Eliquis, CKD stage 3A, bladder cancer s/p cystectomy with urostomy, and bilateral hip and knee arthroplasties who was brought in by EMS for hypoxia. The patient states that his daughter and her boyfriend who reside in the same home as him and his wife recently tested positive for COVID-19. He and his wife tested positive for COVID-19 on 3/4/2021, but their symptoms did not start until 3/6/2021. He started feeling "beat down" and "tired all the time" to the point where he had difficulty getting up. He also endorses cough, shortness of breath, and body aches. He denies chest pain, abdominal pain, nausea, vomiting, constipation, or diarrhea. He was told to come to the ED by his daughter after they noticed his wife was "not doing so well."    In the ED, vital signs were Tmax 100.9F, , /66, RR 20, and SpO2 95% on 4L. Labs were significant for creatinine 1.7 (baseline 1.4), troponin 0.02, lactate 2.3, elevated LFTs, and D-dimer 1721. Patient was given dexamethasone 6mg IV, 1L bolus of LR, and acetaminophen 650mg.     Currently admitted to medicine with the primary diagnosis of COVID-19.    Today is hospital day 12.     INTERVAL HPI / OVERNIGHT EVENTS:  Patient was examined and seen at bedside. This morning he is resting comfortably in bed and reports no new issues or overnight events.     Updates:  - Sat'ing 94-96% on 5080 HiFlo   - CXR 3/26: Low volumes, stable left greater than right opacities. No pneumothorax  - f/u CT Abd/Pelvis as per Uro reccs to assess hematuria   - c/w Dex 8 mg q8    ROS: Otherwise unremarkable     PAST MEDICAL & SURGICAL HISTORY  Chronic kidney disease (CKD)  stage 3A, baseline creatinine 1.4  Bladder cancer  secondary to exposure at [facility]  s/p cystectomy with urostomy  History of atrial fibrillation  Diabetes mellitus, type 2  Hyperlipidemia  Hypertension  History of total cystectomy  with resultant urostomy  History of total knee replacement, bilateral  History of total hip replacement, bilateral      ALLERGIES  No Known Allergies    MEDICATIONS  STANDING MEDICATIONS  amLODIPine   Tablet 10 milliGRAM(s) Oral daily  atorvastatin 80 milliGRAM(s) Oral at bedtime  chlorhexidine 4% Liquid 1 Application(s) Topical <User Schedule>  dexAMETHasone  IVPB 8 milliGRAM(s) IV Intermittent every 8 hours  enoxaparin Injectable 120 milliGRAM(s) SubCutaneous every 12 hours  influenza   Vaccine 0.5 milliLiter(s) IntraMuscular once  insulin glargine Injectable (LANTUS) 45 Unit(s) SubCutaneous at bedtime  insulin lispro (ADMELOG) corrective regimen sliding scale   SubCutaneous three times a day before meals  insulin lispro Injectable (ADMELOG) 8 Unit(s) SubCutaneous three times a day before meals  melatonin 5 milliGRAM(s) Oral at bedtime  metoprolol succinate  milliGRAM(s) Oral daily  pantoprazole    Tablet 40 milliGRAM(s) Oral before breakfast  senna 2 Tablet(s) Oral at bedtime  sodium chloride 0.9%. 1000 milliLiter(s) IV Continuous <Continuous>    PRN MEDICATIONS  acetaminophen   Tablet .. 650 milliGRAM(s) Oral every 6 hours PRN  acetaminophen   Tablet .. 650 milliGRAM(s) Oral every 6 hours PRN  guaifenesin/dextromethorphan  Syrup 10 milliLiter(s) Oral every 6 hours PRN    VITALS:  T(F): 96.2  HR: 62  BP: 133/63  RR: 20  SpO2: 95%    PHYSICAL EXAM:  GENERAL: NAD, lying in bed comfortably  CHEST/LUNG: decreased bs bilateral, no wheezing, no access. muscle use  ABDOMEN: Bowel sounds present; Soft, Nontender, Nondistended.   EXTREMITIES: dry, no edema  NERVOUS SYSTEM:  Alert & Oriented X3, speech clear. No deficits     LABS                        12.9   8.35  )-----------( 181      ( 26 Mar 2021 09:18 )             40.0     03-25    135  |  102  |  42<H>  ----------------------------<  255<H>  5.0   |  23  |  1.2    Ca    8.4<L>      25 Mar 2021 07:28    Urinalysis Basic - ( 25 Mar 2021 00:30 )    Color: Light Red / Appearance: Slightly Turbid / S.005 / pH: x  Gluc: x / Ketone: Negative  / Bili: Negative / Urobili: <2 mg/dL   Blood: x / Protein: 100 mg/dL / Nitrite: Negative   Leuk Esterase: Moderate / RBC: 82 /HPF / WBC 15 /HPF   Sq Epi: x / Non Sq Epi: 9 /HPF / Bacteria: Negative    Culture - Blood (collected 24 Mar 2021 07:54)  Source: .Blood None  Preliminary Report (25 Mar 2021 18:01):    No growth to date.        RADIOLOGY    CXR 3/26  Low lung volumes with stable left greater than right parenchymal opacities. No pneumothorax

## 2021-03-26 NOTE — PROGRESS NOTE ADULT - SUBJECTIVE AND OBJECTIVE BOX
Patient is a 80y old  Male who presents with a chief complaint of acute hypoxemic respiratory failure secondary to COVID-19 pneumonia (25 Mar 2021 17:08)        Over Night Events:  Remains on HFNCO2.  Off pressors         ROS:     All ROS are negative except HPI         PHYSICAL EXAM    ICU Vital Signs Last 24 Hrs  T(C): 35.7 (26 Mar 2021 05:17), Max: 36.1 (25 Mar 2021 16:15)  T(F): 96.2 (26 Mar 2021 05:17), Max: 96.9 (25 Mar 2021 16:15)  HR: 62 (26 Mar 2021 05:17) (60 - 88)  BP: 133/63 (26 Mar 2021 05:17) (130/59 - 151/66)  BP(mean): --  ABP: --  ABP(mean): --  RR: 20 (26 Mar 2021 05:17) (20 - 20)  SpO2: 95% (26 Mar 2021 05:17) (89% - 98%)      CONSTITUTIONAL:  Well nourished.  NAD    ENT:   Airway patent,   Mouth with normal mucosa.   No thrush    EYES:   Pupils equal,   Round and reactive to light.    CARDIAC:   Normal rate,   Regular rhythm.    No edema      Vascular:  Normal systolic impulse  No Carotid bruits    RESPIRATORY:   No wheezing  Bilateral crackles   Normal chest expansion  Not tachypneic,  No use of accessory muscles    GASTROINTESTINAL:  Abdomen soft,   Non-tender,   No guarding,   + BS    MUSCULOSKELETAL:   Range of motion is not limited,  No clubbing, cyanosis    NEUROLOGICAL:   Alert and oriented   No motor  deficits.    SKIN:   Skin normal color for race,   Warm and dry  No evidence of rash.    PSYCHIATRIC:   No apparent risk to self or others.    HEMATOLOGICAL:  No cervical  lymphadenopathy.  no inguinal lymphadenopathy      21 @ 07:01  -  21 @ 07:00  --------------------------------------------------------  IN:  Total IN: 0 mL    OUT:    Urostomy (mL): 1900 mL  Total OUT: 1900 mL    Total NET: -1900 mL          LABS:                            12.2   9.84  )-----------( 287      ( 25 Mar 2021 07:28 )             37.4                                               03    135  |  102  |  42<H>  ----------------------------<  255<H>  5.0   |  23  |  1.2    Ca    8.4<L>      25 Mar 2021 07:28    TPro  6.3  /  Alb  2.9<L>  /  TBili  0.9  /  DBili  x   /  AST  58<H>  /  ALT  72<H>  /  AlkPhos  87  03-24                                             Urinalysis Basic - ( 25 Mar 2021 00:30 )    Color: Light Red / Appearance: Slightly Turbid / S.005 / pH: x  Gluc: x / Ketone: Negative  / Bili: Negative / Urobili: <2 mg/dL   Blood: x / Protein: 100 mg/dL / Nitrite: Negative   Leuk Esterase: Moderate / RBC: 82 /HPF / WBC 15 /HPF   Sq Epi: x / Non Sq Epi: 9 /HPF / Bacteria: Negative                                                  LIVER FUNCTIONS - ( 24 Mar 2021 07:54 )  Alb: 2.9 g/dL / Pro: 6.3 g/dL / ALK PHOS: 87 U/L / ALT: 72 U/L / AST: 58 U/L / GGT: x                                                  Culture - Blood (collected 24 Mar 2021 07:54)  Source: .Blood None  Preliminary Report (25 Mar 2021 18:01):    No growth to date.                                                                                           MEDICATIONS  (STANDING):  amLODIPine   Tablet 10 milliGRAM(s) Oral daily  atorvastatin 80 milliGRAM(s) Oral at bedtime  chlorhexidine 4% Liquid 1 Application(s) Topical <User Schedule>  dexAMETHasone  IVPB 8 milliGRAM(s) IV Intermittent every 8 hours  enoxaparin Injectable 120 milliGRAM(s) SubCutaneous every 12 hours  influenza   Vaccine 0.5 milliLiter(s) IntraMuscular once  insulin glargine Injectable (LANTUS) 45 Unit(s) SubCutaneous at bedtime  insulin lispro (ADMELOG) corrective regimen sliding scale   SubCutaneous three times a day before meals  insulin lispro Injectable (ADMELOG) 8 Unit(s) SubCutaneous three times a day before meals  melatonin 5 milliGRAM(s) Oral at bedtime  metoprolol succinate  milliGRAM(s) Oral daily  pantoprazole    Tablet 40 milliGRAM(s) Oral before breakfast  senna 2 Tablet(s) Oral at bedtime    MEDICATIONS  (PRN):  acetaminophen   Tablet .. 650 milliGRAM(s) Oral every 6 hours PRN Temp greater or equal to 38C (100.4F)  acetaminophen   Tablet .. 650 milliGRAM(s) Oral every 6 hours PRN Mild Pain (1 - 3)  guaifenesin/dextromethorphan  Syrup 10 milliLiter(s) Oral every 6 hours PRN Cough      New X-rays reviewed:                                                                                  ECHO    CXR interpreted by me:  Bilateral infiltrates

## 2021-03-26 NOTE — PROGRESS NOTE ADULT - ASSESSMENT
IMPRESSION:  Sepsis present on admission  Acute hypoxemic respiratory failure  Severe COVID-19 PNA SP Toci   Doubt superimposed bacterial infection  LORRAINE on CKD 3a, resolved  Probable JARETH    PLAN:    CNS: Avoid CNS depressants    HEENT: Oral care    PULMONARY:  HOB @ 45 degrees.  Aspiration precautions. Target SpO2>94%, wean oxygen as tolerated.  Dexamethasone 8 mg Q8 today.  Possible Q 12 in am .     CARDIOVASCULAR: Avoid volume overload. Repeat BNP.     GI: GI prophylaxis. Feeding as tolerated  Bowel regimen     RENAL:  Follow up lytes.  Correct as needed.     INFECTIOUS DISEASE: FU PanCx.      HEMATOLOGICAL:  DVT prophylaxis. LMWH     ENDOCRINE:  Follow up FS.     MUSCULOSKELETAL: OOB to chair     Step Down Unit monitoring for now    Prognosis poor

## 2021-03-26 NOTE — PROGRESS NOTE ADULT - SUBJECTIVE AND OBJECTIVE BOX
ROSA MARIA CASEY  80y  Male      Patient is a 80y old  Male who presents with a chief complaint of acute hypoxic RF due to Covid Viral syndrome. Underlying Hx of DMII, DLD, MO,  JARETH, bladder ca, sp urostomy, Hx of 911 exposure.      INTERVAL HPI/OVERNIGHT EVENTS: pt afebrile, in CEU, 5lNC 95%, K 6.0/H will repeat,+ hema  turia in urostomy bag, being evaluated byURO, CT of abd/pelvis p    T:  96.5  HR:   68  BP:  24953    RR: 18  Pulse ox:  5 L NC 95%        PHYSICAL EXAM:  GENERAL: A&O, overweight, chronically ill looking,  +O2  Neck:  short,  thick but supple, no JVD, no bruit, no stridor  Lungs:  shallow resp, scattered rhonchi, no wheezing  Abd:   distended soft and benign, + BS  Urinary:  RLQ urostomy  Ext:  arthritic changes, moves all limbs, nonpitting edema  Skin:  no rash  Psych stable    LABS                          12.9  8.3)-----------( 181             40      133 |  102  |  44  ----------------------------<229  6.0H  |  17  |  1.1    GFR 64, 58  Ca    8.8       Phos  3.4      Mg     2.0        MB 63  trop <0.01 x2  ferritin 535, 462, 737  procal 0.26, 0.11  CRP 25, 180  COVID AB + 85.60    TPro  5.9<L>  /  Alb  3.2<L>  /  TBili  0.9  /  DBili  x   /  AST  45, 88, 88  /  ALT  53, 93, 99  /  AlkPhos  43  03-14      Urinalysis Basic - ( 12 Mar 2021 16:38 )    Color: Yellow / Appearance: Slightly Turbid / S.020 / pH: x  Gluc: x / Ketone: Negative  / Bili: Negative / Urobili: 3 mg/dL   Blood: x / Protein: 30 mg/dL / Nitrite: Negative   Leuk Esterase: Moderate / RBC: 5 /HPF / WBC 90 /HPF   Sq Epi: x / Non Sq Epi: 0 /HPF / Bacteria: Ma       RADIOLOGY & ADDITIONAL TESTS:  Venous duplex:  negative for DVT  CXR:  BL opacities L>R  CXR:  stable BL opacities    CT angio:  neg for central PE

## 2021-03-26 NOTE — PROGRESS NOTE ADULT - ASSESSMENT
81yo M c PMHx chronic afib on noac, htn, dm2, ckd3 bladder ca s/p urostomy, b/l hip/ knee replacements obesity, here with acute hypoxic respiratory failure 2/2 severe covid 19 viral pneumonia, lorraine on ckd3 now better, elevated ddimer    Covid viral PNA. acute hypoxic RF  underlying JARETH, MO  LORRAINE on CKD  Transaminitis  Hx of HTN, ASHD, Afib/Eliquis  DM II, CKD  DLD  Bladder ca, sp urostomy, exposure to 911  OA, DDD, DJD, sp BL hip and knee surgeries, mobility dysfunction      rest status stable  K 6.0 hemolyzed, repeat     pt had change of ostomy bag, noted to have hematuria, NB pt on Eliquis  Uro consult  CT of abd  send urine for C&S and FISH test    remains  in CEU, on HFNC O2, on DEXA, sp TOCI, Cefepime 2gms q 12 d/c  offer CPAP at HS    Covid ABs are + 85.6  pul-critical care ffup and care appreciated  ID ff up and care appreciated  CXR show BL opacities, low lung volumes  spirometry    us duplex LE's negative for DVT  elevated DDIMER, CT angio negative for PE  monitor infla parameters q 72 hrs    mild transaminitis  improving    Rehab consult  pt is a full code

## 2021-03-26 NOTE — CHART NOTE - NSCHARTNOTEFT_GEN_A_CORE
Registered Dietitian Follow-Up     Patient Profile Reviewed                           Yes [x]   No []     Nutrition History Previously Obtained        Yes [x]  No []       Pertinent Subjective Information: Unable to conduct face to face assessment d/t COVID-19 isolation precautions. Attempted to call pt room phone >5x but no response. Spoke to staff who reported pt was NPO earlier today but unclear why. As per LIP Dr. Aldana, pt had CT scan earlier today but cleared to resume PO intake. Since previous RD assessment PO intake declined, % but inconsistent, pt at nutritional risk. Recs d/w Dr. Aldana.      Pertinent Medical Interventions:  1. AHRF 2/2 COVID-19 pneumonia complicated by JARETH--upgraded to CEU 3/23; tolerating HFNC 50/80 satting 94-96%, pt refusing CPAP. s/p 1unit plasma   2. LORRAINE on CKD III--resolved.   3. Paroxysmal Afib--rate controlled.   4. Bladder Ca s/p cystectomy with urostomy--f/u CT A/P to assess hematuria in urostomy, per urology      Diet order: DASH/TLC, CHO Consistent      Anthropometrics:  - Ht. 185.4cm   - Wt. 127kg (3/12) no new wt   - BMI 37   - IBW 83.6kg      Pertinent Lab Data: (3/26/21) RBC 4.68, H/H 12.9/40.0, POCT 267, glucose 229, Na 133, K+ 6.0, BUN 44, Ca 8.3     Pertinent Meds: lovenox, lantus, admelog, decadron, protonix, senna, amlodipine, atorvastatin, metoprolol       Physical Findings:  - Appearance: AAOx4   - GI function: fecal incontinence w. LBM 3/23   - Tubes: n/a   - Oral/Mouth cavity: none per RN   - Skin: intact; 1+ edema (generalized)      Nutrition Requirements  Weight Used: 127kg ABW; 83.6kg IBW (continued from RD initial assessment)     Calories: 2048 - 2252 kcals (MSJ x 1.0 - 1.1 AF obesity)   Protein: 84 - 100 gms (1.0 - 1.2 gm/kg IBW obesity)   Fluid: 1ml/kcal or per LIP     Nutrient Intake: not meeting needs     Nutrition Diagnostic #1  Problem: inadequate protein/energy intake   Etiology: COVID-19 w. critical illness   Statement: varied PO intake %      Nutrition Intervention: meals & snacks, medical food supplements    Recommend:  1. Add trial of Glucerna BID.  2. Continue DASH/TLC, CHO Consistent diet order. If serum K+ remains elevated add No concentrated K+ restriction to diet order.      Goal/Expected Outcome: Pt to consistently consume/tolerate >50% meals & supplements upon f/u in 4 days.      Indicator/Monitoring: RD to monitor energy intake, diet order, body comp, NFPF, glucose profile

## 2021-03-27 LAB
ANION GAP SERPL CALC-SCNC: 11 MMOL/L — SIGNIFICANT CHANGE UP (ref 7–14)
ANION GAP SERPL CALC-SCNC: 8 MMOL/L — SIGNIFICANT CHANGE UP (ref 7–14)
BASOPHILS # BLD AUTO: 0.01 K/UL — SIGNIFICANT CHANGE UP (ref 0–0.2)
BASOPHILS NFR BLD AUTO: 0.1 % — SIGNIFICANT CHANGE UP (ref 0–1)
BUN SERPL-MCNC: 43 MG/DL — HIGH (ref 10–20)
BUN SERPL-MCNC: 45 MG/DL — HIGH (ref 10–20)
CALCIUM SERPL-MCNC: 8.6 MG/DL — SIGNIFICANT CHANGE UP (ref 8.5–10.1)
CALCIUM SERPL-MCNC: 8.7 MG/DL — SIGNIFICANT CHANGE UP (ref 8.5–10.1)
CHLORIDE SERPL-SCNC: 102 MMOL/L — SIGNIFICANT CHANGE UP (ref 98–110)
CHLORIDE SERPL-SCNC: 103 MMOL/L — SIGNIFICANT CHANGE UP (ref 98–110)
CO2 SERPL-SCNC: 23 MMOL/L — SIGNIFICANT CHANGE UP (ref 17–32)
CO2 SERPL-SCNC: 23 MMOL/L — SIGNIFICANT CHANGE UP (ref 17–32)
CREAT SERPL-MCNC: 1 MG/DL — SIGNIFICANT CHANGE UP (ref 0.7–1.5)
CREAT SERPL-MCNC: 1.1 MG/DL — SIGNIFICANT CHANGE UP (ref 0.7–1.5)
EOSINOPHIL # BLD AUTO: 0 K/UL — SIGNIFICANT CHANGE UP (ref 0–0.7)
EOSINOPHIL NFR BLD AUTO: 0 % — SIGNIFICANT CHANGE UP (ref 0–8)
GLUCOSE BLDC GLUCOMTR-MCNC: 181 MG/DL — HIGH (ref 70–99)
GLUCOSE BLDC GLUCOMTR-MCNC: 325 MG/DL — HIGH (ref 70–99)
GLUCOSE BLDC GLUCOMTR-MCNC: 333 MG/DL — HIGH (ref 70–99)
GLUCOSE BLDC GLUCOMTR-MCNC: 357 MG/DL — HIGH (ref 70–99)
GLUCOSE SERPL-MCNC: 175 MG/DL — HIGH (ref 70–99)
GLUCOSE SERPL-MCNC: 227 MG/DL — HIGH (ref 70–99)
HCT VFR BLD CALC: 37.6 % — LOW (ref 42–52)
HGB BLD-MCNC: 12.2 G/DL — LOW (ref 14–18)
IMM GRANULOCYTES NFR BLD AUTO: 0.5 % — HIGH (ref 0.1–0.3)
LYMPHOCYTES # BLD AUTO: 0.48 K/UL — LOW (ref 1.2–3.4)
LYMPHOCYTES # BLD AUTO: 5.9 % — LOW (ref 20.5–51.1)
MCHC RBC-ENTMCNC: 27.5 PG — SIGNIFICANT CHANGE UP (ref 27–31)
MCHC RBC-ENTMCNC: 32.4 G/DL — SIGNIFICANT CHANGE UP (ref 32–37)
MCV RBC AUTO: 84.7 FL — SIGNIFICANT CHANGE UP (ref 80–94)
MONOCYTES # BLD AUTO: 0.53 K/UL — SIGNIFICANT CHANGE UP (ref 0.1–0.6)
MONOCYTES NFR BLD AUTO: 6.5 % — SIGNIFICANT CHANGE UP (ref 1.7–9.3)
NEUTROPHILS # BLD AUTO: 7.13 K/UL — HIGH (ref 1.4–6.5)
NEUTROPHILS NFR BLD AUTO: 87 % — HIGH (ref 42.2–75.2)
NRBC # BLD: 0 /100 WBCS — SIGNIFICANT CHANGE UP (ref 0–0)
PLATELET # BLD AUTO: 253 K/UL — SIGNIFICANT CHANGE UP (ref 130–400)
POTASSIUM SERPL-MCNC: 4.9 MMOL/L — SIGNIFICANT CHANGE UP (ref 3.5–5)
POTASSIUM SERPL-MCNC: 5.3 MMOL/L — HIGH (ref 3.5–5)
POTASSIUM SERPL-SCNC: 4.9 MMOL/L — SIGNIFICANT CHANGE UP (ref 3.5–5)
POTASSIUM SERPL-SCNC: 5.3 MMOL/L — HIGH (ref 3.5–5)
RBC # BLD: 4.44 M/UL — LOW (ref 4.7–6.1)
RBC # FLD: 14.5 % — SIGNIFICANT CHANGE UP (ref 11.5–14.5)
SODIUM SERPL-SCNC: 133 MMOL/L — LOW (ref 135–146)
SODIUM SERPL-SCNC: 137 MMOL/L — SIGNIFICANT CHANGE UP (ref 135–146)
WBC # BLD: 8.19 K/UL — SIGNIFICANT CHANGE UP (ref 4.8–10.8)
WBC # FLD AUTO: 8.19 K/UL — SIGNIFICANT CHANGE UP (ref 4.8–10.8)

## 2021-03-27 PROCEDURE — 99232 SBSQ HOSP IP/OBS MODERATE 35: CPT

## 2021-03-27 RX ORDER — POLYETHYLENE GLYCOL 3350 17 G/17G
17 POWDER, FOR SOLUTION ORAL DAILY
Refills: 0 | Status: DISCONTINUED | OUTPATIENT
Start: 2021-03-27 | End: 2021-04-20

## 2021-03-27 RX ADMIN — ENOXAPARIN SODIUM 120 MILLIGRAM(S): 100 INJECTION SUBCUTANEOUS at 17:05

## 2021-03-27 RX ADMIN — Medication 101.6 MILLIGRAM(S): at 07:24

## 2021-03-27 RX ADMIN — Medication 2: at 07:55

## 2021-03-27 RX ADMIN — PANTOPRAZOLE SODIUM 40 MILLIGRAM(S): 20 TABLET, DELAYED RELEASE ORAL at 07:25

## 2021-03-27 RX ADMIN — Medication 101.6 MILLIGRAM(S): at 21:58

## 2021-03-27 RX ADMIN — CHLORHEXIDINE GLUCONATE 1 APPLICATION(S): 213 SOLUTION TOPICAL at 07:23

## 2021-03-27 RX ADMIN — ATORVASTATIN CALCIUM 80 MILLIGRAM(S): 80 TABLET, FILM COATED ORAL at 21:57

## 2021-03-27 RX ADMIN — Medication 5 MILLIGRAM(S): at 21:57

## 2021-03-27 RX ADMIN — Medication 8 UNIT(S): at 17:04

## 2021-03-27 RX ADMIN — Medication 8 UNIT(S): at 12:07

## 2021-03-27 RX ADMIN — AMLODIPINE BESYLATE 10 MILLIGRAM(S): 2.5 TABLET ORAL at 07:23

## 2021-03-27 RX ADMIN — Medication 100 MILLIGRAM(S): at 07:24

## 2021-03-27 RX ADMIN — Medication 101.6 MILLIGRAM(S): at 14:58

## 2021-03-27 RX ADMIN — INSULIN GLARGINE 45 UNIT(S): 100 INJECTION, SOLUTION SUBCUTANEOUS at 22:37

## 2021-03-27 RX ADMIN — Medication 8: at 12:08

## 2021-03-27 RX ADMIN — Medication 8 UNIT(S): at 07:55

## 2021-03-27 RX ADMIN — ENOXAPARIN SODIUM 120 MILLIGRAM(S): 100 INJECTION SUBCUTANEOUS at 07:24

## 2021-03-27 RX ADMIN — Medication 650 MILLIGRAM(S): at 21:07

## 2021-03-27 RX ADMIN — Medication 10: at 17:05

## 2021-03-27 RX ADMIN — SENNA PLUS 2 TABLET(S): 8.6 TABLET ORAL at 21:57

## 2021-03-27 NOTE — PROGRESS NOTE ADULT - SUBJECTIVE AND OBJECTIVE BOX
Urology Progress Note: 79 yo M with a history of bladder ca s/p cystectomy, ileoureterostomy in 2015 with Dr. Benton currently a/w COVID+ PNA; urology following for hematuria. Patient seen and examined at bedside - no complaints at this time. Chavez actively draining blood tinged urine in urostomy tubing, no clots noted. Pt denies fevers, chills, n/v, LUTS, abdominal pain or flank pain.     [ x ] A 10 Point Review of Systems was negative except where noted     MEDICATIONS  (STANDING):  amLODIPine   Tablet 10 milliGRAM(s) Oral daily  atorvastatin 80 milliGRAM(s) Oral at bedtime  chlorhexidine 4% Liquid 1 Application(s) Topical <User Schedule>  dexAMETHasone  IVPB 8 milliGRAM(s) IV Intermittent every 8 hours  enoxaparin Injectable 120 milliGRAM(s) SubCutaneous every 12 hours  influenza   Vaccine 0.5 milliLiter(s) IntraMuscular once  insulin glargine Injectable (LANTUS) 45 Unit(s) SubCutaneous at bedtime  insulin lispro (ADMELOG) corrective regimen sliding scale   SubCutaneous three times a day before meals  insulin lispro Injectable (ADMELOG) 8 Unit(s) SubCutaneous three times a day before meals  melatonin 5 milliGRAM(s) Oral at bedtime  metoprolol succinate  milliGRAM(s) Oral daily  pantoprazole    Tablet 40 milliGRAM(s) Oral before breakfast  polyethylene glycol 3350 17 Gram(s) Oral daily  senna 2 Tablet(s) Oral at bedtime  sodium chloride 0.9%. 1000 milliLiter(s) (100 mL/Hr) IV Continuous <Continuous>    MEDICATIONS  (PRN):  acetaminophen   Tablet .. 650 milliGRAM(s) Oral every 6 hours PRN Temp greater or equal to 38C (100.4F)  acetaminophen   Tablet .. 650 milliGRAM(s) Oral every 6 hours PRN Mild Pain (1 - 3)  guaifenesin/dextromethorphan  Syrup 10 milliLiter(s) Oral every 6 hours PRN Cough    acetaminophen   Tablet .. 650 milliGRAM(s) Oral every 6 hours PRN  acetaminophen   Tablet .. 650 milliGRAM(s) Oral every 6 hours PRN  amLODIPine   Tablet 10 milliGRAM(s) Oral daily  atorvastatin 80 milliGRAM(s) Oral at bedtime  chlorhexidine 4% Liquid 1 Application(s) Topical <User Schedule>  dexAMETHasone  IVPB 8 milliGRAM(s) IV Intermittent every 8 hours  enoxaparin Injectable 120 milliGRAM(s) SubCutaneous every 12 hours  guaifenesin/dextromethorphan  Syrup 10 milliLiter(s) Oral every 6 hours PRN  influenza   Vaccine 0.5 milliLiter(s) IntraMuscular once  insulin glargine Injectable (LANTUS) 45 Unit(s) SubCutaneous at bedtime  insulin lispro (ADMELOG) corrective regimen sliding scale   SubCutaneous three times a day before meals  insulin lispro Injectable (ADMELOG) 8 Unit(s) SubCutaneous three times a day before meals  melatonin 5 milliGRAM(s) Oral at bedtime  metoprolol succinate  milliGRAM(s) Oral daily  pantoprazole    Tablet 40 milliGRAM(s) Oral before breakfast  polyethylene glycol 3350 17 Gram(s) Oral daily  senna 2 Tablet(s) Oral at bedtime  sodium chloride 0.9%. 1000 milliLiter(s) IV Continuous <Continuous>      Vital signs  T(C): , Max: 36.1 (21 @ 12:10)  HR: 71 (21 @ 16:12)  BP: 130/67 (21 @ 16:12)  SpO2: 93% (21 @ 12:10)    PHYSICAL EXAM:  Constitutional: NAD, well-developed  HEENT: NC/AT  Back: No CVA tenderness b/l  Respiratory: +HFNC in place  Abd: Soft, obese abdomen; no ttp; +urostomy bag to R abdomen in place with slightly blood tinged urine, area around urostomy non ttp, no erythema , no scrotal tenderness  Cardio: +S1/S2  Extremities: No edema or cyanosis, CASON x 4  Neurological: Awake and alert  Psychiatric: Normal mood, normal affect  Skin: Good color non diaphoretic    I&O's Summary    26 Mar 2021 07:01  -  27 Mar 2021 07:00  --------------------------------------------------------  IN: 1360 mL / OUT: 3600 mL / NET: -2240 mL    Labs                        12.2   8.19  )-----------( 253      ( 27 Mar 2021 08:13 )             37.6     27 Mar 2021 08:13    137    |  103    |  43     ----------------------------<  175    4.9     |  23     |  1.0      Ca    8.7        27 Mar 2021 08:13    Urinalysis Basic - ( 25 Mar 2021 00:30 )  Color: Light Red / Appearance: Slightly Turbid / S.005 / pH: x  Gluc: x / Ketone: Negative  / Bili: Negative / Urobili: <2 mg/dL   Blood: x / Protein: 100 mg/dL / Nitrite: Negative   Leuk Esterase: Moderate / RBC: 82 /HPF / WBC 15 /HPF   Sq Epi: x / Non Sq Epi: 9 /HPF / Bacteria: Negative    RADIOLOGY & ADDITIONAL STUDIES:  < from: CT Abdomen and Pelvis w/ IV Cont (21 @ 15:49) >    EXAM:  CT ABDOMEN AND PELVIS IC            PROCEDURE DATE:  2021            INTERPRETATION:  CLINICAL STATEMENT: Hematuria.    TECHNIQUE: Contiguous axial CT images were obtained from the lower chest to the pubic symphysis following administration of 100cc Omnipaque 350 intravenous contrast.  Oral contrast was not administered.  Reformatted images in the coronal and sagittal planes were acquired.    COMPARISON CT: CT abdomen pelvis 2015.    OTHER STUDIES USED FOR CORRELATION: None.      FINDINGS:    LOWER CHEST: Bibasilar opacities. No pleural effusion.    HEPATOBILIARY: Unremarkable.    SPLEEN: Unremarkable.    PANCREAS: Unremarkable.    ADRENAL GLANDS: Mild bilateral adrenal gland thickening.    KIDNEYS: Layering densities within both renal pelvises. No hydroureter is seen. Bilateral renal cysts and other hypodensities incompletely characterized.    ABDOMINOPELVIC NODES: Unremarkable.    PELVIC ORGANS: Not well evaluated due to streak artifact from the hip prostheses. Patient appears to be post cystoprostatectomy and ileal conduit placement.    PERITONEUM/MESENTERY/BOWEL: Post bowel resection and reanastomosis. No bowel dilatation.    BONES/SOFT TISSUES: Ventral abdominal wall hernias containing nonobstructed bowel. Postbilateral hip replacements. Degenerative changes of the spine.    OTHER: Vascular calcifications.      IMPRESSION:    Layering densities within both renal pelvises suspicious for multiple small layering stones/milk of calcium. There is no hydroureteror evidence of  tract obstruction.    Bibasilar lung opacities are noted.    Other chronic and incidental findings as above.              MAGGIE GOOD MD; Attending Radiologist  This document has been electronically signed. Mar 27 2021 12:16PM    < end of copied text >

## 2021-03-27 NOTE — PROGRESS NOTE ADULT - ASSESSMENT
79yo M c PMHx chronic afib on noac, htn, dm2, ckd3 bladder ca s/p urostomy, b/l hip/ knee replacements obesity, here with acute hypoxic respiratory failure 2/2 severe covid 19 viral pneumonia, lorraine on ckd3 now better, elevated ddimer    Sepsis o admission  Covid viral PNA. acute hypoxic RF  underlying JARETH, MO  LORRAINE on CKD  Transaminitis  Hx of HTN, ASHD, Afib/Eliquis  DM II, CKD  DLD  Bladder ca, sp urostomy, sp cystoprostatectomy, exposure to 911  OA, DDD, DJD, sp BL hip and knee surgeries, mobility dysfunction  GERD, HH, diverticulosis, bowel resection and reanastomosis      remains in CEU, resp status improving, now on 5 L NC with 93-95% sat, DEXA, sp TOCI, off cefepime  offer CPAP at HS   spirometry to exercise lungs     pt had change of ostomy bag, noted to have hematuria, NB pt on Eliquis  Uro consult  CT of abd:  layering of material within both kidneys ( sm stones, "milc of calcium" BL renal cysts, no acute pathology  send urine for C&S and FISH test    Covid ABs are + 85.6  pul-critical care ffup and care appreciated  ID ff up and care appreciated  CXR show BL opacities, low lung volumes      us duplex LE's negative for DVT  elevated DDIMER, CT angio negative for PE  monitor infla parameters q 72 hrs    mild transaminitis  improving    Rehab consult, OOB to chair  pt is a full code

## 2021-03-27 NOTE — PROGRESS NOTE ADULT - ASSESSMENT
IMPRESSION:  Sepsis present on admission  Acute hypoxemic respiratory failure improving   Severe COVID-19 PNA SP Toci   Doubt superimposed bacterial infection  LORRAINE on CKD 3a, resolved  Probable JARETH    PLAN:    CNS: Avoid CNS depressants    HEENT: Oral care    PULMONARY:  HOB @ 45 degrees.  Aspiration precautions. Target SpO2>94%, wean oxygen as tolerated.  Dexamethasone 6mg Q12 today.  Possible Q 24 in am .     CARDIOVASCULAR: Avoid volume overload.     GI: GI prophylaxis. Feeding as tolerated  Bowel regimen.  FU CTA results     RENAL:  Follow up lytes.  Correct as needed.     INFECTIOUS DISEASE: FU PanCx.      HEMATOLOGICAL:  DVT prophylaxis. LMWH     ENDOCRINE:  Follow up FS.     MUSCULOSKELETAL: OOB to chair     Step Down Unit monitoring for now.  Possible downgrade in am    PT OT     Prognosis poor

## 2021-03-27 NOTE — PROGRESS NOTE ADULT - SUBJECTIVE AND OBJECTIVE BOX
Patient is a 80y old  Male who presents with a chief complaint of acute hypoxemic respiratory failure secondary to COVID-19 pneumonia (26 Mar 2021 22:57)        Over Night Events:  On NC O2 at 6 liters per min.  Feels better.  SP CTA         ROS:     All ROS are negative except HPI         PHYSICAL EXAM    ICU Vital Signs Last 24 Hrs  T(C): 35.6 (27 Mar 2021 06:34), Max: 35.8 (26 Mar 2021 12:04)  T(F): 96.1 (27 Mar 2021 06:34), Max: 96.5 (26 Mar 2021 12:04)  HR: 61 (27 Mar 2021 06:34) (49 - 71)  BP: 143/68 (27 Mar 2021 06:34) (120/57 - 143/68)  BP(mean): 87 (27 Mar 2021 06:34) (82 - 89)  ABP: --  ABP(mean): --  RR: 20 (27 Mar 2021 06:34) (18 - 20)  SpO2: 93% (27 Mar 2021 06:34) (92% - 96%)      CONSTITUTIONAL:  Well nourished.  NAD    ENT:   Airway patent,   Mouth with normal mucosa.   No thrush    EYES:   Pupils equal,   Round and reactive to light.    CARDIAC:   Normal rate,   Regular rhythm.    No edema      Vascular:  Normal systolic impulse  No Carotid bruits    RESPIRATORY:   No wheezing  Bilateral crackles   Normal chest expansion  Not tachypneic,  No use of accessory muscles    GASTROINTESTINAL:  Abdomen soft,   Non-tender,   No guarding,   + BS    MUSCULOSKELETAL:   Range of motion is not limited,  No clubbing, cyanosis    NEUROLOGICAL:   Alert and oriented   No motor  deficits.    SKIN:   Skin normal color for race,   Warm and dry and intact.   No evidence of rash.    PSYCHIATRIC:   Normal mood and affect.   No apparent risk to self or others.    HEMATOLOGICAL:  No cervical  lymphadenopathy.  no inguinal lymphadenopathy      03-26-21 @ 07:01  -  03-27-21 @ 07:00  --------------------------------------------------------  IN:    IV PiggyBack: 200 mL    Oral Fluid: 360 mL    sodium chloride 0.9%: 800 mL  Total IN: 1360 mL    OUT:    Urostomy (mL): 3600 mL  Total OUT: 3600 mL    Total NET: -2240 mL          LABS:                            12.9   8.35  )-----------( 181      ( 26 Mar 2021 09:18 )             40.0                                               03-26    133<L>  |  102  |  44<H>  ----------------------------<  229<H>  6.0<HH>   |  17  |  1.1    Ca    8.3<L>      26 Mar 2021 09:18                                                                                                                                      Culture - Blood (collected 24 Mar 2021 07:54)  Source: .Blood None  Preliminary Report (25 Mar 2021 18:01):    No growth to date.                                                                                           MEDICATIONS  (STANDING):  amLODIPine   Tablet 10 milliGRAM(s) Oral daily  atorvastatin 80 milliGRAM(s) Oral at bedtime  chlorhexidine 4% Liquid 1 Application(s) Topical <User Schedule>  dexAMETHasone  IVPB 8 milliGRAM(s) IV Intermittent every 8 hours  enoxaparin Injectable 120 milliGRAM(s) SubCutaneous every 12 hours  influenza   Vaccine 0.5 milliLiter(s) IntraMuscular once  insulin glargine Injectable (LANTUS) 45 Unit(s) SubCutaneous at bedtime  insulin lispro (ADMELOG) corrective regimen sliding scale   SubCutaneous three times a day before meals  insulin lispro Injectable (ADMELOG) 8 Unit(s) SubCutaneous three times a day before meals  melatonin 5 milliGRAM(s) Oral at bedtime  metoprolol succinate  milliGRAM(s) Oral daily  pantoprazole    Tablet 40 milliGRAM(s) Oral before breakfast  senna 2 Tablet(s) Oral at bedtime  sodium chloride 0.9%. 1000 milliLiter(s) (100 mL/Hr) IV Continuous <Continuous>    MEDICATIONS  (PRN):  acetaminophen   Tablet .. 650 milliGRAM(s) Oral every 6 hours PRN Temp greater or equal to 38C (100.4F)  acetaminophen   Tablet .. 650 milliGRAM(s) Oral every 6 hours PRN Mild Pain (1 - 3)  guaifenesin/dextromethorphan  Syrup 10 milliLiter(s) Oral every 6 hours PRN Cough      New X-rays reviewed:                                                                                  ECHO    CXR interpreted by me:  Bilateral infiltrates

## 2021-03-27 NOTE — PROGRESS NOTE ADULT - SUBJECTIVE AND OBJECTIVE BOX
ROSA MARIA CASEY  Patient is a 80y old  Male who presents with a chief complaint of acute hypoxic RF due to Covid Viral syndrome. Underlying Hx of DMII, DLD, MO,  JARETH, bladder ca, sp urostomy, Hx of 911 exposure)OA, DDD, DJD,GERD, diverticulosis, abd wall hernias. bowel resection and reanastomosis.      INTERVAL HPI/OVERNIGHT EVENTS: pt afebrile, on 5 LNC 93 %, yesterday  K  6 and repeat 5.3, today 4.9,  CT of abd shows done for hematuria in he urostomy bag: no hydro, layering densities. tiny stones "milk of calcium", no sig pathology    T:  97  HR:   66  BP:  140/65    RR: 18  Pulse ox:  5 L NC 93%        PHYSICAL EXAM:  GENERAL: A&O, overweight, chronically ill looking,  +O2/ 5 L  Neck:  short,  thick but supple, no JVD, no bruit, no stridor  Lungs:  shallow resp, scattered rhonchi, no wheezing, improving air entry  Abd:   distended soft and benign, + BS  Urinary:  RLQ urostomy  Ext:  arthritic changes, moves all limbs, nonpitting edema  Skin:  no rash  Psych stable    LABS                          12  8)-----------( 253             37      137 |  103  |  43  ----------------------------<175  4.9  |  23    |  1.0    GFR 64, 58  Ca    8.8       Phos  3.4      Mg     2.0        MB 63  trop <0.01 x2  ferritin 535, 462, 737  procal 0.26, 0.11  CRP 25, 180  COVID AB + 85.60    TPro  5.9<L>  /  Alb  3.2<L>  /  TBili  0.9  /  DBili  x   /  AST  45, 88, 88  /  ALT  53, 93, 99  /  AlkPhos  43  03-14      Urinalysis Basic - ( 12 Mar 2021 16:38 )    Color: Yellow / Appearance: Slightly Turbid / S.020 / pH: x  Gluc: x / Ketone: Negative  / Bili: Negative / Urobili: 3 mg/dL   Blood: x / Protein: 30 mg/dL / Nitrite: Negative   Leuk Esterase: Moderate / RBC: 5 /HPF / WBC 90 /HPF   Sq Epi: x / Non Sq Epi: 0 /HPF / Bacteria: Ma       RADIOLOGY & ADDITIONAL TESTS:  Venous duplex:  negative for DVT  CXR:  BL opacities L>R  CXR:  stable BL opacities    CT angio:  neg for central PE    CT abd/pelvis:  BIbasilar densities, hepatobiliary/spleen/pancreas WNL, mild BL adrenal thickening, no hydro, bl renal cysts, bl layering densities/ sm stones, "milk of ca", post cystoprostatectomy and ilealconduit, , post bowel resection and reanastomosis, ventral abd wall hernias, bl hip replacements

## 2021-03-27 NOTE — CHART NOTE - NSCHARTNOTEFT_GEN_A_CORE
Called lab because 11:30pm BMP not resulted in the system but said results available in orders. Lab verbally told me K is 5.3.

## 2021-03-27 NOTE — PROGRESS NOTE ADULT - ASSESSMENT
79 yo M with a history of bladder ca s/p cystectomy, ileoureterostomy in 2015 with Dr. Benton currently a/w COVID+ PNA; urology following for hematuria. Patient seen and examined at bedside - no complaints at this time. Chavez actively draining blood tinged urine in urostomy tubing, no clots noted.     ·	Case d/w Dr. Stevenson, plan is as follows  ·	Recommending IV abx for possible pouchitis  ·	Urostomy bag actively draining blood tinged urine  ·	CT AP w/ IVC showed layering densities within both renal pelvises suspicious for multiple small layering stones/milk of calcium. There is no hydroureter evidence of  tract obstruction.  ·	Trend h/h; transfuse prn   ·	F/u UCx

## 2021-03-28 LAB
ANION GAP SERPL CALC-SCNC: 12 MMOL/L — SIGNIFICANT CHANGE UP (ref 7–14)
BASOPHILS # BLD AUTO: 0.02 K/UL — SIGNIFICANT CHANGE UP (ref 0–0.2)
BASOPHILS NFR BLD AUTO: 0.2 % — SIGNIFICANT CHANGE UP (ref 0–1)
BUN SERPL-MCNC: 51 MG/DL — HIGH (ref 10–20)
CALCIUM SERPL-MCNC: 8.3 MG/DL — LOW (ref 8.5–10.1)
CHLORIDE SERPL-SCNC: 105 MMOL/L — SIGNIFICANT CHANGE UP (ref 98–110)
CO2 SERPL-SCNC: 19 MMOL/L — SIGNIFICANT CHANGE UP (ref 17–32)
CREAT SERPL-MCNC: 1 MG/DL — SIGNIFICANT CHANGE UP (ref 0.7–1.5)
CULTURE RESULTS: SIGNIFICANT CHANGE UP
EOSINOPHIL # BLD AUTO: 0.02 K/UL — SIGNIFICANT CHANGE UP (ref 0–0.7)
EOSINOPHIL NFR BLD AUTO: 0.2 % — SIGNIFICANT CHANGE UP (ref 0–8)
GLUCOSE BLDC GLUCOMTR-MCNC: 176 MG/DL — HIGH (ref 70–99)
GLUCOSE BLDC GLUCOMTR-MCNC: 237 MG/DL — HIGH (ref 70–99)
GLUCOSE BLDC GLUCOMTR-MCNC: 292 MG/DL — HIGH (ref 70–99)
GLUCOSE BLDC GLUCOMTR-MCNC: 329 MG/DL — HIGH (ref 70–99)
GLUCOSE SERPL-MCNC: 184 MG/DL — HIGH (ref 70–99)
HCT VFR BLD CALC: 35.6 % — LOW (ref 42–52)
HGB BLD-MCNC: 11.8 G/DL — LOW (ref 14–18)
IMM GRANULOCYTES NFR BLD AUTO: 0.8 % — HIGH (ref 0.1–0.3)
LYMPHOCYTES # BLD AUTO: 0.96 K/UL — LOW (ref 1.2–3.4)
LYMPHOCYTES # BLD AUTO: 9.6 % — LOW (ref 20.5–51.1)
MCHC RBC-ENTMCNC: 28 PG — SIGNIFICANT CHANGE UP (ref 27–31)
MCHC RBC-ENTMCNC: 33.1 G/DL — SIGNIFICANT CHANGE UP (ref 32–37)
MCV RBC AUTO: 84.6 FL — SIGNIFICANT CHANGE UP (ref 80–94)
MONOCYTES # BLD AUTO: 0.89 K/UL — HIGH (ref 0.1–0.6)
MONOCYTES NFR BLD AUTO: 8.9 % — SIGNIFICANT CHANGE UP (ref 1.7–9.3)
NEUTROPHILS # BLD AUTO: 8.06 K/UL — HIGH (ref 1.4–6.5)
NEUTROPHILS NFR BLD AUTO: 80.3 % — HIGH (ref 42.2–75.2)
NRBC # BLD: 0 /100 WBCS — SIGNIFICANT CHANGE UP (ref 0–0)
PLATELET # BLD AUTO: 207 K/UL — SIGNIFICANT CHANGE UP (ref 130–400)
POTASSIUM SERPL-MCNC: 4.9 MMOL/L — SIGNIFICANT CHANGE UP (ref 3.5–5)
POTASSIUM SERPL-SCNC: 4.9 MMOL/L — SIGNIFICANT CHANGE UP (ref 3.5–5)
RBC # BLD: 4.21 M/UL — LOW (ref 4.7–6.1)
RBC # FLD: 14.6 % — HIGH (ref 11.5–14.5)
SODIUM SERPL-SCNC: 136 MMOL/L — SIGNIFICANT CHANGE UP (ref 135–146)
SPECIMEN SOURCE: SIGNIFICANT CHANGE UP
WBC # BLD: 10.03 K/UL — SIGNIFICANT CHANGE UP (ref 4.8–10.8)
WBC # FLD AUTO: 10.03 K/UL — SIGNIFICANT CHANGE UP (ref 4.8–10.8)

## 2021-03-28 PROCEDURE — 99232 SBSQ HOSP IP/OBS MODERATE 35: CPT

## 2021-03-28 RX ADMIN — PANTOPRAZOLE SODIUM 40 MILLIGRAM(S): 20 TABLET, DELAYED RELEASE ORAL at 05:04

## 2021-03-28 RX ADMIN — Medication 8 UNIT(S): at 11:43

## 2021-03-28 RX ADMIN — AMLODIPINE BESYLATE 10 MILLIGRAM(S): 2.5 TABLET ORAL at 05:02

## 2021-03-28 RX ADMIN — Medication 101.6 MILLIGRAM(S): at 21:44

## 2021-03-28 RX ADMIN — INSULIN GLARGINE 45 UNIT(S): 100 INJECTION, SOLUTION SUBCUTANEOUS at 21:47

## 2021-03-28 RX ADMIN — Medication 4: at 18:05

## 2021-03-28 RX ADMIN — Medication 101.6 MILLIGRAM(S): at 14:47

## 2021-03-28 RX ADMIN — Medication 2: at 05:43

## 2021-03-28 RX ADMIN — Medication 8 UNIT(S): at 18:06

## 2021-03-28 RX ADMIN — ENOXAPARIN SODIUM 120 MILLIGRAM(S): 100 INJECTION SUBCUTANEOUS at 18:31

## 2021-03-28 RX ADMIN — Medication 101.6 MILLIGRAM(S): at 05:03

## 2021-03-28 RX ADMIN — ENOXAPARIN SODIUM 120 MILLIGRAM(S): 100 INJECTION SUBCUTANEOUS at 05:03

## 2021-03-28 RX ADMIN — Medication 6: at 11:42

## 2021-03-28 RX ADMIN — POLYETHYLENE GLYCOL 3350 17 GRAM(S): 17 POWDER, FOR SOLUTION ORAL at 11:44

## 2021-03-28 RX ADMIN — Medication 100 MILLIGRAM(S): at 05:04

## 2021-03-28 RX ADMIN — Medication 8 UNIT(S): at 08:34

## 2021-03-28 RX ADMIN — ATORVASTATIN CALCIUM 80 MILLIGRAM(S): 80 TABLET, FILM COATED ORAL at 21:43

## 2021-03-28 RX ADMIN — Medication 5 MILLIGRAM(S): at 21:44

## 2021-03-28 NOTE — PROGRESS NOTE ADULT - SUBJECTIVE AND OBJECTIVE BOX
Patient is a 80y old  Male who presents with a chief complaint of acute hypoxemic respiratory failure secondary to COVID-19 pneumonia (27 Mar 2021 16:44)        Over Night Events: No events overnight         ROS:     All ROS are negative except HPI         PHYSICAL EXAM    ICU Vital Signs Last 24 Hrs  T(C): 35.7 (28 Mar 2021 04:00), Max: 36.1 (27 Mar 2021 12:10)  T(F): 96.3 (28 Mar 2021 04:00), Max: 97 (27 Mar 2021 12:10)  HR: 70 (28 Mar 2021 04:00) (61 - 71)  BP: 124/62 (28 Mar 2021 04:00) (109/62 - 143/68)  BP(mean): 78 (28 Mar 2021 04:00) (78 - 87)  ABP: --  ABP(mean): --  RR: 18 (28 Mar 2021 04:00) (18 - 22)  SpO2: 96% (28 Mar 2021 04:00) (93% - 96%)      CONSTITUTIONAL:  Well nourished.  NAD    ENT:   Airway patent,   Mouth with normal mucosa.   No thrush    EYES:   Pupils equal,   Round and reactive to light.    CARDIAC:   Normal rate,   Regular rhythm.    No edema      Vascular:  Normal systolic impulse  No Carotid bruits    RESPIRATORY:   No wheezing  Bilateral crackles   Normal chest expansion  Not tachypneic,  No use of accessory muscles    GASTROINTESTINAL:  Abdomen soft,   Non-tender,   No guarding,   + BS    MUSCULOSKELETAL:   Range of motion is not limited,  No clubbing, cyanosis    NEUROLOGICAL:   Alert  No motor  deficits.    SKIN:   Skin normal color for race,   Warm and dry   No evidence of rash.    PSYCHIATRIC:   Normal mood and affect.   No apparent risk to self or others.    HEMATOLOGICAL:  No cervical  lymphadenopathy.  no inguinal lymphadenopathy      03-26-21 @ 07:01  -  03-27-21 @ 07:00  --------------------------------------------------------  IN:    IV PiggyBack: 200 mL    Oral Fluid: 360 mL    sodium chloride 0.9%: 800 mL  Total IN: 1360 mL    OUT:    Urostomy (mL): 3600 mL  Total OUT: 3600 mL    Total NET: -2240 mL      03-27-21 @ 07:01  -  03-28-21 @ 06:29  --------------------------------------------------------  IN:    Oral Fluid: 600 mL  Total IN: 600 mL    OUT:    Urostomy (mL): 2700 mL  Total OUT: 2700 mL    Total NET: -2100 mL          LABS:                            12.2   8.19  )-----------( 253      ( 27 Mar 2021 08:13 )             37.6                                               03-27    137  |  103  |  43<H>  ----------------------------<  175<H>  4.9   |  23  |  1.0    Ca    8.7      27 Mar 2021 08:13                                                                                                                                                                                                                           MEDICATIONS  (STANDING):  amLODIPine   Tablet 10 milliGRAM(s) Oral daily  atorvastatin 80 milliGRAM(s) Oral at bedtime  chlorhexidine 4% Liquid 1 Application(s) Topical <User Schedule>  dexAMETHasone  IVPB 8 milliGRAM(s) IV Intermittent every 8 hours  enoxaparin Injectable 120 milliGRAM(s) SubCutaneous every 12 hours  influenza   Vaccine 0.5 milliLiter(s) IntraMuscular once  insulin glargine Injectable (LANTUS) 45 Unit(s) SubCutaneous at bedtime  insulin lispro (ADMELOG) corrective regimen sliding scale   SubCutaneous three times a day before meals  insulin lispro Injectable (ADMELOG) 8 Unit(s) SubCutaneous three times a day before meals  melatonin 5 milliGRAM(s) Oral at bedtime  metoprolol succinate  milliGRAM(s) Oral daily  pantoprazole    Tablet 40 milliGRAM(s) Oral before breakfast  polyethylene glycol 3350 17 Gram(s) Oral daily  senna 2 Tablet(s) Oral at bedtime  sodium chloride 0.9%. 1000 milliLiter(s) (100 mL/Hr) IV Continuous <Continuous>    MEDICATIONS  (PRN):  acetaminophen   Tablet .. 650 milliGRAM(s) Oral every 6 hours PRN Temp greater or equal to 38C (100.4F)  acetaminophen   Tablet .. 650 milliGRAM(s) Oral every 6 hours PRN Mild Pain (1 - 3)  guaifenesin/dextromethorphan  Syrup 10 milliLiter(s) Oral every 6 hours PRN Cough      New X-rays reviewed:                                                                                  ECHO    CXR interpreted by me:

## 2021-03-28 NOTE — PROGRESS NOTE ADULT - SUBJECTIVE AND OBJECTIVE BOX
Urology Progress Note: 79 yo M with a history of bladder ca s/p cystectomy, ileoureterostomy in 2015 with Dr. Benton currently a/w COVID+ PNA; urology following for hematuria. Patient seen and examined at bedside - no urologic complaints at this time. Chavez actively draining blood tinged urine in urostomy tubing, no clots noted. Patient states that he feels cold and would like a blanket. Pt denies fevers, chills, n/v, LUTS, abdominal pain or flank pain.     [ x ] A 10 Point Review of Systems was negative except where noted     MEDICATIONS  (STANDING):  amLODIPine   Tablet 10 milliGRAM(s) Oral daily  atorvastatin 80 milliGRAM(s) Oral at bedtime  chlorhexidine 4% Liquid 1 Application(s) Topical <User Schedule>  dexAMETHasone  IVPB 8 milliGRAM(s) IV Intermittent every 8 hours  enoxaparin Injectable 120 milliGRAM(s) SubCutaneous every 12 hours  influenza   Vaccine 0.5 milliLiter(s) IntraMuscular once  insulin glargine Injectable (LANTUS) 45 Unit(s) SubCutaneous at bedtime  insulin lispro (ADMELOG) corrective regimen sliding scale   SubCutaneous three times a day before meals  insulin lispro Injectable (ADMELOG) 8 Unit(s) SubCutaneous three times a day before meals  melatonin 5 milliGRAM(s) Oral at bedtime  metoprolol succinate  milliGRAM(s) Oral daily  pantoprazole    Tablet 40 milliGRAM(s) Oral before breakfast  polyethylene glycol 3350 17 Gram(s) Oral daily  senna 2 Tablet(s) Oral at bedtime  sodium chloride 0.9%. 1000 milliLiter(s) (100 mL/Hr) IV Continuous <Continuous>    MEDICATIONS  (PRN):  acetaminophen   Tablet .. 650 milliGRAM(s) Oral every 6 hours PRN Temp greater or equal to 38C (100.4F)  acetaminophen   Tablet .. 650 milliGRAM(s) Oral every 6 hours PRN Mild Pain (1 - 3)  guaifenesin/dextromethorphan  Syrup 10 milliLiter(s) Oral every 6 hours PRN Cough    acetaminophen   Tablet .. 650 milliGRAM(s) Oral every 6 hours PRN  acetaminophen   Tablet .. 650 milliGRAM(s) Oral every 6 hours PRN  amLODIPine   Tablet 10 milliGRAM(s) Oral daily  atorvastatin 80 milliGRAM(s) Oral at bedtime  chlorhexidine 4% Liquid 1 Application(s) Topical <User Schedule>  dexAMETHasone  IVPB 8 milliGRAM(s) IV Intermittent every 8 hours  enoxaparin Injectable 120 milliGRAM(s) SubCutaneous every 12 hours  guaifenesin/dextromethorphan  Syrup 10 milliLiter(s) Oral every 6 hours PRN  influenza   Vaccine 0.5 milliLiter(s) IntraMuscular once  insulin glargine Injectable (LANTUS) 45 Unit(s) SubCutaneous at bedtime  insulin lispro (ADMELOG) corrective regimen sliding scale   SubCutaneous three times a day before meals  insulin lispro Injectable (ADMELOG) 8 Unit(s) SubCutaneous three times a day before meals  melatonin 5 milliGRAM(s) Oral at bedtime  metoprolol succinate  milliGRAM(s) Oral daily  pantoprazole    Tablet 40 milliGRAM(s) Oral before breakfast  polyethylene glycol 3350 17 Gram(s) Oral daily  senna 2 Tablet(s) Oral at bedtime  sodium chloride 0.9%. 1000 milliLiter(s) IV Continuous <Continuous>    Vital signs  T(C): , Max: 36.1 (03-27-21 @ 12:10)  HR: 70 (03-28-21 @ 04:00)  BP: 124/62 (03-28-21 @ 04:00)  SpO2: 96% (03-28-21 @ 04:00)    PHYSICAL EXAM:  Constitutional: NAD, well-developed, in good spirits  HEENT: NC/AT  Back: No CVA tenderness b/l  Respiratory: NC w/ humidified air, able to speak in full sentences without difficulty  Abd: Soft, obese abdomen; no ttp; +urostomy bag to R abdomen in place with slightly blood tinged urine w/ minimal clots noted in tubing, area around urostomy non ttp, no erythema , no scrotal tenderness  Cardio: +S1/S2  Neurological: Awake and alert  Psychiatric: Normal mood, normal affect  Skin: Good color non diaphoretic    I&O's Summary    27 Mar 2021 07:01  -  28 Mar 2021 07:00  --------------------------------------------------------  IN: 600 mL / OUT: 2700 mL / NET: -2100 mL    Labs                        12.2   8.19  )-----------( 253      ( 27 Mar 2021 08:13 )             37.6     27 Mar 2021 08:13    137    |  103    |  43     ----------------------------<  175    4.9     |  23     |  1.0      Ca    8.7        27 Mar 2021 08:13      RADIOLOGY & ADDITIONAL STUDIES:    EXAM:  CT ABDOMEN AND PELVIS IC            PROCEDURE DATE:  03/26/2021            INTERPRETATION:  CLINICAL STATEMENT: Hematuria.    TECHNIQUE: Contiguous axial CT images were obtained from the lower chest to the pubic symphysis following administration of 100cc Omnipaque 350 intravenous contrast.  Oral contrast was not administered.  Reformatted images in the coronal and sagittal planes were acquired.    COMPARISON CT: CT abdomen pelvis 6/8/2015.    OTHER STUDIES USED FOR CORRELATION: None.      FINDINGS:    LOWER CHEST: Bibasilar opacities. No pleural effusion.    HEPATOBILIARY: Unremarkable.    SPLEEN: Unremarkable.    PANCREAS: Unremarkable.    ADRENAL GLANDS: Mild bilateral adrenal gland thickening.    KIDNEYS: Layering densities within both renal pelvises. No hydroureter is seen. Bilateral renal cysts and other hypodensities incompletely characterized.    ABDOMINOPELVIC NODES: Unremarkable.    PELVIC ORGANS: Not well evaluated due to streak artifact from the hip prostheses. Patient appears to be post cystoprostatectomy and ileal conduit placement.    PERITONEUM/MESENTERY/BOWEL: Post bowel resection and reanastomosis. No bowel dilatation.    BONES/SOFT TISSUES: Ventral abdominal wall hernias containing nonobstructed bowel. Postbilateral hip replacements. Degenerative changes of the spine.    OTHER: Vascular calcifications.      IMPRESSION:    Layering densities within both renal pelvises suspicious for multiple small layering stones/milk of calcium. There is no hydroureteror evidence of  tract obstruction.    Bibasilar lung opacities are noted.    Other chronic and incidental findings as above.              MAGGIE GOOD MD; Attending Radiologist  This document ha Urology Progress Note: 79 yo M with a history of bladder ca s/p cystectomy, ileoureterostomy in 2015 with Dr. Benton currently a/w COVID+ PNA; urology following for hematuria. Patient seen and examined at bedside - no urologic complaints at this time. Chavez actively draining blood tinged urine in urostomy tubing, no clots noted. Patient states that he feels cold and would like a blanket. Pt denies fevers, chills, n/v, LUTS, abdominal pain or flank pain.     [ x ] A 10 Point Review of Systems was negative except where noted     MEDICATIONS  (STANDING):  amLODIPine   Tablet 10 milliGRAM(s) Oral daily  atorvastatin 80 milliGRAM(s) Oral at bedtime  chlorhexidine 4% Liquid 1 Application(s) Topical <User Schedule>  dexAMETHasone  IVPB 8 milliGRAM(s) IV Intermittent every 8 hours  enoxaparin Injectable 120 milliGRAM(s) SubCutaneous every 12 hours  influenza   Vaccine 0.5 milliLiter(s) IntraMuscular once  insulin glargine Injectable (LANTUS) 45 Unit(s) SubCutaneous at bedtime  insulin lispro (ADMELOG) corrective regimen sliding scale   SubCutaneous three times a day before meals  insulin lispro Injectable (ADMELOG) 8 Unit(s) SubCutaneous three times a day before meals  melatonin 5 milliGRAM(s) Oral at bedtime  metoprolol succinate  milliGRAM(s) Oral daily  pantoprazole    Tablet 40 milliGRAM(s) Oral before breakfast  polyethylene glycol 3350 17 Gram(s) Oral daily  senna 2 Tablet(s) Oral at bedtime  sodium chloride 0.9%. 1000 milliLiter(s) (100 mL/Hr) IV Continuous <Continuous>    MEDICATIONS  (PRN):  acetaminophen   Tablet .. 650 milliGRAM(s) Oral every 6 hours PRN Temp greater or equal to 38C (100.4F)  acetaminophen   Tablet .. 650 milliGRAM(s) Oral every 6 hours PRN Mild Pain (1 - 3)  guaifenesin/dextromethorphan  Syrup 10 milliLiter(s) Oral every 6 hours PRN Cough    acetaminophen   Tablet .. 650 milliGRAM(s) Oral every 6 hours PRN  acetaminophen   Tablet .. 650 milliGRAM(s) Oral every 6 hours PRN  amLODIPine   Tablet 10 milliGRAM(s) Oral daily  atorvastatin 80 milliGRAM(s) Oral at bedtime  chlorhexidine 4% Liquid 1 Application(s) Topical <User Schedule>  dexAMETHasone  IVPB 8 milliGRAM(s) IV Intermittent every 8 hours  enoxaparin Injectable 120 milliGRAM(s) SubCutaneous every 12 hours  guaifenesin/dextromethorphan  Syrup 10 milliLiter(s) Oral every 6 hours PRN  influenza   Vaccine 0.5 milliLiter(s) IntraMuscular once  insulin glargine Injectable (LANTUS) 45 Unit(s) SubCutaneous at bedtime  insulin lispro (ADMELOG) corrective regimen sliding scale   SubCutaneous three times a day before meals  insulin lispro Injectable (ADMELOG) 8 Unit(s) SubCutaneous three times a day before meals  melatonin 5 milliGRAM(s) Oral at bedtime  metoprolol succinate  milliGRAM(s) Oral daily  pantoprazole    Tablet 40 milliGRAM(s) Oral before breakfast  polyethylene glycol 3350 17 Gram(s) Oral daily  senna 2 Tablet(s) Oral at bedtime  sodium chloride 0.9%. 1000 milliLiter(s) IV Continuous <Continuous>    Vital signs  T(C): , Max: 36.1 (03-27-21 @ 12:10)  HR: 70 (03-28-21 @ 04:00)  BP: 124/62 (03-28-21 @ 04:00)  SpO2: 96% (03-28-21 @ 04:00)    PHYSICAL EXAM:  Constitutional: NAD, well-developed, in good spirits  HEENT: NC/AT  Back: No CVA tenderness b/l  Respiratory: NC w/ humidified air, able to speak in full sentences without difficulty  Abd: Soft, obese abdomen; no ttp; +urostomy bag to R abdomen in place with slightly blood tinged urine w/ minimal clots noted in tubing, area around urostomy non ttp, no erythema , urostomy pink no scrotal tenderness  Cardio: +S1/S2  Neurological: Awake and alert  Psychiatric: Normal mood, normal affect  Skin: Good color non diaphoretic    I&O's Summary    27 Mar 2021 07:01  -  28 Mar 2021 07:00  --------------------------------------------------------  IN: 600 mL / OUT: 2700 mL / NET: -2100 mL    Labs                        12.2   8.19  )-----------( 253      ( 27 Mar 2021 08:13 )             37.6     27 Mar 2021 08:13    137    |  103    |  43     ----------------------------<  175    4.9     |  23     |  1.0      Ca    8.7        27 Mar 2021 08:13      RADIOLOGY & ADDITIONAL STUDIES:    EXAM:  CT ABDOMEN AND PELVIS IC            PROCEDURE DATE:  03/26/2021            INTERPRETATION:  CLINICAL STATEMENT: Hematuria.    TECHNIQUE: Contiguous axial CT images were obtained from the lower chest to the pubic symphysis following administration of 100cc Omnipaque 350 intravenous contrast.  Oral contrast was not administered.  Reformatted images in the coronal and sagittal planes were acquired.    COMPARISON CT: CT abdomen pelvis 6/8/2015.    OTHER STUDIES USED FOR CORRELATION: None.      FINDINGS:    LOWER CHEST: Bibasilar opacities. No pleural effusion.    HEPATOBILIARY: Unremarkable.    SPLEEN: Unremarkable.    PANCREAS: Unremarkable.    ADRENAL GLANDS: Mild bilateral adrenal gland thickening.    KIDNEYS: Layering densities within both renal pelvises. No hydroureter is seen. Bilateral renal cysts and other hypodensities incompletely characterized.    ABDOMINOPELVIC NODES: Unremarkable.    PELVIC ORGANS: Not well evaluated due to streak artifact from the hip prostheses. Patient appears to be post cystoprostatectomy and ileal conduit placement.    PERITONEUM/MESENTERY/BOWEL: Post bowel resection and reanastomosis. No bowel dilatation.    BONES/SOFT TISSUES: Ventral abdominal wall hernias containing nonobstructed bowel. Postbilateral hip replacements. Degenerative changes of the spine.    OTHER: Vascular calcifications.      IMPRESSION:    Layering densities within both renal pelvises suspicious for multiple small layering stones/milk of calcium. There is no hydroureteror evidence of  tract obstruction.    Bibasilar lung opacities are noted.    Other chronic and incidental findings as above.              MAGGIE GOOD MD; Attending Radiologist  This document ha

## 2021-03-28 NOTE — PROGRESS NOTE ADULT - ASSESSMENT
79 yo M with a history of bladder ca s/p cystectomy, ileoureterostomy in 2015 with Dr. Benton currently a/w COVID+ PNA; urology following for hematuria. Patient seen and examined at bedside - stated he was feeling cold so provided him with an extra blanket. No urologic complaints at this time.     ·	Continue IV abx  ·	Replenished patient's water container, encourage increased PO fluid hydration  ·	Urostomy bag actively draining blood tinged urine in urostomy tubing, minimal clots noted.   ·	CT AP w/ IVC showed layering densities within both renal pelvises suspicious for multiple small layering stones/milk of calcium. There is no hydroureter evidence of  tract obstruction.  ·	Trend h/h; transfuse prn   ·	F/u UCx results  ·	Will d/w attending

## 2021-03-28 NOTE — PROGRESS NOTE ADULT - ASSESSMENT
IMPRESSION:  Sepsis present on admission  Acute hypoxemic respiratory failure improving   Severe COVID-19 PNA SP Toci   Doubt superimposed bacterial infection  LORRAINE on CKD 3, resolved  Probable JARETH    PLAN:    CNS: Avoid CNS depressants    HEENT: Oral care    PULMONARY:  HOB @ 45 degrees.  Aspiration precautions. Target SpO2>94%, wean oxygen as tolerated.  Dexamethasone 6mg Q12 today.  Possible Q 24 in am .     CARDIOVASCULAR: Avoid volume overload.     GI: GI prophylaxis. Feeding as tolerated  Bowel regimen.      RENAL:  Follow up lytes.  Correct as needed.     INFECTIOUS DISEASE: FU PanCx.      HEMATOLOGICAL:  DVT prophylaxis. LMWH     ENDOCRINE:  Follow up FS.     MUSCULOSKELETAL: OOB to chair     Step Down Unit monitoring for now.  Possible downgrade in am    PT OT     Prognosis poor

## 2021-03-28 NOTE — PROGRESS NOTE ADULT - ASSESSMENT
79yo M c PMHx chronic afib on noac, htn, dm2, ckd3 bladder ca s/p urostomy, b/l hip/ knee replacements obesity, here with acute hypoxic respiratory failure 2/2 severe covid 19 viral pneumonia, lorraine on ckd3 now better, elevated ddimer    Sepsis o admission  Covid viral PNA. acute hypoxic RF  underlying JARETH, MO  LORRAINE on CKD  Transaminitis  Hx of HTN, ASHD, Afib/Eliquis  DM II, CKD  DLD  Bladder ca, sp urostomy, sp cystoprostatectomy, exposure to 911  OA, DDD, DJD, sp BL hip and knee surgeries, mobility dysfunction  GERD, HH, diverticulosis, bowel resection and reanastomosis      remains in CEU,  resp status stable on 5 L NC with 93-95% sat, DEXA, sp TOCI, off Abx  offer CPAP at HS   spirometry to exercise lungs     pt had change of ostomy bag, noted to have hematuria, NB pt on Eliquis  Uro consult  CT of abd:  layering of material within both kidneys ( sm stones, "milk of calcium" BL renal cysts, no acute pathology  send urine for C&S and FISH test    Covid ABs are + 85.6  pul-critical care ffup and care appreciated  ID ff up and care appreciated  CXR show BL opacities, low lung volumes    us duplex LE's negative for DVT  elevated DDIMER, CT angio negative for PE  monitor infla parameters q 72 hrs    mild transaminitis  improving    Rehab consult, OOB to chair  pt is a full code

## 2021-03-28 NOTE — PROGRESS NOTE ADULT - SUBJECTIVE AND OBJECTIVE BOX
ROSA MARIA CASEY  Patient is a 80y old  Male who presents with a chief complaint of acute hypoxic RF due to Covid Viral syndrome. Underlying Hx of DMII, DLD, MO,  JARETH, bladder ca, sp urostomy, Hx of 911 exposure)OA, DDD, DJD,GERD, diverticulosis, abd wall hernias. bowel resection and reanastomosis.      INTERVAL HPI/OVERNIGHT EVENTS: pt afebrile, on 5 LNC sat well,  hematuria in the urostomy bag, CT of abd/pelvis shows no hydro, layering densities,  stones, "milk of calcium", no sig pathology, ff  by urology    T:  96.3  HR:   70  BP:  124/62    RR: 18  Pulse ox:  5 L NC 95%        PHYSICAL EXAM:  GENERAL: A&O, overweight, NAD +O2/ 5 L  Neck:  short,  thick but supple, no JVD, no bruit, no stridor  Lungs:  shallow resp, scattered rhonchi, no wheezing, improving air entry  Abd:   distended soft and benign, + BS  Urinary:  RLQ urostomy  Ext:  arthritic changes, moves all limbs, nonpitting edema  Skin:  no rash  Psych stable    LABS                        11.8  10)-----------( 207             35.6      136 |  105  |  51  ----------------------------<184  4.9  |  19    |  1.0    GFR 64, 58, 72  Ca    8.8       Phos  3.4      Mg     2.0        MB 63  trop <0.01 x2  ferritin 535, 462, 737  procal 0.26, 0.11  CRP 25, 180  COVID AB + 85.60    TPro  5.9<L>  /  Alb  3.2<L>  /  TBili  0.9  /  DBili  x   /  AST  45, 88, 88  /  ALT  53, 93, 99  /  AlkPhos  43  03-14      Urinalysis Basic - ( 12 Mar 2021 16:38 )    Color: Yellow / Appearance: Slightly Turbid / S.020 / pH: x  Gluc: x / Ketone: Negative  / Bili: Negative / Urobili: 3 mg/dL   Blood: x / Protein: 30 mg/dL / Nitrite: Negative   Leuk Esterase: Moderate / RBC: 5 /HPF / WBC 90 /HPF   Sq Epi: x / Non Sq Epi: 0 /HPF / Bacteria: Ma       RADIOLOGY & ADDITIONAL TESTS:  Venous duplex:  negative for DVT  CXR:  BL opacities L>R  CXR:  stable BL opacities    CT angio:  neg for central PE    CT abd/pelvis:  BIbasilar densities, hepatobiliary/spleen/pancreas WNL, mild BL adrenal thickening, no hydro, bl renal cysts, bl layering densities/ sm stones, "milk of ca", post cystoprostatectomy and ilealconduit, , post bowel resection and reanastomosis, ventral abd wall hernias, bl hip replacements

## 2021-03-29 LAB
ANION GAP SERPL CALC-SCNC: 12 MMOL/L — SIGNIFICANT CHANGE UP (ref 7–14)
ANISOCYTOSIS BLD QL: SLIGHT — SIGNIFICANT CHANGE UP
BASOPHILS # BLD AUTO: 0 K/UL — SIGNIFICANT CHANGE UP (ref 0–0.2)
BASOPHILS NFR BLD AUTO: 0 % — SIGNIFICANT CHANGE UP (ref 0–1)
BUN SERPL-MCNC: 67 MG/DL — CRITICAL HIGH (ref 10–20)
BURR CELLS BLD QL SMEAR: PRESENT — SIGNIFICANT CHANGE UP
CALCIUM SERPL-MCNC: 8.3 MG/DL — LOW (ref 8.5–10.1)
CHLORIDE SERPL-SCNC: 104 MMOL/L — SIGNIFICANT CHANGE UP (ref 98–110)
CO2 SERPL-SCNC: 21 MMOL/L — SIGNIFICANT CHANGE UP (ref 17–32)
CREAT SERPL-MCNC: 1.1 MG/DL — SIGNIFICANT CHANGE UP (ref 0.7–1.5)
CULTURE RESULTS: SIGNIFICANT CHANGE UP
EOSINOPHIL # BLD AUTO: 0.13 K/UL — SIGNIFICANT CHANGE UP (ref 0–0.7)
EOSINOPHIL NFR BLD AUTO: 0.9 % — SIGNIFICANT CHANGE UP (ref 0–8)
GAS PNL BLDA: SIGNIFICANT CHANGE UP
GIANT PLATELETS BLD QL SMEAR: PRESENT — SIGNIFICANT CHANGE UP
GLUCOSE BLDC GLUCOMTR-MCNC: 198 MG/DL — HIGH (ref 70–99)
GLUCOSE BLDC GLUCOMTR-MCNC: 209 MG/DL — HIGH (ref 70–99)
GLUCOSE BLDC GLUCOMTR-MCNC: 227 MG/DL — HIGH (ref 70–99)
GLUCOSE BLDC GLUCOMTR-MCNC: 260 MG/DL — HIGH (ref 70–99)
GLUCOSE BLDC GLUCOMTR-MCNC: 268 MG/DL — HIGH (ref 70–99)
GLUCOSE BLDC GLUCOMTR-MCNC: 269 MG/DL — HIGH (ref 70–99)
GLUCOSE SERPL-MCNC: 220 MG/DL — HIGH (ref 70–99)
HCT VFR BLD CALC: 28 % — LOW (ref 42–52)
HCT VFR BLD CALC: 31.4 % — LOW (ref 42–52)
HGB BLD-MCNC: 10.1 G/DL — LOW (ref 14–18)
HGB BLD-MCNC: 9.2 G/DL — LOW (ref 14–18)
LYMPHOCYTES # BLD AUTO: 0.97 K/UL — LOW (ref 1.2–3.4)
LYMPHOCYTES # BLD AUTO: 6.9 % — LOW (ref 20.5–51.1)
MACROCYTES BLD QL: SLIGHT — SIGNIFICANT CHANGE UP
MANUAL SMEAR VERIFICATION: SIGNIFICANT CHANGE UP
MCHC RBC-ENTMCNC: 27.7 PG — SIGNIFICANT CHANGE UP (ref 27–31)
MCHC RBC-ENTMCNC: 28 PG — SIGNIFICANT CHANGE UP (ref 27–31)
MCHC RBC-ENTMCNC: 32.2 G/DL — SIGNIFICANT CHANGE UP (ref 32–37)
MCHC RBC-ENTMCNC: 32.9 G/DL — SIGNIFICANT CHANGE UP (ref 32–37)
MCV RBC AUTO: 85.4 FL — SIGNIFICANT CHANGE UP (ref 80–94)
MCV RBC AUTO: 86.3 FL — SIGNIFICANT CHANGE UP (ref 80–94)
METAMYELOCYTES # FLD: 0.9 % — HIGH (ref 0–0)
MONOCYTES # BLD AUTO: 1.59 K/UL — HIGH (ref 0.1–0.6)
MONOCYTES NFR BLD AUTO: 11.3 % — HIGH (ref 1.7–9.3)
NEUTROPHILS # BLD AUTO: 11.02 K/UL — HIGH (ref 1.4–6.5)
NEUTROPHILS NFR BLD AUTO: 77.4 % — HIGH (ref 42.2–75.2)
NEUTS BAND # BLD: 0.9 % — SIGNIFICANT CHANGE UP (ref 0–6)
NRBC # BLD: 1 /100 — HIGH (ref 0–0)
NRBC # BLD: 7 /100 WBCS — HIGH (ref 0–0)
NRBC # BLD: SIGNIFICANT CHANGE UP /100 WBCS (ref 0–0)
PLAT MORPH BLD: NORMAL — SIGNIFICANT CHANGE UP
PLATELET # BLD AUTO: 245 K/UL — SIGNIFICANT CHANGE UP (ref 130–400)
PLATELET # BLD AUTO: 250 K/UL — SIGNIFICANT CHANGE UP (ref 130–400)
POIKILOCYTOSIS BLD QL AUTO: SIGNIFICANT CHANGE UP
POLYCHROMASIA BLD QL SMEAR: SIGNIFICANT CHANGE UP
POTASSIUM SERPL-MCNC: 5.1 MMOL/L — HIGH (ref 3.5–5)
POTASSIUM SERPL-SCNC: 5.1 MMOL/L — HIGH (ref 3.5–5)
RBC # BLD: 3.28 M/UL — LOW (ref 4.7–6.1)
RBC # BLD: 3.64 M/UL — LOW (ref 4.7–6.1)
RBC # FLD: 14.9 % — HIGH (ref 11.5–14.5)
RBC # FLD: 15.2 % — HIGH (ref 11.5–14.5)
RBC BLD AUTO: ABNORMAL
SODIUM SERPL-SCNC: 137 MMOL/L — SIGNIFICANT CHANGE UP (ref 135–146)
SPECIMEN SOURCE: SIGNIFICANT CHANGE UP
VARIANT LYMPHS # BLD: 1.7 % — SIGNIFICANT CHANGE UP (ref 0–5)
WBC # BLD: 14.07 K/UL — HIGH (ref 4.8–10.8)
WBC # BLD: 16.81 K/UL — HIGH (ref 4.8–10.8)
WBC # FLD AUTO: 14.07 K/UL — HIGH (ref 4.8–10.8)
WBC # FLD AUTO: 16.81 K/UL — HIGH (ref 4.8–10.8)

## 2021-03-29 PROCEDURE — 93010 ELECTROCARDIOGRAM REPORT: CPT

## 2021-03-29 RX ORDER — INSULIN GLARGINE 100 [IU]/ML
25 INJECTION, SOLUTION SUBCUTANEOUS AT BEDTIME
Refills: 0 | Status: DISCONTINUED | OUTPATIENT
Start: 2021-03-29 | End: 2021-03-30

## 2021-03-29 RX ORDER — AMLODIPINE BESYLATE 2.5 MG/1
10 TABLET ORAL DAILY
Refills: 0 | Status: DISCONTINUED | OUTPATIENT
Start: 2021-03-29 | End: 2021-03-31

## 2021-03-29 RX ORDER — PANTOPRAZOLE SODIUM 20 MG/1
8 TABLET, DELAYED RELEASE ORAL
Qty: 80 | Refills: 0 | Status: DISCONTINUED | OUTPATIENT
Start: 2021-03-29 | End: 2021-03-30

## 2021-03-29 RX ORDER — ACETAMINOPHEN 500 MG
650 TABLET ORAL EVERY 6 HOURS
Refills: 0 | Status: DISCONTINUED | OUTPATIENT
Start: 2021-03-29 | End: 2021-04-20

## 2021-03-29 RX ORDER — INSULIN LISPRO 100/ML
10 VIAL (ML) SUBCUTANEOUS
Refills: 0 | Status: ACTIVE | OUTPATIENT
Start: 2021-03-29 | End: 2022-02-25

## 2021-03-29 RX ORDER — INSULIN GLARGINE 100 [IU]/ML
25 INJECTION, SOLUTION SUBCUTANEOUS EVERY MORNING
Refills: 0 | Status: DISCONTINUED | OUTPATIENT
Start: 2021-03-29 | End: 2021-03-30

## 2021-03-29 RX ADMIN — Medication 6: at 16:56

## 2021-03-29 RX ADMIN — Medication 10 UNIT(S): at 16:57

## 2021-03-29 RX ADMIN — INSULIN GLARGINE 25 UNIT(S): 100 INJECTION, SOLUTION SUBCUTANEOUS at 11:33

## 2021-03-29 RX ADMIN — ATORVASTATIN CALCIUM 80 MILLIGRAM(S): 80 TABLET, FILM COATED ORAL at 21:09

## 2021-03-29 RX ADMIN — Medication 8 UNIT(S): at 06:32

## 2021-03-29 RX ADMIN — Medication 4: at 11:34

## 2021-03-29 RX ADMIN — Medication 101.6 MILLIGRAM(S): at 06:09

## 2021-03-29 RX ADMIN — Medication 10 UNIT(S): at 11:34

## 2021-03-29 RX ADMIN — ENOXAPARIN SODIUM 120 MILLIGRAM(S): 100 INJECTION SUBCUTANEOUS at 06:10

## 2021-03-29 RX ADMIN — PANTOPRAZOLE SODIUM 10 MG/HR: 20 TABLET, DELAYED RELEASE ORAL at 16:44

## 2021-03-29 RX ADMIN — AMLODIPINE BESYLATE 10 MILLIGRAM(S): 2.5 TABLET ORAL at 06:07

## 2021-03-29 RX ADMIN — Medication 101.6 MILLIGRAM(S): at 13:47

## 2021-03-29 RX ADMIN — Medication 100 MILLIGRAM(S): at 06:10

## 2021-03-29 RX ADMIN — INSULIN GLARGINE 25 UNIT(S): 100 INJECTION, SOLUTION SUBCUTANEOUS at 21:09

## 2021-03-29 RX ADMIN — Medication 101.6 MILLIGRAM(S): at 21:09

## 2021-03-29 RX ADMIN — Medication 4: at 06:32

## 2021-03-29 RX ADMIN — PANTOPRAZOLE SODIUM 40 MILLIGRAM(S): 20 TABLET, DELAYED RELEASE ORAL at 06:11

## 2021-03-29 NOTE — PROGRESS NOTE ADULT - SUBJECTIVE AND OBJECTIVE BOX
CARMELAROSA MARIA  80y, Male    All available historical data reviewed    OVERNIGHT EVENTS:  no fevers  NRB    ROS:  General: Denies rigors, nightsweats  HEENT: Denies headache, rhinorrhea, sore throat, eye pain  CV: Denies CP, palpitations  PULM: Denies wheezing, hemoptysis  GI: Denies hematemesis, hematochezia, melena  : Denies discharge, hematuria  MSK: Denies arthralgias, myalgias  SKIN: Denies rash, lesions  NEURO: Denies paresthesias, weakness  PSYCH: Denies depression, anxiety    VITALS:  T(F): 97.6, Max: 97.6 (03-29-21 @ 12:07)  HR: 87  BP: 97/56  RR: 20Vital Signs Last 24 Hrs  T(C): 36.4 (29 Mar 2021 12:07), Max: 36.4 (29 Mar 2021 12:07)  T(F): 97.6 (29 Mar 2021 12:07), Max: 97.6 (29 Mar 2021 12:07)  HR: 87 (29 Mar 2021 12:07) (72 - 87)  BP: 97/56 (29 Mar 2021 12:07) (97/56 - 131/65)  BP(mean): 79 (29 Mar 2021 08:00) (79 - 92)  RR: 20 (29 Mar 2021 12:07) (18 - 25)  SpO2: 100% (29 Mar 2021 12:07) (90% - 100%)    TESTS & MEASUREMENTS:                        10.1   14.07 )-----------( 250      ( 29 Mar 2021 08:21 )             31.4     03-29    137  |  104  |  67<HH>  ----------------------------<  220<H>  5.1<H>   |  21  |  1.1    Ca    8.3<L>      29 Mar 2021 08:21          Culture - Blood (collected 03-24-21 @ 07:54)  Source: .Blood None  Preliminary Report (03-25-21 @ 18:01):    No growth to date.    Culture - Blood (collected 03-22-21 @ 18:00)  Source: .Blood Blood  Final Report (03-28-21 @ 02:33):    No Growth Final            RADIOLOGY & ADDITIONAL TESTS:  Personal review of radiological diagnostics performed  Echo and EKG results noted when applicable.     MEDICATIONS:  acetaminophen   Tablet .. 650 milliGRAM(s) Oral every 6 hours PRN  amLODIPine   Tablet 10 milliGRAM(s) Oral daily  atorvastatin 80 milliGRAM(s) Oral at bedtime  chlorhexidine 4% Liquid 1 Application(s) Topical <User Schedule>  dexAMETHasone  IVPB 8 milliGRAM(s) IV Intermittent every 8 hours  enoxaparin Injectable 120 milliGRAM(s) SubCutaneous every 12 hours  guaifenesin/dextromethorphan  Syrup 10 milliLiter(s) Oral every 6 hours PRN  influenza   Vaccine 0.5 milliLiter(s) IntraMuscular once  insulin glargine Injectable (LANTUS) 25 Unit(s) SubCutaneous every morning  insulin glargine Injectable (LANTUS) 25 Unit(s) SubCutaneous at bedtime  insulin lispro (ADMELOG) corrective regimen sliding scale   SubCutaneous three times a day before meals  insulin lispro Injectable (ADMELOG) 10 Unit(s) SubCutaneous three times a day before meals  melatonin 5 milliGRAM(s) Oral at bedtime  metoprolol succinate  milliGRAM(s) Oral daily  pantoprazole    Tablet 40 milliGRAM(s) Oral before breakfast  polyethylene glycol 3350 17 Gram(s) Oral daily  senna 2 Tablet(s) Oral at bedtime      ANTIBIOTICS:

## 2021-03-29 NOTE — ADVANCED PRACTICE NURSE CONSULT - RECOMMEDATIONS
Pouch change: 	  -Gently remove pouch water moistened gauze   -Cleanse stoma site with water moistened gauze, gently pat dry  -Peristomal skin-apply Stella Adapt stoma powder (#3653), dust off excess, then seal w/ Cavilon no-sting barrier film (#3135)  -Measure stoma, currently 1 1/2"  -Trace and cut current size on Stella 2 1/4” CeraPlus convex barrier (#13917)  -Stretch Stella Adapt CeraRing (#2281) onto back of barrier  -Apply barrier to patient's skin  -Attach Firth 2 1/4" urostomy pouch (#01907) onto barrier;  keep pouched connected to bedside drainage via Firth urostomy adaptor (#8515); when/if pouch disconnected from bedside drainage, empty pouch when 1/3 to no more than ½ full of urine. Change pouching system q 3 - 4 days and prn for leaking. Next pouch change-Thurs 4/1-to be performed by RN staff while patient remains in-patient.     Plan of Care: Patient's d/c pending at this time. No further needs/recs from WO service, routine pouch changes to be performed by staff RN while patient remains in-patient. Questions or concerns or pouch w/ consistent leakage and unable to obtain seal, please consult WOCN, Spectra #[id number]. If pouch leaks after [facility] service hours, please repouch using supplies and technique as indicated above and document where leakage occurred so system can be modified by WOCN if needed. Primary RN Liza made aware.      Pouch change: 	  -Gently remove pouch water moistened gauze   -Cleanse stoma site with water moistened gauze, gently pat dry  -Peristomal skin-apply Stella Adapt stoma powder (#8582), dust off excess, then seal w/ Cavilon no-sting barrier film (#4396)  -Measure stoma, currently 1 1/2"  -Trace and cut current size on Stella 2 1/4” CeraPlus convex barrier (#48935)  -Stretch Stella Adapt CeraRing (#7347) onto back of barrier  -Apply barrier to patient's skin  -Attach Hitchins 2 1/4" urostomy pouch (#84547) onto barrier;  keep pouched connected to bedside drainage via Hitchins urostomy adaptor (#0303); when/if pouch disconnected from bedside drainage, empty pouch when 1/3 to no more than ½ full of urine. Change pouching system q 3 - 4 days and prn for leaking. Next pouch change-Thurs 4/1-to be performed by RN staff while patient remains in-patient.     Plan of Care: Patient's d/c pending at this time. No further needs/recs from WOCN service, routine pouch changes to be performed by staff RN while patient remains in-patient, all ostomy supplies utilized at pt's bedside. Questions or concerns or pouch w/ consistent leakage and unable to obtain seal, please consult WOCN, Spectra #[id number]. If pouch leaks after WO service hours, please repouch using supplies and technique as indicated above and document where leakage occurred so system can be modified by WOCN if needed. Primary RN Liza made aware.

## 2021-03-29 NOTE — CONSULT NOTE ADULT - SUBJECTIVE AND OBJECTIVE BOX
Gastroenterology Consultation:    Patient is a 80y old  Male who presents with a chief complaint of acute hypoxemic respiratory failure secondary to COVID-19 pneumonia (29 Mar 2021 13:21)      Admitted on: 03-12-21  HPI:  Patient is a 81yo male with PMH of hypertension, hyperlipidemia, diabetes mellitus type 2, atrial fibrillation on Eliquis, CKD stage 3A, bladder cancer s/p cystectomy with urostomy, and bilateral hip and knee arthroplasties who was brought in by EMS for hypoxia. The patient states that his daughter and her boyfriend who reside in the same home as him and his wife recently tested positive for COVID-19. He and his wife tested positive for COVID-19 on 3/4/2021, but their symptoms did not start until 3/6/2021. He started feeling "beat down" and "tired all the time" to the point where he had difficulty getting up. He also endorses cough, shortness of breath, and body aches. He denies chest pain, abdominal pain, nausea, vomiting, constipation, or diarrhea. Patient is being actively treated for COVID pneumonia on 5 L NC with 93-95% sat, DEXA, sp TOCI, off Abx . GI consulted for gradual drop in HGB . No melena, no fresh blood per rectum , no hematemesis . Patient with green stool this am       Prior records Reviewed (Y/N): Y   History obtained from person other than patient (Y/N): Y        PAST MEDICAL & SURGICAL HISTORY:  Chronic kidney disease (CKD)  stage 3A, baseline creatinine 1.4    Bladder cancer  secondary to exposure at [place] 9/11 s/p cystectomy with urostomy    History of atrial fibrillation    Diabetes mellitus, type 2    Hyperlipidemia    Hypertension    History of total cystectomy  with resultant urostomy    History of total knee replacement, bilateral    History of total hip replacement, bilateral        FAMILY HISTORY: No pertinent family history         Home Medications:  amlodipine-benazepril 10 mg-40 mg oral capsule: 1 cap(s) orally once a day (12 Mar 2021 16:08)  Eliquis 5 mg oral tablet: 1 tab(s) orally 2 times a day (12 Mar 2021 16:08)  glimepiride 2 mg oral tablet: 1 tab(s) orally once a day (12 Mar 2021 16:08)  Lantus 100 units/mL subcutaneous solution: 40 unit(s) subcutaneous once a day (12 Mar 2021 16:08)  metoprolol succinate 100 mg oral tablet, extended release: 1 tab(s) orally once a day (12 Mar 2021 16:08)  Ozempic (1 mg dose) 2 mg/1.5 mL subcutaneous solution:  (12 Mar 2021 16:08)  Percocet 7.5/325 oral tablet: 0.5 tab(s) orally once a day (at bedtime) (12 Mar 2021 16:08)  potassium citrate 10 mEq oral tablet, extended release: 1 tab(s) orally once a day (12 Mar 2021 16:08)  rosuvastatin 20 mg oral tablet: 1 tab(s) orally once a day (12 Mar 2021 16:08)    MEDICATIONS  (STANDING):  amLODIPine   Tablet 10 milliGRAM(s) Oral daily  atorvastatin 80 milliGRAM(s) Oral at bedtime  chlorhexidine 4% Liquid 1 Application(s) Topical <User Schedule>  dexAMETHasone  IVPB 8 milliGRAM(s) IV Intermittent every 8 hours  enoxaparin Injectable 120 milliGRAM(s) SubCutaneous every 12 hours  influenza   Vaccine 0.5 milliLiter(s) IntraMuscular once  insulin glargine Injectable (LANTUS) 25 Unit(s) SubCutaneous every morning  insulin glargine Injectable (LANTUS) 25 Unit(s) SubCutaneous at bedtime  insulin lispro (ADMELOG) corrective regimen sliding scale   SubCutaneous three times a day before meals  insulin lispro Injectable (ADMELOG) 10 Unit(s) SubCutaneous three times a day before meals  melatonin 5 milliGRAM(s) Oral at bedtime  metoprolol succinate  milliGRAM(s) Oral daily  pantoprazole Infusion 8 mG/Hr (10 mL/Hr) IV Continuous <Continuous>  polyethylene glycol 3350 17 Gram(s) Oral daily  senna 2 Tablet(s) Oral at bedtime    MEDICATIONS  (PRN):  acetaminophen   Tablet .. 650 milliGRAM(s) Oral every 6 hours PRN Temp greater or equal to 38C (100.4F), Mild Pain (1 - 3)  guaifenesin/dextromethorphan  Syrup 10 milliLiter(s) Oral every 6 hours PRN Cough      Allergies  No Known Allergies      Review of Systems:   Constitutional:  No Fever, No Chills  ENT/Mouth:  No Hearing Changes,  No Difficulty Swallowing  Eyes:  No Eye Pain, No Vision Changes  Cardiovascular:  No Chest Pain now , No Palpitations now   Respiratory:  + Cough,+ Dyspnea  Gastrointestinal:  As described in HPI  Musculoskeletal:  No Joint Swelling, No Back Pain  Skin:  No Skin Lesions, No Jaundice  Neuro:  No Syncope, No Dizziness  Heme/Lymph:  No Bruising, No Bleeding.          Physical Examination:  T(C): 36.4 (03-29-21 @ 12:07), Max: 36.4 (03-29-21 @ 12:07)  HR: 87 (03-29-21 @ 12:07) (72 - 87)  BP: 97/56 (03-29-21 @ 12:07) (97/56 - 128/64)  RR: 20 (03-29-21 @ 12:07) (18 - 25)  SpO2: 100% (03-29-21 @ 12:07) (90% - 100%)      03-27-21 @ 07:01  -  03-28-21 @ 07:00  --------------------------------------------------------  IN: 600 mL / OUT: 2700 mL / NET: -2100 mL    03-28-21 @ 07:01  -  03-29-21 @ 07:00  --------------------------------------------------------  IN: 750 mL / OUT: 800 mL / NET: -50 mL    03-29-21 @ 07:01  -  03-29-21 @ 15:17  --------------------------------------------------------  IN: 0 mL / OUT: 450 mL / NET: -450 mL        Constitutional: No acute distress.  Eyes:. Conjunctivae are clear, Sclera is non-icteric.  Ears Nose and Throat: The external ears are normal appearing,  Oral mucosa is pink and moist.  Respiratory:  No signs of respiratory distress. Lung sounds are clear bilaterally.  Cardiovascular:  S1 S2, Regular rate and rhythm.  GI: Abdomen is soft, symmetric, and non-tender without distention.  Bowel sounds are present and normoactive in all four quadrants. No masses, hepatomegaly, or splenomegaly are noted.   Neuro: No Tremor, No involuntary movements  Skin: No rashes, No Jaundice.          Data: (reviewed by attending)                        10.1   14.07 )-----------( 250      ( 29 Mar 2021 08:21 )             31.4     Hgb Trend:  10.1  03-29-21 @ 08:21  11.8  03-28-21 @ 08:13  12.2  03-27-21 @ 08:13        03-29    137  |  104  |  67<HH>  ----------------------------<  220<H>  5.1<H>   |  21  |  1.1    Ca    8.3<L>      29 Mar 2021 08:21      Liver panel trend:  TBili 0.9   /   AST 58   /   ALT 72   /   AlkP 87   /   Tptn 6.3   /   Alb 2.9    /   DBili --      03-24  TBili 1.4   /   AST 56   /   ALT 68   /   AlkP 78   /   Tptn 6.0   /   Alb 2.8    /   DBili --      03-23  TBili 1.4   /   AST 54   /   ALT 71   /   AlkP 70   /   Tptn 6.0   /   Alb 2.8    /   DBili --      03-22  TBili 1.2   /   AST 65   /   ALT 83   /   AlkP 68   /   Tptn 6.1   /   Alb 3.0    /   DBili --      03-21  TBili 1.3   /   AST 88   /   ALT 99   /   AlkP 63   /   Tptn 6.1   /   Alb 3.1    /   DBili --      03-20              Radiology:(reviewed by attending)

## 2021-03-29 NOTE — ADVANCED PRACTICE NURSE CONSULT - ASSESSMENT
79yo male with PMH of hypertension, hyperlipidemia, diabetes mellitus type 2, atrial fibrillation on Eliquis, CKD stage 3A, bladder cancer s/p cystectomy with urostomy, and bilateral hip and knee arthroplasties who was brought in by EMS (3/12) for hypoxia. The patient states that his daughter and her boyfriend who reside in the same home as him and his wife recently tested positive for COVID-19. He and his wife tested positive for COVID-19 on 3/4/2021, but their symptoms did not start until 3/6/2021.  In the ED, vital signs were Tmax 100.9F, , /66, RR 20, and SpO2 95% on 4L. Labs were significant for creatinine 1.7 (baseline 1.4), troponin 0.02, lactate 2.3, elevated LFTs, and D-dimer 1721.   Currently admitted to medicine with the primary diagnosis of COVID-19. Patient in CEU, being managed for  AHRF 2/2 COVID-19 pneumonia complicated by JARETH; LORRAINE on CKDIIIA - resolved; Paroxysmal AFib - rate controlled; HLD; HTN; Hx of bilateral TKR, THR;  Bladder Ca s/p cystectomy with urostomy.  Hospital day 17; Pt remained on O2 NC 5L overnight, placed on NRB this morning as he was desaturating below 90%. States that he feels well and denies difficulty breathing. Continues to have blood and clots in phillips    Received patient in CEU, sitting up in bed, awake, A&Ox3, non-rebreather in place, patient with recognition of WOCN from previous visit, made aware of purpose of this WOCN visit, agreeable to consult. Ileal conduit to RUQ w/ Stella [facility] urostomy pouching system applied by WOCN on Thurs 3/25 still in place, barrier intact, connected to bedside drainage. Pouch disconnected from bedside drainage & pouching system gently removed from pt's abdomen w/ water moistened gauze.     Type of ostomy: ileal conduit/urostomy   Location: RUQ  Judd: n/a  Size: slightly oval in shape, still able to be rounded out to 1 1/2"  Mucosa: red, moist, minimally budded, no lomger friable as during previous assessment, convex test remains positive    Peristomal skin: brown hyperpigmentation w/ small nodule like bumps & macerated skin circumferentially presenting as pseudoverrucous -much improved skin previous assessment   Mucocutaneous junction: intact   Abdominal contours: rotund, semi-soft   Output: dark malaika-maroon-blood tinged colored urine emptied from removed pouch, light pink urine actively draining from stoma during WOCN visit, no clots present   Midline/lap sites/ drains: n/a    Peristomal skin crusted w/ Stella Adapt stoma powder #([id number]) & Cavilon no-sting barrier film (4868). Patient re-pouched w/ [place] 2 1/4" CeraPlus convex barrier #01776 (cut to 1 1/2"), [place]wick Adapt flat CeraRing #[id number], and [place]  2 1/4” urostomy pouch #[id number], pouch reconnected to bedside drainage w/ Stella Adaptor #[id number].     Impression:   Ileal conduit/urostomy to RUQ-given pt's stomal characteristics and abdominal topography, pt requires convex pouching system    Again discussed w/ patient rationale for utilizing convex barrier; patient verbalized understanding of info given.

## 2021-03-29 NOTE — PROGRESS NOTE ADULT - ASSESSMENT
79yo male with PMH of hypertension, hyperlipidemia, diabetes mellitus type 2, atrial fibrillation on Eliquis, CKD stage 3A, bladder cancer s/p cystectomy with urostomy, and bilateral hip and knee arthroplasties who was brought in by EMS for hypoxia. Patient had tested positive for COVID-19 on 3/4/2021, but symptoms did not begin until 3/6/2021. He was found to be hypoxic to 85% on room air.    IMPRESSION;  #COVID 19 with severe illness. SpO2 < 94% on RA and need for supplemental O2.    Pt is in the late inflammatory response phase ot the illness based on the onset of symptoms.    CXR opacities b/l    COVID-19 Nucleocapsid JAMSHID AB Interp: Positive (03-24-21 @ 11:00)  < from: CT Angio Chest PE Protocol w/ IV Cont (03.22.21 @ 22:32) >  No evidence of central pulmonary embolus. Evaluation of the segmental and subsegmental branches is limited by the motion artifact, therefore a small peripheral pulmonary embolism cannot be excluded.  There are scattered peripheral ground glass opacities and focal consolidations throughout both lung fields consistent with COVID-19 pneumonia.   Ferritin, Serum: 462 ng/mL (03.16.21 @ 07:07)    Hx of bladder ca s/p cystectomy   : no acute urologic intervention at this time    - s/p 3/23 Tocilizumab >90kg 800mg x1     RECOMMENDATIONS;  -off ABx  -no bacterial PNA  - Procalcitonin, Serum: 0.11

## 2021-03-29 NOTE — PROGRESS NOTE ADULT - SUBJECTIVE AND OBJECTIVE BOX
Progress Note    Subjective Pt seen and examined at bedside   79 y/o M with a history of bladder ca s/p cystectomy, ileoureterostomy in 2015 with Dr. Benton currently a/w COVID+ PNA; urology following for hematuria. Pt currently with gross hematuria, conduit draining bloody urine with small clots, Pt offers no new complaints    [x ] a 10 point review of systems was negative except where noted    Vital signs  T(C): , Max: 36.2 (03-28-21 @ 10:00)  HR: 85 (03-29-21 @ 08:00)  BP: 109/61 (03-29-21 @ 08:00)  SpO2: 90% (03-29-21 @ 08:44)    Gen NC/AT in NAD  SKIN warm, dry with good tugor Pt on nasal O2  Neck supple, FROM  LUNGS No dyspnea, No accessory muscle use  BACK No CVAT  ABD    Soft non tender, bladder not palpable  conduit draining bloody urine with few small clots        Labs                        11.8   10.03 )-----------( 207      ( 28 Mar 2021 08:13 )             35.6     28 Mar 2021 08:13    136    |  105    |  51     ----------------------------<  184    4.9     |  19     |  1.0      Ca    8.3        28 Mar 2021 08:13

## 2021-03-29 NOTE — PROGRESS NOTE ADULT - SUBJECTIVE AND OBJECTIVE BOX
ROSA MARIA CASEY  Patient is a 80y old  Male who presents with a chief complaint of acute hypoxic RF due to Covid Viral syndrome. Underlying Hx of DMII, DLD, MO,  JARETH, bladder ca, sp urostomy, Hx of 911 exposure)OA, DDD, DJD,GERD, diverticulosis, abd wall hernias. bowel resection and reanastomosis.      Overnight events:  over the weekend pt dec to 5 L NC, overnight had desat to 90, tx with NRM the transitioned to again HFNC with pulse ox of 98%, , cont with hematuria, dec H/H over the last few days  13 to 10 probably due to daily bloods and the hematuria, lovenox and Eliquis on hold  Uro ff, GI consulted recommend  CT of abd to R/O retroperitoneal bleed, pt overall feels well    T:  96.2  HR:   80  BP:  124/66    RR: 20  Pulse ox:  NRM  to HFNC 98%        PHYSICAL EXAM:  GENERAL: A&O, overweight, NAD on HFNC  Neck:  short,  thick but supple, no JVD, no bruit, no stridor  Lungs:  shallow resp, scattered rhonchi, no wheezing, improving air entry  Abd:   distended soft and benign, + BS  Urinary:  RLQ urostomy, reddish urine  Ext:  arthritic changes, moves all limbs, nonpitting edema  Skin:  no rash  Psych stable    LABS                        10  14)-----------( 250            31      137 |  104  |  67  ----------------------------<220  5.1  |  21    |  1.1    GFR 64, 58, 72, 64  Ca    8.8       Phos  3.4      Mg     2.0        MB 63  trop <0.01 x2  ferritin 535, 462, 737  procal 0.26, 0.11  CRP 25, 180  COVID AB + 85.60    TPro  5.9<L>  /  Alb  3.2<L>  /  TBili  0.9  /  DBili  x   /  AST  45, 88, 88  /  ALT  53, 93, 99  /  AlkPhos  43  03-14      Urinalysis Basic - ( 12 Mar 2021 16:38 )    Color: Yellow / Appearance: Slightly Turbid / S.020 / pH: x  Gluc: x / Ketone: Negative  / Bili: Negative / Urobili: 3 mg/dL   Blood: x / Protein: 30 mg/dL / Nitrite: Negative   Leuk Esterase: Moderate / RBC: 5 /HPF / WBC 90 /HPF   Sq Epi: x / Non Sq Epi: 0 /HPF / Bacteria: Ma       RADIOLOGY & ADDITIONAL TESTS:  Venous duplex:  negative for DVT  CXR:  BL opacities L>R  CXR:  stable BL opacities    CT angio:  neg for central PE    CT abd/pelvis:  BIbasilar densities, hepatobiliary/spleen/pancreas WNL, mild BL adrenal thickening, no hydro, bl renal cysts, bl layering densities/ sm stones, "milk of ca", post cystoprostatectomy and ilealconduit, , post bowel resection and reanastomosis, ventral abd wall hernias, bl hip replacements

## 2021-03-29 NOTE — CONSULT NOTE ADULT - ASSESSMENT
Patient is a 79yo male with PMH of hypertension, hyperlipidemia, diabetes mellitus type 2, atrial fibrillation on Eliquis, CKD stage 3A, bladder cancer s/p cystectomy with urostomy, and bilateral hip and knee arthroplasties who was brought in by EMS for hypoxia. Patient is being actively treated for COVID pneumonia on 5 L NC with 93-95% sat, DEXA, sp TOCI, off Abx . GI consulted for gradual drop in HGB . No melena, no fresh blood per rectum , no hematemesis . Patient with green stool this am.      #acute normocytic anemia :  drop in HGB from 13 to 10 over several days   No gross GI bleed reported   patient with green bowel movement this am  HD stable     REC:  no acute GI intervention for now   patient might benefit from CT abdomen and pelvis non contrast to r/o retroperitoneal bleed   please call back if any signs of gross GI bleed   Protonix  40 mg po BID   Serial CBC and keep HGB above 8 Patient is a 79yo male with PMH of hypertension, hyperlipidemia, diabetes mellitus type 2, atrial fibrillation on Eliquis, CKD stage 3A, bladder cancer s/p cystectomy with urostomy, and bilateral hip and knee arthroplasties who was brought in by EMS for hypoxia. Patient is being actively treated for COVID pneumonia on 5 L NC with 93-95% sat, DEXA, sp TOCI, off Abx . GI consulted for gradual drop in HGB . No melena, no fresh blood per rectum , no hematemesis . Patient with green stool this am.      #acute normocytic anemia :  drop in HGB from 13 to 10 over several days /patient with hematuria  No gross GI bleed reported   patient with green bowel movement this am  HD stable   #hypoxia / COVID pneumonia on high flow O2     REC:  no acute GI intervention for now   patient might benefit from CT abdomen and pelvis non contrast to r/o retroperitoneal bleed   please call back if any signs of gross GI bleed   Protonix  40 mg po BID   Serial CBC and keep HGB above 8

## 2021-03-29 NOTE — PROGRESS NOTE ADULT - ASSESSMENT
81yo M c PMHx chronic afib on noac, htn, dm2, ckd3 bladder ca s/p urostomy, b/l hip/ knee replacements obesity, here with acute hypoxic respiratory failure 2/2 severe covid 19 viral pneumonia, lorraine on ckd3 now better, elevated ddimer    Sepsis o admission  Covid viral PNA. acute hypoxic RF  underlying JARETH, MO  LORRAINE on CKD  Transaminitis  Hx of HTN, ASHD, Afib/Eliquis  DM II, CKD  DLD  Bladder ca, sp urostomy, sp cystoprostatectomy, exposure to 911  OA, DDD, DJD, sp BL hip and knee surgeries, mobility dysfunction  GERD, HH, diverticulosis, bowel resection and reanastomosis      remains in CEU, was dec to 5L with desat, placed on NRM the returned to HFNC with 98%bsat  offer CPAP at HS   spirometry to exercise lungs   noted to have dec in H/H over the last few days 13 to 10, was on Lovenox and Eliquis, now on hold, + hematuria also daily blood labs, will cont to monitor  GI;  no intervention, may repeat CT of abd/pelvis to R/O retroperitoneal bleed  pt had change of ostomy bag, noted to have hematuria  Uro ff no acute intervention  CT of abd:  layering of material within both kidneys ( sm stones, "milk of calcium" BL renal cysts, no acute pathology  send urine for C&S     Covid ABs are + 85.6  pul-critical care ffup and care appreciated  ID ff up and care appreciated  CXR show BL opacities, low lung volumes    us duplex LE's negative for DVT  elevated DDIMER, CT angio negative for PE  monitor infla parameters q 72 hrs    mild transaminitis  improving    Rehab consult, OOB to chair  pt is a full code

## 2021-03-29 NOTE — CHART NOTE - NSCHARTNOTEFT_GEN_A_CORE
Spoke to son, Jai, answered all questions and gave medical update; states his cell phone number is [phone]. Spoke to daughter, Rain, ([phone]), gave medical update and answered questions.

## 2021-03-29 NOTE — ADVANCED PRACTICE NURSE CONSULT - REASON FOR CONSULT
WOCN consult requested for ileal conduit assessment & pouch change assistance r/t pouch leakage 
Ileal conduit/urostomy stoma & pouch reassessment

## 2021-03-29 NOTE — PROGRESS NOTE ADULT - PROBLEM SELECTOR PLAN 1
urine appears to be clearing.    monitor h/h  Will need definitive treatment of kidney stones as outpatient.
Will need pouchoscopy as outpatient.  hematuria could be due to Kidney stone and antiplatelet medications  h/h stable but need to monitor.  f/up urine cytology
CT scan revealed non-obstructing stones.  Increase PO fluids.  monitor h/h

## 2021-03-29 NOTE — PROGRESS NOTE ADULT - ASSESSMENT
81 yo M with PMHx of AFib, on Eliquis, HTN, DMII, CKDIII, hx of Bladder Ca s/p Urostomy, bilateral hip/knee replacements, obesity admitted for acute hypoxemic respiratory failure due to COVID PNA.    #Acute hypoxemic respiratory failure secondary to COVID-19 pneumonia  - COVID-19 PCR positive  - Isolation precautions (contact, droplet, airborne)  - Chest X-ray 3/26 - stable bilateral opacities  - Patient is saturating 90% on O2 NC 5L and NRB  - s/p Toci 3/23  - C/w Dexamethasone 8 mg q 8  - Incentive spirometry at bedside  - DVT prophylaxis per protocol  - Titrate supplemental O2 to maintain SpO2 92-96%    #CKD III - stable  #Hx of Bladder Ca, s/p Cystoprostatectomy, Urostomy  - Hospital stay complicated by hematuria ir urostomy bag and phillips catheter  - Inc bleeding and blood clots in phillips catheter overnight  - Remains HD stable, Hb stable  - CT Abd obtained - small stones, bilateral renal cysts visualized  - Urology following  - Pt will require management of  renal stones as outpt  - Urine cx pending    #Chronic AFIb on Eliquis  # [id number] Score: 4  - Eliquis on Hold, on full AC Lovenox while in hospital  - C/w Metoprolol succinate 100 mg qd    #HTN  - Well controlled  - C/w Amlodipine    #DMII  - FS elevated, will inc Lantus to 25 BID, Lispro 10 U before meals  - Cont to monitor, inc insulin as needed    DVT ppx: Lovenox 120 mg BID  GI ppx: PPI  Diet: DASH, CCC  Activity: IAT  Full code  Dispo: c/w CEU monitoring, wean off oxygen as tolerated     81 yo M with PMHx of AFib, on Eliquis, HTN, DMII, CKDIII, hx of Bladder Ca s/p Urostomy, bilateral hip/knee replacements, obesity admitted for acute hypoxemic respiratory failure due to COVID PNA.    #Acute hypoxemic respiratory failure secondary to COVID-19 pneumonia  - COVID-19 PCR positive  - Isolation precautions (contact, droplet, airborne)  - Chest X-ray 3/26 - stable bilateral opacities  - Patient is saturating 90% on O2 NC 5L and NRB  - s/p Toci 3/23  - C/w Dexamethasone 8 mg q 8  - Incentive spirometry at bedside  - DVT prophylaxis per protocol  - Titrate supplemental O2 to maintain SpO2 92-96%    #Acute drop in Hb  - Hb gradually dec from 13 to 10  - BUN significantly increasing, may have underlying GI bleed  - Initiated on PPI drip, GI c/s in place    #CKD III - stable  #Hx of Bladder Ca, s/p Cystoprostatectomy, Urostomy  - Hospital stay complicated by hematuria in urostomy bag and phillips catheter  - Inc bleeding and blood clots in phillips catheter overnight  - Remains HD stable  - CT Abd obtained - small stones, bilateral renal cysts visualized  - Urology following  - Pt will require management of  renal stones as outpt  - Urine cx pending    #Chronic AFIb on Eliquis  # [id number] Score: 4  - Eliquis on Hold, on full AC Lovenox while in hospital  - C/w Metoprolol succinate 100 mg qd    #HTN  - Well controlled  - C/w Amlodipine    #DMII  - FS elevated, will inc Lantus to 25 BID, Lispro 10 U before meals  - Cont to monitor, inc insulin as needed    DVT ppx: Lovenox 120 mg BID  GI ppx: PPI  Diet: DASH, CCC  Activity: IAT  Full code  Dispo: c/w CEU monitoring, wean off oxygen as tolerated     79 yo M with PMHx of AFib, on Eliquis, HTN, DMII, CKDIII, hx of Bladder Ca s/p Urostomy, bilateral hip/knee replacements, obesity admitted for acute hypoxemic respiratory failure due to COVID PNA.    #Acute hypoxemic respiratory failure secondary to COVID-19 pneumonia  - COVID-19 PCR positive  - Isolation precautions (contact, droplet, airborne)  - Chest X-ray 3/26 - stable bilateral opacities  - Patient is saturating 90% on O2 NC 5L and NRB  - s/p Toci 3/23  - C/w Dexamethasone 8 mg q 8  - Incentive spirometry at bedside  - DVT prophylaxis per protocol  - Titrate supplemental O2 to maintain SpO2 92-96%    #Acute drop in Hb  - Hb gradually dec from 13 to 10  - BUN significantly increased, suspicion for GI bleed  - Pt has not had any melena or hematochezia, continues to have hematuria  - Per GI, elevated BUN can be attributed by systemic steroids and process of catabolism in setting of likely inadequate nutrition  - Will c/w PPI drip for now, f/u CBC this evening and tomorrow, monitor trend  - Will obtain CT Abd and Pelvis to rule out any acute bleeding, hold full AC Lovenox for now    #CKD III - stable  #Hx of Bladder Ca, s/p Cystoprostatectomy, Urostomy  - Hospital stay complicated by hematuria in urostomy bag and phillips catheter  - Inc bleeding and blood clots in phillips catheter overnight  - Remains HD stable  - CT Abd obtained - small stones, bilateral renal cysts visualized  - Urology following  - Pt will require management of  renal stones as outpt  - Urine cx pending    #Chronic AFIb on Eliquis  # [id number] Score: 4  - Eliquis on Hold, was on full AC Lovenox while in hospital, on hold for now while being worked up for potential acute bleeding  - C/w Metoprolol succinate 100 mg qd    #HTN  - Well controlled  - C/w Amlodipine    #DMII  - FS elevated, will inc Lantus to 25 BID, Lispro 10 U before meals  - Cont to monitor, inc insulin as needed    DVT ppx: Lovenox 120 mg BID on hold  GI ppx: PPI  Diet: DASH, CCC  Activity: IAT  Full code  Dispo: c/w CEU monitoring, wean off oxygen as tolerated

## 2021-03-29 NOTE — PROGRESS NOTE ADULT - SUBJECTIVE AND OBJECTIVE BOX
Patient Information:  ROSA MARIA CASEY / 80y / Male / MRN#:[id number]    Hospital Day: 17d    Interval History:  Patient seen and examined at bedside. Pt remained on O2 NC 5L overnight, placed on NRB this morning as he was desaturating below 90%. States that he feels well and denies difficulty breathing. Continues to have blood and clots in phillips.    Past Medical History:  Chronic kidney disease (CKD)    Bladder cancer    History of atrial fibrillation    Diabetes mellitus, type 2    Hyperlipidemia    Hypertension      Past Surgical History:  History of total cystectomy    History of total knee replacement, bilateral    History of total hip replacement, bilateral      Allergies:  No Known Allergies    Medications:  PRN:  acetaminophen   Tablet .. 650 milliGRAM(s) Oral every 6 hours PRN Temp greater or equal to 38C (100.4F)  acetaminophen   Tablet .. 650 milliGRAM(s) Oral every 6 hours PRN Mild Pain (1 - 3)  guaifenesin/dextromethorphan  Syrup 10 milliLiter(s) Oral every 6 hours PRN Cough    Standing:  amLODIPine   Tablet 10 milliGRAM(s) Oral daily  atorvastatin 80 milliGRAM(s) Oral at bedtime  chlorhexidine 4% Liquid 1 Application(s) Topical <User Schedule>  dexAMETHasone  IVPB 8 milliGRAM(s) IV Intermittent every 8 hours  enoxaparin Injectable 120 milliGRAM(s) SubCutaneous every 12 hours  influenza   Vaccine 0.5 milliLiter(s) IntraMuscular once  insulin glargine Injectable (LANTUS) 45 Unit(s) SubCutaneous at bedtime  insulin lispro (ADMELOG) corrective regimen sliding scale   SubCutaneous three times a day before meals  insulin lispro Injectable (ADMELOG) 10 Unit(s) SubCutaneous three times a day before meals  melatonin 5 milliGRAM(s) Oral at bedtime  metoprolol succinate  milliGRAM(s) Oral daily  pantoprazole    Tablet 40 milliGRAM(s) Oral before breakfast  polyethylene glycol 3350 17 Gram(s) Oral daily  senna 2 Tablet(s) Oral at bedtime  sodium chloride 0.9%. 1000 milliLiter(s) (100 mL/Hr) IV Continuous <Continuous>    Home:  amlodipine-benazepril 10 mg-40 mg oral capsule: 1 cap(s) orally once a day  Eliquis 5 mg oral tablet: 1 tab(s) orally 2 times a day  glimepiride 2 mg oral tablet: 1 tab(s) orally once a day  Lantus 100 units/mL subcutaneous solution: 40 unit(s) subcutaneous once a day  metoprolol succinate 100 mg oral tablet, extended release: 1 tab(s) orally once a day  Ozempic (1 mg dose) 2 mg/1.5 mL subcutaneous solution:   Percocet 7.5/325 oral tablet: 0.5 tab(s) orally once a day (at bedtime)  potassium citrate 10 mEq oral tablet, extended release: 1 tab(s) orally once a day  rosuvastatin 20 mg oral tablet: 1 tab(s) orally once a day    Vitals:  T(C): 35, Max: 36.2 (03-28-21 @ 10:00)  T(F): 95, Max: 97.2 (03-28-21 @ 10:00)  HR: 85 (68 - 85)  BP: 109/61 (109/61 - 131/65)  RR: 25 (18 - 25)  SpO2: 90% (90% - 95%)    Physical Exam:  General: Awake, alert, NAD, sitting up in bed, on O2 NC 5L and NRB  HEENT: Head NC/AT  Heart: RRR; systolic murmur apprec  Lungs: Mild rales in bilateral bases  Abdomen: Soft, nontender, nondistended, BS+, urostomy bag in place, appears clean, no bleeding visualized  Extremities: No edema, clubbing, cyanosis in upper or lower extremities; venous stasis dermatitis skin changes on bilateral lower extremities   Neuro: AAOx3, NFD.    Labs:  CBC (03-28 @ 08:13)                        Hgb: 11.8   WBC: 10.03 )-----------------( Plts: 207                              Hct: 35.6     Chem (03-28 @ 08:13)  Na: 136  |     Cl: 105     |  BUN: 51  -----------------------------------------< Gluc: 184    K: 4.9   |    HCO3: 19  |  Cr: 1.0    Ca 8.3 (03-28 @ 08:13)              Microbiology:  Culture - Blood (collected 03-24 @ 07:54)  Source: .Blood None  Preliminary Report (03-25 @ 18:01):    No growth to date.    Culture - Blood (collected 03-22 @ 18:00)  Source: .Blood Blood  Final Report (03-28 @ 02:33):    No Growth Final

## 2021-03-29 NOTE — PROGRESS NOTE ADULT - ASSESSMENT
79 y/o M with a history of bladder ca s/p cystectomy, ileoureterostomy in 2015 with Dr. Benton currently a/w COVID+ PNA; urology following for hematuria. Pt currently with gross hematuria.    A) Hematuria  Covid 19 PNA  Hx of bladder ca s/p cystectomy    P) Continue present care  No acute urologic intervention at this time  trend Hb  will d/w attending

## 2021-03-30 LAB
ALBUMIN SERPL ELPH-MCNC: 2.9 G/DL — LOW (ref 3.5–5.2)
ALP SERPL-CCNC: 54 U/L — SIGNIFICANT CHANGE UP (ref 30–115)
ALT FLD-CCNC: 33 U/L — SIGNIFICANT CHANGE UP (ref 0–41)
ANION GAP SERPL CALC-SCNC: 14 MMOL/L — SIGNIFICANT CHANGE UP (ref 7–14)
ANION GAP SERPL CALC-SCNC: 16 MMOL/L — HIGH (ref 7–14)
APPEARANCE UR: ABNORMAL
AST SERPL-CCNC: 21 U/L — SIGNIFICANT CHANGE UP (ref 0–41)
BACTERIA # UR AUTO: NEGATIVE — SIGNIFICANT CHANGE UP
BASOPHILS # BLD AUTO: 0.05 K/UL — SIGNIFICANT CHANGE UP (ref 0–0.2)
BASOPHILS NFR BLD AUTO: 0.3 % — SIGNIFICANT CHANGE UP (ref 0–1)
BILIRUB SERPL-MCNC: 0.7 MG/DL — SIGNIFICANT CHANGE UP (ref 0.2–1.2)
BILIRUB UR-MCNC: NEGATIVE — SIGNIFICANT CHANGE UP
BLD GP AB SCN SERPL QL: SIGNIFICANT CHANGE UP
BUN SERPL-MCNC: 101 MG/DL — CRITICAL HIGH (ref 10–20)
BUN SERPL-MCNC: 105 MG/DL — CRITICAL HIGH (ref 10–20)
CALCIUM SERPL-MCNC: 8.1 MG/DL — LOW (ref 8.5–10.1)
CALCIUM SERPL-MCNC: 8.4 MG/DL — LOW (ref 8.5–10.1)
CHLORIDE SERPL-SCNC: 102 MMOL/L — SIGNIFICANT CHANGE UP (ref 98–110)
CHLORIDE SERPL-SCNC: 103 MMOL/L — SIGNIFICANT CHANGE UP (ref 98–110)
CO2 SERPL-SCNC: 17 MMOL/L — SIGNIFICANT CHANGE UP (ref 17–32)
CO2 SERPL-SCNC: 18 MMOL/L — SIGNIFICANT CHANGE UP (ref 17–32)
COLOR SPEC: YELLOW — SIGNIFICANT CHANGE UP
CREAT SERPL-MCNC: 1.8 MG/DL — HIGH (ref 0.7–1.5)
CREAT SERPL-MCNC: 1.8 MG/DL — HIGH (ref 0.7–1.5)
DIFF PNL FLD: ABNORMAL
EOSINOPHIL # BLD AUTO: 0.01 K/UL — SIGNIFICANT CHANGE UP (ref 0–0.7)
EOSINOPHIL NFR BLD AUTO: 0.1 % — SIGNIFICANT CHANGE UP (ref 0–8)
EPI CELLS # UR: 0 /HPF — SIGNIFICANT CHANGE UP (ref 0–5)
FERRITIN SERPL-MCNC: 730 NG/ML — HIGH (ref 30–400)
GLUCOSE BLDC GLUCOMTR-MCNC: 173 MG/DL — HIGH (ref 70–99)
GLUCOSE BLDC GLUCOMTR-MCNC: 263 MG/DL — HIGH (ref 70–99)
GLUCOSE BLDC GLUCOMTR-MCNC: 308 MG/DL — HIGH (ref 70–99)
GLUCOSE BLDC GLUCOMTR-MCNC: 388 MG/DL — HIGH (ref 70–99)
GLUCOSE SERPL-MCNC: 301 MG/DL — HIGH (ref 70–99)
GLUCOSE SERPL-MCNC: 319 MG/DL — HIGH (ref 70–99)
GLUCOSE UR QL: NEGATIVE — SIGNIFICANT CHANGE UP
HCT VFR BLD CALC: 27.1 % — LOW (ref 42–52)
HCT VFR BLD CALC: 28.6 % — LOW (ref 42–52)
HGB BLD-MCNC: 8.9 G/DL — LOW (ref 14–18)
HGB BLD-MCNC: 9.1 G/DL — LOW (ref 14–18)
HYALINE CASTS # UR AUTO: 25 /LPF — HIGH (ref 0–7)
IMM GRANULOCYTES NFR BLD AUTO: 6.4 % — HIGH (ref 0.1–0.3)
IRON SATN MFR SERPL: 178 UG/DL — HIGH (ref 35–150)
IRON SATN MFR SERPL: 58 % — HIGH (ref 15–50)
KETONES UR-MCNC: NEGATIVE — SIGNIFICANT CHANGE UP
LACTATE SERPL-SCNC: 5.1 MMOL/L — CRITICAL HIGH (ref 0.7–2)
LACTATE SERPL-SCNC: 5.1 MMOL/L — CRITICAL HIGH (ref 0.7–2)
LEUKOCYTE ESTERASE UR-ACNC: ABNORMAL
LYMPHOCYTES # BLD AUTO: 12.3 % — LOW (ref 20.5–51.1)
LYMPHOCYTES # BLD AUTO: 2.1 K/UL — SIGNIFICANT CHANGE UP (ref 1.2–3.4)
MAGNESIUM SERPL-MCNC: 2.6 MG/DL — HIGH (ref 1.8–2.4)
MCHC RBC-ENTMCNC: 28.1 PG — SIGNIFICANT CHANGE UP (ref 27–31)
MCHC RBC-ENTMCNC: 28.5 PG — SIGNIFICANT CHANGE UP (ref 27–31)
MCHC RBC-ENTMCNC: 31.8 G/DL — LOW (ref 32–37)
MCHC RBC-ENTMCNC: 32.8 G/DL — SIGNIFICANT CHANGE UP (ref 32–37)
MCV RBC AUTO: 86.9 FL — SIGNIFICANT CHANGE UP (ref 80–94)
MCV RBC AUTO: 88.3 FL — SIGNIFICANT CHANGE UP (ref 80–94)
MONOCYTES # BLD AUTO: 0.53 K/UL — SIGNIFICANT CHANGE UP (ref 0.1–0.6)
MONOCYTES NFR BLD AUTO: 3.1 % — SIGNIFICANT CHANGE UP (ref 1.7–9.3)
NEUTROPHILS # BLD AUTO: 13.28 K/UL — HIGH (ref 1.4–6.5)
NEUTROPHILS NFR BLD AUTO: 77.8 % — HIGH (ref 42.2–75.2)
NITRITE UR-MCNC: NEGATIVE — SIGNIFICANT CHANGE UP
NRBC # BLD: 6 /100 WBCS — HIGH (ref 0–0)
NRBC # BLD: 6 /100 WBCS — HIGH (ref 0–0)
PH UR: 6.5 — SIGNIFICANT CHANGE UP (ref 5–8)
PLATELET # BLD AUTO: 220 K/UL — SIGNIFICANT CHANGE UP (ref 130–400)
PLATELET # BLD AUTO: 245 K/UL — SIGNIFICANT CHANGE UP (ref 130–400)
POTASSIUM SERPL-MCNC: 5.3 MMOL/L — HIGH (ref 3.5–5)
POTASSIUM SERPL-MCNC: 5.8 MMOL/L — HIGH (ref 3.5–5)
POTASSIUM SERPL-SCNC: 5.3 MMOL/L — HIGH (ref 3.5–5)
POTASSIUM SERPL-SCNC: 5.8 MMOL/L — HIGH (ref 3.5–5)
PROCALCITONIN SERPL-MCNC: 0.31 NG/ML — HIGH (ref 0.02–0.1)
PROT SERPL-MCNC: 5.1 G/DL — LOW (ref 6–8)
PROT UR-MCNC: SIGNIFICANT CHANGE UP
RBC # BLD: 3.12 M/UL — LOW (ref 4.7–6.1)
RBC # BLD: 3.24 M/UL — LOW (ref 4.7–6.1)
RBC # FLD: 15.7 % — HIGH (ref 11.5–14.5)
RBC # FLD: 16.2 % — HIGH (ref 11.5–14.5)
RBC CASTS # UR COMP ASSIST: 164 /HPF — HIGH (ref 0–4)
SODIUM SERPL-SCNC: 135 MMOL/L — SIGNIFICANT CHANGE UP (ref 135–146)
SODIUM SERPL-SCNC: 135 MMOL/L — SIGNIFICANT CHANGE UP (ref 135–146)
SP GR SPEC: 1.02 — SIGNIFICANT CHANGE UP (ref 1.01–1.03)
TIBC SERPL-MCNC: 305 UG/DL — SIGNIFICANT CHANGE UP (ref 220–430)
TRANSFERRIN SERPL-MCNC: 251 MG/DL — SIGNIFICANT CHANGE UP (ref 200–360)
UIBC SERPL-MCNC: 127 UG/DL — SIGNIFICANT CHANGE UP (ref 110–370)
UROBILINOGEN FLD QL: SIGNIFICANT CHANGE UP
WBC # BLD: 17.07 K/UL — HIGH (ref 4.8–10.8)
WBC # BLD: 21.93 K/UL — HIGH (ref 4.8–10.8)
WBC # FLD AUTO: 17.07 K/UL — HIGH (ref 4.8–10.8)
WBC # FLD AUTO: 21.93 K/UL — HIGH (ref 4.8–10.8)
WBC UR QL: 84 /HPF — HIGH (ref 0–5)

## 2021-03-30 PROCEDURE — 71045 X-RAY EXAM CHEST 1 VIEW: CPT | Mod: 26

## 2021-03-30 PROCEDURE — 74176 CT ABD & PELVIS W/O CONTRAST: CPT | Mod: 26

## 2021-03-30 PROCEDURE — 99222 1ST HOSP IP/OBS MODERATE 55: CPT

## 2021-03-30 PROCEDURE — 99232 SBSQ HOSP IP/OBS MODERATE 35: CPT

## 2021-03-30 PROCEDURE — 70450 CT HEAD/BRAIN W/O DYE: CPT | Mod: 26

## 2021-03-30 RX ORDER — INSULIN GLARGINE 100 [IU]/ML
30 INJECTION, SOLUTION SUBCUTANEOUS AT BEDTIME
Refills: 0 | Status: ACTIVE | OUTPATIENT
Start: 2021-03-30 | End: 2022-02-26

## 2021-03-30 RX ORDER — INSULIN GLARGINE 100 [IU]/ML
30 INJECTION, SOLUTION SUBCUTANEOUS EVERY MORNING
Refills: 0 | Status: ACTIVE | OUTPATIENT
Start: 2021-03-30 | End: 2022-02-26

## 2021-03-30 RX ORDER — CEFEPIME 1 G/1
2000 INJECTION, POWDER, FOR SOLUTION INTRAMUSCULAR; INTRAVENOUS ONCE
Refills: 0 | Status: COMPLETED | OUTPATIENT
Start: 2021-03-30 | End: 2021-03-30

## 2021-03-30 RX ORDER — SODIUM CHLORIDE 9 MG/ML
500 INJECTION INTRAMUSCULAR; INTRAVENOUS; SUBCUTANEOUS ONCE
Refills: 0 | Status: COMPLETED | OUTPATIENT
Start: 2021-03-30 | End: 2021-03-30

## 2021-03-30 RX ORDER — VANCOMYCIN HCL 1 G
1500 VIAL (EA) INTRAVENOUS EVERY 12 HOURS
Refills: 0 | Status: DISCONTINUED | OUTPATIENT
Start: 2021-03-30 | End: 2021-03-30

## 2021-03-30 RX ORDER — CEFEPIME 1 G/1
2000 INJECTION, POWDER, FOR SOLUTION INTRAMUSCULAR; INTRAVENOUS EVERY 8 HOURS
Refills: 0 | Status: DISCONTINUED | OUTPATIENT
Start: 2021-03-30 | End: 2021-03-30

## 2021-03-30 RX ORDER — SODIUM ZIRCONIUM CYCLOSILICATE 10 G/10G
5 POWDER, FOR SUSPENSION ORAL EVERY 12 HOURS
Refills: 0 | Status: DISCONTINUED | OUTPATIENT
Start: 2021-03-30 | End: 2021-03-31

## 2021-03-30 RX ORDER — DEXAMETHASONE 0.5 MG/5ML
6 ELIXIR ORAL
Refills: 0 | Status: DISCONTINUED | OUTPATIENT
Start: 2021-03-30 | End: 2021-03-31

## 2021-03-30 RX ORDER — CEFEPIME 1 G/1
INJECTION, POWDER, FOR SOLUTION INTRAMUSCULAR; INTRAVENOUS
Refills: 0 | Status: DISCONTINUED | OUTPATIENT
Start: 2021-03-30 | End: 2021-03-30

## 2021-03-30 RX ORDER — DEXAMETHASONE 0.5 MG/5ML
6 ELIXIR ORAL
Refills: 0 | Status: DISCONTINUED | OUTPATIENT
Start: 2021-03-30 | End: 2021-03-30

## 2021-03-30 RX ORDER — VANCOMYCIN HCL 1 G
1500 VIAL (EA) INTRAVENOUS ONCE
Refills: 0 | Status: COMPLETED | OUTPATIENT
Start: 2021-03-30 | End: 2021-03-30

## 2021-03-30 RX ORDER — CEFEPIME 1 G/1
2000 INJECTION, POWDER, FOR SOLUTION INTRAMUSCULAR; INTRAVENOUS EVERY 12 HOURS
Refills: 0 | Status: DISCONTINUED | OUTPATIENT
Start: 2021-03-30 | End: 2021-03-30

## 2021-03-30 RX ORDER — SODIUM CHLORIDE 9 MG/ML
1000 INJECTION INTRAMUSCULAR; INTRAVENOUS; SUBCUTANEOUS
Refills: 0 | Status: DISCONTINUED | OUTPATIENT
Start: 2021-03-30 | End: 2021-03-30

## 2021-03-30 RX ORDER — SODIUM CHLORIDE 9 MG/ML
1000 INJECTION INTRAMUSCULAR; INTRAVENOUS; SUBCUTANEOUS
Refills: 0 | Status: DISCONTINUED | OUTPATIENT
Start: 2021-03-30 | End: 2021-03-31

## 2021-03-30 RX ORDER — PANTOPRAZOLE SODIUM 20 MG/1
40 TABLET, DELAYED RELEASE ORAL EVERY 12 HOURS
Refills: 0 | Status: DISCONTINUED | OUTPATIENT
Start: 2021-03-30 | End: 2021-04-07

## 2021-03-30 RX ADMIN — Medication 10: at 07:57

## 2021-03-30 RX ADMIN — CHLORHEXIDINE GLUCONATE 1 APPLICATION(S): 213 SOLUTION TOPICAL at 05:18

## 2021-03-30 RX ADMIN — Medication 10 UNIT(S): at 07:57

## 2021-03-30 RX ADMIN — Medication 6 MILLIGRAM(S): at 17:17

## 2021-03-30 RX ADMIN — ATORVASTATIN CALCIUM 80 MILLIGRAM(S): 80 TABLET, FILM COATED ORAL at 21:09

## 2021-03-30 RX ADMIN — SENNA PLUS 2 TABLET(S): 8.6 TABLET ORAL at 21:09

## 2021-03-30 RX ADMIN — Medication 8: at 12:09

## 2021-03-30 RX ADMIN — Medication 101.6 MILLIGRAM(S): at 05:19

## 2021-03-30 RX ADMIN — INSULIN GLARGINE 25 UNIT(S): 100 INJECTION, SOLUTION SUBCUTANEOUS at 07:57

## 2021-03-30 RX ADMIN — Medication 10 UNIT(S): at 12:10

## 2021-03-30 RX ADMIN — SODIUM ZIRCONIUM CYCLOSILICATE 5 GRAM(S): 10 POWDER, FOR SUSPENSION ORAL at 05:34

## 2021-03-30 RX ADMIN — SODIUM CHLORIDE 75 MILLILITER(S): 9 INJECTION INTRAMUSCULAR; INTRAVENOUS; SUBCUTANEOUS at 20:47

## 2021-03-30 RX ADMIN — SODIUM ZIRCONIUM CYCLOSILICATE 5 GRAM(S): 10 POWDER, FOR SUSPENSION ORAL at 17:17

## 2021-03-30 RX ADMIN — INSULIN GLARGINE 30 UNIT(S): 100 INJECTION, SOLUTION SUBCUTANEOUS at 21:09

## 2021-03-30 RX ADMIN — Medication 5 MILLIGRAM(S): at 21:09

## 2021-03-30 RX ADMIN — SODIUM CHLORIDE 500 MILLILITER(S): 9 INJECTION INTRAMUSCULAR; INTRAVENOUS; SUBCUTANEOUS at 16:16

## 2021-03-30 RX ADMIN — PANTOPRAZOLE SODIUM 40 MILLIGRAM(S): 20 TABLET, DELAYED RELEASE ORAL at 09:02

## 2021-03-30 RX ADMIN — SODIUM CHLORIDE 75 MILLILITER(S): 9 INJECTION INTRAMUSCULAR; INTRAVENOUS; SUBCUTANEOUS at 07:00

## 2021-03-30 RX ADMIN — CEFEPIME 100 MILLIGRAM(S): 1 INJECTION, POWDER, FOR SOLUTION INTRAMUSCULAR; INTRAVENOUS at 04:15

## 2021-03-30 RX ADMIN — PANTOPRAZOLE SODIUM 40 MILLIGRAM(S): 20 TABLET, DELAYED RELEASE ORAL at 17:17

## 2021-03-30 RX ADMIN — Medication 6: at 16:17

## 2021-03-30 RX ADMIN — SODIUM CHLORIDE 75 MILLILITER(S): 9 INJECTION INTRAMUSCULAR; INTRAVENOUS; SUBCUTANEOUS at 16:16

## 2021-03-30 RX ADMIN — Medication 10 UNIT(S): at 16:17

## 2021-03-30 RX ADMIN — Medication 300 MILLIGRAM(S): at 05:36

## 2021-03-30 RX ADMIN — PANTOPRAZOLE SODIUM 10 MG/HR: 20 TABLET, DELAYED RELEASE ORAL at 07:00

## 2021-03-30 NOTE — PROGRESS NOTE ADULT - SUBJECTIVE AND OBJECTIVE BOX
ROSA MARIA CASEY  Patient is a 80y old  Male who presents with a chief complaint of acute hypoxic RF due to Covid Viral syndrome. Underlying Hx of DMII, DLD, MO,  JARETH, bladder ca, sp urostomy, Hx of 911 exposure)OA, DDD, DJD,GERD, diverticulosis, abd wall hernias. bowel resection and reanastomosis.      Overnight events:  pt in CEU, on HFNC, overnight had episodes of tremors and incoherence, drop in BP 94/55 with elevated WBC 17, 21 and LA 5.1, pt was cultured and given dose of cefepime and Vanco, renal parameters inc BUN/creat 101/1.8 and K 5.8, pt started on IVF, given dose of Lokelma 10mg, CT of H no acute path, EEG done     T:  96.5  HR:   73  BP:  110/62    RR: 20  Pulse ox:  NRM  to HFNC 100%        PHYSICAL EXAM:  GENERAL: A&O, overweight, NAD on HFNC  Neck:  short,  thick but supple, no JVD, no bruit, no stridor  Lungs:  shallow resp, scattered rhonchi, no wheezing, improving air entry  Abd:   distended soft and benign, + BS  Urinary:  RLQ urostomy, reddish urine  Ext:  arthritic changes, moves all limbs, nonpitting edema  Skin:  no rash  Psych stable    LABS                        8.9  21)-----------( 245   WBC 14, 17            27    135 |  103  | 101 (67)  ----------------------------<220  5.1  |  21    | 1.8 (1.1)    GFR 64, 58, 72, 64, 35  Ca    8.8       Phos  3.4      Mg     2.0        MB 63  trop <0.01 x2  ferritin 535, 462, 737  procal 0.26, 0.11  CRP 25, 180  COVID AB + 85.60  3/30 LA 5.1    TPro  5.9<L>  /  Alb  3.2<L>  /  TBili  0.9  /  DBili  x   /  AST  45, 88, 88  /  ALT  53, 93, 99  /  AlkPhos  43  03-14      Urinalysis Basic - ( 12 Mar 2021 16:38 )    Color: Yellow / Appearance: Slightly Turbid / S.020 / pH: x  Gluc: x / Ketone: Negative  / Bili: Negative / Urobili: 3 mg/dL   Blood: x / Protein: 30 mg/dL / Nitrite: Negative   Leuk Esterase: Moderate / RBC: 5 /HPF / WBC 90 /HPF   Sq Epi: x / Non Sq Epi: 0 /HPF / Bacteria: Ma       RADIOLOGY & ADDITIONAL TESTS:  Venous duplex:  negative for DVT  CXR:  BL opacities L>R  CXR:  stable BL opacities    CT angio:  neg for central PE    CT abd/pelvis:  BIbasilar densities, hepatobiliary/spleen/pancreas WNL, mild BL adrenal thickening, no hydro, bl renal cysts, bl layering densities/ sm stones, "milk of ca", post cystoprostatectomy and ilealconduit, , post bowel resection and reanastomosis, ventral abd wall hernias, bl hip replacements    CTH 3/30:  moderate atrophy, chronic microvascular changes,  no evidence of acute pathology    EEG 3/30

## 2021-03-30 NOTE — PROGRESS NOTE ADULT - SUBJECTIVE AND OBJECTIVE BOX
Patient Information:  ROSA MARIA CASEY / 80y / Male / MRN#:[id number]    Hospital Day: 18d    Interval History:  Patient seen and examined at bedside.  Yesterday evening, pt had episode of diffuse tremors, was not responding to questions, was desaturating. Episode lasted for about 30 seconds, pt spontaneously returned to baseline mentation, AAOx4, NFD on exam. Several recurrent episodes occurred overnight, pt has no recollection of episodes.  Overnight, pt also became slightly hypotensive, CBC revealed further decrease of Hb. Lactate also elevated at 4. Given Cefepime and Vanco and pancultured.   This morning, pt feels no distress or discomfort, but reports feeling "pissed off" due to being in a small room, unable to get up or ambulate.    Past Medical History:  Hypertension    Hyperlipidemia    Diabetes mellitus, type 2    History of atrial fibrillation    Bladder cancer    Chronic kidney disease (CKD)      Past Surgical History:  History of total hip replacement, bilateral    History of total knee replacement, bilateral    History of total cystectomy      Allergies:  No Known Allergies    Medications:  PRN:  acetaminophen   Tablet .. 650 milliGRAM(s) Oral every 6 hours PRN Temp greater or equal to 38C (100.4F), Mild Pain (1 - 3)  guaifenesin/dextromethorphan  Syrup 10 milliLiter(s) Oral every 6 hours PRN Cough    Standing:  amLODIPine   Tablet 10 milliGRAM(s) Oral daily  atorvastatin 80 milliGRAM(s) Oral at bedtime  chlorhexidine 4% Liquid 1 Application(s) Topical <User Schedule>  dexAMETHasone  Injectable 6 milliGRAM(s) IV Push two times a day  influenza   Vaccine 0.5 milliLiter(s) IntraMuscular once  insulin glargine Injectable (LANTUS) 30 Unit(s) SubCutaneous at bedtime  insulin glargine Injectable (LANTUS) 30 Unit(s) SubCutaneous every morning  insulin lispro (ADMELOG) corrective regimen sliding scale   SubCutaneous three times a day before meals  insulin lispro Injectable (ADMELOG) 10 Unit(s) SubCutaneous three times a day before meals  melatonin 5 milliGRAM(s) Oral at bedtime  metoprolol succinate  milliGRAM(s) Oral daily  pantoprazole  Injectable 40 milliGRAM(s) IV Push every 12 hours  polyethylene glycol 3350 17 Gram(s) Oral daily  senna 2 Tablet(s) Oral at bedtime  sodium chloride 0.9%. 1000 milliLiter(s) (75 mL/Hr) IV Continuous <Continuous>  sodium zirconium cyclosilicate 5 Gram(s) Oral every 12 hours    Home:  amlodipine-benazepril 10 mg-40 mg oral capsule: 1 cap(s) orally once a day  Eliquis 5 mg oral tablet: 1 tab(s) orally 2 times a day  glimepiride 2 mg oral tablet: 1 tab(s) orally once a day  Lantus 100 units/mL subcutaneous solution: 40 unit(s) subcutaneous once a day  metoprolol succinate 100 mg oral tablet, extended release: 1 tab(s) orally once a day  Ozempic (1 mg dose) 2 mg/1.5 mL subcutaneous solution:   Percocet 7.5/325 oral tablet: 0.5 tab(s) orally once a day (at bedtime)  potassium citrate 10 mEq oral tablet, extended release: 1 tab(s) orally once a day  rosuvastatin 20 mg oral tablet: 1 tab(s) orally once a day    Vitals:  T(C): 35.8, Max: 36.4 (21 @ 12:07)  T(F): 96.5, Max: 97.6 (21 @ 12:07)  HR: 73 (68 - 87)  BP: 110/62 (97/56 - 124/66)  RR: 20 (20 - 22)  SpO2: 100% (97% - 100%)    Physical Exam:  General: Awake, alert, NAD, sitting up in bed, on HFNC 50/80  HEENT: Head NC/AT  Heart: RRR; systolic murmur apprec  Lungs: Mild rales in bilateral bases  Abdomen: Soft, nontender, nondistended, BS+, urostomy bag in place, appears clean, no bleeding visualized  Extremities: No edema, clubbing, cyanosis in upper or lower extremities; venous stasis dermatitis skin changes on bilateral lower extremities   Neuro: AAOx3, NFD.    Labs:  CBC ( @ 03:10)                        Hgb: 8.9    WBC: 17.07 )-----------------( Plts: 245                              Hct: 27.1     Chem ( @ 03:10)  Na: 135  |     Cl: 103     |  BUN: 101  -----------------------------------------< Gluc: 301    K: 5.8   |    HCO3: 18  |  Cr: 1.8    Ca 8.1 ( @ 03:10)  Mg 2.6 ( @ 03:10)    LFTs ( @ 03:10)  TPro 5.1  /  Alb 2.9  TBili 0.7  /  DBili     AST 21  /  ALT 33  /  AlkPhos 54    Cardiac Markers ( @ 04:30)  Troponin I X  Troponin T X  CK X  CKMB X  CKMB Units X  Myoglobin X  Lactate 5.1  ESR X          Urinalysis Basic ( @ 04:27)  Color: Yellow  Appearance: Slightly Turbid  S.017  pH:   Gluc:   Ketone: Negative  Bili: Negative  Urobili: <2 mg/dL   Blood:   Protein: Trace  Nitrite: Negative   Leuk Esterase: Large  RBC: 164  WBC: 84   Sq Epi:   Non Sq Epi: 0  Bacteria: Negative    Microbiology:  Culture - Blood (collected  @ 07:54)  Source: .Blood None  Final Report ( @ 18:01):    No Growth Final

## 2021-03-30 NOTE — CHART NOTE - NSCHARTNOTEFT_GEN_A_CORE
Registered Dietitian Follow-Up     Patient Profile Reviewed                           Yes [x]   No []     Nutrition History Previously Obtained        Yes [x]  No []       Pertinent Subjective Information: Unable to conduct face to face assessment d/t COVID-19 isolation precautions. Attempted to call pt room phone >5x but no response. Spoke to staff who reported pt intake varies daily based on both meal options & acute s/s. As per RN intake varying 50-90% at this time. Previous RD recs not in place & diet order adjusted again. Recs d/w LIP Dr. Antony.      Pertinent Medical Interventions:  1. Several episodes of seizure like activity overnight--f/u EEG & CT head results for acute pathology   2. AHRF 2/2 COVID-19 pneumonia complicated by JARETH--tolerating HFNC 50L/80% satting 100%, weaning. Urine, Blood Cx pending, procalc pending.    3. Acute drop in Hgb--no source of or s/s active bleed as per GI. CT A/P negative for retroperitoneal hematoma.   4. LORRAINE w. h/o CKD III & Bladder Ca s/p cystectomy with urostomy--IVF started, nephrology c/s placed.   5. T2DM--Lantus 30u BID, Lispro 10u prior to meals      Diet order: CHO Consistent, DASH/TLC, High Fiber, No concentrated Potassium      Anthropometrics:  - Ht. 185.4cm   - Wt. 127kg (3/12) no new wt   - BMI 37   - IBW 83.6kg      Pertinent Lab Data: (3/30/21) WBC 21.93, RBC 3.24, H/H 9.1/28.6, lactate 5.1, K+ 5.3, , Cr 1.8, glucose 319, Ca 8.4, GFR 35      Pertinent Meds: NS @75mL/hr, lantus, admelog, decadron, protonix, lokelma, amlodipine, senna, atorvastatin, metoprolol      Physical Findings:  - Appearance: AAOx4   - GI function: fecal incontinence, loose BMs +BM 3/29  - Tubes: n/a   - Oral/Mouth cavity: none per RN   - Skin: intact; no edema per RN flowsheet      Nutrition Requirements  Weight Used: 127kg ABW; 83.6kg IBW (continued from RD initial assessment)     Calories: 2048 - 2252 kcals (MSJ x 1.0 - 1.1 AF obesity)   Protein: 84 - 100 gms (1.0 - 1.2 gm/kg IBW obesity)   Fluid: 1ml/kcal or per LIP     Nutrient Intake: not meeting needs d/t varied intake      Previous Nutrition Diagnostic: inadequate protein/energy intake--ongoing       Nutrition Intervention: meals & snacks, medical food supplements    Recommend:  1. Add trial of Nepro BID.   2. D/c high fiber diet modifier as pt having regular BMs. Continue DASH/TLC, CHO Consistent, No concentrated K+ diet. Meal set-up asst PRN.      Goal/Expected Outcome: Pt to consistently consume/tolerate >50% meals & supplements upon f/u in 3 days.      Indicator/Monitoring: RD to monitor energy intake, diet order, body comp, NFPF, glucose profile

## 2021-03-30 NOTE — PROGRESS NOTE ADULT - ASSESSMENT
81 yo M with PMHx of AFib, on Eliquis, HTN, DMII, CKDIII, hx of Bladder Ca s/p Urostomy, bilateral hip/knee replacements, obesity admitted for acute hypoxemic respiratory failure due to COVID PNA.    #Acute hypoxemic respiratory failure secondary to COVID-19 pneumonia  - COVID-19 PCR positive  - Isolation precautions (contact, droplet, airborne)  - Chest X-ray 3/31 - improvement of bilateral opacities  - Patient is saturating 100% on HFNC 50L/80%  - s/p Toci 3/23  - C/w Dexamethasone, dec to 6 mg q12  - Low suspicion for superimposed bacterial infection, has been afebrile, WBC elevated although likely due to systemic steroids  - s/p Vanco, Cefepime overnight, will cont to monitor off ABx for now  - Urine, Blood Cx pending, procalc pending  - Incentive spirometry at bedside  - DVT prophylaxis per protocol  - Titrate supplemental O2 to maintain SpO2 92-96%    #Episodes of seizure like activity  - Had several episodes of tremors, not responding overnight  - No post ictal state, no focal neurologic deficits on exam  - Will obtain EEG, CT Head to evaluate for acute pathology    #Acute drop in Hb  - Hb gradually dec from 13 to 8  - Pt has not had any melena or hematochezia, no hematuria overnight  - Seen by GI, low suspicion for GI bleed  - CT Abd obtained - negative for retroperitoneal hematoma  - Cont to monitor CBC, T+S pending, continue to hold Lovenox    #CKD III   #Hx of Bladder Ca, s/p Cystoprostatectomy, Urostomy  #Acute kidney injury, possibly prerenal in setting of acute drop in Hb and borderline hypotension  - Hospital stay complicated by hematuria in urostomy bag and phillips catheter  - Inc bleeding and blood clots in phillips catheter overnight  - Remains HD stable  - CT Abd obtained - small stones, bilateral renal cysts visualized  - Urology following  - Pt will require management of  renal stones as outpt  - Urine cx pending  - Cr elevated to 1.8, initiated on NS at 75 cc/hr, nephrology consulted    #Chronic AFIb on Eliquis  # [id number] Score: 4  - Eliquis on Hold, was on full AC Lovenox while in hospital, on hold for now while being worked up for potential acute bleeding  - C/w Metoprolol succinate 100 mg qd    #HTN  - Well controlled, borderline hypotensive at times  - C/w Amlodipine, hold if SBP < 120    #DMII  - FS elevated, will inc Lantus to 30 BID, Lispro 10 U before meals  - Cont to monitor, inc insulin as needed    DVT ppx: Lovenox 120 mg BID on hold  GI ppx: PPI  Diet: DASH, CCC  Activity: IAT  Full code  Dispo: c/w CEU monitoring    Updated sonEllis, and answered all questions. 81 yo M with PMHx of AFib, on Eliquis, HTN, DMII, CKDIII, hx of Bladder Ca s/p Urostomy, bilateral hip/knee replacements, obesity admitted for acute hypoxemic respiratory failure due to COVID PNA.    #Acute hypoxemic respiratory failure secondary to COVID-19 pneumonia  - COVID-19 PCR positive  - Isolation precautions (contact, droplet, airborne)  - Chest X-ray 3/31 - improvement of bilateral opacities  - Patient is saturating 100% on HFNC 50L/80%  - s/p Toci 3/23  - C/w Dexamethasone, dec to 6 mg q12  - Low suspicion for superimposed bacterial infection, has been afebrile, WBC elevated although likely due to systemic steroids  - s/p Vanco, Cefepime overnight, will cont to monitor off ABx for now  - Urine, Blood Cx pending, procalc pending  - Incentive spirometry at bedside  - DVT prophylaxis per protocol  - Titrate supplemental O2 to maintain SpO2 92-96%    #Episodes of seizure like activity  - Had several episodes of tremors, not responding overnight  - No post ictal state, no focal neurologic deficits on exam  - Will obtain EEG, CT Head to evaluate for acute pathology    #Acute drop in Hb  - Hb gradually dec from 13 to 8  - Pt has not had any melena or hematochezia, no hematuria overnight  - Seen by GI, low suspicion for GI bleed  - CT Abd obtained - negative for retroperitoneal hematoma  - Cont to monitor CBC, T+S pending, continue to hold Lovenox    #CKD III   #Hx of Bladder Ca, s/p Cystoprostatectomy, Urostomy  #Acute kidney injury, possibly prerenal in setting of acute drop in Hb and borderline hypotension  - Hospital stay complicated by hematuria in urostomy bag and phillips catheter- now improved  - CT Abd obtained - small stones, bilateral renal cysts visualized  - Urology following  - Pt will require management of  renal stones as outpt  - Urine cx pending  - Cr elevated to 1.8, initiated on NS at 75 cc/hr, nephrology consulted    #Chronic AFIb on Eliquis  # [id number] Score: 4  - Eliquis on Hold, was on full AC Lovenox while in hospital, on hold for now while being worked up for potential acute bleeding  - C/w Metoprolol succinate 100 mg qd    #HTN  - Well controlled, borderline hypotensive at times  - C/w Amlodipine, hold if SBP < 120    #DMII  - FS elevated, will inc Lantus to 30 BID, Lispro 10 U before meals  - Cont to monitor, inc insulin as needed    DVT ppx: Lovenox 120 mg BID on hold  GI ppx: PPI  Diet: DASH, CCC  Activity: IAT  Full code  Dispo: c/w CEU monitoring    Updated son, Ellis, and answered all questions.

## 2021-03-30 NOTE — CONSULT NOTE ADULT - SUBJECTIVE AND OBJECTIVE BOX
[facility] INITIAL CONSULT NOTE  --------------------------------------------------------------------------------  HPI:  Patient is a 81yo male with PMH of hypertension, hyperlipidemia, diabetes mellitus type 2, atrial fibrillation on Eliquis, CKD stage 3A (baseline creatinine 1.2-1.5), bladder cancer s/p cystectomy with urostomy, and bilateral hip and knee arthroplasties who was brought in by EMS for hypoxia. The patient states that his daughter and her boyfriend who reside in the same home as him and his wife recently tested positive for COVID-19.  He started feeling "beat down" and "tired all the time" to the point where he had difficulty getting up. He also endorses cough, shortness of breath, and body aches.  He was told to come to the ED by his daughter after they noticed his wife was "not doing so well."    PAST HISTORY  --------------------------------------------------------------------------------  PAST MEDICAL & SURGICAL HISTORY:  Hypertension  Hyperlipidemia  Diabetes mellitus, type 2  History of atrial fibrillation  Bladder cancer  secondary to exposure at [employer] 9/11 s/p cystectomy with urostomy  Chronic kidney disease (CKD)  stage 3A, baseline creatinine 1.2-1.5  History of total hip replacement, bilateral  History of total knee replacement, bilateral  History of total cystectomy/urostomy    FAMILY HISTORY:  Negative for kidney disease    SOCIAL HISTORY:  No current ETOH, tobacco, or illicit drug use    ALLERGIES & MEDICATIONS  --------------------------------------------------------------------------------  Allergies    No Known Allergies    Intolerances      Standing Inpatient Medications  amLODIPine   Tablet 10 milliGRAM(s) Oral daily  atorvastatin 80 milliGRAM(s) Oral at bedtime  chlorhexidine 4% Liquid 1 Application(s) Topical <User Schedule>  dexAMETHasone  Injectable 6 milliGRAM(s) IV Push two times a day  influenza   Vaccine 0.5 milliLiter(s) IntraMuscular once  insulin glargine Injectable (LANTUS) 30 Unit(s) SubCutaneous at bedtime  insulin glargine Injectable (LANTUS) 30 Unit(s) SubCutaneous every morning  insulin lispro (ADMELOG) corrective regimen sliding scale   SubCutaneous three times a day before meals  insulin lispro Injectable (ADMELOG) 10 Unit(s) SubCutaneous three times a day before meals  melatonin 5 milliGRAM(s) Oral at bedtime  metoprolol succinate  milliGRAM(s) Oral daily  pantoprazole  Injectable 40 milliGRAM(s) IV Push every 12 hours  polyethylene glycol 3350 17 Gram(s) Oral daily  senna 2 Tablet(s) Oral at bedtime  sodium chloride 0.9%. 1000 milliLiter(s) IV Continuous <Continuous>  sodium zirconium cyclosilicate 5 Gram(s) Oral every 12 hours    PRN Inpatient Medications  acetaminophen   Tablet .. 650 milliGRAM(s) Oral every 6 hours PRN  guaifenesin/dextromethorphan  Syrup 10 milliLiter(s) Oral every 6 hours PRN      REVIEW OF SYSTEMS  --------------------------------------------------------------------------------  Gen:  weakness  Skin: No rashes  Head/Eyes/Ears/Mouth: No headache; No sinus pain/discomfort, sore throat  Respiratory: dyspnea  CV: No chest pain  GI: No abdominal pain, diarrhea, constipation, nausea, vomiting, melena, hematochezia  All other systems were reviewed and are negative, except as noted.    VITALS/PHYSICAL EXAM  --------------------------------------------------------------------------------  T(C): 36 (03-30-21 @ 16:00), Max: 36 (03-30-21 @ 16:00)  HR: 80 (03-30-21 @ 16:00) (68 - 80)  BP: 107/64 (03-30-21 @ 16:00) (94/55 - 124/66)  RR: 20 (03-30-21 @ 16:00) (20 - 22)  SpO2: 95% (03-30-21 @ 16:00) (95% - 100%)    03-29-21 @ 07:01  -  03-30-21 @ 07:00  --------------------------------------------------------  IN: 885 mL / OUT: 1950 mL / NET: -1065 mL    03-30-21 @ 07:01  -  03-30-21 @ 18:36  --------------------------------------------------------  IN: 1335 mL / OUT: 1050 mL / NET: 285 mL      Physical Exam:  	Gen: NAD  	Pulm:  B/L scant rales  	CV: RRR, S1S2  	Back: No CVA tenderness; no sacral edema  	Abd: +BS, soft, nontender/nondistended  	: No suprapubic tenderness  	LE: Warm, no edema  	Neuro: AAO x3    LABS/STUDIES  --------------------------------------------------------------------------------              9.1    21.93 >-----------<  220      [03-30-21 @ 12:24]              28.6     135  |  102  |  105  ----------------------------<  319      [03-30-21 @ 12:24]  5.3   |  17  |  1.8        Ca     8.4     [03-30-21 @ 12:24]      Mg     2.6     [03-30-21 @ 03:10]    TPro  5.1  /  Alb  2.9  /  TBili  0.7  /  DBili  x   /  AST  21  /  ALT  33  /  AlkPhos  54  [03-30-21 @ 03:10]    Creatinine Trend:  SCr 1.8 [03-30 @ 12:24]  SCr 1.8 [03-30 @ 03:10]  SCr 1.1 [03-29 @ 08:21]  SCr 1.0 [03-28 @ 08:13]  SCr 1.0 [03-27 @ 08:13]    Urinalysis - [03-30-21 @ 04:27]      Color Yellow / Appearance Slightly Turbid / SG 1.017 / pH 6.5      Gluc Negative / Ketone Negative  / Bili Negative / Urobili <2 mg/dL       Blood Large / Protein Trace / Leuk Est Large / Nitrite Negative       / WBC 84 / Hyaline 25 / Gran  / Sq Epi  / Non Sq Epi 0 / Bacteria Negative    Iron 178, TIBC 305, %sat 58      [03-30-21 @ 07:11]  Ferritin 730      [03-30-21 @ 07:11]

## 2021-03-30 NOTE — PROGRESS NOTE ADULT - SUBJECTIVE AND OBJECTIVE BOX
Patient is a 80y old  Male who presents with a chief complaint of acute hypoxemic respiratory failure secondary to COVID-19 pneumonia (29 Mar 2021 21:07)        Over Night Events:  on HF this am.  SP Repeat CTA.         ROS:     All ROS are negative except HPI         PHYSICAL EXAM    ICU Vital Signs Last 24 Hrs  T(C): 35.1 (30 Mar 2021 04:40), Max: 36.4 (29 Mar 2021 12:07)  T(F): 95.2 (30 Mar 2021 04:40), Max: 97.6 (29 Mar 2021 12:07)  HR: 68 (30 Mar 2021 04:40) (68 - 87)  BP: 97/57 (30 Mar 2021 04:40) (97/56 - 124/66)  BP(mean): 76 (29 Mar 2021 16:00) (76 - 79)  ABP: --  ABP(mean): --  RR: 20 (30 Mar 2021 04:40) (19 - 25)  SpO2: 99% (30 Mar 2021 04:40) (90% - 100%)      CONSTITUTIONAL:  Well nourished.  NAD    ENT:   Airway patent,   Mouth with normal mucosa.   No thrush    EYES:   Pupils equal,   Round and reactive to light.    CARDIAC:   Normal rate,   Regular rhythm.    No edema      Vascular:  Normal systolic impulse  No Carotid bruits    RESPIRATORY:   No wheezing  Bilateral crackles   Normal chest expansion  Not tachypneic,  No use of accessory muscles    GASTROINTESTINAL:  Abdomen soft,   Non-tender,   No guarding,   + BS    MUSCULOSKELETAL:   Range of motion is not limited,  No clubbing, cyanosis    NEUROLOGICAL:   Alert and oriented   No motor  deficits.    SKIN:   Skin normal color for race,   Warm and dry and intact.   No evidence of rash.    PSYCHIATRIC:   Normal mood and affect.   No apparent risk to self or others.    HEMATOLOGICAL:  No cervical  lymphadenopathy.  no inguinal lymphadenopathy      21 @ 07:01  -  21 @ 07:00  --------------------------------------------------------  IN:    IV PiggyBack: 650 mL    Pantoprazole: 160 mL    sodium chloride 0.9%: 75 mL  Total IN: 885 mL    OUT:    Urostomy (mL): 1550 mL    Voided (mL): 400 mL  Total OUT: 1950 mL    Total NET: -1065 mL          LABS:                            8.9    17.07 )-----------( 245      ( 30 Mar 2021 03:10 )             27.1                                                   135  |  103  |  101<HH>  ----------------------------<  301<H>  5.8<H>   |  18  |  1.8<H>    30 Mar 2021 03:10    135    |  103    |  101<HH>  ----------------------------<  301<H>  5.8<H>   |  18     |  1.8<H>  29 Mar 2021 08:21    137    |  104    |  67<HH>  ----------------------------<  220<H>  5.1<H>   |  21     |  1.1      Ca    8.1<L>      30 Mar 2021 03:10  Ca    8.3<L>      29 Mar 2021 08:21  Mg     2.6<H>     30 Mar 2021 03:10    TPro  5.1<L>  /  Alb  2.9<L>  /  TBili  0.7    /  DBili  x      /  AST  21     /  ALT  33     /  AlkPhos  54     30 Mar 2021 03:10      Ca    8.1<L>      30 Mar 2021 03:10  Mg     2.6         TPro  5.1<L>  /  Alb  2.9<L>  /  TBili  0.7  /  DBili  x   /  AST  21  /  ALT  33  /  AlkPhos  54  30                                             Urinalysis Basic - ( 30 Mar 2021 04:27 )    Color: Yellow / Appearance: Slightly Turbid / S.017 / pH: x  Gluc: x / Ketone: Negative  / Bili: Negative / Urobili: <2 mg/dL   Blood: x / Protein: Trace / Nitrite: Negative   Leuk Esterase: Large / RBC: 164 /HPF / WBC 84 /HPF   Sq Epi: x / Non Sq Epi: 0 /HPF / Bacteria: Negative                                                  LIVER FUNCTIONS - ( 30 Mar 2021 03:10 )  Alb: 2.9 g/dL / Pro: 5.1 g/dL / ALK PHOS: 54 U/L / ALT: 33 U/L / AST: 21 U/L / GGT: x                                                                                                                                   ABG - ( 29 Mar 2021 21:38 )  pH, Arterial: 7.40  pH, Blood: x     /  pCO2: 28    /  pO2: 106   / HCO3: 18    / Base Excess: -5.9  /  SaO2: 98                  MEDICATIONS  (STANDING):  amLODIPine   Tablet 10 milliGRAM(s) Oral daily  atorvastatin 80 milliGRAM(s) Oral at bedtime  cefepime   IVPB 2000 milliGRAM(s) IV Intermittent every 12 hours  chlorhexidine 4% Liquid 1 Application(s) Topical <User Schedule>  dexAMETHasone  IVPB 8 milliGRAM(s) IV Intermittent every 8 hours  influenza   Vaccine 0.5 milliLiter(s) IntraMuscular once  insulin glargine Injectable (LANTUS) 25 Unit(s) SubCutaneous at bedtime  insulin glargine Injectable (LANTUS) 25 Unit(s) SubCutaneous every morning  insulin lispro (ADMELOG) corrective regimen sliding scale   SubCutaneous three times a day before meals  insulin lispro Injectable (ADMELOG) 10 Unit(s) SubCutaneous three times a day before meals  melatonin 5 milliGRAM(s) Oral at bedtime  metoprolol succinate  milliGRAM(s) Oral daily  pantoprazole Infusion 8 mG/Hr (10 mL/Hr) IV Continuous <Continuous>  polyethylene glycol 3350 17 Gram(s) Oral daily  senna 2 Tablet(s) Oral at bedtime  sodium chloride 0.9%. 1000 milliLiter(s) (75 mL/Hr) IV Continuous <Continuous>  sodium zirconium cyclosilicate 5 Gram(s) Oral every 12 hours    MEDICATIONS  (PRN):  acetaminophen   Tablet .. 650 milliGRAM(s) Oral every 6 hours PRN Temp greater or equal to 38C (100.4F), Mild Pain (1 - 3)  guaifenesin/dextromethorphan  Syrup 10 milliLiter(s) Oral every 6 hours PRN Cough      New X-rays reviewed:                                                                                  ECHO    CXR interpreted by me:

## 2021-03-30 NOTE — PROGRESS NOTE ADULT - ASSESSMENT
79yo M c PMHx chronic afib on noac, htn, dm2, ckd3 bladder ca s/p urostomy, b/l hip/ knee replacements obesity, here with acute hypoxic respiratory failure 2/2 severe covid 19 viral pneumonia, lorraine on ckd3 now better, elevated ddimer    Sepsis o admission  Covid viral PNA. acute hypoxic RF  underlying [place]  LORRAINE on CKD  Transaminitis  Hx of HTN, ASHD, Afib/Eliquis  DM II, CKD  DLD  Bladder ca, sp urostomy, sp cystoprostatectomy, exposure to 911  OA, DDD, DJD, sp BL hip and knee surgeries, mobility dysfunction  GERD, HH, diverticulosis, bowel resection and reanastomosis      overnight had episodes of tremors/ ? shaking chills with incoherence, no T but inc WBC 17, 21, drop in BP 94/51 and LA 5.1 with bump in renal parameters  pt cultured and given Cefepime and dose of vanco  pt started on IVF, lokelma for K of 5.8  recall ID  repeat infla parameters  cont on HFNC    for hematuria in urostomy bag,  monitor H/H, off AC    Uro ff no acute intervention  CT of abd:  layering of material within both kidneys ( sm stones, "milk of calcium" BL renal cysts, no acute pathology  send urine for C&S     Covid ABs are + 85.6  pul-critical care ffup and care appreciated    CXR show BL opacities, low lung volumes    us duplex LE's negative for DVT  elevated DDIMER, CT angio negative for PE  monitor infla parameters q 72 hrs    mild transaminitis  improving    Rehab consult, OOB to chair  pt is a full code   79yo M c PMHx chronic afib on noac, htn, dm2, ckd3 bladder ca s/p urostomy, b/l hip/ knee replacements obesity, here with acute hypoxic respiratory failure 2/2 severe covid 19 viral pneumonia, lorraine on ckd3 now better, elevated ddimer    Sepsis o admission  Covid viral PNA. acute hypoxic RF  underlying [place]  LORRAINE on CKD  Transaminitis  Hx of HTN, ASHD, Afib/Eliquis  DM II, CKD  DLD  Bladder ca, sp urostomy, sp cystoprostatectomy, exposure to 911  OA, DDD, DJD, sp BL hip and knee surgeries, mobility dysfunction  GERD, HH, diverticulosis, bowel resection and reanastomosis      overnight had episodes of tremors/ ? shaking chills with incoherence, no T but inc WBC 17, 21, drop in BP 94/51 and LA 5.1 with bump in renal parameters  pt cultured and given Cefepime and dose of vanco  pt started on IVF, lokelma for K of 5.8  recall ID  repeat infla parameters. inc fungitel  cont on HFNC    for hematuria in urostomy bag,  monitor H/H, off AC    Uro ff no acute intervention  CT of abd:  layering of material within both kidneys ( sm stones, "milk of calcium" BL renal cysts, no acute pathology  send urine for C&S     Covid ABs are + 85.6  pul-critical care ffup and care appreciated    CXR show BL opacities, low lung volumes    us duplex LE's negative for DVT  elevated DDIMER, CT angio negative for PE  monitor infla parameters q 72 hrs    mild transaminitis  improving    Rehab consult, OOB to chair  pt is a full code

## 2021-03-31 LAB
ALBUMIN SERPL ELPH-MCNC: 3 G/DL — LOW (ref 3.5–5.2)
ALBUMIN SERPL ELPH-MCNC: 3.2 G/DL — LOW (ref 3.5–5.2)
ALP SERPL-CCNC: 52 U/L — SIGNIFICANT CHANGE UP (ref 30–115)
ALP SERPL-CCNC: 52 U/L — SIGNIFICANT CHANGE UP (ref 30–115)
ALT FLD-CCNC: 26 U/L — SIGNIFICANT CHANGE UP (ref 0–41)
ALT FLD-CCNC: 29 U/L — SIGNIFICANT CHANGE UP (ref 0–41)
ANION GAP SERPL CALC-SCNC: 15 MMOL/L — HIGH (ref 7–14)
ANION GAP SERPL CALC-SCNC: 17 MMOL/L — HIGH (ref 7–14)
AST SERPL-CCNC: 25 U/L — SIGNIFICANT CHANGE UP (ref 0–41)
AST SERPL-CCNC: 26 U/L — SIGNIFICANT CHANGE UP (ref 0–41)
BASOPHILS # BLD AUTO: 0.1 K/UL — SIGNIFICANT CHANGE UP (ref 0–0.2)
BASOPHILS # BLD AUTO: 0.11 K/UL — SIGNIFICANT CHANGE UP (ref 0–0.2)
BASOPHILS NFR BLD AUTO: 0.4 % — SIGNIFICANT CHANGE UP (ref 0–1)
BASOPHILS NFR BLD AUTO: 0.4 % — SIGNIFICANT CHANGE UP (ref 0–1)
BILIRUB SERPL-MCNC: 0.8 MG/DL — SIGNIFICANT CHANGE UP (ref 0.2–1.2)
BILIRUB SERPL-MCNC: 0.8 MG/DL — SIGNIFICANT CHANGE UP (ref 0.2–1.2)
BUN SERPL-MCNC: 110 MG/DL — CRITICAL HIGH (ref 10–20)
BUN SERPL-MCNC: 123 MG/DL — CRITICAL HIGH (ref 10–20)
CALCIUM SERPL-MCNC: 8 MG/DL — LOW (ref 8.5–10.1)
CALCIUM SERPL-MCNC: 8 MG/DL — LOW (ref 8.5–10.1)
CHLORIDE SERPL-SCNC: 110 MMOL/L — SIGNIFICANT CHANGE UP (ref 98–110)
CHLORIDE SERPL-SCNC: 110 MMOL/L — SIGNIFICANT CHANGE UP (ref 98–110)
CO2 SERPL-SCNC: 14 MMOL/L — LOW (ref 17–32)
CO2 SERPL-SCNC: 17 MMOL/L — SIGNIFICANT CHANGE UP (ref 17–32)
CREAT SERPL-MCNC: 2.2 MG/DL — HIGH (ref 0.7–1.5)
CREAT SERPL-MCNC: 2.3 MG/DL — HIGH (ref 0.7–1.5)
EOSINOPHIL # BLD AUTO: 0.01 K/UL — SIGNIFICANT CHANGE UP (ref 0–0.7)
EOSINOPHIL # BLD AUTO: 0.01 K/UL — SIGNIFICANT CHANGE UP (ref 0–0.7)
EOSINOPHIL NFR BLD AUTO: 0 % — SIGNIFICANT CHANGE UP (ref 0–8)
EOSINOPHIL NFR BLD AUTO: 0 % — SIGNIFICANT CHANGE UP (ref 0–8)
GLUCOSE BLDC GLUCOMTR-MCNC: 161 MG/DL — HIGH (ref 70–99)
GLUCOSE BLDC GLUCOMTR-MCNC: 173 MG/DL — HIGH (ref 70–99)
GLUCOSE BLDC GLUCOMTR-MCNC: 194 MG/DL — HIGH (ref 70–99)
GLUCOSE BLDC GLUCOMTR-MCNC: 214 MG/DL — HIGH (ref 70–99)
GLUCOSE SERPL-MCNC: 162 MG/DL — HIGH (ref 70–99)
GLUCOSE SERPL-MCNC: 163 MG/DL — HIGH (ref 70–99)
HCT VFR BLD CALC: 24.7 % — LOW (ref 42–52)
HCT VFR BLD CALC: 25.7 % — LOW (ref 42–52)
HGB BLD-MCNC: 8.1 G/DL — LOW (ref 14–18)
HGB BLD-MCNC: 8.3 G/DL — LOW (ref 14–18)
IMM GRANULOCYTES NFR BLD AUTO: 7.9 % — HIGH (ref 0.1–0.3)
IMM GRANULOCYTES NFR BLD AUTO: 8.7 % — HIGH (ref 0.1–0.3)
LACTATE SERPL-SCNC: 3.7 MMOL/L — HIGH (ref 0.7–2)
LYMPHOCYTES # BLD AUTO: 12.5 % — LOW (ref 20.5–51.1)
LYMPHOCYTES # BLD AUTO: 2.32 K/UL — SIGNIFICANT CHANGE UP (ref 1.2–3.4)
LYMPHOCYTES # BLD AUTO: 3.17 K/UL — SIGNIFICANT CHANGE UP (ref 1.2–3.4)
LYMPHOCYTES # BLD AUTO: 8.7 % — LOW (ref 20.5–51.1)
MAGNESIUM SERPL-MCNC: 2.6 MG/DL — HIGH (ref 1.8–2.4)
MCHC RBC-ENTMCNC: 28.8 PG — SIGNIFICANT CHANGE UP (ref 27–31)
MCHC RBC-ENTMCNC: 29.3 PG — SIGNIFICANT CHANGE UP (ref 27–31)
MCHC RBC-ENTMCNC: 32.3 G/DL — SIGNIFICANT CHANGE UP (ref 32–37)
MCHC RBC-ENTMCNC: 32.8 G/DL — SIGNIFICANT CHANGE UP (ref 32–37)
MCV RBC AUTO: 87.9 FL — SIGNIFICANT CHANGE UP (ref 80–94)
MCV RBC AUTO: 90.8 FL — SIGNIFICANT CHANGE UP (ref 80–94)
MONOCYTES # BLD AUTO: 1.75 K/UL — HIGH (ref 0.1–0.6)
MONOCYTES # BLD AUTO: 2.33 K/UL — HIGH (ref 0.1–0.6)
MONOCYTES NFR BLD AUTO: 6.9 % — SIGNIFICANT CHANGE UP (ref 1.7–9.3)
MONOCYTES NFR BLD AUTO: 8.8 % — SIGNIFICANT CHANGE UP (ref 1.7–9.3)
NEUTROPHILS # BLD AUTO: 18.04 K/UL — HIGH (ref 1.4–6.5)
NEUTROPHILS # BLD AUTO: 19.68 K/UL — HIGH (ref 1.4–6.5)
NEUTROPHILS NFR BLD AUTO: 71.5 % — SIGNIFICANT CHANGE UP (ref 42.2–75.2)
NEUTROPHILS NFR BLD AUTO: 74.2 % — SIGNIFICANT CHANGE UP (ref 42.2–75.2)
NRBC # BLD: 10 /100 WBCS — HIGH (ref 0–0)
NRBC # BLD: 12 /100 WBCS — HIGH (ref 0–0)
PLATELET # BLD AUTO: 202 K/UL — SIGNIFICANT CHANGE UP (ref 130–400)
PLATELET # BLD AUTO: 210 K/UL — SIGNIFICANT CHANGE UP (ref 130–400)
POTASSIUM SERPL-MCNC: 4.7 MMOL/L — SIGNIFICANT CHANGE UP (ref 3.5–5)
POTASSIUM SERPL-MCNC: 5 MMOL/L — SIGNIFICANT CHANGE UP (ref 3.5–5)
POTASSIUM SERPL-SCNC: 4.7 MMOL/L — SIGNIFICANT CHANGE UP (ref 3.5–5)
POTASSIUM SERPL-SCNC: 5 MMOL/L — SIGNIFICANT CHANGE UP (ref 3.5–5)
PROT SERPL-MCNC: 5.1 G/DL — LOW (ref 6–8)
PROT SERPL-MCNC: 5.2 G/DL — LOW (ref 6–8)
RBC # BLD: 2.81 M/UL — LOW (ref 4.7–6.1)
RBC # BLD: 2.83 M/UL — LOW (ref 4.7–6.1)
RBC # FLD: 17.6 % — HIGH (ref 11.5–14.5)
RBC # FLD: 19.3 % — HIGH (ref 11.5–14.5)
SODIUM SERPL-SCNC: 141 MMOL/L — SIGNIFICANT CHANGE UP (ref 135–146)
SODIUM SERPL-SCNC: 142 MMOL/L — SIGNIFICANT CHANGE UP (ref 135–146)
WBC # BLD: 25.28 K/UL — HIGH (ref 4.8–10.8)
WBC # BLD: 26.54 K/UL — HIGH (ref 4.8–10.8)
WBC # FLD AUTO: 25.28 K/UL — HIGH (ref 4.8–10.8)
WBC # FLD AUTO: 26.54 K/UL — HIGH (ref 4.8–10.8)

## 2021-03-31 PROCEDURE — 95816 EEG AWAKE AND DROWSY: CPT | Mod: 26

## 2021-03-31 PROCEDURE — 71045 X-RAY EXAM CHEST 1 VIEW: CPT | Mod: 26

## 2021-03-31 PROCEDURE — 99232 SBSQ HOSP IP/OBS MODERATE 35: CPT

## 2021-03-31 RX ORDER — CASPOFUNGIN ACETATE 7 MG/ML
70 INJECTION, POWDER, LYOPHILIZED, FOR SOLUTION INTRAVENOUS ONCE
Refills: 0 | Status: COMPLETED | OUTPATIENT
Start: 2021-03-31 | End: 2021-03-31

## 2021-03-31 RX ORDER — MEROPENEM 1 G/30ML
750 INJECTION INTRAVENOUS EVERY 12 HOURS
Refills: 0 | Status: DISCONTINUED | OUTPATIENT
Start: 2021-03-31 | End: 2021-04-05

## 2021-03-31 RX ORDER — NOREPINEPHRINE BITARTRATE/D5W 8 MG/250ML
0.05 PLASTIC BAG, INJECTION (ML) INTRAVENOUS
Qty: 8 | Refills: 0 | Status: DISCONTINUED | OUTPATIENT
Start: 2021-03-31 | End: 2021-03-31

## 2021-03-31 RX ORDER — SODIUM CHLORIDE 9 MG/ML
1000 INJECTION INTRAMUSCULAR; INTRAVENOUS; SUBCUTANEOUS ONCE
Refills: 0 | Status: COMPLETED | OUTPATIENT
Start: 2021-03-31 | End: 2021-03-31

## 2021-03-31 RX ORDER — NOREPINEPHRINE BITARTRATE/D5W 8 MG/250ML
0.03 PLASTIC BAG, INJECTION (ML) INTRAVENOUS
Qty: 8 | Refills: 0 | Status: DISCONTINUED | OUTPATIENT
Start: 2021-03-31 | End: 2021-04-03

## 2021-03-31 RX ORDER — FUROSEMIDE 40 MG
40 TABLET ORAL ONCE
Refills: 0 | Status: COMPLETED | OUTPATIENT
Start: 2021-03-31 | End: 2021-03-31

## 2021-03-31 RX ORDER — CASPOFUNGIN ACETATE 7 MG/ML
INJECTION, POWDER, LYOPHILIZED, FOR SOLUTION INTRAVENOUS
Refills: 0 | Status: DISCONTINUED | OUTPATIENT
Start: 2021-03-31 | End: 2021-04-05

## 2021-03-31 RX ORDER — CASPOFUNGIN ACETATE 7 MG/ML
50 INJECTION, POWDER, LYOPHILIZED, FOR SOLUTION INTRAVENOUS EVERY 24 HOURS
Refills: 0 | Status: DISCONTINUED | OUTPATIENT
Start: 2021-04-01 | End: 2021-04-05

## 2021-03-31 RX ORDER — DEXAMETHASONE 0.5 MG/5ML
6 ELIXIR ORAL DAILY
Refills: 0 | Status: DISCONTINUED | OUTPATIENT
Start: 2021-04-01 | End: 2021-04-09

## 2021-03-31 RX ADMIN — SODIUM CHLORIDE 75 MILLILITER(S): 9 INJECTION INTRAMUSCULAR; INTRAVENOUS; SUBCUTANEOUS at 08:08

## 2021-03-31 RX ADMIN — Medication 2: at 17:06

## 2021-03-31 RX ADMIN — MEROPENEM 100 MILLIGRAM(S): 1 INJECTION INTRAVENOUS at 17:05

## 2021-03-31 RX ADMIN — ATORVASTATIN CALCIUM 80 MILLIGRAM(S): 80 TABLET, FILM COATED ORAL at 21:12

## 2021-03-31 RX ADMIN — Medication 7.14 MICROGRAM(S)/KG/MIN: at 15:10

## 2021-03-31 RX ADMIN — Medication 2: at 08:07

## 2021-03-31 RX ADMIN — Medication 10 UNIT(S): at 08:06

## 2021-03-31 RX ADMIN — CHLORHEXIDINE GLUCONATE 1 APPLICATION(S): 213 SOLUTION TOPICAL at 05:15

## 2021-03-31 RX ADMIN — SODIUM CHLORIDE 1000 MILLILITER(S): 9 INJECTION INTRAMUSCULAR; INTRAVENOUS; SUBCUTANEOUS at 13:03

## 2021-03-31 RX ADMIN — Medication 40 MILLIGRAM(S): at 17:04

## 2021-03-31 RX ADMIN — Medication 100 MILLIGRAM(S): at 05:15

## 2021-03-31 RX ADMIN — INSULIN GLARGINE 30 UNIT(S): 100 INJECTION, SOLUTION SUBCUTANEOUS at 21:11

## 2021-03-31 RX ADMIN — SODIUM ZIRCONIUM CYCLOSILICATE 5 GRAM(S): 10 POWDER, FOR SUSPENSION ORAL at 05:15

## 2021-03-31 RX ADMIN — SENNA PLUS 2 TABLET(S): 8.6 TABLET ORAL at 21:12

## 2021-03-31 RX ADMIN — Medication 6 MILLIGRAM(S): at 05:14

## 2021-03-31 RX ADMIN — PANTOPRAZOLE SODIUM 40 MILLIGRAM(S): 20 TABLET, DELAYED RELEASE ORAL at 17:05

## 2021-03-31 RX ADMIN — Medication 5 MILLIGRAM(S): at 21:12

## 2021-03-31 RX ADMIN — PANTOPRAZOLE SODIUM 40 MILLIGRAM(S): 20 TABLET, DELAYED RELEASE ORAL at 05:17

## 2021-03-31 RX ADMIN — AMLODIPINE BESYLATE 10 MILLIGRAM(S): 2.5 TABLET ORAL at 05:15

## 2021-03-31 RX ADMIN — INSULIN GLARGINE 30 UNIT(S): 100 INJECTION, SOLUTION SUBCUTANEOUS at 08:06

## 2021-03-31 RX ADMIN — CASPOFUNGIN ACETATE 260 MILLIGRAM(S): 7 INJECTION, POWDER, LYOPHILIZED, FOR SOLUTION INTRAVENOUS at 17:05

## 2021-03-31 RX ADMIN — Medication 10 UNIT(S): at 17:06

## 2021-03-31 RX ADMIN — Medication 11.9 MICROGRAM(S)/KG/MIN: at 13:03

## 2021-03-31 RX ADMIN — Medication 4: at 12:34

## 2021-03-31 RX ADMIN — Medication 10 UNIT(S): at 12:33

## 2021-03-31 NOTE — PROGRESS NOTE ADULT - SUBJECTIVE AND OBJECTIVE BOX
Patient Information:  ROSA MARIA CASEY / 80y / Male / MRN#:[id number]    Hospital Day: 19d    Interval History:  Patient seen and examined at bedside. Pt became hypotensive and hypothermic this morning, s/p 2 IVF boluses with mild improvement in BP.  Hb continues to decline with no clear source of bleeding, no hematuria, hematochezia, hemoptysis. However, pt reports he feels better this morning, denies any complaints, discomfort.    Past Medical History:  Hypertension    Hyperlipidemia    Diabetes mellitus, type 2    History of atrial fibrillation    Bladder cancer    Chronic kidney disease (CKD)      Past Surgical History:  History of total hip replacement, bilateral    History of total knee replacement, bilateral    History of total cystectomy      Allergies:  No Known Allergies    Medications:  PRN:  acetaminophen   Tablet .. 650 milliGRAM(s) Oral every 6 hours PRN Temp greater or equal to 38C (100.4F), Mild Pain (1 - 3)  guaifenesin/dextromethorphan  Syrup 10 milliLiter(s) Oral every 6 hours PRN Cough    Standing:  atorvastatin 80 milliGRAM(s) Oral at bedtime  chlorhexidine 4% Liquid 1 Application(s) Topical <User Schedule>  influenza   Vaccine 0.5 milliLiter(s) IntraMuscular once  insulin glargine Injectable (LANTUS) 30 Unit(s) SubCutaneous at bedtime  insulin glargine Injectable (LANTUS) 30 Unit(s) SubCutaneous every morning  insulin lispro (ADMELOG) corrective regimen sliding scale   SubCutaneous three times a day before meals  insulin lispro Injectable (ADMELOG) 10 Unit(s) SubCutaneous three times a day before meals  melatonin 5 milliGRAM(s) Oral at bedtime  metoprolol succinate  milliGRAM(s) Oral daily  pantoprazole  Injectable 40 milliGRAM(s) IV Push every 12 hours  polyethylene glycol 3350 17 Gram(s) Oral daily  senna 2 Tablet(s) Oral at bedtime  sodium chloride 0.9%. 1000 milliLiter(s) (75 mL/Hr) IV Continuous <Continuous>  sodium zirconium cyclosilicate 5 Gram(s) Oral every 12 hours    Home:  amlodipine-benazepril 10 mg-40 mg oral capsule: 1 cap(s) orally once a day  Eliquis 5 mg oral tablet: 1 tab(s) orally 2 times a day  glimepiride 2 mg oral tablet: 1 tab(s) orally once a day  Lantus 100 units/mL subcutaneous solution: 40 unit(s) subcutaneous once a day  metoprolol succinate 100 mg oral tablet, extended release: 1 tab(s) orally once a day  Ozempic (1 mg dose) 2 mg/1.5 mL subcutaneous solution:   Percocet 7.5/325 oral tablet: 0.5 tab(s) orally once a day (at bedtime)  potassium citrate 10 mEq oral tablet, extended release: 1 tab(s) orally once a day  rosuvastatin 20 mg oral tablet: 1 tab(s) orally once a day    Vitals:  T(C): 35.4, Max: 36.2 (21 @ 05:37)  T(F): 95.8, Max: 97.2 (21 @ 05:37)  HR: 64 (64 - 92)  BP: 88/52 (82/52 - 114/75)  RR: 20 (20 - 20)  SpO2: 92% (92% - 97%)    Physical Exam:  General: Awake, alert, NAD, sitting up in bed, on HFNC 50/70  HEENT: Head NC/AT  Heart: RRR; systolic murmur apprec  Lungs: Mild rales in bilateral bases  Abdomen: Soft, nontender, nondistended, BS+, urostomy bag in place, appears clean, no bleeding visualized  Extremities: No edema, clubbing, cyanosis in upper or lower extremities; venous stasis dermatitis skin changes on bilateral lower extremities   Neuro: AAOx3, NFD    Labs:  CBC ( @ 04:45)                        Hgb: 8.1    WBC: 25.28 )-----------------( Plts: 202                              Hct: 24.7     Chem ( @ 04:45)  Na: 141  |     Cl: 110     |  BUN: 123  -----------------------------------------< Gluc: 163    K: 4.7   |    HCO3: 14  |  Cr: 2.3    Ca 8.0 ( @ 04:45)  Mg 2.6 ( @ 04:45)    LFTs ( 04:45)  TPro 5.1  /  Alb 3.0  TBili 0.8  /  DBili     AST 25  /  ALT 29  /  AlkPhos 52    Cardiac Markers ( @ 04:45)  Troponin I X  Troponin T X  CK X  CKMB X  CKMB Units X  Myoglobin X  Lactate 3.7  ESR X    Cardiac Markers ( @ 12:24)  Troponin I X  Troponin T X  CK X  CKMB X  CKMB Units X  Myoglobin X  Lactate 5.1  ESR X    Cardiac Markers ( @ 04:30)  Troponin I X  Troponin T X  CK X  CKMB X  CKMB Units X  Myoglobin X  Lactate 5.1  ESR X          Urinalysis Basic ( @ 04:27)  Color: Yellow  Appearance: Slightly Turbid  S.017  pH:   Gluc:   Ketone: Negative  Bili: Negative  Urobili: <2 mg/dL   Blood:   Protein: Trace  Nitrite: Negative   Leuk Esterase: Large  RBC: 164  WBC: 84   Sq Epi:   Non Sq Epi: 0  Bacteria: Negative    Radiology:    < from: Xray Chest 1 View AP/PA (21 @ 08:57) >  Impression:      Unchanged left-sided opacity with improved right-sided opacity.    < end of copied text >

## 2021-03-31 NOTE — PROGRESS NOTE ADULT - SUBJECTIVE AND OBJECTIVE BOX
[facility] FOLLOW UP NOTE  --------------------------------------------------------------------------------  24 hour events/subjective: Patient examined. C/O orthopnea. Now on nonrebreather.    PAST HISTORY  --------------------------------------------------------------------------------  No significant changes to PMH, PSH, FHx, SHx, unless otherwise noted    ALLERGIES & MEDICATIONS  --------------------------------------------------------------------------------  Allergies    No Known Allergies    Standing Inpatient Medications  atorvastatin 80 milliGRAM(s) Oral at bedtime  chlorhexidine 4% Liquid 1 Application(s) Topical <User Schedule>  influenza   Vaccine 0.5 milliLiter(s) IntraMuscular once  insulin glargine Injectable (LANTUS) 30 Unit(s) SubCutaneous at bedtime  insulin glargine Injectable (LANTUS) 30 Unit(s) SubCutaneous every morning  insulin lispro (ADMELOG) corrective regimen sliding scale   SubCutaneous three times a day before meals  insulin lispro Injectable (ADMELOG) 10 Unit(s) SubCutaneous three times a day before meals  melatonin 5 milliGRAM(s) Oral at bedtime  metoprolol succinate  milliGRAM(s) Oral daily  norepinephrine Infusion 0.05 MICROgram(s)/kG/Min IV Continuous <Continuous>  pantoprazole  Injectable 40 milliGRAM(s) IV Push every 12 hours  polyethylene glycol 3350 17 Gram(s) Oral daily  senna 2 Tablet(s) Oral at bedtime  sodium chloride 0.9% Bolus 1000 milliLiter(s) IV Bolus once  sodium chloride 0.9%. 1000 milliLiter(s) IV Continuous <Continuous>  sodium zirconium cyclosilicate 5 Gram(s) Oral every 12 hours    PRN Inpatient Medications  acetaminophen   Tablet .. 650 milliGRAM(s) Oral every 6 hours PRN  guaifenesin/dextromethorphan  Syrup 10 milliLiter(s) Oral every 6 hours PRN    VITALS/PHYSICAL EXAM  --------------------------------------------------------------------------------  T(C): 34.7 (03-31-21 @ 12:22), Max: 36.2 (03-31-21 @ 05:37)  HR: 66 (03-31-21 @ 12:22) (64 - 92)  BP: 99/64 (03-31-21 @ 12:22) (82/52 - 114/75)  RR: 20 (03-31-21 @ 05:37) (20 - 20)  SpO2: 92% (03-31-21 @ 08:14) (92% - 97%)    03-30-21 @ 07:01  -  03-31-21 @ 07:00  --------------------------------------------------------  IN: 2235 mL / OUT: 2250 mL / NET: -15 mL    Physical Exam:  	Gen: NAD  	Pulm:  B/L rales  	CV: RRR, S1S2  	Abd: +BS, soft, nontender/nondistended  	: No suprapubic tenderness  	LE: Warm, trace edema    LABS/STUDIES  --------------------------------------------------------------------------------              8.1    25.28 >-----------<  202      [03-31-21 @ 04:45]              24.7     141  |  110  |  123  ----------------------------<  163      [03-31-21 @ 04:45]  4.7   |  14  |  2.3        Ca     8.0     [03-31-21 @ 04:45]      Mg     2.6     [03-31-21 @ 04:45]    TPro  5.1  /  Alb  3.0  /  TBili  0.8  /  DBili  x   /  AST  25  /  ALT  29  /  AlkPhos  52  [03-31-21 @ 04:45]    Creatinine Trend:  SCr 2.3 [03-31 @ 04:45]  SCr 1.8 [03-30 @ 12:24]  SCr 1.8 [03-30 @ 03:10]  SCr 1.1 [03-29 @ 08:21]  SCr 1.0 [03-28 @ 08:13]    Urinalysis - [03-30-21 @ 04:27]      Color Yellow / Appearance Slightly Turbid / SG 1.017 / pH 6.5      Gluc Negative / Ketone Negative  / Bili Negative / Urobili <2 mg/dL       Blood Large / Protein Trace / Leuk Est Large / Nitrite Negative       / WBC 84 / Hyaline 25 / Gran  / Sq Epi  / Non Sq Epi 0 / Bacteria Negative    Iron 178, TIBC 305, %sat 58      [03-30-21 @ 07:11]  Ferritin 730      [03-30-21 @ 07:11]

## 2021-03-31 NOTE — PROGRESS NOTE ADULT - ASSESSMENT
· Assessment	  81yo male with PMH of hypertension, hyperlipidemia, diabetes mellitus type 2, atrial fibrillation on Eliquis, CKD stage 3A, bladder cancer s/p cystectomy with urostomy, and bilateral hip and knee arthroplasties who was brought in by EMS for hypoxia. Patient had tested positive for COVID-19 on 3/4/2021, but symptoms did not begin until 3/6/2021. He was found to be hypoxic to 85% on room air.    IMPRESSION;  #COVID 19 with severe illness. SpO2 < 94% on RA and need for supplemental O2. HFNC    Pt was in the late inflammatory response phase ot the illness based on the onset of symptoms.    CXR opacities b/l    CT A 3/30 : no RPH. Opacity LLL > no change compared with 3/26    LLL bacterial PNA. Fungal etiology cannot be r/o  Ddimer 3121 . suggestive of thrombus  Ferritin 730    procal 0.31  WBC 25.2, lactate 2.6, ARF > MSOF suggestive of possible sepsis  BCx 3/30 NGTD    Hx of bladder ca s/p cystectomy   : no acute urologic intervention at this time    - s/p 3/23 Tocilizumab >90kg 800mg x1     RECOMMENDATIONS;  Serum fungitell assay  Serum Galactomaana assay  UA/UCx  Nares ORSA  try and obtain sputum for gm stain , cultures  Neurology f/u  Meropenem 750 mg  iv q12h  Caspofungin 70 mg iv x once today and from 4/1 50 mg iv q24h

## 2021-03-31 NOTE — PROGRESS NOTE ADULT - SUBJECTIVE AND OBJECTIVE BOX
Patient is a 80y old  Male who presents with a chief complaint of acute hypoxemic respiratory failure secondary to COVID-19 pneumonia (29 Mar 2021 21:07)        Over Night Events:  Remains on HF this am.         ROS:     All ROS are negative except HPI         PHYSICAL EXAM    ICU Vital Signs Last 24 Hrs  T(C): 35.1 (30 Mar 2021 04:40), Max: 36.4 (29 Mar 2021 12:07)  T(F): 95.2 (30 Mar 2021 04:40), Max: 97.6 (29 Mar 2021 12:07)  HR: 68 (30 Mar 2021 04:40) (68 - 87)  BP: 97/57 (30 Mar 2021 04:40) (97/56 - 124/66)  BP(mean): 76 (29 Mar 2021 16:00) (76 - 79)  ABP: --  ABP(mean): --  RR: 20 (30 Mar 2021 04:40) (19 - 25)  SpO2: 99% (30 Mar 2021 04:40) (90% - 100%)      CONSTITUTIONAL:  Well nourished.  NAD    ENT:   Airway patent,   Mouth with normal mucosa.   No thrush    EYES:   Pupils equal,   Round and reactive to light.    CARDIAC:   Normal rate,   Regular rhythm.    No edema      Vascular:  Normal systolic impulse  No Carotid bruits    RESPIRATORY:   No wheezing  Bilateral crackles   Normal chest expansion  Not tachypneic,  No use of accessory muscles    GASTROINTESTINAL:  Abdomen soft,   Non-tender,   No guarding,   + BS    MUSCULOSKELETAL:   Range of motion is not limited,  No clubbing, cyanosis    NEUROLOGICAL:   Alert  No motor  deficits.    SKIN:   Skin normal color for race,   Warm and dry and intact.   No evidence of rash.    PSYCHIATRIC:   Normal mood and affect.   No apparent risk to self or others.    HEMATOLOGICAL:  No cervical  lymphadenopathy.  no inguinal lymphadenopathy      03-29-21 @ 07:01  -  03-30-21 @ 07:00  --------------------------------------------------------  IN:    IV PiggyBack: 650 mL    Pantoprazole: 160 mL    sodium chloride 0.9%: 75 mL  Total IN: 885 mL    OUT:    Urostomy (mL): 1550 mL    Voided (mL): 400 mL  Total OUT: 1950 mL    Total NET: -1065 mL          LABS:            MEDICATIONS  (STANDING):  amLODIPine   Tablet 10 milliGRAM(s) Oral daily  atorvastatin 80 milliGRAM(s) Oral at bedtime  cefepime   IVPB 2000 milliGRAM(s) IV Intermittent every 12 hours  chlorhexidine 4% Liquid 1 Application(s) Topical <User Schedule>  dexAMETHasone  IVPB 8 milliGRAM(s) IV Intermittent every 8 hours  influenza   Vaccine 0.5 milliLiter(s) IntraMuscular once  insulin glargine Injectable (LANTUS) 25 Unit(s) SubCutaneous at bedtime  insulin glargine Injectable (LANTUS) 25 Unit(s) SubCutaneous every morning  insulin lispro (ADMELOG) corrective regimen sliding scale   SubCutaneous three times a day before meals  insulin lispro Injectable (ADMELOG) 10 Unit(s) SubCutaneous three times a day before meals  melatonin 5 milliGRAM(s) Oral at bedtime  metoprolol succinate  milliGRAM(s) Oral daily  pantoprazole Infusion 8 mG/Hr (10 mL/Hr) IV Continuous <Continuous>  polyethylene glycol 3350 17 Gram(s) Oral daily  senna 2 Tablet(s) Oral at bedtime  sodium chloride 0.9%. 1000 milliLiter(s) (75 mL/Hr) IV Continuous <Continuous>  sodium zirconium cyclosilicate 5 Gram(s) Oral every 12 hours    MEDICATIONS  (PRN):  acetaminophen   Tablet .. 650 milliGRAM(s) Oral every 6 hours PRN Temp greater or equal to 38C (100.4F), Mild Pain (1 - 3)  guaifenesin/dextromethorphan  Syrup 10 milliLiter(s) Oral every 6 hours PRN Cough      New X-rays reviewed:                                                                                  ECHO    CXR interpreted by me:

## 2021-03-31 NOTE — PROGRESS NOTE ADULT - ASSESSMENT
1. Acute hypoxemic respiratory failure secondary to COVID-19 pneumonia, s/p Toci 3/23, on dexamethasone, requiring high flow O2  2. Acute kidney injury, likely pre-renal azotemia vs contrast nephropathy  3. CKD III, baseline creatinine 1.2-1.5.  4. Hx of Bladder Ca, s/p cystoprostatectomy, urostomy  5. Hyperkalemia  6. Hypotension    Plan:    Hold IVF  Start Levo drip  Continue Lokelma  Check CXR  Daily BMP  Monitor I/O  No urgent indication for RRT but appears headed in that direction

## 2021-03-31 NOTE — PROGRESS NOTE ADULT - ASSESSMENT
79 yo M with PMHx of AFib, on Eliquis, HTN, DMII, CKDIII, hx of Bladder Ca s/p Urostomy, bilateral hip/knee replacements, obesity admitted for acute hypoxemic respiratory failure due to COVID PNA.    #Acute hypoxemic respiratory failure secondary to COVID-19 pneumonia  - COVID-19 PCR positive  - Isolation precautions (contact, droplet, airborne)  - Chest X-ray 3/31 -stable L sided opacities, improved R sided opacities  - Patient is saturating 90-05%% on HFNC 50L/70%  - s/p Toci 3/23  - C/w Dexamethasone 6 mg once daily     #Sepsis due to unclear source  - Hypotensive, hypothermic, elevated WBC ct, elevated lactate  - Had received Vanco and Cefepime 3/30, now off Abx  - ID follow up for further recommendations  - Procalc 0.31  - Urine, Blood Cx pending    #Episodic tremors, non responsiveness  - No further episodes overnight  - No post ictal state, no focal neurologic deficits on exam  - EEG revealed mild-moderate generalized slowing  - CT Head negative for acute pathology  - Episodes may have been due to sepsis    #Acute drop in Hb  - Hb gradually dec from 13 to 8  - Pt has not had any melena or hematochezia, no hematuria overnight  - Seen by GI, low suspicion for GI bleed  - CT Abd obtained - negative for retroperitoneal hematoma  - Cont to monitor CBC, maintain active T+S  - If CBC begins to trend up, resume Lovenox as pt has inc risk for developing thrombosis    #CKD III   #Hx of Bladder Ca, s/p Cystoprostatectomy, Urostomy  #Acute kidney injury due to LINDSAY vs ATN  - Hospital stay complicated by hematuria in urostomy bag and phillips catheter- now improved  - CT Abd obtained - small stones, bilateral renal cysts visualized; stones appeared resolved on repeat imaging  - Cr continues to rise, pt is nonoliguric  - Nephrology following  - LORRAINE may be due to LINDSAY as pt got contrast 3/26 vs ATN in setting of acute COVID and likely underlying infection causing sepsis  - C/w IVF    #Chronic AFIb on Eliquis  # [id number] Score: 4  - Eliquis on Hold, was on full AC Lovenox while in hospital, on hold for now while Hb has been trending down  - C/w Metoprolol succinate 100 mg qd    #HTN  - Has been hypotensive in setting of sepsis, holding Amlodipine    #DMII  - FS elevated, will inc Lantus to 30 BID, Lispro 10 U before meals  - Cont to monitor, inc insulin as needed    DVT ppx: Lovenox 120 mg BID on hold  GI ppx: PPI  Diet: DASH, CCC  Activity: IAT  Full code  Dispo: c/w CEU monitoring

## 2021-03-31 NOTE — PROGRESS NOTE ADULT - ASSESSMENT
79yo M c PMHx chronic afib on noac, htn, dm2, ckd3 bladder ca s/p urostomy, b/l hip/ knee replacements obesity, here with acute hypoxic respiratory failure 2/2 severe covid 19 viral pneumonia, lorraine on ckd3 now better, elevated ddimer    Sepsis o admission  Covid viral PNA. acute hypoxic RF  underlying JARETH, MO  LORRAINE on CKD  Transaminitis  Hx of HTN, ASHD, Afib/Eliquis  DM II, CKD  DLD  Bladder ca, sp urostomy, sp cystoprostatectomy, exposure to 911  OA, DDD, DJD, sp BL hip and knee surgeries, mobility dysfunction  GERD, HH, diverticulosis, bowel resection and reanastomosis      pt is hypotensive, hypothermic, WBC 25m, bump in renal parameters 123/2.3  / sepsis  pt cultured and given Cefepime and dose of vanco yesterday  pt started on IVF  recall ID   repeat infla parameters. inc fungitel  cont on HFNC    for hematuria in urostomy bag,  monitor H/H, off AC    Uro ff no acute intervention  CT of abd:  layering of material within both kidneys ( sm stones, "milk of calcium" BL renal cysts, no acute pathology  send urine for C&S     Covid ABs are + 85.6  pul-critical care ffup and care appreciated    CXR show BL opacities, low lung volumes    us duplex LE's negative for DVT  elevated DDIMER, CT angio negative for PE  monitor infla parameters q 72 hrs    mild transaminitis  improving    Rehab consult, OOB to chair  pt is a full code   79yo M c PMHx chronic afib on noac, htn, dm2, ckd3 bladder ca s/p urostomy, b/l hip/ knee replacements obesity, here with acute hypoxic respiratory failure 2/2 severe covid 19 viral pneumonia, lorraine on ckd3 now better, elevated ddimer    Sepsis o admission  Covid viral PNA. acute hypoxic RF  underlying JARETH, MO  LORRAINE on CKD  Transaminitis  Hx of HTN, ASHD, Afib/Eliquis  DM II, CKD  DLD  Bladder ca, sp urostomy, sp cystoprostatectomy, exposure to 911  OA, DDD, DJD, sp BL hip and knee surgeries, mobility dysfunction  GERD, HH, diverticulosis, bowel resection and reanastomosis      pt is hypotensive, hypothermic, WBC 25m, bump in renal parameters 123/2.3  / sepsis  pt cultured and given Cefepime and dose of vanco yesterday  pt started on IVF  recall ID for appropriate Abx/? antifungal  repeat infla parameters. inc fungitel  cont on HFNC    for hematuria in urostomy bag,  monitor H/H, off AC    Uro ff no acute intervention  CT of abd:  layering of material within both kidneys ( sm stones, "milk of calcium" BL renal cysts, no acute pathology  send urine for C&S     Covid ABs are + 85.6  pul-critical care ffup and care appreciated    CXR show BL opacities, low lung volumes    us duplex LE's negative for DVT  elevated DDIMER, CT angio negative for PE  monitor infla parameters q 72 hrs    mild transaminitis  improving    Rehab consult, OOB to chair  pt is a full code

## 2021-03-31 NOTE — PROGRESS NOTE ADULT - SUBJECTIVE AND OBJECTIVE BOX
CARMELAROSA MARIA  80y, Male    All available historical data reviewed    OVERNIGHT EVENTS:  no fevers  weak  does not follow commands  HFNC    ROS:  unable to obtain history secondary to patient's mental status and/or sedation     VITALS:  T(F): 94.7, Max: 97.2 (21 @ 05:37)  HR: 74  BP: 116/63  RR: 20Vital Signs Last 24 Hrs  T(C): 34.8 (31 Mar 2021 13:11), Max: 36.2 (31 Mar 2021 05:37)  T(F): 94.7 (31 Mar 2021 13:11), Max: 97.2 (31 Mar 2021 05:37)  HR: 74 (31 Mar 2021 13:11) (64 - 92)  BP: 116/63 (31 Mar 2021 13:11) (82/52 - 116/63)  BP(mean): 75 (31 Mar 2021 10:25) (65 - 91)  RR: 20 (31 Mar 2021 05:37) (20 - 20)  SpO2: 93% (31 Mar 2021 13:11) (92% - 99%)    TESTS & MEASUREMENTS:                        8.1    25.28 )-----------( 202      ( 31 Mar 2021 04:45 )             24.7         141  |  110  |  123<HH>  ----------------------------<  163<H>  4.7   |  14<L>  |  2.3<H>    Ca    8.0<L>      31 Mar 2021 04:45  Mg     2.6         TPro  5.1<L>  /  Alb  3.0<L>  /  TBili  0.8  /  DBili  x   /  AST  25  /  ALT  29  /  AlkPhos  52      LIVER FUNCTIONS - ( 31 Mar 2021 04:45 )  Alb: 3.0 g/dL / Pro: 5.1 g/dL / ALK PHOS: 52 U/L / ALT: 29 U/L / AST: 25 U/L / GGT: x             Culture - Blood (collected 21 @ 03:10)  Source: .Blood Blood-Peripheral  Preliminary Report (21 @ 14:01):    No growth to date.      Urinalysis Basic - ( 30 Mar 2021 04:27 )    Color: Yellow / Appearance: Slightly Turbid / S.017 / pH: x  Gluc: x / Ketone: Negative  / Bili: Negative / Urobili: <2 mg/dL   Blood: x / Protein: Trace / Nitrite: Negative   Leuk Esterase: Large / RBC: 164 /HPF / WBC 84 /HPF   Sq Epi: x / Non Sq Epi: 0 /HPF / Bacteria: Negative          RADIOLOGY & ADDITIONAL TESTS:  Personal review of radiological diagnostics performed  Echo and EKG results noted when applicable.     MEDICATIONS:  acetaminophen   Tablet .. 650 milliGRAM(s) Oral every 6 hours PRN  atorvastatin 80 milliGRAM(s) Oral at bedtime  chlorhexidine 4% Liquid 1 Application(s) Topical <User Schedule>  guaifenesin/dextromethorphan  Syrup 10 milliLiter(s) Oral every 6 hours PRN  influenza   Vaccine 0.5 milliLiter(s) IntraMuscular once  insulin glargine Injectable (LANTUS) 30 Unit(s) SubCutaneous at bedtime  insulin glargine Injectable (LANTUS) 30 Unit(s) SubCutaneous every morning  insulin lispro (ADMELOG) corrective regimen sliding scale   SubCutaneous three times a day before meals  insulin lispro Injectable (ADMELOG) 10 Unit(s) SubCutaneous three times a day before meals  melatonin 5 milliGRAM(s) Oral at bedtime  metoprolol succinate  milliGRAM(s) Oral daily  norepinephrine Infusion 0.03 MICROgram(s)/kG/Min IV Continuous <Continuous>  pantoprazole  Injectable 40 milliGRAM(s) IV Push every 12 hours  polyethylene glycol 3350 17 Gram(s) Oral daily  senna 2 Tablet(s) Oral at bedtime  sodium zirconium cyclosilicate 5 Gram(s) Oral every 12 hours      ANTIBIOTICS:

## 2021-03-31 NOTE — PROGRESS NOTE ADULT - ASSESSMENT
IMPRESSION:  Sepsis present on admission  Acute hypoxemic respiratory failure   Severe COVID-19 PNA SP Toci   Doubt superimposed bacterial infection  LORRAINE on CKD 3, resolved  Probable JARETH    PLAN:    CNS: Avoid CNS depressants    HEENT: Oral care    PULMONARY:  HOB @ 45 degrees.  Aspiration precautions. Target SpO2>94%, wean oxygen as tolerated.  Dexamethasone 6mg Q24    CARDIOVASCULAR: Avoid volume overload.     GI: GI prophylaxis. Feeding as tolerated  Bowel regimen.      RENAL:  Follow up lytes.  Correct as needed. Renal eval.      INFECTIOUS DISEASE: FU PanCx.      HEMATOLOGICAL:  DVT prophylaxis. LMWH.  FU CBC     ENDOCRINE:  Follow up FS.     MUSCULOSKELETAL: OOB to chair     Step Down Unit monitoring for now.     PT OT     Prognosis poor  IMPRESSION:  Sepsis present on admission  Acute hypoxemic respiratory failure   Severe COVID-19 PNA SP Toci   Doubt superimposed bacterial infection  LORRAINE on CKD 3  Probable JAERTH    PLAN:    CNS: Avoid CNS depressants    HEENT: Oral care    PULMONARY:  HOB @ 45 degrees.  Aspiration precautions. Target SpO2>94%, wean oxygen as tolerated.  Dexamethasone 6mg Q24 and wean off     CARDIOVASCULAR: Avoid volume overload.     GI: GI prophylaxis. Feeding as tolerated  Bowel regimen.      RENAL:  Follow up lytes.  Correct as needed. Renal eval appreciated.      INFECTIOUS DISEASE: FU PanCx.  Fungitell.  FU with ID     HEMATOLOGICAL:  DVT prophylaxis. LMWH.  FU CBC     ENDOCRINE:  Follow up FS.     MUSCULOSKELETAL: OOB to chair     Step Down Unit monitoring for now.     PT OT     Prognosis poor

## 2021-03-31 NOTE — PROGRESS NOTE ADULT - SUBJECTIVE AND OBJECTIVE BOX
ROSA MARIA CASEY  Patient is a 80y old  Male who presents with a chief complaint of acute hypoxic RF due to Covid Viral syndrome. Underlying Hx of DMII, DLD, MO,  JARETH, bladder ca, sp urostomy, Hx of 911 exposure)OA, DDD, DJD,GERD, diverticulosis, abd wall hernias. bowel resection and reanastomosis.      Overnight events:  pt in CEU, on HFNC, in the last 24 hrs took a turn for the worse,  became hypotensive , shaking chills inc WBC to 25 today, cultured, given dose of Vanco and Cefepime, BUN/creat 123/2.3, started IV fluids, ID and renal ff up    T:  92.7, 95, 84, 104/54,   HR:   73  BP:  110/62    RR: 20  Pulse ox:  NRM  to HFNC 92%        PHYSICAL EXAM:  GENERAL: A&O but weak and sluggish, overweight, NAD on HFNC  Neck:  short,  thick but supple, no JVD, no bruit, no stridor  Lungs:  shallow resp, scattered rhonchi, no wheezing, improving air entry  Abd:   distended soft and benign, + BS  Urinary:  RLQ urostomy, reddish urine  Ext:  arthritic changes, moves all limbs, nonpitting edema  Skin:  no rash  Psych stable    LABS                        8.1  25)-----------( 201    WBC 14, 17, 21            27    141 |  110  | 123  ----------------------------<163  4.7  |  14    | 2.3 (1.8)    GFR 64, 58, 72, 64, 35, 26  Ca    8.8       Phos  3.4      Mg     2.0, 2.6       MB 63  trop <0.01 x2  ferritin 535, 462, 737  procal 0.26, 0.11  CRP 25, 180  COVID AB + 85.60  330 LA 5.1,  3.7    TPro  5.9<L>  /  Alb  3.2<L>  /  TBili  0.9  /  DBili  x   /  AST  45, 88, 88  /  ALT  53, 93, 99  /  AlkPhos  43  03-14      Urinalysis Basic - ( 12 Mar 2021 16:38 )    Color: Yellow / Appearance: Slightly Turbid / S.020 / pH: x  Gluc: x / Ketone: Negative  / Bili: Negative / Urobili: 3 mg/dL   Blood: x / Protein: 30 mg/dL / Nitrite: Negative   Leuk Esterase: Moderate / RBC: 5 /HPF / WBC 90 /HPF   Sq Epi: x / Non Sq Epi: 0 /HPF / Bacteria: Ma       RADIOLOGY & ADDITIONAL TESTS:  Venous duplex:  negative for DVT  CXR:  BL opacities L>R  CXR:  stable BL opacities    CT angio:  neg for central PE    CT abd/pelvis:  BIbasilar densities, hepatobiliary/spleen/pancreas WNL, mild BL adrenal thickening, no hydro, bl renal cysts, bl layering densities/ sm stones, "milk of ca", post cystoprostatectomy and ilealconduit, , post bowel resection and reanastomosis, ventral abd wall hernias, bl hip replacements    CTH 3/30:  moderate atrophy, chronic microvascular changes,  no evidence of acute pathology    EEG 3/30 gen slowing , no focal epileptiform activity       ROSA MARIA CASEY  Patient is a 80y old  Male who presents with a chief complaint of acute hypoxic RF due to Covid Viral syndrome. Underlying Hx of DMII, DLD, MO,  JARETH, bladder ca, sp urostomy, Hx of 911 exposure)OA, DDD, DJD,GERD, diverticulosis, abd wall hernias. bowel resection and reanastomosis.      Overnight events:  pt in CEU, on HFNC, in the last 24 hrs took a turn for the worse,  became hypotensive , shaking chills inc WBC to 25 today, cultured, given dose of Vanco and Cefepime, BUN/creat 123/2.3, started IV fluids, ID  recall for sepsis and renal ff up    T:  92.7, 95, 84, 104/54,   HR:   73  BP:  110/62    RR: 20  Pulse ox:  NRM  to HFNC 92%        PHYSICAL EXAM:  GENERAL: A&O but weak and sluggish, overweight, NAD on HFNC  Neck:  short,  thick but supple, no JVD, no bruit, no stridor  Lungs:  shallow resp, scattered rhonchi, no wheezing, improving air entry  Abd:   distended soft and benign, + BS  Urinary:  RLQ urostomy, reddish urine  Ext:  arthritic changes, moves all limbs, nonpitting edema  Skin:  no rash  Psych stable    LABS                        8.1  25)-----------( 201    WBC 14, 17, 21            27    141 |  110  | 123  ----------------------------<163  4.7  |  14    | 2.3 (1.8)    GFR 64, 58, 72, 64, 35, 26  Ca    8.8       Phos  3.4      Mg     2.0, 2.6       MB 63  trop <0.01 x2  ferritin 535, 462, 737  procal 0.26, 0.11  CRP 25, 180  COVID AB + 85.60  3/30 LA 5.1,  3.7    TPro  5.9<L>  /  Alb  3.2<L>  /  TBili  0.9  /  DBili  x   /  AST  45, 88, 88  /  ALT  53, 93, 99  /  AlkPhos  43  03-14      Urinalysis Basic - ( 12 Mar 2021 16:38 )    Color: Yellow / Appearance: Slightly Turbid / S.020 / pH: x  Gluc: x / Ketone: Negative  / Bili: Negative / Urobili: 3 mg/dL   Blood: x / Protein: 30 mg/dL / Nitrite: Negative   Leuk Esterase: Moderate / RBC: 5 /HPF / WBC 90 /HPF   Sq Epi: x / Non Sq Epi: 0 /HPF / Bacteria: Ma       RADIOLOGY & ADDITIONAL TESTS:  Venous duplex:  negative for DVT  CXR:  BL opacities L>R  CXR:  stable BL opacities    CT angio:  neg for central PE    CT abd/pelvis:  BIbasilar densities, hepatobiliary/spleen/pancreas WNL, mild BL adrenal thickening, no hydro, bl renal cysts, bl layering densities/ sm stones, "milk of ca", post cystoprostatectomy and ilealconduit, , post bowel resection and reanastomosis, ventral abd wall hernias, bl hip replacements    CTH 3/30:  moderate atrophy, chronic microvascular changes,  no evidence of acute pathology    EEG 3/30 gen slowing , no focal epileptiform activity

## 2021-04-01 LAB
-  AMPICILLIN: SIGNIFICANT CHANGE UP
-  CIPROFLOXACIN: SIGNIFICANT CHANGE UP
-  LEVOFLOXACIN: SIGNIFICANT CHANGE UP
-  NITROFURANTOIN: SIGNIFICANT CHANGE UP
-  TETRACYCLINE: SIGNIFICANT CHANGE UP
-  VANCOMYCIN: SIGNIFICANT CHANGE UP
ALBUMIN SERPL ELPH-MCNC: 3 G/DL — LOW (ref 3.5–5.2)
ALP SERPL-CCNC: 51 U/L — SIGNIFICANT CHANGE UP (ref 30–115)
ALT FLD-CCNC: 27 U/L — SIGNIFICANT CHANGE UP (ref 0–41)
ANION GAP SERPL CALC-SCNC: 14 MMOL/L — SIGNIFICANT CHANGE UP (ref 7–14)
AST SERPL-CCNC: 29 U/L — SIGNIFICANT CHANGE UP (ref 0–41)
BILIRUB SERPL-MCNC: 1 MG/DL — SIGNIFICANT CHANGE UP (ref 0.2–1.2)
BUN SERPL-MCNC: 106 MG/DL — CRITICAL HIGH (ref 10–20)
CALCIUM SERPL-MCNC: 8.2 MG/DL — LOW (ref 8.5–10.1)
CHLORIDE SERPL-SCNC: 113 MMOL/L — HIGH (ref 98–110)
CO2 SERPL-SCNC: 19 MMOL/L — SIGNIFICANT CHANGE UP (ref 17–32)
CREAT SERPL-MCNC: 2.3 MG/DL — HIGH (ref 0.7–1.5)
CULTURE RESULTS: SIGNIFICANT CHANGE UP
GLUCOSE BLDC GLUCOMTR-MCNC: 131 MG/DL — HIGH (ref 70–99)
GLUCOSE BLDC GLUCOMTR-MCNC: 241 MG/DL — HIGH (ref 70–99)
GLUCOSE BLDC GLUCOMTR-MCNC: 266 MG/DL — HIGH (ref 70–99)
GLUCOSE BLDC GLUCOMTR-MCNC: 97 MG/DL — SIGNIFICANT CHANGE UP (ref 70–99)
GLUCOSE SERPL-MCNC: 119 MG/DL — HIGH (ref 70–99)
MAGNESIUM SERPL-MCNC: 3 MG/DL — HIGH (ref 1.8–2.4)
METHOD TYPE: SIGNIFICANT CHANGE UP
ORGANISM # SPEC MICROSCOPIC CNT: SIGNIFICANT CHANGE UP
ORGANISM # SPEC MICROSCOPIC CNT: SIGNIFICANT CHANGE UP
POTASSIUM SERPL-MCNC: 5.1 MMOL/L — HIGH (ref 3.5–5)
POTASSIUM SERPL-SCNC: 5.1 MMOL/L — HIGH (ref 3.5–5)
PROT SERPL-MCNC: 5.1 G/DL — LOW (ref 6–8)
SODIUM SERPL-SCNC: 146 MMOL/L — SIGNIFICANT CHANGE UP (ref 135–146)
SPECIMEN SOURCE: SIGNIFICANT CHANGE UP

## 2021-04-01 PROCEDURE — 71045 X-RAY EXAM CHEST 1 VIEW: CPT | Mod: 26

## 2021-04-01 PROCEDURE — 99232 SBSQ HOSP IP/OBS MODERATE 35: CPT

## 2021-04-01 RX ORDER — INSULIN GLARGINE 100 [IU]/ML
20 INJECTION, SOLUTION SUBCUTANEOUS AT BEDTIME
Refills: 0 | Status: DISCONTINUED | OUTPATIENT
Start: 2021-04-01 | End: 2021-04-05

## 2021-04-01 RX ORDER — INSULIN LISPRO 100/ML
8 VIAL (ML) SUBCUTANEOUS
Refills: 0 | Status: DISCONTINUED | OUTPATIENT
Start: 2021-04-01 | End: 2021-04-12

## 2021-04-01 RX ORDER — SODIUM ZIRCONIUM CYCLOSILICATE 10 G/10G
5 POWDER, FOR SUSPENSION ORAL EVERY 12 HOURS
Refills: 0 | Status: COMPLETED | OUTPATIENT
Start: 2021-04-01 | End: 2021-04-02

## 2021-04-01 RX ADMIN — POLYETHYLENE GLYCOL 3350 17 GRAM(S): 17 POWDER, FOR SOLUTION ORAL at 11:58

## 2021-04-01 RX ADMIN — MEROPENEM 100 MILLIGRAM(S): 1 INJECTION INTRAVENOUS at 20:00

## 2021-04-01 RX ADMIN — Medication 6: at 16:52

## 2021-04-01 RX ADMIN — CHLORHEXIDINE GLUCONATE 1 APPLICATION(S): 213 SOLUTION TOPICAL at 06:16

## 2021-04-01 RX ADMIN — PANTOPRAZOLE SODIUM 40 MILLIGRAM(S): 20 TABLET, DELAYED RELEASE ORAL at 06:06

## 2021-04-01 RX ADMIN — CASPOFUNGIN ACETATE 260 MILLIGRAM(S): 7 INJECTION, POWDER, LYOPHILIZED, FOR SOLUTION INTRAVENOUS at 21:00

## 2021-04-01 RX ADMIN — Medication 6 MILLIGRAM(S): at 06:06

## 2021-04-01 RX ADMIN — PANTOPRAZOLE SODIUM 40 MILLIGRAM(S): 20 TABLET, DELAYED RELEASE ORAL at 18:22

## 2021-04-01 RX ADMIN — INSULIN GLARGINE 20 UNIT(S): 100 INJECTION, SOLUTION SUBCUTANEOUS at 21:00

## 2021-04-01 RX ADMIN — Medication 8 UNIT(S): at 16:45

## 2021-04-01 RX ADMIN — MEROPENEM 100 MILLIGRAM(S): 1 INJECTION INTRAVENOUS at 06:07

## 2021-04-01 RX ADMIN — SODIUM ZIRCONIUM CYCLOSILICATE 5 GRAM(S): 10 POWDER, FOR SUSPENSION ORAL at 13:19

## 2021-04-01 NOTE — PROGRESS NOTE ADULT - SUBJECTIVE AND OBJECTIVE BOX
[place] NEPHROLOGY FOLLOW UP NOTE  --------------------------------------------------------------------------------  24 hour events/subjective: Patient examined. Appears comfortable.    PAST HISTORY  --------------------------------------------------------------------------------  No significant changes to PMH, PSH, FHx, SHx, unless otherwise noted    ALLERGIES & MEDICATIONS  --------------------------------------------------------------------------------  Allergies    No Known Allergies    Standing Inpatient Medications  atorvastatin 80 milliGRAM(s) Oral at bedtime  caspofungin IVPB      caspofungin IVPB 50 milliGRAM(s) IV Intermittent every 24 hours  chlorhexidine 4% Liquid 1 Application(s) Topical <User Schedule>  dexAMETHasone  Injectable 6 milliGRAM(s) IV Push daily  influenza   Vaccine 0.5 milliLiter(s) IntraMuscular once  insulin glargine Injectable (LANTUS) 20 Unit(s) SubCutaneous at bedtime  insulin lispro (ADMELOG) corrective regimen sliding scale   SubCutaneous three times a day before meals  insulin lispro Injectable (ADMELOG) 8 Unit(s) SubCutaneous three times a day before meals  melatonin 5 milliGRAM(s) Oral at bedtime  meropenem  IVPB 750 milliGRAM(s) IV Intermittent every 12 hours  metoprolol succinate  milliGRAM(s) Oral daily  norepinephrine Infusion 0.03 MICROgram(s)/kG/Min IV Continuous <Continuous>  pantoprazole  Injectable 40 milliGRAM(s) IV Push every 12 hours  polyethylene glycol 3350 17 Gram(s) Oral daily  senna 2 Tablet(s) Oral at bedtime    PRN Inpatient Medications  acetaminophen   Tablet .. 650 milliGRAM(s) Oral every 6 hours PRN  guaifenesin/dextromethorphan  Syrup 10 milliLiter(s) Oral every 6 hours PRN    VITALS/PHYSICAL EXAM  --------------------------------------------------------------------------------  T(C): 36.6 (04-01-21 @ 11:41), Max: 36.6 (04-01-21 @ 11:41)  HR: 83 (04-01-21 @ 11:41) (62 - 83)  BP: 118/92 (04-01-21 @ 08:00) (99/64 - 132/58)  RR: 23 (04-01-21 @ 11:41) (23 - 23)  SpO2: 83% (04-01-21 @ 11:41) (83% - 100%)    03-31-21 @ 07:01  -  04-01-21 @ 07:00  --------------------------------------------------------  IN: 1067.9 mL / OUT: 2550 mL / NET: -1482.1 mL    04-01-21 @ 07:01  -  04-01-21 @ 12:15  --------------------------------------------------------  IN: 250 mL / OUT: 225 mL / NET: 25 mL    Physical Exam:  	Gen: NAD  	Pulm:  B/L rales  	CV: RRR, S1S2  	Abd: +BS, soft, nondistended  	: No suprapubic tenderness  	LE: Warm, edema    LABS/STUDIES  --------------------------------------------------------------------------------              7.8    22.76 >-----------<  181      [04-01-21 @ 09:41]              24.5     146  |  113  |  106  ----------------------------<  119      [04-01-21 @ 09:41]  5.1   |  19  |  2.3        Ca     8.2     [04-01-21 @ 09:41]      Mg     3.0     [04-01-21 @ 09:41]    TPro  5.1  /  Alb  3.0  /  TBili  1.0  /  DBili  x   /  AST  29  /  ALT  27  /  AlkPhos  51  [04-01-21 @ 09:41]    Creatinine Trend:  SCr 2.3 [04-01 @ 09:41]  SCr 2.2 [03-31 @ 21:36]  SCr 2.3 [03-31 @ 04:45]  SCr 1.8 [03-30 @ 12:24]  SCr 1.8 [03-30 @ 03:10]    Urinalysis - [03-30-21 @ 04:27]      Color Yellow / Appearance Slightly Turbid / SG 1.017 / pH 6.5      Gluc Negative / Ketone Negative  / Bili Negative / Urobili <2 mg/dL       Blood Large / Protein Trace / Leuk Est Large / Nitrite Negative       / WBC 84 / Hyaline 25 / Gran  / Sq Epi  / Non Sq Epi 0 / Bacteria Negative      Iron 178, TIBC 305, %sat 58      [03-30-21 @ 07:11]  Ferritin 730      [03-30-21 @ 07:11]

## 2021-04-01 NOTE — PROGRESS NOTE ADULT - ASSESSMENT
IMPRESSION:  Sepsis present on admission  Acute hypoxemic respiratory failure   Severe COVID-19 PNA SP Toci   Doubt superimposed bacterial infection  LORRAINE on CKD 3  Probable JARETH    PLAN:    CNS: Avoid CNS depressants    HEENT: Oral care    PULMONARY:  HOB @ 45 degrees.  Aspiration precautions. Target SpO2>94%, wean oxygen as tolerated.  Dexamethasone 6mg Q24 and wean off     CARDIOVASCULAR: Avoid volume overload.     GI: GI prophylaxis. Feeding as tolerated  Bowel regimen.      RENAL:  Follow up lytes.  Correct as needed. Renal eval appreciated.  follow bun, cr     INFECTIOUS DISEASE: FU PanCx.  Fungitell.  FU with ID     HEMATOLOGICAL:  DVT prophylaxis. LMWH.  FU CBC     ENDOCRINE:  Follow up FS.     MUSCULOSKELETAL: OOB to chair     Step Down Unit monitoring for now.     PT OT     Prognosis poor

## 2021-04-01 NOTE — PROGRESS NOTE ADULT - SUBJECTIVE AND OBJECTIVE BOX
Patient is a 80y old  Male who presents with a chief complaint of acute hypoxemic respiratory failure secondary to COVID-19 pneumonia (31 Mar 2021 15:01)      Over Night Events:  Patient seen and examined.   on high flow 100%     ROS:  See HPI    PHYSICAL EXAM    ICU Vital Signs Last 24 Hrs  T(C): 36.1 (01 Apr 2021 04:00), Max: 36.1 (01 Apr 2021 00:00)  T(F): 96.9 (01 Apr 2021 04:00), Max: 96.9 (01 Apr 2021 00:00)  HR: 64 (01 Apr 2021 04:00) (62 - 84)  BP: 118/68 (01 Apr 2021 04:00) (82/52 - 132/58)  BP(mean): 83 (01 Apr 2021 04:00) (65 - 91)  ABP: --  ABP(mean): --  RR: --  SpO2: 100% (01 Apr 2021 04:00) (91% - 100%)      General: awake lethargic   HEENT:     nasal pillow            Lymph Nodes: NO cervical LN   Lungs: Bilateral BS  Cardiovascular: Regular   Abdomen: Soft, Positive BS  Extremities: No clubbing   Skin: warm   Neurological: no focal   Musculoskeletal: move all ext     I&O's Detail    30 Mar 2021 07:01  -  31 Mar 2021 07:00  --------------------------------------------------------  IN:    Pantoprazole: 10 mL    sodium chloride 0.9%: 450 mL    sodium chloride 0.9%: 1275 mL    Sodium Chloride 0.9% Bolus: 500 mL  Total IN: 2235 mL    OUT:    Urostomy (mL): 2250 mL  Total OUT: 2250 mL    Total NET: -15 mL      31 Mar 2021 07:01  -  01 Apr 2021 06:44  --------------------------------------------------------  IN:    IV PiggyBack: 7 mL    Norepinephrine: 36.5 mL    Norepinephrine: 24.4 mL    sodium chloride 0.9%: 1000 mL  Total IN: 1067.9 mL    OUT:    Urostomy (mL): 750 mL    Voided (mL): 1800 mL  Total OUT: 2550 mL    Total NET: -1482.1 mL          LABS:                          8.3    26.54 )-----------( 210      ( 31 Mar 2021 21:36 )             25.7         31 Mar 2021 21:36    142    |  110    |  110    ----------------------------<  162    5.0     |  17     |  2.2      Ca    8.0        31 Mar 2021 21:36  Mg     2.6       31 Mar 2021 04:45    TPro  5.2    /  Alb  3.2    /  TBili  0.8    /  DBili  x      /  AST  26     /  ALT  26     /  AlkPhos  52     31 Mar 2021 21:36  Amylase x     lipase x                                                                                            Lactate, Blood: 3.7 mmol/L (03-31-21 @ 04:45)  Lactate, Blood: 5.1 mmol/L (03-30-21 @ 12:24)  Lactate, Blood: 5.1 mmol/L (03-30-21 @ 04:30)                                                          Culture - Urine (collected 30 Mar 2021 04:27)  Source: .Urine Clean Catch (Midstream)  Preliminary Report (31 Mar 2021 19:03):    >100,000 CFU/ml Enterococcus species    <10,000 CFU/ml of other organisms    Culture - Blood (collected 30 Mar 2021 03:10)  Source: .Blood Blood-Peripheral  Preliminary Report (31 Mar 2021 14:01):    No growth to date.                                                                                           MEDICATIONS  (STANDING):  atorvastatin 80 milliGRAM(s) Oral at bedtime  caspofungin IVPB      caspofungin IVPB 50 milliGRAM(s) IV Intermittent every 24 hours  chlorhexidine 4% Liquid 1 Application(s) Topical <User Schedule>  dexAMETHasone  Injectable 6 milliGRAM(s) IV Push daily  influenza   Vaccine 0.5 milliLiter(s) IntraMuscular once  insulin glargine Injectable (LANTUS) 30 Unit(s) SubCutaneous at bedtime  insulin glargine Injectable (LANTUS) 30 Unit(s) SubCutaneous every morning  insulin lispro (ADMELOG) corrective regimen sliding scale   SubCutaneous three times a day before meals  insulin lispro Injectable (ADMELOG) 10 Unit(s) SubCutaneous three times a day before meals  melatonin 5 milliGRAM(s) Oral at bedtime  meropenem  IVPB 750 milliGRAM(s) IV Intermittent every 12 hours  metoprolol succinate  milliGRAM(s) Oral daily  norepinephrine Infusion 0.03 MICROgram(s)/kG/Min (7.14 mL/Hr) IV Continuous <Continuous>  pantoprazole  Injectable 40 milliGRAM(s) IV Push every 12 hours  polyethylene glycol 3350 17 Gram(s) Oral daily  senna 2 Tablet(s) Oral at bedtime    MEDICATIONS  (PRN):  acetaminophen   Tablet .. 650 milliGRAM(s) Oral every 6 hours PRN Temp greater or equal to 38C (100.4F), Mild Pain (1 - 3)  guaifenesin/dextromethorphan  Syrup 10 milliLiter(s) Oral every 6 hours PRN Cough          Xrays:  TLC:  OG:  ET tube:                                                                                    b/l opacity L>R   ECHO:  [facility] ICU:

## 2021-04-01 NOTE — PROGRESS NOTE ADULT - ASSESSMENT
· Assessment	  79yo male with PMH of hypertension, hyperlipidemia, diabetes mellitus type 2, atrial fibrillation on Eliquis, CKD stage 3A, bladder cancer s/p cystectomy with urostomy, and bilateral hip and knee arthroplasties who was brought in by EMS for hypoxia. Patient had tested positive for COVID-19 on 3/4/2021, but symptoms did not begin until 3/6/2021. He was found to be hypoxic to 85% on room air.    IMPRESSION;  Clinically improved and has no complaints  #COVID 19 with severe illness. SpO2 < 94% on RA and need for supplemental O2. HFNC    Pt was in the late inflammatory response phase ot the illness based on the onset of symptoms.    CXR opacities b/l    CT A 3/30 : no RPH. Opacity LLL > no change compared with 3/26    LLL bacterial PNA. Fungal etiology cannot be r/o  Ddimer 3121 . suggestive of thrombus  Ferritin 730    procal 0.31  Sepsis ? WBC 26.5, lactate 2.6, ARF . MSOF> resolved, being alert, responsive  Clinically no PNA although CT shows opacity LLL. Being treated as such  No  infection. Has a urstomy  UCx 3/30 Enterococci : represents gut kike from urostomy  Hx of bladder ca s/p cystectomy   : no acute urologic intervention at this time  BCx 3/30 NGTD    - s/p 3/23 Tocilizumab >90kg 800mg x1     RECOMMENDATIONS;  Serum fungitell assay  Serum Galactomannan assay  Jv FAULKNER  try and obtain sputum for gm stain , cultures  Neurology f/u  Meropenem 750 mg  iv q12h  Caspofungin  50 mg iv q24h

## 2021-04-01 NOTE — PROGRESS NOTE ADULT - SUBJECTIVE AND OBJECTIVE BOX
ROSA MARIA CASEY  Patient is a 80y old  Male who presents with a chief complaint of acute hypoxic RF due to Covid Viral syndrome. Underlying Hx of DMII, DLD, MO,  JARETH, bladder ca, sp urostomy, Hx of 911 exposure)OA, DDD, DJD,GERD, diverticulosis, abd wall hernias. bowel resection and reanastomosis.      Overnight events:  pt in CEU, became septic 3/30 with hypotension, LORRAINE,  leukocytosis and shaking chills, cultured and given vanco and cefefpime 3/30, BP support with IV boluses + levophed overnight, now WBC 22 (25),  on Meropenem and caspofungin, BP improved and pt taken off levophed, some overload of fluid with SOB, given one dose of lasix IV, BUNcrat still elevated 106/2.3, pt ff by Pul -critical care, ID and arenal    T:  967  HR:   8  BP:  109/55, off Levophed    RR: 23  Pulse ox:  NRM  to HFNC 92%        PHYSICAL EXAM:  GENERAL: A&O but weak and sluggish, overweight, NAD on HFNC  Neck:  short,  thick but supple, no JVD, no bruit, no stridor  Lungs:  shallow resp, scattered rhonchi, no wheezing, improving air entry  Abd:   distended soft and benign, + BS  Urinary:  RLQ urostomy, reddish urine  Ext:  arthritic changes, moves all limbs, nonpitting edema  Skin:  no rash  Psych stable    LABS                        7.8  22-----------( 181            27    146|  113 | 106  ----------------------------<119  5.1   19     2.     GFR 64, 58, 72, 64, 35, 26, 30  Ca    8.8       Phos  3.4      Mg     2.0, 2.6, 3.0    MB 63  trop <0.01 x2  ferritin 535, 462, 737  procal 0.26, 0.11  CRP 25, 180  COVID AB + 85.60  3/30 LA 5.1,  3.7    TPro  5.9<L>  /  Alb  3.2<L>  /  TBili  0.9  /  DBili  x   /  AST  45, 88, 88  /  ALT  53, 93, 99  /  AlkPhos  43  03-14      Urinalysis Basic - ( 12 Mar 2021 16:38 )    Color: Yellow / Appearance: Slightly Turbid / S.020 / pH: x  Gluc: x / Ketone: Negative  / Bili: Negative / Urobili: 3 mg/dL   Blood: x / Protein: 30 mg/dL / Nitrite: Negative   Leuk Esterase: Moderate / RBC: 5 /HPF / WBC 90 /HPF   Sq Epi: x / Non Sq Epi: 0 /HPF / Bacteria: Ma       RADIOLOGY & ADDITIONAL TESTS:  Venous duplex:  negative for DVT  CXR:  BL opacities L>R  CXR:  stable BL opacities    CT angio:  neg for central PE    CT abd/pelvis:  BIbasilar densities, hepatobiliary/spleen/pancreas WNL, mild BL adrenal thickening, no hydro, bl renal cysts, bl layering densities/ sm stones, "milk of ca", post cystoprostatectomy and ilealconduit, , post bowel resection and reanastomosis, ventral abd wall hernias, bl hip replacements    CTH 3/30:  moderate atrophy, chronic microvascular changes,  no evidence of acute pathology    EEG 3/30 gen slowing , no focal epileptiform activity       ROSA MARIA CASEY  Patient is a 80y old  Male who presents with a chief complaint of acute hypoxic RF due to Covid Viral syndrome. Underlying Hx of DMII, DLD, MO,  JARETH, bladder ca, sp urostomy, Hx of 911 exposure)OA, DDD, DJD,GERD, diverticulosis, abd wall hernias. bowel resection and reanastomosis.      Overnight events:  pt in CEU, became septic 3/30 with hypotension, LORRAINE,  leukocytosis and shaking chills, cultured and given vanco and cefefpime 3/30, BP support with IV boluses + levophed overnight, now WBC 22 (25),  on Meropenem and caspofungin, BP improved and pt taken off levophed, some overload of fluid with SOB, given one dose of lasix IV, BUNcrat still elevated 106/2.3, pt ff by Pul -critical care, ID and arenal    T:  96.7  HR:   88  BP:  109/55, off Levophed    RR: 23  Pulse ox:  NRM  to HFNC 92%        PHYSICAL EXAM:  GENERAL: A&O but weak and sluggish, overweight, NAD on HFNC  Neck:  short,  thick but supple, no JVD, no bruit, no stridor  Lungs:  shallow resp, scattered rhonchi, no wheezing, improving air entry  Abd:   distended soft and benign, + BS  Urinary:  RLQ urostomy, reddish urine  Ext:  arthritic changes, moves all limbs, nonpitting edema  Skin:  no rash  Psych stable    LABS                        7.8  22-----------( 181            27    146|  113 | 106  ----------------------------<119  5.1   19     2.3    GFR 64, 58, 72, 64, 35, 26, 30  Ca    8.8       Phos  3.4      Mg     2.0, 2.6, 3.0    MB 63  trop <0.01 x2  ferritin 535, 462, 737  procal 0.26, 0.11  CRP 25, 180  COVID AB + 85.60  3/30 LA 5.1,  3.7    TPro  5.9<L>  /  Alb  3.2<L>  /  TBili  0.9  /  DBili  x   /  AST  45, 88, 88  /  ALT  53, 93, 99  /  AlkPhos  43  03-14      Urinalysis Basic - ( 12 Mar 2021 16:38 )    Color: Yellow / Appearance: Slightly Turbid / S.020 / pH: x  Gluc: x / Ketone: Negative  / Bili: Negative / Urobili: 3 mg/dL   Blood: x / Protein: 30 mg/dL / Nitrite: Negative   Leuk Esterase: Moderate / RBC: 5 /HPF / WBC 90 /HPF   Sq Epi: x / Non Sq Epi: 0 /HPF / Bacteria: Ma       RADIOLOGY & ADDITIONAL TESTS:  Venous duplex:  negative for DVT  CXR:  BL opacities L>R  CXR:  stable BL opacities    CT angio:  neg for central PE    CT abd/pelvis:  BIbasilar densities, hepatobiliary/spleen/pancreas WNL, mild BL adrenal thickening, no hydro, bl renal cysts, bl layering densities/ sm stones, "milk of ca", post cystoprostatectomy and ilealconduit, , post bowel resection and reanastomosis, ventral abd wall hernias, bl hip replacements    CTH 3/30:  moderate atrophy, chronic microvascular changes,  no evidence of acute pathology    EEG 3/30 gen slowing , no focal epileptiform activity

## 2021-04-01 NOTE — PROGRESS NOTE ADULT - ASSESSMENT
81 yo M with PMHx of AFib, on Eliquis, HTN, DMII, CKDIII, hx of Bladder Ca s/p Urostomy, bilateral hip/knee replacements, obesity admitted for acute hypoxemic respiratory failure due to COVID PNA.    #Acute hypoxemic respiratory failure secondary to COVID-19 pneumonia  - COVID-19 PCR positive  - Isolation precautions (contact, droplet, airborne)  - Chest X-ray 3/31 -stable L sided opacities, improved R sided opacities  - Patient is saturating 90-95%% on HFNC 60L/80%  - s/p Toci 3/23  - C/w Dexamethasone 6 mg once daily     #Sepsis due to unclear source - improved  - Not requiring levo at this time, was hypothermic, now improved  - Had received Vanco and Cefepime 3/30  - ID following - c/w Meropenem, Caspofungin  - Procalc 0.31  - Blood Cx 3/30 NGTD, Urine Cx grew enterococcus (gut kike from urostomy)  - Sputum Cx pending    #Episodic tremors, non responsiveness - resolved  - No post ictal state, no focal neurologic deficits on exam  - EEG revealed mild-moderate generalized slowing  - CT Head negative for acute pathology  - Episodes likely occurred due to sepsis    #Acute drop in Hb  - Hb gradually dec from 13 to 8, now stable at 8  - Pt has not had any melena or hematochezia, no hematuria overnight  - Seen by GI, low suspicion for GI bleed  - CT Abd obtained - negative for retroperitoneal hematoma  - Cont to monitor CBC, maintain active T+S  - If CBC begins to trend up, resume Lovenox as pt has inc risk for developing thrombosis    #CKD III   #Hx of Bladder Ca, s/p Cystoprostatectomy, Urostomy  #Acute kidney injury due to LINDSAY vs ATN  - Hospital stay complicated by hematuria in urostomy bag and phillips catheter- now improved  - CT Abd obtained - small stones, bilateral renal cysts visualized; stones appeared resolved on repeat imaging  - Cr stable, pt is nonoliguric  - Nephrology following  - LORRAINE may be due to LINDSAY as pt got contrast 3/26 vs ATN in setting of acute COVID and likely underlying infection causing sepsis  - Cont to monitor, avoid IVF as pt had developed mild fluid overload due to fluid boluses, initiate levophed if needed for MAP < 65    #Chronic AFIb on Eliquis  # [id number] Score: 4  - Eliquis on Hold, was on full AC Lovenox while in hospital, on hold for now while Hb has been trending down  - C/w Metoprolol succinate 100 mg qd    #HTN  - Has been hypotensive in setting of sepsis, holding Amlodipine    #DMII  - FS decreasing in setting of sepsis, poor PO intake  - COnt to monitor, dec insulin to Lantus 20 U qHS, Lispro 8 U before meals    DVT ppx: Lovenox 120 mg BID on hold  GI ppx: PPI  Diet: DASH, CCC  Activity: IAT  Full code  Dispo: c/w CEU monitoring    Spoke to Ellis (son), provided medical update and answered all questions.

## 2021-04-01 NOTE — PROGRESS NOTE ADULT - ASSESSMENT
1. Acute hypoxemic respiratory failure secondary to COVID-19 pneumonia, s/p Toci 3/23, on dexamethasone, requiring high flow O2  2. Acute kidney injury, likely pre-renal azotemia vs contrast nephropathy  3. CKD III, baseline creatinine 1.2-1.5.  4. Hx of Bladder Ca, s/p cystoprostatectomy, urostomy  5. Hyperkalemia  6. Hypotension    Plan:    Avoid IVF if possible  Pressor support as needed  F/U CXR  Daily BMP  Monitor I/O  No urgent indication for RRT, creatinine stabilized

## 2021-04-01 NOTE — PROGRESS NOTE ADULT - ASSESSMENT
81yo M c PMHx chronic afib on noac, htn, dm2, ckd3 bladder ca s/p urostomy, b/l hip/ knee replacements obesity, here with acute hypoxic respiratory failure 2/2 severe covid 19 viral pneumonia, lorraine on ckd3 now better, elevated ddimer    Sepsis on admission  Covid viral PNA. acute hypoxic RF  underlying JARETH, MO  LORRAINE on CKD  Transaminitis  Hx of HTN, ASHD, Afib/Eliquis  DM II, CKD  DLD  Bladder ca, sp urostomy, sp cystoprostatectomy, exposure to 911  OA, DDD, DJD, sp BL hip and knee surgeries, mobility dysfunction  GERD, HH, diverticulosis, bowel resection and reanastomosis      pt became septic, hypotensive with inc in  WBC 22 today,  inc in renal parameters, cultured and given one dose of Vanco and cefepime  3/30,  BP needed levophed support over night and IV boluses  this AM pt had some SOB probable fluid overload given one dose of Lasix with improvement of SOB, off levophed, /55  ID:  Meropenem 750 q12 and Caspofungin 50mg q 24  repeat infla parameters. inc fungitel  Blood C&S NGTD, urine  from urostomy enterococcus  cont on HFNC    CT of abd:  layering of material within both kidneys ( sm stones, "milk of calcium" BL renal cysts, no acute pathol    Covid ABs are + 85.6  pul-critical care ffup and care appreciated    CXR show BL opacities, low lung volumes    us duplex LE's negative for DVT  elevated DDIMER, CT angio negative for PE  monitor infla parameters q 72 hrs    mild transaminitis  improving  pt is a full code

## 2021-04-01 NOTE — PROGRESS NOTE ADULT - SUBJECTIVE AND OBJECTIVE BOX
TIANNAROSA MARIA CUMMINS  80y, Male    All available historical data reviewed    OVERNIGHT EVENTS:  no fevers  alert, responsive  feels well and has no complaints  no cough  HFNC  no abdominal pain/diarrhea  urostomy in place  ROS:  General: Denies rigors, nightsweats  HEENT: Denies headache, rhinorrhea, sore throat, eye pain  CV: Denies CP, palpitations  PULM: Denies wheezing, hemoptysis  GI: Denies hematemesis, hematochezia, melena  : Denies discharge, hematuria  MSK: Denies arthralgias, myalgias  SKIN: Denies rash, lesions  NEURO: Denies paresthesias, weakness  PSYCH: Denies depression, anxiety    VITALS:  T(F): 96.9, Max: 96.9 (04-01-21 @ 00:00)  HR: 64  BP: 118/68  RR: --Vital Signs Last 24 Hrs  T(C): 36.1 (01 Apr 2021 04:00), Max: 36.1 (01 Apr 2021 00:00)  T(F): 96.9 (01 Apr 2021 04:00), Max: 96.9 (01 Apr 2021 00:00)  HR: 64 (01 Apr 2021 04:00) (62 - 84)  BP: 118/68 (01 Apr 2021 04:00) (82/52 - 132/58)  BP(mean): 83 (01 Apr 2021 04:00) (65 - 83)  RR: --  SpO2: 100% (01 Apr 2021 04:00) (91% - 100%)    TESTS & MEASUREMENTS:                        8.3    26.54 )-----------( 210      ( 31 Mar 2021 21:36 )             25.7     03-31    142  |  110  |  110<HH>  ----------------------------<  162<H>  5.0   |  17  |  2.2<H>    Ca    8.0<L>      31 Mar 2021 21:36  Mg     2.6     03-31    TPro  5.2<L>  /  Alb  3.2<L>  /  TBili  0.8  /  DBili  x   /  AST  26  /  ALT  26  /  AlkPhos  52  03-31    LIVER FUNCTIONS - ( 31 Mar 2021 21:36 )  Alb: 3.2 g/dL / Pro: 5.2 g/dL / ALK PHOS: 52 U/L / ALT: 26 U/L / AST: 26 U/L / GGT: x             Culture - Urine (collected 03-30-21 @ 04:27)  Source: .Urine Clean Catch (Midstream)  Preliminary Report (03-31-21 @ 19:03):    >100,000 CFU/ml Enterococcus species    <10,000 CFU/ml of other organisms    Culture - Blood (collected 03-30-21 @ 03:10)  Source: .Blood Blood-Peripheral  Preliminary Report (03-31-21 @ 14:01):    No growth to date.            RADIOLOGY & ADDITIONAL TESTS:  Personal review of radiological diagnostics performed  Echo and EKG results noted when applicable.     MEDICATIONS:  acetaminophen   Tablet .. 650 milliGRAM(s) Oral every 6 hours PRN  atorvastatin 80 milliGRAM(s) Oral at bedtime  caspofungin IVPB      caspofungin IVPB 50 milliGRAM(s) IV Intermittent every 24 hours  chlorhexidine 4% Liquid 1 Application(s) Topical <User Schedule>  dexAMETHasone  Injectable 6 milliGRAM(s) IV Push daily  guaifenesin/dextromethorphan  Syrup 10 milliLiter(s) Oral every 6 hours PRN  influenza   Vaccine 0.5 milliLiter(s) IntraMuscular once  insulin glargine Injectable (LANTUS) 30 Unit(s) SubCutaneous at bedtime  insulin glargine Injectable (LANTUS) 30 Unit(s) SubCutaneous every morning  insulin lispro (ADMELOG) corrective regimen sliding scale   SubCutaneous three times a day before meals  insulin lispro Injectable (ADMELOG) 10 Unit(s) SubCutaneous three times a day before meals  melatonin 5 milliGRAM(s) Oral at bedtime  meropenem  IVPB 750 milliGRAM(s) IV Intermittent every 12 hours  metoprolol succinate  milliGRAM(s) Oral daily  norepinephrine Infusion 0.03 MICROgram(s)/kG/Min IV Continuous <Continuous>  pantoprazole  Injectable 40 milliGRAM(s) IV Push every 12 hours  polyethylene glycol 3350 17 Gram(s) Oral daily  senna 2 Tablet(s) Oral at bedtime      ANTIBIOTICS:  caspofungin IVPB      caspofungin IVPB 50 milliGRAM(s) IV Intermittent every 24 hours  meropenem  IVPB 750 milliGRAM(s) IV Intermittent every 12 hours

## 2021-04-01 NOTE — PROGRESS NOTE ADULT - GASTROINTESTINAL DETAILS
soft/nontender/no rebound tenderness/no guarding/no rigidity
nontender/no rebound tenderness/no guarding/no rigidity

## 2021-04-01 NOTE — PROGRESS NOTE ADULT - SUBJECTIVE AND OBJECTIVE BOX
Patient Information:  ROSA MARIA CASEY / 80y / Male / MRN#:[id number]    Hospital Day: 20d    Interval History:  Patient seen and examined at bedside. Pt was initiated on levo yesterday afternoon due to hypotension with minimal response to IVF boluses. BP improved overnight, now off Levo. Pt also developed mild fluid overload after fluid boluses, given IV Lasix 40 mg x1, reports some improvement in shortness of breath.    Past Medical History:  Hypertension    Hyperlipidemia    Diabetes mellitus, type 2    History of atrial fibrillation    Bladder cancer    Chronic kidney disease (CKD)      Past Surgical History:  History of total hip replacement, bilateral    History of total knee replacement, bilateral    History of total cystectomy      Allergies:  No Known Allergies    Medications:  PRN:  acetaminophen   Tablet .. 650 milliGRAM(s) Oral every 6 hours PRN Temp greater or equal to 38C (100.4F), Mild Pain (1 - 3)  guaifenesin/dextromethorphan  Syrup 10 milliLiter(s) Oral every 6 hours PRN Cough    Standing:  atorvastatin 80 milliGRAM(s) Oral at bedtime  caspofungin IVPB      caspofungin IVPB 50 milliGRAM(s) IV Intermittent every 24 hours  chlorhexidine 4% Liquid 1 Application(s) Topical <User Schedule>  dexAMETHasone  Injectable 6 milliGRAM(s) IV Push daily  influenza   Vaccine 0.5 milliLiter(s) IntraMuscular once  insulin glargine Injectable (LANTUS) 30 Unit(s) SubCutaneous at bedtime  insulin glargine Injectable (LANTUS) 30 Unit(s) SubCutaneous every morning  insulin lispro (ADMELOG) corrective regimen sliding scale   SubCutaneous three times a day before meals  insulin lispro Injectable (ADMELOG) 10 Unit(s) SubCutaneous three times a day before meals  melatonin 5 milliGRAM(s) Oral at bedtime  meropenem  IVPB 750 milliGRAM(s) IV Intermittent every 12 hours  metoprolol succinate  milliGRAM(s) Oral daily  norepinephrine Infusion 0.03 MICROgram(s)/kG/Min (7.14 mL/Hr) IV Continuous <Continuous>  pantoprazole  Injectable 40 milliGRAM(s) IV Push every 12 hours  polyethylene glycol 3350 17 Gram(s) Oral daily  senna 2 Tablet(s) Oral at bedtime    Home:  amlodipine-benazepril 10 mg-40 mg oral capsule: 1 cap(s) orally once a day  Eliquis 5 mg oral tablet: 1 tab(s) orally 2 times a day  glimepiride 2 mg oral tablet: 1 tab(s) orally once a day  Lantus 100 units/mL subcutaneous solution: 40 unit(s) subcutaneous once a day  metoprolol succinate 100 mg oral tablet, extended release: 1 tab(s) orally once a day  Ozempic (1 mg dose) 2 mg/1.5 mL subcutaneous solution:   Percocet 7.5/325 oral tablet: 0.5 tab(s) orally once a day (at bedtime)  potassium citrate 10 mEq oral tablet, extended release: 1 tab(s) orally once a day  rosuvastatin 20 mg oral tablet: 1 tab(s) orally once a day    Vitals:  T(C): 36.1, Max: 36.1 (04-01-21 @ 00:00)  T(F): 96.9, Max: 96.9 (04-01-21 @ 00:00)  HR: 64 (62 - 84)  BP: 118/68 (82/52 - 132/58)  RR: --  SpO2: 100% (91% - 100%)    Physical Exam:  General: Awake, alert, NAD, sitting up in bed, on HFNC 60/80  HEENT: Head NC/AT  Heart: RRR; systolic murmur apprec  Lungs: Slightly dec breath sounds in bilateral bases  Abdomen: Soft, nontender, nondistended, BS+, urostomy bag in place, appears clean, no bleeding visualized  Extremities: Mild edema of lower ext bilaterally  Neuro: AAOx3, NFD    Labs:  CBC (03-31 @ 21:36)                        Hgb: 8.3    WBC: 26.54 )-----------------( Plts: 210                              Hct: 25.7     Chem (03-31 @ 21:36)  Na: 142  |     Cl: 110     |  BUN: 110  -----------------------------------------< Gluc: 162    K: 5.0   |    HCO3: 17  |  Cr: 2.2    Ca 8.0 (03-31 @ 21:36)  Mg 2.6 (03-31 @ 04:45)    LFTs (03-31 @ 21:36)  TPro 5.2  /  Alb 3.2  TBili 0.8  /  DBili     AST 26  /  ALT 26  /  AlkPhos 52    Cardiac Markers (03-31 @ 04:45)  Troponin I X  Troponin T X  CK X  CKMB X  CKMB Units X  Myoglobin X  Lactate 3.7  ESR X    Cardiac Markers (03-30 @ 12:24)  Troponin I X  Troponin T X  CK X  CKMB X  CKMB Units X  Myoglobin X  Lactate 5.1  ESR X    Cardiac Markers (03-30 @ 04:30)  Troponin I X  Troponin T X  CK X  CKMB X  CKMB Units X  Myoglobin X  Lactate 5.1  ESR X            Microbiology:  Culture - Urine (collected 03-30 @ 04:27)  Source: .Urine Clean Catch (Midstream)  Preliminary Report (03-31 @ 19:03):    >100,000 CFU/ml Enterococcus species    <10,000 CFU/ml of other organisms    Culture - Blood (collected 03-30 @ 03:10)  Source: .Blood Blood-Peripheral  Preliminary Report (03-31 @ 14:01):    No growth to date.

## 2021-04-02 LAB
ALBUMIN SERPL ELPH-MCNC: 3 G/DL — LOW (ref 3.5–5.2)
ALP SERPL-CCNC: 55 U/L — SIGNIFICANT CHANGE UP (ref 30–115)
ALT FLD-CCNC: 26 U/L — SIGNIFICANT CHANGE UP (ref 0–41)
ANION GAP SERPL CALC-SCNC: 13 MMOL/L — SIGNIFICANT CHANGE UP (ref 7–14)
AST SERPL-CCNC: 36 U/L — SIGNIFICANT CHANGE UP (ref 0–41)
BASOPHILS # BLD AUTO: 0.03 K/UL — SIGNIFICANT CHANGE UP (ref 0–0.2)
BASOPHILS NFR BLD AUTO: 0.2 % — SIGNIFICANT CHANGE UP (ref 0–1)
BILIRUB SERPL-MCNC: 1.2 MG/DL — SIGNIFICANT CHANGE UP (ref 0.2–1.2)
BUN SERPL-MCNC: 91 MG/DL — CRITICAL HIGH (ref 10–20)
CALCIUM SERPL-MCNC: 8 MG/DL — LOW (ref 8.5–10.1)
CHLORIDE SERPL-SCNC: 112 MMOL/L — HIGH (ref 98–110)
CO2 SERPL-SCNC: 17 MMOL/L — SIGNIFICANT CHANGE UP (ref 17–32)
CREAT SERPL-MCNC: 1.8 MG/DL — HIGH (ref 0.7–1.5)
D DIMER BLD IA.RAPID-MCNC: 1976 NG/ML DDU — HIGH (ref 0–230)
EOSINOPHIL # BLD AUTO: 0.13 K/UL — SIGNIFICANT CHANGE UP (ref 0–0.7)
EOSINOPHIL NFR BLD AUTO: 0.9 % — SIGNIFICANT CHANGE UP (ref 0–8)
FUNGITELL: <31 PG/ML — SIGNIFICANT CHANGE UP
GLUCOSE BLDC GLUCOMTR-MCNC: 102 MG/DL — HIGH (ref 70–99)
GLUCOSE BLDC GLUCOMTR-MCNC: 103 MG/DL — HIGH (ref 70–99)
GLUCOSE BLDC GLUCOMTR-MCNC: 160 MG/DL — HIGH (ref 70–99)
GLUCOSE BLDC GLUCOMTR-MCNC: 217 MG/DL — HIGH (ref 70–99)
GLUCOSE SERPL-MCNC: 86 MG/DL — SIGNIFICANT CHANGE UP (ref 70–99)
HCT VFR BLD CALC: 21.5 % — LOW (ref 42–52)
HGB BLD-MCNC: 6.7 G/DL — CRITICAL LOW (ref 14–18)
IMM GRANULOCYTES NFR BLD AUTO: 4.6 % — HIGH (ref 0.1–0.3)
LDH SERPL L TO P-CCNC: 515 U/L — HIGH (ref 50–242)
LYMPHOCYTES # BLD AUTO: 1.62 K/UL — SIGNIFICANT CHANGE UP (ref 1.2–3.4)
LYMPHOCYTES # BLD AUTO: 11 % — LOW (ref 20.5–51.1)
MAGNESIUM SERPL-MCNC: 3 MG/DL — HIGH (ref 1.8–2.4)
MCHC RBC-ENTMCNC: 28.8 PG — SIGNIFICANT CHANGE UP (ref 27–31)
MCHC RBC-ENTMCNC: 31.2 G/DL — LOW (ref 32–37)
MCV RBC AUTO: 92.3 FL — SIGNIFICANT CHANGE UP (ref 80–94)
MONOCYTES # BLD AUTO: 1.25 K/UL — HIGH (ref 0.1–0.6)
MONOCYTES NFR BLD AUTO: 8.5 % — SIGNIFICANT CHANGE UP (ref 1.7–9.3)
NEUTROPHILS # BLD AUTO: 11.01 K/UL — HIGH (ref 1.4–6.5)
NEUTROPHILS NFR BLD AUTO: 74.8 % — SIGNIFICANT CHANGE UP (ref 42.2–75.2)
NRBC # BLD: 7 /100 WBCS — HIGH (ref 0–0)
PLATELET # BLD AUTO: 106 K/UL — LOW (ref 130–400)
POTASSIUM SERPL-MCNC: 4.7 MMOL/L — SIGNIFICANT CHANGE UP (ref 3.5–5)
POTASSIUM SERPL-SCNC: 4.7 MMOL/L — SIGNIFICANT CHANGE UP (ref 3.5–5)
PROT SERPL-MCNC: 5.1 G/DL — LOW (ref 6–8)
RBC # BLD: 2.33 M/UL — LOW (ref 4.7–6.1)
RBC # FLD: 22 % — HIGH (ref 11.5–14.5)
SODIUM SERPL-SCNC: 142 MMOL/L — SIGNIFICANT CHANGE UP (ref 135–146)
WBC # BLD: 14.72 K/UL — HIGH (ref 4.8–10.8)
WBC # FLD AUTO: 14.72 K/UL — HIGH (ref 4.8–10.8)

## 2021-04-02 PROCEDURE — 99232 SBSQ HOSP IP/OBS MODERATE 35: CPT

## 2021-04-02 PROCEDURE — 71045 X-RAY EXAM CHEST 1 VIEW: CPT | Mod: 26

## 2021-04-02 RX ORDER — FUROSEMIDE 40 MG
40 TABLET ORAL ONCE
Refills: 0 | Status: COMPLETED | OUTPATIENT
Start: 2021-04-02 | End: 2021-04-02

## 2021-04-02 RX ADMIN — SENNA PLUS 2 TABLET(S): 8.6 TABLET ORAL at 06:49

## 2021-04-02 RX ADMIN — ATORVASTATIN CALCIUM 80 MILLIGRAM(S): 80 TABLET, FILM COATED ORAL at 21:34

## 2021-04-02 RX ADMIN — INSULIN GLARGINE 20 UNIT(S): 100 INJECTION, SOLUTION SUBCUTANEOUS at 21:51

## 2021-04-02 RX ADMIN — POLYETHYLENE GLYCOL 3350 17 GRAM(S): 17 POWDER, FOR SOLUTION ORAL at 11:34

## 2021-04-02 RX ADMIN — Medication 6 MILLIGRAM(S): at 06:46

## 2021-04-02 RX ADMIN — Medication 100 MILLIGRAM(S): at 06:45

## 2021-04-02 RX ADMIN — PANTOPRAZOLE SODIUM 40 MILLIGRAM(S): 20 TABLET, DELAYED RELEASE ORAL at 06:45

## 2021-04-02 RX ADMIN — CHLORHEXIDINE GLUCONATE 1 APPLICATION(S): 213 SOLUTION TOPICAL at 06:44

## 2021-04-02 RX ADMIN — MEROPENEM 100 MILLIGRAM(S): 1 INJECTION INTRAVENOUS at 17:30

## 2021-04-02 RX ADMIN — Medication 4: at 17:25

## 2021-04-02 RX ADMIN — PANTOPRAZOLE SODIUM 40 MILLIGRAM(S): 20 TABLET, DELAYED RELEASE ORAL at 17:28

## 2021-04-02 RX ADMIN — Medication 5 MILLIGRAM(S): at 06:49

## 2021-04-02 RX ADMIN — Medication 40 MILLIGRAM(S): at 11:33

## 2021-04-02 RX ADMIN — ATORVASTATIN CALCIUM 80 MILLIGRAM(S): 80 TABLET, FILM COATED ORAL at 06:48

## 2021-04-02 RX ADMIN — MEROPENEM 100 MILLIGRAM(S): 1 INJECTION INTRAVENOUS at 06:45

## 2021-04-02 RX ADMIN — Medication 8 UNIT(S): at 17:24

## 2021-04-02 RX ADMIN — CASPOFUNGIN ACETATE 260 MILLIGRAM(S): 7 INJECTION, POWDER, LYOPHILIZED, FOR SOLUTION INTRAVENOUS at 17:29

## 2021-04-02 RX ADMIN — Medication 5 MILLIGRAM(S): at 21:34

## 2021-04-02 NOTE — PROGRESS NOTE ADULT - ASSESSMENT
1. Acute hypoxemic respiratory failure secondary to COVID-19 pneumonia, s/p Toci 3/23, on dexamethasone, requiring high flow O2  2. Acute kidney injury, likely pre-renal azotemia vs contrast nephropathy - improving  3. CKD III, baseline creatinine 1.2-1.5.  4. Hx of Bladder Ca, s/p cystoprostatectomy, urostomy  5. Hyperkalemia  6. Hypotension    Plan:    Avoid IVF if possible  Pressor support as needed  F/U CXR  Daily BMP  Monitor I/O

## 2021-04-02 NOTE — CHART NOTE - NSCHARTNOTEFT_GEN_A_CORE
Registered Dietitian Follow-Up    ***Scroll to the bottom for RD recommendation***    Patient Profile Reviewed                           Yes [x]   No []  Nutrition History Previously Obtained        Yes [x]  No []          PERTINENT SUBJECTIVE INFORMATION:  - pt is AOx4 per PCA, looks well, responding to me, during meals pt needs some assistant in tray set up but eats independently.  - on HFNC, eating about 60% of meals, drank <25% of Nepro shakes. Most of them are stacking on his table.   - RN spoken with. No HD.         PERTINENT MEDICAL INFORMATIONS:  (1) P/w Acute Hypoxic resp failure 2/2 COVID. hx of DM2 DLD, 911 exposure, diverticulosis, had bowel resection and reanastomosis.  (2) LORRAINE on CKD improved. Pt became septic hypotensive. on Abx  (3) Pt also had some SOB possible fluid overload.   (4) Continue HFNC, s/p CXR  (5) mild transaminitis.   (6) nephro following. Pulm following.          DIET ORDER:   CC no snack, DASH/TLC, Low K, and NEPRO shake x2/day.          ANTHROPOMETRICS:  - Ht.  185.4cm  - Wt.  (3/12): 127kg - no new weight since  - BMI.  37.0  - IBW.  83.6kg       PERTINENT LAB DATA:  4/1: h/h 7.8/24.5, K 5.1, Chloride 113, , Cr 2.3, Glucose 119, Ca 8.2, Albumin 3.0, GFR 26, HgbA1c 8.1,  PERTINENT MEDS:   abx, decadron, levophed, protonix, senna, atorvastatin, metoprolol         PHYSICAL FINDINGS  - APPEARANCE:         AAOx4 per staff. No edema noted  - GI FUNCTION:        LBM 3/30 (per staff, pt is going, but not noted when)  - TUBES:                     - ORAL/MOUTH:       none reported  - SKIN:                     Intact        NUTRITION REQUIREMENTS  WEIGHT USED:                           127kg ABW; 83.6kg IBW  ESTIMATED ENERGY NEEDS:       CONTINUE [  ]      ADJUST [ x ] - as of 4/2    ESTIMATED ENERGY NEEDS:         2048 - 2252 kcals (MSJ x 1.0 - 1.1 AF obesity)   ESTIMATED PROTEIN NEEDS:        75-92 gms (0.9 - 1.1 gm/kg IBW obesity) - aim low for now due to poor renal function with no HD  ESTIMATED FLUID NEEDS:             1ml/kcal or per LIP    CURRENT NUTRIENT NEEDS:      NOT MEETING, around 60% of PO, with minimal intake of Nepro shake          [  x] PREVIOUS NUTRITION DIAGNOSIS:    (1) Inadequate protein-energy intake            [ x ] ONGOING        [  ] RESOLVED            PATIENT INTERVENTION:    [x  ] ORAL        [ ] EN/TF     GOAL/EXPECTED OUTCOME:     pt to consume and tolerate >75% of all meals and snacks upon f/u in 4 days. Pt to have 1BM/day.  INDICATOR/MONITORING:       RD to monitor diet order, energy intake, body composition, nutrition focused physical findings (PO tolerance, appetite, BM, renal profile, electrolytes, glucose profile)  NUTRITION INTERVENTION:        Meals and snacks. Medical food supplements              PLAN:  - due to poor renal function, not oliguric, no RRT. Nephro following  - Therefore, adjust diet to Carbohydrate Consistent w/ snack, DASH/TLC, and LOW K.   - discontinue NEPRO oral shakes until renal function improves. Now just focus on solids.   - please check serum Vitamin D 25-OH level.

## 2021-04-02 NOTE — PROGRESS NOTE ADULT - SUBJECTIVE AND OBJECTIVE BOX
ROSA MARIA CASEY  Patient is a 80y old  Male who presents with a chief complaint of acute hypoxic RF due to Covid Viral syndrome. Underlying Hx of DMII, DLD, MO,  JAREHT, bladder ca, sp urostomy, Hx of 911 exposure)OA, DDD, DJD,GERD, diverticulosis, abd wall hernias. bowel resection and reanastomosis.      Overnight events:  pt in CEU, c/o fatigue, on HFNC. afebrile, WBC 14, on Meropenem and caspofungin, BUN/creat 91/1.8, drop in H/H 6.7/2, transfuse 1u PBRC,     Vital Signs:   T:  97.9  HR:  71  BP:  132/60    RR: 21  Pulse ox:  HFNC 60/50   96%        PHYSICAL EXAM:  GENERAL: A&O but weak and sluggish, overweight, NAD on HFNC  Neck:  short,  thick but supple, no JVD, no bruit, no stridor  Lungs:  shallow resp, scattered rhonchi, no wheezing, improving air entry  Abd:   distended soft and benign, + BS  Urinary:  RLQ urostomy, reddish urine  Ext:  arthritic changes, moves all limbs, nonpitting edema  Skin:  no rash  Psych stable    LABS                        76.7  14-----------( 106            21.5    142|  112 | 91  ----------------------------<86  4.7   17    1.8    GFR 64, 58, 72, 64, 35, 26, 30  Ca    8.8       Phos  3.4      Mg     2.0, 2.6, 3.0    MB 63  trop <0.01 x2  ferritin 535, 462, 737  procal 0.26, 0.11  CRP 25, 180  COVID AB + 85.60  3/30 LA 5.1,  3.7    TPro  5.9<L>  /  Alb  3.2<L>  /  TBili  0.9  /  DBili  x   /  AST  45, 88, 88  /  ALT  53, 93, 99  /  AlkPhos  43  03-14      Urinalysis Basic - ( 12 Mar 2021 16:38 )    Color: Yellow / Appearance: Slightly Turbid / S.020 / pH: x  Gluc: x / Ketone: Negative  / Bili: Negative / Urobili: 3 mg/dL   Blood: x / Protein: 30 mg/dL / Nitrite: Negative   Leuk Esterase: Moderate / RBC: 5 /HPF / WBC 90 /HPF   Sq Epi: x / Non Sq Epi: 0 /HPF / Bacteria: Ma       RADIOLOGY & ADDITIONAL TESTS:  Venous duplex:  negative for DVT  CXR:  BL opacities L>R  CXR:  stable BL opacities    CT angio:  neg for central PE    CT abd/pelvis:  BIbasilar densities, hepatobiliary/spleen/pancreas WNL, mild BL adrenal thickening, no hydro, bl renal cysts, bl layering densities/ sm stones, "milk of ca", post cystoprostatectomy and ilealconduit, , post bowel resection and reanastomosis, ventral abd wall hernias, bl hip replacements    CTH 3/30:  moderate atrophy, chronic microvascular changes,  no evidence of acute pathology    EEG 3/30 gen slowing , no focal epileptiform activity       ROSA MARIA CASEY  Patient is a 80y old  Male who presents with a chief complaint of acute hypoxic RF due to Covid Viral syndrome. Underlying Hx of DMII, DLD, MO,  JARETH, bladder ca, sp urostomy, Hx of 911 exposure)OA, DDD, DJD,GERD, diverticulosis, abd wall hernias. bowel resection and reanastomosis.      Overnight events:  pt in CEU, c/o fatigue, on HFNC. afebrile, WBC 14, on Meropenem and caspofungin, BUN/creat 91/1.8, drop in H/H 6.7/2, transfuse 1u PBRC,     Vital Signs:   T:  97.9  HR:  71  BP:  132/60    RR: 21  Pulse ox:  HFNC 60/50   96%        PHYSICAL EXAM:  GENERAL: A&O but weak and sluggish, overweight, NAD on HFNC  Neck:  short,  thick but supple, no JVD, no bruit, no stridor  Lungs:  shallow resp, scattered rhonchi, no wheezing, improving air entry  Abd:   distended soft and benign, + BS  Urinary:  RLQ urostomy, reddish urine  Ext:  arthritic changes, moves all limbs, nonpitting edema  Skin:  no rash  Psych stable    LABS                        6.7  14-----------( 106            21.5    142|  112 | 91  ----------------------------<86  4.7   17    1.8    GFR 64, 58, 72, 64, 35, 26, 30  Ca    8.8       Phos  3.4      Mg     2.0, 2.6, 3.0    MB 63  trop <0.01 x2  ferritin 535, 462, 737  procal 0.26, 0.11  CRP 25, 180  COVID AB + 85.60  3/30 LA 5.1,  3.7    TPro  5.9<L>  /  Alb  3.2<L>  /  TBili  0.9  /  DBili  x   /  AST  45, 88, 88  /  ALT  53, 93, 99  /  AlkPhos  43  03-14      Urinalysis Basic - ( 12 Mar 2021 16:38 )    Color: Yellow / Appearance: Slightly Turbid / S.020 / pH: x  Gluc: x / Ketone: Negative  / Bili: Negative / Urobili: 3 mg/dL   Blood: x / Protein: 30 mg/dL / Nitrite: Negative   Leuk Esterase: Moderate / RBC: 5 /HPF / WBC 90 /HPF   Sq Epi: x / Non Sq Epi: 0 /HPF / Bacteria: Ma       RADIOLOGY & ADDITIONAL TESTS:  Venous duplex:  negative for DVT  CXR:  BL opacities L>R  CXR:  stable BL opacities    CT angio:  neg for central PE    CT abd/pelvis:  BIbasilar densities, hepatobiliary/spleen/pancreas WNL, mild BL adrenal thickening, no hydro, bl renal cysts, bl layering densities/ sm stones, "milk of ca", post cystoprostatectomy and ilealconduit, , post bowel resection and reanastomosis, ventral abd wall hernias, bl hip replacements    CTH 3/30:  moderate atrophy, chronic microvascular changes,  no evidence of acute pathology    EEG 3/30 gen slowing , no focal epileptiform activity

## 2021-04-02 NOTE — PROGRESS NOTE ADULT - ASSESSMENT
· Assessment	  81yo male with PMH of hypertension, hyperlipidemia, diabetes mellitus type 2, atrial fibrillation on Eliquis, CKD stage 3A, bladder cancer s/p cystectomy with urostomy, and bilateral hip and knee arthroplasties who was brought in by EMS for hypoxia. Patient had tested positive for COVID-19 on 3/4/2021, but symptoms did not begin until 3/6/2021. He was found to be hypoxic to 85% on room air.    IMPRESSION;  Clinically stable / improved and has no complaints  #COVID 19 with severe illness. SpO2 < 94% on RA and need for supplemental O2. HFNC    Pt was in the late inflammatory response phase ot the illness based on the onset of symptoms.    CXR opacities b/l    CT A 3/30 : no RPH. Opacity LLL > no change compared with 3/26    LLL bacterial PNA. Fungal etiology cannot be r/o  Ddimer 3121 . suggestive of thrombus  Ferritin 730    procal 0.31  Sepsis ? WBC 22.7, ARF . Metabolic encephalopathy> resolved, being alert, responsive  Clinically no PNA although CT shows opacity LLL. Being treated as such  No  infection. Has a urostomy  UCx 3/30 Enterococci : represents gut kike from urostomy  Hx of bladder ca s/p cystectomy   : no acute urologic intervention at this time  BCx 3/30,31  NGTD    - s/p 3/23 Tocilizumab >90kg 800mg x1     RECOMMENDATIONS;  Serum fungitell assay  Serum Galactomannan assay  Jv FAULKNER  try and obtain sputum for gm stain , cultures  Meropenem 750 mg  iv q12h  Caspofungin  50 mg iv q24h

## 2021-04-02 NOTE — PROGRESS NOTE ADULT - ASSESSMENT
79yo M c PMHx chronic afib on noac, htn, dm2, ckd3 bladder ca s/p urostomy, b/l hip/ knee replacements obesity, here with acute hypoxic respiratory failure 2/2 severe covid 19 viral pneumonia, lorraine on ckd3 now better, elevated ddimer    Sepsis on admission  Covid viral PNA. acute hypoxic RF  underlying JARETH, MO  LORRAINE on CKD  Transaminitis  Hx of HTN, ASHD, Afib/Eliquis  DM II, CKD  DLD  Bladder ca, sp urostomy, sp cystoprostatectomy, exposure to 911  OA, DDD, DJD, sp BL hip and knee surgeries, mobility dysfunction  GERD, HH, diverticulosis, bowel resection and reanastomosis      pt off Levophed, remains on HFNC in CEU  pt noted to have drop in H/H 6.7/21.5, transfuse 1 u PRBC today    ID:  Meropenem 750 q12 and Caspofungin 50mg q 24    repeat infla parameters. inc fungitel,   blood and urien cultures      CT of abd:  layering of material within both kidneys ( sm stones, "milk of calcium" BL renal cysts, no acute pathol    Covid ABs are + 85.6  pul-critical care ffup and care appreciated    CXR show BL opacities, low lung volumes    us duplex LE's negative for DVT  elevated DDIMER, CT angio negative for PE  monitor infla parameters q 72 hrs    mild transaminitis  improving  pt is a full code   79yo M c PMHx chronic afib on noac, htn, dm2, ckd3 bladder ca s/p urostomy, b/l hip/ knee replacements obesity, here with acute hypoxic respiratory failure 2/2 severe covid 19 viral pneumonia, lorraine on ckd3 now better, elevated ddimer    Sepsis on admission  Covid viral PNA. acute hypoxic RF  underlying JARETH, MO  LORRAINE on CKD  Transaminitis  Hx of HTN, ASHD, Afib/Eliquis  DM II, CKD  DLD  Bladder ca, sp urostomy, sp cystoprostatectomy, exposure to 911  OA, DDD, DJD, sp BL hip and knee surgeries, mobility dysfunction  GERD, HH, diverticulosis, bowel resection and reanastomosis      pt off Levophed, remains on HFNC in CEU  pt noted to have drop in H/H 6.7/21.5, transfuse 1 u PRBC today    ID:  Meropenem 750 q12 and Caspofungin 50mg q 24    repeat infla parameters. inc fungitel,   blood and urine cultures      CT of abd:  layering of material within both kidneys ( sm stones, "milk of calcium" BL renal cysts, no acute pathol    Covid ABs are + 85.6  pul-critical care ffup and care appreciated    CXR show BL opacities, low lung volumes    us duplex LE's negative for DVT  elevated DDIMER, CT angio negative for PE  monitor infla parameters q 72 hrs    mild transaminitis  improving  pt is a full code  guarded state

## 2021-04-02 NOTE — PROGRESS NOTE ADULT - SUBJECTIVE AND OBJECTIVE BOX
TIANNAROSA MARIA CUMMINS  80y, Male    All available historical data reviewed    OVERNIGHT EVENTS:  no fevers  remains alert  HFNC  no pressors  no diarrhea  no central lines    ROS:  General: Denies rigors, nightsweats  HEENT: Denies headache, rhinorrhea, sore throat, eye pain  CV: Denies CP, palpitations  PULM: Denies wheezing, hemoptysis  GI: Denies hematemesis, hematochezia, melena  : Denies discharge, hematuria  MSK: Denies arthralgias, myalgias  SKIN: Denies rash, lesions  NEURO: Denies paresthesias, weakness  PSYCH: Denies depression, anxiety    VITALS:  T(F): 97.9, Max: 97.9 (04-02-21 @ 03:00)  HR: 71  BP: 132/60  RR: 21Vital Signs Last 24 Hrs  T(C): 36.6 (02 Apr 2021 03:00), Max: 36.6 (02 Apr 2021 03:00)  T(F): 97.9 (02 Apr 2021 03:00), Max: 97.9 (02 Apr 2021 03:00)  HR: 71 (02 Apr 2021 03:00) (63 - 83)  BP: 132/60 (02 Apr 2021 03:00) (107/57 - 132/60)  BP(mean): 86 (02 Apr 2021 03:00) (80 - 86)  RR: 21 (02 Apr 2021 03:00) (20 - 23)  SpO2: 96% (02 Apr 2021 08:25) (93% - 98%)    TESTS & MEASUREMENTS:                        7.8    22.76 )-----------( 181      ( 01 Apr 2021 09:41 )             24.5     04-01    146  |  113<H>  |  106<HH>  ----------------------------<  119<H>  5.1<H>   |  19  |  2.3<H>    Ca    8.2<L>      01 Apr 2021 09:41  Mg     3.0     04-01    TPro  5.1<L>  /  Alb  3.0<L>  /  TBili  1.0  /  DBili  x   /  AST  29  /  ALT  27  /  AlkPhos  51  04-01    LIVER FUNCTIONS - ( 01 Apr 2021 09:41 )  Alb: 3.0 g/dL / Pro: 5.1 g/dL / ALK PHOS: 51 U/L / ALT: 27 U/L / AST: 29 U/L / GGT: x             Culture - Blood (collected 03-31-21 @ 12:09)  Source: .Blood None  Preliminary Report (04-01-21 @ 23:01):    No growth to date.    Culture - Urine (collected 03-30-21 @ 04:27)  Source: .Urine Clean Catch (Midstream)  Final Report (04-01-21 @ 22:46):    >100,000 CFU/ml Enterococcus faecalis    <10,000 CFU/ml of other organisms  Organism: Enterococcus faecalis (04-01-21 @ 22:46)  Organism: Enterococcus faecalis (04-01-21 @ 22:46)      -  Ampicillin: S <=2 Predicts results to ampicillin/sulbactam, amoxacillin-clavulanate and  piperacillin-tazobactam.      -  Ciprofloxacin: S <=1      -  Levofloxacin: S 2      -  Nitrofurantoin: S <=32 Should not be used to treat pyelonephritis.      -  Tetra/Doxy: S <=4      -  Vancomycin: S 4      Method Type: HALI    Culture - Blood (collected 03-30-21 @ 03:10)  Source: .Blood Blood-Peripheral  Preliminary Report (03-31-21 @ 14:01):    No growth to date.            RADIOLOGY & ADDITIONAL TESTS:  Personal review of radiological diagnostics performed  Echo and EKG results noted when applicable.     MEDICATIONS:  acetaminophen   Tablet .. 650 milliGRAM(s) Oral every 6 hours PRN  atorvastatin 80 milliGRAM(s) Oral at bedtime  caspofungin IVPB 50 milliGRAM(s) IV Intermittent every 24 hours  caspofungin IVPB      chlorhexidine 4% Liquid 1 Application(s) Topical <User Schedule>  dexAMETHasone  Injectable 6 milliGRAM(s) IV Push daily  guaifenesin/dextromethorphan  Syrup 10 milliLiter(s) Oral every 6 hours PRN  influenza   Vaccine 0.5 milliLiter(s) IntraMuscular once  insulin glargine Injectable (LANTUS) 20 Unit(s) SubCutaneous at bedtime  insulin lispro (ADMELOG) corrective regimen sliding scale   SubCutaneous three times a day before meals  insulin lispro Injectable (ADMELOG) 8 Unit(s) SubCutaneous three times a day before meals  melatonin 5 milliGRAM(s) Oral at bedtime  meropenem  IVPB 750 milliGRAM(s) IV Intermittent every 12 hours  metoprolol succinate  milliGRAM(s) Oral daily  norepinephrine Infusion 0.03 MICROgram(s)/kG/Min IV Continuous <Continuous>  pantoprazole  Injectable 40 milliGRAM(s) IV Push every 12 hours  polyethylene glycol 3350 17 Gram(s) Oral daily  senna 2 Tablet(s) Oral at bedtime      ANTIBIOTICS:  caspofungin IVPB      caspofungin IVPB 50 milliGRAM(s) IV Intermittent every 24 hours  meropenem  IVPB 750 milliGRAM(s) IV Intermittent every 12 hours

## 2021-04-02 NOTE — PROGRESS NOTE ADULT - SUBJECTIVE AND OBJECTIVE BOX
Patient Information:  ROSA MARIA CASEY / 80y / Male / MRN#:[id number]    Hospital Day: 21d    Interval History:  Patient seen and examined at bedside. No acute events overnight. Pt remains on HFNC 60L/50%. States that he feels tired and weak, is uncomfortable.    Past Medical History:  Hypertension    Hyperlipidemia    Diabetes mellitus, type 2    History of atrial fibrillation    Bladder cancer    Chronic kidney disease (CKD)      Past Surgical History:  History of total hip replacement, bilateral    History of total knee replacement, bilateral    History of total cystectomy      Allergies:  No Known Allergies    Medications:  PRN:  acetaminophen   Tablet .. 650 milliGRAM(s) Oral every 6 hours PRN Temp greater or equal to 38C (100.4F), Mild Pain (1 - 3)  guaifenesin/dextromethorphan  Syrup 10 milliLiter(s) Oral every 6 hours PRN Cough    Standing:  atorvastatin 80 milliGRAM(s) Oral at bedtime  caspofungin IVPB      caspofungin IVPB 50 milliGRAM(s) IV Intermittent every 24 hours  chlorhexidine 4% Liquid 1 Application(s) Topical <User Schedule>  dexAMETHasone  Injectable 6 milliGRAM(s) IV Push daily  influenza   Vaccine 0.5 milliLiter(s) IntraMuscular once  insulin glargine Injectable (LANTUS) 20 Unit(s) SubCutaneous at bedtime  insulin lispro (ADMELOG) corrective regimen sliding scale   SubCutaneous three times a day before meals  insulin lispro Injectable (ADMELOG) 8 Unit(s) SubCutaneous three times a day before meals  melatonin 5 milliGRAM(s) Oral at bedtime  meropenem  IVPB 750 milliGRAM(s) IV Intermittent every 12 hours  metoprolol succinate  milliGRAM(s) Oral daily  norepinephrine Infusion 0.03 MICROgram(s)/kG/Min (7.14 mL/Hr) IV Continuous <Continuous>  pantoprazole  Injectable 40 milliGRAM(s) IV Push every 12 hours  polyethylene glycol 3350 17 Gram(s) Oral daily  senna 2 Tablet(s) Oral at bedtime    Home:  amlodipine-benazepril 10 mg-40 mg oral capsule: 1 cap(s) orally once a day  Eliquis 5 mg oral tablet: 1 tab(s) orally 2 times a day  glimepiride 2 mg oral tablet: 1 tab(s) orally once a day  Lantus 100 units/mL subcutaneous solution: 40 unit(s) subcutaneous once a day  metoprolol succinate 100 mg oral tablet, extended release: 1 tab(s) orally once a day  Ozempic (1 mg dose) 2 mg/1.5 mL subcutaneous solution:   Percocet 7.5/325 oral tablet: 0.5 tab(s) orally once a day (at bedtime)  potassium citrate 10 mEq oral tablet, extended release: 1 tab(s) orally once a day  rosuvastatin 20 mg oral tablet: 1 tab(s) orally once a day    Vitals:  T(C): 36.6, Max: 36.6 (04-02-21 @ 03:00)  T(F): 97.9, Max: 97.9 (04-02-21 @ 03:00)  HR: 71 (63 - 83)  BP: 132/60 (107/57 - 132/60)  RR: 21 (20 - 23)  SpO2: 96% (93% - 98%)    Physical Exam:  General: Awake, alert, NAD, sitting up in bed, on HFNC 60/50  HEENT: Head NC/AT  Heart: RRR; systolic murmur apprec  Lungs: Slightly dec breath sounds in bilateral bases  Abdomen: Soft, nontender, nondistended, BS+, urostomy bag in place, appears clean, no bleeding visualized  Extremities: Mild edema of lower ext bilaterally  Neuro: AAOx3, NFD    Labs:  CBC (04-01 @ 09:41)                        Hgb: 7.8    WBC: 22.76 )-----------------( Plts: 181                              Hct: 24.5     Chem (04-01 @ 09:41)  Na: 146  |     Cl: 113     |  BUN: 106  -----------------------------------------< Gluc: 119    K: 5.1   |    HCO3: 19  |  Cr: 2.3    Ca 8.2 (04-01 @ 09:41)  Mg 3.0 (04-01 @ 09:41)    LFTs (04-01 @ 09:41)  TPro 5.1  /  Alb 3.0  TBili 1.0  /  DBili     AST 29  /  ALT 27  /  AlkPhos 51    Cardiac Markers (03-31 @ 04:45)  Troponin I X  Troponin T X  CK X  CKMB X  CKMB Units X  Myoglobin X  Lactate 3.7  ESR X    Cardiac Markers (03-30 @ 12:24)  Troponin I X  Troponin T X  CK X  CKMB X  CKMB Units X  Myoglobin X  Lactate 5.1  ESR X    Cardiac Markers (03-30 @ 04:30)  Troponin I X  Troponin T X  CK X  CKMB X  CKMB Units X  Myoglobin X  Lactate 5.1  ESR X            Microbiology:  Culture - Blood (collected 03-31 @ 12:09)  Source: .Blood None  Preliminary Report (04-01 @ 23:01):    No growth to date.    Culture - Urine (collected 03-30 @ 04:27)  Source: .Urine Clean Catch (Midstream)  Final Report (04-01 @ 22:46):    >100,000 CFU/ml Enterococcus faecalis    <10,000 CFU/ml of other organisms  Organism: Enterococcus faecalis (04-01 @ 22:46)  Organism: Enterococcus faecalis (04-01 @ 22:46)    Culture - Blood (collected 03-30 @ 03:10)  Source: .Blood Blood-Peripheral  Preliminary Report (03-31 @ 14:01):    No growth to date.        Radiology:    < from: Xray Chest 1 View AP/PA (04.01.21 @ 10:53) >  Impression:    Unchanged patchy bilateral parenchymal opacities.    < end of copied text >

## 2021-04-02 NOTE — PROGRESS NOTE ADULT - SUBJECTIVE AND OBJECTIVE BOX
Patient is a 80y old  Male who presents with a chief complaint of acute hypoxemic respiratory failure secondary to COVID-19 pneumonia (01 Apr 2021 13:48)      Over Night Events:  Patient seen and examined.   still on high flow 50%, no acute     ROS:  See HPI    PHYSICAL EXAM    ICU Vital Signs Last 24 Hrs  T(C): 36.6 (02 Apr 2021 03:00), Max: 36.6 (02 Apr 2021 03:00)  T(F): 97.9 (02 Apr 2021 03:00), Max: 97.9 (02 Apr 2021 03:00)  HR: 71 (02 Apr 2021 03:00) (63 - 83)  BP: 132/60 (02 Apr 2021 03:00) (107/57 - 132/60)  BP(mean): 86 (02 Apr 2021 03:00) (80 - 86)  ABP: --  ABP(mean): --  RR: 21 (02 Apr 2021 03:00) (20 - 23)  SpO2: 98% (02 Apr 2021 03:00) (93% - 100%)      General: awake   HEENT:      nasal pillow           Lymph Nodes: NO cervical LN   Lungs: Bilateral BS  Cardiovascular: Regular   Abdomen: Soft, Positive BS  Extremities: No clubbing   Skin: warm   Neurological:   Musculoskeletal: move all ext     I&O's Detail    31 Mar 2021 07:01  -  01 Apr 2021 07:00  --------------------------------------------------------  IN:    IV PiggyBack: 7 mL    Norepinephrine: 36.5 mL    Norepinephrine: 24.4 mL    sodium chloride 0.9%: 1000 mL  Total IN: 1067.9 mL    OUT:    Urostomy (mL): 750 mL    Voided (mL): 1800 mL  Total OUT: 2550 mL    Total NET: -1482.1 mL      01 Apr 2021 07:01  -  02 Apr 2021 06:40  --------------------------------------------------------  IN:    IV PiggyBack: 500 mL    Oral Fluid: 1310 mL  Total IN: 1810 mL    OUT:    Urostomy (mL): 2830 mL  Total OUT: 2830 mL    Total NET: -1020 mL          LABS:                          7.8    22.76 )-----------( 181      ( 01 Apr 2021 09:41 )             24.5         01 Apr 2021 09:41    146    |  113    |  106    ----------------------------<  119    5.1     |  19     |  2.3      Ca    8.2        01 Apr 2021 09:41  Mg     3.0       01 Apr 2021 09:41    TPro  5.1    /  Alb  3.0    /  TBili  1.0    /  DBili  x      /  AST  29     /  ALT  27     /  AlkPhos  51     01 Apr 2021 09:41  Amylase x     lipase x                                                                                            Lactate, Blood: 3.7 mmol/L (03-31-21 @ 04:45)  Lactate, Blood: 5.1 mmol/L (03-30-21 @ 12:24)                                                          Culture - Blood (collected 31 Mar 2021 12:09)  Source: .Blood None  Preliminary Report (01 Apr 2021 23:01):    No growth to date.                                                                                           MEDICATIONS  (STANDING):  atorvastatin 80 milliGRAM(s) Oral at bedtime  caspofungin IVPB      caspofungin IVPB 50 milliGRAM(s) IV Intermittent every 24 hours  chlorhexidine 4% Liquid 1 Application(s) Topical <User Schedule>  dexAMETHasone  Injectable 6 milliGRAM(s) IV Push daily  influenza   Vaccine 0.5 milliLiter(s) IntraMuscular once  insulin glargine Injectable (LANTUS) 20 Unit(s) SubCutaneous at bedtime  insulin lispro (ADMELOG) corrective regimen sliding scale   SubCutaneous three times a day before meals  insulin lispro Injectable (ADMELOG) 8 Unit(s) SubCutaneous three times a day before meals  melatonin 5 milliGRAM(s) Oral at bedtime  meropenem  IVPB 750 milliGRAM(s) IV Intermittent every 12 hours  metoprolol succinate  milliGRAM(s) Oral daily  norepinephrine Infusion 0.03 MICROgram(s)/kG/Min (7.14 mL/Hr) IV Continuous <Continuous>  pantoprazole  Injectable 40 milliGRAM(s) IV Push every 12 hours  polyethylene glycol 3350 17 Gram(s) Oral daily  senna 2 Tablet(s) Oral at bedtime  sodium zirconium cyclosilicate 5 Gram(s) Oral every 12 hours    MEDICATIONS  (PRN):  acetaminophen   Tablet .. 650 milliGRAM(s) Oral every 6 hours PRN Temp greater or equal to 38C (100.4F), Mild Pain (1 - 3)  guaifenesin/dextromethorphan  Syrup 10 milliLiter(s) Oral every 6 hours PRN Cough          Xrays:  TLC:  OG:  ET tube:                                                                                    b/l opacity    ECHO:  [facility] ICU:

## 2021-04-02 NOTE — PROGRESS NOTE ADULT - ASSESSMENT
79 yo M with PMHx of AFib, on Eliquis, HTN, DMII, CKDIII, hx of Bladder Ca s/p Urostomy, bilateral hip/knee replacements, obesity admitted for acute hypoxemic respiratory failure due to COVID PNA.    #Acute hypoxemic respiratory failure secondary to COVID-19 pneumonia  - COVID-19 PCR positive  - Isolation precautions (contact, droplet, airborne)  - Chest X-ray 3/31 -stable bilateral opacities  - Patient is saturating 96%% on HFNC 60L/50%  - s/p Toci 3/23  - C/w Dexamethasone 6 mg once daily     #Sepsis due to unclear source - improved  - Not requiring levo at this time, was hypothermic, now improved  - Had received Vanco and Cefepime 3/30  - ID following - c/w Meropenem, Caspofungin  - Procalc 0.31  - Blood Cx 3/30 NGTD, Urine Cx grew enterococcus (gut kike from urostomy)  - Sputum Cx pending    #Episodic tremors, non responsiveness - resolved  - No post ictal state, no focal neurologic deficits on exam  - EEG revealed mild-moderate generalized slowing  - CT Head negative for acute pathology  - Episodes likely occurred due to sepsis    #Acute drop in Hb  - Hb gradually dec from 13 to 8, now stable at 8, f/u CBC  - Pt has not had any melena or hematochezia, no hematuria overnight  - Seen by GI, low suspicion for GI bleed  - CT Abd obtained - negative for retroperitoneal hematoma  - Cont to monitor CBC, maintain active T+S  - If CBC begins to trend up, resume Lovenox as pt has inc risk for developing thrombosis    #CKD III   #Hx of Bladder Ca, s/p Cystoprostatectomy, Urostomy  #Acute kidney injury due to LINDSAY vs ATN  - Hospital stay complicated by hematuria in urostomy bag and phillips catheter- now improved  - CT Abd obtained - small stones, bilateral renal cysts visualized; stones appeared resolved on repeat imaging  - Cr stable, pt is nonoliguric  - Nephrology following  - LORRAINE may be due to LINDSAY as pt got contrast 3/26 vs ATN in setting of acute COVID and likely underlying infection causing sepsis  - Cont to monitor, avoid IVF as pt had developed mild fluid overload due to fluid boluses, initiate levophed if needed for MAP < 65    #Chronic AFIb on Eliquis  # [id number] Score: 4  - Eliquis on Hold, was on full AC Lovenox while in hospital, on hold for now while Hb has been trending down  - C/w Metoprolol succinate 100 mg qd    #HTN  - Has been hypotensive in setting of sepsis, holding Amlodipine    #DMII  - FS decreasing in setting of sepsis, poor PO intake  - C/w Lantus 20 U qHS, Lispro 8 U before meals, inc insulin if needed    DVT ppx: Lovenox 120 mg BID on hold  GI ppx: PPI  Diet: DASH, CCC  Activity: IAT  Full code  Dispo: c/w CEU monitoring   81 yo M with PMHx of AFib, on Eliquis, HTN, DMII, CKDIII, hx of Bladder Ca s/p Urostomy, bilateral hip/knee replacements, obesity admitted for acute hypoxemic respiratory failure due to COVID PNA.    #Acute hypoxemic respiratory failure secondary to COVID-19 pneumonia  - COVID-19 PCR positive  - Isolation precautions (contact, droplet, airborne)  - Chest X-ray 3/31 -stable bilateral opacities  - Patient is saturating 96%% on HFNC 60L/50%, will dec to 40% today  - s/p Toci 3/23  - C/w Dexamethasone 6 mg once daily   - Maintain negative balance, will administer OV Lasix 40 mg today    #Sepsis due to unclear source - improved  - Not requiring levo at this time, was hypothermic, now improved  - Had received Vanco and Cefepime 3/30  - ID following - c/w Meropenem, Caspofungin  - Procalc 0.31  - Blood Cx 3/30 NGTD, Urine Cx grew enterococcus (gut kike from urostomy)  - Sputum Cx pending    #Episodic tremors, non responsiveness - resolved  - No post ictal state, no focal neurologic deficits on exam  - EEG revealed mild-moderate generalized slowing  - CT Head negative for acute pathology  - Episodes likely occurred due to sepsis    #Acute drop in Hb  - Hb gradually dec from 13 to 8, now stable at 8, f/u CBC  - Pt has not had any melena or hematochezia, no hematuria overnight  - Seen by GI, low suspicion for GI bleed  - CT Abd obtained - negative for retroperitoneal hematoma  - Cont to monitor CBC, maintain active T+S  - If CBC begins to trend up, resume Lovenox as pt has inc risk for developing thrombosis    #CKD III   #Hx of Bladder Ca, s/p Cystoprostatectomy, Urostomy  #Acute kidney injury due to LINDSAY vs ATN  - Hospital stay complicated by hematuria in urostomy bag and phillips catheter- now improved  - CT Abd obtained - small stones, bilateral renal cysts visualized; stones appeared resolved on repeat imaging  - Cr stable, pt is nonoliguric  - Nephrology following  - LORRAINE may be due to LINDSAY as pt got contrast 3/26 vs ATN in setting of acute COVID and likely underlying infection causing sepsis  - Cont to monitor, avoid IVF as pt had developed mild fluid overload due to fluid boluses, initiate levophed if needed for MAP < 65    #Chronic AFIb on Eliquis  # [id number] Score: 4  - Eliquis on Hold, was on full AC Lovenox while in hospital, on hold for now while Hb has been trending down  - C/w Metoprolol succinate 100 mg qd    #HTN  - Has been hypotensive in setting of sepsis, holding Amlodipine    #DMII  - FS decreasing in setting of sepsis, poor PO intake  - C/w Lantus 20 U qHS, Lispro 8 U before meals, inc insulin if needed    DVT ppx: Lovenox 120 mg BID on hold  GI ppx: PPI  Diet: DASH, CCC  Activity: IAT  Full code  Dispo: c/w CEU monitoring    Spoke to Ellis, son, ([phone]), provided medical update and answered all questions.

## 2021-04-02 NOTE — PROGRESS NOTE ADULT - SUBJECTIVE AND OBJECTIVE BOX
[facility] FOLLOW UP NOTE  --------------------------------------------------------------------------------  24 hour events/subjective: Patient examined. Appears comfortable.    PAST HISTORY  --------------------------------------------------------------------------------  No significant changes to PMH, PSH, FHx, SHx, unless otherwise noted    ALLERGIES & MEDICATIONS  --------------------------------------------------------------------------------  Allergies    No Known Allergies    Standing Inpatient Medications  atorvastatin 80 milliGRAM(s) Oral at bedtime  caspofungin IVPB 50 milliGRAM(s) IV Intermittent every 24 hours  caspofungin IVPB      chlorhexidine 4% Liquid 1 Application(s) Topical <User Schedule>  dexAMETHasone  Injectable 6 milliGRAM(s) IV Push daily  influenza   Vaccine 0.5 milliLiter(s) IntraMuscular once  insulin glargine Injectable (LANTUS) 20 Unit(s) SubCutaneous at bedtime  insulin lispro (ADMELOG) corrective regimen sliding scale   SubCutaneous three times a day before meals  insulin lispro Injectable (ADMELOG) 8 Unit(s) SubCutaneous three times a day before meals  melatonin 5 milliGRAM(s) Oral at bedtime  meropenem  IVPB 750 milliGRAM(s) IV Intermittent every 12 hours  metoprolol succinate  milliGRAM(s) Oral daily  norepinephrine Infusion 0.03 MICROgram(s)/kG/Min IV Continuous <Continuous>  pantoprazole  Injectable 40 milliGRAM(s) IV Push every 12 hours  polyethylene glycol 3350 17 Gram(s) Oral daily  senna 2 Tablet(s) Oral at bedtime    PRN Inpatient Medications  acetaminophen   Tablet .. 650 milliGRAM(s) Oral every 6 hours PRN  guaifenesin/dextromethorphan  Syrup 10 milliLiter(s) Oral every 6 hours PRN      VITALS/PHYSICAL EXAM  --------------------------------------------------------------------------------  T(C): 36.6 (04-02-21 @ 03:00), Max: 36.6 (04-02-21 @ 03:00)  HR: 71 (04-02-21 @ 03:00) (63 - 77)  BP: 132/60 (04-02-21 @ 03:00) (107/57 - 132/60)  RR: 21 (04-02-21 @ 03:00) (20 - 22)  SpO2: 98% (04-02-21 @ 10:05) (96% - 98%)    04-01-21 @ 07:01  -  04-02-21 @ 07:00  --------------------------------------------------------  IN: 1810 mL / OUT: 2830 mL / NET: -1020 mL    Physical Exam:  	Gen: NAD  	Pulm:  B/L rales  	CV: RRR, S1S2  	Abd: +BS, soft, nontender/nondistended  	: No suprapubic tenderness  	LE: Warm, trace edema    LABS/STUDIES  --------------------------------------------------------------------------------              7.8    22.76 >-----------<  181      [04-01-21 @ 09:41]              24.5     142  |  112  |  91  ----------------------------<  86      [04-02-21 @ 09:04]  4.7   |  17  |  1.8        Ca     8.0     [04-02-21 @ 09:04]      Mg     3.0     [04-02-21 @ 09:04]    TPro  5.1  /  Alb  3.0  /  TBili  1.2  /  DBili  x   /  AST  36  /  ALT  26  /  AlkPhos  55  [04-02-21 @ 09:04]          [04-02-21 @ 09:04]    Creatinine Trend:  SCr 1.8 [04-02 @ 09:04]  SCr 2.3 [04-01 @ 09:41]  SCr 2.2 [03-31 @ 21:36]  SCr 2.3 [03-31 @ 04:45]  SCr 1.8 [03-30 @ 12:24]    Urinalysis - [03-30-21 @ 04:27]      Color Yellow / Appearance Slightly Turbid / SG 1.017 / pH 6.5      Gluc Negative / Ketone Negative  / Bili Negative / Urobili <2 mg/dL       Blood Large / Protein Trace / Leuk Est Large / Nitrite Negative       / WBC 84 / Hyaline 25 / Gran  / Sq Epi  / Non Sq Epi 0 / Bacteria Negative    Iron 178, TIBC 305, %sat 58      [03-30-21 @ 07:11]  Ferritin 730      [03-30-21 @ 07:11]

## 2021-04-03 LAB
24R-OH-CALCIDIOL SERPL-MCNC: 23 NG/ML — LOW (ref 30–80)
ALBUMIN SERPL ELPH-MCNC: 3.1 G/DL — LOW (ref 3.5–5.2)
ALP SERPL-CCNC: 61 U/L — SIGNIFICANT CHANGE UP (ref 30–115)
ALT FLD-CCNC: 28 U/L — SIGNIFICANT CHANGE UP (ref 0–41)
ANION GAP SERPL CALC-SCNC: 14 MMOL/L — SIGNIFICANT CHANGE UP (ref 7–14)
AST SERPL-CCNC: 42 U/L — HIGH (ref 0–41)
BASOPHILS # BLD AUTO: 0.02 K/UL — SIGNIFICANT CHANGE UP (ref 0–0.2)
BASOPHILS # BLD AUTO: 0.03 K/UL — SIGNIFICANT CHANGE UP (ref 0–0.2)
BASOPHILS NFR BLD AUTO: 0.1 % — SIGNIFICANT CHANGE UP (ref 0–1)
BASOPHILS NFR BLD AUTO: 0.2 % — SIGNIFICANT CHANGE UP (ref 0–1)
BILIRUB SERPL-MCNC: 1.6 MG/DL — HIGH (ref 0.2–1.2)
BUN SERPL-MCNC: 73 MG/DL — CRITICAL HIGH (ref 10–20)
CALCIUM SERPL-MCNC: 7.9 MG/DL — LOW (ref 8.5–10.1)
CHLORIDE SERPL-SCNC: 106 MMOL/L — SIGNIFICANT CHANGE UP (ref 98–110)
CO2 SERPL-SCNC: 19 MMOL/L — SIGNIFICANT CHANGE UP (ref 17–32)
CREAT SERPL-MCNC: 1.5 MG/DL — SIGNIFICANT CHANGE UP (ref 0.7–1.5)
CRP SERPL-MCNC: <3 MG/L — SIGNIFICANT CHANGE UP
EOSINOPHIL # BLD AUTO: 0.02 K/UL — SIGNIFICANT CHANGE UP (ref 0–0.7)
EOSINOPHIL # BLD AUTO: 0.26 K/UL — SIGNIFICANT CHANGE UP (ref 0–0.7)
EOSINOPHIL NFR BLD AUTO: 0.1 % — SIGNIFICANT CHANGE UP (ref 0–8)
EOSINOPHIL NFR BLD AUTO: 1.7 % — SIGNIFICANT CHANGE UP (ref 0–8)
FERRITIN SERPL-MCNC: 339 NG/ML — SIGNIFICANT CHANGE UP (ref 30–400)
GLUCOSE BLDC GLUCOMTR-MCNC: 110 MG/DL — HIGH (ref 70–99)
GLUCOSE BLDC GLUCOMTR-MCNC: 154 MG/DL — HIGH (ref 70–99)
GLUCOSE BLDC GLUCOMTR-MCNC: 167 MG/DL — HIGH (ref 70–99)
GLUCOSE BLDC GLUCOMTR-MCNC: 176 MG/DL — HIGH (ref 70–99)
GLUCOSE SERPL-MCNC: 111 MG/DL — HIGH (ref 70–99)
HCT VFR BLD CALC: 23.3 % — LOW (ref 42–52)
HCT VFR BLD CALC: 26.7 % — LOW (ref 42–52)
HGB BLD-MCNC: 7.7 G/DL — LOW (ref 14–18)
HGB BLD-MCNC: 8.5 G/DL — LOW (ref 14–18)
IMM GRANULOCYTES NFR BLD AUTO: 2.5 % — HIGH (ref 0.1–0.3)
IMM GRANULOCYTES NFR BLD AUTO: 4.2 % — HIGH (ref 0.1–0.3)
LDH SERPL L TO P-CCNC: 452 U/L — HIGH (ref 50–242)
LYMPHOCYTES # BLD AUTO: 0.7 K/UL — LOW (ref 1.2–3.4)
LYMPHOCYTES # BLD AUTO: 1.12 K/UL — LOW (ref 1.2–3.4)
LYMPHOCYTES # BLD AUTO: 5 % — LOW (ref 20.5–51.1)
LYMPHOCYTES # BLD AUTO: 7.3 % — LOW (ref 20.5–51.1)
MAGNESIUM SERPL-MCNC: 2.8 MG/DL — HIGH (ref 1.8–2.4)
MCHC RBC-ENTMCNC: 29.5 PG — SIGNIFICANT CHANGE UP (ref 27–31)
MCHC RBC-ENTMCNC: 29.7 PG — SIGNIFICANT CHANGE UP (ref 27–31)
MCHC RBC-ENTMCNC: 31.8 G/DL — LOW (ref 32–37)
MCHC RBC-ENTMCNC: 33 G/DL — SIGNIFICANT CHANGE UP (ref 32–37)
MCV RBC AUTO: 90 FL — SIGNIFICANT CHANGE UP (ref 80–94)
MCV RBC AUTO: 92.7 FL — SIGNIFICANT CHANGE UP (ref 80–94)
MONOCYTES # BLD AUTO: 0.62 K/UL — HIGH (ref 0.1–0.6)
MONOCYTES # BLD AUTO: 0.92 K/UL — HIGH (ref 0.1–0.6)
MONOCYTES NFR BLD AUTO: 4.4 % — SIGNIFICANT CHANGE UP (ref 1.7–9.3)
MONOCYTES NFR BLD AUTO: 6 % — SIGNIFICANT CHANGE UP (ref 1.7–9.3)
NEUTROPHILS # BLD AUTO: 12.31 K/UL — HIGH (ref 1.4–6.5)
NEUTROPHILS # BLD AUTO: 12.42 K/UL — HIGH (ref 1.4–6.5)
NEUTROPHILS NFR BLD AUTO: 80.6 % — HIGH (ref 42.2–75.2)
NEUTROPHILS NFR BLD AUTO: 87.9 % — HIGH (ref 42.2–75.2)
NRBC # BLD: 2 /100 WBCS — HIGH (ref 0–0)
NRBC # BLD: 3 /100 WBCS — HIGH (ref 0–0)
PLATELET # BLD AUTO: 96 K/UL — LOW (ref 130–400)
PLATELET # BLD AUTO: 99 K/UL — LOW (ref 130–400)
POTASSIUM SERPL-MCNC: 4.5 MMOL/L — SIGNIFICANT CHANGE UP (ref 3.5–5)
POTASSIUM SERPL-SCNC: 4.5 MMOL/L — SIGNIFICANT CHANGE UP (ref 3.5–5)
PROCALCITONIN SERPL-MCNC: 0.07 NG/ML — SIGNIFICANT CHANGE UP (ref 0.02–0.1)
PROT SERPL-MCNC: 5.1 G/DL — LOW (ref 6–8)
RBC # BLD: 2.59 M/UL — LOW (ref 4.7–6.1)
RBC # BLD: 2.59 M/UL — LOW (ref 4.7–6.1)
RBC # BLD: 2.88 M/UL — LOW (ref 4.7–6.1)
RBC # FLD: 20 % — HIGH (ref 11.5–14.5)
RBC # FLD: 20.9 % — HIGH (ref 11.5–14.5)
RETICS #: 305.6 K/UL — HIGH (ref 25–125)
RETICS/RBC NFR: 11.8 % — HIGH (ref 0.5–1.5)
SODIUM SERPL-SCNC: 139 MMOL/L — SIGNIFICANT CHANGE UP (ref 135–146)
WBC # BLD: 14.02 K/UL — HIGH (ref 4.8–10.8)
WBC # BLD: 15.4 K/UL — HIGH (ref 4.8–10.8)
WBC # FLD AUTO: 14.02 K/UL — HIGH (ref 4.8–10.8)
WBC # FLD AUTO: 15.4 K/UL — HIGH (ref 4.8–10.8)

## 2021-04-03 PROCEDURE — 99232 SBSQ HOSP IP/OBS MODERATE 35: CPT

## 2021-04-03 RX ADMIN — PANTOPRAZOLE SODIUM 40 MILLIGRAM(S): 20 TABLET, DELAYED RELEASE ORAL at 17:04

## 2021-04-03 RX ADMIN — PANTOPRAZOLE SODIUM 40 MILLIGRAM(S): 20 TABLET, DELAYED RELEASE ORAL at 05:49

## 2021-04-03 RX ADMIN — MEROPENEM 100 MILLIGRAM(S): 1 INJECTION INTRAVENOUS at 05:48

## 2021-04-03 RX ADMIN — SENNA PLUS 2 TABLET(S): 8.6 TABLET ORAL at 21:10

## 2021-04-03 RX ADMIN — Medication 2: at 11:38

## 2021-04-03 RX ADMIN — ATORVASTATIN CALCIUM 80 MILLIGRAM(S): 80 TABLET, FILM COATED ORAL at 21:10

## 2021-04-03 RX ADMIN — MEROPENEM 100 MILLIGRAM(S): 1 INJECTION INTRAVENOUS at 17:03

## 2021-04-03 RX ADMIN — CASPOFUNGIN ACETATE 260 MILLIGRAM(S): 7 INJECTION, POWDER, LYOPHILIZED, FOR SOLUTION INTRAVENOUS at 17:03

## 2021-04-03 RX ADMIN — POLYETHYLENE GLYCOL 3350 17 GRAM(S): 17 POWDER, FOR SOLUTION ORAL at 11:39

## 2021-04-03 RX ADMIN — Medication 6 MILLIGRAM(S): at 05:48

## 2021-04-03 RX ADMIN — Medication 8 UNIT(S): at 11:38

## 2021-04-03 RX ADMIN — CHLORHEXIDINE GLUCONATE 1 APPLICATION(S): 213 SOLUTION TOPICAL at 05:48

## 2021-04-03 RX ADMIN — Medication 8 UNIT(S): at 17:03

## 2021-04-03 RX ADMIN — Medication 2: at 17:03

## 2021-04-03 RX ADMIN — INSULIN GLARGINE 20 UNIT(S): 100 INJECTION, SOLUTION SUBCUTANEOUS at 21:10

## 2021-04-03 RX ADMIN — Medication 5 MILLIGRAM(S): at 21:10

## 2021-04-03 RX ADMIN — Medication 100 MILLIGRAM(S): at 05:49

## 2021-04-03 NOTE — PROGRESS NOTE ADULT - SUBJECTIVE AND OBJECTIVE BOX
Patient is a 80y old  Male who presents with a chief complaint of acute hypoxemic respiratory failure secondary to COVID-19 pneumonia (02 Apr 2021 22:13)      Over Night Events:  Patient seen and examined.   still on high flow   drop in hb   ROS:  See HPI    PHYSICAL EXAM    ICU Vital Signs Last 24 Hrs  T(C): 35.3 (03 Apr 2021 04:00), Max: 37.1 (02 Apr 2021 12:00)  T(F): 95.6 (03 Apr 2021 04:00), Max: 98.7 (02 Apr 2021 12:00)  HR: 63 (03 Apr 2021 04:00) (63 - 88)  BP: 111/57 (03 Apr 2021 04:00) (104/60 - 117/58)  BP(mean): 80 (02 Apr 2021 12:00) (80 - 80)  ABP: --  ABP(mean): --  RR: 18 (03 Apr 2021 04:00) (18 - 20)  SpO2: 96% (03 Apr 2021 04:00) (92% - 98%)      General: awake   HEENT:  babar nasal pillow               Lymph Nodes: NO cervical LN   Lungs: Bilateral BS  Cardiovascular: Regular   Abdomen: Soft, Positive BS  Extremities: No clubbing   Skin: warm   Neurological: no focal   Musculoskeletal: move all ext     I&O's Detail    02 Apr 2021 07:01  -  03 Apr 2021 07:00  --------------------------------------------------------  IN:  Total IN: 0 mL    OUT:    Urostomy (mL): 4200 mL  Total OUT: 4200 mL    Total NET: -4200 mL          LABS:                          6.7    14.72 )-----------( 106      ( 02 Apr 2021 19:16 )             21.5         02 Apr 2021 09:04    142    |  112    |  91     ----------------------------<  86     4.7     |  17     |  1.8      Ca    8.0        02 Apr 2021 09:04  Mg     3.0       02 Apr 2021 09:04    TPro  5.1    /  Alb  3.0    /  TBili  1.2    /  DBili  x      /  AST  36     /  ALT  26     /  AlkPhos  55     02 Apr 2021 09:04  Amylase x     lipase x                                                                                                                                                    Culture - Blood (collected 31 Mar 2021 12:09)  Source: .Blood None  Preliminary Report (01 Apr 2021 23:01):    No growth to date.                                                                                           MEDICATIONS  (STANDING):  atorvastatin 80 milliGRAM(s) Oral at bedtime  caspofungin IVPB      caspofungin IVPB 50 milliGRAM(s) IV Intermittent every 24 hours  chlorhexidine 4% Liquid 1 Application(s) Topical <User Schedule>  dexAMETHasone  Injectable 6 milliGRAM(s) IV Push daily  influenza   Vaccine 0.5 milliLiter(s) IntraMuscular once  insulin glargine Injectable (LANTUS) 20 Unit(s) SubCutaneous at bedtime  insulin lispro (ADMELOG) corrective regimen sliding scale   SubCutaneous three times a day before meals  insulin lispro Injectable (ADMELOG) 8 Unit(s) SubCutaneous three times a day before meals  melatonin 5 milliGRAM(s) Oral at bedtime  meropenem  IVPB 750 milliGRAM(s) IV Intermittent every 12 hours  metoprolol succinate  milliGRAM(s) Oral daily  norepinephrine Infusion 0.03 MICROgram(s)/kG/Min (7.14 mL/Hr) IV Continuous <Continuous>  pantoprazole  Injectable 40 milliGRAM(s) IV Push every 12 hours  polyethylene glycol 3350 17 Gram(s) Oral daily  senna 2 Tablet(s) Oral at bedtime    MEDICATIONS  (PRN):  acetaminophen   Tablet .. 650 milliGRAM(s) Oral every 6 hours PRN Temp greater or equal to 38C (100.4F), Mild Pain (1 - 3)  guaifenesin/dextromethorphan  Syrup 10 milliLiter(s) Oral every 6 hours PRN Cough          Xrays:  TLC:  OG:  ET tube:                                                                                       ECHO:  [facility] ICU:

## 2021-04-03 NOTE — PROGRESS NOTE ADULT - SUBJECTIVE AND OBJECTIVE BOX
SI FOLLOW UP NOTE  --------------------------------------------------------------------------------  Chief Complaint/24 hour events/subjective:    feel better, still on O2    PAST HISTORY  --------------------------------------------------------------------------------  No significant changes to PMH, PSH, FHx, SHx, unless otherwise noted    ALLERGIES & MEDICATIONS  --------------------------------------------------------------------------------  Allergies    No Known Allergies    Intolerances      Standing Inpatient Medications  atorvastatin 80 milliGRAM(s) Oral at bedtime  caspofungin IVPB      caspofungin IVPB 50 milliGRAM(s) IV Intermittent every 24 hours  chlorhexidine 4% Liquid 1 Application(s) Topical <User Schedule>  dexAMETHasone  Injectable 6 milliGRAM(s) IV Push daily  influenza   Vaccine 0.5 milliLiter(s) IntraMuscular once  insulin glargine Injectable (LANTUS) 20 Unit(s) SubCutaneous at bedtime  insulin lispro (ADMELOG) corrective regimen sliding scale   SubCutaneous three times a day before meals  insulin lispro Injectable (ADMELOG) 8 Unit(s) SubCutaneous three times a day before meals  melatonin 5 milliGRAM(s) Oral at bedtime  meropenem  IVPB 750 milliGRAM(s) IV Intermittent every 12 hours  metoprolol succinate  milliGRAM(s) Oral daily  norepinephrine Infusion 0.03 MICROgram(s)/kG/Min IV Continuous <Continuous>  pantoprazole  Injectable 40 milliGRAM(s) IV Push every 12 hours  polyethylene glycol 3350 17 Gram(s) Oral daily  senna 2 Tablet(s) Oral at bedtime    PRN Inpatient Medications  acetaminophen   Tablet .. 650 milliGRAM(s) Oral every 6 hours PRN  guaifenesin/dextromethorphan  Syrup 10 milliLiter(s) Oral every 6 hours PRN      REVIEW OF SYSTEMS  --------------------------------------------------------------------------------    All other systems were reviewed and are negative, except as noted.    VITALS/PHYSICAL EXAM  --------------------------------------------------------------------------------  T(C): 35.3 (04-03-21 @ 07:35), Max: 37.1 (04-02-21 @ 12:00)  HR: 66 (04-03-21 @ 07:35) (63 - 88)  BP: 111/57 (04-03-21 @ 07:35) (104/60 - 117/58)  RR: 18 (04-03-21 @ 07:35) (18 - 20)  SpO2: 95% (04-03-21 @ 10:48) (91% - 97%)  Wt(kg): --        04-02-21 @ 07:01  -  04-03-21 @ 07:00  --------------------------------------------------------  IN: 0 mL / OUT: 4200 mL / NET: -4200 mL      Physical Exam:    	Gen: no distress   	Pulm: CTA B/L  	CV: S1S2; no rub  	Abd: +BS, soft, nontender/nondistended  	LE:  no  edema      LABS/STUDIES  --------------------------------------------------------------------------------              8.5    15.40 >-----------<  99       [04-03-21 @ 08:42]              26.7     139  |  106  |  73  ----------------------------<  111      [04-03-21 @ 08:42]  4.5   |  19  |  1.5        Ca     7.9     [04-03-21 @ 08:42]      Mg     2.8     [04-03-21 @ 08:42]    TPro  5.1  /  Alb  3.1  /  TBili  1.6  /  DBili  x   /  AST  42  /  ALT  28  /  AlkPhos  61  [04-03-21 @ 08:42]              [04-02-21 @ 09:04]    Creatinine Trend:  SCr 1.5 [04-03 @ 08:42]  SCr 1.8 [04-02 @ 09:04]  SCr 2.3 [04-01 @ 09:41]  SCr 2.2 [03-31 @ 21:36]  SCr 2.3 [03-31 @ 04:45]    Urinalysis - [03-30-21 @ 04:27]      Color Yellow / Appearance Slightly Turbid / SG 1.017 / pH 6.5      Gluc Negative / Ketone Negative  / Bili Negative / Urobili <2 mg/dL       Blood Large / Protein Trace / Leuk Est Large / Nitrite Negative       / WBC 84 / Hyaline 25 / Gran  / Sq Epi  / Non Sq Epi 0 / Bacteria Negative      Iron 178, TIBC 305, %sat 58      [03-30-21 @ 07:11]  Ferritin 339      [04-02-21 @ 09:04]

## 2021-04-03 NOTE — PROGRESS NOTE ADULT - ASSESSMENT
79yo M c PMHx chronic afib on noac, htn, dm2, ckd3 bladder ca s/p urostomy, b/l hip/ knee replacements obesity, here with acute hypoxic respiratory failure 2/2 severe covid 19 viral pneumonia, lorraine on ckd3 now better, elevated ddimer    Sepsis on admission  Covid viral PNA. acute hypoxic RF  underlying JARETH, [place]  LORRAINE on CKD  Transaminitis  aNEMIA SP TRANSFUSION  Hx of HTN, ASHD, Afib/Eliquis  DM II, CKD  DLD  Bladder ca, sp urostomy, sp cystoprostatectomy, exposure to 911  OA, DDD, DJD, sp BL hip and knee surgeries, mobility dysfunction  GERD, HH, diverticulosis, bowel resection and reanastomosis      pt in CEU on HFNC 50/40 oxygenating bet 91-95%    pt yesterday noted to have drop in H/H 6.7/21.5, transfused 1 u, today 8.5/26.7, no overt sign s of bleeding, enoxaparin and apixiban on hold  pt became septic, cultured and started on Maldonado and Caspofungin, WBC 15, BP stable off Levophed and renal parameters improving    ID:  Meropenem 750 q12 and Caspofungin 50mg q 24  urine + E faecalis  Blood NTD  repeat infla parameters. inc fungitel,       Covid ABs are + 85.6  pul-critical care ff  CXR show BL opacities, low lung volumes    us duplex LE's negative for DVT  elevated DDIMER, CT angio negative for PE  monitor infla parameters q 72 hrs    mild transaminitis  improving  pt is a full code  guarded state

## 2021-04-03 NOTE — PROGRESS NOTE ADULT - ASSESSMENT
1. Acute kidney injury on CKD, likely pre-renal azotemia vs contrast nephropathy - improving, close to  baseline creatinine 1.2-1.5, f/u BMP.    2. Acute hypoxemic respiratory failure secondary to COVID-19 pneumonia, s/p Toci 3/23, on dexamethasone, requiring high flow O2    3. Hx of Bladder Ca, s/p cystoprostatectomy, urostomy.    4, A fib, HTN.    Plan:    keep I=O   avoid nephrotoxic meds and contrast.  Daily BMP  on steroid and IV abx  Eliquis on Hold, due to low h/h  on  Metoprolol succinate 100 mg qd

## 2021-04-03 NOTE — PROGRESS NOTE ADULT - ASSESSMENT
IMPRESSION:  Sepsis present on admission  Acute hypoxemic respiratory failure   Severe COVID-19 PNA SP Toci   Doubt superimposed bacterial infection  LORRAINE on CKD 3  Probable JARETH    PLAN:    CNS: Avoid CNS depressants    HEENT: Oral care    PULMONARY:  HOB @ 45 degrees.  Aspiration precautions. Target SpO2>94%, wean oxygen as tolerated.  Dexamethasone 6mg Q24 and wean off     CARDIOVASCULAR: Avoid volume overload.     GI: GI prophylaxis. Feeding as tolerated  Bowel regimen.      RENAL:  Follow up lytes.  Correct as needed. Renal eval appreciated.  follow bun, cr     INFECTIOUS DISEASE: FU PanCx.  Fungitell.  FU with ID     HEMATOLOGICAL:  DVT prophylaxis. transfuse 2 unit prbc   hold LMWh for now   seriel cbc   stool guiag if drop if not adequate respond to trasnfusion consider ct abd/ pelvis     ENDOCRINE:  Follow up FS.     MUSCULOSKELETAL: OOB to chair     Step Down Unit monitoring for now.     PT OT     Prognosis poor

## 2021-04-03 NOTE — PROGRESS NOTE ADULT - SUBJECTIVE AND OBJECTIVE BOX
Patient Information:  ROSA MARIA CASEY / 80y / Male / MRN#:[id number]    Hospital Day: 22d    Interval History:  Patient seen and examined at bedside. Pt Hb significantly dropped yesterday to 6.7, s/p 1U pRBCs overnight. Pt is resting this morning, states he feels "better". Has been HD stable overnight, has no overt bleeding.    Past Medical History:  Hypertension    Hyperlipidemia    Diabetes mellitus, type 2    History of atrial fibrillation    Bladder cancer    Chronic kidney disease (CKD)      Past Surgical History:  History of total hip replacement, bilateral    History of total knee replacement, bilateral    History of total cystectomy      Allergies:  No Known Allergies    Medications:  PRN:  acetaminophen   Tablet .. 650 milliGRAM(s) Oral every 6 hours PRN Temp greater or equal to 38C (100.4F), Mild Pain (1 - 3)  guaifenesin/dextromethorphan  Syrup 10 milliLiter(s) Oral every 6 hours PRN Cough    Standing:  atorvastatin 80 milliGRAM(s) Oral at bedtime  caspofungin IVPB      caspofungin IVPB 50 milliGRAM(s) IV Intermittent every 24 hours  chlorhexidine 4% Liquid 1 Application(s) Topical <User Schedule>  dexAMETHasone  Injectable 6 milliGRAM(s) IV Push daily  influenza   Vaccine 0.5 milliLiter(s) IntraMuscular once  insulin glargine Injectable (LANTUS) 20 Unit(s) SubCutaneous at bedtime  insulin lispro (ADMELOG) corrective regimen sliding scale   SubCutaneous three times a day before meals  insulin lispro Injectable (ADMELOG) 8 Unit(s) SubCutaneous three times a day before meals  melatonin 5 milliGRAM(s) Oral at bedtime  meropenem  IVPB 750 milliGRAM(s) IV Intermittent every 12 hours  metoprolol succinate  milliGRAM(s) Oral daily  norepinephrine Infusion 0.03 MICROgram(s)/kG/Min (7.14 mL/Hr) IV Continuous <Continuous>  pantoprazole  Injectable 40 milliGRAM(s) IV Push every 12 hours  polyethylene glycol 3350 17 Gram(s) Oral daily  senna 2 Tablet(s) Oral at bedtime    Home:  amlodipine-benazepril 10 mg-40 mg oral capsule: 1 cap(s) orally once a day  Eliquis 5 mg oral tablet: 1 tab(s) orally 2 times a day  glimepiride 2 mg oral tablet: 1 tab(s) orally once a day  Lantus 100 units/mL subcutaneous solution: 40 unit(s) subcutaneous once a day  metoprolol succinate 100 mg oral tablet, extended release: 1 tab(s) orally once a day  Ozempic (1 mg dose) 2 mg/1.5 mL subcutaneous solution:   Percocet 7.5/325 oral tablet: 0.5 tab(s) orally once a day (at bedtime)  potassium citrate 10 mEq oral tablet, extended release: 1 tab(s) orally once a day  rosuvastatin 20 mg oral tablet: 1 tab(s) orally once a day    Vitals:  T(C): 35.3, Max: 37.1 (04-02-21 @ 12:00)  T(F): 95.6, Max: 98.7 (04-02-21 @ 12:00)  HR: 63 (63 - 88)  BP: 111/57 (104/60 - 117/58)  RR: 18 (18 - 20)  SpO2: 96% (92% - 98%)    Physical Exam:  General: Awake, alert, NAD, sitting up in bed, on HFNC 60/40  HEENT: Head NC/AT  Heart: regular rhythm, dec rate; systolic murmur apprec  Lungs: Slightly dec breath sounds in bilateral bases  Abdomen: Soft, nontender, nondistended, BS+, urostomy bag in place, appears clean, no bleeding visualized  Extremities: Mild edema of lower ext bilaterally  Neuro: AAOx3, NFD    Labs:  CBC (04-02 @ 19:16)                        Hgb: 6.7    WBC: 14.72 )-----------------( Plts: 106                              Hct: 21.5     Chem (04-02 @ 09:04)  Na: 142  |     Cl: 112     |  BUN: 91  -----------------------------------------< Gluc: 86    K: 4.7   |    HCO3: 17  |  Cr: 1.8    Ca 8.0 (04-02 @ 09:04)  Mg 3.0 (04-02 @ 09:04)    LFTs (04-02 @ 09:04)  TPro 5.1  /  Alb 3.0  TBili 1.2  /  DBili     AST 36  /  ALT 26  /  AlkPhos 55    Cardiac Markers (03-31 @ 04:45)  Troponin I X  Troponin T X  CK X  CKMB X  CKMB Units X  Myoglobin X  Lactate 3.7  ESR X    Cardiac Markers (03-30 @ 12:24)  Troponin I X  Troponin T X  CK X  CKMB X  CKMB Units X  Myoglobin X  Lactate 5.1  ESR X            Microbiology:  Culture - Blood (collected 03-31 @ 12:09)  Source: .Blood None  Preliminary Report (04-01 @ 23:01):    No growth to date.    Culture - Urine (collected 03-30 @ 04:27)  Source: .Urine Clean Catch (Midstream)  Final Report (04-01 @ 22:46):    >100,000 CFU/ml Enterococcus faecalis    <10,000 CFU/ml of other organisms  Organism: Enterococcus faecalis (04-01 @ 22:46)  Organism: Enterococcus faecalis (04-01 @ 22:46)    Culture - Blood (collected 03-30 @ 03:10)  Source: .Blood Blood-Peripheral  Preliminary Report (03-31 @ 14:01):    No growth to date.

## 2021-04-03 NOTE — PROGRESS NOTE ADULT - ASSESSMENT
81 yo M with PMHx of AFib, on Eliquis, HTN, DMII, CKDIII, hx of Bladder Ca s/p Urostomy, bilateral hip/knee replacements, obesity admitted for acute hypoxemic respiratory failure due to COVID PNA.    #Acute hypoxemic respiratory failure secondary to COVID-19 pneumonia  - COVID-19 PCR positive  - Isolation precautions (contact, droplet, airborne)  - Chest X-ray 3/31 -stable bilateral opacities  - Patient is saturating 96%% on HFNC 60L/40%, tolerating well  - s/p Toci 3/23  - C/w Dexamethasone 6 mg once daily   - Maintain negative balance    #Sepsis due to unclear source - improved  - Not requiring levo at this time, was hypothermic, now improved  - Had received Vanco and Cefepime 3/30  - ID following - c/w Meropenem, Caspofungin  - Procalc 0.07, CRP <3  - Blood Cx 3/30, 3/31 NGTD, Urine Cx grew enterococcus (gut kike from urostomy)  - Sputum Cx pending    #Episodic tremors, non responsiveness - resolved  - No post ictal state, no focal neurologic deficits on exam  - EEG revealed mild-moderate generalized slowing  - CT Head negative for acute pathology  - Episodes likely occurred due to sepsis    #Acute drop in Hb  - Hb gradually dec from 13 to 6.7, required 1 U pRBCs overnight, f/u todays CBC  - Pt has not had any melena or hematochezia, no hematuria overnight  - Seen by GI, low suspicion for GI bleed  - CT Abd obtained 3/30 - negative for retroperitoneal hematoma  - Cont to monitor CBC, maintain active T+S (4/2)  - Pending stool guaiac, will obtain hemolysis workup  - C/w CBC BID, maintain Hb > 7    #CKD III   #Hx of Bladder Ca, s/p Cystoprostatectomy, Urostomy  #Acute kidney injury due to LINDSAY vs ATN  - Hospital stay complicated by hematuria in urostomy bag and phillips catheter- now improved  - CT Abd obtained - small stones, bilateral renal cysts visualized; stones appeared resolved on repeat imaging  - Cr improving, pt is nonoliguric  - Nephrology following  - LORRAINE may be due to LINDSAY as pt got contrast 3/26 vs ATN in setting of acute COVID and likely underlying infection causing sepsis  - Cont to monitor, avoid IVF as pt had developed mild fluid overload due to fluid boluses, initiate levophed if needed for MAP < 65    #Chronic AFIb on Eliquis  # [id number] Score: 4  - Eliquis on Hold, was on full AC Lovenox while in hospital, on hold for now while Hb has been trending down  - C/w Metoprolol succinate 100 mg qd    #HTN  - Has been hypotensive in setting of sepsis, holding Amlodipine    #DMII  - FS decreasing in setting of sepsis, poor PO intake  - C/w Lantus 20 U qHS, Lispro 8 U before meals, inc insulin if needed    DVT ppx: Lovenox 120 mg BID on hold  GI ppx: PPI  Diet: DASH, CCC  Activity: IAT  Full code  Dispo: c/w CEU monitoring   79 yo M with PMHx of AFib, on Eliquis, HTN, DMII, CKDIII, hx of Bladder Ca s/p Urostomy, bilateral hip/knee replacements, obesity admitted for acute hypoxemic respiratory failure due to COVID PNA.    #Acute hypoxemic respiratory failure secondary to COVID-19 pneumonia  - COVID-19 PCR positive  - Isolation precautions (contact, droplet, airborne)  - Chest X-ray 3/31 -stable bilateral opacities  - Patient is saturating 96%% on HFNC 60L/40%, tolerating well  - s/p Toci 3/23  - C/w Dexamethasone 6 mg once daily   - Maintain negative balance    #Sepsis due to unclear source - improved  - Not requiring levo at this time, was hypothermic, now improved  - CBC reveals dec WBC ct, Hb, plt ct, may represent BM suppression in setting of sepsis  - Had received Vanco and Cefepime 3/30  - ID following - c/w Meropenem, Caspofungin  - Procalc 0.07, CRP <3  - Blood Cx 3/30, 3/31 NGTD, Urine Cx grew enterococcus (gut kike from urostomy)  - Sputum Cx pending    #Episodic tremors, non responsiveness - resolved  - No post ictal state, no focal neurologic deficits on exam  - EEG revealed mild-moderate generalized slowing  - CT Head negative for acute pathology  - Episodes likely occurred due to sepsis    #Acute drop in Hb  - Hb gradually dec from 13 to 6.7, required 1 U pRBCs overnight, f/u todays CBC  - Pt has not had any melena or hematochezia, no hematuria overnight  - Seen by GI, low suspicion for GI bleed  - CT Abd obtained 3/30 - negative for retroperitoneal hematoma  - Cont to monitor CBC, maintain active T+S (4/2)  - Pending stool guaiac, will obtain hemolysis workup  - C/w CBC BID, maintain Hb > 7    #CKD III   #Hx of Bladder Ca, s/p Cystoprostatectomy, Urostomy  #Acute kidney injury due to LNIDSAY vs ATN  - Hospital stay complicated by hematuria in urostomy bag and phillips catheter- now improved  - CT Abd obtained - small stones, bilateral renal cysts visualized; stones appeared resolved on repeat imaging  - Cr improving, pt is nonoliguric  - Nephrology following  - LORRAINE may be due to LINDSAY as pt got contrast 3/26 vs ATN in setting of acute COVID and likely underlying infection causing sepsis  - Cont to monitor, avoid IVF as pt had developed mild fluid overload due to fluid boluses, initiate levophed if needed for MAP < 65    #Chronic AFIb on Eliquis  # [id number] Score: 4  - Eliquis on Hold, was on full AC Lovenox while in hospital, on hold for now while Hb has been trending down  - C/w Metoprolol succinate 100 mg qd    #HTN  - Has been hypotensive in setting of sepsis, holding Amlodipine    #DMII  - FS decreasing in setting of sepsis, poor PO intake  - C/w Lantus 20 U qHS, Lispro 8 U before meals, inc insulin if needed    DVT ppx: Lovenox 120 mg BID on hold  GI ppx: PPI  Diet: DASH, CCC  Activity: IAT  Full code  Dispo: c/w CEU monitoring   79 yo M with PMHx of AFib, on Eliquis, HTN, DMII, CKDIII, hx of Bladder Ca s/p Urostomy, bilateral hip/knee replacements, obesity admitted for acute hypoxemic respiratory failure due to COVID PNA.    #Acute hypoxemic respiratory failure secondary to COVID-19 pneumonia  - COVID-19 PCR positive  - Isolation precautions (contact, droplet, airborne)  - Chest X-ray 3/31 -stable bilateral opacities  - Patient is saturating 96%% on HFNC 60L/40%, tolerating well  - s/p Toci 3/23  - C/w Dexamethasone 6 mg once daily   - Maintain negative balance    #Sepsis due to unclear source - improved  - Not requiring levo at this time, was hypothermic, now improved  - CBC reveals dec WBC ct, Hb, plt ct, may represent BM suppression in setting of sepsis  - Had received Vanco and Cefepime 3/30  - ID following - c/w Meropenem, Caspofungin  - Procalc 0.07, CRP <3  - Blood Cx 3/30, 3/31 NGTD, Urine Cx grew enterococcus (gut kike from urostomy)  - Sputum Cx pending    #Episodic tremors, non responsiveness - resolved  - No post ictal state, no focal neurologic deficits on exam  - EEG revealed mild-moderate generalized slowing  - CT Head negative for acute pathology  - Episodes likely occurred due to sepsis    #Acute drop in Hb  - Hb gradually dec from 13 to 6.7, required 1 U pRBCs overnight, f/u todays CBC  - Pt has not had any melena or hematochezia, no hematuria overnight  - Seen by GI, low suspicion for GI bleed  - CT Abd obtained 3/30 - negative for retroperitoneal hematoma  - Cont to monitor CBC, maintain active T+S (4/2)  - Pending stool guaiac, will obtain hemolysis workup  - C/w CBC BID, maintain Hb > 7    #CKD III   #Hx of Bladder Ca, s/p Cystoprostatectomy, Urostomy  #Acute kidney injury due to LINDSAY vs ATN  - Hospital stay complicated by hematuria in urostomy bag and phillips catheter- now improved  - CT Abd obtained - small stones, bilateral renal cysts visualized; stones appeared resolved on repeat imaging  - Cr improving, pt is nonoliguric  - Nephrology following  - LORRAINE may be due to LINDSAY as pt got contrast 3/26 vs ATN in setting of acute COVID and likely underlying infection causing sepsis  - Cont to monitor, avoid IVF as pt had developed mild fluid overload due to fluid boluses, initiate levophed if needed for MAP < 65    #Chronic AFIb on Eliquis  # [id number] Score: 4  - Eliquis on Hold, was on full AC Lovenox while in hospital, on hold for now while Hb has been trending down  - C/w Metoprolol succinate 100 mg qd    #HTN  - Has been hypotensive in setting of sepsis, holding Amlodipine    #DMII  - FS decreasing in setting of sepsis, poor PO intake  - C/w Lantus 20 U qHS, Lispro 8 U before meals, inc insulin if needed    DVT ppx: Lovenox 120 mg BID on hold  GI ppx: PPI  Diet: DASH, CCC  Activity: IAT  Full code  Dispo: c/w CEU monitoring    Spoke to sonJuancho, provided medical update and answered all questions.   81 yo M with PMHx of AFib, on Eliquis, HTN, DMII, CKDIII, hx of Bladder Ca s/p Urostomy, bilateral hip/knee replacements, obesity admitted for acute hypoxemic respiratory failure due to COVID PNA.    #Acute hypoxemic respiratory failure secondary to COVID-19 pneumonia  - COVID-19 PCR positive  - Isolation precautions (contact, droplet, airborne)  - Chest X-ray 3/31 -stable bilateral opacities  - Patient is saturating 96%% on HFNC 60L/40%, tolerating well  - s/p Toci 3/23  - C/w Dexamethasone 6 mg once daily   - Maintain negative balance    #Sepsis due to unclear source - improved  - Not requiring levo at this time, was hypothermic, now improved  - CBC reveals dec WBC ct, Hb, plt ct, may represent BM suppression in setting of sepsis  - Had received Vanco and Cefepime 3/30  - ID following - c/w Meropenem, Caspofungin  - Procalc 0.07, CRP <3  - Blood Cx 3/30, 3/31 NGTD, Urine Cx grew enterococcus (gut kike from urostomy)  - Sputum Cx pending    #Episodic tremors, non responsiveness - resolved  - No post ictal state, no focal neurologic deficits on exam  - EEG revealed mild-moderate generalized slowing  - CT Head negative for acute pathology  - Episodes likely occurred due to sepsis    #Acute drop in Hb  - Hb gradually dec from 13 to 6.7, required 1 U pRBCs overnight, f/u todays CBC  - Pt has not had any melena or hematochezia, no hematuria overnight  - Seen by GI, low suspicion for GI bleed  - CT Abd obtained 3/30 - negative for retroperitoneal hematoma  - Cont to monitor CBC, maintain active T+S (4/2)  - Pending stool guaiac, will obtain hemolysis workup  - C/w CBC BID, maintain Hb > 7    #CKD III   #Hx of Bladder Ca, s/p Cystoprostatectomy, Urostomy  #Acute kidney injury due to LINDSAY vs ATN  - Hospital stay complicated by hematuria in urostomy bag and phillips catheter- now improved  - CT Abd obtained - small stones, bilateral renal cysts visualized; stones appeared resolved on repeat imaging  - Cr improving, pt is nonoliguric  - Nephrology following  - LORRAINE may be due to LINDSAY as pt got contrast 3/26 vs ATN in setting of acute COVID and likely underlying infection causing sepsis  - Cont to monitor, avoid IVF as pt had developed mild fluid overload due to fluid boluses, initiate levophed if needed for MAP < 65    #Chronic AFIb on Eliquis  # [id number] Score: 4  - Eliquis on Hold, was on full AC Lovenox while in hospital, on hold for now while Hb has been trending down  - C/w Metoprolol succinate 100 mg qd    #HTN  - Has been hypotensive in setting of sepsis, holding Amlodipine    #DMII  - FS decreasing in setting of sepsis, poor PO intake  - C/w Lantus 20 U qHS, Lispro 8 U before meals, inc insulin if needed    DVT ppx: Lovenox 120 mg BID on hold  GI ppx: PPI  Diet: DASH, CCC  Activity: IAT  Full code  Dispo: c/w CEU monitoring    Spoke to Juancho oropeza ([phone]), provided medical update and answered all questions.

## 2021-04-03 NOTE — PROGRESS NOTE ADULT - SUBJECTIVE AND OBJECTIVE BOX
ROSA MARIA CASEY  Patient is a 80y old  Male who presents with a chief complaint of acute hypoxic RF due to Covid Viral Syndrome. Underlying Hx of DMII, CKD,  DLD, MO,  JARETH, bladder ca, sp urostomy, Hx of 911 exposure), OA, DDD, DJD, GERD, diverticulosis, abd wall hernias. bowel resection and reanastomosis.      Overnight events:  pt in CEU, on HFNC, pulse ox 91-95%, WNBC 15,  on Meropenem and caspofungin, BUN/creat 73/1.5  improving, drop in H/H 6.7/2 yesterday so 1 u pRBC improved to 8.5/26, overall feels min better    Vital Signs:   T:  99.5  HR:  66  BP:  111/57    RR: 18  Pulse ox:  HFNC 50/40   91-95%        PHYSICAL EXAM:  GENERAL: A&O but weak and sluggish, overweight, NAD on HFNC  Neck:  short,  thick but supple, no JVD, no bruit, no stridor  Lungs:  shallow resp, scattered rhonchi, no wheezing, improving air entry  Abd:   distended soft and benign, + BS  Urinary:  RLQ urostomy  Ext:  arthritic changes, moves all limbs  Skin:  no rash  Psych stable    LABS                        8.5  15-----------( 99            26.5    139 |  106 | 73  ----------------------------<111  4.5   19    1.5    GFR 64, 58, 72, 64, 35, 26, 30, 35  Ca    8.8       Phos  3.4      Mg     2.0, 2.6, 3.0, 2.8      MB 63    trop <0.01 x2  ferritin 535, 462, 737, 339  procal 0.26, 0.11, 0.07  CRP 25, 180. <3  LDH  515    COVID AB + 85.60  3/30 LA 5.1,  3.7    TPro  5.9,  5.1/  Alb  3.2,  3.1  /  TBili  0.9  /  DBili  x   /  AST  45, 88, 88  /  ALT  53, 93, 99  /  AlkPhos  43  03-14      Urinalysis Basic - ( 12 Mar 2021 16:38 )    Color: Yellow / Appearance: Slightly Turbid / S.020 / pH: x  Gluc: x / Ketone: Negative  / Bili: Negative / Urobili: 3 mg/dL   Blood: x / Protein: 30 mg/dL / Nitrite: Negative   Leuk Esterase: Moderate / RBC: 5 /HPF / WBC 90 /HPF   Sq Epi: x / Non Sq Epi: 0 /HPF / Bacteria: Ma       RADIOLOGY & ADDITIONAL TESTS:  Venous duplex:  negative for DVT  CXR:  BL opacities L>R  CXR:  stable BL opacities    CT angio:  neg for central PE    CT abd/pelvis:  BIbasilar densities, hepatobiliary/spleen/pancreas WNL, mild BL adrenal thickening, no hydro, bl renal cysts, bl layering densities/ sm stones, "milk of ca", post cystoprostatectomy and ilealconduit, , post bowel resection and reanastomosis, ventral abd wall hernias, bl hip replacements    CTH 3/30:  moderate atrophy, chronic microvascular changes,  no evidence of acute pathology    EEG 3/30 gen slowing , no focal epileptiform activity

## 2021-04-04 LAB
CULTURE RESULTS: SIGNIFICANT CHANGE UP
GLUCOSE BLDC GLUCOMTR-MCNC: 115 MG/DL — HIGH (ref 70–99)
GLUCOSE BLDC GLUCOMTR-MCNC: 221 MG/DL — HIGH (ref 70–99)
GLUCOSE BLDC GLUCOMTR-MCNC: 285 MG/DL — HIGH (ref 70–99)
GLUCOSE BLDC GLUCOMTR-MCNC: 81 MG/DL — SIGNIFICANT CHANGE UP (ref 70–99)
SPECIMEN SOURCE: SIGNIFICANT CHANGE UP

## 2021-04-04 PROCEDURE — 93010 ELECTROCARDIOGRAM REPORT: CPT

## 2021-04-04 PROCEDURE — 99232 SBSQ HOSP IP/OBS MODERATE 35: CPT

## 2021-04-04 RX ADMIN — CHLORHEXIDINE GLUCONATE 1 APPLICATION(S): 213 SOLUTION TOPICAL at 05:34

## 2021-04-04 RX ADMIN — MEROPENEM 100 MILLIGRAM(S): 1 INJECTION INTRAVENOUS at 05:36

## 2021-04-04 RX ADMIN — CASPOFUNGIN ACETATE 260 MILLIGRAM(S): 7 INJECTION, POWDER, LYOPHILIZED, FOR SOLUTION INTRAVENOUS at 16:44

## 2021-04-04 RX ADMIN — Medication 8 UNIT(S): at 16:43

## 2021-04-04 RX ADMIN — Medication 5 MILLIGRAM(S): at 21:14

## 2021-04-04 RX ADMIN — PANTOPRAZOLE SODIUM 40 MILLIGRAM(S): 20 TABLET, DELAYED RELEASE ORAL at 17:12

## 2021-04-04 RX ADMIN — INSULIN GLARGINE 20 UNIT(S): 100 INJECTION, SOLUTION SUBCUTANEOUS at 21:14

## 2021-04-04 RX ADMIN — ATORVASTATIN CALCIUM 80 MILLIGRAM(S): 80 TABLET, FILM COATED ORAL at 21:14

## 2021-04-04 RX ADMIN — MEROPENEM 100 MILLIGRAM(S): 1 INJECTION INTRAVENOUS at 17:13

## 2021-04-04 RX ADMIN — POLYETHYLENE GLYCOL 3350 17 GRAM(S): 17 POWDER, FOR SOLUTION ORAL at 11:53

## 2021-04-04 RX ADMIN — Medication 6 MILLIGRAM(S): at 05:37

## 2021-04-04 RX ADMIN — Medication 6: at 16:42

## 2021-04-04 RX ADMIN — PANTOPRAZOLE SODIUM 40 MILLIGRAM(S): 20 TABLET, DELAYED RELEASE ORAL at 05:37

## 2021-04-04 NOTE — PROGRESS NOTE ADULT - ASSESSMENT
81yo M c PMHx chronic afib on noac, htn, dm2, ckd3 bladder ca s/p urostomy, b/l hip/ knee replacements obesity, here with acute hypoxic respiratory failure 2/2 severe covid 19 viral pneumonia, lorraine on ckd3 now better, elevated ddimer    Sepsis on admission  Covid viral PNA. acute hypoxic RF  underlying JARETH, MO  LORRAINE on CKD  Transaminitis  aNEMIA SP TRANSFUSION  Hx of HTN, ASHD, Afib/Eliquis  DM II, CKD  DLD  Bladder ca, sp urostomy, sp cystoprostatectomy, exposure to 911  OA, DDD, DJD, sp BL hip and knee surgeries, mobility dysfunction  GERD, HH, diverticulosis, bowel resection and reanastomosis      pt in CEU on HFNC 50/40 oxygenating bet 94%    pt had low H/H, transfused 1u,  no overt signs of bleeding, PLTS low 106,  enoxaparin and apixiban on hold  monitor CBC and transfuse to keep Hgb around 8  monitor renal parameters and electrolytes    pt became septic, cultured and started on Maldonado and Caspofungin, WBC 15, BP stable off Levophed and renal parameters improving    ID:  Meropenem 750 q12 and Caspofungin 50mg q 24  urine + E faecalis  Blood NTD  repeat infla parameters. inc fungitel,       Covid ABs are + 85.6  pul-critical care ff  CXR show BL opacities, low lung volumes    us duplex LE's negative for DVT  elevated DDIMER, CT angio negative for PE  monitor infla parameters q 72 hrs    mild transaminitis  improving  pt is a full code  guarded state

## 2021-04-04 NOTE — PROGRESS NOTE ADULT - ASSESSMENT
IMPRESSION:  Sepsis present on admission  Acute hypoxemic respiratory failure   Severe COVID-19 PNA SP Toci   Doubt superimposed bacterial infection  LORRAINE on CKD 3 improving   Probable JARETH  anemia   thrombocytopenia   PLAN:    CNS: Avoid CNS depressants    HEENT: Oral care    PULMONARY:  HOB @ 45 degrees.  Aspiration precautions. Target SpO2>94%, wean oxygen as tolerated.  Dexamethasone 6mg Q24     CARDIOVASCULAR: Avoid volume overload.     GI: GI prophylaxis. Feeding as tolerated  Bowel regimen.      RENAL:  Follow up lytes.  Correct as needed. Renal eval appreciated.  follow bun, cr     INFECTIOUS DISEASE: FU PanCx.  Fungitell.  FU with ID     HEMATOLOGICAL:  DVT prophylaxis. seriel cbc  send Hit   off anticoagulation for >?? bleed   send peripheral smear   LDH hemolytic work up   follow h/h if dropping today do ct abd pelvis no contrast   stool guiag i    ENDOCRINE:  Follow up FS.     MUSCULOSKELETAL: OOB to chair     Step Down Unit monitoring for now.     PT OT     Prognosis poor

## 2021-04-04 NOTE — PROGRESS NOTE ADULT - SUBJECTIVE AND OBJECTIVE BOX
[facility] FOLLOW UP NOTE  --------------------------------------------------------------------------------  Chief Complaint/24 hour events/subjective:    on O2, no distress    PAST HISTORY  --------------------------------------------------------------------------------  No significant changes to PMH, PSH, FHx, SHx, unless otherwise noted    ALLERGIES & MEDICATIONS  --------------------------------------------------------------------------------  Allergies    No Known Allergies    Intolerances      Standing Inpatient Medications  atorvastatin 80 milliGRAM(s) Oral at bedtime  caspofungin IVPB      caspofungin IVPB 50 milliGRAM(s) IV Intermittent every 24 hours  chlorhexidine 4% Liquid 1 Application(s) Topical <User Schedule>  dexAMETHasone  Injectable 6 milliGRAM(s) IV Push daily  influenza   Vaccine 0.5 milliLiter(s) IntraMuscular once  insulin glargine Injectable (LANTUS) 20 Unit(s) SubCutaneous at bedtime  insulin lispro (ADMELOG) corrective regimen sliding scale   SubCutaneous three times a day before meals  insulin lispro Injectable (ADMELOG) 8 Unit(s) SubCutaneous three times a day before meals  melatonin 5 milliGRAM(s) Oral at bedtime  meropenem  IVPB 750 milliGRAM(s) IV Intermittent every 12 hours  metoprolol succinate  milliGRAM(s) Oral daily  pantoprazole  Injectable 40 milliGRAM(s) IV Push every 12 hours  polyethylene glycol 3350 17 Gram(s) Oral daily  senna 2 Tablet(s) Oral at bedtime    PRN Inpatient Medications  acetaminophen   Tablet .. 650 milliGRAM(s) Oral every 6 hours PRN  guaifenesin/dextromethorphan  Syrup 10 milliLiter(s) Oral every 6 hours PRN      REVIEW OF SYSTEMS  --------------------------------------------------------------------------------    All other systems were reviewed and are negative, except as noted.    VITALS/PHYSICAL EXAM  --------------------------------------------------------------------------------  T(C): 35.9 (04-04-21 @ 08:01), Max: 35.9 (04-03-21 @ 20:11)  HR: 52 (04-04-21 @ 08:01) (52 - 60)  BP: 111/59 (04-04-21 @ 08:01) (93/48 - 111/60)  RR: 18 (04-04-21 @ 05:01) (18 - 20)  SpO2: 94% (04-04-21 @ 07:15) (94% - 99%)  Wt(kg): --        04-03-21 @ 07:01  -  04-04-21 @ 07:00  --------------------------------------------------------  IN: 0 mL / OUT: 900 mL / NET: -900 mL      Physical Exam:    	Gen: no distress   	Pulm: CTA B/L  	CV: S1S2; no rub  	Abd: +BS, soft, nontender/nondistended  	LE:  no  edema      LABS/STUDIES  --------------------------------------------------------------------------------              7.7    14.02 >-----------<  96       [04-03-21 @ 16:00]              23.3     139  |  106  |  73  ----------------------------<  111      [04-03-21 @ 08:42]  4.5   |  19  |  1.5        Ca     7.9     [04-03-21 @ 08:42]      Mg     2.8     [04-03-21 @ 08:42]    TPro  5.1  /  Alb  3.1  /  TBili  1.6  /  DBili  x   /  AST  42  /  ALT  28  /  AlkPhos  61  [04-03-21 @ 08:42]              [04-03-21 @ 16:00]    Creatinine Trend:  SCr 1.5 [04-03 @ 08:42]  SCr 1.8 [04-02 @ 09:04]  SCr 2.3 [04-01 @ 09:41]  SCr 2.2 [03-31 @ 21:36]  SCr 2.3 [03-31 @ 04:45]    Urinalysis - [03-30-21 @ 04:27]      Color Yellow / Appearance Slightly Turbid / SG 1.017 / pH 6.5      Gluc Negative / Ketone Negative  / Bili Negative / Urobili <2 mg/dL       Blood Large / Protein Trace / Leuk Est Large / Nitrite Negative       / WBC 84 / Hyaline 25 / Gran  / Sq Epi  / Non Sq Epi 0 / Bacteria Negative      Iron 178, TIBC 305, %sat 58      [03-30-21 @ 07:11]  Ferritin 339      [04-02-21 @ 09:04]  Vitamin D (25OH) 23      [04-03-21 @ 08:42]

## 2021-04-04 NOTE — PROGRESS NOTE ADULT - SUBJECTIVE AND OBJECTIVE BOX
Patient is a 80y old  Male who presents with a chief complaint of acute hypoxemic respiratory failure secondary to COVID-19 pneumonia (03 Apr 2021 15:38)      Over Night Events:  Patient seen and examined.   stbale on high flow less oxygen     ROS:  See HPI    PHYSICAL EXAM    ICU Vital Signs Last 24 Hrs  T(C): 35.9 (04 Apr 2021 08:01), Max: 35.9 (03 Apr 2021 20:11)  T(F): 96.6 (04 Apr 2021 08:01), Max: 96.7 (03 Apr 2021 20:11)  HR: 52 (04 Apr 2021 08:01) (52 - 60)  BP: 111/59 (04 Apr 2021 08:01) (93/48 - 111/60)  BP(mean): --  ABP: --  ABP(mean): --  RR: 18 (04 Apr 2021 05:01) (18 - 20)  SpO2: 96% (04 Apr 2021 05:01) (94% - 99%)      General: Aox3  HEENT:     babar           Lymph Nodes: NO cervical LN   Lungs: Bilateral BS  Cardiovascular: Regular   Abdomen: Soft, Positive BS  Extremities: No clubbing   Skin: warm   Neurological: no focal   Musculoskeletal: move all ext     I&O's Detail    03 Apr 2021 07:01  -  04 Apr 2021 07:00  --------------------------------------------------------  IN:  Total IN: 0 mL    OUT:    Urostomy (mL): 900 mL  Total OUT: 900 mL    Total NET: -900 mL          LABS:                          7.7    14.02 )-----------( 96       ( 03 Apr 2021 16:00 )             23.3         03 Apr 2021 08:42    139    |  106    |  73     ----------------------------<  111    4.5     |  19     |  1.5      Ca    7.9        03 Apr 2021 08:42  Mg     2.8       03 Apr 2021 08:42    TPro  5.1    /  Alb  3.1    /  TBili  1.6    /  DBili  x      /  AST  42     /  ALT  28     /  AlkPhos  61     03 Apr 2021 08:42  Amylase x     lipase x                                                                                                                                                                                                                                         MEDICATIONS  (STANDING):  atorvastatin 80 milliGRAM(s) Oral at bedtime  caspofungin IVPB 50 milliGRAM(s) IV Intermittent every 24 hours  caspofungin IVPB      chlorhexidine 4% Liquid 1 Application(s) Topical <User Schedule>  dexAMETHasone  Injectable 6 milliGRAM(s) IV Push daily  influenza   Vaccine 0.5 milliLiter(s) IntraMuscular once  insulin glargine Injectable (LANTUS) 20 Unit(s) SubCutaneous at bedtime  insulin lispro (ADMELOG) corrective regimen sliding scale   SubCutaneous three times a day before meals  insulin lispro Injectable (ADMELOG) 8 Unit(s) SubCutaneous three times a day before meals  melatonin 5 milliGRAM(s) Oral at bedtime  meropenem  IVPB 750 milliGRAM(s) IV Intermittent every 12 hours  metoprolol succinate  milliGRAM(s) Oral daily  pantoprazole  Injectable 40 milliGRAM(s) IV Push every 12 hours  polyethylene glycol 3350 17 Gram(s) Oral daily  senna 2 Tablet(s) Oral at bedtime    MEDICATIONS  (PRN):  acetaminophen   Tablet .. 650 milliGRAM(s) Oral every 6 hours PRN Temp greater or equal to 38C (100.4F), Mild Pain (1 - 3)  guaifenesin/dextromethorphan  Syrup 10 milliLiter(s) Oral every 6 hours PRN Cough          Xrays:  TLC:  OG:  ET tube:                                                                                    decrease opacity    ECHO:  [facility] ICU:

## 2021-04-04 NOTE — PROGRESS NOTE ADULT - ASSESSMENT
1. Acute kidney injury on CKD, likely pre-renal azotemia vs contrast nephropathy - improving, close to  baseline creatinine 1.2-1.5    2. Acute hypoxemic respiratory failure secondary to COVID-19 pneumonia, s/p Toci 3/23, on dexamethasone, requiring high flow O2    3. Hx of Bladder Ca, s/p cystoprostatectomy, urostomy.    4, A fib, HTN.    Plan:    avoid nephrotoxic meds and contrast.  Daily BMP  on steroid and IV abx  pulm f/u  on  Metoprolol succinate 100 mg qd

## 2021-04-04 NOTE — PROGRESS NOTE ADULT - SUBJECTIVE AND OBJECTIVE BOX
ROSA MARIA CASEY  Patient is a 80y old  Male who presents with a chief complaint of acute hypoxic RF due to Covid Viral Syndrome. Underlying Hx of DMII, CKD,  DLD, MO,  JARETH, bladder ca, sp urostomy, Hx of 911 exposure), OA, DDD, DJD, GERD, diverticulosis, abd wall hernias. bowel resection and reanastomosis.      Overnight events:  pt in CEU, on HFNC, pulse ox 94%, WBC 14,  on Meropenem and caspofungin, BUN/creat 73/1.5  cont low H/H, today's labs  P,     Vital Signs:   T:  96.7  HR:  59  BP:  132/56    RR: 18  Pulse ox:  HFNC 50/40   94%        PHYSICAL EXAM:  GENERAL: A&O but weak and sluggish, overweight, NAD on HFNC  Neck:  short,  thick but supple, no JVD, no bruit, no stridor  Lungs:  shallow resp, scattered rhonchi, no wheezing, improving air entry  Abd:   distended soft and benign, + BS  Urinary:  RLQ urostomy  Ext:  arthritic changes, moves all limbs  Skin:  no rash  Psych stable    LABS:      4/3                      7.7  14-----------( 96            23.3    139 |  106 | 73  ----------------------------<111  4.5   19    1.5    GFR 64, 58, 72, 64, 35, 26, 30, 35  Ca    8.8       Phos  3.4      Mg     2.0, 2.6, 3.0, 2.8      MB 63    trop <0.01 x2  ferritin 535, 462, 737, 339  procal 0.26, 0.11, 0.07  CRP 25, 180. <3  LDH  515    COVID AB + 85.60  3/30 LA 5.1,  3.7    TPro  5.9,  5.1/  Alb  3.2,  3.1  /  TBili  0.9  /  DBili  x   /  AST  45, 88, 88  /  ALT  53, 93, 99  /  AlkPhos  43  03-14      Urinalysis Basic - ( 12 Mar 2021 16:38 )    Color: Yellow / Appearance: Slightly Turbid / S.020 / pH: x  Gluc: x / Ketone: Negative  / Bili: Negative / Urobili: 3 mg/dL   Blood: x / Protein: 30 mg/dL / Nitrite: Negative   Leuk Esterase: Moderate / RBC: 5 /HPF / WBC 90 /HPF   Sq Epi: x / Non Sq Epi: 0 /HPF / Bacteria: Ma       RADIOLOGY & ADDITIONAL TESTS:  Venous duplex:  negative for DVT  CXR:  BL opacities L>R  CXR:  stable BL opacities    CT angio:  neg for central PE    CT abd/pelvis:  BIbasilar densities, hepatobiliary/spleen/pancreas WNL, mild BL adrenal thickening, no hydro, bl renal cysts, bl layering densities/ sm stones, "milk of ca", post cystoprostatectomy and ilealconduit, , post bowel resection and reanastomosis, ventral abd wall hernias, bl hip replacements    CTH 3/30:  moderate atrophy, chronic microvascular changes,  no evidence of acute pathology    EEG 3/30 gen slowing , no focal epileptiform activity

## 2021-04-05 LAB
ALBUMIN SERPL ELPH-MCNC: 3 G/DL — LOW (ref 3.5–5.2)
ALBUMIN SERPL ELPH-MCNC: 3 G/DL — LOW (ref 3.5–5.2)
ALP SERPL-CCNC: 116 U/L — HIGH (ref 30–115)
ALP SERPL-CCNC: 81 U/L — SIGNIFICANT CHANGE UP (ref 30–115)
ALT FLD-CCNC: 35 U/L — SIGNIFICANT CHANGE UP (ref 0–41)
ALT FLD-CCNC: 41 U/L — SIGNIFICANT CHANGE UP (ref 0–41)
ANION GAP SERPL CALC-SCNC: 10 MMOL/L — SIGNIFICANT CHANGE UP (ref 7–14)
ANION GAP SERPL CALC-SCNC: 11 MMOL/L — SIGNIFICANT CHANGE UP (ref 7–14)
AST SERPL-CCNC: 42 U/L — HIGH (ref 0–41)
AST SERPL-CCNC: 51 U/L — HIGH (ref 0–41)
BASOPHILS # BLD AUTO: 0.01 K/UL — SIGNIFICANT CHANGE UP (ref 0–0.2)
BASOPHILS # BLD AUTO: 0.01 K/UL — SIGNIFICANT CHANGE UP (ref 0–0.2)
BASOPHILS NFR BLD AUTO: 0.1 % — SIGNIFICANT CHANGE UP (ref 0–1)
BASOPHILS NFR BLD AUTO: 0.1 % — SIGNIFICANT CHANGE UP (ref 0–1)
BILIRUB SERPL-MCNC: 1.5 MG/DL — HIGH (ref 0.2–1.2)
BILIRUB SERPL-MCNC: 1.7 MG/DL — HIGH (ref 0.2–1.2)
BUN SERPL-MCNC: 42 MG/DL — HIGH (ref 10–20)
BUN SERPL-MCNC: 42 MG/DL — HIGH (ref 10–20)
CALCIUM SERPL-MCNC: 7.3 MG/DL — LOW (ref 8.5–10.1)
CALCIUM SERPL-MCNC: 7.5 MG/DL — LOW (ref 8.5–10.1)
CHLORIDE SERPL-SCNC: 103 MMOL/L — SIGNIFICANT CHANGE UP (ref 98–110)
CHLORIDE SERPL-SCNC: 104 MMOL/L — SIGNIFICANT CHANGE UP (ref 98–110)
CO2 SERPL-SCNC: 19 MMOL/L — SIGNIFICANT CHANGE UP (ref 17–32)
CO2 SERPL-SCNC: 20 MMOL/L — SIGNIFICANT CHANGE UP (ref 17–32)
CREAT SERPL-MCNC: 1.1 MG/DL — SIGNIFICANT CHANGE UP (ref 0.7–1.5)
CREAT SERPL-MCNC: 1.2 MG/DL — SIGNIFICANT CHANGE UP (ref 0.7–1.5)
CULTURE RESULTS: SIGNIFICANT CHANGE UP
D DIMER BLD IA.RAPID-MCNC: 4298 NG/ML DDU — HIGH (ref 0–230)
EOSINOPHIL # BLD AUTO: 0.12 K/UL — SIGNIFICANT CHANGE UP (ref 0–0.7)
EOSINOPHIL # BLD AUTO: 0.17 K/UL — SIGNIFICANT CHANGE UP (ref 0–0.7)
EOSINOPHIL NFR BLD AUTO: 1.1 % — SIGNIFICANT CHANGE UP (ref 0–8)
EOSINOPHIL NFR BLD AUTO: 1.9 % — SIGNIFICANT CHANGE UP (ref 0–8)
FIBRINOGEN PPP-MCNC: 199 MG/DL — LOW (ref 204.4–570.6)
GLUCOSE BLDC GLUCOMTR-MCNC: 145 MG/DL — HIGH (ref 70–99)
GLUCOSE BLDC GLUCOMTR-MCNC: 221 MG/DL — HIGH (ref 70–99)
GLUCOSE BLDC GLUCOMTR-MCNC: 225 MG/DL — HIGH (ref 70–99)
GLUCOSE BLDC GLUCOMTR-MCNC: 95 MG/DL — SIGNIFICANT CHANGE UP (ref 70–99)
GLUCOSE SERPL-MCNC: 218 MG/DL — HIGH (ref 70–99)
GLUCOSE SERPL-MCNC: 246 MG/DL — HIGH (ref 70–99)
HAPTOGLOB SERPL-MCNC: 126 MG/DL — SIGNIFICANT CHANGE UP (ref 34–200)
HAPTOGLOB SERPL-MCNC: 134 MG/DL — SIGNIFICANT CHANGE UP (ref 34–200)
HCT VFR BLD CALC: 24.3 % — LOW (ref 42–52)
HCT VFR BLD CALC: 25 % — LOW (ref 42–52)
HEPARIN-PF4 AB RESULT: <0.6 U/ML — SIGNIFICANT CHANGE UP (ref 0–0.9)
HGB BLD-MCNC: 7.6 G/DL — LOW (ref 14–18)
HGB BLD-MCNC: 7.8 G/DL — LOW (ref 14–18)
IMM GRANULOCYTES NFR BLD AUTO: 1.6 % — HIGH (ref 0.1–0.3)
IMM GRANULOCYTES NFR BLD AUTO: 1.6 % — HIGH (ref 0.1–0.3)
LYMPHOCYTES # BLD AUTO: 0.51 K/UL — LOW (ref 1.2–3.4)
LYMPHOCYTES # BLD AUTO: 0.88 K/UL — LOW (ref 1.2–3.4)
LYMPHOCYTES # BLD AUTO: 5.7 % — LOW (ref 20.5–51.1)
LYMPHOCYTES # BLD AUTO: 8.3 % — LOW (ref 20.5–51.1)
MAGNESIUM SERPL-MCNC: 2.3 MG/DL — SIGNIFICANT CHANGE UP (ref 1.8–2.4)
MCHC RBC-ENTMCNC: 28.9 PG — SIGNIFICANT CHANGE UP (ref 27–31)
MCHC RBC-ENTMCNC: 29.2 PG — SIGNIFICANT CHANGE UP (ref 27–31)
MCHC RBC-ENTMCNC: 31.2 G/DL — LOW (ref 32–37)
MCHC RBC-ENTMCNC: 31.3 G/DL — LOW (ref 32–37)
MCV RBC AUTO: 92.4 FL — SIGNIFICANT CHANGE UP (ref 80–94)
MCV RBC AUTO: 93.6 FL — SIGNIFICANT CHANGE UP (ref 80–94)
MONOCYTES # BLD AUTO: 0.58 K/UL — SIGNIFICANT CHANGE UP (ref 0.1–0.6)
MONOCYTES # BLD AUTO: 0.84 K/UL — HIGH (ref 0.1–0.6)
MONOCYTES NFR BLD AUTO: 6.5 % — SIGNIFICANT CHANGE UP (ref 1.7–9.3)
MONOCYTES NFR BLD AUTO: 7.9 % — SIGNIFICANT CHANGE UP (ref 1.7–9.3)
NEUTROPHILS # BLD AUTO: 7.54 K/UL — HIGH (ref 1.4–6.5)
NEUTROPHILS # BLD AUTO: 8.59 K/UL — HIGH (ref 1.4–6.5)
NEUTROPHILS NFR BLD AUTO: 81 % — HIGH (ref 42.2–75.2)
NEUTROPHILS NFR BLD AUTO: 84.2 % — HIGH (ref 42.2–75.2)
NRBC # BLD: 1 /100 WBCS — HIGH (ref 0–0)
NRBC # BLD: 2 /100 WBCS — HIGH (ref 0–0)
OB PNL STL: NEGATIVE — SIGNIFICANT CHANGE UP
PF4 HEPARIN CMPLX AB SER-ACNC: NEGATIVE — SIGNIFICANT CHANGE UP
PLATELET # BLD AUTO: 91 K/UL — LOW (ref 130–400)
PLATELET # BLD AUTO: 95 K/UL — LOW (ref 130–400)
POTASSIUM SERPL-MCNC: 4.6 MMOL/L — SIGNIFICANT CHANGE UP (ref 3.5–5)
POTASSIUM SERPL-MCNC: 4.8 MMOL/L — SIGNIFICANT CHANGE UP (ref 3.5–5)
POTASSIUM SERPL-SCNC: 4.6 MMOL/L — SIGNIFICANT CHANGE UP (ref 3.5–5)
POTASSIUM SERPL-SCNC: 4.8 MMOL/L — SIGNIFICANT CHANGE UP (ref 3.5–5)
PROT SERPL-MCNC: 4.6 G/DL — LOW (ref 6–8)
PROT SERPL-MCNC: 4.7 G/DL — LOW (ref 6–8)
RBC # BLD: 2.63 M/UL — LOW (ref 4.7–6.1)
RBC # BLD: 2.67 M/UL — LOW (ref 4.7–6.1)
RBC # FLD: 20.3 % — HIGH (ref 11.5–14.5)
RBC # FLD: 20.6 % — HIGH (ref 11.5–14.5)
SODIUM SERPL-SCNC: 133 MMOL/L — LOW (ref 135–146)
SODIUM SERPL-SCNC: 134 MMOL/L — LOW (ref 135–146)
SPECIMEN SOURCE: SIGNIFICANT CHANGE UP
WBC # BLD: 10.61 K/UL — SIGNIFICANT CHANGE UP (ref 4.8–10.8)
WBC # BLD: 8.95 K/UL — SIGNIFICANT CHANGE UP (ref 4.8–10.8)
WBC # FLD AUTO: 10.61 K/UL — SIGNIFICANT CHANGE UP (ref 4.8–10.8)
WBC # FLD AUTO: 8.95 K/UL — SIGNIFICANT CHANGE UP (ref 4.8–10.8)

## 2021-04-05 PROCEDURE — 99232 SBSQ HOSP IP/OBS MODERATE 35: CPT

## 2021-04-05 PROCEDURE — 93970 EXTREMITY STUDY: CPT | Mod: 26

## 2021-04-05 RX ORDER — ENOXAPARIN SODIUM 100 MG/ML
120 INJECTION SUBCUTANEOUS EVERY 12 HOURS
Refills: 0 | Status: DISCONTINUED | OUTPATIENT
Start: 2021-04-05 | End: 2021-04-07

## 2021-04-05 RX ORDER — INSULIN GLARGINE 100 [IU]/ML
12 INJECTION, SOLUTION SUBCUTANEOUS AT BEDTIME
Refills: 0 | Status: DISCONTINUED | OUTPATIENT
Start: 2021-04-05 | End: 2021-04-06

## 2021-04-05 RX ORDER — MEROPENEM 1 G/30ML
1000 INJECTION INTRAVENOUS EVERY 8 HOURS
Refills: 0 | Status: DISCONTINUED | OUTPATIENT
Start: 2021-04-05 | End: 2021-04-07

## 2021-04-05 RX ORDER — INSULIN GLARGINE 100 [IU]/ML
12 INJECTION, SOLUTION SUBCUTANEOUS EVERY MORNING
Refills: 0 | Status: DISCONTINUED | OUTPATIENT
Start: 2021-04-05 | End: 2021-04-06

## 2021-04-05 RX ADMIN — Medication 5 MILLIGRAM(S): at 22:20

## 2021-04-05 RX ADMIN — SENNA PLUS 2 TABLET(S): 8.6 TABLET ORAL at 22:20

## 2021-04-05 RX ADMIN — Medication 100 MILLIGRAM(S): at 05:04

## 2021-04-05 RX ADMIN — Medication 4: at 12:08

## 2021-04-05 RX ADMIN — PANTOPRAZOLE SODIUM 40 MILLIGRAM(S): 20 TABLET, DELAYED RELEASE ORAL at 05:04

## 2021-04-05 RX ADMIN — MEROPENEM 100 MILLIGRAM(S): 1 INJECTION INTRAVENOUS at 22:19

## 2021-04-05 RX ADMIN — Medication 8 UNIT(S): at 08:06

## 2021-04-05 RX ADMIN — Medication 6 MILLIGRAM(S): at 05:03

## 2021-04-05 RX ADMIN — CHLORHEXIDINE GLUCONATE 1 APPLICATION(S): 213 SOLUTION TOPICAL at 05:03

## 2021-04-05 RX ADMIN — Medication 8 UNIT(S): at 12:08

## 2021-04-05 RX ADMIN — MEROPENEM 100 MILLIGRAM(S): 1 INJECTION INTRAVENOUS at 14:15

## 2021-04-05 RX ADMIN — ATORVASTATIN CALCIUM 80 MILLIGRAM(S): 80 TABLET, FILM COATED ORAL at 22:20

## 2021-04-05 RX ADMIN — Medication 8 UNIT(S): at 17:21

## 2021-04-05 RX ADMIN — PANTOPRAZOLE SODIUM 40 MILLIGRAM(S): 20 TABLET, DELAYED RELEASE ORAL at 17:21

## 2021-04-05 RX ADMIN — ENOXAPARIN SODIUM 120 MILLIGRAM(S): 100 INJECTION SUBCUTANEOUS at 17:21

## 2021-04-05 RX ADMIN — INSULIN GLARGINE 12 UNIT(S): 100 INJECTION, SOLUTION SUBCUTANEOUS at 11:14

## 2021-04-05 RX ADMIN — INSULIN GLARGINE 12 UNIT(S): 100 INJECTION, SOLUTION SUBCUTANEOUS at 22:21

## 2021-04-05 RX ADMIN — MEROPENEM 100 MILLIGRAM(S): 1 INJECTION INTRAVENOUS at 05:03

## 2021-04-05 RX ADMIN — Medication 4: at 08:06

## 2021-04-05 NOTE — CHART NOTE - NSCHARTNOTEFT_GEN_A_CORE
Vascular technician at patient's bedside, has positive finding on lower extremity duplex for acute right popliteal vein DVT. Pt also has atrial fibrillation. Full dose Lovenox has been on hold due to acute drop in hemoglobin, requiring 1U blood transfusion. Hb has been ~7.5 for about 2 days, baseline ~13. However, will consult Vascular for any further recommendations. Vascular technician at patient's bedside, has positive finding on lower extremity duplex for acute right popliteal vein DVT. Pt also has atrial fibrillation. Full dose Lovenox has been on hold due to acute drop in hemoglobin, requiring 1U blood transfusion. Hb has been ~7.5 for about 2 days, will resume full dose Lovenox.

## 2021-04-05 NOTE — PROGRESS NOTE ADULT - ASSESSMENT
1. Acute hypoxemic respiratory failure secondary to COVID-19 pneumonia, s/p Toci 3/23, on dexamethasone, requiring high flow O2  2. Acute kidney injury, likely pre-renal azotemia vs contrast nephropathy  3. CKD III, baseline creatinine 1.2-1.5.  4. Hx of Bladder Ca, s/p cystoprostatectomy, urostomy  5. Hyperkalemia  6. Hypotension    Plan:    F/U CXR  Daily BMP  Monitor I/O

## 2021-04-05 NOTE — PROGRESS NOTE ADULT - SUBJECTIVE AND OBJECTIVE BOX
OVERNIGHT EVENTS: events noted, still on HHNFC 40%, afebrile    Vital Signs Last 24 Hrs  T(C): 35.8 (05 Apr 2021 04:26), Max: 36.2 (05 Apr 2021 00:01)  T(F): 96.4 (05 Apr 2021 04:26), Max: 97.1 (05 Apr 2021 00:01)  HR: 66 (05 Apr 2021 04:26) (52 - 80)  BP: 107/53 (05 Apr 2021 04:26) (96/45 - 132/56)  BP(mean): --  RR: 20 (05 Apr 2021 04:26) (18 - 20)  SpO2: 95% (05 Apr 2021 04:26) (94% - 99%)    PHYSICAL EXAMINATION:    GENERAL: ill looking    HEENT: Head is normocephalic and atraumatic.     NECK: Supple.    LUNGS: bl crackles    HEART: Regular rate and rhythm without murmur.    ABDOMEN: Soft, nontender, and nondistended.      EXTREMITIES: Without any cyanosis, clubbing, rash, lesions or edema.    NEUROLOGIC: Grossly intact.    SKIN: No ulceration or induration present.      LABS:                        7.6    10.61 )-----------( 91       ( 05 Apr 2021 00:52 )             24.3     04-05    133<L>  |  103  |  42<H>  ----------------------------<  246<H>  4.6   |  20  |  1.2    Ca    7.3<L>      05 Apr 2021 00:52  Mg     2.3     04-05    TPro  4.6<L>  /  Alb  3.0<L>  /  TBili  1.7<H>  /  DBili  x   /  AST  42<H>  /  ALT  35  /  AlkPhos  81  04-05              D-Dimer Assay, Quantitative: 4298 ng/mL DDU (04-05-21 @ 00:52)        Procalcitonin, Serum: 0.07 ng/mL (04-02-21 @ 09:04)        04-03-21 @ 07:01  -  04-04-21 @ 07:00  --------------------------------------------------------  IN: 0 mL / OUT: 900 mL / NET: -900 mL    04-04-21 @ 07:01  -  04-05-21 @ 06:42  --------------------------------------------------------  IN: 0 mL / OUT: 2200 mL / NET: -2200 mL        MICROBIOLOGY:      MEDICATIONS  (STANDING):  atorvastatin 80 milliGRAM(s) Oral at bedtime  caspofungin IVPB      caspofungin IVPB 50 milliGRAM(s) IV Intermittent every 24 hours  chlorhexidine 4% Liquid 1 Application(s) Topical <User Schedule>  dexAMETHasone  Injectable 6 milliGRAM(s) IV Push daily  influenza   Vaccine 0.5 milliLiter(s) IntraMuscular once  insulin glargine Injectable (LANTUS) 20 Unit(s) SubCutaneous at bedtime  insulin lispro (ADMELOG) corrective regimen sliding scale   SubCutaneous three times a day before meals  insulin lispro Injectable (ADMELOG) 8 Unit(s) SubCutaneous three times a day before meals  melatonin 5 milliGRAM(s) Oral at bedtime  meropenem  IVPB 750 milliGRAM(s) IV Intermittent every 12 hours  metoprolol succinate  milliGRAM(s) Oral daily  pantoprazole  Injectable 40 milliGRAM(s) IV Push every 12 hours  polyethylene glycol 3350 17 Gram(s) Oral daily  senna 2 Tablet(s) Oral at bedtime    MEDICATIONS  (PRN):  acetaminophen   Tablet .. 650 milliGRAM(s) Oral every 6 hours PRN Temp greater or equal to 38C (100.4F), Mild Pain (1 - 3)  guaifenesin/dextromethorphan  Syrup 10 milliLiter(s) Oral every 6 hours PRN Cough      RADIOLOGY & ADDITIONAL STUDIES:

## 2021-04-05 NOTE — PROGRESS NOTE ADULT - SUBJECTIVE AND OBJECTIVE BOX
ROSA MARIA CASEY  Patient is a 80y old  Male who presents with a chief complaint of acute hypoxic RF due to Covid Viral Syndrome. Underlying Hx of DMII, CKD,  DLD, MO,  JARETH, bladder ca, sp urostomy, Hx of 911 exposure), OA, DDD, DJD, GERD, diverticulosis, abd wall hernias. bowel resection and reanastomosis.      Overnight events:  pt in CEU, on HFNC, pulse ox 94%, WBC 14,  on Meropenem and caspofungin, BUN/creat 73/1.5  cont low H/H, today's labs  P,     Vital Signs:   T:  96.7  HR:  59  BP:  132/56    RR: 18  Pulse ox:  HFNC 50/40   94%        PHYSICAL EXAM:  GENERAL: A&O but weak and sluggish, overweight, NAD on HFNC  Neck:  short,  thick but supple, no JVD, no bruit, no stridor  Lungs:  shallow resp, scattered rhonchi, no wheezing, improving air entry  Abd:   distended soft and benign, + BS  Urinary:  RLQ urostomy  Ext:  arthritic changes, moves all limbs  Skin:  no rash  Psych stable    LABS:      4/3                      7.7  14-----------( 96            23.3    139 |  106 | 73  ----------------------------<111  4.5   19    1.5    GFR 64, 58, 72, 64, 35, 26, 30, 35  Ca    8.8       Phos  3.4      Mg     2.0, 2.6, 3.0, 2.8      MB 63    trop <0.01 x2  ferritin 535, 462, 737, 339  procal 0.26, 0.11, 0.07  CRP 25, 180. <3  LDH  515    COVID AB + 85.60  3/30 LA 5.1,  3.7    TPro  5.9,  5.1/  Alb  3.2,  3.1  /  TBili  0.9  /  DBili  x   /  AST  45, 88, 88  /  ALT  53, 93, 99  /  AlkPhos  43  03-14      Urinalysis Basic - ( 12 Mar 2021 16:38 )    Color: Yellow / Appearance: Slightly Turbid / S.020 / pH: x  Gluc: x / Ketone: Negative  / Bili: Negative / Urobili: 3 mg/dL   Blood: x / Protein: 30 mg/dL / Nitrite: Negative   Leuk Esterase: Moderate / RBC: 5 /HPF / WBC 90 /HPF   Sq Epi: x / Non Sq Epi: 0 /HPF / Bacteria: Ma       RADIOLOGY & ADDITIONAL TESTS:  Venous duplex:  negative for DVT  CXR:  BL opacities L>R  CXR:  stable BL opacities    CT angio:  neg for central PE    CT abd/pelvis:  BIbasilar densities, hepatobiliary/spleen/pancreas WNL, mild BL adrenal thickening, no hydro, bl renal cysts, bl layering densities/ sm stones, "milk of ca", post cystoprostatectomy and ilealconduit, , post bowel resection and reanastomosis, ventral abd wall hernias, bl hip replacements    CTH 3/30:  moderate atrophy, chronic microvascular changes,  no evidence of acute pathology    EEG 3/30 gen slowing , no focal epileptiform activity       ROSA MARIA CASEY  Patient is a 80y old  Male who presents with a chief complaint of acute hypoxic RF due to Covid Viral Syndrome. Underlying Hx of DMII, CKD,  DLD, MO,  JARETH, bladder ca, sp urostomy, Hx of 911 exposure), OA, DDD, DJD, GERD, diverticulosis, abd wall hernias. bowel resection and reanastomosis.      Overnight events:  pt in CEU, on HFNC, pulse ox 93%, WBC 8.9,  on Meropenem 1gm q 8 and caspofungin will be D/C  BUN/creat 42/1.1 improved, fibrinogen 199, DD 4298    Vital Signs:   T:  97.2  HR:  66  BP:  115/57    RR: 20  Pulse ox:  HFNC 50/40   93%        PHYSICAL EXAM:  GENERAL: A&O but weak and sluggish, overweight, NAD on HFNC  Neck:  short,  thick but supple, no JVD, no bruit, no stridor  Lungs:  shallow resp, scattered rhonchi, no wheezing, improving air entry  Abd:   distended soft and benign, + BS  Urinary:  RLQ urostomy  Ext:  arthritic changes, moves all limbs  Skin:  no rash  Psych stable    LABS:                        7.8  -----------( 95            25    134 |  104 | 42  ----------------------------<218  4.8   19    1.1    GFR 64, 58, 72, 64, 35, 26, 30, 35, 64  Ca    8.8       Phos  3.4      Mg     2.0, 2.6, 3.0, 2.8      MB 63    trop <0.01 x2  ferritin 535, 462, 737, 339  procal 0.26, 0.11, 0.07  CRP 25, 180. <3  LDH  515  fibrinogen 199  DDIMER 4298  COVID AB + 85.60  3/30 LA 5.1,  3.7    TPro  5.9,  5.1/  Alb  3.2,  3.1  /  TBili  0.9  /  DBili  x   /  AST  45, 88, 88  /  ALT  53, 93, 99  /  AlkPhos  43  03-14      Urinalysis Basic - ( 12 Mar 2021 16:38 )    Color: Yellow / Appearance: Slightly Turbid / S.020 / pH: x  Gluc: x / Ketone: Negative  / Bili: Negative / Urobili: 3 mg/dL   Blood: x / Protein: 30 mg/dL / Nitrite: Negative   Leuk Esterase: Moderate / RBC: 5 /HPF / WBC 90 /HPF   Sq Epi: x / Non Sq Epi: 0 /HPF / Bacteria: Ma       RADIOLOGY & ADDITIONAL TESTS:  Venous duplex:  negative for DVT  CXR:  BL opacities L>R  CXR:  stable BL opacities    CT angio:  neg for central PE    CT abd/pelvis:  BIbasilar densities, hepatobiliary/spleen/pancreas WNL, mild BL adrenal thickening, no hydro, bl renal cysts, bl layering densities/ sm stones, "milk of ca", post cystoprostatectomy and ilealconduit, , post bowel resection and reanastomosis, ventral abd wall hernias, bl hip replacements    CTH 3/30:  moderate atrophy, chronic microvascular changes,  no evidence of acute pathology    EEG 3/30 gen slowing , no focal epileptiform activity

## 2021-04-05 NOTE — PROGRESS NOTE ADULT - ASSESSMENT
79 yo M with PMHx of AFib, on Eliquis, HTN, DMII, CKDIII, hx of Bladder Ca s/p Urostomy, bilateral hip/knee replacements, obesity admitted for acute hypoxemic respiratory failure due to COVID PNA.    #Acute hypoxemic respiratory failure secondary to COVID-19 pneumonia  - COVID-19 PCR positive  - Isolation precautions (contact, droplet, airborne)  - Chest X-ray 3/31 -stable bilateral opacities  - Patient is saturating 96%% on HFNC 50L/40%, tolerating well  - D-Dimer Assay, Quantitative: 4298 ng/mL DDU (04-05-21 @ 00:52), Procalcitonin, Serum: 0.07 ng/mL (04-02-21 @ 09:04), Ferritin, Serum: 339 ng/mL (04-02-21 @ 09:04), C-Reactive Protein, Serum: <3 mg/L (04-02-21 @ 09:04)  - Will repeat LE duplex  - s/p Toci 3/23  - C/w Dexamethasone 6 mg once daily   - Maintain negative balance    #Sepsis due to unclear source - improved, complicated by likely DIC  - Not requiring levo at this time, was hypothermic, now improved  - CBC reveals dec WBC ct, Hb, plt ct, may represent BM suppression in setting of sepsis vs acute DIC ( borderline low fibrinogen, elevated d-dimer)  - Plt ct and Hb stable  - Had received Vanco and Cefepime 3/30  - ID following - c/w Meropenem, s/p Caspofungin, will d/c Caspofungin as fungitell within normal limits   - Procalc 0.07, CRP <3  - Blood Cx 3/30, 3/31 NGTD, Urine Cx grew enterococcus (gut kike from urostomy)  - Sputum Cx pending    #Episodic tremors, non responsiveness - resolved  - No post ictal state, no focal neurologic deficits on exam  - EEG revealed mild-moderate generalized slowing  - CT Head negative for acute pathology  - Episodes likely occurred due to sepsis    #Acute drop in Hb  - Hb gradually dec from 13 to 6.7, s/p 1 U pRBCs, now stable at 7.6  - Pt has not had any melena or hematochezia, no hematuria overnight  - Seen by GI, low suspicion for GI bleed  - CT Abd obtained 3/30 - negative for retroperitoneal hematoma  - Cont to monitor CBC, maintain active T+S (4/2)  - Pending stool guaiac, will obtain hemolysis workup  - C/w CBC BID, maintain Hb > 7    #CKD III   #Hx of Bladder Ca, s/p Cystoprostatectomy, Urostomy  #Acute kidney injury due to LINDSAY vs ATN - resolved  - Hospital stay complicated by hematuria in urostomy bag and phillips catheter- now improved  - CT Abd obtained - small stones, bilateral renal cysts visualized; stones appeared resolved on repeat imaging  - Cr improved, at baseline  - Nephrology following    #Chronic AFIb on Eliquis  # [id number] Score: 4  - Eliquis on Hold, was on full AC Lovenox while in hospital, on hold for now while Hb has been trending down  - C/w Metoprolol succinate 100 mg qd    #HTN  - Has been hypotensive in setting of sepsis, holding Amlodipine    #DMII  - FS decreasing in setting of sepsis, poor PO intake  - C/w Lantus 15 U BID, Lispro 8 U before meals, inc insulin if needed    DVT ppx: Lovenox 120 mg BID on hold  GI ppx: PPI  Diet: DASH, CCC  Activity: IAT  Full code  Dispo: c/w CEU monitoring   81 yo M with PMHx of AFib, on Eliquis, HTN, DMII, CKDIII, hx of Bladder Ca s/p Urostomy, bilateral hip/knee replacements, obesity admitted for acute hypoxemic respiratory failure due to COVID PNA.    #Acute hypoxemic respiratory failure secondary to COVID-19 pneumonia  - COVID-19 PCR positive  - Isolation precautions (contact, droplet, airborne)  - Chest X-ray 3/31 -stable bilateral opacities  - Patient is saturating 96%% on HFNC 50L/40%, tolerating well  - D-Dimer Assay, Quantitative: 4298 ng/mL DDU (04-05-21 @ 00:52), Procalcitonin, Serum: 0.07 ng/mL (04-02-21 @ 09:04), Ferritin, Serum: 339 ng/mL (04-02-21 @ 09:04), C-Reactive Protein, Serum: <3 mg/L (04-02-21 @ 09:04)  - Will repeat LE duplex  - s/p Toci 3/23  - C/w Dexamethasone 6 mg once daily   - Maintain negative balance    #Sepsis due to unclear source - improved, complicated by likely DIC  - Not requiring levo at this time, was hypothermic, now improved  - CBC reveals dec WBC ct, Hb, plt ct, may represent BM suppression in setting of sepsis vs acute DIC ( borderline low fibrinogen, elevated d-dimer)  - Plt ct and Hb stable  - Had received Vanco and Cefepime 3/30  - ID following - c/w Meropenem, s/p Caspofungin, will d/c Caspofungin as fungitell within normal limits   - Procalc 0.07, CRP <3  - Blood Cx 3/30, 3/31 NGTD, Urine Cx grew enterococcus (gut kike from urostomy)  - Sputum Cx pending    #Episodic tremors, non responsiveness - resolved  - No post ictal state, no focal neurologic deficits on exam  - EEG revealed mild-moderate generalized slowing  - CT Head negative for acute pathology  - Episodes likely occurred due to sepsis    #Acute drop in Hb  - Hb gradually dec from 13 to 6.7, s/p 1 U pRBCs, now stable at 7.6  - Pt has not had any melena or hematochezia, no hematuria overnight  - Seen by GI, low suspicion for GI bleed  - CT Abd obtained 3/30 - negative for retroperitoneal hematoma  - Cont to monitor CBC, maintain active T+S (4/2)  - Pending stool guaiac, will obtain hemolysis workup  - C/w CBC BID, maintain Hb > 7    #CKD III   #Hx of Bladder Ca, s/p Cystoprostatectomy, Urostomy  #Acute kidney injury due to LINDSAY vs ATN - resolved  - Hospital stay complicated by hematuria in urostomy bag and phillips catheter- now improved  - CT Abd obtained - small stones, bilateral renal cysts visualized; stones appeared resolved on repeat imaging  - Cr improved, at baseline  - Nephrology following    #Chronic AFIb on Eliquis  # [id number] Score: 4  - Eliquis on Hold, was on full AC Lovenox while in hospital, on hold for now while Hb has been trending down  - C/w Metoprolol succinate 100 mg qd    #HTN  - Has been hypotensive in setting of sepsis, holding Amlodipine    #DMII  - FS decreasing in setting of sepsis, poor PO intake  - C/w Lantus 12 U BID, Lispro 8 U before meals, inc insulin if needed    DVT ppx: Lovenox 120 mg BID on hold  GI ppx: PPI  Diet: DASH, CCC  Activity: IAT  Full code  Dispo: c/w CEU monitoring    Spoke to sonJuancho ([phone]), provided medical update and answered all questions.

## 2021-04-05 NOTE — PROGRESS NOTE ADULT - ASSESSMENT
· Assessment	  81yo male with PMH of hypertension, hyperlipidemia, diabetes mellitus type 2, atrial fibrillation on Eliquis, CKD stage 3A, bladder cancer s/p cystectomy with urostomy, and bilateral hip and knee arthroplasties who was brought in by EMS for hypoxia. Patient had tested positive for COVID-19 on 3/4/2021, but symptoms did not begin until 3/6/2021. He was found to be hypoxic to 85% on room air.    IMPRESSION;  Clinically stable / improved and has no complaints. Normalization of WBC,renal failure resolved  #COVID 19 with severe illness. SpO2 < 94% on RA and need for supplemental O2. HFNC    Pt was in the late inflammatory response phase ot the illness based on the onset of symptoms.    CXR opacities b/l    CT A 3/30 : no RPH. Opacity LLL > no change compared with 3/26    LLL bacterial PNA. Fungal etiology cannot be r/o  Ddimer 3121 >4298  Ferritin 730    procal 0.31  Sepsis ? WBC 22.7, ARF . Metabolic encephalopathy> resolved, being alert, responsive  Clinically no PNA although CT shows opacity LLL. Being treated as such  No  infection. Has a urostomy  UCx 3/30 Enterococci : represents gut kike from urostomy  Hx of bladder ca s/p cystectomy   : no acute urologic intervention at this time  BCx 3/30,31, 4/1   NGTD    - s/p 3/23 Tocilizumab >90kg 800mg x1     RECOMMENDATIONS;  Change Meropenem 1 gm iv q8h  Caspofungin  50 mg iv q24h

## 2021-04-05 NOTE — PROGRESS NOTE ADULT - CARDIOVASCULAR
Regular rate & rhythm, normal S1, S2; no murmurs, gallops or rubs; no S3, S4
detailed exam
Regular rate & rhythm, normal S1, S2; no murmurs, gallops or rubs; no S3, S4
detailed exam

## 2021-04-05 NOTE — PROGRESS NOTE ADULT - ASSESSMENT
IMPRESSION:    Sepsis present on admission  Acute hypoxemic respiratory failure   Severe COVID-19 PNA SP Toci   Doubt superimposed bacterial infection  LORRAINE on CKD 3 improving   Probable JARETH  anemia / thrombocytopenia     PLAN:    CNS: Avoid CNS depressants    HEENT: Oral care    PULMONARY:  HOB @ 45 degrees.  Aspiration precautions. Target SpO2 88 to 94%, wean oxygen as tolerated.  Dexamethasone 6mg Q24     CARDIOVASCULAR: Avoid volume overload.     GI: GI prophylaxis. Feeding as tolerated  Bowel regimen.      RENAL:  Follow up lytes.  Correct as needed. Renal eval appreciated.  follow bun, cr     INFECTIOUS DISEASE: FU PanCx.  Fungitell -, dc caspo    HEMATOLOGICAL:  DVT prophylaxis. serial cbc  Hit   LE doppler, hemolysis profile    ENDOCRINE:  Follow up FS.     MUSCULOSKELETAL: OOB to chair     Step Down Unit monitoring for now.     PT OT     Prognosis poor

## 2021-04-05 NOTE — PROGRESS NOTE ADULT - ASSESSMENT
79yo M c PMHx chronic afib on noac, htn, dm2, ckd3 bladder ca s/p urostomy, b/l hip/ knee replacements obesity, here with acute hypoxic respiratory failure 2/2 severe covid 19 viral pneumonia, lorraine on ckd3 now better, elevated ddimer    Sepsis on admission  Covid viral PNA. acute hypoxic RF  underlying JARETH, [place]  LORRAINE on CKD  Transaminitis  aNEMIA SP TRANSFUSION  Hx of HTN, ASHD, Afib/Eliquis  DM II, CKD  DLD  Bladder ca, sp urostomy, sp cystoprostatectomy, exposure to 911  OA, DDD, DJD, sp BL hip and knee surgeries, mobility dysfunction  GERD, HH, diverticulosis, bowel resection and reanastomosis      pt in CEU on HFNC 50/40 oxygenating bet 94%    pt had low H/H, transfused 1u,  no overt signs of bleeding, PLTS low 106,  enoxaparin and apixiban on hold  monitor CBC and transfuse to keep Hgb around 8  monitor renal parameters and electrolytes    pt became septic, cultured and started on Maldonado and Caspofungin, WBC 15, BP stable off Levophed and renal parameters improving    ID:  Meropenem 750 q12 and Caspofungin 50mg q 24  urine + E faecalis  Blood NTD  repeat infla parameters. inc fungitel,       Covid ABs are + 85.6  pul-critical care ff  CXR show BL opacities, low lung volumes    us duplex LE's negative for DVT  elevated DDIMER, CT angio negative for PE  monitor infla parameters q 72 hrs    mild transaminitis  improving  pt is a full code  guarded state   81yo M c PMHx chronic afib on noac, htn, dm2, ckd3 bladder ca s/p urostomy, b/l hip/ knee replacements obesity, here with acute hypoxic respiratory failure 2/2 severe covid 19 viral pneumonia, lorraine on ckd3 now better, elevated ddimer    Sepsis on admission  Covid viral PNA. acute hypoxic RF  underlying JARETH, [place]  LORRAINE on CKD  Transaminitis  aNEMIA SP TRANSFUSION  Hx of HTN, ASHD, Afib/Eliquis  DM II, CKD  DLD  Bladder ca, sp urostomy, sp cystoprostatectomy, exposure to 911  OA, DDD, DJD, sp BL hip and knee surgeries, mobility dysfunction  GERD, HH, diverticulosis, bowel resection and reanastomosis      pt in CEU on HFNC 50/40 oxygenating at 93%  elevated [id number]   + DVT, start Lovenox 120mg B!D  monitor H/H   monitor renal parameters and electrolytes    pt became septic, cultured and started on Maldonado and Caspofungin, WBC 15, BP stable off Levophed and renal parameters improving    ID:  Meropenem 750 q12 and Caspofungin 50mg q 24  urine + E faecalis  Blood NTD  repeat infla parameters. inc fungitel,       Covid ABs are + 85.6  pul-critical care ff  CXR show BL opacities, low lung volumes    us duplex LE's negative for DVT  elevated DDIMER, CT angio negative for PE  monitor infla parameters q 72 hrs    mild transaminitis  improving  pt is a full code  guarded state   81yo M c PMHx chronic afib on noac, htn, dm2, ckd3 bladder ca s/p urostomy, b/l hip/ knee replacements obesity, here with acute hypoxic respiratory failure 2/2 severe covid 19 viral pneumonia, lorraine on ckd3 now better, elevated ddimer    Sepsis on admission  Covid viral PNA. acute hypoxic RF  underlying JARETH, [place]  LORRAINE on CKD  Transaminitis  aNEMIA SP TRANSFUSION  Hx of HTN, ASHD, Afib/Eliquis  DM II, CKD  DLD  Bladder ca, sp urostomy, sp cystoprostatectomy, exposure to 911  OA, DDD, DJD, sp BL hip and knee surgeries, mobility dysfunction  GERD, HH, diverticulosis, bowel resection and reanastomosis      pt in CEU on HFNC 50/40 oxygenating at 93%  elevated [id number]   + DVT, start Lovenox 120mg B!D  monitor H/H   monitor renal parameters and electrolytes    pt became septic, cultured and started on Maldonado and Caspofungin, WBC 15, BP stable off Levophed and renal parameters improving    ID:  Meropenem 750 q12 and Caspofungin 50mg q 24  urine + E faecalis  Blood NTD  repeat infla parameters. inc fungitel,       Covid ABs are + 85.6  pul-critical care ff  CXR show BL opacities, low lung volumes      elevated DDIMER, CT angio negative for PE  monitor infla parameters q 72 hrs    mild transaminitis  improving  pt is a full code  guarded state

## 2021-04-05 NOTE — PROGRESS NOTE ADULT - SUBJECTIVE AND OBJECTIVE BOX
TIANNAROSA MARIA CUMMINS  80y, Male    All available historical data reviewed    OVERNIGHT EVENTS:  no fevers  doing well  feels well and has no complaints  HFNC  no pressors  no diarrhea  alert, resposnsive    ROS:  General: Denies rigors, nightsweats  HEENT: Denies headache, rhinorrhea, sore throat, eye pain  CV: Denies CP, palpitations  PULM: Denies wheezing, hemoptysis  GI: Denies hematemesis, hematochezia, melena  : Denies discharge, hematuria  MSK: Denies arthralgias, myalgias  SKIN: Denies rash, lesions  NEURO: Denies paresthesias, weakness  PSYCH: Denies depression, anxiety    VITALS:  T(F): 96.9, Max: 97.1 (04-05-21 @ 00:01)  HR: 70  BP: 104/51  RR: 20Vital Signs Last 24 Hrs  T(C): 36.1 (05 Apr 2021 08:46), Max: 36.2 (05 Apr 2021 00:01)  T(F): 96.9 (05 Apr 2021 08:46), Max: 97.1 (05 Apr 2021 00:01)  HR: 70 (05 Apr 2021 08:46) (59 - 80)  BP: 104/51 (05 Apr 2021 08:46) (96/45 - 132/56)  BP(mean): 75 (05 Apr 2021 08:46) (75 - 75)  RR: 20 (05 Apr 2021 08:46) (18 - 20)  SpO2: 95% (05 Apr 2021 04:26) (94% - 99%)    TESTS & MEASUREMENTS:                        7.8    8.95  )-----------( 95       ( 05 Apr 2021 06:52 )             25.0     04-05    134<L>  |  104  |  42<H>  ----------------------------<  218<H>  4.8   |  19  |  1.1    Ca    7.5<L>      05 Apr 2021 06:52  Mg     2.3     04-05    TPro  4.7<L>  /  Alb  3.0<L>  /  TBili  1.5<H>  /  DBili  x   /  AST  51<H>  /  ALT  41  /  AlkPhos  116<H>  04-05    LIVER FUNCTIONS - ( 05 Apr 2021 06:52 )  Alb: 3.0 g/dL / Pro: 4.7 g/dL / ALK PHOS: 116 U/L / ALT: 41 U/L / AST: 51 U/L / GGT: x             Culture - Blood (collected 03-31-21 @ 12:09)  Source: .Blood None  Preliminary Report (04-01-21 @ 23:01):    No growth to date.    Culture - Urine (collected 03-30-21 @ 04:27)  Source: .Urine Clean Catch (Midstream)  Final Report (04-01-21 @ 22:46):    >100,000 CFU/ml Enterococcus faecalis    <10,000 CFU/ml of other organisms  Organism: Enterococcus faecalis (04-01-21 @ 22:46)  Organism: Enterococcus faecalis (04-01-21 @ 22:46)      -  Ampicillin: S <=2 Predicts results to ampicillin/sulbactam, amoxacillin-clavulanate and  piperacillin-tazobactam.      -  Ciprofloxacin: S <=1      -  Levofloxacin: S 2      -  Nitrofurantoin: S <=32 Should not be used to treat pyelonephritis.      -  Tetra/Doxy: S <=4      -  Vancomycin: S 4      Method Type: HALI    Culture - Blood (collected 03-30-21 @ 03:10)  Source: .Blood Blood-Peripheral  Final Report (04-04-21 @ 14:00):    No Growth Final            RADIOLOGY & ADDITIONAL TESTS:  Personal review of radiological diagnostics performed  Echo and EKG results noted when applicable.     MEDICATIONS:  acetaminophen   Tablet .. 650 milliGRAM(s) Oral every 6 hours PRN  atorvastatin 80 milliGRAM(s) Oral at bedtime  chlorhexidine 4% Liquid 1 Application(s) Topical <User Schedule>  dexAMETHasone  Injectable 6 milliGRAM(s) IV Push daily  guaifenesin/dextromethorphan  Syrup 10 milliLiter(s) Oral every 6 hours PRN  influenza   Vaccine 0.5 milliLiter(s) IntraMuscular once  insulin glargine Injectable (LANTUS) 12 Unit(s) SubCutaneous every morning  insulin glargine Injectable (LANTUS) 12 Unit(s) SubCutaneous at bedtime  insulin lispro (ADMELOG) corrective regimen sliding scale   SubCutaneous three times a day before meals  insulin lispro Injectable (ADMELOG) 8 Unit(s) SubCutaneous three times a day before meals  melatonin 5 milliGRAM(s) Oral at bedtime  meropenem  IVPB 750 milliGRAM(s) IV Intermittent every 12 hours  metoprolol succinate  milliGRAM(s) Oral daily  pantoprazole  Injectable 40 milliGRAM(s) IV Push every 12 hours  polyethylene glycol 3350 17 Gram(s) Oral daily  senna 2 Tablet(s) Oral at bedtime      ANTIBIOTICS:  meropenem  IVPB 750 milliGRAM(s) IV Intermittent every 12 hours

## 2021-04-05 NOTE — PROGRESS NOTE ADULT - SUBJECTIVE AND OBJECTIVE BOX
[facility] FOLLOW UP NOTE  --------------------------------------------------------------------------------  24 hour events/subjective: Patient examined. Appears comfortable.    PAST HISTORY  --------------------------------------------------------------------------------  No significant changes to PMH, PSH, FHx, SHx, unless otherwise noted    ALLERGIES & MEDICATIONS  --------------------------------------------------------------------------------  Allergies    No Known Allergies    Intolerances      Standing Inpatient Medications  atorvastatin 80 milliGRAM(s) Oral at bedtime  chlorhexidine 4% Liquid 1 Application(s) Topical <User Schedule>  dexAMETHasone  Injectable 6 milliGRAM(s) IV Push daily  influenza   Vaccine 0.5 milliLiter(s) IntraMuscular once  insulin glargine Injectable (LANTUS) 12 Unit(s) SubCutaneous every morning  insulin glargine Injectable (LANTUS) 12 Unit(s) SubCutaneous at bedtime  insulin lispro (ADMELOG) corrective regimen sliding scale   SubCutaneous three times a day before meals  insulin lispro Injectable (ADMELOG) 8 Unit(s) SubCutaneous three times a day before meals  melatonin 5 milliGRAM(s) Oral at bedtime  meropenem  IVPB 1000 milliGRAM(s) IV Intermittent every 8 hours  metoprolol succinate  milliGRAM(s) Oral daily  pantoprazole  Injectable 40 milliGRAM(s) IV Push every 12 hours  polyethylene glycol 3350 17 Gram(s) Oral daily  senna 2 Tablet(s) Oral at bedtime    PRN Inpatient Medications  acetaminophen   Tablet .. 650 milliGRAM(s) Oral every 6 hours PRN  guaifenesin/dextromethorphan  Syrup 10 milliLiter(s) Oral every 6 hours PRN      VITALS/PHYSICAL EXAM  --------------------------------------------------------------------------------  T(C): 35.8 (04-05-21 @ 12:03), Max: 36.2 (04-05-21 @ 00:01)  HR: 62 (04-05-21 @ 12:03) (62 - 80)  BP: 118/58 (04-05-21 @ 12:03) (96/45 - 118/58)  RR: 20 (04-05-21 @ 12:03) (18 - 20)  SpO2: 95% (04-05-21 @ 04:26) (95% - 99%)  Wt(kg): --        04-04-21 @ 07:01  -  04-05-21 @ 07:00  --------------------------------------------------------  IN: 0 mL / OUT: 2200 mL / NET: -2200 mL      Physical Exam:  	Gen: NAD  	Pulm: crackles B/L  	CV: RRR, S1S2  	Abd: +BS, soft, nontender/nondistended  	: No suprapubic tenderness  	LE: Warm, trace edema    LABS/STUDIES  --------------------------------------------------------------------------------              7.8    8.95  >-----------<  95       [04-05-21 @ 06:52]              25.0     134  |  104  |  42  ----------------------------<  218      [04-05-21 @ 06:52]  4.8   |  19  |  1.1        Ca     7.5     [04-05-21 @ 06:52]      Mg     2.3     [04-05-21 @ 00:52]    TPro  4.7  /  Alb  3.0  /  TBili  1.5  /  DBili  x   /  AST  51  /  ALT  41  /  AlkPhos  116  [04-05-21 @ 06:52]              [04-03-21 @ 16:00]    Creatinine Trend:  SCr 1.1 [04-05 @ 06:52]  SCr 1.2 [04-05 @ 00:52]  SCr 1.5 [04-03 @ 08:42]  SCr 1.8 [04-02 @ 09:04]  SCr 2.3 [04-01 @ 09:41]    Urinalysis - [03-30-21 @ 04:27]      Color Yellow / Appearance Slightly Turbid / SG 1.017 / pH 6.5      Gluc Negative / Ketone Negative  / Bili Negative / Urobili <2 mg/dL       Blood Large / Protein Trace / Leuk Est Large / Nitrite Negative       / WBC 84 / Hyaline 25 / Gran  / Sq Epi  / Non Sq Epi 0 / Bacteria Negative      Iron 178, TIBC 305, %sat 58      [03-30-21 @ 07:11]  Ferritin 339      [04-02-21 @ 09:04]  Vitamin D (25OH) 23      [04-03-21 @ 08:42]

## 2021-04-05 NOTE — PROGRESS NOTE ADULT - NEUROLOGICAL
detailed exam
Alert & oriented; no sensory, motor or coordination deficits, normal reflexes
Alert & oriented; no sensory, motor or coordination deficits, normal reflexes
detailed exam

## 2021-04-05 NOTE — PROGRESS NOTE ADULT - SUBJECTIVE AND OBJECTIVE BOX
Patient Information:  ROSA MARIA CASEY / 80y / Male / MRN#:[id number]    Hospital Day: 24d    Interval History:  Patient seen and examined at bedside. No acute events overnight. Pt is resting in bed, is very talkative, looks more awake and comfortable. Denies any complaints.    Past Medical History:  Hypertension    Hyperlipidemia    Diabetes mellitus, type 2    History of atrial fibrillation    Bladder cancer    Chronic kidney disease (CKD)      Past Surgical History:  History of total hip replacement, bilateral    History of total knee replacement, bilateral    History of total cystectomy      Allergies:  No Known Allergies    Medications:  PRN:  acetaminophen   Tablet .. 650 milliGRAM(s) Oral every 6 hours PRN Temp greater or equal to 38C (100.4F), Mild Pain (1 - 3)  guaifenesin/dextromethorphan  Syrup 10 milliLiter(s) Oral every 6 hours PRN Cough    Standing:  atorvastatin 80 milliGRAM(s) Oral at bedtime  chlorhexidine 4% Liquid 1 Application(s) Topical <User Schedule>  dexAMETHasone  Injectable 6 milliGRAM(s) IV Push daily  influenza   Vaccine 0.5 milliLiter(s) IntraMuscular once  insulin glargine Injectable (LANTUS) 20 Unit(s) SubCutaneous at bedtime  insulin lispro (ADMELOG) corrective regimen sliding scale   SubCutaneous three times a day before meals  insulin lispro Injectable (ADMELOG) 8 Unit(s) SubCutaneous three times a day before meals  melatonin 5 milliGRAM(s) Oral at bedtime  meropenem  IVPB 750 milliGRAM(s) IV Intermittent every 12 hours  metoprolol succinate  milliGRAM(s) Oral daily  pantoprazole  Injectable 40 milliGRAM(s) IV Push every 12 hours  polyethylene glycol 3350 17 Gram(s) Oral daily  senna 2 Tablet(s) Oral at bedtime    Home:  amlodipine-benazepril 10 mg-40 mg oral capsule: 1 cap(s) orally once a day  Eliquis 5 mg oral tablet: 1 tab(s) orally 2 times a day  glimepiride 2 mg oral tablet: 1 tab(s) orally once a day  Lantus 100 units/mL subcutaneous solution: 40 unit(s) subcutaneous once a day  metoprolol succinate 100 mg oral tablet, extended release: 1 tab(s) orally once a day  Ozempic (1 mg dose) 2 mg/1.5 mL subcutaneous solution:   Percocet 7.5/325 oral tablet: 0.5 tab(s) orally once a day (at bedtime)  potassium citrate 10 mEq oral tablet, extended release: 1 tab(s) orally once a day  rosuvastatin 20 mg oral tablet: 1 tab(s) orally once a day    Vitals:  T(C): 36.1, Max: 36.2 (04-05-21 @ 00:01)  T(F): 96.9, Max: 97.1 (04-05-21 @ 00:01)  HR: 70 (59 - 80)  BP: 104/51 (96/45 - 132/56)  RR: 20 (18 - 20)  SpO2: 95% (94% - 99%)    Physical Exam:  General: Awake, alert, NAD, sitting up in bed, on HFNC 50/40  HEENT: Head NC/AT  Heart: RRR; systolic murmur apprec  Lungs: Slightly dec breath sounds in bilateral bases  Abdomen: Soft, nontender, nondistended, BS+, urostomy bag in place, appears clean, no bleeding visualized  Extremities: Mild edema of lower ext bilaterally  Neuro: AAOx3, NFD    Labs:  CBC (04-05 @ 06:52)                        Hgb: 7.8    WBC: 8.95  )-----------------( Plts: 95                               Hct: 25.0     Chem (04-05 @ 06:52)  Na: 134  |     Cl: 104     |  BUN: 42  -----------------------------------------< Gluc: 218    K: 4.8   |    HCO3: 19  |  Cr: 1.1    Ca 7.5 (04-05 @ 06:52)  Mg 2.3 (04-05 @ 00:52)    LFTs (04-05 @ 06:52)  TPro 4.7  /  Alb 3.0  TBili 1.5  /  DBili     AST 51  /  ALT 41  /  AlkPhos 116            Microbiology:  Culture - Blood (collected 03-31 @ 12:09)  Source: .Blood None  Preliminary Report (04-01 @ 23:01):    No growth to date.    Culture - Urine (collected 03-30 @ 04:27)  Source: .Urine Clean Catch (Midstream)  Final Report (04-01 @ 22:46):    >100,000 CFU/ml Enterococcus faecalis    <10,000 CFU/ml of other organisms  Organism: Enterococcus faecalis (04-01 @ 22:46)  Organism: Enterococcus faecalis (04-01 @ 22:46)    Culture - Blood (collected 03-30 @ 03:10)  Source: .Blood Blood-Peripheral  Final Report (04-04 @ 14:00):    No Growth Final        Radiology:    < from: Xray Chest 1 View- PORTABLE-Routine (Xray Chest 1 View- PORTABLE-Routine in AM.) (04.02.21 @ 07:07) >    IMPRESSION:    Unchanged bilateral opacities.    < end of copied text >

## 2021-04-06 LAB
ALBUMIN SERPL ELPH-MCNC: 2.9 G/DL — LOW (ref 3.5–5.2)
ALP SERPL-CCNC: 68 U/L — SIGNIFICANT CHANGE UP (ref 30–115)
ALT FLD-CCNC: 36 U/L — SIGNIFICANT CHANGE UP (ref 0–41)
ANION GAP SERPL CALC-SCNC: 9 MMOL/L — SIGNIFICANT CHANGE UP (ref 7–14)
APTT BLD: 41.7 SEC — HIGH (ref 27–39.2)
AST SERPL-CCNC: 39 U/L — SIGNIFICANT CHANGE UP (ref 0–41)
BASOPHILS # BLD AUTO: 0 K/UL — SIGNIFICANT CHANGE UP (ref 0–0.2)
BASOPHILS # BLD AUTO: 0 K/UL — SIGNIFICANT CHANGE UP (ref 0–0.2)
BASOPHILS NFR BLD AUTO: 0 % — SIGNIFICANT CHANGE UP (ref 0–1)
BASOPHILS NFR BLD AUTO: 0 % — SIGNIFICANT CHANGE UP (ref 0–1)
BILIRUB SERPL-MCNC: 2.1 MG/DL — HIGH (ref 0.2–1.2)
BUN SERPL-MCNC: 32 MG/DL — HIGH (ref 10–20)
CALCIUM SERPL-MCNC: 7.5 MG/DL — LOW (ref 8.5–10.1)
CHLORIDE SERPL-SCNC: 103 MMOL/L — SIGNIFICANT CHANGE UP (ref 98–110)
CO2 SERPL-SCNC: 21 MMOL/L — SIGNIFICANT CHANGE UP (ref 17–32)
CREAT SERPL-MCNC: 1 MG/DL — SIGNIFICANT CHANGE UP (ref 0.7–1.5)
EOSINOPHIL # BLD AUTO: 0.04 K/UL — SIGNIFICANT CHANGE UP (ref 0–0.7)
EOSINOPHIL # BLD AUTO: 0.27 K/UL — SIGNIFICANT CHANGE UP (ref 0–0.7)
EOSINOPHIL NFR BLD AUTO: 0.5 % — SIGNIFICANT CHANGE UP (ref 0–8)
EOSINOPHIL NFR BLD AUTO: 3.5 % — SIGNIFICANT CHANGE UP (ref 0–8)
GALACTOMANNAN AG SERPL-ACNC: <0.5 INDEX — SIGNIFICANT CHANGE UP
GLUCOSE BLDC GLUCOMTR-MCNC: 184 MG/DL — HIGH (ref 70–99)
GLUCOSE BLDC GLUCOMTR-MCNC: 189 MG/DL — HIGH (ref 70–99)
GLUCOSE BLDC GLUCOMTR-MCNC: 223 MG/DL — HIGH (ref 70–99)
GLUCOSE BLDC GLUCOMTR-MCNC: 89 MG/DL — SIGNIFICANT CHANGE UP (ref 70–99)
GLUCOSE SERPL-MCNC: 99 MG/DL — SIGNIFICANT CHANGE UP (ref 70–99)
GRAM STN FLD: SIGNIFICANT CHANGE UP
HCT VFR BLD CALC: 22.6 % — LOW (ref 42–52)
HCT VFR BLD CALC: 23.8 % — LOW (ref 42–52)
HGB BLD-MCNC: 7.3 G/DL — LOW (ref 14–18)
HGB BLD-MCNC: 7.4 G/DL — LOW (ref 14–18)
IMM GRANULOCYTES NFR BLD AUTO: 1.1 % — HIGH (ref 0.1–0.3)
IMM GRANULOCYTES NFR BLD AUTO: 1.6 % — HIGH (ref 0.1–0.3)
INR BLD: 1.17 RATIO — SIGNIFICANT CHANGE UP (ref 0.65–1.3)
LYMPHOCYTES # BLD AUTO: 0.46 K/UL — LOW (ref 1.2–3.4)
LYMPHOCYTES # BLD AUTO: 0.57 K/UL — LOW (ref 1.2–3.4)
LYMPHOCYTES # BLD AUTO: 6 % — LOW (ref 20.5–51.1)
LYMPHOCYTES # BLD AUTO: 6.5 % — LOW (ref 20.5–51.1)
MAGNESIUM SERPL-MCNC: 2.2 MG/DL — SIGNIFICANT CHANGE UP (ref 1.8–2.4)
MCHC RBC-ENTMCNC: 28.7 PG — SIGNIFICANT CHANGE UP (ref 27–31)
MCHC RBC-ENTMCNC: 29.7 PG — SIGNIFICANT CHANGE UP (ref 27–31)
MCHC RBC-ENTMCNC: 31.1 G/DL — LOW (ref 32–37)
MCHC RBC-ENTMCNC: 32.3 G/DL — SIGNIFICANT CHANGE UP (ref 32–37)
MCV RBC AUTO: 91.9 FL — SIGNIFICANT CHANGE UP (ref 80–94)
MCV RBC AUTO: 92.2 FL — SIGNIFICANT CHANGE UP (ref 80–94)
MONOCYTES # BLD AUTO: 0.41 K/UL — SIGNIFICANT CHANGE UP (ref 0.1–0.6)
MONOCYTES # BLD AUTO: 0.66 K/UL — HIGH (ref 0.1–0.6)
MONOCYTES NFR BLD AUTO: 5.3 % — SIGNIFICANT CHANGE UP (ref 1.7–9.3)
MONOCYTES NFR BLD AUTO: 7.5 % — SIGNIFICANT CHANGE UP (ref 1.7–9.3)
NEUTROPHILS # BLD AUTO: 6.47 K/UL — SIGNIFICANT CHANGE UP (ref 1.4–6.5)
NEUTROPHILS # BLD AUTO: 7.44 K/UL — HIGH (ref 1.4–6.5)
NEUTROPHILS NFR BLD AUTO: 83.6 % — HIGH (ref 42.2–75.2)
NEUTROPHILS NFR BLD AUTO: 84.4 % — HIGH (ref 42.2–75.2)
NRBC # BLD: 0 /100 WBCS — SIGNIFICANT CHANGE UP (ref 0–0)
NRBC # BLD: 0 /100 WBCS — SIGNIFICANT CHANGE UP (ref 0–0)
PLATELET # BLD AUTO: 104 K/UL — LOW (ref 130–400)
PLATELET # BLD AUTO: 116 K/UL — LOW (ref 130–400)
POTASSIUM SERPL-MCNC: 4.5 MMOL/L — SIGNIFICANT CHANGE UP (ref 3.5–5)
POTASSIUM SERPL-SCNC: 4.5 MMOL/L — SIGNIFICANT CHANGE UP (ref 3.5–5)
PROT SERPL-MCNC: 4.5 G/DL — LOW (ref 6–8)
PROTHROM AB SERPL-ACNC: 13.5 SEC — HIGH (ref 9.95–12.87)
RBC # BLD: 2.46 M/UL — LOW (ref 4.7–6.1)
RBC # BLD: 2.58 M/UL — LOW (ref 4.7–6.1)
RBC # FLD: 19.8 % — HIGH (ref 11.5–14.5)
RBC # FLD: 19.9 % — HIGH (ref 11.5–14.5)
SODIUM SERPL-SCNC: 133 MMOL/L — LOW (ref 135–146)
SPECIMEN SOURCE: SIGNIFICANT CHANGE UP
WBC # BLD: 7.73 K/UL — SIGNIFICANT CHANGE UP (ref 4.8–10.8)
WBC # BLD: 8.81 K/UL — SIGNIFICANT CHANGE UP (ref 4.8–10.8)
WBC # FLD AUTO: 7.73 K/UL — SIGNIFICANT CHANGE UP (ref 4.8–10.8)
WBC # FLD AUTO: 8.81 K/UL — SIGNIFICANT CHANGE UP (ref 4.8–10.8)

## 2021-04-06 PROCEDURE — 99232 SBSQ HOSP IP/OBS MODERATE 35: CPT

## 2021-04-06 RX ORDER — ATROPINE SULFATE 0.1 MG/ML
0.5 SYRINGE (ML) INJECTION ONCE
Refills: 0 | Status: DISCONTINUED | OUTPATIENT
Start: 2021-04-06 | End: 2021-04-20

## 2021-04-06 RX ORDER — METOPROLOL TARTRATE 50 MG
50 TABLET ORAL DAILY
Refills: 0 | Status: DISCONTINUED | OUTPATIENT
Start: 2021-04-07 | End: 2021-04-20

## 2021-04-06 RX ORDER — INSULIN GLARGINE 100 [IU]/ML
24 INJECTION, SOLUTION SUBCUTANEOUS AT BEDTIME
Refills: 0 | Status: DISCONTINUED | OUTPATIENT
Start: 2021-04-06 | End: 2021-04-12

## 2021-04-06 RX ORDER — MUPIROCIN 20 MG/G
1 OINTMENT TOPICAL
Refills: 0 | Status: DISCONTINUED | OUTPATIENT
Start: 2021-04-06 | End: 2021-04-20

## 2021-04-06 RX ADMIN — Medication 5 MILLIGRAM(S): at 21:52

## 2021-04-06 RX ADMIN — ENOXAPARIN SODIUM 120 MILLIGRAM(S): 100 INJECTION SUBCUTANEOUS at 17:16

## 2021-04-06 RX ADMIN — POLYETHYLENE GLYCOL 3350 17 GRAM(S): 17 POWDER, FOR SOLUTION ORAL at 12:09

## 2021-04-06 RX ADMIN — INSULIN GLARGINE 24 UNIT(S): 100 INJECTION, SOLUTION SUBCUTANEOUS at 21:53

## 2021-04-06 RX ADMIN — MEROPENEM 100 MILLIGRAM(S): 1 INJECTION INTRAVENOUS at 14:41

## 2021-04-06 RX ADMIN — ENOXAPARIN SODIUM 120 MILLIGRAM(S): 100 INJECTION SUBCUTANEOUS at 05:48

## 2021-04-06 RX ADMIN — Medication 6 MILLIGRAM(S): at 05:50

## 2021-04-06 RX ADMIN — Medication 8 UNIT(S): at 12:19

## 2021-04-06 RX ADMIN — Medication 2: at 12:18

## 2021-04-06 RX ADMIN — MEROPENEM 100 MILLIGRAM(S): 1 INJECTION INTRAVENOUS at 05:49

## 2021-04-06 RX ADMIN — MEROPENEM 100 MILLIGRAM(S): 1 INJECTION INTRAVENOUS at 21:52

## 2021-04-06 RX ADMIN — ATORVASTATIN CALCIUM 80 MILLIGRAM(S): 80 TABLET, FILM COATED ORAL at 21:52

## 2021-04-06 RX ADMIN — MUPIROCIN 1 APPLICATION(S): 20 OINTMENT TOPICAL at 18:26

## 2021-04-06 RX ADMIN — PANTOPRAZOLE SODIUM 40 MILLIGRAM(S): 20 TABLET, DELAYED RELEASE ORAL at 05:49

## 2021-04-06 RX ADMIN — Medication 2: at 16:29

## 2021-04-06 RX ADMIN — PANTOPRAZOLE SODIUM 40 MILLIGRAM(S): 20 TABLET, DELAYED RELEASE ORAL at 17:16

## 2021-04-06 RX ADMIN — Medication 8 UNIT(S): at 16:30

## 2021-04-06 NOTE — PROGRESS NOTE ADULT - ASSESSMENT
81yo M c PMHx chronic afib on noac, htn, dm2, ckd3 bladder ca s/p urostomy, b/l hip/ knee replacements obesity, here with acute hypoxic respiratory failure 2/2 severe covid 19 viral pneumonia, lorraine on ckd3 now better, elevated ddimer    Sepsis on admission  Covid viral PNA. acute hypoxic RF  underlying JARETH, MO  LORRAINE on CKD  Transaminitis  aNEMIA SP TRANSFUSION  Hx of HTN, ASHD, Afib/Eliquis  DM II, CKD  DLD  Bladder ca, sp urostomy, sp cystoprostatectomy, exposure to 911  OA, DDD, DJD, sp BL hip and knee surgeries, mobility dysfunction  GERD, HH, diverticulosis, bowel resection and reanastomosis      pt in CEU on HFNC 50/40 oxygenating at 93%  elevated [id number]   + DVT, start Lovenox 120mg B!D  monitor H/H   monitor renal parameters and electrolytes    pt became septic, cultured and started on Maldonado and Caspofungin, WBC 15, BP stable off Levophed and renal parameters improving    ID:  Meropenem 750 q12 and Caspofungin 50mg q 24  urine + E faecalis  Blood NTD  repeat infla parameters. inc fungitel,       Covid ABs are + 85.6  pul-critical care ff  CXR show BL opacities, low lung volumes    us duplex LE's negative for DVT  elevated DDIMER, CT angio negative for PE  monitor infla parameters q 72 hrs    mild transaminitis  improving  pt is a full code  guarded state   79yo M c PMHx chronic afib on noac, htn, dm2, ckd3 bladder ca s/p urostomy, b/l hip/ knee replacements obesity, here with acute hypoxic respiratory failure 2/2 severe covid 19 viral pneumonia, lorraine on ckd3 now better, elevated ddimer    Sepsis on admission  Covid viral PNA. acute hypoxic RF  underlying JARETH, MO  LORRAINE on CKD  Transaminitis  aNEMIA SP TRANSFUSION  Hx of HTN, ASHD, Afib/Eliquis  DM II, CKD  DLD  Bladder ca, sp urostomy, sp cystoprostatectomy, exposure to 911  OA, DDD, DJD, sp BL hip and knee surgeries, mobility dysfunction  GERD, HH, diverticulosis, bowel resection and reanastomosis      pt in CEU, first day of NC 5 L sat well %  elevated [id number]   + LL E  DVT, start Lovenox 120mg B!D  monitor H/H   monitor renal parameters and electrolytes    pt became septic, cultured and started on Maldonado and Caspofungin, WBC 15, BP stable off Levophed and renal parameters improving    ID:  Meropenem 750 q12 and Caspofungin 50mg q 24  urine + E faecalis  Blood NTD  repeat infla parameters. inc fungitel,       Covid ABs are + 85.6  pul-critical care ff  CXR show BL opacities, low lung volumes    elevated DDIMER, CT angio negative for PE  monitor infla parameters q 72 hrs    mild transaminitis  improving  pt is a full code  guarded state

## 2021-04-06 NOTE — PROGRESS NOTE ADULT - SUBJECTIVE AND OBJECTIVE BOX
[facility] FOLLOW UP NOTE  --------------------------------------------------------------------------------  24 hour events/subjective: Patient examined. Appears comfortable.    PAST HISTORY  --------------------------------------------------------------------------------  No significant changes to PMH, PSH, FHx, SHx, unless otherwise noted    ALLERGIES & MEDICATIONS  --------------------------------------------------------------------------------  Allergies    No Known Allergies    Standing Inpatient Medications  atorvastatin 80 milliGRAM(s) Oral at bedtime  atropine Injectable 0.5 milliGRAM(s) IntraMuscular once  chlorhexidine 4% Liquid 1 Application(s) Topical <User Schedule>  dexAMETHasone  Injectable 6 milliGRAM(s) IV Push daily  enoxaparin Injectable 120 milliGRAM(s) SubCutaneous every 12 hours  influenza   Vaccine 0.5 milliLiter(s) IntraMuscular once  insulin glargine Injectable (LANTUS) 24 Unit(s) SubCutaneous at bedtime  insulin lispro (ADMELOG) corrective regimen sliding scale   SubCutaneous three times a day before meals  insulin lispro Injectable (ADMELOG) 8 Unit(s) SubCutaneous three times a day before meals  melatonin 5 milliGRAM(s) Oral at bedtime  meropenem  IVPB 1000 milliGRAM(s) IV Intermittent every 8 hours  pantoprazole  Injectable 40 milliGRAM(s) IV Push every 12 hours  polyethylene glycol 3350 17 Gram(s) Oral daily  senna 2 Tablet(s) Oral at bedtime    PRN Inpatient Medications  acetaminophen   Tablet .. 650 milliGRAM(s) Oral every 6 hours PRN  guaifenesin/dextromethorphan  Syrup 10 milliLiter(s) Oral every 6 hours PRN      VITALS/PHYSICAL EXAM  --------------------------------------------------------------------------------  T(C): 36 (04-06-21 @ 08:17), Max: 36.2 (04-05-21 @ 20:12)  HR: 55 (04-06-21 @ 11:25) (55 - 66)  BP: 123/57 (04-06-21 @ 11:25) (109/54 - 130/61)  RR: 20 (04-06-21 @ 11:25) (20 - 20)  SpO2: 93% (04-06-21 @ 09:05) (92% - 94%)  Wt(kg): --        04-05-21 @ 07:01  -  04-06-21 @ 07:00  --------------------------------------------------------  IN: 100 mL / OUT: 2450 mL / NET: -2350 mL      Physical Exam:  	Gen: NAD  	Pulm: B/L rales  	CV: RRR, S1S2  	Abd: +BS, soft, nontender/nondistended  	: No suprapubic tenderness  	LE: Warm,  edema    LABS/STUDIES  --------------------------------------------------------------------------------              7.3    7.73  >-----------<  104      [04-06-21 @ 08:46]              22.6     133  |  103  |  32  ----------------------------<  99      [04-06-21 @ 08:46]  4.5   |  21  |  1.0        Ca     7.5     [04-06-21 @ 08:46]      Mg     2.2     [04-06-21 @ 08:46]    TPro  4.5  /  Alb  2.9  /  TBili  2.1  /  DBili  x   /  AST  39  /  ALT  36  /  AlkPhos  68  [04-06-21 @ 08:46]    PT/INR: PT 13.50, INR 1.17       [04-06-21 @ 08:46]  PTT: 41.7       [04-06-21 @ 08:46]    Creatinine Trend:  SCr 1.0 [04-06 @ 08:46]  SCr 1.1 [04-05 @ 06:52]  SCr 1.2 [04-05 @ 00:52]  SCr 1.5 [04-03 @ 08:42]  SCr 1.8 [04-02 @ 09:04]    Urinalysis - [03-30-21 @ 04:27]      Color Yellow / Appearance Slightly Turbid / SG 1.017 / pH 6.5      Gluc Negative / Ketone Negative  / Bili Negative / Urobili <2 mg/dL       Blood Large / Protein Trace / Leuk Est Large / Nitrite Negative       / WBC 84 / Hyaline 25 / Gran  / Sq Epi  / Non Sq Epi 0 / Bacteria Negative    Iron 178, TIBC 305, %sat 58      [03-30-21 @ 07:11]  Ferritin 339      [04-02-21 @ 09:04]  Vitamin D (25OH) 23      [04-03-21 @ 08:42]

## 2021-04-06 NOTE — PROGRESS NOTE ADULT - SUBJECTIVE AND OBJECTIVE BOX
OVERNIGHT EVENTS: events noted, on HHFNC 50/40%, LE doppler P    Vital Signs Last 24 Hrs  T(C): 35.7 (06 Apr 2021 05:42), Max: 36.2 (05 Apr 2021 20:12)  T(F): 96.3 (06 Apr 2021 05:42), Max: 97.2 (05 Apr 2021 20:12)  HR: 57 (06 Apr 2021 05:42) (57 - 70)  BP: 111/53 (06 Apr 2021 05:42) (104/51 - 118/58)  BP(mean): 83 (05 Apr 2021 12:03) (75 - 83)  RR: 20 (06 Apr 2021 05:42) (20 - 20)  SpO2: 93% (06 Apr 2021 05:42) (93% - 95%)    PHYSICAL EXAMINATION:    GENERAL: Ill looking    HEENT: Head is normocephalic and atraumatic.    NECK: Supple.    LUNGS: bl rhonchi    HEART: Regular rate and rhythm without murmur.    ABDOMEN: Soft, nontender, and nondistended.      EXTREMITIES: Without any cyanosis, clubbing, rash, lesions or edema.    NEUROLOGIC: Grossly intact.    SKIN: No ulceration or induration present.      LABS:                        7.8    8.95  )-----------( 95       ( 05 Apr 2021 06:52 )             25.0     04-05    134<L>  |  104  |  42<H>  ----------------------------<  218<H>  4.8   |  19  |  1.1    Ca    7.5<L>      05 Apr 2021 06:52  Mg     2.3     04-05    TPro  4.7<L>  /  Alb  3.0<L>  /  TBili  1.5<H>  /  DBili  x   /  AST  51<H>  /  ALT  41  /  AlkPhos  116<H>  04-05 04-04-21 @ 07:01  -  04-05-21 @ 07:00  --------------------------------------------------------  IN: 0 mL / OUT: 2200 mL / NET: -2200 mL    04-05-21 @ 07:01  -  04-06-21 @ 06:36  --------------------------------------------------------  IN: 100 mL / OUT: 2450 mL / NET: -2350 mL        MICROBIOLOGY:      MEDICATIONS  (STANDING):  atorvastatin 80 milliGRAM(s) Oral at bedtime  chlorhexidine 4% Liquid 1 Application(s) Topical <User Schedule>  dexAMETHasone  Injectable 6 milliGRAM(s) IV Push daily  enoxaparin Injectable 120 milliGRAM(s) SubCutaneous every 12 hours  influenza   Vaccine 0.5 milliLiter(s) IntraMuscular once  insulin glargine Injectable (LANTUS) 12 Unit(s) SubCutaneous every morning  insulin glargine Injectable (LANTUS) 12 Unit(s) SubCutaneous at bedtime  insulin lispro (ADMELOG) corrective regimen sliding scale   SubCutaneous three times a day before meals  insulin lispro Injectable (ADMELOG) 8 Unit(s) SubCutaneous three times a day before meals  melatonin 5 milliGRAM(s) Oral at bedtime  meropenem  IVPB 1000 milliGRAM(s) IV Intermittent every 8 hours  metoprolol succinate  milliGRAM(s) Oral daily  pantoprazole  Injectable 40 milliGRAM(s) IV Push every 12 hours  polyethylene glycol 3350 17 Gram(s) Oral daily  senna 2 Tablet(s) Oral at bedtime    MEDICATIONS  (PRN):  acetaminophen   Tablet .. 650 milliGRAM(s) Oral every 6 hours PRN Temp greater or equal to 38C (100.4F), Mild Pain (1 - 3)  guaifenesin/dextromethorphan  Syrup 10 milliLiter(s) Oral every 6 hours PRN Cough      RADIOLOGY & ADDITIONAL STUDIES:

## 2021-04-06 NOTE — PROGRESS NOTE ADULT - ASSESSMENT
1. Acute hypoxemic respiratory failure secondary to COVID-19 pneumonia, s/p Toci 3/23, on dexamethasone, requiring high flow O2  2. Acute kidney injury, likely pre-renal azotemia vs contrast nephropathy  3. CKD III, baseline creatinine 1.2-1.5.  4. Hx of Bladder Ca, s/p cystoprostatectomy, urostomy  5. Hyperkalemia  6. Hypotension    Plan:    Daily BMP  Monitor I/O

## 2021-04-06 NOTE — PROGRESS NOTE ADULT - SUBJECTIVE AND OBJECTIVE BOX
ROSA MARIA CASEY  Patient is a 80y old  Male who presents with a chief complaint of acute hypoxic RF due to Covid Viral Syndrome. Underlying Hx of DMII, CKD,  DLD, MO,  JARETH, bladder ca, sp urostomy, Hx of 911 exposure), OA, DDD, DJD, GERD, diverticulosis, abd wall hernias. bowel resection and reanastomosis.      Overnight events:  pt in CEU, on HFNC, pulse ox 93%, WBC 8.9,  on Meropenem 1gm q 8 and caspofungin will be D/C  BUN/creat 42/1.1 improved, fibrinogen 199, DD 4298    Vital Signs:   T:  97.2  HR:  66  BP:  115/57    RR: 20  Pulse ox:  HFNC 50/40   93%        PHYSICAL EXAM:  GENERAL: A&O but weak and sluggish, overweight, NAD on HFNC  Neck:  short,  thick but supple, no JVD, no bruit, no stridor  Lungs:  shallow resp, scattered rhonchi, no wheezing, improving air entry  Abd:   distended soft and benign, + BS  Urinary:  RLQ urostomy  Ext:  arthritic changes, moves all limbs  Skin:  no rash  Psych stable    LABS:                        7.8  -----------( 95            25    134 |  104 | 42  ----------------------------<218  4.8   19    1.1    GFR 64, 58, 72, 64, 35, 26, 30, 35, 64  Ca    8.8       Phos  3.4      Mg     2.0, 2.6, 3.0, 2.8      MB 63    trop <0.01 x2  ferritin 535, 462, 737, 339  procal 0.26, 0.11, 0.07  CRP 25, 180. <3  LDH  515  fibrinogen 199  DDIMER 4298  COVID AB + 85.60  3/30 LA 5.1,  3.7    TPro  5.9,  5.1/  Alb  3.2,  3.1  /  TBili  0.9  /  DBili  x   /  AST  45, 88, 88  /  ALT  53, 93, 99  /  AlkPhos  43  03-14      Urinalysis Basic - ( 12 Mar 2021 16:38 )    Color: Yellow / Appearance: Slightly Turbid / S.020 / pH: x  Gluc: x / Ketone: Negative  / Bili: Negative / Urobili: 3 mg/dL   Blood: x / Protein: 30 mg/dL / Nitrite: Negative   Leuk Esterase: Moderate / RBC: 5 /HPF / WBC 90 /HPF   Sq Epi: x / Non Sq Epi: 0 /HPF / Bacteria: Ma       RADIOLOGY & ADDITIONAL TESTS:  Venous duplex:  negative for DVT  CXR:  BL opacities L>R  CXR:  stable BL opacities    CT angio:  neg for central PE    CT abd/pelvis:  BIbasilar densities, hepatobiliary/spleen/pancreas WNL, mild BL adrenal thickening, no hydro, bl renal cysts, bl layering densities/ sm stones, "milk of ca", post cystoprostatectomy and ilealconduit, , post bowel resection and reanastomosis, ventral abd wall hernias, bl hip replacements    CTH 3/30:  moderate atrophy, chronic microvascular changes,  no evidence of acute pathology    EEG 3/30 gen slowing , no focal epileptiform activity       ROSA MARIA CASEY  Patient is a 80y old  Male who presents with a chief complaint of acute hypoxic RF due to Covid Viral Syndrome. Underlying Hx of DMII, CKD,  DLD, MO,  JARETH, bladder ca, sp urostomy, Hx of 911 exposure), OA, DDD, DJD, GERD, diverticulosis, abd wall hernias. bowel resection and reanastomosis.      Overnight events:  pt in CEU, HFNC transitioned to NC 5 L  pulse ox 100%, WBC 8.8,  on Meropenem 1gm q 8 and caspofungin will be D/C  BUN/creat 32/1.0 , + RLE DVT on Lovenox    Vital Signs:   T:  97.6  HR:  82  BP:  105/59    RR: 20  Pulse ox:  HFNC 50/40   93%, today 5l %        PHYSICAL EXAM:  GENERAL: A&O but weak and sluggish, overweight, NAD, first day of NC 5L  Neck:  short,  thick but supple, no JVD, no bruit, no stridor  Lungs:  shallow resp, scattered rhonchi, no wheezing, improving air entry  Abd:   distended soft and benign, + BS  Urinary:  RLQ urostomy  Ext:  arthritic changes, moves all limbs  Skin:  no rash  Psych stable    LABS:                        7.4  8.8-----------( 116            23.8    133 |  103 | 32  ----------------------------<99  4.5   21    1.0    GFR 64, 58, 72, 64, 35, 26, 30, 35, 64, 72  Ca    8.8       Phos  3.4      Mg     2.0, 2.6, 3.0, 2.8, 2.2      MB 63    trop <0.01 x2  ferritin 535, 462, 737, 339  procal 0.26, 0.11, 0.07  CRP 25, 180. <3  LDH  515  fibrinogen 199  DDIMER 4298  COVID AB + 85.60  3/30 LA 5.1,  3.7    TPro  5.9,  5.1/  Alb  3.2,  3.1  /  TBili  0.9  /  DBili  x   /  AST  45, 88, 88  /  ALT  53, 93, 99  /  AlkPhos  43  03-14      Urinalysis Basic - ( 12 Mar 2021 16:38 )    Color: Yellow / Appearance: Slightly Turbid / S.020 / pH: x  Gluc: x / Ketone: Negative  / Bili: Negative / Urobili: 3 mg/dL   Blood: x / Protein: 30 mg/dL / Nitrite: Negative   Leuk Esterase: Moderate / RBC: 5 /HPF / WBC 90 /HPF   Sq Epi: x / Non Sq Epi: 0 /HPF / Bacteria: Ma       RADIOLOGY & ADDITIONAL TESTS:  Venous duplex:  negative for DVT  CXR:  BL opacities L>R  CXR:  stable BL opacities    CT angio:  neg for central PE    CT abd/pelvis:  BIbasilar densities, hepatobiliary/spleen/pancreas WNL, mild BL adrenal thickening, no hydro, bl renal cysts, bl layering densities/ sm stones, "milk of ca", post cystoprostatectomy and ilealconduit, , post bowel resection and reanastomosis, ventral abd wall hernias, bl hip replacements    CTH 3/30:  moderate atrophy, chronic microvascular changes,  no evidence of acute pathology    EEG 3/30 gen slowing , no focal epileptiform activity    Venous duplex of lower ext:  + RLE popliteal vein thrombosis

## 2021-04-06 NOTE — PROGRESS NOTE ADULT - ASSESSMENT
IMPRESSION:    Sepsis present on admission  Acute hypoxemic respiratory failure   Severe COVID-19 PNA SP Toci   Doubt superimposed bacterial infection  LORRAINE on CKD 3 improving   Probable JARETH  anemia / thrombocytopenia / HIT neg    PLAN:    CNS: Avoid CNS depressants    HEENT: Oral care    PULMONARY:  HOB @ 45 degrees.  Aspiration precautions. Target SpO2 88 to 94%, wean oxygen as tolerated.  Dexamethasone 6mg Q24     CARDIOVASCULAR: Avoid volume overload.     GI: GI prophylaxis. Feeding as tolerated  Bowel regimen.      RENAL:  Follow up lytes.  Correct as needed.     INFECTIOUS DISEASE: FU PanCx.  Fungitell -    HEMATOLOGICAL:  DVT prophylaxis. serial cbc  Hit -  LE doppler, hemolysis profile    ENDOCRINE:  Follow up FS.     MUSCULOSKELETAL: OOB to chair     Step Down Unit monitoring for now.     PT OT     Prognosis poor

## 2021-04-06 NOTE — PROGRESS NOTE ADULT - ASSESSMENT
79 yo M with PMHx of AFib, on Eliquis, HTN, DMII, CKDIII, hx of Bladder Ca s/p Urostomy, bilateral hip/knee replacements, obesity admitted for acute hypoxemic respiratory failure due to COVID PNA.    Neuro:  - Is AAOx4  - Previously had episodes of diffuse tremors, non responsiveness with spont. resolution, likely due to acute sepsis, now resolved  - CT Head, EEG was negative    Pulmonary:  #Acute Hypoxemic Respiratory Failure secondary to COVID PNA  - CXR revealed stable bilateral opacities  - Remains on HFNC 40/50, saturating 92%  - Will attempt NC as tolerated    Cardiovascular:   #Hx of Paroxysmal AFib on Eliquis  #CHADSVASC Score: 4  #HTN  - Has been rate controlled, on Metoprolol Succinate 100 mg once daily, although has been bradycardic to 40s, asymptomatic  - Will dec Toprol to 50 mg once daily, hold for HR<60, PRN Atropine for HR < 35 or symptomatic bradycardia  - Eliquis has been on hold, currently receiving Lovenox 120 mg BID  - Takes Amlodipine at home, BP has been well controlled, on hold for now    GI:  - C/w PPI for GI ppx  - Was previously suspected to have underlying GI bleed due to acute Hb drop, however has not had any episodes of melena, hematochezia, stool guaiac negative  - C/w regular diet, has been eating majority of most meals    Endocrine:  #Hx of DMII  - FS better controlled  - C/w Lantus 24 U at bedtime, Lispro 8 U before meals    Renal:   #HX of Bladder Ca, s/p cystoprostatectomy with urostomy  #LORRAINE due to LINDSAY - resolved  #CKD III - stable  #Episodic hematuria with blood clots - now resolved  - Nephrology following  - Cr has been stable  - Urine in urostomy bag appears clear, site appears clean and intact    Heme Onc:  #Acute anemia (baseline Hb 13)  #Acute thrombocytopenia  #Acute R popliteal vein DVT  - Hb had gradually dropped from 13 to 6.7, requiring 1 U pRBCs transfusion; has now been stable ~7.5  - Hematuria now resolved, no melena, hematochezia, stool guaiac negative  - CT Abd negative for retroperitoneal bleed  - Hemolysis workup negative  - Plt ct dropped to < 100, HIT penal within normal limits  - Likely due to BM suppression in setting of sepsis, DIC unlikely as pt has no acute bleeding from mucosal sites, fibrinogen was borderline low (199), PT/PTT pending  - Lovenox was previously on hold, however LE duplex revealed acute R popliteal vein DVT, resumed full dose Lovenox  - Cont to monitor CBC, active T+S 4/5    Infectious Disease:   #COVID PNA  #Sepsis of unclear source - now resolved  - Remains on Dexamethasone 6 mg once daily, s/p Toci 3/23  - Had developed hypotension, hypothermia, elevated lactate - now resolved  - Received Vanco, Cefepime x1, initiated on Caspofungin, now d/c'd as fungitell within normal limits, remains on Meropenem (today is day #7)  - ID following  - Blood Cx NGTD, Urine Cx revealed gut kike as pt has urostomy  - Sputum Cx - numerous gram pos cocci in pairs, yeast cells       79 yo M with PMHx of AFib, on Eliquis, HTN, DMII, CKDIII, hx of Bladder Ca s/p Urostomy, bilateral hip/knee replacements, obesity admitted for acute hypoxemic respiratory failure due to COVID PNA.    Neuro:  - Is AAOx4  - Previously had episodes of diffuse tremors, non responsiveness with spont. resolution, likely due to acute sepsis, now resolved  - CT Head, EEG was negative    Pulmonary:  #Acute Hypoxemic Respiratory Failure secondary to COVID PNA  - CXR revealed stable bilateral opacities  - Remains on HFNC 40/50, saturating 92%  - Will attempt NC as tolerated    Cardiovascular:   #Hx of Paroxysmal AFib on Eliquis  #CHADSVASC Score: 4  #HTN  - Has been rate controlled, on Metoprolol Succinate 100 mg once daily, although has been bradycardic to 40s, asymptomatic  - Will dec Toprol to 50 mg once daily, hold for HR<60, PRN Atropine for HR < 35 or symptomatic bradycardia  - Eliquis has been on hold, currently receiving Lovenox 120 mg BID  - Takes Amlodipine at home, BP has been well controlled, on hold for now    GI:  - C/w PPI for GI ppx  - Was previously suspected to have underlying GI bleed due to acute Hb drop, however has not had any episodes of melena, hematochezia, stool guaiac negative  - C/w regular diet, has been eating majority of most meals    Endocrine:  #Hx of DMII  - FS better controlled  - C/w Lantus 24 U at bedtime, Lispro 8 U before meals    Renal:   #HX of Bladder Ca, s/p cystoprostatectomy with urostomy  #LORRAINE due to LINDSAY - resolved  #CKD III - stable  #Episodic hematuria with blood clots - now resolved  - Nephrology following  - Cr has been stable  - Urine in urostomy bag appears clear, site appears clean and intact  - Has phillips in place, will perform TOV    Heme Onc:  #Acute anemia (baseline Hb 13)  #Acute thrombocytopenia  #Acute R popliteal vein DVT  - Hb had gradually dropped from 13 to 6.7, requiring 1 U pRBCs transfusion; has now been stable ~7.5  - Hematuria now resolved, no melena, hematochezia, stool guaiac negative  - CT Abd negative for retroperitoneal bleed  - Hemolysis workup negative  - Plt ct dropped to < 100, HIT penal within normal limits  - Likely due to BM suppression in setting of sepsis, DIC unlikely as pt has no acute bleeding from mucosal sites, fibrinogen was borderline low (199), PT/PTT pending  - Lovenox was previously on hold, however LE duplex revealed acute R popliteal vein DVT, resumed full dose Lovenox  - Cont to monitor CBC, active T+S 4/5    Infectious Disease:   #COVID PNA  #Sepsis of unclear source - now resolved  - Remains on Dexamethasone 6 mg once daily, s/p Toci 3/23  - Had developed hypotension, hypothermia, elevated lactate - now resolved  - Received Vanco, Cefepime x1, initiated on Caspofungin, now d/c'd as fungitell within normal limits, remains on Meropenem (today is day #7)  - ID following  - Blood Cx NGTD, Urine Cx revealed gut kike as pt has urostomy  - Sputum Cx - numerous gram pos cocci in pairs, yeast cells       81 yo M with PMHx of AFib, on Eliquis, HTN, DMII, CKDIII, hx of Bladder Ca s/p Urostomy, bilateral hip/knee replacements, obesity admitted for acute hypoxemic respiratory failure due to COVID PNA.    Neuro:  - Is AAOx4  - Previously had episodes of diffuse tremors, non responsiveness with spont. resolution, likely due to acute sepsis, now resolved  - CT Head, EEG was negative    Pulmonary:  #Acute Hypoxemic Respiratory Failure secondary to COVID PNA  - CXR revealed stable bilateral opacities  - Remains on HFNC 40/50, saturating 92%  - Will attempt NC as tolerated    Cardiovascular:   #Hx of Paroxysmal AFib on Eliquis  #CHADSVASC Score: 4  #HTN  - Has been rate controlled, on Metoprolol Succinate 100 mg once daily, although has been bradycardic to 40s, asymptomatic  - Will dec Toprol to 50 mg once daily, hold for HR<60, PRN Atropine for HR < 35 or symptomatic bradycardia  - Eliquis has been on hold, currently receiving Lovenox 120 mg BID  - Takes Amlodipine at home, BP has been well controlled, on hold for now    GI:  - C/w PPI for GI ppx  - Was previously suspected to have underlying GI bleed due to acute Hb drop, however has not had any episodes of melena, hematochezia, stool guaiac negative  - C/w regular diet, has been eating majority of most meals    Endocrine:  #Hx of DMII  - FS better controlled  - C/w Lantus 24 U at bedtime, Lispro 8 U before meals    Renal:   #HX of Bladder Ca, s/p cystoprostatectomy with urostomy  #LORRAINE due to LINDSAY - resolved  #CKD III - stable  #Episodic hematuria with blood clots - now resolved  - Nephrology following  - Cr has been stable  - Urine in urostomy bag appears clear, site appears clean and intact    Heme Onc:  #Acute anemia (baseline Hb 13)  #Acute thrombocytopenia  #Acute R popliteal vein DVT  - Hb had gradually dropped from 13 to 6.7, requiring 1 U pRBCs transfusion; has now been stable ~7.5  - Hematuria now resolved, no melena, hematochezia, stool guaiac negative  - CT Abd negative for retroperitoneal bleed  - Hemolysis workup negative  - Plt ct dropped to < 100, HIT penal within normal limits  - Likely due to BM suppression in setting of sepsis, DIC unlikely as pt has no acute bleeding from mucosal sites, fibrinogen was borderline low (199), PT/PTT pending  - Lovenox was previously on hold, however LE duplex revealed acute R popliteal vein DVT, resumed full dose Lovenox  - Cont to monitor CBC, active T+S 4/5    Infectious Disease:   #COVID PNA  #Sepsis of unclear source - now resolved  - Remains on Dexamethasone 6 mg once daily, s/p Toci 3/23  - Had developed hypotension, hypothermia, elevated lactate - now resolved  - Received Vanco, Cefepime x1, initiated on Caspofungin, now d/c'd as fungitell within normal limits, remains on Meropenem (today is day #7)  - ID following  - Blood Cx NGTD, Urine Cx revealed gut kike as pt has urostomy  - Sputum Cx - numerous gram pos cocci in pairs, yeast cells      Spoke to sonJuancho ([phone]), provided medical update and answered all questions.

## 2021-04-06 NOTE — PROGRESS NOTE ADULT - SUBJECTIVE AND OBJECTIVE BOX
Patient Information:  ROSA MARIA CASEY / 80y / Male / MRN#:[id number]    Hospital Day: 25d    Interval History:  Patient seen and examined at bedside. Pt resting in bed, states that he had poor sleep due to chills overnight.  Has also been bradycardic to 40s overnight, during sleep, HR inc to > 60 when awake. Denies any sx of lightheadedness, chest pain or discomfort.    Past Medical History:  Hypertension    Hyperlipidemia    Diabetes mellitus, type 2    History of atrial fibrillation    Bladder cancer    Chronic kidney disease (CKD)      Past Surgical History:  History of total hip replacement, bilateral    History of total knee replacement, bilateral    History of total cystectomy      Allergies:  No Known Allergies    Medications:  PRN:  acetaminophen   Tablet .. 650 milliGRAM(s) Oral every 6 hours PRN Temp greater or equal to 38C (100.4F), Mild Pain (1 - 3)  guaifenesin/dextromethorphan  Syrup 10 milliLiter(s) Oral every 6 hours PRN Cough    Standing:  atorvastatin 80 milliGRAM(s) Oral at bedtime  atropine Injectable 0.5 milliGRAM(s) IntraMuscular once  chlorhexidine 4% Liquid 1 Application(s) Topical <User Schedule>  dexAMETHasone  Injectable 6 milliGRAM(s) IV Push daily  enoxaparin Injectable 120 milliGRAM(s) SubCutaneous every 12 hours  influenza   Vaccine 0.5 milliLiter(s) IntraMuscular once  insulin glargine Injectable (LANTUS) 24 Unit(s) SubCutaneous at bedtime  insulin lispro (ADMELOG) corrective regimen sliding scale   SubCutaneous three times a day before meals  insulin lispro Injectable (ADMELOG) 8 Unit(s) SubCutaneous three times a day before meals  melatonin 5 milliGRAM(s) Oral at bedtime  meropenem  IVPB 1000 milliGRAM(s) IV Intermittent every 8 hours  pantoprazole  Injectable 40 milliGRAM(s) IV Push every 12 hours  polyethylene glycol 3350 17 Gram(s) Oral daily  senna 2 Tablet(s) Oral at bedtime    Home:  amlodipine-benazepril 10 mg-40 mg oral capsule: 1 cap(s) orally once a day  Eliquis 5 mg oral tablet: 1 tab(s) orally 2 times a day  glimepiride 2 mg oral tablet: 1 tab(s) orally once a day  Lantus 100 units/mL subcutaneous solution: 40 unit(s) subcutaneous once a day  metoprolol succinate 100 mg oral tablet, extended release: 1 tab(s) orally once a day  Ozempic (1 mg dose) 2 mg/1.5 mL subcutaneous solution:   Percocet 7.5/325 oral tablet: 0.5 tab(s) orally once a day (at bedtime)  potassium citrate 10 mEq oral tablet, extended release: 1 tab(s) orally once a day  rosuvastatin 20 mg oral tablet: 1 tab(s) orally once a day    Vitals:  T(C): 36, Max: 36.2 (04-05-21 @ 20:12)  T(F): 96.8, Max: 97.2 (04-05-21 @ 20:12)  HR: 55 (55 - 66)  BP: 130/61 (109/54 - 130/61)  RR: 20 (20 - 20)  SpO2: 92% (92% - 94%)    Physical Exam:  General: Awake, alert, NAD, sitting up in bed, on HFNC 50/40  HEENT: Head NC/AT  Heart: RRR; systolic murmur apprec  Lungs: Slightly dec breath sounds in bilateral bases  Abdomen: Soft, nontender, nondistended, BS+, urostomy bag in place, appears clean, no bleeding visualized  Extremities: Mild edema of lower ext bilaterally  Neuro: AAOx3, NFD    Labs:  CBC (04-05 @ 06:52)                        Hgb: 7.8    WBC: 8.95  )-----------------( Plts: 95                               Hct: 25.0     Chem (04-05 @ 06:52)  Na: 134  |     Cl: 104     |  BUN: 42  -----------------------------------------< Gluc: 218    K: 4.8   |    HCO3: 19  |  Cr: 1.1    Ca 7.5 (04-05 @ 06:52)  Mg 2.3 (04-05 @ 00:52)    LFTs (04-05 @ 06:52)  TPro 4.7  /  Alb 3.0  TBili 1.5  /  DBili     AST 51  /  ALT 41  /  AlkPhos 116            Microbiology:  Culture - Sputum (collected 04-05 @ 09:08)  Source: .Sputum Sputum  Gram Stain (04-06 @ 07:06):    Rare polymorphonuclear leukocytes per low power field    Few Squamous epithelial cells per low power field    Numerous Gram positive cocci in pairs seen per oil power field    Moderate Yeast like cells seen per oil power field    Culture - Blood (collected 03-31 @ 12:09)  Source: .Blood None  Final Report (04-05 @ 23:01):    No Growth Final

## 2021-04-06 NOTE — CHART NOTE - NSCHARTNOTEFT_GEN_A_CORE
Registered Dietitian Follow-Up     Patient Profile Reviewed                           Yes [x]   No []     Nutrition History Previously Obtained        Yes [x]  No []       Pertinent Subjective Information: Unable to conduct face to face assessment d/t COVID-19 isolation precautions. Attempted to call pt room phone >5x but no response. Spoke to staff who reported pt intake improved, consuming 75% or more of meals & snacks at this time. RD previously provided pt Nepro BID but he does not drink therefore supplement d/c'd. Meal intake sufficient to pt nutritional needs at this time. no further nutrition interventions at this time.      Pertinent Medical Interventions:  1. Several episodes of seizure like activity overnight--now resolved, possibly d/t acute sepsis.  2. AHRF 2/2 COVID-19 pneumonia complicated by JARETH--tolerating HFNC 40/50,satting 92%, attempt to wean to NC.   3. Acute drop in Hgb s/p 1unit PRBC--now stable, no source of or s/s active bleed as per GI. CT A/P negative for retroperitoneal hematoma.   4. Acute thrombocytopenia likely d/t BM suppression in setting of sepsis--PT/PTT pending.   5. LORRAINE d/t LINDSAY w. h/o CKD III & bladder cancer--resolved. Cr stable   6. T2DM--Lantus 24u BID, Lispro 8u prior to meals      Diet order: CHO Consistent, DASH/TLC, No concentrated Potassium      Anthropometrics:  - Ht. 185.4cm   - Wt. 127kg (3/12) no new wt   - BMI 37   - IBW 83.6kg      Pertinent Lab Data: (4/6/21) RBC 2.46, H/H 7.3/22.6, POCT 189, Na 133, BUN 32, corrected Ca 8.38      Pertinent Meds: lovenox, abx, lantus, admelog, metoprolol, decadron, protonix, miralax, senna, atorvastatin      Physical Findings:  - Appearance: AAOx4   - GI function: fecal incontinence, LBM 4/4   - Tubes: n/a   - Oral/Mouth cavity: none per RN   - Skin: intact; 1+ edema, generalized      Nutrition Requirements  Weight Used: 127kg ABW; 83.6kg IBW (continued from RD initial assessment)     Calories: 2048 - 2252 kcals (MSJ x 1.0 - 1.1 AF obesity)   Protein:  g/day  (1.0-1.2 gm/kg IBW obesity) renal function improved, no RRT   Fluid: 1ml/kcal or per LIP     Nutrient Intake: meeting needs d/t 75% or more intake      Previous Nutrition Diagnostic: inadequate protein/energy intake--resolved    [x] no active nutrition diagnosis at this time.      Nutrition Intervention: meals & snacks, medical food supplements    Recommend:  1. Continue CHO Consistent, DASH/TLC diet. If serum potassium level remains WNL d/c no concentrated potassium restriction.      Goal/Expected Outcome: Pt to continue to consume 75% or more of meals & snacks throughout LOS. RD to reassess in 7 days.      Indicator/Monitoring: RD to monitor energy intake, diet order, body comp, NFPF, glucose profile.

## 2021-04-07 LAB
ALBUMIN SERPL ELPH-MCNC: 3.1 G/DL — LOW (ref 3.5–5.2)
ALP SERPL-CCNC: 67 U/L — SIGNIFICANT CHANGE UP (ref 30–115)
ALT FLD-CCNC: 36 U/L — SIGNIFICANT CHANGE UP (ref 0–41)
ANION GAP SERPL CALC-SCNC: 12 MMOL/L — SIGNIFICANT CHANGE UP (ref 7–14)
APTT BLD: 38.1 SEC — SIGNIFICANT CHANGE UP (ref 27–39.2)
AST SERPL-CCNC: 32 U/L — SIGNIFICANT CHANGE UP (ref 0–41)
BASOPHILS # BLD AUTO: 0.01 K/UL — SIGNIFICANT CHANGE UP (ref 0–0.2)
BASOPHILS NFR BLD AUTO: 0.1 % — SIGNIFICANT CHANGE UP (ref 0–1)
BILIRUB SERPL-MCNC: 2 MG/DL — HIGH (ref 0.2–1.2)
BLD GP AB SCN SERPL QL: SIGNIFICANT CHANGE UP
BUN SERPL-MCNC: 37 MG/DL — HIGH (ref 10–20)
CALCIUM SERPL-MCNC: 7.8 MG/DL — LOW (ref 8.5–10.1)
CHLORIDE SERPL-SCNC: 99 MMOL/L — SIGNIFICANT CHANGE UP (ref 98–110)
CO2 SERPL-SCNC: 21 MMOL/L — SIGNIFICANT CHANGE UP (ref 17–32)
CREAT SERPL-MCNC: 0.9 MG/DL — SIGNIFICANT CHANGE UP (ref 0.7–1.5)
CULTURE RESULTS: SIGNIFICANT CHANGE UP
EOSINOPHIL # BLD AUTO: 0.46 K/UL — SIGNIFICANT CHANGE UP (ref 0–0.7)
EOSINOPHIL NFR BLD AUTO: 6.1 % — SIGNIFICANT CHANGE UP (ref 0–8)
GLUCOSE BLDC GLUCOMTR-MCNC: 105 MG/DL — HIGH (ref 70–99)
GLUCOSE BLDC GLUCOMTR-MCNC: 162 MG/DL — HIGH (ref 70–99)
GLUCOSE BLDC GLUCOMTR-MCNC: 238 MG/DL — HIGH (ref 70–99)
GLUCOSE BLDC GLUCOMTR-MCNC: 238 MG/DL — HIGH (ref 70–99)
GLUCOSE SERPL-MCNC: 111 MG/DL — HIGH (ref 70–99)
HCT VFR BLD CALC: 23.2 % — LOW (ref 42–52)
HGB BLD-MCNC: 7.3 G/DL — LOW (ref 14–18)
IMM GRANULOCYTES NFR BLD AUTO: 1.6 % — HIGH (ref 0.1–0.3)
LYMPHOCYTES # BLD AUTO: 0.79 K/UL — LOW (ref 1.2–3.4)
LYMPHOCYTES # BLD AUTO: 10.4 % — LOW (ref 20.5–51.1)
MAGNESIUM SERPL-MCNC: 2.2 MG/DL — SIGNIFICANT CHANGE UP (ref 1.8–2.4)
MCHC RBC-ENTMCNC: 29.2 PG — SIGNIFICANT CHANGE UP (ref 27–31)
MCHC RBC-ENTMCNC: 31.5 G/DL — LOW (ref 32–37)
MCV RBC AUTO: 92.8 FL — SIGNIFICANT CHANGE UP (ref 80–94)
MONOCYTES # BLD AUTO: 0.51 K/UL — SIGNIFICANT CHANGE UP (ref 0.1–0.6)
MONOCYTES NFR BLD AUTO: 6.7 % — SIGNIFICANT CHANGE UP (ref 1.7–9.3)
NEUTROPHILS # BLD AUTO: 5.69 K/UL — SIGNIFICANT CHANGE UP (ref 1.4–6.5)
NEUTROPHILS NFR BLD AUTO: 75.1 % — SIGNIFICANT CHANGE UP (ref 42.2–75.2)
NRBC # BLD: 0 /100 WBCS — SIGNIFICANT CHANGE UP (ref 0–0)
PLATELET # BLD AUTO: 128 K/UL — LOW (ref 130–400)
POTASSIUM SERPL-MCNC: 4.2 MMOL/L — SIGNIFICANT CHANGE UP (ref 3.5–5)
POTASSIUM SERPL-SCNC: 4.2 MMOL/L — SIGNIFICANT CHANGE UP (ref 3.5–5)
PROT SERPL-MCNC: 4.8 G/DL — LOW (ref 6–8)
RBC # BLD: 2.5 M/UL — LOW (ref 4.7–6.1)
RBC # FLD: 19.8 % — HIGH (ref 11.5–14.5)
SODIUM SERPL-SCNC: 132 MMOL/L — LOW (ref 135–146)
SPECIMEN SOURCE: SIGNIFICANT CHANGE UP
WBC # BLD: 7.58 K/UL — SIGNIFICANT CHANGE UP (ref 4.8–10.8)
WBC # FLD AUTO: 7.58 K/UL — SIGNIFICANT CHANGE UP (ref 4.8–10.8)

## 2021-04-07 PROCEDURE — 71045 X-RAY EXAM CHEST 1 VIEW: CPT | Mod: 26

## 2021-04-07 PROCEDURE — 99232 SBSQ HOSP IP/OBS MODERATE 35: CPT

## 2021-04-07 RX ORDER — PANTOPRAZOLE SODIUM 20 MG/1
40 TABLET, DELAYED RELEASE ORAL
Refills: 0 | Status: DISCONTINUED | OUTPATIENT
Start: 2021-04-07 | End: 2021-04-08

## 2021-04-07 RX ORDER — HEPARIN SODIUM 5000 [USP'U]/ML
700 INJECTION INTRAVENOUS; SUBCUTANEOUS
Qty: 25000 | Refills: 0 | Status: DISCONTINUED | OUTPATIENT
Start: 2021-04-07 | End: 2021-04-08

## 2021-04-07 RX ADMIN — ATORVASTATIN CALCIUM 80 MILLIGRAM(S): 80 TABLET, FILM COATED ORAL at 21:15

## 2021-04-07 RX ADMIN — PANTOPRAZOLE SODIUM 40 MILLIGRAM(S): 20 TABLET, DELAYED RELEASE ORAL at 12:02

## 2021-04-07 RX ADMIN — Medication 8 UNIT(S): at 12:01

## 2021-04-07 RX ADMIN — Medication 5 MILLIGRAM(S): at 21:15

## 2021-04-07 RX ADMIN — Medication 4: at 12:01

## 2021-04-07 RX ADMIN — Medication 8 UNIT(S): at 08:25

## 2021-04-07 RX ADMIN — Medication 50 MILLIGRAM(S): at 05:54

## 2021-04-07 RX ADMIN — MUPIROCIN 1 APPLICATION(S): 20 OINTMENT TOPICAL at 17:36

## 2021-04-07 RX ADMIN — ENOXAPARIN SODIUM 120 MILLIGRAM(S): 100 INJECTION SUBCUTANEOUS at 05:54

## 2021-04-07 RX ADMIN — POLYETHYLENE GLYCOL 3350 17 GRAM(S): 17 POWDER, FOR SOLUTION ORAL at 12:02

## 2021-04-07 RX ADMIN — Medication 2: at 17:35

## 2021-04-07 RX ADMIN — HEPARIN SODIUM 7 UNIT(S)/HR: 5000 INJECTION INTRAVENOUS; SUBCUTANEOUS at 16:26

## 2021-04-07 RX ADMIN — MUPIROCIN 1 APPLICATION(S): 20 OINTMENT TOPICAL at 05:55

## 2021-04-07 RX ADMIN — MEROPENEM 100 MILLIGRAM(S): 1 INJECTION INTRAVENOUS at 05:51

## 2021-04-07 RX ADMIN — PANTOPRAZOLE SODIUM 40 MILLIGRAM(S): 20 TABLET, DELAYED RELEASE ORAL at 05:54

## 2021-04-07 RX ADMIN — INSULIN GLARGINE 24 UNIT(S): 100 INJECTION, SOLUTION SUBCUTANEOUS at 20:47

## 2021-04-07 RX ADMIN — Medication 6 MILLIGRAM(S): at 05:53

## 2021-04-07 RX ADMIN — Medication 8 UNIT(S): at 17:35

## 2021-04-07 RX ADMIN — SENNA PLUS 2 TABLET(S): 8.6 TABLET ORAL at 21:15

## 2021-04-07 NOTE — PROGRESS NOTE ADULT - SUBJECTIVE AND OBJECTIVE BOX
[facility] FOLLOW UP NOTE  --------------------------------------------------------------------------------  24 hour events/subjective: Patient examined. Appears comfortable.    PAST HISTORY  --------------------------------------------------------------------------------  No significant changes to PMH, PSH, FHx, SHx, unless otherwise noted    ALLERGIES & MEDICATIONS  --------------------------------------------------------------------------------  Allergies    No Known Allergies    Standing Inpatient Medications  atorvastatin 80 milliGRAM(s) Oral at bedtime  atropine Injectable 0.5 milliGRAM(s) IntraMuscular once  chlorhexidine 4% Liquid 1 Application(s) Topical <User Schedule>  dexAMETHasone  Injectable 6 milliGRAM(s) IV Push daily  influenza   Vaccine 0.5 milliLiter(s) IntraMuscular once  insulin glargine Injectable (LANTUS) 24 Unit(s) SubCutaneous at bedtime  insulin lispro (ADMELOG) corrective regimen sliding scale   SubCutaneous three times a day before meals  insulin lispro Injectable (ADMELOG) 8 Unit(s) SubCutaneous three times a day before meals  melatonin 5 milliGRAM(s) Oral at bedtime  metoprolol succinate ER 50 milliGRAM(s) Oral daily  mupirocin 2% Nasal 1 Application(s) Nasal two times a day  pantoprazole    Tablet 40 milliGRAM(s) Oral before breakfast  polyethylene glycol 3350 17 Gram(s) Oral daily  senna 2 Tablet(s) Oral at bedtime    PRN Inpatient Medications  acetaminophen   Tablet .. 650 milliGRAM(s) Oral every 6 hours PRN  guaifenesin/dextromethorphan  Syrup 10 milliLiter(s) Oral every 6 hours PRN      VITALS/PHYSICAL EXAM  --------------------------------------------------------------------------------  T(C): 35.8 (04-07-21 @ 08:37), Max: 36.6 (04-06-21 @ 15:41)  HR: 72 (04-07-21 @ 08:37) (61 - 82)  BP: 111/56 (04-07-21 @ 08:37) (96/55 - 111/56)  RR: 20 (04-07-21 @ 09:41) (20 - 20)  SpO2: 95% (04-07-21 @ 09:41) (95% - 100%)    04-06-21 @ 07:01  -  04-07-21 @ 07:00  --------------------------------------------------------  IN: 0 mL / OUT: 2200 mL / NET: -2200 mL      Physical Exam:  	Gen: NAD  	Pulm:  B/L rales  	CV: RRR, S1S2  	Abd: +BS, soft, nontender/nondistended  	: No suprapubic tenderness  	LE: Warm, trace edema    LABS/STUDIES  --------------------------------------------------------------------------------              7.3    7.58  >-----------<  128      [04-07-21 @ 08:06]              23.2     132  |  99  |  37  ----------------------------<  111      [04-07-21 @ 08:06]  4.2   |  21  |  0.9        Ca     7.8     [04-07-21 @ 08:06]      Mg     2.2     [04-07-21 @ 08:06]    TPro  4.8  /  Alb  3.1  /  TBili  2.0  /  DBili  x   /  AST  32  /  ALT  36  /  AlkPhos  67  [04-07-21 @ 08:06]    PT/INR: PT 13.50, INR 1.17       [04-06-21 @ 08:46]  PTT: 41.7       [04-06-21 @ 08:46]      Creatinine Trend:  SCr 0.9 [04-07 @ 08:06]  SCr 1.0 [04-06 @ 08:46]  SCr 1.1 [04-05 @ 06:52]  SCr 1.2 [04-05 @ 00:52]  SCr 1.5 [04-03 @ 08:42]    Urinalysis - [03-30-21 @ 04:27]      Color Yellow / Appearance Slightly Turbid / SG 1.017 / pH 6.5      Gluc Negative / Ketone Negative  / Bili Negative / Urobili <2 mg/dL       Blood Large / Protein Trace / Leuk Est Large / Nitrite Negative       / WBC 84 / Hyaline 25 / Gran  / Sq Epi  / Non Sq Epi 0 / Bacteria Negative      Iron 178, TIBC 305, %sat 58      [03-30-21 @ 07:11]  Ferritin 339      [04-02-21 @ 09:04]  Vitamin D (25OH) 23      [04-03-21 @ 08:42]

## 2021-04-07 NOTE — PROGRESS NOTE ADULT - ASSESSMENT
81yo M c PMHx chronic afib on noac, htn, dm2, ckd3 bladder ca s/p urostomy, b/l hip/ knee replacements obesity, here with acute hypoxic respiratory failure 2/2 severe covid 19 viral pneumonia, lorraine on ckd3 now better, elevated ddimer    Sepsis on admission  Covid viral PNA. acute hypoxic RF  underlying JARETH, [place]  LORRAINE on CKD  Transaminitis  aNEMIA SP TRANSFUSION  Hx of HTN, ASHD, Afib/Eliquis  DM II, CKD  DLD  Bladder ca, sp urostomy, sp cystoprostatectomy, exposure to 911  OA, DDD, DJD, sp BL hip and knee surgeries, mobility dysfunction  GERD, HH, diverticulosis, bowel resection and reanastomosis      pt in CEU, first day of NC 5 L sat well %  elevated [id number]   + LL E  DVT, start Lovenox 120mg B!D  monitor H/H   monitor renal parameters and electrolytes    pt became septic, cultured and started on Maldonado and Caspofungin, WBC 15, BP stable off Levophed and renal parameters improving    ID:  Meropenem 750 q12 and Caspofungin 50mg q 24  urine + E faecalis  Blood NTD  repeat infla parameters. inc fungitel,       Covid ABs are + 85.6  pul-critical care ff  CXR show BL opacities, low lung volumes    elevated DDIMER, CT angio negative for PE  monitor infla parameters q 72 hrs    mild transaminitis  improving  pt is a full code  guarded state   81yo M c PMHx chronic afib on noac, htn, dm2, ckd3 bladder ca s/p urostomy, b/l hip/ knee replacements obesity, here with acute hypoxic respiratory failure 2/2 severe covid 19 viral pneumonia, lorraine on ckd3 now better, elevated ddimer    Sepsis on admission  Covid viral PNA. acute hypoxic RF  underlying JARETH, [place]  LORRAINE on CKD  Transaminitis  Anemia sp transfusion  E coli UTI  Hx of HTN, ASHD, Afib/Eliquis  DM II, CKD  DLD  Bladder ca, sp urostomy, sp cystoprostatectomy, exposure to 911  OA, DDD, DJD, sp BL hip and knee surgeries, mobility dysfunction  GERD, HH, diverticulosis, bowel resection and reanastomosis      pt in CEU, transition to 5 L NC, 100% sat  overall improvement  LL E  DVT, started Lovenox 120mg B!D switch to INV heparin, Plts 111, ff H/H, observe for  bleed   improvement of renal function and LFTs, cont to monitor    ID sp sepsis, Maldonado completed, D/C caspofungin as fungitell nl    pul-critical care ff  CXR shows stable  BL opacities  spirometry  elevated DDIMER, CT angio negative for PE  guarded state

## 2021-04-07 NOTE — PROGRESS NOTE ADULT - ASSESSMENT
79 yo M with PMHx of AFib, on Eliquis, HTN, DMII, CKDIII, hx of Bladder Ca s/p Urostomy, bilateral hip/knee replacements, obesity admitted for acute hypoxemic respiratory failure due to COVID PNA.    Neuro:  - Is AAOx4  - Previously had episodes of diffuse tremors, non responsiveness with spont. resolution, likely due to acute sepsis, now resolved  - CT Head, EEG was negative    Pulmonary:  #Acute Hypoxemic Respiratory Failure secondary to COVID PNA  - CXR revealed stable bilateral opacities  - Saturating 100% on O2 NC 5L    Cardiovascular:   #Hx of Paroxysmal AFib on Eliquis  #CHADSVASC Score: 4  #HTN  - Has been rate controlled, on Metoprolol Succinate 100 mg once daily, although has been bradycardic to 40s, asymptomatic  - Dec Toprol to 50 mg once daily with improvement of HR, hold for HR<60, PRN Atropine for HR < 35 or symptomatic bradycardia  - Eliquis has been on hold, was receiving Lovenox, will change to Heparin drip due to high risk for acute bleeding  - Takes Amlodipine at home, BP has been well controlled, on hold for now    GI:  - C/w PPI for GI ppx  - Was previously suspected to have underlying GI bleed due to acute Hb drop, however has not had any episodes of melena, hematochezia, stool guaiac negative  - C/w regular diet, has been eating majority of most meals    Endocrine:  #Hx of DMII  - FS better controlled  - C/w Lantus 24 U at bedtime, Lispro 8 U before meals    Renal:   #HX of Bladder Ca, s/p cystoprostatectomy with urostomy  #LORRAINE due to LINDSAY - resolved  #CKD III - stable  #Episodic hematuria with blood clots - now resolved  - Nephrology following  - Cr has been stable  - Urine in urostomy bag appears clear, site appears clean and intact    Heme Onc:  #Acute anemia (baseline Hb 13)  #Acute thrombocytopenia  #Acute R popliteal vein DVT  - Hb had gradually dropped from 13 to 6.7, requiring 1 U pRBCs transfusion; has now been stable ~7  - Hematuria now resolved, no melena, hematochezia, stool guaiac negative  - CT Abd negative for retroperitoneal bleed  - Hemolysis workup negative  - Plt ct dropped to < 100, HIT penal within normal limits  - Likely due to BM suppression in setting of sepsis, DIC unlikely as pt has no acute bleeding from mucosal sites, fibrinogen was borderline low (199), PT/PTT only slightly elevated   - Resumed of full dose Lovenox, will change to Heparin drip as pt is high risk for bleeding  - Cont to monitor Hb, goal > 7, maintain active T+S    Infectious Disease:   #COVID PNA  #Sepsis of unclear source - now resolved  - Remains on Dexamethasone 6 mg once daily, s/p Toci 3/23  - Had developed hypotension, hypothermia, elevated lactate - now resolved  - Received Vanco, Cefepime x1, initiated on Caspofungin, now d/c'd as fungitell within normal limits, remains on Meropenem (today is day #8), will d/c  - ID following  - Blood Cx NGTD, Urine Cx revealed gut kike as pt has urostomy  - Sputum Cx - normal resp kkie     81 yo M with PMHx of AFib, on Eliquis, HTN, DMII, CKDIII, hx of Bladder Ca s/p Urostomy, bilateral hip/knee replacements, obesity admitted for acute hypoxemic respiratory failure due to COVID PNA.    Neuro:  - Is AAOx4  - Previously had episodes of diffuse tremors, non responsiveness with spont. resolution, likely due to acute sepsis, now resolved  - CT Head, EEG was negative    Pulmonary:  #Acute Hypoxemic Respiratory Failure secondary to COVID PNA  - CXR revealed stable bilateral opacities  - Saturating 100% on O2 NC 5L    Cardiovascular:   #Hx of Paroxysmal AFib on Eliquis  #CHADSVASC Score: 4  #HTN  - Has been rate controlled, on Metoprolol Succinate 100 mg once daily, although has been bradycardic to 40s, asymptomatic  - Dec Toprol to 50 mg once daily with improvement of HR, hold for HR<60, PRN Atropine for HR < 35 or symptomatic bradycardia  - Eliquis has been on hold, was receiving Lovenox, will change to Heparin drip due to high risk for acute bleeding  - Takes Amlodipine at home, BP has been well controlled, on hold for now    GI:  - C/w PPI for GI ppx  - Was previously suspected to have underlying GI bleed due to acute Hb drop, however has not had any episodes of melena, hematochezia, stool guaiac negative  - C/w regular diet, has been eating majority of most meals    Endocrine:  #Hx of DMII  - FS better controlled  - C/w Lantus 24 U at bedtime, Lispro 8 U before meals    Renal:   #HX of Bladder Ca, s/p cystoprostatectomy with urostomy  #LORRAINE due to LINDSAY - resolved  #CKD III - stable  #Episodic hematuria with blood clots - now resolved  - Nephrology following  - Cr has been stable  - Urine in urostomy bag appears clear, site appears clean and intact    Heme Onc:  #Acute anemia (baseline Hb 13)  #Acute thrombocytopenia  #Acute R popliteal vein DVT  - Hb had gradually dropped from 13 to 6.7, requiring 1 U pRBCs transfusion; has now been stable ~7  - Hematuria now resolved, no melena, hematochezia, stool guaiac negative  - CT Abd negative for retroperitoneal bleed  - Hemolysis workup negative  - Plt ct dropped to < 100, HIT penal within normal limits  - Likely due to BM suppression in setting of sepsis, DIC unlikely as pt has no acute bleeding from mucosal sites, fibrinogen was borderline low (199), PT/PTT only slightly elevated   - Resumed of full dose Lovenox, will change to Heparin drip as pt is high risk for bleeding  - Cont to monitor Hb, goal > 7, maintain active T+S    Infectious Disease:   #COVID PNA  #Sepsis of unclear source - now resolved  - Remains on Dexamethasone 6 mg once daily, s/p Toci 3/23  - Had developed hypotension, hypothermia, elevated lactate - now resolved  - Received Vanco, Cefepime x1, initiated on Caspofungin, now d/c'd as fungitell within normal limits, remains on Meropenem (today is day #8), will d/c  - ID following  - Blood Cx NGTD, Urine Cx revealed gut kike as pt has urostomy  - Sputum Cx - normal resp kike    Spoke to son, Juancho ([phone]), provided medical update and answered all questions. 81 yo M with PMHx of AFib, on Eliquis, HTN, DMII, CKDIII, hx of Bladder Ca s/p Urostomy, bilateral hip/knee replacements, obesity admitted for acute hypoxemic respiratory failure due to COVID PNA.    Neuro:  - Is AAOx4  - Previously had episodes of diffuse tremors, non responsiveness with spont. resolution, likely due to metabolic encephalopathy in setting of acute sepsis, now resolved  - CT Head, EEG was negative    Pulmonary:  #Acute Hypoxemic Respiratory Failure secondary to COVID PNA  - CXR revealed stable bilateral opacities  - Saturating 100% on O2 NC 5L    Cardiovascular:   #Hx of Paroxysmal AFib on Eliquis  #CHADSVASC Score: 4  #HTN  - Has been rate controlled, on Metoprolol Succinate 100 mg once daily, although has been bradycardic to 40s, asymptomatic  - Dec Toprol to 50 mg once daily with improvement of HR, hold for HR<60, PRN Atropine for HR < 35 or symptomatic bradycardia  - Eliquis has been on hold, was receiving Lovenox, will change to Heparin drip due to high risk for acute bleeding  - Takes Amlodipine at home, BP has been well controlled, on hold for now    GI:  - C/w PPI for GI ppx  - Was previously suspected to have underlying GI bleed due to acute Hb drop, however has not had any episodes of melena, hematochezia, stool guaiac negative  - C/w regular diet, has been eating majority of most meals    Endocrine:  #Hx of DMII  - FS better controlled  - C/w Lantus 24 U at bedtime, Lispro 8 U before meals    Renal:   #HX of Bladder Ca, s/p cystoprostatectomy with urostomy  #LORRAINE due to LINDSAY - resolved  #CKD III - stable  #Episodic hematuria with blood clots - now resolved  - Nephrology following  - Cr has been stable  - Urine in urostomy bag appears clear, site appears clean and intact    Heme Onc:  #Acute anemia (baseline Hb 13)  #Acute thrombocytopenia  #Acute R popliteal vein DVT  - Hb had gradually dropped from 13 to 6.7, requiring 1 U pRBCs transfusion; has now been stable ~7  - Hematuria now resolved, no melena, hematochezia, stool guaiac negative  - CT Abd negative for retroperitoneal bleed  - Hemolysis workup negative  - Plt ct dropped to < 100, HIT penal within normal limits  - Likely due to BM suppression in setting of sepsis, DIC unlikely as pt has no acute bleeding from mucosal sites, fibrinogen was borderline low (199), PT/PTT only slightly elevated   - Resumed of full dose Lovenox, will change to Heparin drip as pt is high risk for bleeding  - Cont to monitor Hb, goal > 7, maintain active T+S    Infectious Disease:   #COVID PNA  #Sepsis of unclear source - now resolved  - Remains on Dexamethasone 6 mg once daily, s/p Toci 3/23  - Had developed hypotension, hypothermia, elevated lactate - now resolved  - Received Vanco, Cefepime x1, initiated on Caspofungin, now d/c'd as fungitell within normal limits, remains on Meropenem (today is day #8), will d/c  - ID following  - Blood Cx NGTD, Urine Cx revealed gut kike as pt has urostomy  - Sputum Cx - normal resp kike    Spoke to son, Juancho ([phone]), provided medical update and answered all questions.

## 2021-04-07 NOTE — PROGRESS NOTE ADULT - SUBJECTIVE AND OBJECTIVE BOX
OVERNIGHT EVENTS: events noted, on NC 5L, LE doppler reviewed    Vital Signs Last 24 Hrs  T(C): 36.1 (07 Apr 2021 06:23), Max: 36.6 (06 Apr 2021 15:41)  T(F): 97 (07 Apr 2021 06:23), Max: 97.8 (06 Apr 2021 15:41)  HR: 61 (07 Apr 2021 06:23) (55 - 82)  BP: 110/65 (07 Apr 2021 06:23) (96/55 - 130/61)  RR: 20 (07 Apr 2021 06:23) (20 - 20)  SpO2: 98% (07 Apr 2021 06:23) (92% - 100%)    PHYSICAL EXAMINATION:    GENERAL: The patient is awake and alert in no apparent distress.     HEENT: Head is normocephalic and atraumatic. Extraocular muscles are intact. Mucous membranes are moist.    NECK: Supple.    LUNGS: l basilar crackles    HEART: Regular rate and rhythm without murmur.    ABDOMEN: Soft, nontender, and nondistended.      EXTREMITIES: Without any cyanosis, clubbing, rash, lesions or edema.    NEUROLOGIC: Grossly intact.    SKIN: No ulceration or induration present.      LABS:                        7.4    8.81  )-----------( 116      ( 06 Apr 2021 17:59 )             23.8     04-06    133<L>  |  103  |  32<H>  ----------------------------<  99  4.5   |  21  |  1.0    Ca    7.5<L>      06 Apr 2021 08:46  Mg     2.2     04-06    TPro  4.5<L>  /  Alb  2.9<L>  /  TBili  2.1<H>  /  DBili  x   /  AST  39  /  ALT  36  /  AlkPhos  68  04-06    PT/INR - ( 06 Apr 2021 08:46 )   PT: 13.50 sec;   INR: 1.17 ratio         PTT - ( 06 Apr 2021 08:46 )  PTT:41.7 sec                      04-06-21 @ 07:01  -  04-07-21 @ 07:00  --------------------------------------------------------  IN: 0 mL / OUT: 2200 mL / NET: -2200 mL        MICROBIOLOGY:  Culture Results:   Normal Respiratory Abeba present (04-05 @ 09:08)      MEDICATIONS  (STANDING):  atorvastatin 80 milliGRAM(s) Oral at bedtime  atropine Injectable 0.5 milliGRAM(s) IntraMuscular once  chlorhexidine 4% Liquid 1 Application(s) Topical <User Schedule>  dexAMETHasone  Injectable 6 milliGRAM(s) IV Push daily  enoxaparin Injectable 120 milliGRAM(s) SubCutaneous every 12 hours  influenza   Vaccine 0.5 milliLiter(s) IntraMuscular once  insulin glargine Injectable (LANTUS) 24 Unit(s) SubCutaneous at bedtime  insulin lispro (ADMELOG) corrective regimen sliding scale   SubCutaneous three times a day before meals  insulin lispro Injectable (ADMELOG) 8 Unit(s) SubCutaneous three times a day before meals  melatonin 5 milliGRAM(s) Oral at bedtime  meropenem  IVPB 1000 milliGRAM(s) IV Intermittent every 8 hours  metoprolol succinate ER 50 milliGRAM(s) Oral daily  mupirocin 2% Nasal 1 Application(s) Nasal two times a day  pantoprazole  Injectable 40 milliGRAM(s) IV Push every 12 hours  polyethylene glycol 3350 17 Gram(s) Oral daily  senna 2 Tablet(s) Oral at bedtime    MEDICATIONS  (PRN):  acetaminophen   Tablet .. 650 milliGRAM(s) Oral every 6 hours PRN Temp greater or equal to 38C (100.4F), Mild Pain (1 - 3)  guaifenesin/dextromethorphan  Syrup 10 milliLiter(s) Oral every 6 hours PRN Cough      RADIOLOGY & ADDITIONAL STUDIES:

## 2021-04-07 NOTE — PROGRESS NOTE ADULT - SUBJECTIVE AND OBJECTIVE BOX
TIANNAROSA MARIA CUMMINS  80y, Male    All available historical data reviewed    OVERNIGHT EVENTS:  no fevers  feels well and has no complaints  955 NC 5 LIT    ROS:  General: Denies rigors, nightsweats  HEENT: Denies headache, rhinorrhea, sore throat, eye pain  CV: Denies CP, palpitations  PULM: Denies wheezing, hemoptysis  GI: Denies hematemesis, hematochezia, melena  : Denies discharge, hematuria  MSK: Denies arthralgias, myalgias  SKIN: Denies rash, lesions  NEURO: Denies paresthesias, weakness  PSYCH: Denies depression, anxiety    VITALS:  T(F): 96.4, Max: 97.8 (04-06-21 @ 15:41)  HR: 72  BP: 111/56  RR: 20Vital Signs Last 24 Hrs  T(C): 35.8 (07 Apr 2021 08:37), Max: 36.6 (06 Apr 2021 15:41)  T(F): 96.4 (07 Apr 2021 08:37), Max: 97.8 (06 Apr 2021 15:41)  HR: 72 (07 Apr 2021 08:37) (55 - 82)  BP: 111/56 (07 Apr 2021 08:37) (96/55 - 123/57)  BP(mean): 77 (07 Apr 2021 08:37) (77 - 77)  RR: 20 (07 Apr 2021 09:41) (20 - 20)  SpO2: 95% (07 Apr 2021 09:41) (95% - 100%)    TESTS & MEASUREMENTS:                        7.3    7.58  )-----------( 128      ( 07 Apr 2021 08:06 )             23.2     04-07    132<L>  |  99  |  37<H>  ----------------------------<  111<H>  4.2   |  21  |  0.9    Ca    7.8<L>      07 Apr 2021 08:06  Mg     2.2     04-07    TPro  4.8<L>  /  Alb  3.1<L>  /  TBili  2.0<H>  /  DBili  x   /  AST  32  /  ALT  36  /  AlkPhos  67  04-07    LIVER FUNCTIONS - ( 07 Apr 2021 08:06 )  Alb: 3.1 g/dL / Pro: 4.8 g/dL / ALK PHOS: 67 U/L / ALT: 36 U/L / AST: 32 U/L / GGT: x             Culture - Sputum (collected 04-05-21 @ 09:08)  Source: .Sputum Sputum  Gram Stain (04-06-21 @ 07:06):    Rare polymorphonuclear leukocytes per low power field    Few Squamous epithelial cells per low power field    Numerous Gram positive cocci in pairs seen per oil power field    Moderate Yeast like cells seen per oil power field  Preliminary Report (04-06-21 @ 20:27):    Normal Respiratory Abeba present    Culture - Blood (collected 03-31-21 @ 12:09)  Source: .Blood None  Final Report (04-05-21 @ 23:01):    No Growth Final            RADIOLOGY & ADDITIONAL TESTS:  Personal review of radiological diagnostics performed  Echo and EKG results noted when applicable.     MEDICATIONS:  acetaminophen   Tablet .. 650 milliGRAM(s) Oral every 6 hours PRN  atorvastatin 80 milliGRAM(s) Oral at bedtime  atropine Injectable 0.5 milliGRAM(s) IntraMuscular once  chlorhexidine 4% Liquid 1 Application(s) Topical <User Schedule>  dexAMETHasone  Injectable 6 milliGRAM(s) IV Push daily  guaifenesin/dextromethorphan  Syrup 10 milliLiter(s) Oral every 6 hours PRN  influenza   Vaccine 0.5 milliLiter(s) IntraMuscular once  insulin glargine Injectable (LANTUS) 24 Unit(s) SubCutaneous at bedtime  insulin lispro (ADMELOG) corrective regimen sliding scale   SubCutaneous three times a day before meals  insulin lispro Injectable (ADMELOG) 8 Unit(s) SubCutaneous three times a day before meals  melatonin 5 milliGRAM(s) Oral at bedtime  metoprolol succinate ER 50 milliGRAM(s) Oral daily  mupirocin 2% Nasal 1 Application(s) Nasal two times a day  pantoprazole    Tablet 40 milliGRAM(s) Oral before breakfast  polyethylene glycol 3350 17 Gram(s) Oral daily  senna 2 Tablet(s) Oral at bedtime      ANTIBIOTICS:

## 2021-04-07 NOTE — PROGRESS NOTE ADULT - SUBJECTIVE AND OBJECTIVE BOX
ROSA MARIA CASEY  Patient is a 80y old  Male who presents with a chief complaint of acute hypoxic RF due to Covid Viral Syndrome. Underlying Hx of DMII, CKD,  DLD, MO,  JARETH, bladder ca, sp urostomy, Hx of 911 exposure), OA, DDD, DJD, GERD, diverticulosis, abd wall hernias. bowel resection and reanastomosis.      Overnight events:  pt in CEU, HFNC transitioned to NC 5 L  pulse ox 100%, WBC 8.8,  on Meropenem 1gm q 8 and caspofungin will be D/C  BUN/creat 32/1.0 , + RLE DVT on Lovenox    Vital Signs:   T:  97.6  HR:  82  BP:  105/59    RR: 20  Pulse ox:  HFNC 50/40   93%, today 5l %        PHYSICAL EXAM:  GENERAL: A&O but weak and sluggish, overweight, NAD, first day of NC 5L  Neck:  short,  thick but supple, no JVD, no bruit, no stridor  Lungs:  shallow resp, scattered rhonchi, no wheezing, improving air entry  Abd:   distended soft and benign, + BS  Urinary:  RLQ urostomy  Ext:  arthritic changes, moves all limbs  Skin:  no rash  Psych stable    LABS:                        7.4  8.8-----------( 116            23.8    133 |  103 | 32  ----------------------------<99  4.5   21    1.0    GFR 64, 58, 72, 64, 35, 26, 30, 35, 64, 72  Ca    8.8       Phos  3.4      Mg     2.0, 2.6, 3.0, 2.8, 2.2      MB 63    trop <0.01 x2  ferritin 535, 462, 737, 339  procal 0.26, 0.11, 0.07  CRP 25, 180. <3  LDH  515  fibrinogen 199  DDIMER 4298  COVID AB + 85.60  3/30 LA 5.1,  3.7    TPro  5.9,  5.1/  Alb  3.2,  3.1  /  TBili  0.9  /  DBili  x   /  AST  45, 88, 88  /  ALT  53, 93, 99  /  AlkPhos  43  03-14      Urinalysis Basic - ( 12 Mar 2021 16:38 )    Color: Yellow / Appearance: Slightly Turbid / S.020 / pH: x  Gluc: x / Ketone: Negative  / Bili: Negative / Urobili: 3 mg/dL   Blood: x / Protein: 30 mg/dL / Nitrite: Negative   Leuk Esterase: Moderate / RBC: 5 /HPF / WBC 90 /HPF   Sq Epi: x / Non Sq Epi: 0 /HPF / Bacteria: Ma       RADIOLOGY & ADDITIONAL TESTS:  Venous duplex:  negative for DVT  CXR:  BL opacities L>R  CXR:  stable BL opacities    CT angio:  neg for central PE    CT abd/pelvis:  BIbasilar densities, hepatobiliary/spleen/pancreas WNL, mild BL adrenal thickening, no hydro, bl renal cysts, bl layering densities/ sm stones, "milk of ca", post cystoprostatectomy and ilealconduit, , post bowel resection and reanastomosis, ventral abd wall hernias, bl hip replacements    CTH 3/30:  moderate atrophy, chronic microvascular changes,  no evidence of acute pathology    EEG 3/30 gen slowing , no focal epileptiform activity    Venous duplex of lower ext:  + RLE popliteal vein thrombosis       ROSA MARIA CASEY  Patient is a 80y old  Male who presents with a chief complaint of acute hypoxic RF due to Covid Viral Syndrome. Underlying Hx of DMII, CKD,  DLD, MO,  JARETH, bladder ca, sp urostomy, Hx of 911 exposure), OA, DDD, DJD, GERD, diverticulosis, abd wall hernias. bowel resection and reanastomosis.      Overnight events:  pt in CEU, + clinical improvement , HFNC transitioned to NC 5 L  pulse ox 100%, WBC 7.3,  meropenem completed and D/C caspofungin as fungitell nl,   renal function improved BUN/creat 37/0.9 , + RLE DVT Lovenox switched to IV heparin, Plts 128, observe for bleed, H/H 7.3/ trans fuse as needed    Vital Signs:   T:  96.6  HR:  68  BP:  98//59    RR: 20  Pulse ox: 5l %        PHYSICAL EXAM:  GENERAL: A&O, debilitated but in NAD + NC  Neck:  short,  thick but supple, no JVD, no bruit, no stridor  Lungs:  shallow resp, scattered rhonchi, no wheezing, improving air entry  Abd:   distended soft and benign, + BS  Urinary:  RLQ urostomy  Ext:  arthritic changes, moves all limbs  Skin:  no rash  Psych stable    LABS:                        7.3  7.5-----------( 128            23    132 |  99 | 37  ----------------------------<111  4.2   21    0.9    GFR 64, 58, 72, 64, 35, 26, 30, 35, 64, 72, 82  Ca    8.8       Phos  3.4      Mg     2.0, 2.6, 3.0, 2.8, 2.2      MB 63    trop <0.01 x2  ferritin 535, 462, 737, 339  procal 0.26, 0.11, 0.07  CRP 25, 180. <3  LDH  515  fibrinogen 199  DDIMER 4298  COVID AB + 85.60  3/30 LA 5.1,  3.7    TPro  5.9,  5.1/  Alb  3.2,  3.1  /  TBili  0.9  /  DBili  x   /  AST  45, 88, 88, 32  /  ALT  53, 93, 99, 36  /  AlkPhos  43  03-14      Urinalysis Basic - ( 12 Mar 2021 16:38 )    Color: Yellow / Appearance: Slightly Turbid / S.020 / pH: x  Gluc: x / Ketone: Negative  / Bili: Negative / Urobili: 3 mg/dL   Blood: x / Protein: 30 mg/dL / Nitrite: Negative   Leuk Esterase: Moderate / RBC: 5 /HPF / WBC 90 /HPF   Sq Epi: x / Non Sq Epi: 0 /HPF / Bacteria: Ma       RADIOLOGY & ADDITIONAL TESTS:  Venous duplex:  negative for DVT  CXR:  BL opacities L>R  CXR:  stable BL opacities    CT angio:  neg for central PE    CT abd/pelvis:  BIbasilar densities, hepatobiliary/spleen/pancreas WNL, mild BL adrenal thickening, no hydro, bl renal cysts, bl layering densities/ sm stones, "milk of ca", post cystoprostatectomy and ilealconduit, , post bowel resection and reanastomosis, ventral abd wall hernias, bl hip replacements    CTH 3/30:  moderate atrophy, chronic microvascular changes,  no evidence of acute pathology    EEG 3/30 gen slowing , no focal epileptiform activity    Venous duplex of lower ext:  + RLE popliteal vein thrombosis

## 2021-04-07 NOTE — PROGRESS NOTE ADULT - ASSESSMENT
· Assessment	  79yo male with PMH of hypertension, hyperlipidemia, diabetes mellitus type 2, atrial fibrillation on Eliquis, CKD stage 3A, bladder cancer s/p cystectomy with urostomy, and bilateral hip and knee arthroplasties who was brought in by EMS for hypoxia. Patient had tested positive for COVID-19 on 3/4/2021, but symptoms did not begin until 3/6/2021. He was found to be hypoxic to 85% on room air.    IMPRESSION;  Clinically stable / improved and has no complaints. Normalization of WBC,renal failure resolved  #COVID 19 with severe illness. SpO2 < 94% on RA and need for supplemental O2. Off HFNC    Pt was in the late inflammatory response phase ot the illness based on the onset of symptoms.    CXR opacities b/l    CT A 3/30 : no RPH. Opacity LLL > no change compared with 3/26    LLL bacterial PNA. Fungal etiology cannot be r/o  Ddimer 3121 >4298  Ferritin 730    procal 0.31  ARF > resolved . . Metabolic encephalopathy> resolved  Clinically no ongoing  PNA although CT shows opacity LLL. Being treated as such  No  infection. Has a urostomy  UCx 3/30 Enterococci : represents gut kike from urostomy  Hx of bladder ca s/p cystectomy   : no acute urologic intervention at this time  BCx 3/30,31, 4/1   NGTD    - s/p 3/23 Tocilizumab >90kg 800mg x1     RECOMMENDATIONS;  d/c Meropenem 1 gm iv q8h  d/c Caspofungin  50 mg iv q24h  recall prn please

## 2021-04-07 NOTE — PROGRESS NOTE ADULT - SUBJECTIVE AND OBJECTIVE BOX
Patient Information:  ROSA MARIA CASEY / 80y / Male / MRN#:[id number]    Hospital Day: 26d    Interval History:  Patient seen and examined at bedside. No acute events overnight. Pt sitting up in bed, on O2 NC 5L this morning, saturating 100%. States that he feels well, wishes to participate with PT today.    Past Medical History:  Hypertension    Hyperlipidemia    Diabetes mellitus, type 2    History of atrial fibrillation    Bladder cancer    Chronic kidney disease (CKD)      Past Surgical History:  History of total hip replacement, bilateral    History of total knee replacement, bilateral    History of total cystectomy      Allergies:  No Known Allergies    Medications:  PRN:  acetaminophen   Tablet .. 650 milliGRAM(s) Oral every 6 hours PRN Temp greater or equal to 38C (100.4F), Mild Pain (1 - 3)  guaifenesin/dextromethorphan  Syrup 10 milliLiter(s) Oral every 6 hours PRN Cough    Standing:  atorvastatin 80 milliGRAM(s) Oral at bedtime  atropine Injectable 0.5 milliGRAM(s) IntraMuscular once  chlorhexidine 4% Liquid 1 Application(s) Topical <User Schedule>  dexAMETHasone  Injectable 6 milliGRAM(s) IV Push daily  influenza   Vaccine 0.5 milliLiter(s) IntraMuscular once  insulin glargine Injectable (LANTUS) 24 Unit(s) SubCutaneous at bedtime  insulin lispro (ADMELOG) corrective regimen sliding scale   SubCutaneous three times a day before meals  insulin lispro Injectable (ADMELOG) 8 Unit(s) SubCutaneous three times a day before meals  melatonin 5 milliGRAM(s) Oral at bedtime  metoprolol succinate ER 50 milliGRAM(s) Oral daily  mupirocin 2% Nasal 1 Application(s) Nasal two times a day  pantoprazole  Injectable 40 milliGRAM(s) IV Push every 12 hours  polyethylene glycol 3350 17 Gram(s) Oral daily  senna 2 Tablet(s) Oral at bedtime    Home:  amlodipine-benazepril 10 mg-40 mg oral capsule: 1 cap(s) orally once a day  Eliquis 5 mg oral tablet: 1 tab(s) orally 2 times a day  glimepiride 2 mg oral tablet: 1 tab(s) orally once a day  Lantus 100 units/mL subcutaneous solution: 40 unit(s) subcutaneous once a day  metoprolol succinate 100 mg oral tablet, extended release: 1 tab(s) orally once a day  Ozempic (1 mg dose) 2 mg/1.5 mL subcutaneous solution:   Percocet 7.5/325 oral tablet: 0.5 tab(s) orally once a day (at bedtime)  potassium citrate 10 mEq oral tablet, extended release: 1 tab(s) orally once a day  rosuvastatin 20 mg oral tablet: 1 tab(s) orally once a day    Vitals:  T(C): 35.8, Max: 36.6 (04-06-21 @ 15:41)  T(F): 96.4, Max: 97.8 (04-06-21 @ 15:41)  HR: 72 (55 - 82)  BP: 111/56 (96/55 - 123/57)  RR: 20 (20 - 20)  SpO2: 98% (98% - 100%)    Physical Exam:  General: Awake, alert, NAD, sitting up in bed, on O2 NC 5L  HEENT: Head NC/AT  Heart: RRR; systolic murmur apprec  Lungs: Good air entry bilaterally  Abdomen: Soft, nontender, nondistended, BS+, urostomy bag in place, appears clean, intact  Extremities: Venous stasis skin changes of LE bilaterally  Neuro: AAOx3, NFD    Labs:  CBC (04-06 @ 17:59)                        Hgb: 7.4    WBC: 8.81  )-----------------( Plts: 116                              Hct: 23.8     Chem (04-06 @ 08:46)  Na: 133  |     Cl: 103     |  BUN: 32  -----------------------------------------< Gluc: 99    K: 4.5   |    HCO3: 21  |  Cr: 1.0    Ca 7.5 (04-06 @ 08:46)  Mg 2.2 (04-06 @ 08:46)    LFTs (04-06 @ 08:46)  TPro 4.5  /  Alb 2.9  TBili 2.1  /  DBili     AST 39  /  ALT 36  /  AlkPhos 68        PT/INR (04-06 @ 08:46)  PT: 13.50; INR: 1.17   PTT: 41.7             Microbiology:  Culture - Sputum (collected 04-05 @ 09:08)  Source: .Sputum Sputum  Gram Stain (04-06 @ 07:06):    Rare polymorphonuclear leukocytes per low power field    Few Squamous epithelial cells per low power field    Numerous Gram positive cocci in pairs seen per oil power field    Moderate Yeast like cells seen per oil power field  Preliminary Report (04-06 @ 20:27):    Normal Respiratory Abeba present    Culture - Blood (collected 03-31 @ 12:09)  Source: .Blood None  Final Report (04-05 @ 23:01):    No Growth Final

## 2021-04-07 NOTE — PROGRESS NOTE ADULT - ASSESSMENT
IMPRESSION:      Acute hypoxemic respiratory failure   Severe COVID-19 PNA SP Toci   Doubt superimposed bacterial infection  LORRAINE on CKD 3 improving   Probable JARETH  anemia / thrombocytopenia / HIT neg    PLAN:    CNS: Avoid CNS depressants    HEENT: Oral care    PULMONARY:  HOB @ 45 degrees.  Aspiration precautions. Target SpO2 88 to 94%, wean oxygen as tolerated.  Dexamethasone 6mg Q24     CARDIOVASCULAR: Avoid volume overload.     GI: GI prophylaxis. Feeding as tolerated  Bowel regimen.      RENAL:  Follow up lytes.  Correct as needed.     INFECTIOUS DISEASE: FU PanCx.  Fungitell -    HEMATOLOGICAL:  DVT therapy. serial cbc  Hit -, would change to heparin IV an monitor for bleed, vascular eval      ENDOCRINE:  Follow up FS.     MUSCULOSKELETAL: OOB to chair     poor prognosis

## 2021-04-08 LAB
ALBUMIN SERPL ELPH-MCNC: 2.7 G/DL — LOW (ref 3.5–5.2)
ALP SERPL-CCNC: 58 U/L — SIGNIFICANT CHANGE UP (ref 30–115)
ALT FLD-CCNC: 27 U/L — SIGNIFICANT CHANGE UP (ref 0–41)
ANION GAP SERPL CALC-SCNC: 12 MMOL/L — SIGNIFICANT CHANGE UP (ref 7–14)
APTT BLD: 57.7 SEC — HIGH (ref 27–39.2)
AST SERPL-CCNC: 25 U/L — SIGNIFICANT CHANGE UP (ref 0–41)
BASOPHILS # BLD AUTO: 0 K/UL — SIGNIFICANT CHANGE UP (ref 0–0.2)
BASOPHILS # BLD AUTO: 0.02 K/UL — SIGNIFICANT CHANGE UP (ref 0–0.2)
BASOPHILS NFR BLD AUTO: 0 % — SIGNIFICANT CHANGE UP (ref 0–1)
BASOPHILS NFR BLD AUTO: 0.2 % — SIGNIFICANT CHANGE UP (ref 0–1)
BILIRUB SERPL-MCNC: 1.4 MG/DL — HIGH (ref 0.2–1.2)
BUN SERPL-MCNC: 49 MG/DL — HIGH (ref 10–20)
CALCIUM SERPL-MCNC: 7.4 MG/DL — LOW (ref 8.5–10.1)
CHLORIDE SERPL-SCNC: 100 MMOL/L — SIGNIFICANT CHANGE UP (ref 98–110)
CO2 SERPL-SCNC: 18 MMOL/L — SIGNIFICANT CHANGE UP (ref 17–32)
CREAT SERPL-MCNC: 1 MG/DL — SIGNIFICANT CHANGE UP (ref 0.7–1.5)
EOSINOPHIL # BLD AUTO: 0.05 K/UL — SIGNIFICANT CHANGE UP (ref 0–0.7)
EOSINOPHIL # BLD AUTO: 0.09 K/UL — SIGNIFICANT CHANGE UP (ref 0–0.7)
EOSINOPHIL NFR BLD AUTO: 0.6 % — SIGNIFICANT CHANGE UP (ref 0–8)
EOSINOPHIL NFR BLD AUTO: 1.3 % — SIGNIFICANT CHANGE UP (ref 0–8)
GLUCOSE BLDC GLUCOMTR-MCNC: 111 MG/DL — HIGH (ref 70–99)
GLUCOSE BLDC GLUCOMTR-MCNC: 144 MG/DL — HIGH (ref 70–99)
GLUCOSE BLDC GLUCOMTR-MCNC: 181 MG/DL — HIGH (ref 70–99)
GLUCOSE BLDC GLUCOMTR-MCNC: 277 MG/DL — HIGH (ref 70–99)
GLUCOSE SERPL-MCNC: 250 MG/DL — HIGH (ref 70–99)
HCT VFR BLD CALC: 17.5 % — LOW (ref 42–52)
HCT VFR BLD CALC: 21.8 % — LOW (ref 42–52)
HCT VFR BLD CALC: 25.5 % — LOW (ref 42–52)
HGB BLD-MCNC: 5.6 G/DL — CRITICAL LOW (ref 14–18)
HGB BLD-MCNC: 6.8 G/DL — CRITICAL LOW (ref 14–18)
HGB BLD-MCNC: 8.4 G/DL — LOW (ref 14–18)
IMM GRANULOCYTES NFR BLD AUTO: 1.2 % — HIGH (ref 0.1–0.3)
IMM GRANULOCYTES NFR BLD AUTO: 1.5 % — HIGH (ref 0.1–0.3)
LDH SERPL L TO P-CCNC: 380 U/L — HIGH (ref 50–242)
LYMPHOCYTES # BLD AUTO: 0.57 K/UL — LOW (ref 1.2–3.4)
LYMPHOCYTES # BLD AUTO: 1.17 K/UL — LOW (ref 1.2–3.4)
LYMPHOCYTES # BLD AUTO: 13.9 % — LOW (ref 20.5–51.1)
LYMPHOCYTES # BLD AUTO: 8.3 % — LOW (ref 20.5–51.1)
MAGNESIUM SERPL-MCNC: 2 MG/DL — SIGNIFICANT CHANGE UP (ref 1.8–2.4)
MCHC RBC-ENTMCNC: 28.8 PG — SIGNIFICANT CHANGE UP (ref 27–31)
MCHC RBC-ENTMCNC: 29 PG — SIGNIFICANT CHANGE UP (ref 27–31)
MCHC RBC-ENTMCNC: 29.3 PG — SIGNIFICANT CHANGE UP (ref 27–31)
MCHC RBC-ENTMCNC: 31.2 G/DL — LOW (ref 32–37)
MCHC RBC-ENTMCNC: 32 G/DL — SIGNIFICANT CHANGE UP (ref 32–37)
MCHC RBC-ENTMCNC: 32.9 G/DL — SIGNIFICANT CHANGE UP (ref 32–37)
MCV RBC AUTO: 88.9 FL — SIGNIFICANT CHANGE UP (ref 80–94)
MCV RBC AUTO: 90.7 FL — SIGNIFICANT CHANGE UP (ref 80–94)
MCV RBC AUTO: 92.4 FL — SIGNIFICANT CHANGE UP (ref 80–94)
MONOCYTES # BLD AUTO: 0.22 K/UL — SIGNIFICANT CHANGE UP (ref 0.1–0.6)
MONOCYTES # BLD AUTO: 0.71 K/UL — HIGH (ref 0.1–0.6)
MONOCYTES NFR BLD AUTO: 3.2 % — SIGNIFICANT CHANGE UP (ref 1.7–9.3)
MONOCYTES NFR BLD AUTO: 8.4 % — SIGNIFICANT CHANGE UP (ref 1.7–9.3)
NEUTROPHILS # BLD AUTO: 5.9 K/UL — SIGNIFICANT CHANGE UP (ref 1.4–6.5)
NEUTROPHILS # BLD AUTO: 6.37 K/UL — SIGNIFICANT CHANGE UP (ref 1.4–6.5)
NEUTROPHILS NFR BLD AUTO: 75.7 % — HIGH (ref 42.2–75.2)
NEUTROPHILS NFR BLD AUTO: 85.7 % — HIGH (ref 42.2–75.2)
NRBC # BLD: 0 /100 WBCS — SIGNIFICANT CHANGE UP (ref 0–0)
NRBC # BLD: 0 /100 WBCS — SIGNIFICANT CHANGE UP (ref 0–0)
NRBC # BLD: 1 /100 WBCS — HIGH (ref 0–0)
PLATELET # BLD AUTO: 120 K/UL — LOW (ref 130–400)
PLATELET # BLD AUTO: 131 K/UL — SIGNIFICANT CHANGE UP (ref 130–400)
PLATELET # BLD AUTO: 98 K/UL — LOW (ref 130–400)
POTASSIUM SERPL-MCNC: 4.8 MMOL/L — SIGNIFICANT CHANGE UP (ref 3.5–5)
POTASSIUM SERPL-SCNC: 4.8 MMOL/L — SIGNIFICANT CHANGE UP (ref 3.5–5)
PROT SERPL-MCNC: 4.1 G/DL — LOW (ref 6–8)
RBC # BLD: 1.93 M/UL — LOW (ref 4.7–6.1)
RBC # BLD: 2.36 M/UL — LOW (ref 4.7–6.1)
RBC # BLD: 2.36 M/UL — LOW (ref 4.7–6.1)
RBC # BLD: 2.87 M/UL — LOW (ref 4.7–6.1)
RBC # FLD: 17.7 % — HIGH (ref 11.5–14.5)
RBC # FLD: 17.8 % — HIGH (ref 11.5–14.5)
RBC # FLD: 19.3 % — HIGH (ref 11.5–14.5)
RETICS #: 223.3 K/UL — HIGH (ref 25–125)
RETICS/RBC NFR: 9.5 % — HIGH (ref 0.5–1.5)
SODIUM SERPL-SCNC: 130 MMOL/L — LOW (ref 135–146)
WBC # BLD: 6.88 K/UL — SIGNIFICANT CHANGE UP (ref 4.8–10.8)
WBC # BLD: 8.29 K/UL — SIGNIFICANT CHANGE UP (ref 4.8–10.8)
WBC # BLD: 8.42 K/UL — SIGNIFICANT CHANGE UP (ref 4.8–10.8)
WBC # FLD AUTO: 6.88 K/UL — SIGNIFICANT CHANGE UP (ref 4.8–10.8)
WBC # FLD AUTO: 8.29 K/UL — SIGNIFICANT CHANGE UP (ref 4.8–10.8)
WBC # FLD AUTO: 8.42 K/UL — SIGNIFICANT CHANGE UP (ref 4.8–10.8)

## 2021-04-08 PROCEDURE — 37191 INS ENDOVAS VENA CAVA FILTR: CPT

## 2021-04-08 PROCEDURE — 99232 SBSQ HOSP IP/OBS MODERATE 35: CPT

## 2021-04-08 PROCEDURE — 74176 CT ABD & PELVIS W/O CONTRAST: CPT | Mod: 26

## 2021-04-08 RX ORDER — PANTOPRAZOLE SODIUM 20 MG/1
40 TABLET, DELAYED RELEASE ORAL
Refills: 0 | Status: DISCONTINUED | OUTPATIENT
Start: 2021-04-08 | End: 2021-04-09

## 2021-04-08 RX ADMIN — INSULIN GLARGINE 24 UNIT(S): 100 INJECTION, SOLUTION SUBCUTANEOUS at 21:35

## 2021-04-08 RX ADMIN — Medication 6 MILLIGRAM(S): at 05:30

## 2021-04-08 RX ADMIN — Medication 2: at 07:40

## 2021-04-08 RX ADMIN — ATORVASTATIN CALCIUM 80 MILLIGRAM(S): 80 TABLET, FILM COATED ORAL at 21:35

## 2021-04-08 RX ADMIN — PANTOPRAZOLE SODIUM 40 MILLIGRAM(S): 20 TABLET, DELAYED RELEASE ORAL at 05:28

## 2021-04-08 RX ADMIN — MUPIROCIN 1 APPLICATION(S): 20 OINTMENT TOPICAL at 05:22

## 2021-04-08 RX ADMIN — Medication 8 UNIT(S): at 07:40

## 2021-04-08 RX ADMIN — PANTOPRAZOLE SODIUM 40 MILLIGRAM(S): 20 TABLET, DELAYED RELEASE ORAL at 17:09

## 2021-04-08 RX ADMIN — Medication 50 MILLIGRAM(S): at 05:27

## 2021-04-08 RX ADMIN — Medication 5 MILLIGRAM(S): at 21:35

## 2021-04-08 RX ADMIN — Medication 8 UNIT(S): at 16:28

## 2021-04-08 RX ADMIN — MUPIROCIN 1 APPLICATION(S): 20 OINTMENT TOPICAL at 17:09

## 2021-04-08 NOTE — PROGRESS NOTE ADULT - SUBJECTIVE AND OBJECTIVE BOX
Gastroenterology progress note:     Patient is a 80y old  Male who presents with a chief complaint of acute hypoxemic respiratory failure secondary to COVID-19 pneumonia (08 Apr 2021 16:21)       Admitted on: 03-12-21    We are following the patient for: melena      Interval History: patient with one episode of melena , patient with drop in HGB from 7.3 to 5.6. No fresh blood per rectum . No hematemesis . Patient underwent IVC filter placement today. Received 2 units of PRBC and corrected appropriately     Patient's medical problems are stable    Prior records reviewed (Y/N): Y   History obtained from someone other than patient (Y/N): Y       PAST MEDICAL & SURGICAL HISTORY:  Hypertension    Hyperlipidemia    Diabetes mellitus, type 2    History of atrial fibrillation    Bladder cancer  secondary to exposure at [employer] 9/11 s/p cystectomy with urostomy    Chronic kidney disease (CKD)  stage 3A, baseline creatinine 1.4    History of total hip replacement, bilateral    History of total knee replacement, bilateral    History of total cystectomy  with resultant urostomy        MEDICATIONS  (STANDING):  atorvastatin 80 milliGRAM(s) Oral at bedtime  atropine Injectable 0.5 milliGRAM(s) IntraMuscular once  chlorhexidine 4% Liquid 1 Application(s) Topical <User Schedule>  dexAMETHasone  Injectable 6 milliGRAM(s) IV Push daily  influenza   Vaccine 0.5 milliLiter(s) IntraMuscular once  insulin glargine Injectable (LANTUS) 24 Unit(s) SubCutaneous at bedtime  insulin lispro (ADMELOG) corrective regimen sliding scale   SubCutaneous three times a day before meals  insulin lispro Injectable (ADMELOG) 8 Unit(s) SubCutaneous three times a day before meals  melatonin 5 milliGRAM(s) Oral at bedtime  metoprolol succinate ER 50 milliGRAM(s) Oral daily  mupirocin 2% Nasal 1 Application(s) Nasal two times a day  pantoprazole  Injectable 40 milliGRAM(s) IV Push two times a day  polyethylene glycol 3350 17 Gram(s) Oral daily  senna 2 Tablet(s) Oral at bedtime    MEDICATIONS  (PRN):  acetaminophen   Tablet .. 650 milliGRAM(s) Oral every 6 hours PRN Temp greater or equal to 38C (100.4F), Mild Pain (1 - 3)  guaifenesin/dextromethorphan  Syrup 10 milliLiter(s) Oral every 6 hours PRN Cough      Allergies  No Known Allergies      Review of Systems:   Cardiovascular:  No Chest Pain, No Palpitations  Respiratory:  No Cough, No Dyspnea  Gastrointestinal:  As described in HPI    Physical Examination:  T(C): 35.3 (04-08-21 @ 20:26), Max: 36.3 (04-08-21 @ 05:05)  HR: 58 (04-08-21 @ 20:26) (58 - 82)  BP: 137/73 (04-08-21 @ 20:26) (81/45 - 137/73)  RR: 20 (04-08-21 @ 20:26) (18 - 20)  SpO2: 96% (04-08-21 @ 20:26) (95% - 100%)      04-07-21 @ 07:01  -  04-08-21 @ 07:00  --------------------------------------------------------  IN: 840 mL / OUT: 1175 mL / NET: -335 mL    04-08-21 @ 07:01  -  04-08-21 @ 23:51  --------------------------------------------------------  IN: 0 mL / OUT: 1800 mL / NET: -1800 mL      Constitutional: No acute distress.  Respiratory:  No signs of respiratory distress. Lung sounds are clear bilaterally.  Cardiovascular:  S1 S2, Regular rate and rhythm.  Abdominal: Abdomen is soft, symmetric, and non-tender without distention. urostomy bag in place. Bowel sounds are present and normoactive in all four quadrants. No masses, hepatomegaly, or splenomegaly are noted.   Skin: No rashes, No Jaundice.        Data: (reviewed by attending)                        8.4    8.42  )-----------( 131      ( 08 Apr 2021 22:22 )             25.5     Hgb trend:  8.4  04-08-21 @ 22:22  6.8  04-08-21 @ 09:40  5.6  04-08-21 @ 03:02  7.3  04-07-21 @ 08:06  7.4  04-06-21 @ 17:59  7.3  04-06-21 @ 08:46        04-08    130<L>  |  100  |  49<H>  ----------------------------<  250<H>  4.8   |  18  |  1.0    Ca    7.4<L>      08 Apr 2021 09:40  Mg     2.0     04-08    TPro  4.1<L>  /  Alb  2.7<L>  /  TBili  1.4<H>  /  DBili  x   /  AST  25  /  ALT  27  /  AlkPhos  58  04-08    Liver panel trend:  TBili 1.4   /   AST 25   /   ALT 27   /   AlkP 58   /   Tptn 4.1   /   Alb 2.7    /   DBili --      04-08  TBili 2.0   /   AST 32   /   ALT 36   /   AlkP 67   /   Tptn 4.8   /   Alb 3.1    /   DBili --      04-07  TBili 2.1   /   AST 39   /   ALT 36   /   AlkP 68   /   Tptn 4.5   /   Alb 2.9    /   DBili --      04-06  TBili 1.5   /   AST 51   /   ALT 41   /   AlkP 116   /   Tptn 4.7   /   Alb 3.0    /   DBili --      04-05  TBili 1.7   /   AST 42   /   ALT 35   /   AlkP 81   /   Tptn 4.6   /   Alb 3.0    /   DBili --      04-05  TBili 1.6   /   AST 42   /   ALT 28   /   AlkP 61   /   Tptn 5.1   /   Alb 3.1    /   DBili --      04-03  TBili 1.2   /   AST 36   /   ALT 26   /   AlkP 55   /   Tptn 5.1   /   Alb 3.0    /   DBili --      04-02  TBili 1.0   /   AST 29   /   ALT 27   /   AlkP 51   /   Tptn 5.1   /   Alb 3.0    /   DBili --      04-01  TBili 0.8   /   AST 26   /   ALT 26   /   AlkP 52   /   Tptn 5.2   /   Alb 3.2    /   DBili --      03-31  TBili 0.8   /   AST 25   /   ALT 29   /   AlkP 52   /   Tptn 5.1   /   Alb 3.0    /   DBili --      03-31  TBili 0.7   /   AST 21   /   ALT 33   /   AlkP 54   /   Tptn 5.1   /   Alb 2.9    /   DBili --      03-30      PTT - ( 08 Apr 2021 03:02 )  PTT:57.7 sec       Radiology: (reviewed by attending)  CT Abdomen and Pelvis No Cont:   EXAM:  CT ABDOMEN AND PELVIS            PROCEDURE DATE:  04/08/2021            INTERPRETATION:  CLINICAL STATEMENT: Retroperitoneal bleed    TECHNIQUE: Contiguous axial CT images were obtained from the lower chest to the pubic symphysis without intravenous contrast.  Oral contrast was not given.  Reformatted images in the coronal and sagittal planes were acquired.    COMPARISON CT: 3/30/2021    FINDINGS:    LOWER CHEST: Mildly increased bilateral lower lung field opacities.    HEPATOBILIARY: Stable    SPLEEN: Stable..    PANCREAS: Stable..    ADRENAL GLANDS: Stable    KIDNEYS: Stable.    ABDOMINAL NODES: Unremarkable..    PELVIC ORGANS: Beam hardening artifact from bilateral hip replacements limits evaluation the pelvis. The bladder and  system is not evaluated due to this artifact. Prior report describes the possibility of cystoprostatectomy and ileal conduit. Evaluation of the pelvic GI tract, lymph nodes and vessels are limited    PERITONEUM/MESENTERY/BOWEL: Surgical clips in the region of the right groin. There is a fat-containing ventral hernia containing nonobstructed bowel loops. There is additional more superior fat-containing ventral hernia. There is a right lower quadrant ostomy with parastomal hernia. Diverticulosis. Rectosigmoid large fecal load. There is no free air or obstruction. There is a mid abdominal bowel suture anastomosis. There is no evidence of retroperitoneal hematoma    BONES/SOFT TISSUES: There are postsurgical subcutaneous infiltrative changes. Bilateral hip replacements. Degenerative change.    OTHER: Vascular calcifications..      IMPRESSION:    No evidence of retroperitoneal hematoma.    Mild increase in lung opacities              VINCENT DONG MD; Attending Radiologist  This document has been electronically signed. Apr 8 2021 12:26PM (04-08-21 @ 12:01)

## 2021-04-08 NOTE — CONSULT NOTE ADULT - ATTENDING COMMENTS
IMPRESSION:  Sepsis present on admission  Acute hypoxemic respiratory failure  COVID-19 PNA s/p 10 days of dexa  Possible superimposed bacterial infection  LORRAINE on CKD 3a, resolved  Probable JARETH    Recommendations:    Dexa 8 mg Q8  Pan cultures. ABX until cultures resulted and decide.  Repeat PRocal  Toci   Prognosis poor overall
No active Gi bleeding at this time. To consider Gi intervention if he has active bleeding.
Plan for IVC filter placement today
Case discussed with KOSTA GREENWOOD

## 2021-04-08 NOTE — CONSULT NOTE ADULT - CONSULT REQUESTED DATE/TIME
21-Mar-2021 17:21
23-Mar-2021 09:50
25-Mar-2021 17:09
30-Mar-2021 18:34
29-Mar-2021 15:17
08-Apr-2021 11:14
13-Mar-2021 08:34

## 2021-04-08 NOTE — PROGRESS NOTE ADULT - ASSESSMENT
1. Acute hypoxemic respiratory failure secondary to COVID-19 pneumonia, s/p Toci 3/23, on dexamethasone, requiring high flow O2  2. Acute kidney injury, likely pre-renal azotemia vs contrast nephropathy  3. CKD III, baseline creatinine 1.2-1.5.  4. Hx of Bladder Ca, s/p cystoprostatectomy, urostomy  5. Hyperkalemia  6. Hypotension  7. Acute drop in Hgb, transfused    Plan:    Daily BMP  Monitor I/O  Trend H/H  Transfuse as needed  If Hgb continues to drop, consider CT scan to rule out retroperitoneal hemorrhage

## 2021-04-08 NOTE — PROGRESS NOTE ADULT - ATTENDING COMMENTS
Patient seen and examined independently of resident. My addendum supersedes resident notation. Case discussed with housestaff, nursing and patient
Please see above recommendations  D/w KOSTA GREENWOOD
pt with COVID with intermittently dropping Hgb during hospitalization, now with episode of witnessed melena and acute drop in Hgb. Hgb stable after transfusion. plan for EGD tomorrow.
patient discussed with KOSTA GREENWOOD
Case discussed with KOSTA GREENWOOD

## 2021-04-08 NOTE — PROGRESS NOTE ADULT - SUBJECTIVE AND OBJECTIVE BOX
Patient Information:  ROSA MARIA CASEY / 80y / Male / MRN#:[id number]    Hospital Day: 27d    Interval History:  Patient seen and examined at bedside. Pt was initiated on Heparin drip yesterday afternoon and developed acute drop in Hb 7.3 --> 5.6. Transfused 1 U pRBCs overnight, Heparin drip stopped. Hb improved to 6.8, will transfuse additional unit today.  Per PCA, pt had 1 episode of black stools this morning. Pt was hypotensive overnight, improved with unit of pRBCs, now HD stable.    Past Medical History:  Hypertension    Hyperlipidemia    Diabetes mellitus, type 2    History of atrial fibrillation    Bladder cancer    Chronic kidney disease (CKD)      Past Surgical History:  History of total hip replacement, bilateral    History of total knee replacement, bilateral    History of total cystectomy      Allergies:  No Known Allergies    Medications:  PRN:  acetaminophen   Tablet .. 650 milliGRAM(s) Oral every 6 hours PRN Temp greater or equal to 38C (100.4F), Mild Pain (1 - 3)  guaifenesin/dextromethorphan  Syrup 10 milliLiter(s) Oral every 6 hours PRN Cough    Standing:  atorvastatin 80 milliGRAM(s) Oral at bedtime  atropine Injectable 0.5 milliGRAM(s) IntraMuscular once  chlorhexidine 4% Liquid 1 Application(s) Topical <User Schedule>  dexAMETHasone  Injectable 6 milliGRAM(s) IV Push daily  influenza   Vaccine 0.5 milliLiter(s) IntraMuscular once  insulin glargine Injectable (LANTUS) 24 Unit(s) SubCutaneous at bedtime  insulin lispro (ADMELOG) corrective regimen sliding scale   SubCutaneous three times a day before meals  insulin lispro Injectable (ADMELOG) 8 Unit(s) SubCutaneous three times a day before meals  melatonin 5 milliGRAM(s) Oral at bedtime  metoprolol succinate ER 50 milliGRAM(s) Oral daily  mupirocin 2% Nasal 1 Application(s) Nasal two times a day  pantoprazole  Injectable 40 milliGRAM(s) IV Push two times a day  polyethylene glycol 3350 17 Gram(s) Oral daily  senna 2 Tablet(s) Oral at bedtime    Home:  amlodipine-benazepril 10 mg-40 mg oral capsule: 1 cap(s) orally once a day  Eliquis 5 mg oral tablet: 1 tab(s) orally 2 times a day  glimepiride 2 mg oral tablet: 1 tab(s) orally once a day  Lantus 100 units/mL subcutaneous solution: 40 unit(s) subcutaneous once a day  metoprolol succinate 100 mg oral tablet, extended release: 1 tab(s) orally once a day  Ozempic (1 mg dose) 2 mg/1.5 mL subcutaneous solution:   Percocet 7.5/325 oral tablet: 0.5 tab(s) orally once a day (at bedtime)  potassium citrate 10 mEq oral tablet, extended release: 1 tab(s) orally once a day  rosuvastatin 20 mg oral tablet: 1 tab(s) orally once a day    Vitals:  T(C): 36.2, Max: 36.3 (04-08-21 @ 05:05)  T(F): 97.2, Max: 97.3 (04-08-21 @ 05:05)  HR: 72 (66 - 82)  BP: 100/58 (81/45 - 125/59)  RR: 20 (18 - 20)  SpO2: 97% (95% - 100%)    Physical Exam:  General: Awake, alert, NAD, sitting up in bed, appears sleepy, tired, on O2 NC 5L  HEENT: Head NC/AT  Heart: RRR; systolic murmur apprec  Lungs: Good air entry bilaterally  Abdomen: Soft, nontender, nondistended, BS+, urostomy bag in place, appears clean, intact  Extremities: Venous stasis skin changes of LE bilaterally  Neuro: AAOx3, NFD    Labs:  CBC (04-08 @ 09:40)                        Hgb: 6.8    WBC: 6.88  )-----------------( Plts: 98                               Hct: 21.8     Chem (04-07 @ 08:06)  Na: 132  |     Cl: 99     |  BUN: 37  -----------------------------------------< Gluc: 111    K: 4.2   |    HCO3: 21  |  Cr: 0.9    Ca 7.8 (04-07 @ 08:06)  Mg 2.2 (04-07 @ 08:06)    LFTs (04-07 @ 08:06)  TPro 4.8  /  Alb 3.1  TBili 2.0  /  DBili     AST 32  /  ALT 36  /  AlkPhos 67        PT/INR (04-08 @ 03:02)  PT:      ; INR:        PTT: 57.7             Microbiology:  Culture - Sputum (collected 04-05 @ 09:08)  Source: .Sputum Sputum  Gram Stain (04-06 @ 07:06):    Rare polymorphonuclear leukocytes per low power field    Few Squamous epithelial cells per low power field    Numerous Gram positive cocci in pairs seen per oil power field    Moderate Yeast like cells seen per oil power field  Final Report (04-07 @ 19:53):    Normal Respiratory Abeba present

## 2021-04-08 NOTE — PROGRESS NOTE ADULT - SUBJECTIVE AND OBJECTIVE BOX
ROSA MARIA CASEY  Patient is a 80y old  Male who presents with a chief complaint of acute hypoxic RF due to Covid Viral Syndrome. Underlying Hx of DMII, CKD,  DLD, MO,  JARETH, bladder ca, sp urostomy, Hx of 911 exposure), OA, DDD, DJD, GERD, diverticulosis, abd wall hernias. bowel resection and reanastomosis.    Overnight Events:  pt had drop in Hgb to 5.6, one dark BM, hypotensive, sp 1u PRBC over night  and to receive 2nd, IV heparin  D/C, CT abd to R/O retroperitoneal bleed, CT of abd/pelvis to R/O retroperitoneal hematoma    Vital Signs:   T:  96.2  HR:  68  BP:  105/61    RR: 20  Pulse ox:  3L NC 98%        PHYSICAL EXAM:  GENERAL: A&O, debilitated but in NAD + NC  Neck:  short,  thick but supple, no JVD, no bruit, no stridor  Lungs:  shallow resp, scattered rhonchi, no wheezing, improving air entry  Abd:   distended soft and benign, + BS  Urinary:  RLQ urostomy  Ext:  arthritic changes, moves all limbs  Skin:  no rash  Psych stable    LABS:                        5.6 (7.3)  6.8-----------( 98           21.8 (21)    130 |  100 | 49  ----------------------------<250  4.2   18    1.0    GFR 64, 58, 72, 64, 35, 26, 30, 35, 64, 72, 82  Ca    8.8       Phos  3.4      Mg     2.0, 2.6, 3.0, 2.8, 2.2      MB 63    trop <0.01 x2  ferritin 535, 462, 737, 339  procal 0.26, 0.11, 0.07  CRP 25, 180. <3  LDH  515  fibrinogen 199  DDIMER 4298  COVID AB + 85.60  3/30 LA 5.1,  3.7    TPro  5.9,  5.1, 4.1/  Alb  3.2,  3.1, 2.7  /  TBili  0.9  /  DBili  x   /  AST  45, 88, 88, 32  /  ALT  53, 93, 99, 36  /  AlkPhos  43  03-14      Urinalysis Basic - ( 12 Mar 2021 16:38 )    Color: Yellow / Appearance: Slightly Turbid / S.020 / pH: x  Gluc: x / Ketone: Negative  / Bili: Negative / Urobili: 3 mg/dL   Blood: x / Protein: 30 mg/dL / Nitrite: Negative   Leuk Esterase: Moderate / RBC: 5 /HPF / WBC 90 /HPF   Sq Epi: x / Non Sq Epi: 0 /HPF / Bacteria: Ma       RADIOLOGY & ADDITIONAL TESTS:  Venous duplex:  negative for DVT  CXR:  BL opacities L>R  CXR:  stable BL opacities    CT angio:  neg for central PE    CT abd/pelvis:  BIbasilar densities, hepatobiliary/spleen/pancreas WNL, mild BL adrenal thickening, no hydro, bl renal cysts, bl layering densities/ sm stones, "milk of ca", post cystoprostatectomy and ilealconduit, , post bowel resection and reanastomosis, ventral abd wall hernias, bl hip replacements    CTH 3/30:  moderate atrophy, chronic microvascular changes,  no evidence of acute pathology    EEG 3/30 gen slowing , no focal epileptiform activity    Venous duplex of lower ext:  + RLE popliteal vein thrombosis

## 2021-04-08 NOTE — CONSULT NOTE ADULT - REASON FOR ADMISSION
acute hypoxemic respiratory failure secondary to COVID-19 pneumonia

## 2021-04-08 NOTE — PROGRESS NOTE ADULT - ASSESSMENT
Patient is a 79yo male with PMH of hypertension, hyperlipidemia, diabetes mellitus type 2, atrial fibrillation on Eliquis, CKD stage 3A, bladder cancer s/p cystectomy with urostomy, and bilateral hip and knee arthroplasties who was brought in by EMS for hypoxia. Patient is being  treated for COVID pneumonia on 3 L NC . GI consulted for melena and  drop in HGB .     #melena r/o PUD / erosive gastritis , esophagitis versus others   #acute normocytic anemia :  drop in HGB from 7.3 to 5.6 , received 2 units  of PRBC and corrected appropriately   HD stable       REC:  EGD  in am   Protonix  40 mg IV BID   Serial CBC and keep HGB above 8  2 large peripheral IV   active type and screen   if patient developed GI bleed with HD instability , please call GI for urgent EGD

## 2021-04-08 NOTE — PROGRESS NOTE ADULT - SUBJECTIVE AND OBJECTIVE BOX
[facility] NEPHROLOGY FOLLOW UP NOTE  --------------------------------------------------------------------------------  24 hour events/subjective: Patient examined. Appears comfortable.    PAST HISTORY  --------------------------------------------------------------------------------  No significant changes to PMH, PSH, FHx, SHx, unless otherwise noted    ALLERGIES & MEDICATIONS  --------------------------------------------------------------------------------  Allergies    No Known Allergies    Intolerances      Standing Inpatient Medications  atorvastatin 80 milliGRAM(s) Oral at bedtime  atropine Injectable 0.5 milliGRAM(s) IntraMuscular once  chlorhexidine 4% Liquid 1 Application(s) Topical <User Schedule>  dexAMETHasone  Injectable 6 milliGRAM(s) IV Push daily  influenza   Vaccine 0.5 milliLiter(s) IntraMuscular once  insulin glargine Injectable (LANTUS) 24 Unit(s) SubCutaneous at bedtime  insulin lispro (ADMELOG) corrective regimen sliding scale   SubCutaneous three times a day before meals  insulin lispro Injectable (ADMELOG) 8 Unit(s) SubCutaneous three times a day before meals  melatonin 5 milliGRAM(s) Oral at bedtime  metoprolol succinate ER 50 milliGRAM(s) Oral daily  mupirocin 2% Nasal 1 Application(s) Nasal two times a day  pantoprazole  Injectable 40 milliGRAM(s) IV Push two times a day  polyethylene glycol 3350 17 Gram(s) Oral daily  senna 2 Tablet(s) Oral at bedtime    PRN Inpatient Medications  acetaminophen   Tablet .. 650 milliGRAM(s) Oral every 6 hours PRN  guaifenesin/dextromethorphan  Syrup 10 milliLiter(s) Oral every 6 hours PRN      VITALS/PHYSICAL EXAM  --------------------------------------------------------------------------------  T(C): 36.2 (04-08-21 @ 08:28), Max: 36.3 (04-08-21 @ 05:05)  HR: 72 (04-08-21 @ 08:28) (66 - 82)  BP: 100/58 (04-08-21 @ 08:28) (81/45 - 125/59)  RR: 20 (04-08-21 @ 08:28) (18 - 20)  SpO2: 96% (04-08-21 @ 11:38) (95% - 100%)  Wt(kg): --        04-07-21 @ 07:01  -  04-08-21 @ 07:00  --------------------------------------------------------  IN: 840 mL / OUT: 1175 mL / NET: -335 mL      Physical Exam:  	Gen: NAD  	Pulm: CTA B/L  	CV: RRR, S1S2  	Abd: +BS, soft, nontender/nondistended  	: No suprapubic tenderness  	LE: Warm, trace edema    LABS/STUDIES  --------------------------------------------------------------------------------              6.8    6.88  >-----------<  98       [04-08-21 @ 09:40]              21.8     130  |  100  |  49  ----------------------------<  250      [04-08-21 @ 09:40]  4.8   |  18  |  1.0        Ca     7.4     [04-08-21 @ 09:40]      Mg     2.0     [04-08-21 @ 09:40]    TPro  4.1  /  Alb  2.7  /  TBili  1.4  /  DBili  x   /  AST  25  /  ALT  27  /  AlkPhos  58  [04-08-21 @ 09:40]      PTT: 57.7       [04-08-21 @ 03:02]          [04-08-21 @ 09:40]    Creatinine Trend:  SCr 1.0 [04-08 @ 09:40]  SCr 0.9 [04-07 @ 08:06]  SCr 1.0 [04-06 @ 08:46]  SCr 1.1 [04-05 @ 06:52]  SCr 1.2 [04-05 @ 00:52]    Urinalysis - [03-30-21 @ 04:27]      Color Yellow / Appearance Slightly Turbid / SG 1.017 / pH 6.5      Gluc Negative / Ketone Negative  / Bili Negative / Urobili <2 mg/dL       Blood Large / Protein Trace / Leuk Est Large / Nitrite Negative       / WBC 84 / Hyaline 25 / Gran  / Sq Epi  / Non Sq Epi 0 / Bacteria Negative      Iron 178, TIBC 305, %sat 58      [03-30-21 @ 07:11]  Ferritin 339      [04-02-21 @ 09:04]  Vitamin D (25OH) 23      [04-03-21 @ 08:42]

## 2021-04-08 NOTE — CHART NOTE - NSCHARTNOTEFT_GEN_A_CORE
Received call from lab about hgb dropped to 5.6 from 7.3 yesterday.  Even though PTT is 75 but will hold heparin drip for now and 1 unit PRBC ordered.

## 2021-04-08 NOTE — CONSULT NOTE ADULT - SUBJECTIVE AND OBJECTIVE BOX
INTERVENTIONAL RADIOLOGY CONSULT:     Procedure Requested: IVC filter placement after hgb drop while on heparin    HPI:  Patient is a 81yo male with PMH of hypertension, hyperlipidemia, diabetes mellitus type 2, atrial fibrillation on Eliquis, CKD stage 3A, bladder cancer s/p cystectomy with urostomy, and bilateral hip and knee arthroplasties who was brought in by EMS for hypoxia. The patient states that his daughter and her boyfriend who reside in the same home as him and his wife recently tested positive for COVID-19. He and his wife tested positive for COVID-19 on 3/4/2021, but their symptoms did not start until 3/6/2021. He started feeling "beat down" and "tired all the time" to the point where he had difficulty getting up. He also endorses cough, shortness of breath, and body aches. He denies chest pain, abdominal pain, nausea, vomiting, constipation, or diarrhea. He was told to come to the ED by his daughter after they noticed his wife was "not doing so well."    In the ED, vital signs were Tmax 100.9F, , /66, RR 20, and SpO2 95% on 4L. Labs were significant for creatinine 1.7 (baseline 1.4), troponin 0.02, lactate 2.3, elevated LFTs, and D-dimer 1721. Patient was given dexamethasone 6mg IV, 1L bolus of LR, and acetaminophen 650mg. (12 Mar 2021 15:20)    Acute hgb drop while on heparin for treatmetn of DVT, IR consulted for IVC filter placement.    PAST MEDICAL & SURGICAL HISTORY:  Hypertension    Hyperlipidemia    Diabetes mellitus, type 2    History of atrial fibrillation    Bladder cancer  secondary to exposure at [employer] 9/11 s/p cystectomy with urostomy    Chronic kidney disease (CKD)  stage 3A, baseline creatinine 1.4    History of total hip replacement, bilateral    History of total knee replacement, bilateral    History of total cystectomy  with resultant urostomy        MEDICATIONS  (STANDING):  atorvastatin 80 milliGRAM(s) Oral at bedtime  atropine Injectable 0.5 milliGRAM(s) IntraMuscular once  chlorhexidine 4% Liquid 1 Application(s) Topical <User Schedule>  dexAMETHasone  Injectable 6 milliGRAM(s) IV Push daily  influenza   Vaccine 0.5 milliLiter(s) IntraMuscular once  insulin glargine Injectable (LANTUS) 24 Unit(s) SubCutaneous at bedtime  insulin lispro (ADMELOG) corrective regimen sliding scale   SubCutaneous three times a day before meals  insulin lispro Injectable (ADMELOG) 8 Unit(s) SubCutaneous three times a day before meals  melatonin 5 milliGRAM(s) Oral at bedtime  metoprolol succinate ER 50 milliGRAM(s) Oral daily  mupirocin 2% Nasal 1 Application(s) Nasal two times a day  pantoprazole  Injectable 40 milliGRAM(s) IV Push two times a day  polyethylene glycol 3350 17 Gram(s) Oral daily  senna 2 Tablet(s) Oral at bedtime    MEDICATIONS  (PRN):  acetaminophen   Tablet .. 650 milliGRAM(s) Oral every 6 hours PRN Temp greater or equal to 38C (100.4F), Mild Pain (1 - 3)  guaifenesin/dextromethorphan  Syrup 10 milliLiter(s) Oral every 6 hours PRN Cough      Allergies    No Known Allergies    Intolerances        Physical Exam:   Vital Signs Last 24 Hrs  T(C): 36.2 (08 Apr 2021 08:28), Max: 36.3 (08 Apr 2021 05:05)  T(F): 97.2 (08 Apr 2021 08:28), Max: 97.3 (08 Apr 2021 05:05)  HR: 72 (08 Apr 2021 08:28) (66 - 82)  BP: 100/58 (08 Apr 2021 08:28) (81/45 - 125/59)  BP(mean): 77 (07 Apr 2021 12:02) (77 - 77)  RR: 20 (08 Apr 2021 08:28) (18 - 20)  SpO2: 97% (08 Apr 2021 08:28) (95% - 100%)      Pertinent labs:                      6.8    6.88  )-----------( 98       ( 08 Apr 2021 09:40 )             21.8       04-07    132<L>  |  99  |  37<H>  ----------------------------<  111<H>  4.2   |  21  |  0.9    Ca    7.8<L>      07 Apr 2021 08:06  Mg     2.2     04-07    TPro  4.8<L>  /  Alb  3.1<L>  /  TBili  2.0<H>  /  DBili  x   /  AST  32  /  ALT  36  /  AlkPhos  67  04-07      PTT - ( 08 Apr 2021 03:02 )  PTT:57.7 sec    Radiology & Additional Studies:     Radiology imaging reviewed.       ASSESSMENT AND PLAN:    #Anemia - acute hgb drop while on heparin for DVT treatment.   - transfuse as needed, holding AC given drop in hgb  - INR 1.17 on 4/6  - f/u CT abdomen pelvis   - plan for possible IVC filter in afternoon 4/8  - please keep NPO prior to procedure (had liquids around 830am 4/8)  - Covid positive test on 3/12    Thank you for the courtesy of this consult, please call [phone] with any further questions.

## 2021-04-08 NOTE — PROGRESS NOTE ADULT - ASSESSMENT
IMPRESSION:      Acute hypoxemic respiratory failure   Severe COVID-19 PNA SP Toci   Doubt superimposed bacterial infection  LORRAINE on CKD 3 improving   Probable JARETH  anemia / thrombocytopenia / HIT neg  dec Hb ( No GI bleed)    PLAN:    CNS: Avoid CNS depressants    HEENT: Oral care    PULMONARY:  HOB @ 45 degrees.  Aspiration precautions. Target SpO2 88 to 94%, wean oxygen as tolerated.  Dexamethasone 6mg Q24     CARDIOVASCULAR: Avoid volume overload.     GI: GI prophylaxis. Feeding as tolerated  Bowel regimen.  CT A.P ro retroperi bleed    RENAL:  Follow up lytes.  Correct as needed.     INFECTIOUS DISEASE: FU PanCx.     HEMATOLOGICAL:  DVT therapy. serial cbc , IR for GFF      ENDOCRINE:  Follow up FS.     MUSCULOSKELETAL: OOB to chair     poor prognosis

## 2021-04-08 NOTE — BRIEF OPERATIVE NOTE - COMMENTS
The FDA recommends IVC filter removal between 29 and 54 days after implantation. Consider IVC filter retrieval as clinically indicated.

## 2021-04-08 NOTE — PROGRESS NOTE ADULT - ASSESSMENT
81 yo M with PMHx of AFib, on Eliquis, HTN, DMII, CKDIII, hx of Bladder Ca s/p Urostomy, bilateral hip/knee replacements, obesity admitted for acute hypoxemic respiratory failure due to COVID PNA.    Neuro:  - Is AAOx4  - Previously had episodes of diffuse tremors, non responsiveness with spont. resolution, likely due to metabolic encephalopathy in setting of acute sepsis, now resolved  - CT Head, EEG was negative    Pulmonary:  #Acute Hypoxemic Respiratory Failure secondary to COVID PNA  - CXR revealed stable bilateral opacities  - Saturating 100% on O2 NC 5L    Cardiovascular:   #Hx of Paroxysmal AFib on Eliquis  #CHADSVASC Score: 4  #HTN  - Has been rate controlled, on Metoprolol Succinate 100 mg once daily at home, was dec to 50 mg once daily due to bradycardia  - Eliquis has been on hold, was receiving Lovenox, was changed to Heparin in case of acute bleeding and stopped overnight due to acute drop in Hb  - Takes Amlodipine at home, on hold as pt has been hypotensive in setting of acute blood loss    GI:  - Had episode of black stools this morning  - C/w PPI BID  - s/p 1U pRBCs overnight for Hb of 5.6, improved to 6.8; will receive additional unit    Endocrine:  #Hx of DMII  - FS better controlled  - C/w Lantus 24 U at bedtime, Lispro 8 U before meals    Renal:   #HX of Bladder Ca, s/p cystoprostatectomy with urostomy  #LORRAINE due to LINDSAY - resolved  #CKD III - stable  #Episodic hematuria with blood clots - now resolved  - Nephrology following  - Cr has been stable  - Urine in urostomy bag appears clear, site appears clean and intact    Heme Onc:  #Acute anemia (baseline Hb 13)  #Acute thrombocytopenia  #Acute R popliteal vein DVT  - Hb had gradually dropped from 13, has dropped to 5.6 after converting Lovenox to Heparin drip, s/p 1U pRBCs overnight, with improvement to 6.8, will transfuse additional unit  - Hemolysis workup previously negative, will repeat  - Plt ct has gradually dropped to < 100, HIT panel within normal limits  - Had episode of melena this morning  - Will obtain CT Abd and Pelvis  - IR consulted, will see CT Abd, possibly take pt for IVC filter today, keep NPO for now    Infectious Disease:   #COVID PNA  #Sepsis of unclear source - now resolved  - Remains on Dexamethasone 6 mg once daily, s/p Toci 3/23  - Had developed hypotension, hypothermia, elevated lactate - now resolved  - Received Vanco, Cefepime x1, initiated on Caspofungin, now d/c'd as fungitell within normal limits, s/p week course of Meropenem   - ID following  - Blood Cx NGTD, Urine Cx revealed gut kike as pt has urostomy  - Sputum Cx - normal resp kike   81 yo M with PMHx of AFib, on Eliquis, HTN, DMII, CKDIII, hx of Bladder Ca s/p Urostomy, bilateral hip/knee replacements, obesity admitted for acute hypoxemic respiratory failure due to COVID PNA.    Neuro:  - Is AAOx4  - Previously had episodes of diffuse tremors, non responsiveness with spont. resolution, likely due to metabolic encephalopathy in setting of acute sepsis, now resolved  - CT Head, EEG was negative    Pulmonary:  #Acute Hypoxemic Respiratory Failure secondary to COVID PNA  - CXR revealed stable bilateral opacities  - Saturating 100% on O2 NC 5L    Cardiovascular:   #Hx of Paroxysmal AFib on Eliquis  #CHADSVASC Score: 4  #HTN  - Has been rate controlled, on Metoprolol Succinate 100 mg once daily at home, was dec to 50 mg once daily due to bradycardia  - Eliquis has been on hold, was receiving Lovenox, was changed to Heparin in case of acute bleeding and stopped overnight due to acute drop in Hb  - Takes Amlodipine at home, on hold as pt has been hypotensive in setting of acute blood loss    GI:  - Had episode of black stools this morning  - C/w PPI BID  - s/p 1U pRBCs overnight for Hb of 5.6, improved to 6.8; will receive additional unit    Endocrine:  #Hx of DMII  - FS better controlled  - C/w Lantus 24 U at bedtime, Lispro 8 U before meals    Renal:   #HX of Bladder Ca, s/p cystoprostatectomy with urostomy  #LORRAINE due to LINDSAY - resolved  #CKD III - stable  #Episodic hematuria with blood clots - now resolved  - Nephrology following  - Cr has been stable  - Urine in urostomy bag appears clear, site appears clean and intact    Heme Onc:  #Acute anemia (baseline Hb 13)  #Acute thrombocytopenia  #Acute R popliteal vein DVT  - Hb had gradually dropped from 13, has dropped to 5.6 after converting Lovenox to Heparin drip, s/p 1U pRBCs overnight, with improvement to 6.8, will transfuse additional unit  - Hemolysis workup previously negative, will repeat  - Plt ct has gradually dropped to < 100, HIT panel within normal limits  - Had episode of melena this morning  - Will obtain CT Abd and Pelvis  - IR consulted, will see CT Abd, possibly take pt for IVC filter today, keep NPO for now    Infectious Disease:   #COVID PNA  #Sepsis of unclear source - now resolved  - Remains on Dexamethasone 6 mg once daily, s/p Toci 3/23  - Had developed hypotension, hypothermia, elevated lactate - now resolved  - Received Vanco, Cefepime x1, initiated on Caspofungin, now d/c'd as fungitell within normal limits, s/p week course of Meropenem   - ID following  - Blood Cx NGTD, Urine Cx revealed gut kike as pt has urostomy  - Sputum Cx - normal resp kike    Spoke to son, Juancho ([phone]), provided medical update and answered all questions. 79 yo M with PMHx of AFib, on Eliquis, HTN, DMII, CKDIII, hx of Bladder Ca s/p Urostomy, bilateral hip/knee replacements, obesity admitted for acute hypoxemic respiratory failure due to COVID PNA.    Neuro:  - Is AAOx4  - Previously had episodes of diffuse tremors, non responsiveness with spont. resolution, likely due to metabolic encephalopathy in setting of acute sepsis, now resolved  - CT Head, EEG was negative    Pulmonary:  #Acute Hypoxemic Respiratory Failure secondary to COVID PNA  - CXR revealed stable bilateral opacities  - Saturating % on O2 NC 3L    Cardiovascular:   #Hx of Paroxysmal AFib on Eliquis  #CHADSVASC Score: 4, HASBLED 2  #HTN  - Has been rate controlled, on Metoprolol Succinate 100 mg once daily at home, was dec to 50 mg once daily due to bradycardia  - Eliquis has been on hold, was receiving Lovenox, was changed to Heparin in case of acute bleeding and stopped overnight due to acute drop in Hb  - Takes Amlodipine at home, on hold as pt has been hypotensive in setting of acute blood loss    GI:  - Had episode of black stools this morning  - C/w PPI BID  - s/p 1U pRBCs overnight for Hb of 5.6, improved to 6.8; will receive additional unit    Endocrine:  #Hx of DMII  - FS better controlled  - C/w Lantus 24 U at bedtime, Lispro 8 U before meals    Renal:   #HX of Bladder Ca, s/p cystoprostatectomy with urostomy  #LORRAINE due to LINDSAY - resolved  #CKD III - stable  #Episodic hematuria with blood clots - now resolved  - Nephrology following  - Cr has been stable  - Urine in urostomy bag appears clear, site appears clean and intact    Heme Onc:  #Acute anemia (baseline Hb 13)  #Acute thrombocytopenia  #Acute R popliteal vein DVT  - Hb had gradually dropped from 13, has dropped to 5.6 after converting Lovenox to Heparin drip, s/p 1U pRBCs overnight, with improvement to 6.8, will transfuse additional unit  - Hemolysis workup previously negative, will repeat  - Plt ct has gradually dropped to < 100, HIT panel within normal limits  - Had episode of melena this morning, will recall GI for follow up  - Will obtain CT Abd and Pelvis  - IR consulted, will see CT Abd, possibly take pt for IVC filter today, keep NPO for now    Infectious Disease:   #COVID PNA  #Sepsis of unclear source - now resolved  - Remains on Dexamethasone 6 mg once daily, s/p Toci 3/23  - Had developed hypotension, hypothermia, elevated lactate - now resolved  - Received Vanco, Cefepime x1, initiated on Caspofungin, now d/c'd as fungitell within normal limits, s/p week course of Meropenem   - ID following  - Blood Cx NGTD, Urine Cx revealed gut kike as pt has urostomy  - Sputum Cx - normal resp kike    Spoke to son, Juancho ([phone]), provided medical update and answered all questions.

## 2021-04-08 NOTE — PROGRESS NOTE ADULT - SUBJECTIVE AND OBJECTIVE BOX
OVERNIGHT EVENTS: events noted, dec hc was on IV heparin for DVT    Vital Signs Last 24 Hrs  T(C): 36.2 (08 Apr 2021 08:28), Max: 36.3 (08 Apr 2021 05:05)  T(F): 97.2 (08 Apr 2021 08:28), Max: 97.3 (08 Apr 2021 05:05)  HR: 72 (08 Apr 2021 08:28) (66 - 82)  BP: 100/58 (08 Apr 2021 08:28) (81/45 - 125/59)  BP(mean): 77 (07 Apr 2021 12:02) (77 - 77)  RR: 20 (08 Apr 2021 08:28) (18 - 20)  SpO2: 97% (08 Apr 2021 08:28) (95% - 100%)    PHYSICAL EXAMINATION:    GENERAL: ill looking    HEENT: Head is normocephalic and atraumatic.     NECK: Supple.    LUNGS: bl crackles    HEART: Regular rate and rhythm without murmur.    ABDOMEN: Soft, nontender, and nondistended.      EXTREMITIES: Without any cyanosis, clubbing, rash, lesions or edema.    NEUROLOGIC: Grossly intact.    SKIN: No ulceration or induration present.      LABS:                        5.6    8.29  )-----------( 120      ( 08 Apr 2021 03:02 )             17.5     04-07    132<L>  |  99  |  37<H>  ----------------------------<  111<H>  4.2   |  21  |  0.9    Ca    7.8<L>      07 Apr 2021 08:06  Mg     2.2     04-07    TPro  4.8<L>  /  Alb  3.1<L>  /  TBili  2.0<H>  /  DBili  x   /  AST  32  /  ALT  36  /  AlkPhos  67  04-07    PTT - ( 08 Apr 2021 03:02 )  PTT:57.7 sec                      04-07-21 @ 07:01  -  04-08-21 @ 07:00  --------------------------------------------------------  IN: 840 mL / OUT: 1175 mL / NET: -335 mL        MICROBIOLOGY:  Culture Results:   Normal Respiratory Abeba present (04-05 @ 09:08)      MEDICATIONS  (STANDING):  atorvastatin 80 milliGRAM(s) Oral at bedtime  atropine Injectable 0.5 milliGRAM(s) IntraMuscular once  chlorhexidine 4% Liquid 1 Application(s) Topical <User Schedule>  dexAMETHasone  Injectable 6 milliGRAM(s) IV Push daily  influenza   Vaccine 0.5 milliLiter(s) IntraMuscular once  insulin glargine Injectable (LANTUS) 24 Unit(s) SubCutaneous at bedtime  insulin lispro (ADMELOG) corrective regimen sliding scale   SubCutaneous three times a day before meals  insulin lispro Injectable (ADMELOG) 8 Unit(s) SubCutaneous three times a day before meals  melatonin 5 milliGRAM(s) Oral at bedtime  metoprolol succinate ER 50 milliGRAM(s) Oral daily  mupirocin 2% Nasal 1 Application(s) Nasal two times a day  pantoprazole    Tablet 40 milliGRAM(s) Oral before breakfast  polyethylene glycol 3350 17 Gram(s) Oral daily  senna 2 Tablet(s) Oral at bedtime    MEDICATIONS  (PRN):  acetaminophen   Tablet .. 650 milliGRAM(s) Oral every 6 hours PRN Temp greater or equal to 38C (100.4F), Mild Pain (1 - 3)  guaifenesin/dextromethorphan  Syrup 10 milliLiter(s) Oral every 6 hours PRN Cough      RADIOLOGY & ADDITIONAL STUDIES:

## 2021-04-08 NOTE — PROGRESS NOTE ADULT - ASSESSMENT
79yo M c PMHx chronic afib on noac, htn, dm2, ckd3 bladder ca s/p urostomy, b/l hip/ knee replacements obesity, here with acute hypoxic respiratory failure 2/2 severe covid 19 viral pneumonia, lorraine on ckd3 now better, elevated ddimer    Sepsis on admission  Covid viral PNA. acute hypoxic RF  underlying JARETH, [place]  LORRAINE on CKD  Transaminitis  Anemia sp transfusion  E coli UTI  Hx of HTN, ASHD, Afib/Eliquis  DM II, CKD  DLD  Bladder ca, sp urostomy, sp cystoprostatectomy, exposure to 911  OA, DDD, DJD, sp BL hip and knee surgeries, mobility dysfunction  GERD, HH, diverticulosis, bowel resection and reanastomosis      pt in CEU, transitionedto 5L  today 3LNC, 98% sat  overnight had hypotension, black stool and drop in H/H, PLTS 98, ransfuse total of 2 u today, IV heparin D/C  CT of abd/pelvis non contrast  to R/O retroperitoneal hematoma  IR for IVF  LL E  DVT unable to tolerate AC due to dec in H/H  improvement of renal function and LFTs, cont to monitor    ID sp sepsis, Maldonado completed, D/C caspofungin as fungitell nl, pt of Abx    pul-critical care ff    CXR shows stable  BL opacities  spirometry  elevated DDIMER, CT angio negative for PE  guarded state but improving resp status

## 2021-04-09 ENCOUNTER — TRANSCRIPTION ENCOUNTER (OUTPATIENT)
Age: 81
End: 2021-04-09

## 2021-04-09 LAB
ALBUMIN SERPL ELPH-MCNC: 2.8 G/DL — LOW (ref 3.5–5.2)
ALP SERPL-CCNC: 52 U/L — SIGNIFICANT CHANGE UP (ref 30–115)
ALT FLD-CCNC: 23 U/L — SIGNIFICANT CHANGE UP (ref 0–41)
ANION GAP SERPL CALC-SCNC: 7 MMOL/L — SIGNIFICANT CHANGE UP (ref 7–14)
AST SERPL-CCNC: 24 U/L — SIGNIFICANT CHANGE UP (ref 0–41)
BILIRUB SERPL-MCNC: 2.1 MG/DL — HIGH (ref 0.2–1.2)
BUN SERPL-MCNC: 42 MG/DL — HIGH (ref 10–20)
CALCIUM SERPL-MCNC: 7.9 MG/DL — LOW (ref 8.5–10.1)
CHLORIDE SERPL-SCNC: 104 MMOL/L — SIGNIFICANT CHANGE UP (ref 98–110)
CO2 SERPL-SCNC: 23 MMOL/L — SIGNIFICANT CHANGE UP (ref 17–32)
CREAT SERPL-MCNC: 1 MG/DL — SIGNIFICANT CHANGE UP (ref 0.7–1.5)
GLUCOSE BLDC GLUCOMTR-MCNC: 116 MG/DL — HIGH (ref 70–99)
GLUCOSE BLDC GLUCOMTR-MCNC: 146 MG/DL — HIGH (ref 70–99)
GLUCOSE BLDC GLUCOMTR-MCNC: 171 MG/DL — HIGH (ref 70–99)
GLUCOSE BLDC GLUCOMTR-MCNC: 177 MG/DL — HIGH (ref 70–99)
GLUCOSE SERPL-MCNC: 102 MG/DL — HIGH (ref 70–99)
HAPTOGLOB SERPL-MCNC: 107 MG/DL — SIGNIFICANT CHANGE UP (ref 34–200)
HCT VFR BLD CALC: 25.1 % — LOW (ref 42–52)
HGB BLD-MCNC: 8.1 G/DL — LOW (ref 14–18)
MAGNESIUM SERPL-MCNC: 2.4 MG/DL — SIGNIFICANT CHANGE UP (ref 1.8–2.4)
MCHC RBC-ENTMCNC: 29.2 PG — SIGNIFICANT CHANGE UP (ref 27–31)
MCHC RBC-ENTMCNC: 32.3 G/DL — SIGNIFICANT CHANGE UP (ref 32–37)
MCV RBC AUTO: 90.6 FL — SIGNIFICANT CHANGE UP (ref 80–94)
NRBC # BLD: 0 /100 WBCS — SIGNIFICANT CHANGE UP (ref 0–0)
PLATELET # BLD AUTO: 117 K/UL — LOW (ref 130–400)
POTASSIUM SERPL-MCNC: 4.6 MMOL/L — SIGNIFICANT CHANGE UP (ref 3.5–5)
POTASSIUM SERPL-SCNC: 4.6 MMOL/L — SIGNIFICANT CHANGE UP (ref 3.5–5)
PROT SERPL-MCNC: 4.5 G/DL — LOW (ref 6–8)
RBC # BLD: 2.77 M/UL — LOW (ref 4.7–6.1)
RBC # FLD: 18.3 % — HIGH (ref 11.5–14.5)
SODIUM SERPL-SCNC: 134 MMOL/L — LOW (ref 135–146)
WBC # BLD: 8.39 K/UL — SIGNIFICANT CHANGE UP (ref 4.8–10.8)
WBC # FLD AUTO: 8.39 K/UL — SIGNIFICANT CHANGE UP (ref 4.8–10.8)

## 2021-04-09 PROCEDURE — 43235 EGD DIAGNOSTIC BRUSH WASH: CPT

## 2021-04-09 PROCEDURE — 99232 SBSQ HOSP IP/OBS MODERATE 35: CPT

## 2021-04-09 RX ORDER — FUROSEMIDE 40 MG
20 TABLET ORAL DAILY
Refills: 0 | Status: DISCONTINUED | OUTPATIENT
Start: 2021-04-09 | End: 2021-04-14

## 2021-04-09 RX ORDER — PANTOPRAZOLE SODIUM 20 MG/1
40 TABLET, DELAYED RELEASE ORAL
Refills: 0 | Status: DISCONTINUED | OUTPATIENT
Start: 2021-04-10 | End: 2021-04-20

## 2021-04-09 RX ADMIN — PANTOPRAZOLE SODIUM 40 MILLIGRAM(S): 20 TABLET, DELAYED RELEASE ORAL at 05:58

## 2021-04-09 RX ADMIN — INSULIN GLARGINE 24 UNIT(S): 100 INJECTION, SOLUTION SUBCUTANEOUS at 21:03

## 2021-04-09 RX ADMIN — Medication 2: at 17:20

## 2021-04-09 RX ADMIN — Medication 5 MILLIGRAM(S): at 21:03

## 2021-04-09 RX ADMIN — Medication 20 MILLIGRAM(S): at 16:14

## 2021-04-09 RX ADMIN — Medication 50 MILLIGRAM(S): at 05:57

## 2021-04-09 RX ADMIN — ATORVASTATIN CALCIUM 80 MILLIGRAM(S): 80 TABLET, FILM COATED ORAL at 21:03

## 2021-04-09 RX ADMIN — Medication 8 UNIT(S): at 17:20

## 2021-04-09 RX ADMIN — Medication 6 MILLIGRAM(S): at 05:57

## 2021-04-09 RX ADMIN — MUPIROCIN 1 APPLICATION(S): 20 OINTMENT TOPICAL at 05:58

## 2021-04-09 RX ADMIN — MUPIROCIN 1 APPLICATION(S): 20 OINTMENT TOPICAL at 18:18

## 2021-04-09 NOTE — PROGRESS NOTE ADULT - SUBJECTIVE AND OBJECTIVE BOX
OVERNIGHT EVENTS: events noted, on nc, CT A/P, GI reviewed, sp GFF    Vital Signs Last 24 Hrs  T(C): 35.9 (09 Apr 2021 07:21), Max: 36.1 (08 Apr 2021 11:47)  T(F): 96.6 (09 Apr 2021 07:21), Max: 97 (08 Apr 2021 11:47)  HR: 59 (09 Apr 2021 07:50) (54 - 68)  BP: 122/57 (09 Apr 2021 07:21) (104/58 - 137/73)  BP(mean): --  RR: 20 (09 Apr 2021 07:50) (20 - 20)  SpO2: 92% (09 Apr 2021 07:50) (92% - 100%)    PHYSICAL EXAMINATION:    GENERAL: ILL looking    HEENT: Head is normocephalic and atraumatic.     NECK: Supple.    LUNGS: bl crackles    HEART: Irregular, KONSTANTIN 3/6    ABDOMEN: Soft, nontender, and nondistended.      EXTREMITIES: Without any cyanosis, clubbing, rash, lesions or edema.    NEUROLOGIC: Grossly intact.    SKIN: No ulceration or induration present.      LABS:                        8.1    8.39  )-----------( 117      ( 09 Apr 2021 09:00 )             25.1     04-09    134<L>  |  104  |  42<H>  ----------------------------<  102<H>  4.6   |  23  |  1.0    Ca    7.9<L>      09 Apr 2021 09:00  Mg     2.4     04-09    TPro  4.5<L>  /  Alb  2.8<L>  /  TBili  2.1<H>  /  DBili  x   /  AST  24  /  ALT  23  /  AlkPhos  52  04-09    PTT - ( 08 Apr 2021 03:02 )  PTT:57.7 sec                      04-08-21 @ 07:01  -  04-09-21 @ 07:00  --------------------------------------------------------  IN: 0 mL / OUT: 2300 mL / NET: -2300 mL        MICROBIOLOGY:      MEDICATIONS  (STANDING):  atorvastatin 80 milliGRAM(s) Oral at bedtime  atropine Injectable 0.5 milliGRAM(s) IntraMuscular once  chlorhexidine 4% Liquid 1 Application(s) Topical <User Schedule>  influenza   Vaccine 0.5 milliLiter(s) IntraMuscular once  insulin glargine Injectable (LANTUS) 24 Unit(s) SubCutaneous at bedtime  insulin lispro (ADMELOG) corrective regimen sliding scale   SubCutaneous three times a day before meals  insulin lispro Injectable (ADMELOG) 8 Unit(s) SubCutaneous three times a day before meals  melatonin 5 milliGRAM(s) Oral at bedtime  metoprolol succinate ER 50 milliGRAM(s) Oral daily  mupirocin 2% Nasal 1 Application(s) Nasal two times a day  pantoprazole  Injectable 40 milliGRAM(s) IV Push two times a day  polyethylene glycol 3350 17 Gram(s) Oral daily  senna 2 Tablet(s) Oral at bedtime    MEDICATIONS  (PRN):  acetaminophen   Tablet .. 650 milliGRAM(s) Oral every 6 hours PRN Temp greater or equal to 38C (100.4F), Mild Pain (1 - 3)  guaifenesin/dextromethorphan  Syrup 10 milliLiter(s) Oral every 6 hours PRN Cough      RADIOLOGY & ADDITIONAL STUDIES:

## 2021-04-09 NOTE — PROGRESS NOTE ADULT - SUBJECTIVE AND OBJECTIVE BOX
ROSA MARIA CASEY  Patient is a 80y old  Male who presents with a chief complaint of acute hypoxic RF due to Covid Viral Syndrome. Underlying Hx of DMII, CKD,  DLD, MO,  JARETH, bladder ca, sp urostomy, Hx of 911 exposure), OA, DDD, DJD, GERD, diverticulosis, abd wall hernias. bowel resection and reanastomosis.    Overnight Events:  pt had drop in Hgb to 5.6 sp 2 u PRBC on ,  H/H low but stable , had 2 melanotic stools,  IV heparin  D/C, EGD today    Vital Signs:   T:  96.2  HR:  68  BP:  105/61    RR: 20  Pulse ox:  3L NC 98%        PHYSICAL EXAM:  GENERAL: A&O, debilitated but in NAD + NC  Neck:  short,  thick but supple, no JVD, no bruit, no stridor  Lungs:  shallow resp, scattered rhonchi, no wheezing, improving air entry  Abd:   distended soft and benign, + BS  Urinary:  RLQ urostomy  Ext:  arthritic changes, moves all limbs  Skin:  no rash  Psych stable    LABS:                        8.1  8-----------( 117           25    134 |  104 | 42  ----------------------------<102  4.6   23    1.0    GFR 64, 58, 72, 64, 35, 26, 30, 35, 64, 72,   Ca    8.8       Phos  3.4      Mg     2.0, 2.6, 3.0, 2.8, 2.2, 2.4      MB 63    trop <0.01 x2  ferritin 535, 462, 737, 339  procal 0.26, 0.11, 0.07  CRP 25, 180. <3  LDH  515  fibrinogen 199  DDIMER 4298  COVID AB + 85.60  3/30 LA 5.1,  3.7    TPro  5.9,  5.1, 4.1/  Alb  3.2,  3.1, 2.7  /  TBili  0.9  /  DBili  x   /  AST  45, 88, 88, 32  /  ALT  53, 93, 99, 36  /  AlkPhos  43  03-14      Urinalysis Basic - ( 12 Mar 2021 16:38 )    Color: Yellow / Appearance: Slightly Turbid / S.020 / pH: x  Gluc: x / Ketone: Negative  / Bili: Negative / Urobili: 3 mg/dL   Blood: x / Protein: 30 mg/dL / Nitrite: Negative   Leuk Esterase: Moderate / RBC: 5 /HPF / WBC 90 /HPF   Sq Epi: x / Non Sq Epi: 0 /HPF / Bacteria: Ma       RADIOLOGY & ADDITIONAL TESTS:  Venous duplex:  negative for DVT  CXR:  BL opacities L>R  CXR:  stable BL opacities    CT angio:  neg for central PE    CT abd/pelvis:  BIbasilar densities, hepatobiliary/spleen/pancreas WNL, mild BL adrenal thickening, no hydro, bl renal cysts, bl layering densities/ sm stones, "milk of ca", post cystoprostatectomy and ilealconduit, , post bowel resection and reanastomosis, ventral abd wall hernias, bl hip replacements    CTH 3/30:  moderate atrophy, chronic microvascular changes,  no evidence of acute pathology    EEG 3/30 gen slowing , no focal epileptiform activity    Venous duplex of lower ext:  + RLE popliteal vein thrombosis

## 2021-04-09 NOTE — PROGRESS NOTE ADULT - ASSESSMENT
IMPRESSION:      Acute hypoxemic respiratory failure   Severe COVID-19 PNA SP Toci   Doubt superimposed bacterial infection  LORRAINE on CKD 3 improving   Probable JARETH  dvt sp GFF  anemia / thrombocytopenia / HIT neg  dec Hb  CT reviewed    PLAN:    CNS: Avoid CNS depressants    HEENT: Oral care    PULMONARY:  HOB @ 45 degrees.  Aspiration precautions. Target SpO2 88 to 94%, wean oxygen as tolerated.     CARDIOVASCULAR: Avoid volume overload.     GI: GI prophylaxis. protonix q 12    RENAL:  Follow up lytes.  Correct as needed.     INFECTIOUS DISEASE: FU PanCx.     HEMATOLOGICAL:  serial cbc, hold AC      ENDOCRINE:  Follow up FS.     MUSCULOSKELETAL: OOB to chair     poor prognosis

## 2021-04-09 NOTE — PROGRESS NOTE ADULT - SUBJECTIVE AND OBJECTIVE BOX
Patient Information:  ROSA MARIA CASEY / 80y / Male / MRN#:[id number]    Hospital Day: 28d    Interval History:  Patient seen and examined at bedside. Pt s/p IVC filter placement by IR. S/p 2U pRBCs transfusion yesterday, Hb improved to 8.4. Had additional episode of melena overnight. Has remained hemodynamically stable overnight. Scheduled for EGD this morning.    Past Medical History:  Hypertension    Hyperlipidemia    Diabetes mellitus, type 2    History of atrial fibrillation    Bladder cancer    Chronic kidney disease (CKD)      Past Surgical History:  History of total hip replacement, bilateral    History of total knee replacement, bilateral    History of total cystectomy      Allergies:  No Known Allergies    Medications:  PRN:  acetaminophen   Tablet .. 650 milliGRAM(s) Oral every 6 hours PRN Temp greater or equal to 38C (100.4F), Mild Pain (1 - 3)  guaifenesin/dextromethorphan  Syrup 10 milliLiter(s) Oral every 6 hours PRN Cough    Standing:  atorvastatin 80 milliGRAM(s) Oral at bedtime  atropine Injectable 0.5 milliGRAM(s) IntraMuscular once  chlorhexidine 4% Liquid 1 Application(s) Topical <User Schedule>  influenza   Vaccine 0.5 milliLiter(s) IntraMuscular once  insulin glargine Injectable (LANTUS) 24 Unit(s) SubCutaneous at bedtime  insulin lispro (ADMELOG) corrective regimen sliding scale   SubCutaneous three times a day before meals  insulin lispro Injectable (ADMELOG) 8 Unit(s) SubCutaneous three times a day before meals  melatonin 5 milliGRAM(s) Oral at bedtime  metoprolol succinate ER 50 milliGRAM(s) Oral daily  mupirocin 2% Nasal 1 Application(s) Nasal two times a day  pantoprazole  Injectable 40 milliGRAM(s) IV Push two times a day  polyethylene glycol 3350 17 Gram(s) Oral daily  senna 2 Tablet(s) Oral at bedtime    Home:  amlodipine-benazepril 10 mg-40 mg oral capsule: 1 cap(s) orally once a day  Eliquis 5 mg oral tablet: 1 tab(s) orally 2 times a day  glimepiride 2 mg oral tablet: 1 tab(s) orally once a day  Lantus 100 units/mL subcutaneous solution: 40 unit(s) subcutaneous once a day  metoprolol succinate 100 mg oral tablet, extended release: 1 tab(s) orally once a day  Ozempic (1 mg dose) 2 mg/1.5 mL subcutaneous solution:   Percocet 7.5/325 oral tablet: 0.5 tab(s) orally once a day (at bedtime)  potassium citrate 10 mEq oral tablet, extended release: 1 tab(s) orally once a day  rosuvastatin 20 mg oral tablet: 1 tab(s) orally once a day    Vitals:  T(C): 35.9, Max: 36.1 (04-08-21 @ 11:47)  T(F): 96.6, Max: 97 (04-08-21 @ 11:47)  HR: 59 (54 - 68)  BP: 122/57 (104/58 - 137/73)  RR: 20 (20 - 20)  SpO2: 92% (92% - 100%)    Physical Exam:  General: Awake, alert, NAD, sitting up in bed, appears improved, well rested, on O2 NC 3L  HEENT: Head NC/AT  Heart: RRR; systolic murmur apprec  Lungs: Good air entry bilaterally  Abdomen: Soft, nontender, nondistended, BS+, urostomy bag in place, appears clean, intact  Extremities: Venous stasis skin changes of LE bilaterally  Neuro: AAOx3, NFD    Labs:  CBC (04-08 @ 22:22)                        Hgb: 8.4    WBC: 8.42  )-----------------( Plts: 131                              Hct: 25.5     Chem (04-08 @ 09:40)  Na: 130  |     Cl: 100     |  BUN: 49  -----------------------------------------< Gluc: 250    K: 4.8   |    HCO3: 18  |  Cr: 1.0    Ca 7.4 (04-08 @ 09:40)  Mg 2.0 (04-08 @ 09:40)    LFTs (04-08 @ 09:40)  TPro 4.1  /  Alb 2.7  TBili 1.4  /  DBili     AST 25  /  ALT 27  /  AlkPhos 58        PT/INR (04-08 @ 03:02)  PT:      ; INR:        PTT: 57.7             Microbiology:  Culture - Sputum (collected 04-05 @ 09:08)  Source: .Sputum Sputum  Gram Stain (04-06 @ 07:06):    Rare polymorphonuclear leukocytes per low power field    Few Squamous epithelial cells per low power field    Numerous Gram positive cocci in pairs seen per oil power field    Moderate Yeast like cells seen per oil power field  Final Report (04-07 @ 19:53):    Normal Respiratory Abeba present        Radiology:    < from: CT Abdomen and Pelvis No Cont (04.08.21 @ 12:01) >  IMPRESSION:    No evidence of retroperitoneal hematoma.    Mild increase in lung opacities    < end of copied text >

## 2021-04-09 NOTE — CHART NOTE - NSCHARTNOTEFT_GEN_A_CORE
patient is s/p EGD , none erosive gastritis , duodenitis , no GI bleed . Patient will need outpatient colonoscopy after resolution of COVID for evaluation of anemia patient is s/p EGD , none erosive gastritis , duodenitis , no GI bleed .  REC;  advance diet as tolerated   keep HGB above 8  Protonix 40 mg PO QD   Patient will need outpatient colonoscopy after resolution of COVID for evaluation of anemia

## 2021-04-09 NOTE — PROGRESS NOTE ADULT - SUBJECTIVE AND OBJECTIVE BOX
[facility] FOLLOW UP NOTE  --------------------------------------------------------------------------------  24 hour events/subjective: Patient examined. Appears comfortable.    PAST HISTORY  --------------------------------------------------------------------------------  No significant changes to PMH, PSH, FHx, SHx, unless otherwise noted    ALLERGIES & MEDICATIONS  --------------------------------------------------------------------------------  Allergies    No Known Allergies    Intolerances      Standing Inpatient Medications  atorvastatin 80 milliGRAM(s) Oral at bedtime  atropine Injectable 0.5 milliGRAM(s) IntraMuscular once  chlorhexidine 4% Liquid 1 Application(s) Topical <User Schedule>  influenza   Vaccine 0.5 milliLiter(s) IntraMuscular once  insulin glargine Injectable (LANTUS) 24 Unit(s) SubCutaneous at bedtime  insulin lispro (ADMELOG) corrective regimen sliding scale   SubCutaneous three times a day before meals  insulin lispro Injectable (ADMELOG) 8 Unit(s) SubCutaneous three times a day before meals  melatonin 5 milliGRAM(s) Oral at bedtime  metoprolol succinate ER 50 milliGRAM(s) Oral daily  mupirocin 2% Nasal 1 Application(s) Nasal two times a day  pantoprazole  Injectable 40 milliGRAM(s) IV Push two times a day  polyethylene glycol 3350 17 Gram(s) Oral daily  senna 2 Tablet(s) Oral at bedtime    PRN Inpatient Medications  acetaminophen   Tablet .. 650 milliGRAM(s) Oral every 6 hours PRN  guaifenesin/dextromethorphan  Syrup 10 milliLiter(s) Oral every 6 hours PRN      VITALS/PHYSICAL EXAM  --------------------------------------------------------------------------------  T(C): 35.9 (04-09-21 @ 07:21), Max: 36.1 (04-08-21 @ 11:47)  HR: 59 (04-09-21 @ 07:50) (54 - 68)  BP: 122/57 (04-09-21 @ 07:21) (104/58 - 137/73)  RR: 20 (04-09-21 @ 07:50) (20 - 20)  SpO2: 92% (04-09-21 @ 07:50) (92% - 100%)  Wt(kg): --        04-08-21 @ 07:01  -  04-09-21 @ 07:00  --------------------------------------------------------  IN: 0 mL / OUT: 2300 mL / NET: -2300 mL      Physical Exam:  	Gen: NAD  	Pulm: CTA B/L  	CV: RRR, S1S2  	Abd: +BS, soft, nontender/nondistended  	: No suprapubic tenderness  	LE: Warm,  edema    LABS/STUDIES  --------------------------------------------------------------------------------              8.1    8.39  >-----------<  117      [04-09-21 @ 09:00]              25.1     134  |  104  |  42  ----------------------------<  102      [04-09-21 @ 09:00]  4.6   |  23  |  1.0        Ca     7.9     [04-09-21 @ 09:00]      Mg     2.4     [04-09-21 @ 09:00]    TPro  4.5  /  Alb  2.8  /  TBili  2.1  /  DBili  x   /  AST  24  /  ALT  23  /  AlkPhos  52  [04-09-21 @ 09:00]      PTT: 57.7       [04-08-21 @ 03:02]          [04-08-21 @ 09:40]    Creatinine Trend:  SCr 1.0 [04-09 @ 09:00]  SCr 1.0 [04-08 @ 09:40]  SCr 0.9 [04-07 @ 08:06]  SCr 1.0 [04-06 @ 08:46]  SCr 1.1 [04-05 @ 06:52]    Urinalysis - [03-30-21 @ 04:27]      Color Yellow / Appearance Slightly Turbid / SG 1.017 / pH 6.5      Gluc Negative / Ketone Negative  / Bili Negative / Urobili <2 mg/dL       Blood Large / Protein Trace / Leuk Est Large / Nitrite Negative       / WBC 84 / Hyaline 25 / Gran  / Sq Epi  / Non Sq Epi 0 / Bacteria Negative      Iron 178, TIBC 305, %sat 58      [03-30-21 @ 07:11]  Ferritin 339      [04-02-21 @ 09:04]  Vitamin D (25OH) 23      [04-03-21 @ 08:42]

## 2021-04-09 NOTE — PROGRESS NOTE ADULT - ASSESSMENT
1. Acute hypoxemic respiratory failure secondary to COVID-19 pneumonia, s/p Toci 3/23, on dexamethasone, requiring high flow O2  2. Acute kidney injury, likely pre-renal azotemia vs contrast nephropathy  3. CKD III, baseline creatinine 1.2-1.5.  4. Hx of Bladder Ca, s/p cystoprostatectomy, urostomy  5. Hyperkalemia  6. Hypotension  7. Acute drop in Hgb, transfused    Plan:    Daily BMP  Monitor I/O  Trend H/H  Add furosemide 20mg PO daily

## 2021-04-09 NOTE — CHART NOTE - NSCHARTNOTEFT_GEN_A_CORE
[facility] ANESTHESIA ADMISSION NOTE      Procedure: Insertion, filter, inferior vena cava      Post op diagnosis:  Retroperitoneal hemorrhage        ____  Intubated  TV:______       Rate: ______      FiO2: ______    __x__  Patent Airway    __x__  Full return of protective reflexes    __x__  Full recovery from anesthesia / back to baseline     Vitals:   T:           R: 16                 BP:   102/55               Sat:  96                 P: 65      Mental Status:  _x___ Awake   __x___ Alert   _____ Drowsy   _____ Sedated    Nausea/Vomiting:  _x___ NO  ______Yes,   __x__ See Post - Op Orders          Pain Scale (0-10):  __0___    Treatment: ____ None    __x__ See Post - Op/PCA Orders    Post - Operative Fluids:   ____ Oral   __x__ See Post - Op Orders    Plan: Discharge:   _x___Home       _____Floor     _____Critical Care    _____  Other:_________________    Comments: When parameters met.

## 2021-04-09 NOTE — PROGRESS NOTE ADULT - ASSESSMENT
79 yo M with PMHx of AFib, on Eliquis, HTN, DMII, CKDIII, hx of Bladder Ca s/p Urostomy, bilateral hip/knee replacements, obesity admitted for acute hypoxemic respiratory failure due to COVID PNA.    Neuro:  - Is AAOx4  - Previously had episodes of diffuse tremors, non responsiveness with spont. resolution, likely due to metabolic encephalopathy in setting of acute sepsis, now resolved  - CT Head, EEG was negative    Pulmonary:  #Acute Hypoxemic Respiratory Failure secondary to COVID PNA - resolved  - Saturating % on O2 NC 3L    Cardiovascular:   #Hx of Paroxysmal AFib on Eliquis  #CHADSVASC Score: 4, HASBLED 2  #HTN  - Has been rate controlled, on Metoprolol Succinate 100 mg once daily at home, was dec to 50 mg once daily due to bradycardia, has HR in 40s-50s at times while at rest, asymptomatic  - Takes Eliquis at home, was receiving Lovenox while here, transitioned to Heparin and developed worsening drop in Hb (5.6); now off AC  - Takes Amlodipine at home, on hold as pt has been hypotensive in setting of acute blood loss    GI:  - Had 2 episodes of melena in past 24 hours  - s/p 2U pRBCs yesterday - Hb improved from 5.6 to 8.4  - GI following - c/w PPI BID, scheduled for EGD today    Endocrine:  #Hx of DMII  - FS better controlled  - C/w Lantus 24 U at bedtime, Lispro 8 U before meals    Renal:   #HX of Bladder Ca, s/p cystoprostatectomy with urostomy  #LORRAINE due to LINDSAY - resolved  #CKD III - stable  #Episodic hematuria with blood clots - now resolved  - Nephrology following  - Cr has been stable  - Urine in urostomy bag appears clear, site appears clean and intact    Heme Onc:  #Acute anemia (baseline Hb 13)  #Acute thrombocytopenia - improving  #Acute R popliteal vein DVT  - Hb had gradually dropped from 13 to 5.6, had received 2 U pRCBs yesterday, Hb improved to 8.4  - Hemolysis workup negative x2  - Plt ct has gradually dropped to < 100, HIT panel within normal limits, plt count now improved  - CT Abd and Pelvis negative for retroperitoneal hematoma x2  - GI following, scheduled for EGD today as pt had two episodes of melena in setting of acute Hb drop  - s/p IVC filter placement for acute RLE DVT on 4/9    Infectious Disease:   #COVID PNA  #Sepsis of unclear source - now resolved  - S/p course of Dexamethasone, s/p Toci 3/23  - Had developed hypotension, hypothermia, elevated lactate - now resolved  - Received Vanco, Cefepime x1, initiated on Caspofungin, now d/c'd as fungitell within normal limits, s/p week course of Meropenem   - Blood Cx NGTD, Urine Cx revealed gut kike as pt has urostomy  - Sputum Cx - normal resp kike   81 yo M with PMHx of AFib, on Eliquis, HTN, DMII, CKDIII, hx of Bladder Ca s/p Urostomy, bilateral hip/knee replacements, obesity admitted for acute hypoxemic respiratory failure due to COVID PNA.    Neuro:  - Is AAOx4  - Previously had episodes of diffuse tremors, non responsiveness with spont. resolution, likely due to metabolic encephalopathy in setting of acute sepsis, now resolved  - CT Head, EEG was negative    Pulmonary:  #Acute Hypoxemic Respiratory Failure secondary to COVID PNA - resolved  - Saturating % on O2 NC 3L    Cardiovascular:   #Hx of Paroxysmal AFib on Eliquis  #CHADSVASC Score: 4, HASBLED 2  #HTN  - Has been rate controlled, on Metoprolol Succinate 100 mg once daily at home, was dec to 50 mg once daily due to bradycardia, has HR in 40s-50s at times while at rest, asymptomatic  - Takes Eliquis at home, was receiving Lovenox while here, transitioned to Heparin and developed worsening drop in Hb (5.6); now off AC  - Takes Amlodipine at home, on hold as pt has been hypotensive in setting of acute blood loss    GI:  - Had 2 episodes of melena in past 24 hours  - s/p 2U pRBCs yesterday - Hb improved from 5.6 to 8.4  - GI following - c/w PPI BID, scheduled for EGD today    Endocrine:  #Hx of DMII  - FS better controlled  - C/w Lantus 24 U at bedtime, Lispro 8 U before meals    Renal:   #HX of Bladder Ca, s/p cystoprostatectomy with urostomy  #LORRAINE due to LINDSAY - resolved  #CKD III - stable  #Episodic hematuria with blood clots - now resolved  - Nephrology following  - Cr has been stable  - Urine in urostomy bag appears clear, site appears clean and intact    Heme Onc:  #Acute anemia (baseline Hb 13)  #Acute thrombocytopenia - improving  #Acute R popliteal vein DVT  - Hb had gradually dropped from 13 to 5.6, had received 2 U pRCBs yesterday, Hb improved to 8.4  - Hemolysis workup negative x2  - Plt ct has gradually dropped to < 100, HIT panel within normal limits, plt count now improved  - CT Abd and Pelvis negative for retroperitoneal hematoma x2  - GI following, scheduled for EGD today as pt had two episodes of melena in setting of acute Hb drop  - s/p IVC filter placement for acute RLE DVT on 4/9    Infectious Disease:   #COVID PNA  #Sepsis of unclear source - now resolved  - S/p course of Dexamethasone, s/p Toci 3/23  - Had developed hypotension, hypothermia, elevated lactate - now resolved  - Received Vanco, Cefepime x1, initiated on Caspofungin, now d/c'd as fungitell within normal limits, s/p week course of Meropenem   - Blood Cx NGTD, Urine Cx revealed gut kike as pt has urostomy  - Sputum Cx - normal resp kike    Spoke to sonJuancho ([phone]), provided medical update and answered all questions.

## 2021-04-10 LAB
ALBUMIN SERPL ELPH-MCNC: 2.9 G/DL — LOW (ref 3.5–5.2)
ALP SERPL-CCNC: 57 U/L — SIGNIFICANT CHANGE UP (ref 30–115)
ALT FLD-CCNC: 29 U/L — SIGNIFICANT CHANGE UP (ref 0–41)
ANION GAP SERPL CALC-SCNC: 10 MMOL/L — SIGNIFICANT CHANGE UP (ref 7–14)
AST SERPL-CCNC: 32 U/L — SIGNIFICANT CHANGE UP (ref 0–41)
BASOPHILS # BLD AUTO: 0.01 K/UL — SIGNIFICANT CHANGE UP (ref 0–0.2)
BASOPHILS NFR BLD AUTO: 0.1 % — SIGNIFICANT CHANGE UP (ref 0–1)
BILIRUB SERPL-MCNC: 2.1 MG/DL — HIGH (ref 0.2–1.2)
BUN SERPL-MCNC: 34 MG/DL — HIGH (ref 10–20)
CALCIUM SERPL-MCNC: 7.7 MG/DL — LOW (ref 8.5–10.1)
CHLORIDE SERPL-SCNC: 103 MMOL/L — SIGNIFICANT CHANGE UP (ref 98–110)
CO2 SERPL-SCNC: 22 MMOL/L — SIGNIFICANT CHANGE UP (ref 17–32)
CREAT SERPL-MCNC: 1 MG/DL — SIGNIFICANT CHANGE UP (ref 0.7–1.5)
EOSINOPHIL # BLD AUTO: 0.24 K/UL — SIGNIFICANT CHANGE UP (ref 0–0.7)
EOSINOPHIL NFR BLD AUTO: 2.9 % — SIGNIFICANT CHANGE UP (ref 0–8)
GLUCOSE BLDC GLUCOMTR-MCNC: 106 MG/DL — HIGH (ref 70–99)
GLUCOSE BLDC GLUCOMTR-MCNC: 197 MG/DL — HIGH (ref 70–99)
GLUCOSE BLDC GLUCOMTR-MCNC: 79 MG/DL — SIGNIFICANT CHANGE UP (ref 70–99)
GLUCOSE BLDC GLUCOMTR-MCNC: 85 MG/DL — SIGNIFICANT CHANGE UP (ref 70–99)
GLUCOSE SERPL-MCNC: 93 MG/DL — SIGNIFICANT CHANGE UP (ref 70–99)
HCT VFR BLD CALC: 24.4 % — LOW (ref 42–52)
HGB BLD-MCNC: 7.9 G/DL — LOW (ref 14–18)
IMM GRANULOCYTES NFR BLD AUTO: 0.5 % — HIGH (ref 0.1–0.3)
LYMPHOCYTES # BLD AUTO: 1.39 K/UL — SIGNIFICANT CHANGE UP (ref 1.2–3.4)
LYMPHOCYTES # BLD AUTO: 16.7 % — LOW (ref 20.5–51.1)
MAGNESIUM SERPL-MCNC: 2 MG/DL — SIGNIFICANT CHANGE UP (ref 1.8–2.4)
MCHC RBC-ENTMCNC: 29.4 PG — SIGNIFICANT CHANGE UP (ref 27–31)
MCHC RBC-ENTMCNC: 32.4 G/DL — SIGNIFICANT CHANGE UP (ref 32–37)
MCV RBC AUTO: 90.7 FL — SIGNIFICANT CHANGE UP (ref 80–94)
MONOCYTES # BLD AUTO: 0.65 K/UL — HIGH (ref 0.1–0.6)
MONOCYTES NFR BLD AUTO: 7.8 % — SIGNIFICANT CHANGE UP (ref 1.7–9.3)
NEUTROPHILS # BLD AUTO: 6 K/UL — SIGNIFICANT CHANGE UP (ref 1.4–6.5)
NEUTROPHILS NFR BLD AUTO: 72 % — SIGNIFICANT CHANGE UP (ref 42.2–75.2)
NRBC # BLD: 0 /100 WBCS — SIGNIFICANT CHANGE UP (ref 0–0)
PLATELET # BLD AUTO: 119 K/UL — LOW (ref 130–400)
POTASSIUM SERPL-MCNC: 4.1 MMOL/L — SIGNIFICANT CHANGE UP (ref 3.5–5)
POTASSIUM SERPL-SCNC: 4.1 MMOL/L — SIGNIFICANT CHANGE UP (ref 3.5–5)
PROT SERPL-MCNC: 4.5 G/DL — LOW (ref 6–8)
RBC # BLD: 2.69 M/UL — LOW (ref 4.7–6.1)
RBC # FLD: 18.8 % — HIGH (ref 11.5–14.5)
SODIUM SERPL-SCNC: 135 MMOL/L — SIGNIFICANT CHANGE UP (ref 135–146)
WBC # BLD: 8.33 K/UL — SIGNIFICANT CHANGE UP (ref 4.8–10.8)
WBC # FLD AUTO: 8.33 K/UL — SIGNIFICANT CHANGE UP (ref 4.8–10.8)

## 2021-04-10 RX ORDER — ERGOCALCIFEROL 1.25 MG/1
50000 CAPSULE ORAL
Refills: 0 | Status: DISCONTINUED | OUTPATIENT
Start: 2021-04-10 | End: 2021-04-20

## 2021-04-10 RX ORDER — PREGABALIN 225 MG/1
1000 CAPSULE ORAL DAILY
Refills: 0 | Status: DISCONTINUED | OUTPATIENT
Start: 2021-04-10 | End: 2021-04-20

## 2021-04-10 RX ORDER — FOLIC ACID 0.8 MG
1 TABLET ORAL DAILY
Refills: 0 | Status: DISCONTINUED | OUTPATIENT
Start: 2021-04-10 | End: 2021-04-20

## 2021-04-10 RX ADMIN — ATORVASTATIN CALCIUM 80 MILLIGRAM(S): 80 TABLET, FILM COATED ORAL at 22:19

## 2021-04-10 RX ADMIN — MUPIROCIN 1 APPLICATION(S): 20 OINTMENT TOPICAL at 17:52

## 2021-04-10 RX ADMIN — Medication 5 MILLIGRAM(S): at 22:19

## 2021-04-10 RX ADMIN — Medication 8 UNIT(S): at 11:34

## 2021-04-10 RX ADMIN — Medication 8 UNIT(S): at 07:36

## 2021-04-10 RX ADMIN — PANTOPRAZOLE SODIUM 40 MILLIGRAM(S): 20 TABLET, DELAYED RELEASE ORAL at 05:01

## 2021-04-10 RX ADMIN — MUPIROCIN 1 APPLICATION(S): 20 OINTMENT TOPICAL at 05:01

## 2021-04-10 RX ADMIN — SENNA PLUS 2 TABLET(S): 8.6 TABLET ORAL at 22:19

## 2021-04-10 RX ADMIN — Medication 20 MILLIGRAM(S): at 05:01

## 2021-04-10 RX ADMIN — INSULIN GLARGINE 24 UNIT(S): 100 INJECTION, SOLUTION SUBCUTANEOUS at 22:19

## 2021-04-10 NOTE — PROGRESS NOTE ADULT - SUBJECTIVE AND OBJECTIVE BOX
ROSA MARIA CASEY  Patient is a 80y old  Male who presents with a chief complaint of acute hypoxic RF due to Covid Viral Syndrome. Underlying Hx of DMII, CKD,  DLD, MO,  JARETH, bladder ca, sp urostomy, Hx of 911 exposure), OA, DDD, DJD, GERD, diverticulosis, abd wall hernias. bowel resection and reanastomosis.  Hospital course:  the pt remained in CEU  with HFNC O2 therapy, DEXA, TOCI therapy.  The hospital course was c/by LORRAINE, hematuria in the urostomy bag,  transaminitis, GIB req transfusions, RLE DVT req IVC filter placement and super infections req IV Abx and Caspofungin.  Eventually the renal and liver parameters improved. The pt slowly transitioned to NC O2.  He underwent EGD which showed nonerosive gastritis and duodenitis with no active BGIB.  He had several melanotic stools.      Overnight Events:  pt had drop in H/H ans was transfused, today H/H 7.9/24,  EGD  showed nonerosive gastritis and duodenitis, pt on 3L NC with good O2 sat at 96%, he is sp IVF and AC is on hold    Vital Signs:   T:  96.1  HR:  109  BP:  121/84    RR: 19  Pulse ox:  3L NC 96%        PHYSICAL EXAM:  GENERAL: A&O, debilitated but in NAD + NC  Neck:  short,  thick but supple, no JVD, no bruit, no stridor  Lungs:  shallow resp, scattered rhonchi, no wheezing, improving air entry  Abd:   distended soft and benign, + BS  Urinary:  RLQ urostomy  Ext:  arthritic changes, moves all limbs  Skin:  no rash  Psych stable    LABS:                        7.9  8.3-----------( 119           24    135 |  103 | 34  ----------------------------<93  4.1   22    1.0    GFR 64, 58, 72, 64, 35, 26, 30, 35, 64, 72,   Ca    8.8, 7.7      Phos  3.4      Mg     2.0, 2.6, 3.0, 2.8, 2.2, 2.4, 2.0      MB 63    trop <0.01 x2  ferritin 535, 462, 737, 339  procal 0.26, 0.11, 0.07  CRP 25, 180. <3  LDH  515  fibrinogen 199  DDIMER 4298  COVID AB + 85.60  3/30 LA 5.1,  3.7    TPro  5.9,  5.1, 4.1, 4.5/  Alb  3.2,  3.1, 2.7, 2.9  /  TBili  0.9  /  DBili  x   /  AST  45, 88, 88, 32  /  ALT  53, 93, 99, 36  /  AlkPhos  43  03-14      Urinalysis Basic - ( 12 Mar 2021 16:38 )    Color: Yellow / Appearance: Slightly Turbid / S.020 / pH: x  Gluc: x / Ketone: Negative  / Bili: Negative / Urobili: 3 mg/dL   Blood: x / Protein: 30 mg/dL / Nitrite: Negative   Leuk Esterase: Moderate / RBC: 5 /HPF / WBC 90 /HPF   Sq Epi: x / Non Sq Epi: 0 /HPF / Bacteria: Ma       RADIOLOGY & ADDITIONAL TESTS:  Venous duplex:  negative for DVT  CXR:  BL opacities L>R  CXR:  stable BL opacities    CT angio:  neg for central PE    CT abd/pelvis:  BIbasilar densities, hepatobiliary/spleen/pancreas WNL, mild BL adrenal thickening, no hydro, bl renal cysts, bl layering densities/ sm stones, "milk of ca", post cystoprostatectomy and ilealconduit, , post bowel resection and reanastomosis, ventral abd wall hernias, bl hip replacements    CTH 3/30:  moderate atrophy, chronic microvascular changes,  no evidence of acute pathology    EEG 3/30 gen slowing , no focal epileptiform activity    Venous duplex of lower ext:  + RLE popliteal vein thrombosis    EGD :  nonerosive gastritis and duodenitis, no active bleed

## 2021-04-10 NOTE — PROGRESS NOTE ADULT - ASSESSMENT
81 yo M with PMHx of AFib, on Eliquis, HTN, DMII, CKDIII, hx of Bladder Ca s/p Urostomy, bilateral hip/knee replacements, obesity admitted for acute hypoxemic respiratory failure due to COVID PNA.    Neuro:  - Is AAOx4  - Previously had episodes of diffuse tremors, non responsiveness with spont. resolution, likely due to metabolic encephalopathy in setting of acute sepsis, now resolved  - CT Head, EEG was negative    Pulmonary:  #Acute Hypoxemic Respiratory Failure secondary to COVID PNA - resolved  - Saturating % on O2 NC 3L    Cardiovascular:   #Hx of Paroxysmal AFib on Eliquis  #CHADSVASC Score: 4, HASBLED 2  #HTN  - Has been rate controlled, on Metoprolol Succinate 100 mg once daily at home, was dec to 50 mg once daily due to bradycardia, has HR in 40s-50s at times while at rest, asymptomatic  - Takes Eliquis at home, was receiving Lovenox while here, transitioned to Heparin and developed worsening drop in Hb (5.6); now off AC  - Takes Amlodipine at home, on hold as pt has been hypotensive in setting of acute blood loss    GI:  - s/p 2U pRBCs 4/8 - Hb improved from 5.6 to 8.4   - Due to few episodes of melena, pt had EGD done with GI - negative for acute bleeding  - C/w PPI once daily, cont to monitor Hb, maintain active T+S    Endocrine:  #Hx of DMII  - FS better controlled  - C/w Lantus 24 U at bedtime, Lispro 8 U before meals    Renal:   #HX of Bladder Ca, s/p cystoprostatectomy with urostomy  #LORRAINE due to LINDSAY - resolved  #CKD III - stable  #Episodic hematuria with blood clots - now resolved  - Nephrology following  - Cr has been stable  - Urine in urostomy bag appears clear, site appears clean and intact    Heme Onc:  #Acute anemia (baseline Hb 13)  #Acute thrombocytopenia - improving  #Acute R popliteal vein DVT  - Hb had gradually dropped from 13 to 5.6, had received 2 U pRCBs 4/8, Hb improved to 8.4  - Hemolysis workup negative x2  - Plt ct has gradually dropped to < 100, HIT panel within normal limits, plt count now improved  - CT Abd and Pelvis negative for retroperitoneal hematoma x2  - GI following - s/p EGD 4/9, negative for acute bleeding  - s/p IVC filter placement for acute RLE DVT on 4/9    Infectious Disease:   #COVID PNA  #Sepsis of unclear source - now resolved  - S/p course of Dexamethasone, s/p Toci 3/23  - Had developed hypotension, hypothermia, elevated lactate - now resolved  - Received Vanco, Cefepime x1, initiated on Caspofungin, now d/c'd as fungitell within normal limits, s/p week course of Meropenem   - Blood Cx NGTD, Urine Cx revealed gut kike as pt has urostomy  - Sputum Cx - normal resp kike     79 yo M with PMHx of AFib, on Eliquis, HTN, DMII, CKDIII, hx of Bladder Ca s/p Urostomy, bilateral hip/knee replacements, obesity admitted for acute hypoxemic respiratory failure due to COVID PNA.    #Acute hypoxemic Respiratory Failure due to COVID - resolved  - s/p Toci, course of Decadron  - Treated with Levaquin, Cefepime for suspected superimposed bacterial infection  - Was on HFNC, transitioned to O2 NC 3L and saturating 98%, can trial RA    #Acute drop in Hb - source unclear  #Acute thrombocytopenia - improving  - Had gradual decrease in Hb from baseline 12-13 to 5.6, had total of 3 U pRBCS throughout [facility] stay  - Hb has been stable at ~8  - Had CT Abd x2 - negative for acute retroperitoneal bleed  - Had several episodes of melena, EGD performed 4/9 negative for acute pathology, c/w PPI once daily   - Hemolysis workup negative, DIC unlikely cause for dec in Hb, plt ct as pt did not have acute bleeding, PT/PTT within normal limits, fibrinogen borderline low  - HIT panel done for acute thrombocytopenia - negative    #Hx of Paroxysmal AFib on Eliquis  #CHADSVASC Score: 4, HASBLED 2  #HTN  - Has been rate controlled, on Metoprolol Succinate 100 mg once daily at home, was dec to 50 mg once daily due to bradycardia, has HR in 40s-50s at times while at rest, asymptomatic  - Takes Eliquis at home, was receiving Lovenox while here, transitioned to Heparin and developed worsening drop in Hb (5.6); now off AC  - Takes Amlodipine at home, on hold as pt has been hypotensive in setting of acute blood loss, resume if becomes hypertensive    #Acute R popliteal vein DVT  - LE Duplex on 4/5 revealed acute DVT, initiated on Lovenox, then transitioned to Heparin in case of acute bleeding, then developed acute drop in Hb  - Now off AC as is high risk for acute bleeding  - s/p IVC filter placement for acute RLE DVT on 4/9    #Sepsis of unknown source - resolved  - Hospital course complicated by hypotension, unresponsive to IVF boluses, required low dose Levo, as well as hypothermia  - Blood Cx NGTD, Urine Cx grew gut kike (has urostomy), Sputum Cx grew normal kike  - Procalc, CRP within normal limits, fungitell, galactomannan within normal limits   - Now improved, off Abx, remains normotensive, afebrile, WBC within normal limits   - Had episodes of tremoring, nonresponsiveness with spontaneous resolution, likely due to state of sepsis - now resolved, is AAOx4, no neurological deficits    #Hx of DMII  - HbA1c 8.1%  - FS better controlled  - C/w Lantus 24 U at bedtime, Lispro 8 U before meals    #HX of Bladder Ca, s/p cystoprostatectomy with urostomy  #LORRAINE due to LINDSAY - resolved  #CKD III - stable  #Episodic hematuria with blood clots - now resolved  - Nephrology following  - Cr has been stable  - Urine in urostomy bag appears clear, site appears clean and intact    DVT ppx: On hold, high risk for bleeding, no SCDs as pt has DVT  GI ppx: PPI  Diet: DASH, CC  Activity: AAT  Full Code  Dispo: overall improved, monitoring Hb, possible downgrade to floor       79 yo M with PMHx of AFib, on Eliquis, HTN, DMII, CKDIII, hx of Bladder Ca s/p Urostomy, bilateral hip/knee replacements, obesity admitted for acute hypoxemic respiratory failure due to COVID PNA.    #Acute hypoxemic Respiratory Failure due to COVID - resolved  - s/p Toci, course of Decadron  - Treated with Levaquin, Cefepime for suspected superimposed bacterial infection  - Was on HFNC, transitioned to O2 NC 3L and saturating 98%, can trial RA    #Acute drop in Hb - source unclear  #Acute thrombocytopenia - improving  - Had gradual decrease in Hb from baseline 12-13 to 5.6, had total of 3 U pRBCS throughout [facility] stay  - Hb has been stable at ~8  - Had CT Abd x2 - negative for acute retroperitoneal bleed  - Had several episodes of melena, EGD performed 4/9 negative for acute pathology, c/w PPI once daily   - Hemolysis workup negative, DIC unlikely cause for dec in Hb, plt ct as pt did not have acute bleeding, PT/PTT within normal limits, fibrinogen borderline low  - HIT panel done for acute thrombocytopenia - negative    #Hx of Paroxysmal AFib on Eliquis  #CHADSVASC Score: 4, HASBLED 2  #HTN  - Has been rate controlled, on Metoprolol Succinate 100 mg once daily at home, was dec to 50 mg once daily due to bradycardia, has HR in 40s-50s at times while at rest, asymptomatic  - Takes Eliquis at home, was receiving Lovenox while here, transitioned to Heparin and developed worsening drop in Hb (5.6); now off AC  - Takes Amlodipine at home, on hold as pt has been hypotensive in setting of acute blood loss, resume if becomes hypertensive    #Acute R popliteal vein DVT  - LE Duplex on 4/5 revealed acute DVT, initiated on Lovenox, then transitioned to Heparin in case of acute bleeding, then developed acute drop in Hb  - Now off AC as is high risk for acute bleeding  - s/p IVC filter placement for acute RLE DVT on 4/9    #Sepsis of unknown source - resolved  - Hospital course complicated by hypotension, unresponsive to IVF boluses, required low dose Levo, as well as hypothermia  - Blood Cx NGTD, Urine Cx grew gut kike (has urostomy), Sputum Cx grew normal kike  - Procalc, CRP within normal limits, fungitell, galactomannan within normal limits   - Completed week course of Meropenem  - Now improved, off Abx, remains normotensive, afebrile, WBC within normal limits   - Had episodes of tremoring, nonresponsiveness with spontaneous resolution, likely due to state of sepsis - now resolved, is AAOx4, no neurological deficits    #Hx of DMII  - HbA1c 8.1%  - FS better controlled  - C/w Lantus 24 U at bedtime, Lispro 8 U before meals    #HX of Bladder Ca, s/p cystoprostatectomy with urostomy  #LORRAINE due to LINDSAY - resolved  #CKD III - stable  #Episodic hematuria with blood clots - now resolved  - Nephrology following  - Cr has been stable  - Urine in urostomy bag appears clear, site appears clean and intact    DVT ppx: On hold, high risk for bleeding, no SCDs as pt has DVT  GI ppx: PPI  Diet: DASH, CC  Activity: AAT  Full Code  Dispo: overall improved, monitoring Hb, possible downgrade to floor       81 yo M with PMHx of AFib, on Eliquis, HTN, DMII, CKDIII, hx of Bladder Ca s/p Urostomy, bilateral hip/knee replacements, obesity admitted for acute hypoxemic respiratory failure due to COVID PNA.    #Acute hypoxemic Respiratory Failure due to COVID - resolved  - s/p Toci, course of Decadron  - Treated with Levaquin, Cefepime for suspected superimposed bacterial infection  - Was on HFNC, transitioned to O2 NC 3L and saturating 98%, can trial RA    #Acute drop in Hb - source unclear  #Acute thrombocytopenia - improving  - Had gradual decrease in Hb from baseline 12-13 to 5.6, had total of 3 U pRBCS throughout [facility] stay  - Hb has been stable at ~8  - Had CT Abd x2 - negative for acute retroperitoneal bleed  - Had several episodes of melena, EGD performed 4/9 negative for acute pathology, c/w PPI once daily   - Hemolysis workup negative, DIC unlikely cause for dec in Hb, plt ct as pt did not have acute bleeding, PT/PTT within normal limits, fibrinogen borderline low  - HIT panel done for acute thrombocytopenia - negative    #Hx of Paroxysmal AFib on Eliquis  #CHADSVASC Score: 4, HASBLED 2  #HTN  - Has been rate controlled, on Metoprolol Succinate 100 mg once daily at home, was dec to 50 mg once daily due to bradycardia, has HR in 40s-50s at times while at rest, asymptomatic  - Takes Eliquis at home, was receiving Lovenox while here, transitioned to Heparin and developed worsening drop in Hb (5.6); now off AC  - Takes Amlodipine at home, on hold as pt has been hypotensive in setting of acute blood loss, resume if becomes hypertensive    #Acute R popliteal vein DVT  - LE Duplex on 4/5 revealed acute DVT, initiated on Lovenox, then transitioned to Heparin in case of acute bleeding, then developed acute drop in Hb  - Now off AC as is high risk for acute bleeding  - s/p IVC filter placement for acute RLE DVT on 4/9    #Sepsis of unknown source - resolved  - Hospital course complicated by hypotension, unresponsive to IVF boluses, required low dose Levo, as well as hypothermia  - Blood Cx NGTD, Urine Cx grew gut kike (has urostomy), Sputum Cx grew normal kike  - Procalc, CRP within normal limits, fungitell, galactomannan within normal limits   - Completed week course of Meropenem  - Now improved, off Abx, remains normotensive, afebrile, WBC within normal limits   - Had episodes of tremoring, nonresponsiveness with spontaneous resolution, likely due to state of sepsis - now resolved, is AAOx4, no neurological deficits    #Hx of DMII  - HbA1c 8.1%  - FS better controlled  - C/w Lantus 24 U at bedtime, Lispro 8 U before meals    #HX of Bladder Ca, s/p cystoprostatectomy with urostomy  #LORRAINE due to LINDSAY - resolved  #CKD III - stable  #Episodic hematuria with blood clots - now resolved  - Nephrology following  - Cr has been stable  - Urine in urostomy bag appears clear, site appears clean and intact    DVT ppx: On hold, high risk for bleeding, no SCDs as pt has DVT  GI ppx: PPI  Diet: DASH, CC  Activity: AAT  Full Code  Dispo: overall improved, monitoring Hb, possible downgrade to floor    Spoke to daughter, provided medical update and answered questions.

## 2021-04-10 NOTE — PROGRESS NOTE ADULT - ASSESSMENT
Acute hypoxemic respiratory failure  COVID-19 pneumonia  Acute kidney injury,  CKD 2-3  Hx of Bladder Ca, s/p cystoprostatectomy, urostomy  anemia s/p PRBC transfusion  s/p IVC filter  DM    Plan:    cont lasix 20mg poqd  cont metoprolol  monitor bp's  urostomy care  off abx  trend h/h  prn prbc tx  O2  lantus / humalog  icu care

## 2021-04-10 NOTE — PROGRESS NOTE ADULT - SUBJECTIVE AND OBJECTIVE BOX
NEPHROLOGY FOLLOW UP NOTE  ----------------------------------------  pt seen in icu  on O2 NC  s/p EGD    PAST MEDICAL & SURGICAL HISTORY:  Hypertension    Hyperlipidemia    Diabetes mellitus, type 2    History of atrial fibrillation    Bladder cancer  secondary to exposure at [facility] 9/11 s/p cystectomy with urostomy    Chronic kidney disease (CKD)  stage 3A, baseline creatinine 1.4    History of total hip replacement, bilateral    History of total knee replacement, bilateral    History of total cystectomy  with resultant urostomy      Allergies:  No Known Allergies    Home Medications Reviewed    SOCIAL HISTORY:  Denies ETOH,Smoking,   FAMILY HISTORY:        REVIEW OF SYSTEMS:  All other review of systems is negative unless indicated above.      Physical Exam:  	Gen: NAD  	Pulm: CTA B/L  	CV: RRR, S1S2  	Abd: +BS, soft, nontender/nondistended  	: No suprapubic tenderness  	LE: Warm,  edema    Hospital Medications:   MEDICATIONS  (STANDING):  atorvastatin 80 milliGRAM(s) Oral at bedtime  atropine Injectable 0.5 milliGRAM(s) IntraMuscular once  chlorhexidine 4% Liquid 1 Application(s) Topical <User Schedule>  furosemide    Tablet 20 milliGRAM(s) Oral daily  influenza   Vaccine 0.5 milliLiter(s) IntraMuscular once  insulin glargine Injectable (LANTUS) 24 Unit(s) SubCutaneous at bedtime  insulin lispro (ADMELOG) corrective regimen sliding scale   SubCutaneous three times a day before meals  insulin lispro Injectable (ADMELOG) 8 Unit(s) SubCutaneous three times a day before meals  melatonin 5 milliGRAM(s) Oral at bedtime  metoprolol succinate ER 50 milliGRAM(s) Oral daily  mupirocin 2% Nasal 1 Application(s) Nasal two times a day  pantoprazole    Tablet 40 milliGRAM(s) Oral before breakfast  polyethylene glycol 3350 17 Gram(s) Oral daily  senna 2 Tablet(s) Oral at bedtime        VITALS:  T(F): 96.1 (04-10-21 @ 11:33), Max: 97.6 (04-10-21 @ 05:06)  HR: 109 (04-10-21 @ 11:33)  BP: 121/84 (04-10-21 @ 11:33)  RR: 19 (04-10-21 @ 11:33)  SpO2: 96% (04-10-21 @ 11:33)  Wt(kg): --    04-08 @ 07:01  -  04-09 @ 07:00  --------------------------------------------------------  IN: 0 mL / OUT: 2300 mL / NET: -2300 mL    04-09 @ 07:01  -  04-10 @ 07:00  --------------------------------------------------------  IN: 640 mL / OUT: 1620 mL / NET: -980 mL    04-10 @ 07:01  -  04-10 @ 16:10  --------------------------------------------------------  IN: 0 mL / OUT: 1100 mL / NET: -1100 mL          LABS:  04-10    135  |  103  |  34<H>  ----------------------------<  93  4.1   |  22  |  1.0    Ca    7.7<L>      10 Apr 2021 04:30  Mg     2.0     04-10    TPro  4.5<L>  /  Alb  2.9<L>  /  TBili  2.1<H>  /  DBili      /  AST  32  /  ALT  29  /  AlkPhos  57  04-10                          7.9    8.33  )-----------( 119      ( 10 Apr 2021 04:30 )             24.4       Urine Studies:        RADIOLOGY & ADDITIONAL STUDIES:

## 2021-04-10 NOTE — PROGRESS NOTE ADULT - ASSESSMENT
81yo M c PMHx chronic afib on noac, htn, dm2, ckd3 bladder ca s/p urostomy, b/l hip/ knee replacements obesity, here with acute hypoxic respiratory failure 2/2 severe covid 19 viral pneumonia, lorraine on ckd3 now better, elevated ddimer    Sepsis on admission  Covid viral PNA. acute hypoxic RF  underlying JARETH, [place]  LORRAINE on CKD  Transaminitis  Anemia sp transfusion  EGD 4/9/21, nonerosive gastritis and duodenitis  E coli UTI  R lower ext DVT, sp IVC Filter  Hx of HTN, ASHD, Afib/Eliquis  DM II, CKD  DLD  Bladder ca, sp urostomy, sp cystoprostatectomy, exposure to 911  OA, DDD, DJD, sp BL hip and knee surgeries, mobility dysfunction  GERD, HH, diverticulosis, bowel resection and reanastomosis      pt in CEU, transitionedto 5L to 3LNC, 96% sat  drop in H/H, transfused total of 2u 4/8, IV heparin D/C, H/H low but stable  7.9/24, observe stools  GI:   EGD 4/9 showed nonerosive gastritis and duodenitis and no active bleed  CT of abd/pelvis non contrast  to R/O retroperitoneal hematoma  IR, sp IVC filter for DVT  add folic acid, B12 daily  add ergocal 50,000u q wk  improvement of renal function and LFTs, cont to monitor    ID sp sepsis, Maldonado completed, D/C caspofungin as fungitell nl, pt off all Abx    pul-critical care ff    CXR shows stable  BL opacities  spirometry  elevated DDIMER, CT angio negative for PE  guarded state but improving resp status

## 2021-04-10 NOTE — PROGRESS NOTE ADULT - SUBJECTIVE AND OBJECTIVE BOX
Patient Information:  ROSA MARIA CASEY / 80y / Male / MRN#:[id number]    Hospital Day: 29d    Interval History:  Patient seen and examined at bedside. No acute events overnight. Pt resting in bed, appears comfortable, denies any complaints.     Past Medical History:  Hypertension    Hyperlipidemia    Diabetes mellitus, type 2    History of atrial fibrillation    Bladder cancer    Chronic kidney disease (CKD)      Past Surgical History:  History of total hip replacement, bilateral    History of total knee replacement, bilateral    History of total cystectomy      Allergies:  No Known Allergies    Medications:  PRN:  acetaminophen   Tablet .. 650 milliGRAM(s) Oral every 6 hours PRN Temp greater or equal to 38C (100.4F), Mild Pain (1 - 3)  guaifenesin/dextromethorphan  Syrup 10 milliLiter(s) Oral every 6 hours PRN Cough    Standing:  atorvastatin 80 milliGRAM(s) Oral at bedtime  atropine Injectable 0.5 milliGRAM(s) IntraMuscular once  chlorhexidine 4% Liquid 1 Application(s) Topical <User Schedule>  furosemide    Tablet 20 milliGRAM(s) Oral daily  influenza   Vaccine 0.5 milliLiter(s) IntraMuscular once  insulin glargine Injectable (LANTUS) 24 Unit(s) SubCutaneous at bedtime  insulin lispro (ADMELOG) corrective regimen sliding scale   SubCutaneous three times a day before meals  insulin lispro Injectable (ADMELOG) 8 Unit(s) SubCutaneous three times a day before meals  melatonin 5 milliGRAM(s) Oral at bedtime  metoprolol succinate ER 50 milliGRAM(s) Oral daily  mupirocin 2% Nasal 1 Application(s) Nasal two times a day  pantoprazole    Tablet 40 milliGRAM(s) Oral before breakfast  polyethylene glycol 3350 17 Gram(s) Oral daily  senna 2 Tablet(s) Oral at bedtime    Home:  amlodipine-benazepril 10 mg-40 mg oral capsule: 1 cap(s) orally once a day  Eliquis 5 mg oral tablet: 1 tab(s) orally 2 times a day  glimepiride 2 mg oral tablet: 1 tab(s) orally once a day  Lantus 100 units/mL subcutaneous solution: 40 unit(s) subcutaneous once a day  metoprolol succinate 100 mg oral tablet, extended release: 1 tab(s) orally once a day  Ozempic (1 mg dose) 2 mg/1.5 mL subcutaneous solution:   Percocet 7.5/325 oral tablet: 0.5 tab(s) orally once a day (at bedtime)  potassium citrate 10 mEq oral tablet, extended release: 1 tab(s) orally once a day  rosuvastatin 20 mg oral tablet: 1 tab(s) orally once a day    Vitals:  T(C): 36.1, Max: 36.8 (04-09-21 @ 12:39)  T(F): 96.9, Max: 98.2 (04-09-21 @ 12:39)  HR: 96 (54 - 96)  BP: 91/53 (88/42 - 122/58)  RR: 19 (10 - 20)  SpO2: 96% (91% - 100%)    Physical Exam:  General: Awake, alert, NAD, resting in bed, on O2 NC 3L  HEENT: Head NC/AT  Heart: RRR; systolic murmur apprec  Lungs: Good air entry bilaterally  Abdomen: Soft, nontender, nondistended, BS+, urostomy bag in place, appears clean, intact  Extremities: Venous stasis skin changes of LE bilaterally  Neuro: AAOx3, NFD    Labs:  CBC (04-09 @ 09:00)                        Hgb: 8.1    WBC: 8.39  )-----------------( Plts: 117                              Hct: 25.1     Chem (04-09 @ 09:00)  Na: 134  |     Cl: 104     |  BUN: 42  -----------------------------------------< Gluc: 102    K: 4.6   |    HCO3: 23  |  Cr: 1.0    Ca 7.9 (04-09 @ 09:00)  Mg 2.4 (04-09 @ 09:00)    LFTs (04-09 @ 09:00)  TPro 4.5  /  Alb 2.8  TBili 2.1  /  DBili     AST 24  /  ALT 23  /  AlkPhos 52            Microbiology:  Culture - Sputum (collected 04-05 @ 09:08)  Source: .Sputum Sputum  Gram Stain (04-06 @ 07:06):    Rare polymorphonuclear leukocytes per low power field    Few Squamous epithelial cells per low power field    Numerous Gram positive cocci in pairs seen per oil power field    Moderate Yeast like cells seen per oil power field  Final Report (04-07 @ 19:53):    Normal Respiratory Abeba present

## 2021-04-11 LAB
ALBUMIN SERPL ELPH-MCNC: 2.9 G/DL — LOW (ref 3.5–5.2)
ALP SERPL-CCNC: 76 U/L — SIGNIFICANT CHANGE UP (ref 30–115)
ALT FLD-CCNC: 29 U/L — SIGNIFICANT CHANGE UP (ref 0–41)
ANION GAP SERPL CALC-SCNC: 11 MMOL/L — SIGNIFICANT CHANGE UP (ref 7–14)
AST SERPL-CCNC: 31 U/L — SIGNIFICANT CHANGE UP (ref 0–41)
BASOPHILS # BLD AUTO: 0.01 K/UL — SIGNIFICANT CHANGE UP (ref 0–0.2)
BASOPHILS NFR BLD AUTO: 0.2 % — SIGNIFICANT CHANGE UP (ref 0–1)
BILIRUB SERPL-MCNC: 2 MG/DL — HIGH (ref 0.2–1.2)
BUN SERPL-MCNC: 29 MG/DL — HIGH (ref 10–20)
CALCIUM SERPL-MCNC: 7.7 MG/DL — LOW (ref 8.5–10.1)
CHLORIDE SERPL-SCNC: 106 MMOL/L — SIGNIFICANT CHANGE UP (ref 98–110)
CO2 SERPL-SCNC: 23 MMOL/L — SIGNIFICANT CHANGE UP (ref 17–32)
CREAT SERPL-MCNC: 0.9 MG/DL — SIGNIFICANT CHANGE UP (ref 0.7–1.5)
EOSINOPHIL # BLD AUTO: 0.24 K/UL — SIGNIFICANT CHANGE UP (ref 0–0.7)
EOSINOPHIL NFR BLD AUTO: 3.6 % — SIGNIFICANT CHANGE UP (ref 0–8)
GLUCOSE BLDC GLUCOMTR-MCNC: 129 MG/DL — HIGH (ref 70–99)
GLUCOSE BLDC GLUCOMTR-MCNC: 179 MG/DL — HIGH (ref 70–99)
GLUCOSE BLDC GLUCOMTR-MCNC: 196 MG/DL — HIGH (ref 70–99)
GLUCOSE BLDC GLUCOMTR-MCNC: 83 MG/DL — SIGNIFICANT CHANGE UP (ref 70–99)
GLUCOSE SERPL-MCNC: 101 MG/DL — HIGH (ref 70–99)
HCT VFR BLD CALC: 25 % — LOW (ref 42–52)
HGB BLD-MCNC: 7.7 G/DL — LOW (ref 14–18)
IMM GRANULOCYTES NFR BLD AUTO: 0.6 % — HIGH (ref 0.1–0.3)
LYMPHOCYTES # BLD AUTO: 0.95 K/UL — LOW (ref 1.2–3.4)
LYMPHOCYTES # BLD AUTO: 14.3 % — LOW (ref 20.5–51.1)
MAGNESIUM SERPL-MCNC: 1.9 MG/DL — SIGNIFICANT CHANGE UP (ref 1.8–2.4)
MCHC RBC-ENTMCNC: 28.7 PG — SIGNIFICANT CHANGE UP (ref 27–31)
MCHC RBC-ENTMCNC: 30.8 G/DL — LOW (ref 32–37)
MCV RBC AUTO: 93.3 FL — SIGNIFICANT CHANGE UP (ref 80–94)
MONOCYTES # BLD AUTO: 0.58 K/UL — SIGNIFICANT CHANGE UP (ref 0.1–0.6)
MONOCYTES NFR BLD AUTO: 8.7 % — SIGNIFICANT CHANGE UP (ref 1.7–9.3)
NEUTROPHILS # BLD AUTO: 4.84 K/UL — SIGNIFICANT CHANGE UP (ref 1.4–6.5)
NEUTROPHILS NFR BLD AUTO: 72.6 % — SIGNIFICANT CHANGE UP (ref 42.2–75.2)
NRBC # BLD: 0 /100 WBCS — SIGNIFICANT CHANGE UP (ref 0–0)
PLATELET # BLD AUTO: 111 K/UL — LOW (ref 130–400)
POTASSIUM SERPL-MCNC: 4.2 MMOL/L — SIGNIFICANT CHANGE UP (ref 3.5–5)
POTASSIUM SERPL-SCNC: 4.2 MMOL/L — SIGNIFICANT CHANGE UP (ref 3.5–5)
PROT SERPL-MCNC: 4.6 G/DL — LOW (ref 6–8)
RBC # BLD: 2.68 M/UL — LOW (ref 4.7–6.1)
RBC # FLD: 18.4 % — HIGH (ref 11.5–14.5)
SODIUM SERPL-SCNC: 140 MMOL/L — SIGNIFICANT CHANGE UP (ref 135–146)
WBC # BLD: 6.66 K/UL — SIGNIFICANT CHANGE UP (ref 4.8–10.8)
WBC # FLD AUTO: 6.66 K/UL — SIGNIFICANT CHANGE UP (ref 4.8–10.8)

## 2021-04-11 PROCEDURE — 99232 SBSQ HOSP IP/OBS MODERATE 35: CPT

## 2021-04-11 RX ADMIN — ATORVASTATIN CALCIUM 80 MILLIGRAM(S): 80 TABLET, FILM COATED ORAL at 22:24

## 2021-04-11 RX ADMIN — INSULIN GLARGINE 24 UNIT(S): 100 INJECTION, SOLUTION SUBCUTANEOUS at 22:24

## 2021-04-11 RX ADMIN — Medication 1 MILLIGRAM(S): at 12:04

## 2021-04-11 RX ADMIN — Medication 8 UNIT(S): at 11:59

## 2021-04-11 RX ADMIN — PANTOPRAZOLE SODIUM 40 MILLIGRAM(S): 20 TABLET, DELAYED RELEASE ORAL at 12:01

## 2021-04-11 RX ADMIN — SENNA PLUS 2 TABLET(S): 8.6 TABLET ORAL at 22:24

## 2021-04-11 RX ADMIN — ERGOCALCIFEROL 50000 UNIT(S): 1.25 CAPSULE ORAL at 12:05

## 2021-04-11 RX ADMIN — Medication 2: at 11:58

## 2021-04-11 RX ADMIN — Medication 8 UNIT(S): at 16:52

## 2021-04-11 RX ADMIN — Medication 5 MILLIGRAM(S): at 22:24

## 2021-04-11 RX ADMIN — Medication 2: at 16:50

## 2021-04-11 RX ADMIN — PREGABALIN 1000 MICROGRAM(S): 225 CAPSULE ORAL at 12:05

## 2021-04-11 RX ADMIN — MUPIROCIN 1 APPLICATION(S): 20 OINTMENT TOPICAL at 06:09

## 2021-04-11 RX ADMIN — Medication 20 MILLIGRAM(S): at 06:08

## 2021-04-11 RX ADMIN — Medication 8 UNIT(S): at 08:01

## 2021-04-11 RX ADMIN — MUPIROCIN 1 APPLICATION(S): 20 OINTMENT TOPICAL at 17:26

## 2021-04-11 RX ADMIN — POLYETHYLENE GLYCOL 3350 17 GRAM(S): 17 POWDER, FOR SOLUTION ORAL at 15:16

## 2021-04-11 NOTE — PROGRESS NOTE ADULT - ASSESSMENT
IMPRESSION:      Acute hypoxemic respiratory failure resolved   Severe COVID-19 PNA SP Toci   LORRAINE on CKD 3 resolved   Probable JARETH  DVT sp GFF  anemia / thrombocytopenia / HIT neg      PLAN:    CNS: Avoid CNS depressants    HEENT: Oral care    PULMONARY:  HOB @ 45 degrees.  Aspiration precautions.,      CARDIOVASCULAR: Avoid volume overload.     GI: GI prophylaxis. feeding     RENAL:  Follow up lytes.  Correct as needed.     INFECTIOUS DISEASE: FU PanCx.     HEMATOLOGICAL:  DVT prophylaxis.      ENDOCRINE:  Follow up FS.     MUSCULOSKELETAL: OOB to chair     Downgrade to medical floor

## 2021-04-11 NOTE — CHART NOTE - NSCHARTNOTEFT_GEN_A_CORE
CCU Transfer Note    Transfer from: CEU    Transfer to: ( X ) Medicine    (  ) Telemetry    (  ) RCU                               (  ) Palliative    (  ) Stroke Unit    (  ) MICU    (  ) __________________    Accepting Physician:    Signout given to:     HPI / Ceu COURSE:    Patient is a 79yo male with PMH of hypertension, hyperlipidemia, diabetes mellitus type 2, atrial fibrillation on Eliquis, CKD stage 3A, bladder cancer s/p cystectomy with urostomy, and bilateral hip and knee arthroplasties who was brought in by EMS for hypoxia. The patient states that his daughter and her boyfriend who reside in the same home as him and his wife recently tested positive for COVID-19. He and his wife tested positive for COVID-19 on 3/4/2021, but their symptoms did not start until 3/6/2021. He started feeling "beat down" and "tired all the time" to the point where he had difficulty getting up. He also endorses cough, shortness of breath, and body aches. He denies chest pain, abdominal pain, nausea, vomiting, constipation, or diarrhea. He was told to come to the ED by his daughter after they noticed his wife was "not doing so well."    In the ED, vital signs were Tmax 100.9F, , /66, RR 20, and SpO2 95% on 4L. Labs were significant for creatinine 1.7 (baseline 1.4), troponin 0.02, lactate 2.3, elevated LFTs, and D-dimer 1721. Patient was given dexamethasone 6mg IV, 1L bolus of LR, and acetaminophen 650mg.    SDU: patient was on HFNC then NC 3L and now on RA. patient's course was complicated by acute drop in Hb and melena episodes. He had EGD done and it was negative. Hb is trended and transfused as needed. No source of bleeding found. Patient's Hb is stable currently. He also has an acute DVT but AC was stopped due to drop in hb and melena. He has an IVC filter placed on 4/9. He is now stable for DG.     Vital Signs Last 24 Hrs  T(C): 36.2 (11 Apr 2021 12:35), Max: 36.2 (10 Apr 2021 16:27)  T(F): 97.1 (11 Apr 2021 12:35), Max: 97.2 (10 Apr 2021 16:27)  HR: 80 (11 Apr 2021 12:35) (77 - 110)  BP: 106/53 (11 Apr 2021 12:35) (90/48 - 118/76)  BP(mean): 77 (11 Apr 2021 12:35) (77 - 77)  RR: 18 (11 Apr 2021 12:35) (18 - 20)  SpO2: 96% (11 Apr 2021 06:10) (95% - 97%)    I&O's Summary    10 Apr 2021 07:01  -  11 Apr 2021 07:00  --------------------------------------------------------  IN: 0 mL / OUT: 2000 mL / NET: -2000 mL            LABS:                               7.7    6.66  )-----------( 111      ( 11 Apr 2021 07:59 )             25.0       04-11    140  |  106  |  29<H>  ----------------------------<  101<H>  4.2   |  23  |  0.9    Ca    7.7<L>      11 Apr 2021 07:59  Mg     1.9     04-11    TPro  4.6<L>  /  Alb  2.9<L>  /  TBili  2.0<H>  /  DBili  x   /  AST  31  /  ALT  29  /  AlkPhos  76  04-11                ASSESSMENT & PLAN:   81 yo M with PMHx of AFib, on Eliquis, HTN, DMII, CKDIII, hx of Bladder Ca s/p Urostomy, bilateral hip/knee replacements, obesity admitted for acute hypoxemic respiratory failure due to COVID PNA.    #Acute hypoxemic Respiratory Failure due to COVID - resolved  - s/p Toci, course of Decadron  - Treated with Levaquin, Cefepime for suspected superimposed bacterial infection  - Was on HFNC, transitioned to O2 NC 3L and saturating 98%, now RA sating 92%    #Acute drop in Hb - source unclear  #Acute thrombocytopenia - improving  - Had gradual decrease in Hb from baseline 12-13 to 5.6, had total of 3 U pRBCS throughout CEU stay  - Hb has been stable at ~8  - Had CT Abd x2 - negative for acute retroperitoneal bleed  - Had several episodes of melena, EGD performed 4/9 negative for acute pathology, c/w PPI once daily   - Hemolysis workup negative, DIC unlikely cause for dec in Hb, plt ct as pt did not have acute bleeding, PT/PTT within normal limits, fibrinogen borderline low  - HIT panel done for acute thrombocytopenia - negative    #Hx of Paroxysmal AFib on Eliquis  #CHADSVASC Score: 4, HASBLED 2  #HTN  - Has been rate controlled, on Metoprolol Succinate 100 mg once daily at home, was dec to 50 mg once daily due to bradycardia, has HR in 40s-50s at times while at rest, asymptomatic  - Takes Eliquis at home, was receiving Lovenox while here, transitioned to Heparin and developed worsening drop in Hb (5.6); now off AC  - Takes Amlodipine at home, on hold as pt has been hypotensive in setting of acute blood loss, resume if becomes hypertensive    #Acute R popliteal vein DVT  - LE Duplex on 4/5 revealed acute DVT, initiated on Lovenox, then transitioned to Heparin in case of acute bleeding, then developed acute drop in Hb  - Now off AC as is high risk for acute bleeding  - s/p IVC filter placement for acute RLE DVT on 4/9    #Sepsis of unknown source - resolved  - Hospital course complicated by hypotension, unresponsive to IVF boluses, required low dose Levo, as well as hypothermia  - Blood Cx NGTD, Urine Cx grew gut kike (has urostomy), Sputum Cx grew normal kike  - Procalc, CRP within normal limits, fungitell, galactomannan within normal limits   - Completed week course of Meropenem  - Now improved, off Abx, remains normotensive, afebrile, WBC within normal limits   - Had episodes of tremoring, nonresponsiveness with spontaneous resolution, likely due to state of sepsis - now resolved, is AAOx4, no neurological deficits    #Hx of DMII  - HbA1c 8.1%  - FS better controlled  - C/w Lantus 24 U at bedtime, Lispro 8 U before meals    #HX of Bladder Ca, s/p cystoprostatectomy with urostomy  #LORRAINE due to LINDSAY - resolved  #CKD III - stable  #Episodic hematuria with blood clots - now resolved  - Nephrology following  - Cr has been stable  - Urine in urostomy bag appears clear, site appears clean and intact    DVT ppx: On hold, high risk for bleeding, no SCDs as pt has DVT  GI ppx: PPI  Diet: DASH, CC  Activity: AAT  Full Code  Dispo: DG to floor

## 2021-04-11 NOTE — PROGRESS NOTE ADULT - SUBJECTIVE AND OBJECTIVE BOX
ROSA MARIA CASEY 80y Male  MRN#: [id number]   CODE STATUS:________    SUBJECTIVE  Patient is a 80y old Male who presents with a chief complaint of acute hypoxemic respiratory failure secondary to COVID-19 pneumonia (10 Apr 2021 17:12)  Currently admitted to medicine with the primary diagnosis of COVID-19     Today is hospital day 30d, and this morning he is resting comfortably        OBJECTIVE  PAST MEDICAL & SURGICAL HISTORY  Hypertension    Hyperlipidemia    Diabetes mellitus, type 2    History of atrial fibrillation    Bladder cancer  secondary to exposure at [employer] 9/11 s/p cystectomy with urostomy    Chronic kidney disease (CKD)  stage 3A, baseline creatinine 1.4    History of total hip replacement, bilateral    History of total knee replacement, bilateral    History of total cystectomy  with resultant urostomy      ALLERGIES:  No Known Allergies    MEDICATIONS:  STANDING MEDICATIONS  atorvastatin 80 milliGRAM(s) Oral at bedtime  atropine Injectable 0.5 milliGRAM(s) IntraMuscular once  chlorhexidine 4% Liquid 1 Application(s) Topical <User Schedule>  cyanocobalamin 1000 MICROGram(s) Oral daily  ergocalciferol 37999 Unit(s) Oral <User Schedule>  folic acid 1 milliGRAM(s) Oral daily  furosemide    Tablet 20 milliGRAM(s) Oral daily  influenza   Vaccine 0.5 milliLiter(s) IntraMuscular once  insulin glargine Injectable (LANTUS) 24 Unit(s) SubCutaneous at bedtime  insulin lispro (ADMELOG) corrective regimen sliding scale   SubCutaneous three times a day before meals  insulin lispro Injectable (ADMELOG) 8 Unit(s) SubCutaneous three times a day before meals  melatonin 5 milliGRAM(s) Oral at bedtime  metoprolol succinate ER 50 milliGRAM(s) Oral daily  mupirocin 2% Nasal 1 Application(s) Nasal two times a day  pantoprazole    Tablet 40 milliGRAM(s) Oral before breakfast  polyethylene glycol 3350 17 Gram(s) Oral daily  senna 2 Tablet(s) Oral at bedtime    PRN MEDICATIONS  acetaminophen   Tablet .. 650 milliGRAM(s) Oral every 6 hours PRN  guaifenesin/dextromethorphan  Syrup 10 milliLiter(s) Oral every 6 hours PRN      VITAL SIGNS: Last 24 Hours  T(C): 36.1 (10 Apr 2021 20:00), Max: 36.2 (10 Apr 2021 16:27)  T(F): 97 (10 Apr 2021 20:00), Max: 97.2 (10 Apr 2021 16:27)  HR: 88 (11 Apr 2021 06:10) (77 - 110)  BP: 92/48 (11 Apr 2021 06:10) (90/48 - 121/84)  BP(mean): --  RR: 18 (11 Apr 2021 06:10) (18 - 20)  SpO2: 96% (11 Apr 2021 06:10) (95% - 97%)    LABS:                        7.9    8.33  )-----------( 119      ( 10 Apr 2021 04:30 )             24.4     04-10    135  |  103  |  34<H>  ----------------------------<  93  4.1   |  22  |  1.0    Ca    7.7<L>      10 Apr 2021 04:30  Mg     2.0     04-10    TPro  4.5<L>  /  Alb  2.9<L>  /  TBili  2.1<H>  /  DBili  x   /  AST  32  /  ALT  29  /  AlkPhos  57  04-10                  RADIOLOGY:    PHYSICAL EXAM:  General: Awake, alert, NAD, resting in bed, on O2 NC 3L  HEENT: Head NC/AT  Heart: RRR; systolic murmur apprec  Lungs: Good air entry bilaterally  Abdomen: Soft, nontender, nondistended, BS+, urostomy bag in place, appears clean, intact  Extremities: Venous stasis skin changes of LE bilaterally  Neuro: AAOx3, NFD       ASSESSMENT & PLAN  81 yo M with PMHx of AFib, on Eliquis, HTN, DMII, CKDIII, hx of Bladder Ca s/p Urostomy, bilateral hip/knee replacements, obesity admitted for acute hypoxemic respiratory failure due to COVID PNA.    #Acute hypoxemic Respiratory Failure due to COVID - resolved  - s/p Toci, course of Decadron  - Treated with Levaquin, Cefepime for suspected superimposed bacterial infection  - Was on HFNC, transitioned to O2 NC 3L and saturating 98%, now RA sating 92%    #Acute drop in Hb - source unclear  #Acute thrombocytopenia - improving  - Had gradual decrease in Hb from baseline 12-13 to 5.6, had total of 3 U pRBCS throughout CEU stay  - Hb has been stable at ~8  - Had CT Abd x2 - negative for acute retroperitoneal bleed  - Had several episodes of melena, EGD performed 4/9 negative for acute pathology, c/w PPI once daily   - Hemolysis workup negative, DIC unlikely cause for dec in Hb, plt ct as pt did not have acute bleeding, PT/PTT within normal limits, fibrinogen borderline low  - HIT panel done for acute thrombocytopenia - negative    #Hx of Paroxysmal AFib on Eliquis  #CHADSVASC Score: 4, HASBLED 2  #HTN  - Has been rate controlled, on Metoprolol Succinate 100 mg once daily at home, was dec to 50 mg once daily due to bradycardia, has HR in 40s-50s at times while at rest, asymptomatic  - Takes Eliquis at home, was receiving Lovenox while here, transitioned to Heparin and developed worsening drop in Hb (5.6); now off AC  - Takes Amlodipine at home, on hold as pt has been hypotensive in setting of acute blood loss, resume if becomes hypertensive    #Acute R popliteal vein DVT  - LE Duplex on 4/5 revealed acute DVT, initiated on Lovenox, then transitioned to Heparin in case of acute bleeding, then developed acute drop in Hb  - Now off AC as is high risk for acute bleeding  - s/p IVC filter placement for acute RLE DVT on 4/9    #Sepsis of unknown source - resolved  - Hospital course complicated by hypotension, unresponsive to IVF boluses, required low dose Levo, as well as hypothermia  - Blood Cx NGTD, Urine Cx grew gut kike (has urostomy), Sputum Cx grew normal kike  - Procalc, CRP within normal limits, fungitell, galactomannan within normal limits   - Completed week course of Meropenem  - Now improved, off Abx, remains normotensive, afebrile, WBC within normal limits   - Had episodes of tremoring, nonresponsiveness with spontaneous resolution, likely due to state of sepsis - now resolved, is AAOx4, no neurological deficits    #Hx of DMII  - HbA1c 8.1%  - FS better controlled  - C/w Lantus 24 U at bedtime, Lispro 8 U before meals    #HX of Bladder Ca, s/p cystoprostatectomy with urostomy  #LORRAINE due to LINDSAY - resolved  #CKD III - stable  #Episodic hematuria with blood clots - now resolved  - Nephrology following  - Cr has been stable  - Urine in urostomy bag appears clear, site appears clean and intact    DVT ppx: On hold, high risk for bleeding, no SCDs as pt has DVT  GI ppx: PPI  Diet: DASH, CC  Activity: AAT  Full Code  Dispo: overall improved, monitoring Hb, possible downgrade to floor

## 2021-04-11 NOTE — PROGRESS NOTE ADULT - SUBJECTIVE AND OBJECTIVE BOX
ROSA MARIA CASEY  Patient is a 80y old  Male who presents with a chief complaint of acute hypoxic RF due to Covid Viral Syndrome. Underlying Hx of DMII, CKD,  DLD, MO,  JARETH, bladder ca, sp urostomy, Hx of 911 exposure), OA, DDD, DJD, GERD, diverticulosis, abd wall hernias. bowel resection and reanastomosis.  Hospital course:  the pt remained in CEU  with HFNC O2 therapy, DEXA, TOCI therapy.  The hospital course was c/by LORRAINE, hematuria in the urostomy bag,  transaminitis, GIB req transfusions, RLE DVT req IVC filter placement and super infections req IV Abx and Caspofungin.  Eventually the renal and liver parameters improved. The pt slowly transitioned to NC O2.  He underwent EGD which showed nonerosive gastritis and duodenitis with no active BGIB.  He had several melanotic stools.      Overnight Events:  pt now on RA, being downgraded to regular floor, H/H low, may need another unit tomorrow, +R DVT, sp IVF , renal status stable  Vital Signs:   T:  97.1  HR:  80  BP:  106/53    RR: 18  Pulse ox:  RA 96%        PHYSICAL EXAM:  GENERAL: A&O, debilitated but in NAD + NC  Neck:  short,  thick but supple, no JVD, no bruit, no stridor  Lungs:  shallow resp, scattered rhonchi, no wheezing, improving air entry  Abd:   distended soft and benign, + BS  Urinary:  RLQ urostomy  Ext:  arthritic changes, moves all limbs  Skin:  no rash  Psych stable    LABS:                        7.7  6.6-----------( 111           25    140 |  106 | 29  ----------------------------<101  4.1   23    0.9    GFR 64, 58, 72, 64, 35, 26, 30, 35, 64, 72, 82  Ca    8.8, 7.7      Phos  3.4      Mg     2.0, 2.6, 3.0, 2.8, 2.2, 2.4, 2.0, 1.9      MB 63    trop <0.01 x2  ferritin 535, 462, 737, 339  procal 0.26, 0.11, 0.07  CRP 25, 180. <3  LDH  515  fibrinogen 199  DDIMER 4298  COVID AB + 85.60  3/30 LA 5.1,  3.7    TPro  5.9,  5.1, 4.1, 4.5/  Alb  3.2,  3.1, 2.7, 2.9  /  TBili  0.9  /  DBili  x   /  AST  45, 88, 88, 32  /  ALT  53, 93, 99, 36  /  AlkPhos  43  03-14      Urinalysis Basic - ( 12 Mar 2021 16:38 )    Color: Yellow / Appearance: Slightly Turbid / S.020 / pH: x  Gluc: x / Ketone: Negative  / Bili: Negative / Urobili: 3 mg/dL   Blood: x / Protein: 30 mg/dL / Nitrite: Negative   Leuk Esterase: Moderate / RBC: 5 /HPF / WBC 90 /HPF   Sq Epi: x / Non Sq Epi: 0 /HPF / Bacteria: Ma       RADIOLOGY & ADDITIONAL TESTS:  Venous duplex:  negative for DVT  CXR:  BL opacities L>R  CXR:  stable BL opacities    CT angio:  neg for central PE    CT abd/pelvis:  BIbasilar densities, hepatobiliary/spleen/pancreas WNL, mild BL adrenal thickening, no hydro, bl renal cysts, bl layering densities/ sm stones, "milk of ca", post cystoprostatectomy and ilealconduit, , post bowel resection and reanastomosis, ventral abd wall hernias, bl hip replacements    CTH 3/30:  moderate atrophy, chronic microvascular changes,  no evidence of acute pathology    EEG 3/30 gen slowing , no focal epileptiform activity    Venous duplex of lower ext:  + RLE popliteal vein thrombosis    EGD :  nonerosive gastritis and duodenitis, no active bleed

## 2021-04-11 NOTE — PROGRESS NOTE ADULT - SUBJECTIVE AND OBJECTIVE BOX
Patient is a 80y old  Male who presents with a chief complaint of acute hypoxemic respiratory failure secondary to COVID-19 pneumonia (11 Apr 2021 07:45)        Over Night Events:  On RA this am.  No new event         ROS:     All ROS are negative except HPI         PHYSICAL EXAM    ICU Vital Signs Last 24 Hrs  T(C): 36.1 (10 Apr 2021 20:00), Max: 36.2 (10 Apr 2021 16:27)  T(F): 97 (10 Apr 2021 20:00), Max: 97.2 (10 Apr 2021 16:27)  HR: 88 (11 Apr 2021 06:10) (77 - 110)  BP: 92/48 (11 Apr 2021 06:10) (90/48 - 118/76)  BP(mean): --  ABP: --  ABP(mean): --  RR: 18 (11 Apr 2021 06:10) (18 - 20)  SpO2: 96% (11 Apr 2021 06:10) (95% - 97%)      CONSTITUTIONAL:  Well nourished.  NAD    ENT:   Airway patent,   Mouth with normal mucosa.   No thrush    EYES:   Pupils equal,   Round and reactive to light.    CARDIAC:   Normal rate,   Regular rhythm.    No edema      Vascular:  Normal systolic impulse  No Carotid bruits    RESPIRATORY:   No wheezing  Bilateral BS  Normal chest expansion  Not tachypneic,  No use of accessory muscles    GASTROINTESTINAL:  Abdomen soft,   Non-tender,   No guarding,   + BS    MUSCULOSKELETAL:   Range of motion is not limited,  No clubbing, cyanosis    NEUROLOGICAL:   Alert   No motor  deficits.    SKIN:   Skin normal color for race,   Warm and dry and intact.   No evidence of rash.    PSYCHIATRIC:   Normal mood and affect.   No apparent risk to self or others.    HEMATOLOGICAL:  No cervical  lymphadenopathy.  no inguinal lymphadenopathy      04-10-21 @ 07:01  -  04-11-21 @ 07:00  --------------------------------------------------------  IN:  Total IN: 0 mL    OUT:    Urostomy (mL): 1100 mL    Voided (mL): 900 mL  Total OUT: 2000 mL    Total NET: -2000 mL          LABS:                            7.7    6.66  )-----------( 111      ( 11 Apr 2021 07:59 )             25.0                                               04-11    140  |  106  |  29<H>  ----------------------------<  101<H>  4.2   |  23  |  0.9    Ca    7.7<L>      11 Apr 2021 07:59  Mg     1.9     04-11    TPro  4.6<L>  /  Alb  2.9<L>  /  TBili  2.0<H>  /  DBili  x   /  AST  31  /  ALT  29  /  AlkPhos  76  04-11                                                                                           LIVER FUNCTIONS - ( 11 Apr 2021 07:59 )  Alb: 2.9 g/dL / Pro: 4.6 g/dL / ALK PHOS: 76 U/L / ALT: 29 U/L / AST: 31 U/L / GGT: x                                                                                                                                       MEDICATIONS  (STANDING):  atorvastatin 80 milliGRAM(s) Oral at bedtime  atropine Injectable 0.5 milliGRAM(s) IntraMuscular once  chlorhexidine 4% Liquid 1 Application(s) Topical <User Schedule>  cyanocobalamin 1000 MICROGram(s) Oral daily  ergocalciferol 58420 Unit(s) Oral <User Schedule>  folic acid 1 milliGRAM(s) Oral daily  furosemide    Tablet 20 milliGRAM(s) Oral daily  influenza   Vaccine 0.5 milliLiter(s) IntraMuscular once  insulin glargine Injectable (LANTUS) 24 Unit(s) SubCutaneous at bedtime  insulin lispro (ADMELOG) corrective regimen sliding scale   SubCutaneous three times a day before meals  insulin lispro Injectable (ADMELOG) 8 Unit(s) SubCutaneous three times a day before meals  melatonin 5 milliGRAM(s) Oral at bedtime  metoprolol succinate ER 50 milliGRAM(s) Oral daily  mupirocin 2% Nasal 1 Application(s) Nasal two times a day  pantoprazole    Tablet 40 milliGRAM(s) Oral before breakfast  polyethylene glycol 3350 17 Gram(s) Oral daily  senna 2 Tablet(s) Oral at bedtime    MEDICATIONS  (PRN):  acetaminophen   Tablet .. 650 milliGRAM(s) Oral every 6 hours PRN Temp greater or equal to 38C (100.4F), Mild Pain (1 - 3)  guaifenesin/dextromethorphan  Syrup 10 milliLiter(s) Oral every 6 hours PRN Cough      New X-rays reviewed:                                                                                  ECHO    CXR interpreted by me:  Not Done

## 2021-04-11 NOTE — PROGRESS NOTE ADULT - ASSESSMENT
79yo M c PMHx chronic afib on noac, htn, dm2, ckd3 bladder ca s/p urostomy, b/l hip/ knee replacements obesity, here with acute hypoxic respiratory failure 2/2 severe covid 19 viral pneumonia, lorraine on ckd3 now better, elevated ddimer    Sepsis on admission  Covid viral PNA. acute hypoxic RF  underlying JARETH, MO  LORRAINE on CKD  Transaminitis  Anemia sp transfusion  EGD 4/9/21, nonerosive gastritis and duodenitis  E coli UTI  R lower ext DVT, sp IVC Filter 4/9  Hx of HTN, ASHD, Afib/Eliquis  DM II, CKD  DLD  Bladder ca, sp urostomy, sp cystoprostatectomy, exposure to 911  OA, DDD, DJD, sp BL hip and knee surgeries, mobility dysfunction  GERD, HH, diverticulosis, bowel resection and reanastomosis      pt in CEU, sat well at 96% on RA, being downgraded to reg floor  low H/H 7.7/25, cont to trend, if still in the 7s transfuse 1 u tomorrow  GI:   EGD 4/9 showed nonerosive gastritis and duodenitis and no active bleed  CT of abd/pelvis non contrast  to R/O retroperitoneal hematoma  IR, sp IVC filter for DVT 4/9  add folic acid, B12 daily  add ergocal 50,000u q wk  improvement of renal function and LFTs, cont to monitor    ID sp sepsis, Maldonado completed, D/C caspofungin as fungitell nl, pt off all Abx    pul-critical care ff, may be downgraded to reg floor    CXR shows stable  BL opacities  spirometry  elevated DDIMER, CT angio negative for PE  improving resp status

## 2021-04-11 NOTE — PROGRESS NOTE ADULT - SUBJECTIVE AND OBJECTIVE BOX
NEPHROLOGY FOLLOW UP NOTE  ----------------------------------------  pt seen in icu  now on RA  BP's on low side    PAST MEDICAL & SURGICAL HISTORY:  Hypertension    Hyperlipidemia    Diabetes mellitus, type 2    History of atrial fibrillation    Bladder cancer  secondary to exposure at [employer] 9/11 s/p cystectomy with urostomy    Chronic kidney disease (CKD)  stage 3A, baseline creatinine 1.4    History of total hip replacement, bilateral    History of total knee replacement, bilateral    History of total cystectomy  with resultant urostomy      Allergies:  No Known Allergies    Home Medications Reviewed    SOCIAL HISTORY:  Denies ETOH,Smoking,   FAMILY HISTORY:        REVIEW OF SYSTEMS:  All other review of systems is negative unless indicated above.      Physical Exam:  	Gen: NAD  	Pulm: CTA B/L  	CV: RRR, S1S2  	Abd: +BS, soft, nontender/nondistended  	: No suprapubic tenderness  	LE: Warm,  edema    Hospital Medications:   MEDICATIONS  (STANDING):  atorvastatin 80 milliGRAM(s) Oral at bedtime  atropine Injectable 0.5 milliGRAM(s) IntraMuscular once  chlorhexidine 4% Liquid 1 Application(s) Topical <User Schedule>  cyanocobalamin 1000 MICROGram(s) Oral daily  ergocalciferol 09234 Unit(s) Oral <User Schedule>  folic acid 1 milliGRAM(s) Oral daily  furosemide    Tablet 20 milliGRAM(s) Oral daily  influenza   Vaccine 0.5 milliLiter(s) IntraMuscular once  insulin glargine Injectable (LANTUS) 24 Unit(s) SubCutaneous at bedtime  insulin lispro (ADMELOG) corrective regimen sliding scale   SubCutaneous three times a day before meals  insulin lispro Injectable (ADMELOG) 8 Unit(s) SubCutaneous three times a day before meals  melatonin 5 milliGRAM(s) Oral at bedtime  metoprolol succinate ER 50 milliGRAM(s) Oral daily  mupirocin 2% Nasal 1 Application(s) Nasal two times a day  pantoprazole    Tablet 40 milliGRAM(s) Oral before breakfast  polyethylene glycol 3350 17 Gram(s) Oral daily  senna 2 Tablet(s) Oral at bedtime        VITALS:  T(F): 97.1 (04-11-21 @ 12:35), Max: 97.2 (04-10-21 @ 16:27)  HR: 80 (04-11-21 @ 12:35)  BP: 106/53 (04-11-21 @ 12:35)  RR: 18 (04-11-21 @ 12:35)  SpO2: 96% (04-11-21 @ 06:10)  Wt(kg): --    04-09 @ 07:01  -  04-10 @ 07:00  --------------------------------------------------------  IN: 640 mL / OUT: 1620 mL / NET: -980 mL    04-10 @ 07:01  -  04-11 @ 07:00  --------------------------------------------------------  IN: 0 mL / OUT: 2000 mL / NET: -2000 mL          LABS:  04-11    140  |  106  |  29<H>  ----------------------------<  101<H>  4.2   |  23  |  0.9    Ca    7.7<L>      11 Apr 2021 07:59  Mg     1.9     04-11    TPro  4.6<L>  /  Alb  2.9<L>  /  TBili  2.0<H>  /  DBili      /  AST  31  /  ALT  29  /  AlkPhos  76  04-11                          7.7    6.66  )-----------( 111      ( 11 Apr 2021 07:59 )             25.0       Urine Studies:        RADIOLOGY & ADDITIONAL STUDIES:

## 2021-04-11 NOTE — PROGRESS NOTE ADULT - ASSESSMENT
Acute hypoxemic respiratory failure  COVID-19 pneumonia  Acute kidney injury,  CKD 2-3  Hx of Bladder Ca, s/p cystoprostatectomy, urostomy  anemia s/p PRBC transfusion  s/p IVC filter  DM    Plan:    cont lasix 20mg poqd  cont metoprolol  monitor bp's  urostomy care  off abx  trend h/h  prn prbc tx  O2 prn  lantus / humalog  icu care

## 2021-04-12 LAB
ALBUMIN SERPL ELPH-MCNC: 2.7 G/DL — LOW (ref 3.5–5.2)
ALP SERPL-CCNC: 72 U/L — SIGNIFICANT CHANGE UP (ref 30–115)
ALT FLD-CCNC: 24 U/L — SIGNIFICANT CHANGE UP (ref 0–41)
ANION GAP SERPL CALC-SCNC: 12 MMOL/L — SIGNIFICANT CHANGE UP (ref 7–14)
AST SERPL-CCNC: 28 U/L — SIGNIFICANT CHANGE UP (ref 0–41)
BASOPHILS # BLD AUTO: 0.01 K/UL — SIGNIFICANT CHANGE UP (ref 0–0.2)
BASOPHILS NFR BLD AUTO: 0.2 % — SIGNIFICANT CHANGE UP (ref 0–1)
BILIRUB SERPL-MCNC: 1.8 MG/DL — HIGH (ref 0.2–1.2)
BUN SERPL-MCNC: 21 MG/DL — HIGH (ref 10–20)
CALCIUM SERPL-MCNC: 8.1 MG/DL — LOW (ref 8.5–10.1)
CHLORIDE SERPL-SCNC: 105 MMOL/L — SIGNIFICANT CHANGE UP (ref 98–110)
CO2 SERPL-SCNC: 22 MMOL/L — SIGNIFICANT CHANGE UP (ref 17–32)
CREAT SERPL-MCNC: 0.9 MG/DL — SIGNIFICANT CHANGE UP (ref 0.7–1.5)
EOSINOPHIL # BLD AUTO: 0.22 K/UL — SIGNIFICANT CHANGE UP (ref 0–0.7)
EOSINOPHIL NFR BLD AUTO: 4.2 % — SIGNIFICANT CHANGE UP (ref 0–8)
GLUCOSE BLDC GLUCOMTR-MCNC: 104 MG/DL — HIGH (ref 70–99)
GLUCOSE BLDC GLUCOMTR-MCNC: 149 MG/DL — HIGH (ref 70–99)
GLUCOSE BLDC GLUCOMTR-MCNC: 175 MG/DL — HIGH (ref 70–99)
GLUCOSE BLDC GLUCOMTR-MCNC: 180 MG/DL — HIGH (ref 70–99)
GLUCOSE SERPL-MCNC: 105 MG/DL — HIGH (ref 70–99)
HCT VFR BLD CALC: 22.2 % — LOW (ref 42–52)
HGB BLD-MCNC: 7 G/DL — LOW (ref 14–18)
IMM GRANULOCYTES NFR BLD AUTO: 0.6 % — HIGH (ref 0.1–0.3)
LYMPHOCYTES # BLD AUTO: 0.84 K/UL — LOW (ref 1.2–3.4)
LYMPHOCYTES # BLD AUTO: 16.1 % — LOW (ref 20.5–51.1)
MAGNESIUM SERPL-MCNC: 1.8 MG/DL — SIGNIFICANT CHANGE UP (ref 1.8–2.4)
MCHC RBC-ENTMCNC: 28.9 PG — SIGNIFICANT CHANGE UP (ref 27–31)
MCHC RBC-ENTMCNC: 31.5 G/DL — LOW (ref 32–37)
MCV RBC AUTO: 91.7 FL — SIGNIFICANT CHANGE UP (ref 80–94)
MONOCYTES # BLD AUTO: 0.52 K/UL — SIGNIFICANT CHANGE UP (ref 0.1–0.6)
MONOCYTES NFR BLD AUTO: 10 % — HIGH (ref 1.7–9.3)
NEUTROPHILS # BLD AUTO: 3.59 K/UL — SIGNIFICANT CHANGE UP (ref 1.4–6.5)
NEUTROPHILS NFR BLD AUTO: 68.9 % — SIGNIFICANT CHANGE UP (ref 42.2–75.2)
NRBC # BLD: 0 /100 WBCS — SIGNIFICANT CHANGE UP (ref 0–0)
PLATELET # BLD AUTO: 100 K/UL — LOW (ref 130–400)
POTASSIUM SERPL-MCNC: 3.5 MMOL/L — SIGNIFICANT CHANGE UP (ref 3.5–5)
POTASSIUM SERPL-SCNC: 3.5 MMOL/L — SIGNIFICANT CHANGE UP (ref 3.5–5)
PROT SERPL-MCNC: 4.3 G/DL — LOW (ref 6–8)
RBC # BLD: 2.42 M/UL — LOW (ref 4.7–6.1)
RBC # FLD: 17.8 % — HIGH (ref 11.5–14.5)
SARS-COV-2 RNA SPEC QL NAA+PROBE: SIGNIFICANT CHANGE UP
SODIUM SERPL-SCNC: 139 MMOL/L — SIGNIFICANT CHANGE UP (ref 135–146)
WBC # BLD: 5.21 K/UL — SIGNIFICANT CHANGE UP (ref 4.8–10.8)
WBC # FLD AUTO: 5.21 K/UL — SIGNIFICANT CHANGE UP (ref 4.8–10.8)

## 2021-04-12 RX ORDER — POTASSIUM CHLORIDE 20 MEQ
20 PACKET (EA) ORAL ONCE
Refills: 0 | Status: COMPLETED | OUTPATIENT
Start: 2021-04-12 | End: 2021-04-12

## 2021-04-12 RX ORDER — INSULIN GLARGINE 100 [IU]/ML
18 INJECTION, SOLUTION SUBCUTANEOUS AT BEDTIME
Refills: 0 | Status: DISCONTINUED | OUTPATIENT
Start: 2021-04-12 | End: 2021-04-13

## 2021-04-12 RX ORDER — POTASSIUM CHLORIDE 20 MEQ
40 PACKET (EA) ORAL ONCE
Refills: 0 | Status: DISCONTINUED | OUTPATIENT
Start: 2021-04-12 | End: 2021-04-12

## 2021-04-12 RX ORDER — INSULIN LISPRO 100/ML
6 VIAL (ML) SUBCUTANEOUS
Refills: 0 | Status: DISCONTINUED | OUTPATIENT
Start: 2021-04-12 | End: 2021-04-13

## 2021-04-12 RX ORDER — METHYLPREDNISOLONE 4 MG
500 TABLET ORAL DAILY
Refills: 0 | Status: DISCONTINUED | OUTPATIENT
Start: 2021-04-12 | End: 2021-04-20

## 2021-04-12 RX ADMIN — MUPIROCIN 1 APPLICATION(S): 20 OINTMENT TOPICAL at 17:48

## 2021-04-12 RX ADMIN — PREGABALIN 1000 MICROGRAM(S): 225 CAPSULE ORAL at 11:36

## 2021-04-12 RX ADMIN — Medication 5 MILLIGRAM(S): at 21:42

## 2021-04-12 RX ADMIN — Medication 20 MILLIGRAM(S): at 05:20

## 2021-04-12 RX ADMIN — Medication 500 MILLIGRAM(S): at 23:55

## 2021-04-12 RX ADMIN — PANTOPRAZOLE SODIUM 40 MILLIGRAM(S): 20 TABLET, DELAYED RELEASE ORAL at 05:20

## 2021-04-12 RX ADMIN — Medication 50 MILLIGRAM(S): at 05:20

## 2021-04-12 RX ADMIN — SENNA PLUS 2 TABLET(S): 8.6 TABLET ORAL at 21:43

## 2021-04-12 RX ADMIN — INSULIN GLARGINE 18 UNIT(S): 100 INJECTION, SOLUTION SUBCUTANEOUS at 21:42

## 2021-04-12 RX ADMIN — Medication 6 UNIT(S): at 11:35

## 2021-04-12 RX ADMIN — Medication 2: at 11:35

## 2021-04-12 RX ADMIN — Medication 1 MILLIGRAM(S): at 11:36

## 2021-04-12 RX ADMIN — ATORVASTATIN CALCIUM 80 MILLIGRAM(S): 80 TABLET, FILM COATED ORAL at 21:43

## 2021-04-12 RX ADMIN — CHLORHEXIDINE GLUCONATE 1 APPLICATION(S): 213 SOLUTION TOPICAL at 05:21

## 2021-04-12 RX ADMIN — MUPIROCIN 1 APPLICATION(S): 20 OINTMENT TOPICAL at 05:20

## 2021-04-12 RX ADMIN — POLYETHYLENE GLYCOL 3350 17 GRAM(S): 17 POWDER, FOR SOLUTION ORAL at 13:48

## 2021-04-12 NOTE — PROGRESS NOTE ADULT - SUBJECTIVE AND OBJECTIVE BOX
Patient Information:  ROSA MARIA CASEY / 80y / Male / MRN#:[id number]    Hospital Day: 31d    Interval History:  Patient seen and examined at bedside. No acute events overnight. Pt is resting in bed, feeling well, states that he is agreeable to working with PT and going to SNF for rehab if needed.    Past Medical History:  Hypertension    Hyperlipidemia    Diabetes mellitus, type 2    History of atrial fibrillation    Bladder cancer    Chronic kidney disease (CKD)      Past Surgical History:  History of total hip replacement, bilateral    History of total knee replacement, bilateral    History of total cystectomy      Allergies:  No Known Allergies    Medications:  PRN:  acetaminophen   Tablet .. 650 milliGRAM(s) Oral every 6 hours PRN Temp greater or equal to 38C (100.4F), Mild Pain (1 - 3)  guaifenesin/dextromethorphan  Syrup 10 milliLiter(s) Oral every 6 hours PRN Cough    Standing:  atorvastatin 80 milliGRAM(s) Oral at bedtime  atropine Injectable 0.5 milliGRAM(s) IntraMuscular once  chlorhexidine 4% Liquid 1 Application(s) Topical <User Schedule>  cyanocobalamin 1000 MICROGram(s) Oral daily  ergocalciferol 04433 Unit(s) Oral <User Schedule>  folic acid 1 milliGRAM(s) Oral daily  furosemide    Tablet 20 milliGRAM(s) Oral daily  influenza   Vaccine 0.5 milliLiter(s) IntraMuscular once  insulin glargine Injectable (LANTUS) 24 Unit(s) SubCutaneous at bedtime  insulin lispro (ADMELOG) corrective regimen sliding scale   SubCutaneous three times a day before meals  insulin lispro Injectable (ADMELOG) 8 Unit(s) SubCutaneous three times a day before meals  melatonin 5 milliGRAM(s) Oral at bedtime  metoprolol succinate ER 50 milliGRAM(s) Oral daily  mupirocin 2% Nasal 1 Application(s) Nasal two times a day  pantoprazole    Tablet 40 milliGRAM(s) Oral before breakfast  polyethylene glycol 3350 17 Gram(s) Oral daily  senna 2 Tablet(s) Oral at bedtime    Home:  amlodipine-benazepril 10 mg-40 mg oral capsule: 1 cap(s) orally once a day  Eliquis 5 mg oral tablet: 1 tab(s) orally 2 times a day  glimepiride 2 mg oral tablet: 1 tab(s) orally once a day  Lantus 100 units/mL subcutaneous solution: 40 unit(s) subcutaneous once a day  metoprolol succinate 100 mg oral tablet, extended release: 1 tab(s) orally once a day  Ozempic (1 mg dose) 2 mg/1.5 mL subcutaneous solution:   Percocet 7.5/325 oral tablet: 0.5 tab(s) orally once a day (at bedtime)  potassium citrate 10 mEq oral tablet, extended release: 1 tab(s) orally once a day  rosuvastatin 20 mg oral tablet: 1 tab(s) orally once a day    Vitals:  T(C): 36.2, Max: 36.4 (04-11-21 @ 17:42)  T(F): 97.2, Max: 97.5 (04-11-21 @ 17:42)  HR: 73 (73 - 89)  BP: 117/56 (106/53 - 117/56)  RR: 18 (18 - 20)  SpO2: 93% (93% - 97%)    Physical Exam:  General: Awake, alert, NAD, resting in bed, on RA  HEENT: Head NC/AT  Heart: RRR; systolic murmur apprec  Lungs: Good air entry bilaterally  Abdomen: Soft, nontender, nondistended, BS+, urostomy bag in place, appears clean, intact  Extremities: Venous stasis skin changes of LE bilaterally  Neuro: AAOx3, NFD        Labs:  CBC (04-12 @ 06:19)                        Hgb: 7.0    WBC: 5.21  )-----------------( Plts: 100                              Hct: 22.2     Chem (04-12 @ 06:19)  Na: 139  |     Cl: 105     |  BUN: 21  -----------------------------------------< Gluc: 105    K: 3.5   |    HCO3: 22  |  Cr: 0.9    Ca 8.1 (04-12 @ 06:19)  Mg 1.8 (04-12 @ 06:19)    LFTs (04-12 @ 06:19)  TPro 4.3  /  Alb 2.7  TBili 1.8  /  DBili     AST 28  /  ALT 24  /  AlkPhos 72

## 2021-04-12 NOTE — PROGRESS NOTE ADULT - ASSESSMENT
79 yo M with PMHx of AFib, on Eliquis, HTN, DMII, CKDIII, hx of Bladder Ca s/p Urostomy, bilateral hip/knee replacements, obesity admitted for acute hypoxemic respiratory failure due to COVID PNA.    #Acute hypoxemic Respiratory Failure due to COVID - resolved  - s/p Toci, course of Decadron  - Treated with Levaquin, Cefepime for suspected superimposed bacterial infection  - Was on HFNC, transitioned to O2 NC 3L and now on RA, saturating 94-96%    #Acute drop in Hb - source unclear  #Acute thrombocytopenia - improving  - Had gradual decrease in Hb from baseline 12-13 to 5.6, had total of 3 U pRBCS throughout [facility] stay  - Hb has been stable at ~7-8, 7 this morning, will repeat at 4PM, maintain active T+S  - Had CT Abd x2 - negative for acute retroperitoneal bleed  - Had several episodes of melena, EGD performed 4/9 negative for acute pathology, c/w PPI once daily   - Hemolysis workup negative, DIC unlikely cause for dec in Hb, plt ct as pt did not have acute bleeding, PT/PTT within normal limits, fibrinogen borderline low  - HIT panel done for acute thrombocytopenia - negative    #Hx of Paroxysmal AFib on Eliquis  #CHADSVASC Score: 4, HASBLED 2  #HTN  - Has been rate controlled, on Metoprolol Succinate 100 mg once daily at home, was dec to 50 mg once daily due to bradycardia, has HR in 40s-50s at times while at rest, asymptomatic  - Takes Eliquis at home, was receiving Lovenox while here, transitioned to Heparin and developed worsening drop in Hb (5.6); now off AC  - Takes Amlodipine at home, on hold as pt has been hypotensive in setting of acute blood loss, resume if becomes hypertensive    #Acute R popliteal vein DVT  - LE Duplex on 4/5 revealed acute DVT, initiated on Lovenox, then transitioned to Heparin in case of acute bleeding, then developed acute drop in Hb  - Now off AC as is high risk for acute bleeding  - s/p IVC filter placement for acute RLE DVT on 4/9    #Sepsis of unknown source - resolved  - Hospital course complicated by hypotension, unresponsive to IVF boluses, required low dose Levo, as well as hypothermia  - Blood Cx NGTD, Urine Cx grew gut kike (has urostomy), Sputum Cx grew normal kike  - Procalc, CRP within normal limits, fungitell, galactomannan within normal limits   - Completed week course of Meropenem  - Now improved, off Abx, remains normotensive, afebrile, WBC within normal limits   - Had episodes of tremoring, nonresponsiveness with spontaneous resolution, likely due to state of sepsis - now resolved, is AAOx4, no neurological deficits    #Hx of DMII  - HbA1c 8.1%  - FS run on low end at time  - Dec Lantus to 18 U, Lispro 6 U    #HX of Bladder Ca, s/p cystoprostatectomy with urostomy  #LORRAINE due to LINDSAY - resolved  #CKD III - stable  #Episodic hematuria with blood clots - now resolved  - Nephrology following  - Cr has been stable  - Urine in urostomy bag appears clear, site appears clean and intact    DVT ppx: On hold, high risk for bleeding, no SCDs as pt has DVT  GI ppx: PPI  Diet: DASH, CC  Activity: AAT  Full Code  Dispo: COVID swab, begin dispo planning to SNF, cont to monitor Hb to ensure remains stable     79 yo M with PMHx of AFib, on Eliquis, HTN, DMII, CKDIII, hx of Bladder Ca s/p Urostomy, bilateral hip/knee replacements, obesity admitted for acute hypoxemic respiratory failure due to COVID PNA.    #Acute hypoxemic Respiratory Failure due to COVID - resolved  - s/p Toci, course of Decadron  - Treated with Levaquin, Cefepime for suspected superimposed bacterial infection  - Was on HFNC, transitioned to O2 NC 3L and now on RA, saturating 94-96%    #Acute drop in Hb - source unclear  #Acute thrombocytopenia - improving  - Had gradual decrease in Hb from baseline 12-13 to 5.6, had total of 3 U pRBCS throughout [facility] stay  - Hb has been stable at ~7-8, although gradually trending down, now 7.0, will administer 1U pRBCs  - Maintain active T+S (4/10, ordered for 4/13)  - Had CT Abd x2 - negative for acute retroperitoneal bleed  - Had several episodes of melena, EGD performed 4/9 negative for acute pathology, c/w PPI once daily   - Hemolysis workup negative, DIC unlikely cause for dec in Hb, plt ct as pt did not have acute bleeding, PT/PTT within normal limits, fibrinogen borderline low  - HIT panel done for acute thrombocytopenia - negative    #Hx of Paroxysmal AFib on Eliquis  #CHADSVASC Score: 4, HASBLED 2  #HTN  - Has been rate controlled, on Metoprolol Succinate 100 mg once daily at home, was dec to 50 mg once daily due to bradycardia, has HR in 40s-50s at times while at rest, asymptomatic  - Takes Eliquis at home, was receiving Lovenox while here, transitioned to Heparin and developed worsening drop in Hb (5.6); now off AC  - Takes Amlodipine at home, on hold as pt has been hypotensive in setting of acute blood loss, resume if becomes hypertensive    #Acute R popliteal vein DVT  - LE Duplex on 4/5 revealed acute DVT, initiated on Lovenox, then transitioned to Heparin in case of acute bleeding, then developed acute drop in Hb  - Now off AC as is high risk for acute bleeding  - s/p IVC filter placement for acute RLE DVT on 4/9    #Sepsis of unknown source - resolved  - Hospital course complicated by hypotension, unresponsive to IVF boluses, required low dose Levo, as well as hypothermia  - Blood Cx NGTD, Urine Cx grew gut kike (has urostomy), Sputum Cx grew normal kike  - Procalc, CRP within normal limits, fungitell, galactomannan within normal limits   - Completed week course of Meropenem  - Now improved, off Abx, remains normotensive, afebrile, WBC within normal limits   - Had episodes of tremoring, nonresponsiveness with spontaneous resolution, likely due to state of sepsis - now resolved, is AAOx4, no neurological deficits    #Hx of DMII  - HbA1c 8.1%  - FS run on low end at time  - Dec Lantus to 18 U, Lispro 6 U    #HX of Bladder Ca, s/p cystoprostatectomy with urostomy  #LORRAINE due to LINDSAY - resolved  #CKD III - stable  #Episodic hematuria with blood clots - now resolved  - Nephrology following  - Cr has been stable  - Urine in urostomy bag appears clear, site appears clean and intact    DVT ppx: On hold, high risk for bleeding, no SCDs as pt has DVT  GI ppx: PPI  Diet: DASH, CC  Activity: AAT  Full Code  Dispo: COVID swab, begin dispo planning to SNF, cont to monitor Hb to ensure remains stable

## 2021-04-12 NOTE — PROGRESS NOTE ADULT - SUBJECTIVE AND OBJECTIVE BOX
[facility] FOLLOW UP NOTE  --------------------------------------------------------------------------------  24 hour events/subjective: Patient examined. Appears comfortable.    PAST HISTORY  --------------------------------------------------------------------------------  No significant changes to PMH, PSH, FHx, SHx, unless otherwise noted    ALLERGIES & MEDICATIONS  --------------------------------------------------------------------------------  Allergies    No Known Allergies    Standing Inpatient Medications  atorvastatin 80 milliGRAM(s) Oral at bedtime  atropine Injectable 0.5 milliGRAM(s) IntraMuscular once  chlorhexidine 4% Liquid 1 Application(s) Topical <User Schedule>  cyanocobalamin 1000 MICROGram(s) Oral daily  ergocalciferol 17953 Unit(s) Oral <User Schedule>  folic acid 1 milliGRAM(s) Oral daily  furosemide    Tablet 20 milliGRAM(s) Oral daily  influenza   Vaccine 0.5 milliLiter(s) IntraMuscular once  insulin glargine Injectable (LANTUS) 18 Unit(s) SubCutaneous at bedtime  insulin lispro (ADMELOG) corrective regimen sliding scale   SubCutaneous three times a day before meals  insulin lispro Injectable (ADMELOG) 6 Unit(s) SubCutaneous three times a day before meals  melatonin 5 milliGRAM(s) Oral at bedtime  metoprolol succinate ER 50 milliGRAM(s) Oral daily  mupirocin 2% Nasal 1 Application(s) Nasal two times a day  pantoprazole    Tablet 40 milliGRAM(s) Oral before breakfast  polyethylene glycol 3350 17 Gram(s) Oral daily  senna 2 Tablet(s) Oral at bedtime    PRN Inpatient Medications  acetaminophen   Tablet .. 650 milliGRAM(s) Oral every 6 hours PRN  guaifenesin/dextromethorphan  Syrup 10 milliLiter(s) Oral every 6 hours PRN    VITALS/PHYSICAL EXAM  --------------------------------------------------------------------------------  T(C): 36.1 (04-12-21 @ 12:28), Max: 36.4 (04-11-21 @ 17:42)  HR: 63 (04-12-21 @ 12:28) (63 - 89)  BP: 105/56 (04-12-21 @ 12:28) (105/56 - 117/56)  RR: 18 (04-12-21 @ 12:28) (18 - 20)  SpO2: 93% (04-12-21 @ 12:28) (93% - 97%)    04-11-21 @ 07:01  -  04-12-21 @ 07:00  --------------------------------------------------------  IN: 0 mL / OUT: 2000 mL / NET: -2000 mL    Physical Exam:  	Gen: NAD  	Pulm: CTA B/L  	CV: RRR, S1S2  	Abd: +BS, soft, nontender/nondistended  	: No suprapubic tenderness  	LE: Warm, edema    LABS/STUDIES  --------------------------------------------------------------------------------              7.0    5.21  >-----------<  100      [04-12-21 @ 06:19]              22.2     139  |  105  |  21  ----------------------------<  105      [04-12-21 @ 06:19]  3.5   |  22  |  0.9        Ca     8.1     [04-12-21 @ 06:19]      Mg     1.8     [04-12-21 @ 06:19]    TPro  4.3  /  Alb  2.7  /  TBili  1.8  /  DBili  x   /  AST  28  /  ALT  24  /  AlkPhos  72  [04-12-21 @ 06:19]    Creatinine Trend:  SCr 0.9 [04-12 @ 06:19]  SCr 0.9 [04-11 @ 07:59]  SCr 1.0 [04-10 @ 04:30]  SCr 1.0 [04-09 @ 09:00]  SCr 1.0 [04-08 @ 09:40]    Urinalysis - [03-30-21 @ 04:27]      Color Yellow / Appearance Slightly Turbid / SG 1.017 / pH 6.5      Gluc Negative / Ketone Negative  / Bili Negative / Urobili <2 mg/dL       Blood Large / Protein Trace / Leuk Est Large / Nitrite Negative       / WBC 84 / Hyaline 25 / Gran  / Sq Epi  / Non Sq Epi 0 / Bacteria Negative      Iron 178, TIBC 305, %sat 58      [03-30-21 @ 07:11]  Ferritin 339      [04-02-21 @ 09:04]  Vitamin D (25OH) 23      [04-03-21 @ 08:42]

## 2021-04-12 NOTE — PROGRESS NOTE ADULT - ASSESSMENT
79yo M c PMHx chronic afib on noac, htn, dm2, ckd3 bladder ca s/p urostomy, b/l hip/ knee replacements obesity, here with acute hypoxic respiratory failure 2/2 severe covid 19 viral pneumonia, lorraine on ckd3 now better, elevated ddimer    Sepsis on admission  Covid viral PNA. acute hypoxic RF  underlying JARETH, [place]  LORRAINE on CKD  Transaminitis  Anemia sp transfusion  EGD 4/9/21, nonerosive gastritis and duodenitis  E coli UTI  R lower ext DVT, sp IVC Filter 4/9  Hx of HTN, ASHD, Afib/Eliquis  DM II, CKD  DLD  Bladder ca, sp urostomy, sp cystoprostatectomy, exposure to 911  OA, DDD, DJD, sp BL hip and knee surgeries, mobility dysfunction  GERD, HH, diverticulosis, bowel resection and reanastomosis      pt downgraded to reg floor, remains on RA pulse ox 93-96%  encourage spirometry  low H/H 7/22, cont to trend, transfuse 1u whrn available  GI:   EGD 4/9 showed nonerosive gastritis and duodenitis and no active bleed  CT of abd/pelvis non contrast  to R/O retroperitoneal hematoma  IR, sp IVC filter for DVT 4/9, off AC  add folic acid, B12 daily  add ergocal 50,000u q wk  low K and Mg replete and monitor    check Covid PCR and Ab status    ID, off all ABx    pul-critical care ff, may be downgraded to reg floor    Rehab for PT  social SVC and D/C planning for SNF

## 2021-04-12 NOTE — CHART NOTE - NSCHARTNOTEFT_GEN_A_CORE
I made rounds today with the treatment team including the hospitalist, residents,  nurses, and discussed the patient's current medical status and discharge  planning needs, and reviewed the chart.    T(C): 36.1 (04-12-21 @ 12:28), Max: 36.4 (04-11-21 @ 17:42)  HR: 63 (04-12-21 @ 12:28) (63 - 89)  BP: 105/56 (04-12-21 @ 12:28) (105/56 - 117/56)  RR: 18 (04-12-21 @ 12:28) (18 - 20)  SpO2: 93% (04-12-21 @ 12:28) (93% - 97%)          I reached out to the patient's health care proxy/ responsible family member-           [     ]  I reached                                     and discussed the patient's medical condition,                   family concerns, and discharge planning           [     ]  I left a message with family               [  x   ]  I personally participated in rounds with the medical team and my resident and discussed the case. My resident reached                   family member/ HCP    Rain Gambino                            under my direction and supervision  and we reviewed the conversation.          [     ]  My resident left a message with family under my direction and supervision           [     ]   My resident attended medical rounds and called the family                [   x   ]    The following was discussed:  The patient's medical status over the past 24 hrs was reviewed, as well as oxygen needs and medication changes and labs. Patient is on RA with pulse ox 93%. Hb stable 7-8. Eating. Weak. Plan for STR.         [    x  ]   The following concerns were raised: none          [     ] I spent 5-10 minutes on the above discussing medical issues with team members and family and/ or my resident    [   x  ] I spent 11-20 minutes on the above discussing medical issues with team members and family and/ or my resident    [     ] I spent 21-30 minutes on the above discussing medical issues with team members and family and/ or my resident

## 2021-04-12 NOTE — PROGRESS NOTE ADULT - ASSESSMENT
1. Acute hypoxemic respiratory failure secondary to COVID-19 pneumonia, s/p Toci and dexamethasone  2. Acute kidney injury, likely pre-renal azotemia vs contrast nephropathy  3. CKD III, baseline creatinine 1.2-1.5.  4. Hx of Bladder Ca, s/p cystoprostatectomy, urostomy  5. Hyperkalemia  6. Hypotension  7. Anemia    Plan:    Daily BMP  Monitor I/O  Continue furosemide

## 2021-04-12 NOTE — PROGRESS NOTE ADULT - SUBJECTIVE AND OBJECTIVE BOX
ROSA MARIA CASEY  Patient is a 80y old  Male who presents with a chief complaint of acute hypoxic RF due to Covid Viral Syndrome. Underlying Hx of DMII, CKD,  DLD, MO,  JARETH, bladder ca, sp urostomy, Hx of 911 exposure), OA, DDD, DJD, GERD, diverticulosis, abd wall hernias. bowel resection and reanastomosis.  Hospital course:  the pt remained in CEU  with HFNC O2 therapy, DEXA, TOCI therapy.  The hospital course was c/by LORRAINE, hematuria in the urostomy bag,  transaminitis, GIB req transfusions, RLE DVT req IVC filter placement and super infections req IV Abx and Caspofungin.  Eventually the renal and liver parameters improved. The pt slowly transitioned to NC O2.  He underwent EGD which showed nonerosive gastritis and duodenitis with no active BGIB.  He had several melanotic stools.      Overnight Events:  pt now on RA, being downgraded to regular floor, H/H , transfuse 1 u of PRBC when available, running low K and Mg,     Vital Signs:   T:  97.3  HR:  67  BP:  109/55    RR: 18  Pulse ox:  RA  93-96%        PHYSICAL EXAM:  GENERAL: A&O, debilitated but in NAD + NC  Neck:  short,  thick but supple, no JVD, no bruit, no stridor  Lungs:  shallow resp, scattered rhonchi, no wheezing, improving air entry  Abd:   distended soft and benign, + BS  Urinary:  RLQ urostomy  Ext:  arthritic changes, moves all limbs  Skin:  no rash  Psych stable    LABS:                        7  5.2-----------( 100           22    139 |  105 | 21  ----------------------------<105  3.5   22    0.9    GFR 64, 58, 72, 64, 35, 26, 30, 35, 64, 72, 82  Ca    8.8, 7.7      Phos  3.4      Mg     2.0, 2.6, 3.0, 2.8, 2.2, 2.4, 2.0, 1.9, 1.8      MB 63    trop <0.01 x2  ferritin 535, 462, 737, 339  procal 0.26, 0.11, 0.07  CRP 25, 180. <3  LDH  515  fibrinogen 199  DDIMER 4298  COVID AB + 85.60  3/30 LA 5.1,  3.7    TPro  5.9,  5.1, 4.1, 4.5/  Alb  3.2,  3.1, 2.7, 2.9  /  TBili  0.9  /  DBili  x   /  AST  45, 88, 88, 32  /  ALT  53, 93, 99, 36  /  AlkPhos  43  03-14      Urinalysis Basic - ( 12 Mar 2021 16:38 )    Color: Yellow / Appearance: Slightly Turbid / S.020 / pH: x  Gluc: x / Ketone: Negative  / Bili: Negative / Urobili: 3 mg/dL   Blood: x / Protein: 30 mg/dL / Nitrite: Negative   Leuk Esterase: Moderate / RBC: 5 /HPF / WBC 90 /HPF   Sq Epi: x / Non Sq Epi: 0 /HPF / Bacteria: Ma       RADIOLOGY & ADDITIONAL TESTS:  Venous duplex:  negative for DVT  CXR:  BL opacities L>R  CXR:  stable BL opacities    CT angio:  neg for central PE    CT abd/pelvis:  BIbasilar densities, hepatobiliary/spleen/pancreas WNL, mild BL adrenal thickening, no hydro, bl renal cysts, bl layering densities/ sm stones, "milk of ca", post cystoprostatectomy and ilealconduit, , post bowel resection and reanastomosis, ventral abd wall hernias, bl hip replacements    CTH 3/30:  moderate atrophy, chronic microvascular changes,  no evidence of acute pathology    EEG 3/30 gen slowing , no focal epileptiform activity    Venous duplex of lower ext:  + RLE popliteal vein thrombosis    EGD :  nonerosive gastritis and duodenitis, no active bleed

## 2021-04-13 LAB
ANION GAP SERPL CALC-SCNC: 9 MMOL/L — SIGNIFICANT CHANGE UP (ref 7–14)
BASOPHILS # BLD AUTO: 0.02 K/UL — SIGNIFICANT CHANGE UP (ref 0–0.2)
BASOPHILS NFR BLD AUTO: 0.3 % — SIGNIFICANT CHANGE UP (ref 0–1)
BUN SERPL-MCNC: 18 MG/DL — SIGNIFICANT CHANGE UP (ref 10–20)
CALCIUM SERPL-MCNC: 7.5 MG/DL — LOW (ref 8.5–10.1)
CHLORIDE SERPL-SCNC: 104 MMOL/L — SIGNIFICANT CHANGE UP (ref 98–110)
CO2 SERPL-SCNC: 23 MMOL/L — SIGNIFICANT CHANGE UP (ref 17–32)
CREAT SERPL-MCNC: 0.9 MG/DL — SIGNIFICANT CHANGE UP (ref 0.7–1.5)
EOSINOPHIL # BLD AUTO: 0.22 K/UL — SIGNIFICANT CHANGE UP (ref 0–0.7)
EOSINOPHIL NFR BLD AUTO: 3.8 % — SIGNIFICANT CHANGE UP (ref 0–8)
GLUCOSE BLDC GLUCOMTR-MCNC: 130 MG/DL — HIGH (ref 70–99)
GLUCOSE BLDC GLUCOMTR-MCNC: 140 MG/DL — HIGH (ref 70–99)
GLUCOSE BLDC GLUCOMTR-MCNC: 258 MG/DL — HIGH (ref 70–99)
GLUCOSE BLDC GLUCOMTR-MCNC: 82 MG/DL — SIGNIFICANT CHANGE UP (ref 70–99)
GLUCOSE SERPL-MCNC: 130 MG/DL — HIGH (ref 70–99)
HCT VFR BLD CALC: 27.7 % — LOW (ref 42–52)
HGB BLD-MCNC: 8.7 G/DL — LOW (ref 14–18)
IMM GRANULOCYTES NFR BLD AUTO: 0.3 % — SIGNIFICANT CHANGE UP (ref 0.1–0.3)
LYMPHOCYTES # BLD AUTO: 0.65 K/UL — LOW (ref 1.2–3.4)
LYMPHOCYTES # BLD AUTO: 11.3 % — LOW (ref 20.5–51.1)
MCHC RBC-ENTMCNC: 29.2 PG — SIGNIFICANT CHANGE UP (ref 27–31)
MCHC RBC-ENTMCNC: 31.4 G/DL — LOW (ref 32–37)
MCV RBC AUTO: 93 FL — SIGNIFICANT CHANGE UP (ref 80–94)
MONOCYTES # BLD AUTO: 0.55 K/UL — SIGNIFICANT CHANGE UP (ref 0.1–0.6)
MONOCYTES NFR BLD AUTO: 9.6 % — HIGH (ref 1.7–9.3)
NEUTROPHILS # BLD AUTO: 4.29 K/UL — SIGNIFICANT CHANGE UP (ref 1.4–6.5)
NEUTROPHILS NFR BLD AUTO: 74.7 % — SIGNIFICANT CHANGE UP (ref 42.2–75.2)
NRBC # BLD: 0 /100 WBCS — SIGNIFICANT CHANGE UP (ref 0–0)
PLATELET # BLD AUTO: 91 K/UL — LOW (ref 130–400)
POTASSIUM SERPL-MCNC: 4.3 MMOL/L — SIGNIFICANT CHANGE UP (ref 3.5–5)
POTASSIUM SERPL-SCNC: 4.3 MMOL/L — SIGNIFICANT CHANGE UP (ref 3.5–5)
RBC # BLD: 2.98 M/UL — LOW (ref 4.7–6.1)
RBC # FLD: 16.7 % — HIGH (ref 11.5–14.5)
SODIUM SERPL-SCNC: 136 MMOL/L — SIGNIFICANT CHANGE UP (ref 135–146)
WBC # BLD: 5.75 K/UL — SIGNIFICANT CHANGE UP (ref 4.8–10.8)
WBC # FLD AUTO: 5.75 K/UL — SIGNIFICANT CHANGE UP (ref 4.8–10.8)

## 2021-04-13 RX ORDER — INSULIN LISPRO 100/ML
8 VIAL (ML) SUBCUTANEOUS
Refills: 0 | Status: DISCONTINUED | OUTPATIENT
Start: 2021-04-13 | End: 2021-04-17

## 2021-04-13 RX ORDER — INSULIN GLARGINE 100 [IU]/ML
21 INJECTION, SOLUTION SUBCUTANEOUS AT BEDTIME
Refills: 0 | Status: DISCONTINUED | OUTPATIENT
Start: 2021-04-13 | End: 2021-04-17

## 2021-04-13 RX ADMIN — Medication 1 MILLIGRAM(S): at 11:15

## 2021-04-13 RX ADMIN — Medication 5 MILLIGRAM(S): at 21:52

## 2021-04-13 RX ADMIN — MUPIROCIN 1 APPLICATION(S): 20 OINTMENT TOPICAL at 05:28

## 2021-04-13 RX ADMIN — Medication 20 MILLIGRAM(S): at 05:29

## 2021-04-13 RX ADMIN — Medication 50 MILLIEQUIVALENT(S): at 00:06

## 2021-04-13 RX ADMIN — CHLORHEXIDINE GLUCONATE 1 APPLICATION(S): 213 SOLUTION TOPICAL at 05:29

## 2021-04-13 RX ADMIN — Medication 6 UNIT(S): at 11:27

## 2021-04-13 RX ADMIN — Medication 6 UNIT(S): at 08:00

## 2021-04-13 RX ADMIN — PREGABALIN 1000 MICROGRAM(S): 225 CAPSULE ORAL at 11:15

## 2021-04-13 RX ADMIN — Medication 500 MILLIGRAM(S): at 11:15

## 2021-04-13 RX ADMIN — Medication 6: at 11:27

## 2021-04-13 RX ADMIN — Medication 50 MILLIGRAM(S): at 05:29

## 2021-04-13 RX ADMIN — INSULIN GLARGINE 21 UNIT(S): 100 INJECTION, SOLUTION SUBCUTANEOUS at 21:52

## 2021-04-13 RX ADMIN — ATORVASTATIN CALCIUM 80 MILLIGRAM(S): 80 TABLET, FILM COATED ORAL at 21:53

## 2021-04-13 RX ADMIN — MUPIROCIN 1 APPLICATION(S): 20 OINTMENT TOPICAL at 17:18

## 2021-04-13 RX ADMIN — PANTOPRAZOLE SODIUM 40 MILLIGRAM(S): 20 TABLET, DELAYED RELEASE ORAL at 05:29

## 2021-04-13 RX ADMIN — SENNA PLUS 2 TABLET(S): 8.6 TABLET ORAL at 21:52

## 2021-04-13 NOTE — PROGRESS NOTE ADULT - SUBJECTIVE AND OBJECTIVE BOX
[facility] FOLLOW UP NOTE  --------------------------------------------------------------------------------  24 hour events/subjective: Patient examined. Appears comfortable.    PAST HISTORY  --------------------------------------------------------------------------------  No significant changes to PMH, PSH, FHx, SHx, unless otherwise noted    ALLERGIES & MEDICATIONS  --------------------------------------------------------------------------------  Allergies    No Known Allergies    Standing Inpatient Medications  atorvastatin 80 milliGRAM(s) Oral at bedtime  atropine Injectable 0.5 milliGRAM(s) IntraMuscular once  chlorhexidine 4% Liquid 1 Application(s) Topical <User Schedule>  cyanocobalamin 1000 MICROGram(s) Oral daily  ergocalciferol 07715 Unit(s) Oral <User Schedule>  folic acid 1 milliGRAM(s) Oral daily  furosemide    Tablet 20 milliGRAM(s) Oral daily  influenza   Vaccine 0.5 milliLiter(s) IntraMuscular once  insulin glargine Injectable (LANTUS) 18 Unit(s) SubCutaneous at bedtime  insulin lispro (ADMELOG) corrective regimen sliding scale   SubCutaneous three times a day before meals  insulin lispro Injectable (ADMELOG) 6 Unit(s) SubCutaneous three times a day before meals  magnesium gluconate 500 milliGRAM(s) Oral daily  melatonin 5 milliGRAM(s) Oral at bedtime  metoprolol succinate ER 50 milliGRAM(s) Oral daily  mupirocin 2% Nasal 1 Application(s) Nasal two times a day  pantoprazole    Tablet 40 milliGRAM(s) Oral before breakfast  polyethylene glycol 3350 17 Gram(s) Oral daily  senna 2 Tablet(s) Oral at bedtime    PRN Inpatient Medications  acetaminophen   Tablet .. 650 milliGRAM(s) Oral every 6 hours PRN  guaifenesin/dextromethorphan  Syrup 10 milliLiter(s) Oral every 6 hours PRN      VITALS/PHYSICAL EXAM  --------------------------------------------------------------------------------  T(C): 35.8 (04-13-21 @ 12:00), Max: 36.7 (04-13-21 @ 05:12)  HR: 63 (04-13-21 @ 12:00) (63 - 73)  BP: 119/55 (04-13-21 @ 12:00) (109/55 - 119/55)  RR: 18 (04-13-21 @ 12:00) (18 - 18)  SpO2: 96% (04-13-21 @ 12:00) (95% - 96%)    04-12-21 @ 07:01  -  04-13-21 @ 07:00  --------------------------------------------------------  IN: 240 mL / OUT: 1975 mL / NET: -1735 mL    04-13-21 @ 07:01  -  04-13-21 @ 12:41  --------------------------------------------------------  IN: 250 mL / OUT: 900 mL / NET: -650 mL    Physical Exam:  	Gen: NAD  	Pulm: CTA B/L  	CV: RRR, S1S2  	Abd: +BS, soft, nontender/nondistended  	: No suprapubic tenderness  	LE: Warm,  edema    LABS/STUDIES  --------------------------------------------------------------------------------              8.7    5.75  >-----------<  91       [04-13-21 @ 08:17]              27.7     136  |  104  |  18  ----------------------------<  130      [04-13-21 @ 08:17]  4.3   |  23  |  0.9        Ca     7.5     [04-13-21 @ 08:17]      Mg     1.8     [04-12-21 @ 06:19]    TPro  4.3  /  Alb  2.7  /  TBili  1.8  /  DBili  x   /  AST  28  /  ALT  24  /  AlkPhos  72  [04-12-21 @ 06:19]    Creatinine Trend:  SCr 0.9 [04-13 @ 08:17]  SCr 0.9 [04-12 @ 06:19]  SCr 0.9 [04-11 @ 07:59]  SCr 1.0 [04-10 @ 04:30]  SCr 1.0 [04-09 @ 09:00]    Urinalysis - [03-30-21 @ 04:27]      Color Yellow / Appearance Slightly Turbid / SG 1.017 / pH 6.5      Gluc Negative / Ketone Negative  / Bili Negative / Urobili <2 mg/dL       Blood Large / Protein Trace / Leuk Est Large / Nitrite Negative       / WBC 84 / Hyaline 25 / Gran  / Sq Epi  / Non Sq Epi 0 / Bacteria Negative    Iron 178, TIBC 305, %sat 58      [03-30-21 @ 07:11]  Ferritin 339      [04-02-21 @ 09:04]  Vitamin D (25OH) 23      [04-03-21 @ 08:42]

## 2021-04-13 NOTE — PROGRESS NOTE ADULT - ASSESSMENT
81 yo M with PMHx of AFib, on Eliquis, HTN, DMII, CKDIII, hx of Bladder Ca s/p Urostomy, bilateral hip/knee replacements, obesity admitted for acute hypoxemic respiratory failure due to COVID PNA.    #Acute hypoxemic Respiratory Failure due to COVID - resolved  - s/p Toci, course of Decadron  - Treated with Levaquin, Cefepime for suspected superimposed bacterial infection  - Was on HFNC in CEU, transitioned to O2 NC 3L, downgraded to 3A and now on RA, saturating 94-96%  - COVID neg 4/12    #Acute drop in Hb - source unclear  #Acute thrombocytopenia - improving  - Had gradual decrease in Hb from baseline 12-13 to 5.6, had total of 4 U pRBCS throughout hospital stay  - Hb has been stable at ~7-8, s/p 1U 4/12, follow up todays CBC  - Maintain active T+S (4/13)  - Had CT Abd x2 - negative for acute retroperitoneal bleed  - Had several episodes of melena, EGD performed 4/9 negative for acute bleeding, revealed nonerosive gastritis, c/w PPI once daily   - Hemolysis workup negative, DIC unlikely cause for dec in Hb, plt ct as pt did not have acute bleeding, PT/PTT within normal limits, fibrinogen borderline low  - HIT panel done for acute thrombocytopenia - negative    #Hx of Paroxysmal AFib on Eliquis  #CHADSVASC Score: 4, HASBLED 2  #HTN  - Has been rate controlled, on Metoprolol Succinate 100 mg once daily at home, was dec to 50 mg once daily due to bradycardia, has HR in 40s-50s at times while at rest, asymptomatic  - Takes Eliquis at home, was receiving Lovenox while here, transitioned to Heparin and developed worsening drop in Hb (5.6); now off AC  - Takes Amlodipine at home, on hold as pt has been normotensive, resume if needed    #Acute R popliteal vein DVT  - LE Duplex on 4/5 revealed acute DVT, initiated on Lovenox, then transitioned to Heparin in case of acute bleeding, then developed acute drop in Hb  - Now off AC as is high risk for acute bleeding  - s/p IVC filter placement for acute RLE DVT on 4/9    #Sepsis of unknown source - resolved  - Hospital course complicated by hypotension, unresponsive to IVF boluses, required low dose Levo, as well as hypothermia  - Blood Cx NGTD, Urine Cx grew gut kike (has urostomy), Sputum Cx grew normal kike  - Procalc, CRP within normal limits, fungitell, galactomannan within normal limits   - Completed week course of Meropenem  - Now improved, off Abx, remains normotensive, afebrile, WBC within normal limits   - Had episodes of tremors, nonresponsiveness with spontaneous resolution, likely due to state of sepsis - now resolved, is AAOx4, no neurological deficits    #Hx of DMII  - HbA1c 8.1%  - Cont to monitor FS  - C/w Lantus to 18 U, Lispro 6 U    #HX of Bladder Ca, s/p cystoprostatectomy with urostomy  #LORRAINE due to LINDSAY - resolved  #CKD III - stable  #Episodic hematuria with blood clots - now resolved  - Nephrology following  - Cr has been stable  - Urine in urostomy bag appears clear, site appears clean and intact    DVT ppx: On hold, high risk for bleeding, no SCDs as pt has DVT  GI ppx: PPI  Diet: DASH, CC  Activity: AAT  Full Code  Dispo: COVID neg 4/12, dispo planning to SNF

## 2021-04-13 NOTE — PROGRESS NOTE ADULT - ASSESSMENT
81yo M c PMHx chronic afib on noac, htn, dm2, ckd3 bladder ca s/p urostomy, b/l hip/ knee replacements obesity, here with acute hypoxic respiratory failure 2/2 severe covid 19 viral pneumonia, lorraine on ckd3 now better, elevated ddimer    Sepsis on admission  Covid viral PNA. acute hypoxic RF  underlying JARETH, MO  LORRAINE on CKD  Transaminitis  Anemia sp transfusion  EGD 4/9/21, nonerosive gastritis and duodenitis  E coli UTI  R lower ext DVT, sp IVC Filter 4/9  Hx of HTN, ASHD, Afib/Eliquis  DM II, CKD  DLD  Bladder ca, sp urostomy, sp cystoprostatectomy, exposure to 911  OA, DDD, DJD, sp BL hip and knee surgeries, mobility dysfunction  GERD, HH, diverticulosis, bowel resection and reanastomosis      pt doing well  on RA pulse ox 96%  encourage spirometry  low H/H 8.7/27 cont to trend   GI:   EGD 4/9 showed nonerosive gastritis and duodenitis and no active bleed  CT of abd/pelvis non contrast  to R/O retroperitoneal hematoma  IR, sp IVC filter for DVT 4/9, off AC  add folic acid, B12 daily  add ergocal 50,000u q wk  low K and Mg replete and monitor    check Covid PCR and Ab status    ID, off all ABx  pul-critical care ff, may be downgraded to reg floor    Rehab for PT  social SVC and D/C planning for SNF

## 2021-04-13 NOTE — PROGRESS NOTE ADULT - SUBJECTIVE AND OBJECTIVE BOX
Patient Information:  ROSA MARIA CASEY / 80y / Male / MRN#:[id number]    Hospital Day: 32d    Interval History:  Patient seen and examined at bedside. No acute events overnight. Pt is sitting up in bed, denies any complaints. Is s/p 1U pRBCs transfusion yesterday.    Past Medical History:  Hypertension    Hyperlipidemia    Diabetes mellitus, type 2    History of atrial fibrillation    Bladder cancer    Chronic kidney disease (CKD)      Past Surgical History:  History of total hip replacement, bilateral    History of total knee replacement, bilateral    History of total cystectomy      Allergies:  No Known Allergies    Medications:  PRN:  acetaminophen   Tablet .. 650 milliGRAM(s) Oral every 6 hours PRN Temp greater or equal to 38C (100.4F), Mild Pain (1 - 3)  guaifenesin/dextromethorphan  Syrup 10 milliLiter(s) Oral every 6 hours PRN Cough    Standing:  atorvastatin 80 milliGRAM(s) Oral at bedtime  atropine Injectable 0.5 milliGRAM(s) IntraMuscular once  chlorhexidine 4% Liquid 1 Application(s) Topical <User Schedule>  cyanocobalamin 1000 MICROGram(s) Oral daily  ergocalciferol 17670 Unit(s) Oral <User Schedule>  folic acid 1 milliGRAM(s) Oral daily  furosemide    Tablet 20 milliGRAM(s) Oral daily  influenza   Vaccine 0.5 milliLiter(s) IntraMuscular once  insulin glargine Injectable (LANTUS) 18 Unit(s) SubCutaneous at bedtime  insulin lispro (ADMELOG) corrective regimen sliding scale   SubCutaneous three times a day before meals  insulin lispro Injectable (ADMELOG) 6 Unit(s) SubCutaneous three times a day before meals  magnesium gluconate 500 milliGRAM(s) Oral daily  melatonin 5 milliGRAM(s) Oral at bedtime  metoprolol succinate ER 50 milliGRAM(s) Oral daily  mupirocin 2% Nasal 1 Application(s) Nasal two times a day  pantoprazole    Tablet 40 milliGRAM(s) Oral before breakfast  polyethylene glycol 3350 17 Gram(s) Oral daily  senna 2 Tablet(s) Oral at bedtime    Home:  amlodipine-benazepril 10 mg-40 mg oral capsule: 1 cap(s) orally once a day  Eliquis 5 mg oral tablet: 1 tab(s) orally 2 times a day  glimepiride 2 mg oral tablet: 1 tab(s) orally once a day  Lantus 100 units/mL subcutaneous solution: 40 unit(s) subcutaneous once a day  metoprolol succinate 100 mg oral tablet, extended release: 1 tab(s) orally once a day  Ozempic (1 mg dose) 2 mg/1.5 mL subcutaneous solution:   Percocet 7.5/325 oral tablet: 0.5 tab(s) orally once a day (at bedtime)  potassium citrate 10 mEq oral tablet, extended release: 1 tab(s) orally once a day  rosuvastatin 20 mg oral tablet: 1 tab(s) orally once a day    Vitals:  T(C): 36.7, Max: 36.7 (04-13-21 @ 05:12)  T(F): 98, Max: 98 (04-13-21 @ 05:12)  HR: 73 (63 - 73)  BP: 113/59 (105/56 - 113/59)  RR: 18 (18 - 18)  SpO2: 95% (93% - 96%)    Physical Exam:  General: Awake, alert, NAD, sitting up in bed, on RA  HEENT: Head NC/AT  Heart: RRR; systolic murmur apprec  Lungs: Good air entry bilaterally  Abdomen: Soft, nontender, nondistended, BS+, urostomy bag in place, appears clean, intact  Extremities: Venous stasis skin changes of LE bilaterally  Neuro: AAOx3, NFD    Labs:  CBC (04-12 @ 06:19)                        Hgb: 7.0    WBC: 5.21  )-----------------( Plts: 100                              Hct: 22.2     Chem (04-12 @ 06:19)  Na: 139  |     Cl: 105     |  BUN: 21  -----------------------------------------< Gluc: 105    K: 3.5   |    HCO3: 22  |  Cr: 0.9    Ca 8.1 (04-12 @ 06:19)  Mg 1.8 (04-12 @ 06:19)    LFTs (04-12 @ 06:19)  TPro 4.3  /  Alb 2.7  TBili 1.8  /  DBili     AST 28  /  ALT 24  /  AlkPhos 72

## 2021-04-13 NOTE — PROGRESS NOTE ADULT - SUBJECTIVE AND OBJECTIVE BOX
ROSA MARIA CASEY  Patient is a 80y old  Male who presents with a chief complaint of acute hypoxic RF due to Covid Viral Syndrome. Underlying Hx of DMII, CKD,  DLD, MO,  JARETH, bladder ca, sp urostomy, Hx of 911 exposure), OA, DDD, DJD, GERD, diverticulosis, abd wall hernias. bowel resection and reanastomosis.  Hospital course:  the pt remained in CEU  with HFNC O2 therapy, DEXA, TOCI therapy.  The hospital course was c/by LORRAINE, hematuria in the urostomy bag,  transaminitis, GIB req transfusions, RLE DVT req IVC filter placement and super infections req IV Abx and Caspofungin.  Eventually the renal and liver parameters improved. The pt slowly transitioned to NC O2.  He underwent EGD which showed nonerosive gastritis and duodenitis with no active BGIB.  He had several melanotic stools.      Overnight Events:  pt now on RA, H/H 8.7 post  transfusion yesterday, renal parameters at base line, electrolytes stable, pt being prepared fror SNF, needs rehab and gait reconditioning    Vital Signs:   T:  96.5  HR:  63  BP:  119/55    RR: 18  Pulse ox:  RA  96%        PHYSICAL EXAM:  GENERAL: A&O, debilitated but in NAD, on RA  Neck:  short,  thick but supple, no JVD, no bruit, no stridor  Lungs:  shallow resp, scattered rhonchi, no wheezing, improving air entry  Abd:   distended soft and benign, + BS  Urinary:  RLQ urostomy  Ext:  arthritic changes, moves all limbs  Skin:  no rash  Psych stable    LABS:                        8.7  5.7-----------( 91           27    136 |  104 | 18  ----------------------------<130  4.3   23    0.9    GFR 64, 58, 72, 64, 35, 26, 30, 35, 64, 72, 82  Ca    8.8, 7.7      Phos  3.4      Mg     2.0, 2.6, 3.0, 2.8, 2.2, 2.4, 2.0, 1.9, 1.8      MB 63    trop <0.01 x2  ferritin 535, 462, 737, 339  procal 0.26, 0.11, 0.07  CRP 25, 180. <3  LDH  515  fibrinogen 199  DDIMER 4298  COVID AB + 85.60  3/30 LA 5.1,  3.7    TPro  5.9,  5.1, 4.1, 4.5/  Alb  3.2,  3.1, 2.7, 2.9  /  TBili  0.9  /  DBili  x   /  AST  45, 88, 88, 32  /  ALT  53, 93, 99, 36  /  AlkPhos  43  03-14      Urinalysis Basic - ( 12 Mar 2021 16:38 )    Color: Yellow / Appearance: Slightly Turbid / S.020 / pH: x  Gluc: x / Ketone: Negative  / Bili: Negative / Urobili: 3 mg/dL   Blood: x / Protein: 30 mg/dL / Nitrite: Negative   Leuk Esterase: Moderate / RBC: 5 /HPF / WBC 90 /HPF   Sq Epi: x / Non Sq Epi: 0 /HPF / Bacteria: Ma       RADIOLOGY & ADDITIONAL TESTS:  Venous duplex:  negative for DVT  CXR:  BL opacities L>R  CXR:  stable BL opacities    CT angio:  neg for central PE    CT abd/pelvis:  BIbasilar densities, hepatobiliary/spleen/pancreas WNL, mild BL adrenal thickening, no hydro, bl renal cysts, bl layering densities/ sm stones, "milk of ca", post cystoprostatectomy and ilealconduit, , post bowel resection and reanastomosis, ventral abd wall hernias, bl hip replacements    CTH 3/30:  moderate atrophy, chronic microvascular changes,  no evidence of acute pathology    EEG 3/30 gen slowing , no focal epileptiform activity    Venous duplex of lower ext:  + RLE popliteal vein thrombosis    EGD :  nonerosive gastritis and duodenitis, no active bleed

## 2021-04-13 NOTE — CHART NOTE - NSCHARTNOTEFT_GEN_A_CORE
RD Limited Follow Up Note:    Pt continues on carb consistent, DASH, no concentrate potassium diet. Eating well - 75% of meal trays per flow sheets, Last BM 4/12, no GI distress. 1+ generalized edema, ecchymosis/redness and blanchable to sacrum. Labs and meds reviewed. No new weights. Pertinent medical info: Acute hypoxemic Respiratory Failure due to COVID - resolved. COVID negative 4/12. Pt is not at risk f/u in 7 days

## 2021-04-13 NOTE — CHART NOTE - NSCHARTNOTEFT_GEN_A_CORE
I made rounds today with the treatment team including the hospitalist, residents,  nurses, and discussed the patient's current medical status and discharge  planning needs, and reviewed the chart.    T(C): 36.7 (04-13-21 @ 05:12), Max: 36.7 (04-13-21 @ 05:12)  HR: 73 (04-13-21 @ 05:12) (63 - 73)  BP: 113/59 (04-13-21 @ 05:12) (105/56 - 113/59)  RR: 18 (04-13-21 @ 05:12) (18 - 18)  SpO2: 95% (04-13-21 @ 07:40) (93% - 96%)          I reached out to the patient's health care proxy/ responsible family member-           [     ]  I reached                                     and discussed the patient's medical condition,                   family concerns, and discharge planning           [     ]  I left a message with family               [  x   ]  I personally participated in rounds with the medical team and my resident and discussed the case. My resident reached                   family member/ HCP       Rain Gambino                         under my direction and supervision  and we reviewed the conversation.          [     ]  My resident left a message with family under my direction and supervision           [     ]   My resident attended medical rounds and called the family                [      ]    The following was discussed:  The patient's medical status over the past 24 hrs was reviewed, as well as oxygen needs and medication changes and labs. On RA. S/P 1 unit pRBC. Plan d/c to SNF soon if CBC remains stable.          [   x   ]   The following concerns were raised: none          [     ] I spent 5-10 minutes on the above discussing medical issues with team members and family and/ or my resident    [   x  ] I spent 11-20 minutes on the above discussing medical issues with team members and family and/ or my resident    [     ] I spent 21-30 minutes on the above discussing medical issues with team members and family and/ or my resident

## 2021-04-14 ENCOUNTER — TRANSCRIPTION ENCOUNTER (OUTPATIENT)
Age: 81
End: 2021-04-14

## 2021-04-14 LAB
ANION GAP SERPL CALC-SCNC: 8 MMOL/L — SIGNIFICANT CHANGE UP (ref 7–14)
BASOPHILS # BLD AUTO: 0.01 K/UL — SIGNIFICANT CHANGE UP (ref 0–0.2)
BASOPHILS NFR BLD AUTO: 0.2 % — SIGNIFICANT CHANGE UP (ref 0–1)
BUN SERPL-MCNC: 15 MG/DL — SIGNIFICANT CHANGE UP (ref 10–20)
CALCIUM SERPL-MCNC: 7.7 MG/DL — LOW (ref 8.5–10.1)
CHLORIDE SERPL-SCNC: 103 MMOL/L — SIGNIFICANT CHANGE UP (ref 98–110)
CO2 SERPL-SCNC: 22 MMOL/L — SIGNIFICANT CHANGE UP (ref 17–32)
CREAT SERPL-MCNC: 0.9 MG/DL — SIGNIFICANT CHANGE UP (ref 0.7–1.5)
EOSINOPHIL # BLD AUTO: 0.2 K/UL — SIGNIFICANT CHANGE UP (ref 0–0.7)
EOSINOPHIL NFR BLD AUTO: 4.6 % — SIGNIFICANT CHANGE UP (ref 0–8)
GLUCOSE BLDC GLUCOMTR-MCNC: 102 MG/DL — HIGH (ref 70–99)
GLUCOSE BLDC GLUCOMTR-MCNC: 106 MG/DL — HIGH (ref 70–99)
GLUCOSE BLDC GLUCOMTR-MCNC: 157 MG/DL — HIGH (ref 70–99)
GLUCOSE BLDC GLUCOMTR-MCNC: 216 MG/DL — HIGH (ref 70–99)
GLUCOSE SERPL-MCNC: 96 MG/DL — SIGNIFICANT CHANGE UP (ref 70–99)
HCT VFR BLD CALC: 25.8 % — LOW (ref 42–52)
HGB BLD-MCNC: 7.9 G/DL — LOW (ref 14–18)
IMM GRANULOCYTES NFR BLD AUTO: 0.5 % — HIGH (ref 0.1–0.3)
LYMPHOCYTES # BLD AUTO: 0.64 K/UL — LOW (ref 1.2–3.4)
LYMPHOCYTES # BLD AUTO: 14.8 % — LOW (ref 20.5–51.1)
MCHC RBC-ENTMCNC: 28.2 PG — SIGNIFICANT CHANGE UP (ref 27–31)
MCHC RBC-ENTMCNC: 30.6 G/DL — LOW (ref 32–37)
MCV RBC AUTO: 92.1 FL — SIGNIFICANT CHANGE UP (ref 80–94)
MONOCYTES # BLD AUTO: 0.48 K/UL — SIGNIFICANT CHANGE UP (ref 0.1–0.6)
MONOCYTES NFR BLD AUTO: 11.1 % — HIGH (ref 1.7–9.3)
NEUTROPHILS # BLD AUTO: 2.98 K/UL — SIGNIFICANT CHANGE UP (ref 1.4–6.5)
NEUTROPHILS NFR BLD AUTO: 68.8 % — SIGNIFICANT CHANGE UP (ref 42.2–75.2)
NRBC # BLD: 0 /100 WBCS — SIGNIFICANT CHANGE UP (ref 0–0)
PLATELET # BLD AUTO: 89 K/UL — LOW (ref 130–400)
POTASSIUM SERPL-MCNC: 3.1 MMOL/L — LOW (ref 3.5–5)
POTASSIUM SERPL-SCNC: 3.1 MMOL/L — LOW (ref 3.5–5)
RBC # BLD: 2.8 M/UL — LOW (ref 4.7–6.1)
RBC # FLD: 16.5 % — HIGH (ref 11.5–14.5)
SARS-COV-2 RNA SPEC QL NAA+PROBE: DETECTED
SODIUM SERPL-SCNC: 133 MMOL/L — LOW (ref 135–146)
WBC # BLD: 4.33 K/UL — LOW (ref 4.8–10.8)
WBC # FLD AUTO: 4.33 K/UL — LOW (ref 4.8–10.8)

## 2021-04-14 RX ORDER — POTASSIUM CHLORIDE 20 MEQ
20 PACKET (EA) ORAL
Refills: 0 | Status: COMPLETED | OUTPATIENT
Start: 2021-04-14 | End: 2021-04-14

## 2021-04-14 RX ORDER — FUROSEMIDE 40 MG
1 TABLET ORAL
Qty: 0 | Refills: 0 | DISCHARGE
Start: 2021-04-14

## 2021-04-14 RX ORDER — FUROSEMIDE 40 MG
20 TABLET ORAL
Refills: 0 | Status: DISCONTINUED | OUTPATIENT
Start: 2021-04-14 | End: 2021-04-20

## 2021-04-14 RX ORDER — METOPROLOL TARTRATE 50 MG
1 TABLET ORAL
Qty: 0 | Refills: 0 | DISCHARGE
Start: 2021-04-14

## 2021-04-14 RX ORDER — INSULIN GLARGINE 100 [IU]/ML
40 INJECTION, SOLUTION SUBCUTANEOUS
Qty: 0 | Refills: 0 | DISCHARGE

## 2021-04-14 RX ORDER — METOPROLOL TARTRATE 50 MG
1 TABLET ORAL
Qty: 0 | Refills: 0 | DISCHARGE

## 2021-04-14 RX ORDER — FUROSEMIDE 40 MG
20 TABLET ORAL
Refills: 0 | Status: DISCONTINUED | OUTPATIENT
Start: 2021-04-14 | End: 2021-04-14

## 2021-04-14 RX ORDER — POTASSIUM CHLORIDE 20 MEQ
40 PACKET (EA) ORAL
Refills: 0 | Status: COMPLETED | OUTPATIENT
Start: 2021-04-14 | End: 2021-04-14

## 2021-04-14 RX ADMIN — Medication 50 MILLIEQUIVALENT(S): at 11:13

## 2021-04-14 RX ADMIN — Medication 500 MILLIGRAM(S): at 11:14

## 2021-04-14 RX ADMIN — ATORVASTATIN CALCIUM 80 MILLIGRAM(S): 80 TABLET, FILM COATED ORAL at 22:00

## 2021-04-14 RX ADMIN — PREGABALIN 1000 MICROGRAM(S): 225 CAPSULE ORAL at 11:14

## 2021-04-14 RX ADMIN — Medication 1 MILLIGRAM(S): at 11:14

## 2021-04-14 RX ADMIN — INSULIN GLARGINE 21 UNIT(S): 100 INJECTION, SOLUTION SUBCUTANEOUS at 22:00

## 2021-04-14 RX ADMIN — Medication 2: at 17:17

## 2021-04-14 RX ADMIN — Medication 50 MILLIEQUIVALENT(S): at 13:13

## 2021-04-14 RX ADMIN — Medication 4: at 11:29

## 2021-04-14 RX ADMIN — Medication 8 UNIT(S): at 11:29

## 2021-04-14 RX ADMIN — Medication 8 UNIT(S): at 08:03

## 2021-04-14 RX ADMIN — MUPIROCIN 1 APPLICATION(S): 20 OINTMENT TOPICAL at 05:26

## 2021-04-14 RX ADMIN — PANTOPRAZOLE SODIUM 40 MILLIGRAM(S): 20 TABLET, DELAYED RELEASE ORAL at 05:26

## 2021-04-14 RX ADMIN — Medication 8 UNIT(S): at 17:17

## 2021-04-14 RX ADMIN — Medication 5 MILLIGRAM(S): at 22:00

## 2021-04-14 RX ADMIN — Medication 20 MILLIGRAM(S): at 05:26

## 2021-04-14 RX ADMIN — CHLORHEXIDINE GLUCONATE 1 APPLICATION(S): 213 SOLUTION TOPICAL at 05:26

## 2021-04-14 RX ADMIN — Medication 20 MILLIGRAM(S): at 17:16

## 2021-04-14 RX ADMIN — Medication 50 MILLIGRAM(S): at 05:26

## 2021-04-14 RX ADMIN — MUPIROCIN 1 APPLICATION(S): 20 OINTMENT TOPICAL at 17:18

## 2021-04-14 NOTE — PROGRESS NOTE ADULT - SUBJECTIVE AND OBJECTIVE BOX
ROSA MARIA CASEY  Patient is a 80y old  Male who presents with a chief complaint of acute hypoxic RF due to Covid Viral Syndrome. Underlying Hx of DMII, CKD,  DLD, MO,  JARETH, bladder ca, sp urostomy, Hx of 911 exposure), OA, DDD, DJD, GERD, diverticulosis, abd wall hernias. bowel resection and reanastomosis.  Hospital course:  the pt remained in CEU  with HFNC O2 therapy, DEXA, TOCI therapy.  The hospital course was c/by LORRAINE, hematuria in the urostomy bag,  transaminitis, GIB req transfusions, RLE DVT req IVC filter placement and super infections req IV Abx and Caspofungin.  Eventually the renal and liver parameters improved. The pt slowly transitioned to NC O2.  He underwent EGD which showed nonerosive gastritis and duodenitis with no active BGIB.  He had several melanotic stools.      Overnight Events:  pt now on RA, H/H 7.9/25.8 renal parameters at base line, low K 3.1, pt on Lasix, replete,  pt being prepared for SNF, needs rehab and gait reconditioning, check covib  swab and Ab status    Vital Signs:   T:  96.9  HR:  62  BP:  119/57    RR: 18  Pulse ox:  RA  95%        PHYSICAL EXAM:  GENERAL: A&O, debilitated but in NAD, on RA 95%  Neck:  short,  thick but supple, no JVD, no bruit, no stridor  Lungs:  shallow resp, scattered rhonchi, no wheezing, improving air entry  Abd:   distended soft and benign, + BS  Urinary:  RLQ urostomy  Ext:  arthritic changes, moves all limbs  Skin:  no rash  Psych stable    LABS:                        7.9  4.3----------( 91           25.8    133 |  103 | 15  ----------------------------<96  3.1   22    0.9    GFR 64, 58, 72, 64, 35, 26, 30, 35, 64, 72, 82  Ca    8.8, 7.7      Phos  3.4      Mg     2.0, 2.6, 3.0, 2.8, 2.2, 2.4, 2.0, 1.9, 1.8      MB 63    trop <0.01 x2  ferritin 535, 462, 737, 339  procal 0.26, 0.11, 0.07  CRP 25, 180. <3  LDH  515  fibrinogen 199  DDIMER 4298  COVID AB + 85.60  3/30 LA 5.1,  3.7    TPro  5.9,  5.1, 4.1, 4.5/  Alb  3.2,  3.1, 2.7, 2.9  /  TBili  0.9  /  DBili  x   /  AST  45, 88, 88, 32  /  ALT  53, 93, 99, 36  /  AlkPhos  43  03-14      Urinalysis Basic - ( 12 Mar 2021 16:38 )    Color: Yellow / Appearance: Slightly Turbid / S.020 / pH: x  Gluc: x / Ketone: Negative  / Bili: Negative / Urobili: 3 mg/dL   Blood: x / Protein: 30 mg/dL / Nitrite: Negative   Leuk Esterase: Moderate / RBC: 5 /HPF / WBC 90 /HPF   Sq Epi: x / Non Sq Epi: 0 /HPF / Bacteria: Ma       RADIOLOGY & ADDITIONAL TESTS:  Venous duplex:  negative for DVT  CXR:  BL opacities L>R  CXR:  stable BL opacities    CT angio:  neg for central PE    CT abd/pelvis:  BIbasilar densities, hepatobiliary/spleen/pancreas WNL, mild BL adrenal thickening, no hydro, bl renal cysts, bl layering densities/ sm stones, "milk of ca", post cystoprostatectomy and ilealconduit, , post bowel resection and reanastomosis, ventral abd wall hernias, bl hip replacements    CTH 3/30:  moderate atrophy, chronic microvascular changes,  no evidence of acute pathology    EEG 3/30 gen slowing , no focal epileptiform activity    Venous duplex of lower ext:  + RLE popliteal vein thrombosis    EGD :  nonerosive gastritis and duodenitis, no active bleed

## 2021-04-14 NOTE — CHART NOTE - NSCHARTNOTEFT_GEN_A_CORE
I made rounds today with the treatment team including the hospitalist, residents,  nurses, and discussed the patient's current medical status and discharge  planning needs, and reviewed the chart.    T(C): 35.6 (04-14-21 @ 05:31), Max: 36.2 (04-13-21 @ 20:30)  HR: 61 (04-14-21 @ 05:31) (61 - 64)  BP: 100/67 (04-14-21 @ 05:31) (100/67 - 109/56)  RR: 18 (04-14-21 @ 05:31) (18 - 18)  SpO2: 95% (04-14-21 @ 08:00) (94% - 98%)          I reached out to the patient's health care proxy/ responsible family member-           [     ]  I reached                                     and discussed the patient's medical condition,                   family concerns, and discharge planning           [     ]  I left a message with family               [  x   ]  I personally participated in rounds with the medical team and my resident and discussed the case. My resident reached                   family member/ HCP   Rain Gambino                             under my direction and supervision  and we reviewed the conversation.          [     ]  My resident left a message with family under my direction and supervision           [     ]   My resident attended medical rounds and called the family                [  x    ]    The following was discussed:  The patient's medical status over the past 24 hrs was reviewed, as well as oxygen needs and medication changes and labs. Hemoglobin is stable 7-8. On RA. IVC filter canceled as Vascular Surgery feels that anticoagulation should be treatment. To get repeat swab for possible STR placement.         [ x     ]   The following concerns were raised: none          [     ] I spent 5-10 minutes on the above discussing medical issues with team members and family and/ or my resident    [  x   ] I spent 11-20 minutes on the above discussing medical issues with team members and family and/ or my resident    [     ] I spent 21-30 minutes on the above discussing medical issues with team members and family and/ or my resident

## 2021-04-14 NOTE — DISCHARGE NOTE PROVIDER - HOSPITAL COURSE
79yo male with PMH of hypertension, hyperlipidemia, diabetes mellitus type 2, atrial fibrillation on Eliquis, CKD stage 3A, bladder cancer s/p cystectomy with urostomy, and bilateral hip and knee arthroplasties who was brought in by EMS for hypoxia. Patient had tested positive for COVID-19 on 3/4/2021, but symptoms did not begin until 3/6/2021. He was found to be hypoxic to 85% on room air.  Admitted for ARF 2/2 COVID- 19. On HFNC 80/50 sating 95%. Labs significant for elevated D-dimer (CTA and Duplex negative). Was not a candidate for RDV given timeline. s/p RDV and Dexamethasone. Required upgrade to stepdown due to increasing oxygen requirements, required HFNC. Ultimately improved and was transitioned to RA. Hospital course complicated by septic shock - became hypothermic, hypotensive requiring low dose pressor support. Blood, Urine, sputum Cx did not reveal a clear source of infection. Completed course of Meropenem and improved, remained afebrile, normotensive. Hospital course also complicated by acute drop in Hb (13--> 5.6), requiring total of 4U pRBCs transfusion. CT Abd done twice - negative for acute retroperitoneal hematoma. Pt developed melena, however, EGD negative for acute bleeding. Hb has remained stable. Also developed acute RLE DVT. Was on Lovenox for AFib while in hospital, had to be held due acute Hb drop and resumed when pt developed DVT, then changed to Heparin drip, which was d/c'd when Hb dropped to 5.6. Remains off AC as pt is high risk for acute bleeding. IVC filter placed by IR on 4/8 for acute DVT. Pt was downgraded to floor, remained stable on RA. Hb remained 7-8. Stable for discharge to SNF for rehab.

## 2021-04-14 NOTE — PROGRESS NOTE ADULT - ASSESSMENT
1. Acute hypoxemic respiratory failure secondary to COVID-19 pneumonia, s/p Toci and dexamethasone  2. Acute kidney injury, likely pre-renal azotemia vs contrast nephropathy  3. CKD III, baseline creatinine 1.2-1.5.  4. Hx of Bladder Ca, s/p cystoprostatectomy, urostomy  5. Hyperkalemia  6. Hypotension  7. Anemia    Plan:    Daily BMP  Monitor I/O  Increase furosemide to 20mg PO BID

## 2021-04-14 NOTE — PROGRESS NOTE ADULT - ASSESSMENT
81 yo M with PMHx of AFib, on Eliquis, HTN, DMII, CKDIII, hx of Bladder Ca s/p Urostomy, bilateral hip/knee replacements, obesity admitted for acute hypoxemic respiratory failure due to COVID PNA.    #Acute hypoxemic Respiratory Failure due to COVID - resolved  - s/p Toci, course of Decadron  - Treated with Levaquin, Cefepime for suspected superimposed bacterial infection  - Was on HFNC in CEU, transitioned to O2 NC 3L, downgraded to 3A and now on RA, saturating 94-96%  - COVID neg 4/12    #Acute drop in Hb - source unclear  #Acute thrombocytopenia - improving  - Had gradual decrease in Hb from baseline 12-13 to 5.6, had total of 4 U pRBCS throughout hospital stay  - Hb has been stable at ~7-8, s/p 1U 4/12, Hb stable at 7-8  - Maintain active T+S (4/13)  - Had CT Abd x2 - negative for acute retroperitoneal bleed  - Had several episodes of melena, EGD performed 4/9 negative for acute bleeding, revealed nonerosive gastritis, c/w PPI once daily   - Hemolysis workup negative, DIC unlikely cause for dec in Hb, plt ct as pt did not have acute bleeding, PT/PTT within normal limits, fibrinogen borderline low  - HIT panel done for acute thrombocytopenia - negative    #Hx of Paroxysmal AFib on Eliquis  #CHADSVASC Score: 4, HASBLED 2  #HTN  - Has been rate controlled, on Metoprolol Succinate 100 mg once daily at home, was dec to 50 mg once daily due to bradycardia, has HR in 40s-50s at times while at rest, asymptomatic  - Takes Eliquis at home, was receiving Lovenox while here, transitioned to Heparin and developed worsening drop in Hb (5.6); now off AC  - Takes Amlodipine at home, on hold as pt has been normotensive    #Acute R popliteal vein DVT  - LE Duplex on 4/5 revealed acute DVT, initiated on Lovenox, then transitioned to Heparin in case of acute bleeding, then developed acute drop in Hb  - Now off AC as is high risk for acute bleeding  - s/p IVC filter placement for acute RLE DVT on 4/9    #Sepsis of unknown source - resolved  - Hospital course complicated by hypotension, unresponsive to IVF boluses, required low dose Levo, as well as hypothermia  - Blood Cx NGTD, Urine Cx grew gut kike (has urostomy), Sputum Cx grew normal kike  - Procalc, CRP within normal limits, fungitell, galactomannan within normal limits   - Completed week course of Meropenem  - Now improved, off Abx, remains normotensive, afebrile, WBC within normal limits   - Had episodes of tremors, nonresponsiveness with spontaneous resolution, likely due to state of sepsis - now resolved, is AAOx4, no neurological deficits    #Hx of DMII  - HbA1c 8.1%  - Cont to monitor FS  - C/w Lantus to 18 U, Lispro 6 U    #HX of Bladder Ca, s/p cystoprostatectomy with urostomy  #LORRAINE due to LINDSAY - resolved  #CKD III - stable  #Episodic hematuria with blood clots - now resolved  - Nephrology following  - Cr has been stable  - Urine in urostomy bag appears clear, site appears clean and intact    DVT ppx: On hold, high risk for bleeding, no SCDs as pt has DVT  GI ppx: PPI  Diet: DASH, CC  Activity: AAT  Full Code  Dispo: COVID neg 4/12, will repeat today, possible d/c to SNF today or tomorrow

## 2021-04-14 NOTE — DISCHARGE NOTE PROVIDER - CARE PROVIDER_API CALL
Evan Bull  INTERNAL MEDICINE  [address] [facility] [address]  [place] [address]  Phone: [phone]  Fax: [phone]  Follow Up Time: 2 weeks    Shari Guerrero)  Gastroenterology; Internal Medicine  [address] [place] [address]  Phone: [phone]  Fax: [phone]  Follow Up Time: 1 month    Junior Carey)  Cardiovascular Disease; Internal Medicine; Nuclear Cardiology  [address], [address]  [place] [address]  Phone: [phone]  Fax: [phone]  Follow Up Time: 2 weeks    Darren Tony  INTERNAL MEDICINE  [address] [place] [address]  Phone: [phone]  Fax: [phone]  Follow Up Time: Routine    Mat Banks  UROLOGY  [address] [place] [address]  Phone: [phone]  Fax: [phone]  Follow Up Time: Routine

## 2021-04-14 NOTE — DISCHARGE NOTE PROVIDER - NSDCFUADDINST_GEN_ALL_CORE_FT
Be sure NOT to take your blood pressure medications - Amlodipine - Benazapril. Continue to take Metoprolol, but 50 mg instead of 100 mg. Also, your nephrologist started Lasix 20 mg once per day, continue to take that as prescribed. Be sure NOT to take your blood pressure medications - Amlodipine - Benazapril. Continue to take Metoprolol, but 50 mg instead of 100 mg. Also, your nephrologist started Lasix 20 mg twice per day, continue to take that as prescribed.

## 2021-04-14 NOTE — PROGRESS NOTE ADULT - ASSESSMENT
79yo M c PMHx chronic afib on noac, htn, dm2, ckd3 bladder ca s/p urostomy, b/l hip/ knee replacements obesity, here with acute hypoxic respiratory failure 2/2 severe covid 19 viral pneumonia, lorraine on ckd3 now better, elevated ddimer    Sepsis on admission  Covid viral PNA. acute hypoxic RF  underlying [place], [place]  LORRAINE on CKD  Transaminitis  Anemia sp transfusion  EGD 4/9/21, nonerosive gastritis and duodenitis  E coli UTI  R lower ext DVT, sp IVC Filter 4/9  Hx of HTN, ASHD, Afib/Eliquis  DM II, CKD  DLD  Bladder ca, sp urostomy, sp cystoprostatectomy, exposure to 911  OA, DDD, DJD, sp BL hip and knee surgeries, mobility dysfunction  GERD, HH, diverticulosis, bowel resection and reanastomosis      pt doing well  on RA pulse ox 95%  encourage spirometry  low H/H 7.9/25.8  cont to trend   GI:   EGD 4/9 showed nonerosive gastritis and duodenitis and no active bleed  CT of abd/pelvis non contrast  to R/O retroperitoneal hematoma  IR, sp IVC filter for DVT 4/9, off AC  add folic acid, B12 daily  add ergocal 50,000u q wk  low K and Mg replete and monitor    check Covid PCR and Ab status    ID, off all ABx  pul-critical care ff, may be downgraded to reg floor    Rehab for PT  Covid swab and Ab  social SVC and D/C planning for SNF

## 2021-04-14 NOTE — DISCHARGE NOTE PROVIDER - CARE PROVIDERS DIRECT ADDRESSES
[email or web address]

## 2021-04-14 NOTE — PROGRESS NOTE ADULT - SUBJECTIVE AND OBJECTIVE BOX
[place] NEPHROLOGY FOLLOW UP NOTE  --------------------------------------------------------------------------------  24 hour events/subjective: Patient examined. Appears comfortable.    PAST HISTORY  --------------------------------------------------------------------------------  No significant changes to PMH, PSH, FHx, SHx, unless otherwise noted    ALLERGIES & MEDICATIONS  --------------------------------------------------------------------------------  Allergies    No Known Allergies    Standing Inpatient Medications  atorvastatin 80 milliGRAM(s) Oral at bedtime  atropine Injectable 0.5 milliGRAM(s) IntraMuscular once  chlorhexidine 4% Liquid 1 Application(s) Topical <User Schedule>  cyanocobalamin 1000 MICROGram(s) Oral daily  ergocalciferol 40715 Unit(s) Oral <User Schedule>  folic acid 1 milliGRAM(s) Oral daily  furosemide    Tablet 20 milliGRAM(s) Oral daily  influenza   Vaccine 0.5 milliLiter(s) IntraMuscular once  insulin glargine Injectable (LANTUS) 21 Unit(s) SubCutaneous at bedtime  insulin lispro (ADMELOG) corrective regimen sliding scale   SubCutaneous three times a day before meals  insulin lispro Injectable (ADMELOG) 8 Unit(s) SubCutaneous three times a day before meals  magnesium gluconate 500 milliGRAM(s) Oral daily  melatonin 5 milliGRAM(s) Oral at bedtime  metoprolol succinate ER 50 milliGRAM(s) Oral daily  mupirocin 2% Nasal 1 Application(s) Nasal two times a day  pantoprazole    Tablet 40 milliGRAM(s) Oral before breakfast  polyethylene glycol 3350 17 Gram(s) Oral daily  potassium chloride  20 mEq/100 mL IVPB 20 milliEquivalent(s) IV Intermittent every 2 hours  senna 2 Tablet(s) Oral at bedtime    PRN Inpatient Medications  acetaminophen   Tablet .. 650 milliGRAM(s) Oral every 6 hours PRN  guaifenesin/dextromethorphan  Syrup 10 milliLiter(s) Oral every 6 hours PRN      VITALS/PHYSICAL EXAM  --------------------------------------------------------------------------------  T(C): 35.6 (04-14-21 @ 05:31), Max: 36.2 (04-13-21 @ 20:30)  HR: 61 (04-14-21 @ 05:31) (61 - 64)  BP: 100/67 (04-14-21 @ 05:31) (100/67 - 109/56)  RR: 18 (04-14-21 @ 05:31) (18 - 18)  SpO2: 95% (04-14-21 @ 08:00) (94% - 98%)  Wt(kg): --        04-13-21 @ 07:01  -  04-14-21 @ 07:00  --------------------------------------------------------  IN: 550 mL / OUT: 1800 mL / NET: -1250 mL      Physical Exam:  	Gen: NAD  	Pulm: CTA B/L  	CV: RRR, S1S2  	Abd: +BS, soft, nontender/nondistended  	: No suprapubic tenderness  	LE: Warm, edema    LABS/STUDIES  --------------------------------------------------------------------------------              7.9    4.33  >-----------<  89       [04-14-21 @ 06:09]              25.8     133  |  103  |  15  ----------------------------<  96      [04-14-21 @ 06:09]  3.1   |  22  |  0.9        Ca     7.7     [04-14-21 @ 06:09]    Creatinine Trend:  SCr 0.9 [04-14 @ 06:09]  SCr 0.9 [04-13 @ 08:17]  SCr 0.9 [04-12 @ 06:19]  SCr 0.9 [04-11 @ 07:59]  SCr 1.0 [04-10 @ 04:30]    Urinalysis - [03-30-21 @ 04:27]      Color Yellow / Appearance Slightly Turbid / SG 1.017 / pH 6.5      Gluc Negative / Ketone Negative  / Bili Negative / Urobili <2 mg/dL       Blood Large / Protein Trace / Leuk Est Large / Nitrite Negative       / WBC 84 / Hyaline 25 / Gran  / Sq Epi  / Non Sq Epi 0 / Bacteria Negative    Iron 178, TIBC 305, %sat 58      [03-30-21 @ 07:11]  Ferritin 339      [04-02-21 @ 09:04]  Vitamin D (25OH) 23      [04-03-21 @ 08:42]

## 2021-04-14 NOTE — DISCHARGE NOTE PROVIDER - NSDCCPCAREPLAN_GEN_ALL_CORE_FT
PRINCIPAL DISCHARGE DIAGNOSIS  Diagnosis: COVID-19  Assessment and Plan of Treatment: Coronavirus disease 2019 (COVID-19) is a respiratory illness  that can spread from person to person. The virus that causes  COVID-19 is a novel coronavirus that was first identified during  an investigation into an outbreak in [place], [place].  The virus that causes COVID-19 probably emerged from an  animal source, but is now spreading from person to person.  The virus is thought to spread mainly between people who  are in close contact with one another (within about 6 feet)  through respiratory droplets produced when an infected  person coughs or sneezes. It also may be possible that a person  can get COVID-19 by touching a surface or object that has  the virus on it and then touching their own mouth, nose, or  possibly their eyes, but this is not thought to be the main  way the virus spreads.  You developed a blood clot while here in the hospital likely because of COVID and because we were holding your blood thinner when you rhemoglobin was declining.   A deep vein thrombosis (DVT) is a blood clot (thrombus) that usually occurs in a deep, larger vein of the lower leg or the pelvis, or in an upper extremity such as the arm. These are dangerous and can lead to serious and even life-threatening complications if the clot travels to the lungs. Symptoms include swelling of the arm or leg, warmth and redness of the arm or leg, and pain. Treatment may include taking a blood thinning medication; make sure to take anything prescribed as instructed.  SEEK IMMEDIATE MEDICAL CARE IF YOU HAVE ANY OF THE FOLLOWING SYMPTOMS: shortness of breath, chest pain, rapid or irregular heartbeat, lightheadedness/dizziness, coughing up blood, or any bleeding in your vomit, stool, or urine. These symptoms may represent a serious problem that is an emergency. Do not wait to see if the symptoms will go away. Call 911 and do not drive yourself to the hospital.        SECONDARY DISCHARGE DIAGNOSES  Diagnosis: Acute blood loss anemia  Assessment and Plan of Treatment: You developed an acute drop in your hemoglobin and required several blood transfusions. We performed several CT scans of your abdomen and did not find any bleeding We also performed an endoscopy to investigate if you had any bleeding in your abdomen and did not find any. Continue to monitor your bowel movements for blood. You will likely need a colonoscopy with the gastroenterology doctor in the next several months. Continue to follow up with your primary doctor to ensure your hemoglobin is stable.     PRINCIPAL DISCHARGE DIAGNOSIS  Diagnosis: COVID-19  Assessment and Plan of Treatment: Coronavirus disease 2019 (COVID-19) is a respiratory illness  that can spread from person to person. The virus that causes  COVID-19 is a novel coronavirus that was first identified during  an investigation into an outbreak in [place], [place].  The virus that causes COVID-19 probably emerged from an  animal source, but is now spreading from person to person.  The virus is thought to spread mainly between people who  are in close contact with one another (within about 6 feet)  through respiratory droplets produced when an infected  person coughs or sneezes. It also may be possible that a person  can get COVID-19 by touching a surface or object that has  the virus on it and then touching their own mouth, nose, or  possibly their eyes, but this is not thought to be the main  way the virus spreads.  You developed a blood clot while here in the hospital likely because of COVID and because we were holding your blood thinner when you rhemoglobin was declining.   A deep vein thrombosis (DVT) is a blood clot (thrombus) that usually occurs in a deep, larger vein of the lower leg or the pelvis, or in an upper extremity such as the arm. These are dangerous and can lead to serious and even life-threatening complications if the clot travels to the lungs. Symptoms include swelling of the arm or leg, warmth and redness of the arm or leg, and pain. Treatment may include taking a blood thinning medication; make sure to take anything prescribed as instructed.  SEEK IMMEDIATE MEDICAL CARE IF YOU HAVE ANY OF THE FOLLOWING SYMPTOMS: shortness of breath, chest pain, rapid or irregular heartbeat, lightheadedness/dizziness, coughing up blood, or any bleeding in your vomit, stool, or urine. These symptoms may represent a serious problem that is an emergency. Do not wait to see if the symptoms will go away. Call 911 and do not drive yourself to the hospital.        SECONDARY DISCHARGE DIAGNOSES  Diagnosis: Acute blood loss anemia  Assessment and Plan of Treatment: You developed an acute drop in your hemoglobin and required several blood transfusions. We performed several CT scans of your abdomen and did not find any bleeding We also performed an endoscopy to investigate if you had any bleeding in your abdomen and did not find any. Continue to monitor your bowel movements for blood. You will likely need a colonoscopy with the gastroenterology doctor in the next several months. Continue to follow up with your primary doctor to ensure your hemoglobin is stable.    Diagnosis: Afib  Assessment and Plan of Treatment: Please continue with Metoprolol for rate control. Please note, you are not being d/c on Eliquis due to blood loss anemia and high risk of bleed. Please follow up with your cardiologist in 1 week.

## 2021-04-14 NOTE — DISCHARGE NOTE PROVIDER - NSDCMRMEDTOKEN_GEN_ALL_CORE_FT
furosemide 20 mg oral tablet: 1 tab(s) orally once a day  glimepiride 2 mg oral tablet: 1 tab(s) orally once a day  metoprolol succinate 50 mg oral tablet, extended release: 1 tab(s) orally once a day  Ozempic (1 mg dose) 2 mg/1.5 mL subcutaneous solution:   potassium citrate 10 mEq oral tablet, extended release: 1 tab(s) orally once a day  rosuvastatin 20 mg oral tablet: 1 tab(s) orally once a day   furosemide 20 mg oral tablet: 1 tab(s) orally once a day  furosemide 20 mg oral tablet: 1 tab(s) orally 2 times a day  glimepiride 2 mg oral tablet: 1 tab(s) orally once a day  metoprolol succinate 50 mg oral tablet, extended release: 1 tab(s) orally once a day  Ozempic (1 mg dose) 2 mg/1.5 mL subcutaneous solution:   potassium citrate 10 mEq oral tablet, extended release: 1 tab(s) orally once a day  rosuvastatin 20 mg oral tablet: 1 tab(s) orally once a day   ferrous sulfate 325 mg (65 mg elemental iron) oral tablet: 1 tab(s) orally once a day  furosemide 20 mg oral tablet: 1 tab(s) orally 2 times a day  glimepiride 2 mg oral tablet: 1 tab(s) orally once a day  metoprolol succinate 50 mg oral tablet, extended release: 1 tab(s) orally once a day  Ozempic (1 mg dose) 2 mg/1.5 mL subcutaneous solution:   rosuvastatin 20 mg oral tablet: 1 tab(s) orally once a day

## 2021-04-14 NOTE — DISCHARGE NOTE PROVIDER - PROVIDER TOKENS
PROVIDER:[TOKEN:[[id number]],FOLLOWUP:[2 weeks]],PROVIDER:[TOKEN:[[id number]],FOLLOWUP:[1 month]],PROVIDER:[TOKEN:[[id number]],FOLLOWUP:[2 weeks]],PROVIDER:[TOKEN:[[id number]],FOLLOWUP:[Routine]],PROVIDER:[TOKEN:[[id number]],FOLLOWUP:[Routine]]

## 2021-04-15 LAB
ANION GAP SERPL CALC-SCNC: 13 MMOL/L — SIGNIFICANT CHANGE UP (ref 7–14)
BASOPHILS # BLD AUTO: 0.02 K/UL — SIGNIFICANT CHANGE UP (ref 0–0.2)
BASOPHILS NFR BLD AUTO: 0.4 % — SIGNIFICANT CHANGE UP (ref 0–1)
BUN SERPL-MCNC: 15 MG/DL — SIGNIFICANT CHANGE UP (ref 10–20)
CALCIUM SERPL-MCNC: 8.4 MG/DL — LOW (ref 8.5–10.1)
CHLORIDE SERPL-SCNC: 102 MMOL/L — SIGNIFICANT CHANGE UP (ref 98–110)
CO2 SERPL-SCNC: 20 MMOL/L — SIGNIFICANT CHANGE UP (ref 17–32)
CREAT SERPL-MCNC: 1 MG/DL — SIGNIFICANT CHANGE UP (ref 0.7–1.5)
EOSINOPHIL # BLD AUTO: 0.21 K/UL — SIGNIFICANT CHANGE UP (ref 0–0.7)
EOSINOPHIL NFR BLD AUTO: 4.6 % — SIGNIFICANT CHANGE UP (ref 0–8)
GLUCOSE BLDC GLUCOMTR-MCNC: 122 MG/DL — HIGH (ref 70–99)
GLUCOSE BLDC GLUCOMTR-MCNC: 150 MG/DL — HIGH (ref 70–99)
GLUCOSE BLDC GLUCOMTR-MCNC: 161 MG/DL — HIGH (ref 70–99)
GLUCOSE BLDC GLUCOMTR-MCNC: 96 MG/DL — SIGNIFICANT CHANGE UP (ref 70–99)
GLUCOSE SERPL-MCNC: 151 MG/DL — HIGH (ref 70–99)
HCT VFR BLD CALC: 29 % — LOW (ref 42–52)
HGB BLD-MCNC: 8.9 G/DL — LOW (ref 14–18)
IMM GRANULOCYTES NFR BLD AUTO: 0.7 % — HIGH (ref 0.1–0.3)
LYMPHOCYTES # BLD AUTO: 0.5 K/UL — LOW (ref 1.2–3.4)
LYMPHOCYTES # BLD AUTO: 11 % — LOW (ref 20.5–51.1)
MCHC RBC-ENTMCNC: 28.2 PG — SIGNIFICANT CHANGE UP (ref 27–31)
MCHC RBC-ENTMCNC: 30.7 G/DL — LOW (ref 32–37)
MCV RBC AUTO: 91.8 FL — SIGNIFICANT CHANGE UP (ref 80–94)
MONOCYTES # BLD AUTO: 0.4 K/UL — SIGNIFICANT CHANGE UP (ref 0.1–0.6)
MONOCYTES NFR BLD AUTO: 8.8 % — SIGNIFICANT CHANGE UP (ref 1.7–9.3)
NEUTROPHILS # BLD AUTO: 3.38 K/UL — SIGNIFICANT CHANGE UP (ref 1.4–6.5)
NEUTROPHILS NFR BLD AUTO: 74.5 % — SIGNIFICANT CHANGE UP (ref 42.2–75.2)
NRBC # BLD: 0 /100 WBCS — SIGNIFICANT CHANGE UP (ref 0–0)
PLATELET # BLD AUTO: 113 K/UL — LOW (ref 130–400)
POTASSIUM SERPL-MCNC: 3.6 MMOL/L — SIGNIFICANT CHANGE UP (ref 3.5–5)
POTASSIUM SERPL-SCNC: 3.6 MMOL/L — SIGNIFICANT CHANGE UP (ref 3.5–5)
RBC # BLD: 3.16 M/UL — LOW (ref 4.7–6.1)
RBC # FLD: 16.5 % — HIGH (ref 11.5–14.5)
SODIUM SERPL-SCNC: 135 MMOL/L — SIGNIFICANT CHANGE UP (ref 135–146)
WBC # BLD: 4.54 K/UL — LOW (ref 4.8–10.8)
WBC # FLD AUTO: 4.54 K/UL — LOW (ref 4.8–10.8)

## 2021-04-15 RX ORDER — POTASSIUM CHLORIDE 20 MEQ
40 PACKET (EA) ORAL ONCE
Refills: 0 | Status: COMPLETED | OUTPATIENT
Start: 2021-04-15 | End: 2021-04-15

## 2021-04-15 RX ADMIN — Medication 50 MILLIGRAM(S): at 06:17

## 2021-04-15 RX ADMIN — CHLORHEXIDINE GLUCONATE 1 APPLICATION(S): 213 SOLUTION TOPICAL at 06:17

## 2021-04-15 RX ADMIN — Medication 2: at 17:14

## 2021-04-15 RX ADMIN — INSULIN GLARGINE 21 UNIT(S): 100 INJECTION, SOLUTION SUBCUTANEOUS at 21:35

## 2021-04-15 RX ADMIN — PANTOPRAZOLE SODIUM 40 MILLIGRAM(S): 20 TABLET, DELAYED RELEASE ORAL at 06:18

## 2021-04-15 RX ADMIN — Medication 20 MILLIGRAM(S): at 06:16

## 2021-04-15 RX ADMIN — MUPIROCIN 1 APPLICATION(S): 20 OINTMENT TOPICAL at 17:16

## 2021-04-15 RX ADMIN — Medication 8 UNIT(S): at 11:34

## 2021-04-15 RX ADMIN — Medication 1 MILLIGRAM(S): at 11:42

## 2021-04-15 RX ADMIN — Medication 20 MILLIGRAM(S): at 17:15

## 2021-04-15 RX ADMIN — PREGABALIN 1000 MICROGRAM(S): 225 CAPSULE ORAL at 11:42

## 2021-04-15 RX ADMIN — Medication 40 MILLIEQUIVALENT(S): at 15:01

## 2021-04-15 RX ADMIN — ATORVASTATIN CALCIUM 80 MILLIGRAM(S): 80 TABLET, FILM COATED ORAL at 21:37

## 2021-04-15 RX ADMIN — Medication 5 MILLIGRAM(S): at 21:37

## 2021-04-15 RX ADMIN — Medication 8 UNIT(S): at 08:19

## 2021-04-15 RX ADMIN — SENNA PLUS 2 TABLET(S): 8.6 TABLET ORAL at 21:37

## 2021-04-15 RX ADMIN — MUPIROCIN 1 APPLICATION(S): 20 OINTMENT TOPICAL at 06:17

## 2021-04-15 RX ADMIN — Medication 500 MILLIGRAM(S): at 11:42

## 2021-04-15 RX ADMIN — Medication 8 UNIT(S): at 17:14

## 2021-04-15 NOTE — PROGRESS NOTE ADULT - SUBJECTIVE AND OBJECTIVE BOX
Patient Information:  ROSA MARIA CASEY / 80y / Male / MRN#:[id number]    Hospital Day: 34d    Interval History:  Patient seen and examined at bedside. No acute events overnight. Had positive rapid COVID swab yesterday, however likely false positive. Is sitting up in bed this morning, denies any complaints.    Past Medical History:  Hypertension    Hyperlipidemia    Diabetes mellitus, type 2    History of atrial fibrillation    Bladder cancer    Chronic kidney disease (CKD)      Past Surgical History:  History of total hip replacement, bilateral    History of total knee replacement, bilateral    History of total cystectomy      Allergies:  No Known Allergies    Medications:  PRN:  acetaminophen   Tablet .. 650 milliGRAM(s) Oral every 6 hours PRN Temp greater or equal to 38C (100.4F), Mild Pain (1 - 3)  guaifenesin/dextromethorphan  Syrup 10 milliLiter(s) Oral every 6 hours PRN Cough    Standing:  atorvastatin 80 milliGRAM(s) Oral at bedtime  atropine Injectable 0.5 milliGRAM(s) IntraMuscular once  chlorhexidine 4% Liquid 1 Application(s) Topical <User Schedule>  cyanocobalamin 1000 MICROGram(s) Oral daily  ergocalciferol 82232 Unit(s) Oral <User Schedule>  folic acid 1 milliGRAM(s) Oral daily  furosemide    Tablet 20 milliGRAM(s) Oral two times a day  influenza   Vaccine 0.5 milliLiter(s) IntraMuscular once  insulin glargine Injectable (LANTUS) 21 Unit(s) SubCutaneous at bedtime  insulin lispro (ADMELOG) corrective regimen sliding scale   SubCutaneous three times a day before meals  insulin lispro Injectable (ADMELOG) 8 Unit(s) SubCutaneous three times a day before meals  magnesium gluconate 500 milliGRAM(s) Oral daily  melatonin 5 milliGRAM(s) Oral at bedtime  metoprolol succinate ER 50 milliGRAM(s) Oral daily  mupirocin 2% Nasal 1 Application(s) Nasal two times a day  pantoprazole    Tablet 40 milliGRAM(s) Oral before breakfast  polyethylene glycol 3350 17 Gram(s) Oral daily  senna 2 Tablet(s) Oral at bedtime    Home:  furosemide 20 mg oral tablet: 1 tab(s) orally once a day  furosemide 20 mg oral tablet: 1 tab(s) orally 2 times a day  glimepiride 2 mg oral tablet: 1 tab(s) orally once a day  metoprolol succinate 50 mg oral tablet, extended release: 1 tab(s) orally once a day  Ozempic (1 mg dose) 2 mg/1.5 mL subcutaneous solution:   potassium citrate 10 mEq oral tablet, extended release: 1 tab(s) orally once a day  rosuvastatin 20 mg oral tablet: 1 tab(s) orally once a day    Vitals:  T(C): 36.5, Max: 36.5 (04-15-21 @ 05:00)  T(F): 97.7, Max: 97.7 (04-15-21 @ 05:00)  HR: 92 (57 - 92)  BP: 112/62 (101/60 - 119/58)  RR: 18 (18 - 18)  SpO2: 94% (92% - 96%)    Physical Exam:  General: Awake, alert, NAD, sitting up in bed, on RA  HEENT: Head NC/AT  Heart: RRR; systolic murmur apprec  Lungs: Good air entry bilaterally  Abdomen: Soft, nontender, nondistended, BS+, urostomy bag in place, appears clean, intact  Extremities: Venous stasis skin changes of LE bilaterally  Neuro: AAOx3, NFD    Labs:  CBC (04-14 @ 06:09)                        Hgb: 7.9    WBC: 4.33  )-----------------( Plts: 89                               Hct: 25.8     Chem (04-14 @ 06:09)  Na: 133  |     Cl: 103     |  BUN: 15  -----------------------------------------< Gluc: 96    K: 3.1   |    HCO3: 22  |  Cr: 0.9    Ca 7.7 (04-14 @ 06:09)

## 2021-04-15 NOTE — CHART NOTE - NSCHARTNOTEFT_GEN_A_CORE
I made rounds today with the treatment team including the hospitalist, residents,  nurses, and discussed the patient's current medical status and discharge  planning needs, and reviewed the chart.    T(C): 36.5 (04-15-21 @ 06:17), Max: 36.5 (04-15-21 @ 05:00)  HR: 92 (04-15-21 @ 06:17) (57 - 92)  BP: 112/62 (04-15-21 @ 06:17) (101/60 - 119/58)  RR: 18 (04-15-21 @ 06:17) (18 - 18)  SpO2: 94% (04-15-21 @ 07:40) (92% - 96%)          I reached out to the patient's health care proxy/ responsible family member-           [     ]  I reached                                     and discussed the patient's medical condition,                   family concerns, and discharge planning           [     ]  I left a message with family               [  x   ]  I personally participated in rounds with the medical team and my resident and discussed the case. My resident reached                   family member/ HCP  Rain Gambino                              under my direction and supervision  and we reviewed the conversation.          [     ]  My resident left a message with family under my direction and supervision           [     ]   My resident attended medical rounds and called the family                [   x   ]    The following was discussed:  The patient's medical status over the past 24 hrs was reviewed, as well as oxygen needs and medication changes and labs. comfortable on RA. Awaiting d/c to [facility] but rapid Covid test positive. D/c planning.         [  x    ]   The following concerns were raised: none          [     ] I spent 5-10 minutes on the above discussing medical issues with team members and family and/ or my resident    [  x   ] I spent 11-20 minutes on the above discussing medical issues with team members and family and/ or my resident    [     ] I spent 21-30 minutes on the above discussing medical issues with team members and family and/ or my resident

## 2021-04-15 NOTE — PROGRESS NOTE ADULT - ASSESSMENT
81yo M c PMHx chronic afib on noac, htn, dm2, ckd3 bladder ca s/p urostomy, b/l hip/ knee replacements obesity, here with acute hypoxic respiratory failure 2/2 severe covid 19 viral pneumonia, lorraine on ckd3 now better, elevated ddimer    Sepsis on admission  Covid viral PNA. acute hypoxic RF  underlying [place], [place]  LORRAINE on CKD  Transaminitis  Anemia sp transfusion  EGD 4/9/21, nonerosive gastritis and duodenitis  E coli UTI  R lower ext DVT, sp IVC Filter 4/9  Hx of HTN, ASHD, Afib/Eliquis  DM II, CKD  DLD  Bladder ca, sp urostomy, sp cystoprostatectomy, exposure to 911  OA, DDD, DJD, sp BL hip and knee surgeries, mobility dysfunction  GERD, HH, diverticulosis, bowel resection and reanastomosis      pt doing well  on RA pulse ox 92-95%, encourage spirometry  low H/H but stable 8.9/29  GI:   EGD 4/9 showed nonerosive gastritis and duodenitis and no active bleed  CT of abd/pelvis non contrast  to R/O retroperitoneal hematoma  IR, sp IVC filter for DVT 4/9, off AC  add folic acid, B12 daily  add ergocal 50,000u q wk  low K and Mg replete and monitor  ID, off all Abx  check Covid swab + 4/14    Rehab for PT    social SVC and D/C planning for SNF, Covid accepting SNF if possible

## 2021-04-15 NOTE — PROGRESS NOTE ADULT - ASSESSMENT
1. Acute hypoxemic respiratory failure secondary to COVID-19 pneumonia, s/p Toci and dexamethasone  2. Acute kidney injury, likely pre-renal azotemia vs contrast nephropathy  3. CKD III, baseline creatinine 1.2-1.5.  4. Hx of Bladder Ca, s/p cystoprostatectomy, urostomy  5. Hyperkalemia  6. Hypotension  7. Anemia    Plan:    Daily BMP  Monitor I/O  Continue furosemide 20mg PO BID

## 2021-04-15 NOTE — PROGRESS NOTE ADULT - ASSESSMENT
81 yo M with PMHx of AFib, on Eliquis, HTN, DMII, CKDIII, hx of Bladder Ca s/p Urostomy, bilateral hip/knee replacements, obesity admitted for acute hypoxemic respiratory failure due to COVID PNA.    #Acute hypoxemic Respiratory Failure due to COVID - resolved  - s/p Toci, course of Decadron  - Treated with Levaquin, Cefepime for suspected superimposed bacterial infection  - Was on HFNC in CEU, transitioned to O2 NC 3L, downgraded to 3A and now on RA, saturating 94-96%  - COVID neg 4/12, rapid swab pos 4/14 although likely false pos    #Acute drop in Hb - source unclear  #Acute thrombocytopenia - improving  - Had gradual decrease in Hb from baseline 12-13 to 5.6, had total of 4 U pRBCS throughout hospital stay  - Hb has been stable at ~7-8, s/p 1U 4/12, Hb stable at 7-8  - Maintain active T+S (4/13)  - Had CT Abd x2 - negative for acute retroperitoneal bleed  - Had several episodes of melena, EGD performed 4/9 negative for acute bleeding, revealed nonerosive gastritis, c/w PPI once daily   - Hemolysis workup negative, DIC unlikely cause for dec in Hb, plt ct as pt did not have acute bleeding, PT/PTT within normal limits, fibrinogen borderline low  - HIT panel done for acute thrombocytopenia - negative    #Hx of Paroxysmal AFib on Eliquis  #CHADSVASC Score: 4, HASBLED 2  #HTN  - Has been rate controlled, on Metoprolol Succinate 100 mg once daily at home, was dec to 50 mg once daily due to bradycardia, has HR in 40s-50s at times while at rest, asymptomatic  - Takes Eliquis at home, was receiving Lovenox while here, transitioned to Heparin and developed worsening drop in Hb (5.6); now off AC  - Takes Amlodipine at home, on hold as pt has been normotensive    #Acute R popliteal vein DVT  - LE Duplex on 4/5 revealed acute DVT, initiated on Lovenox, then transitioned to Heparin in case of acute bleeding, then developed acute drop in Hb  - Now off AC as is high risk for acute bleeding  - s/p IVC filter placement for acute RLE DVT on 4/9    #Sepsis of unknown source - resolved  - Hospital course complicated by hypotension, unresponsive to IVF boluses, required low dose Levo, as well as hypothermia  - Blood Cx NGTD, Urine Cx grew gut kike (has urostomy), Sputum Cx grew normal kike  - Procalc, CRP within normal limits, fungitell, galactomannan within normal limits   - Completed week course of Meropenem  - Now improved, off Abx, remains normotensive, afebrile, WBC within normal limits   - Had episodes of tremors, nonresponsiveness with spontaneous resolution, likely due to state of sepsis - now resolved, is AAOx4, no neurological deficits    #Hx of DMII  - HbA1c 8.1%  - Cont to monitor FS  - C/w Lantus to 18 U, Lispro 6 U    #HX of Bladder Ca, s/p cystoprostatectomy with urostomy  #LORRAINE due to LINDSAY - resolved  #CKD III - stable  #Episodic hematuria with blood clots - now resolved  - Nephrology following  - Cr has been stable  - Urine in urostomy bag appears clear, site appears clean and intact    DVT ppx: On hold, high risk for bleeding, no SCDs as pt has DVT  GI ppx: PPI  Diet: DASH, CC  Activity: AAT  Full Code  Dispo: COVID neg 4/12, rapid swab 4/14 positive although likely false positive     81 yo M with PMHx of AFib, on Eliquis, HTN, DMII, CKDIII, hx of Bladder Ca s/p Urostomy, bilateral hip/knee replacements, obesity admitted for acute hypoxemic respiratory failure due to COVID PNA.    #Acute hypoxemic Respiratory Failure due to COVID - resolved  - s/p Toci, course of Decadron  - Treated with Levaquin, Cefepime for suspected superimposed bacterial infection  - Was on HFNC in [facility]U, transitioned to O2 NC 3L, downgraded to 3A and now on RA, saturating 94-96%  - COVID neg 4/12, rapid swab pos 4/14     #Acute drop in Hb - source unclear  #Acute thrombocytopenia - improving  - Had gradual decrease in Hb from baseline 12-13 to 5.6, had total of 4 U pRBCS throughout hospital stay  - Hb has been stable at ~7-8, s/p 1U 4/12, Hb stable at 7-8  - Maintain active T+S (4/13)  - Had CT Abd x2 - negative for acute retroperitoneal bleed  - Had several episodes of melena, EGD performed 4/9 negative for acute bleeding, revealed nonerosive gastritis, c/w PPI once daily   - Hemolysis workup negative, DIC unlikely cause for dec in Hb, plt ct as pt did not have acute bleeding, PT/PTT within normal limits, fibrinogen borderline low  - HIT panel done for acute thrombocytopenia - negative    #Hx of Paroxysmal AFib on Eliquis  #CHADSVASC Score: 4, HASBLED 2  #HTN  - Has been rate controlled, on Metoprolol Succinate 100 mg once daily at home, was dec to 50 mg once daily due to bradycardia, has HR in 40s-50s at times while at rest, asymptomatic  - Takes Eliquis at home, was receiving Lovenox while here, transitioned to Heparin and developed worsening drop in Hb (5.6); now off AC  - Takes Amlodipine at home, on hold as pt has been normotensive    #Acute R popliteal vein DVT  - LE Duplex on 4/5 revealed acute DVT, initiated on Lovenox, then transitioned to Heparin in case of acute bleeding, then developed acute drop in Hb  - Now off AC as is high risk for acute bleeding  - s/p IVC filter placement for acute RLE DVT on 4/9    #Sepsis of unknown source - resolved  - Hospital course complicated by hypotension, unresponsive to IVF boluses, required low dose Levo, as well as hypothermia  - Blood Cx NGTD, Urine Cx grew gut kike (has urostomy), Sputum Cx grew normal kike  - Procalc, CRP within normal limits, fungitell, galactomannan within normal limits   - Completed week course of Meropenem  - Now improved, off Abx, remains normotensive, afebrile, WBC within normal limits   - Had episodes of tremors, nonresponsiveness with spontaneous resolution, likely due to state of sepsis - now resolved, is AAOx4, no neurological deficits    #Hx of DMII  - HbA1c 8.1%  - Cont to monitor FS  - C/w Lantus to 21 U, Lispro 8 U    #HX of Bladder Ca, s/p cystoprostatectomy with urostomy  #LORRAINE due to LINDSAY - resolved  #CKD III - stable  #Episodic hematuria with blood clots - now resolved  - Nephrology following  - Cr has been stable  - Urine in urostomy bag appears clear, site appears clean and intact    DVT ppx: On hold, high risk for bleeding, no SCDs as pt has DVT  GI ppx: PPI  Diet: DASH, CC  Activity: AAT  Full Code  Dispo: COVID neg 4/12, rapid swab 4/14 pos, will likely d/c to COVID SNF     81 yo M with PMHx of AFib, on Eliquis, HTN, DMII, CKDIII, hx of Bladder Ca s/p Urostomy, bilateral hip/knee replacements, obesity admitted for acute hypoxemic respiratory failure due to COVID PNA.    #Acute hypoxemic Respiratory Failure due to COVID - resolved  - s/p Toci, course of Decadron  - Treated with Levaquin, Cefepime for suspected superimposed bacterial infection  - Was on HFNC in CEU, transitioned to O2 NC 3L, downgraded to 3A and now on RA, saturating 94-96%  - COVID neg 4/12, rapid swab pos 4/14, will repeat today    #Acute drop in Hb - source unclear  #Acute thrombocytopenia - improving  - Had gradual decrease in Hb from baseline 12-13 to 5.6, had total of 4 U pRBCS throughout hospital stay  - Hb has been stable at ~7-8, s/p 1U 4/12, Hb stable at 7-8  - Maintain active T+S (4/13)  - Had CT Abd x2 - negative for acute retroperitoneal bleed  - Had several episodes of melena, EGD performed 4/9 negative for acute bleeding, revealed nonerosive gastritis, c/w PPI once daily   - Hemolysis workup negative, DIC unlikely cause for dec in Hb, plt ct as pt did not have acute bleeding, PT/PTT within normal limits, fibrinogen borderline low  - HIT panel done for acute thrombocytopenia - negative    #Hx of Paroxysmal AFib on Eliquis  #CHADSVASC Score: 4, HASBLED 2  #HTN  - Has been rate controlled, on Metoprolol Succinate 100 mg once daily at home, was dec to 50 mg once daily due to bradycardia, has HR in 40s-50s at times while at rest, asymptomatic  - Takes Eliquis at home, was receiving Lovenox while here, transitioned to Heparin and developed worsening drop in Hb (5.6); now off AC  - Takes Amlodipine at home, on hold as pt has been normotensive    #Acute R popliteal vein DVT  - LE Duplex on 4/5 revealed acute DVT, initiated on Lovenox, then transitioned to Heparin in case of acute bleeding, then developed acute drop in Hb  - Now off AC as is high risk for acute bleeding  - s/p IVC filter placement for acute RLE DVT on 4/9    #Sepsis of unknown source - resolved  - Hospital course complicated by hypotension, unresponsive to IVF boluses, required low dose Levo, as well as hypothermia  - Blood Cx NGTD, Urine Cx grew gut kike (has urostomy), Sputum Cx grew normal kike  - Procalc, CRP within normal limits, fungitell, galactomannan within normal limits   - Completed week course of Meropenem  - Now improved, off Abx, remains normotensive, afebrile, WBC within normal limits   - Had episodes of tremors, nonresponsiveness with spontaneous resolution, likely due to state of sepsis - now resolved, is AAOx4, no neurological deficits    #Hx of DMII  - HbA1c 8.1%  - Cont to monitor FS  - C/w Lantus to 21 U, Lispro 8 U    #HX of Bladder Ca, s/p cystoprostatectomy with urostomy  #LORRAINE due to LINDSAY - resolved  #CKD III - stable  #Episodic hematuria with blood clots - now resolved  - Nephrology following  - Cr has been stable  - Urine in urostomy bag appears clear, site appears clean and intact    DVT ppx: On hold, high risk for bleeding, no SCDs as pt has DVT  GI ppx: PPI  Diet: DASH, CC  Activity: AAT  Full Code  Dispo: COVID neg 4/12, rapid swab 4/14 pos, will repeat and likely d/c to SNF [facility]

## 2021-04-15 NOTE — PROGRESS NOTE ADULT - SUBJECTIVE AND OBJECTIVE BOX
ROSA MARIA CASEY  Patient is a 80y old  Male who presents with a chief complaint of acute hypoxic RF due to Covid Viral Syndrome. Underlying Hx of DMII, CKD,  DLD, MO,  JARETH, bladder ca, sp urostomy, Hx of 911 exposure), OA, DDD, DJD, GERD, diverticulosis, abd wall hernias. bowel resection and reanastomosis.  Hospital course:  the pt remained in [facility]U  with HFNC O2 therapy, DEXA, TOCI therapy.  The hospital course was c/by LORRAINE, hematuria in the urostomy bag,  transaminitis, GIB req transfusions, RLE DVT req IVC filter placement and super infections req IV Abx and Caspofungin.  Eventually the renal and liver parameters improved. The pt slowly transitioned to NC O2.  He underwent EGD which showed nonerosive gastritis and duodenitis with no active BGIB.  He had several melanotic stools.      Overnight Events:  pt now on RA, H/H 8.9 renal parameters at base line,  K 3.6, pt on Lasix20 BID,  pt being prepared for SNF, needs rehab and gait reconditioning, Covid swab + , plan for Covid +SNF if family agreeable    Vital Signs:   T:  97.7  HR:  92  BP:  112/62    RR: 18  Pulse ox:  RA  92-94%        PHYSICAL EXAM:  GENERAL: A&O, debilitated but in NAD, on RA 92-94%  Neck:  short,  thick but supple, no JVD, no bruit, no stridor  Lungs:  shallow resp, scattered rhonchi, no wheezing, improving air entry  hEART s1s2 REG, II/vi adriana  Abd:   distended soft and benign, + BS  Urinary:  RLQ urostomy  Ext:  arthritic changes, moves all limbs  Skin:  no rash  Psych stable    LABS:                        8.9  4.5----------( 113           29    135 |  102 | 15  ----------------------------<151  3.6      20    1.0    GFR 64, 58, 72, 64, 35, 26, 30, 35, 64, 72,   Ca    8.8, 7.7      Phos  3.4      Mg     2.0, 2.6, 3.0, 2.8, 2.2, 2.4, 2.0, 1.9, 1.8      MB 63    trop <0.01 x2  ferritin 535, 462, 737, 339  procal 0.26, 0.11, 0.07  CRP 25, 180. <3  LDH  515  fibrinogen 199  DDIMER 4298  COVID AB + 85.60  Covid swab + 4/14  3/30 LA 5.1,  3.7    TPro  5.9,  5.1, 4.1, 4.5/  Alb  3.2,  3.1, 2.7, 2.9  /  TBili  0.9  /  DBili  x   /  AST  45, 88, 88, 32  /  ALT  53, 93, 99, 36  /  AlkPhos  43  -      Urinalysis Basic - ( 12 Mar 2021 16:38 )    Color: Yellow / Appearance: Slightly Turbid / S.020 / pH: x  Gluc: x / Ketone: Negative  / Bili: Negative / Urobili: 3 mg/dL   Blood: x / Protein: 30 mg/dL / Nitrite: Negative   Leuk Esterase: Moderate / RBC: 5 /HPF / WBC 90 /HPF   Sq Epi: x / Non Sq Epi: 0 /HPF / Bacteria: Ma       RADIOLOGY & ADDITIONAL TESTS:  Venous duplex:  negative for DVT  CXR:  BL opacities L>R  CXR:  stable BL opacities    CT angio:  neg for central PE    CT abd/pelvis:  BIbasilar densities, hepatobiliary/spleen/pancreas WNL, mild BL adrenal thickening, no hydro, bl renal cysts, bl layering densities/ sm stones, "milk of ca", post cystoprostatectomy and ilealconduit, , post bowel resection and reanastomosis, ventral abd wall hernias, bl hip replacements    CTH 3/30:  moderate atrophy, chronic microvascular changes,  no evidence of acute pathology    EEG 3/30 gen slowing , no focal epileptiform activity    Venous duplex of lower ext:  + RLE popliteal vein thrombosis    EGD :  nonerosive gastritis and duodenitis, no active bleed

## 2021-04-16 LAB
ANION GAP SERPL CALC-SCNC: 10 MMOL/L — SIGNIFICANT CHANGE UP (ref 7–14)
BASOPHILS # BLD AUTO: 0.03 K/UL — SIGNIFICANT CHANGE UP (ref 0–0.2)
BASOPHILS NFR BLD AUTO: 0.7 % — SIGNIFICANT CHANGE UP (ref 0–1)
BUN SERPL-MCNC: 16 MG/DL — SIGNIFICANT CHANGE UP (ref 10–20)
CALCIUM SERPL-MCNC: 8.1 MG/DL — LOW (ref 8.5–10.1)
CHLORIDE SERPL-SCNC: 99 MMOL/L — SIGNIFICANT CHANGE UP (ref 98–110)
CO2 SERPL-SCNC: 24 MMOL/L — SIGNIFICANT CHANGE UP (ref 17–32)
CREAT SERPL-MCNC: 1 MG/DL — SIGNIFICANT CHANGE UP (ref 0.7–1.5)
EOSINOPHIL # BLD AUTO: 0.2 K/UL — SIGNIFICANT CHANGE UP (ref 0–0.7)
EOSINOPHIL NFR BLD AUTO: 4.9 % — SIGNIFICANT CHANGE UP (ref 0–8)
GLUCOSE BLDC GLUCOMTR-MCNC: 111 MG/DL — HIGH (ref 70–99)
GLUCOSE BLDC GLUCOMTR-MCNC: 133 MG/DL — HIGH (ref 70–99)
GLUCOSE BLDC GLUCOMTR-MCNC: 194 MG/DL — HIGH (ref 70–99)
GLUCOSE BLDC GLUCOMTR-MCNC: 81 MG/DL — SIGNIFICANT CHANGE UP (ref 70–99)
GLUCOSE SERPL-MCNC: 85 MG/DL — SIGNIFICANT CHANGE UP (ref 70–99)
HCT VFR BLD CALC: 25.9 % — LOW (ref 42–52)
HGB BLD-MCNC: 8.1 G/DL — LOW (ref 14–18)
IMM GRANULOCYTES NFR BLD AUTO: 1 % — HIGH (ref 0.1–0.3)
LYMPHOCYTES # BLD AUTO: 0.62 K/UL — LOW (ref 1.2–3.4)
LYMPHOCYTES # BLD AUTO: 15 % — LOW (ref 20.5–51.1)
MCHC RBC-ENTMCNC: 28.5 PG — SIGNIFICANT CHANGE UP (ref 27–31)
MCHC RBC-ENTMCNC: 31.3 G/DL — LOW (ref 32–37)
MCV RBC AUTO: 91.2 FL — SIGNIFICANT CHANGE UP (ref 80–94)
MONOCYTES # BLD AUTO: 0.57 K/UL — SIGNIFICANT CHANGE UP (ref 0.1–0.6)
MONOCYTES NFR BLD AUTO: 13.8 % — HIGH (ref 1.7–9.3)
NEUTROPHILS # BLD AUTO: 2.66 K/UL — SIGNIFICANT CHANGE UP (ref 1.4–6.5)
NEUTROPHILS NFR BLD AUTO: 64.6 % — SIGNIFICANT CHANGE UP (ref 42.2–75.2)
NRBC # BLD: 0 /100 WBCS — SIGNIFICANT CHANGE UP (ref 0–0)
PLATELET # BLD AUTO: 116 K/UL — LOW (ref 130–400)
POTASSIUM SERPL-MCNC: 3.3 MMOL/L — LOW (ref 3.5–5)
POTASSIUM SERPL-SCNC: 3.3 MMOL/L — LOW (ref 3.5–5)
RBC # BLD: 2.84 M/UL — LOW (ref 4.7–6.1)
RBC # FLD: 16.4 % — HIGH (ref 11.5–14.5)
SARS-COV-2 RNA SPEC QL NAA+PROBE: SIGNIFICANT CHANGE UP
SODIUM SERPL-SCNC: 133 MMOL/L — LOW (ref 135–146)
WBC # BLD: 4.12 K/UL — LOW (ref 4.8–10.8)
WBC # FLD AUTO: 4.12 K/UL — LOW (ref 4.8–10.8)

## 2021-04-16 RX ORDER — POTASSIUM CHLORIDE 20 MEQ
20 PACKET (EA) ORAL DAILY
Refills: 0 | Status: DISCONTINUED | OUTPATIENT
Start: 2021-04-16 | End: 2021-04-20

## 2021-04-16 RX ORDER — POTASSIUM CHLORIDE 20 MEQ
40 PACKET (EA) ORAL
Refills: 0 | Status: COMPLETED | OUTPATIENT
Start: 2021-04-16 | End: 2021-04-16

## 2021-04-16 RX ADMIN — Medication 50 MILLIGRAM(S): at 05:33

## 2021-04-16 RX ADMIN — MUPIROCIN 1 APPLICATION(S): 20 OINTMENT TOPICAL at 17:03

## 2021-04-16 RX ADMIN — Medication 8 UNIT(S): at 17:37

## 2021-04-16 RX ADMIN — SENNA PLUS 2 TABLET(S): 8.6 TABLET ORAL at 21:47

## 2021-04-16 RX ADMIN — Medication 40 MILLIEQUIVALENT(S): at 13:03

## 2021-04-16 RX ADMIN — PANTOPRAZOLE SODIUM 40 MILLIGRAM(S): 20 TABLET, DELAYED RELEASE ORAL at 05:34

## 2021-04-16 RX ADMIN — Medication 40 MILLIEQUIVALENT(S): at 11:12

## 2021-04-16 RX ADMIN — PREGABALIN 1000 MICROGRAM(S): 225 CAPSULE ORAL at 11:12

## 2021-04-16 RX ADMIN — Medication 1 MILLIGRAM(S): at 11:12

## 2021-04-16 RX ADMIN — INSULIN GLARGINE 21 UNIT(S): 100 INJECTION, SOLUTION SUBCUTANEOUS at 21:46

## 2021-04-16 RX ADMIN — Medication 20 MILLIGRAM(S): at 05:32

## 2021-04-16 RX ADMIN — Medication 8 UNIT(S): at 11:48

## 2021-04-16 RX ADMIN — Medication 20 MILLIGRAM(S): at 17:03

## 2021-04-16 RX ADMIN — CHLORHEXIDINE GLUCONATE 1 APPLICATION(S): 213 SOLUTION TOPICAL at 05:31

## 2021-04-16 RX ADMIN — MUPIROCIN 1 APPLICATION(S): 20 OINTMENT TOPICAL at 05:34

## 2021-04-16 RX ADMIN — Medication 500 MILLIGRAM(S): at 11:12

## 2021-04-16 RX ADMIN — ATORVASTATIN CALCIUM 80 MILLIGRAM(S): 80 TABLET, FILM COATED ORAL at 21:46

## 2021-04-16 RX ADMIN — Medication 5 MILLIGRAM(S): at 21:46

## 2021-04-16 NOTE — PROGRESS NOTE ADULT - SUBJECTIVE AND OBJECTIVE BOX
ROSA MARIA CASEY  Patient is a 80y old  Male who presents with a chief complaint of acute hypoxic RF due to Covid Viral Syndrome. Underlying Hx of DMII, CKD,  DLD, MO,  AJRETH, bladder ca, sp urostomy, Hx of 911 exposure), OA, DDD, DJD, GERD, diverticulosis, abd wall hernias. bowel resection and reanastomosis.  Hospital course:  the pt remained in CEU  with HFNC O2 therapy, DEXA, TOCI therapy.  The hospital course was c/by LORRAINE, hematuria in the urostomy bag,  transaminitis, GIB req transfusions, RLE DVT req IVC filter placement and super infections req IV Abx and Caspofungin.  Eventually the renal and liver parameters improved. The pt slowly transitioned to NC O2.  He underwent EGD which showed nonerosive gastritis and duodenitis with no active BGIB.  He had several melanotic stools.      Overnight Events:  pt now on RA, H/H 8.1/25.5 renal parameters at base line,  K 3.3 pt on Lasix20 BID, po KCl  pt being prepared for SNF, needs rehab and gait reconditioning, Covid swab - on  and + on , to repeat    Vital Signs:   T:  98.1  HR:  80  BP:  118/58    RR: 19  Pulse ox:  RA  97%        PHYSICAL EXAM:  GENERAL: A&O, debilitated but in NAD, on RA 97%  Neck:  short,  thick but supple, no JVD, no bruit, no stridor  Lungs:  shallow resp, scattered rhonchi, no wheezing, improving air entry  hEART s1s2 REG, II/vi adriana  Abd:   distended soft and benign, + BS  Urinary:  RLQ urostomy  Ext:  arthritic changes, moves all limbs  Skin:  no rash  Psych stable    LABS:                        8.1  4----------( 116           25.5    133 |  99 | 16  ----------------------------<85  3.3     24    1.0    GFR 64, 58, 72, 64, 35, 26, 30, 35, 64, 72,   Ca    8.8, 7.7      Phos  3.4      Mg     2.0, 2.6, 3.0, 2.8, 2.2, 2.4, 2.0, 1.9, 1.8      MB 63    trop <0.01 x2  ferritin 535, 462, 737, 339  procal 0.26, 0.11, 0.07  CRP 25, 180. <3  LDH  515  fibrinogen 199  DDIMER 4298  COVID AB + 85.60  Covid swab + 4/14  3/30 LA 5.1,  3.7    TPro  5.9,  5.1, 4.1, 4.5/  Alb  3.2,  3.1, 2.7, 2.9  /  TBili  0.9  /  DBili  x   /  AST  45, 88, 88, 32  /  ALT  53, 93, 99, 36  /  AlkPhos  43  -      Urinalysis Basic - ( 12 Mar 2021 16:38 )    Color: Yellow / Appearance: Slightly Turbid / S.020 / pH: x  Gluc: x / Ketone: Negative  / Bili: Negative / Urobili: 3 mg/dL   Blood: x / Protein: 30 mg/dL / Nitrite: Negative   Leuk Esterase: Moderate / RBC: 5 /HPF / WBC 90 /HPF   Sq Epi: x / Non Sq Epi: 0 /HPF / Bacteria: Ma       RADIOLOGY & ADDITIONAL TESTS:  Venous duplex:  negative for DVT  CXR:  BL opacities L>R  CXR:  stable BL opacities    CT angio:  neg for central PE    CT abd/pelvis:  BIbasilar densities, hepatobiliary/spleen/pancreas WNL, mild BL adrenal thickening, no hydro, bl renal cysts, bl layering densities/ sm stones, "milk of ca", post cystoprostatectomy and ilealconduit, , post bowel resection and reanastomosis, ventral abd wall hernias, bl hip replacements    CTH 3/30:  moderate atrophy, chronic microvascular changes,  no evidence of acute pathology    EEG 3/30 gen slowing , no focal epileptiform activity    Venous duplex of lower ext:  + RLE popliteal vein thrombosis    EGD :  nonerosive gastritis and duodenitis, no active bleed

## 2021-04-16 NOTE — PROGRESS NOTE ADULT - ASSESSMENT
1. Acute hypoxemic respiratory failure secondary to COVID-19 pneumonia, s/p Toci and dexamethasone  2. Acute kidney injury, likely pre-renal azotemia vs contrast nephropathy  3. CKD III, baseline creatinine 1.2-1.5.  4. Hx of Bladder Ca, s/p cystoprostatectomy, urostomy  5. Hypokalemia  6. Hypotension  7. Anemia    Plan:    Daily BMP  Monitor I/O  Continue furosemide 20mg PO BID  Low salt diet  1L fluid restriction  Replete potassium

## 2021-04-16 NOTE — PROGRESS NOTE ADULT - SUBJECTIVE AND OBJECTIVE BOX
[facility] FOLLOW UP NOTE  --------------------------------------------------------------------------------  24 hour events/subjective: Patient examined. Appears comfortable.    PAST HISTORY  --------------------------------------------------------------------------------  No significant changes to PMH, PSH, FHx, SHx, unless otherwise noted    ALLERGIES & MEDICATIONS  --------------------------------------------------------------------------------  Allergies    No Known Allergies    Intolerances      Standing Inpatient Medications  atorvastatin 80 milliGRAM(s) Oral at bedtime  atropine Injectable 0.5 milliGRAM(s) IntraMuscular once  chlorhexidine 4% Liquid 1 Application(s) Topical <User Schedule>  cyanocobalamin 1000 MICROGram(s) Oral daily  ergocalciferol 74840 Unit(s) Oral <User Schedule>  folic acid 1 milliGRAM(s) Oral daily  furosemide    Tablet 20 milliGRAM(s) Oral two times a day  influenza   Vaccine 0.5 milliLiter(s) IntraMuscular once  insulin glargine Injectable (LANTUS) 21 Unit(s) SubCutaneous at bedtime  insulin lispro (ADMELOG) corrective regimen sliding scale   SubCutaneous three times a day before meals  insulin lispro Injectable (ADMELOG) 8 Unit(s) SubCutaneous three times a day before meals  magnesium gluconate 500 milliGRAM(s) Oral daily  melatonin 5 milliGRAM(s) Oral at bedtime  metoprolol succinate ER 50 milliGRAM(s) Oral daily  mupirocin 2% Nasal 1 Application(s) Nasal two times a day  pantoprazole    Tablet 40 milliGRAM(s) Oral before breakfast  polyethylene glycol 3350 17 Gram(s) Oral daily  potassium chloride   Powder 40 milliEquivalent(s) Oral every 2 hours  senna 2 Tablet(s) Oral at bedtime    PRN Inpatient Medications  acetaminophen   Tablet .. 650 milliGRAM(s) Oral every 6 hours PRN  guaifenesin/dextromethorphan  Syrup 10 milliLiter(s) Oral every 6 hours PRN      VITALS/PHYSICAL EXAM  --------------------------------------------------------------------------------  T(C): 36.4 (04-16-21 @ 05:32), Max: 37 (04-15-21 @ 13:53)  HR: 70 (04-16-21 @ 05:32) (68 - 70)  BP: 117/70 (04-16-21 @ 05:32) (91/52 - 117/70)  RR: 18 (04-16-21 @ 05:32) (18 - 19)  SpO2: 96% (04-16-21 @ 05:32) (94% - 96%)    04-15-21 @ 07:01  -  04-16-21 @ 07:00  --------------------------------------------------------  IN: 0 mL / OUT: 1300 mL / NET: -1300 mL    Physical Exam:  	Gen: NAD  	Pulm: CTA B/L  	CV: RRR, S1S2  	Abd: +BS, soft, nontender/nondistended  	: No suprapubic tenderness  	LE: Warm, edema    LABS/STUDIES  --------------------------------------------------------------------------------              8.1    4.12  >-----------<  116      [04-16-21 @ 07:08]              25.9     133  |  99  |  16  ----------------------------<  85      [04-16-21 @ 07:08]  3.3   |  24  |  1.0        Ca     8.1     [04-16-21 @ 07:08]    Creatinine Trend:  SCr 1.0 [04-16 @ 07:08]  SCr 1.0 [04-15 @ 09:43]  SCr 0.9 [04-14 @ 06:09]  SCr 0.9 [04-13 @ 08:17]  SCr 0.9 [04-12 @ 06:19]    Urinalysis - [03-30-21 @ 04:27]      Color Yellow / Appearance Slightly Turbid / SG 1.017 / pH 6.5      Gluc Negative / Ketone Negative  / Bili Negative / Urobili <2 mg/dL       Blood Large / Protein Trace / Leuk Est Large / Nitrite Negative       / WBC 84 / Hyaline 25 / Gran  / Sq Epi  / Non Sq Epi 0 / Bacteria Negative    Iron 178, TIBC 305, %sat 58      [03-30-21 @ 07:11]  Ferritin 339      [04-02-21 @ 09:04]  Vitamin D (25OH) 23      [04-03-21 @ 08:42]

## 2021-04-16 NOTE — CHART NOTE - NSCHARTNOTEFT_GEN_A_CORE
I made rounds today with the treatment team including the hospitalist, residents,  nurses, and discussed the patient's current medical status and discharge  planning needs, and reviewed the chart.    T(C): 36.2 (04-16-21 @ 12:37), Max: 36.9 (04-15-21 @ 20:10)  HR: 70 (04-16-21 @ 12:37) (69 - 70)  BP: 102/58 (04-16-21 @ 12:37) (91/52 - 117/70)  RR: 18 (04-16-21 @ 12:37) (18 - 19)  SpO2: 96% (04-16-21 @ 12:37) (96% - 96%)          I reached out to the patient's health care proxy/ responsible family member-           [     ]  I reached                                     and discussed the patient's medical condition,                   family concerns, and discharge planning           [     ]  I left a message with family               [  x   ]  I personally participated in rounds with the medical team and my resident and discussed the case. My resident reached                   family member/ HCP          Rain Gambino                      under my direction and supervision  and we reviewed the conversation.          [     ]  My resident left a message with family under my direction and supervision           [     ]   My resident attended medical rounds and called the family                [   x   ]    The following was discussed:  The patient's medical status over the past 24 hrs was reviewed, as well as oxygen needs and medication changes and labs. Stable. Working on d/c planning. Planning to repeat Covid swab.         [x      ]   The following concerns were raised: none        [     ] I spent 5-10 minutes on the above discussing medical issues with team members and family and/ or my resident    [    x ] I spent 11-20 minutes on the above discussing medical issues with team members and family and/ or my resident    [     ] I spent 21-30 minutes on the above discussing medical issues with team members and family and/ or my resident

## 2021-04-16 NOTE — PROGRESS NOTE ADULT - SUBJECTIVE AND OBJECTIVE BOX
Patient Information:  ROSA MARIA CASEY / 80y / Male / MRN#:[id number]    Hospital Day: 35d    Interval History:  Patient seen and examined at bedside. No acute events overnight. Pt is resting in bed, denies any complaints. Pending repeat COVID swab and dispo planning.    Past Medical History:  Hypertension    Hyperlipidemia    Diabetes mellitus, type 2    History of atrial fibrillation    Bladder cancer    Chronic kidney disease (CKD)      Past Surgical History:  History of total hip replacement, bilateral    History of total knee replacement, bilateral    History of total cystectomy      Allergies:  No Known Allergies    Medications:  PRN:  acetaminophen   Tablet .. 650 milliGRAM(s) Oral every 6 hours PRN Temp greater or equal to 38C (100.4F), Mild Pain (1 - 3)  guaifenesin/dextromethorphan  Syrup 10 milliLiter(s) Oral every 6 hours PRN Cough    Standing:  atorvastatin 80 milliGRAM(s) Oral at bedtime  atropine Injectable 0.5 milliGRAM(s) IntraMuscular once  chlorhexidine 4% Liquid 1 Application(s) Topical <User Schedule>  cyanocobalamin 1000 MICROGram(s) Oral daily  ergocalciferol 47010 Unit(s) Oral <User Schedule>  folic acid 1 milliGRAM(s) Oral daily  furosemide    Tablet 20 milliGRAM(s) Oral two times a day  influenza   Vaccine 0.5 milliLiter(s) IntraMuscular once  insulin glargine Injectable (LANTUS) 21 Unit(s) SubCutaneous at bedtime  insulin lispro (ADMELOG) corrective regimen sliding scale   SubCutaneous three times a day before meals  insulin lispro Injectable (ADMELOG) 8 Unit(s) SubCutaneous three times a day before meals  magnesium gluconate 500 milliGRAM(s) Oral daily  melatonin 5 milliGRAM(s) Oral at bedtime  metoprolol succinate ER 50 milliGRAM(s) Oral daily  mupirocin 2% Nasal 1 Application(s) Nasal two times a day  pantoprazole    Tablet 40 milliGRAM(s) Oral before breakfast  polyethylene glycol 3350 17 Gram(s) Oral daily  senna 2 Tablet(s) Oral at bedtime    Home:  furosemide 20 mg oral tablet: 1 tab(s) orally once a day  furosemide 20 mg oral tablet: 1 tab(s) orally 2 times a day  glimepiride 2 mg oral tablet: 1 tab(s) orally once a day  metoprolol succinate 50 mg oral tablet, extended release: 1 tab(s) orally once a day  Ozempic (1 mg dose) 2 mg/1.5 mL subcutaneous solution:   potassium citrate 10 mEq oral tablet, extended release: 1 tab(s) orally once a day  rosuvastatin 20 mg oral tablet: 1 tab(s) orally once a day    Vitals:  T(C): 36.4, Max: 37 (04-15-21 @ 13:53)  T(F): 97.6, Max: 98.6 (04-15-21 @ 13:53)  HR: 70 (68 - 70)  BP: 117/70 (91/52 - 117/70)  RR: 18 (18 - 19)  SpO2: 96% (94% - 96%)    Physical Exam:  General: Awake, alert, NAD, sitting up in bed, on RA  HEENT: Head NC/AT  Heart: RRR; systolic murmur apprec  Lungs: Good air entry bilaterally  Abdomen: Soft, nontender, nondistended, BS+, urostomy bag in place, appears clean, intact  Extremities: Venous stasis skin changes of LE bilaterally, 1+ pitting edema of bilateral lower ext  Neuro: AAOx3, NFD    Labs:  CBC (04-16 @ 07:08)                        Hgb: 8.1    WBC: 4.12  )-----------------( Plts: 116                              Hct: 25.9     Chem (04-16 @ 07:08)  Na: 133  |     Cl: 99     |  BUN: 16  -----------------------------------------< Gluc: 85    K: 3.3   |    HCO3: 24  |  Cr: 1.0    Ca 8.1 (04-16 @ 07:08)

## 2021-04-16 NOTE — PROGRESS NOTE ADULT - ASSESSMENT
79 yo M with PMHx of AFib, on Eliquis, HTN, DMII, CKDIII, hx of Bladder Ca s/p Urostomy, bilateral hip/knee replacements, obesity admitted for acute hypoxemic respiratory failure due to COVID PNA.    #Acute hypoxemic Respiratory Failure due to COVID - resolved  - s/p Toci, course of Decadron  - Treated with Levaquin, Cefepime for suspected superimposed bacterial infection  - Was on HFNC in CEU, transitioned to O2 NC 3L, downgraded to 3A and now on RA, saturating 94-96%  - COVID neg 4/12, rapid swab pos 4/14, will repeat today    #Acute drop in Hb - source unclear  #Acute thrombocytopenia - improving  - Had gradual decrease in Hb from baseline 12-13 to 5.6, had total of 4 U pRBCS throughout hospital stay  - Hb has been stable at ~7-8, s/p 1U 4/12, Hb stable at 7-8  - Maintain active T+S (4/16)  - Had CT Abd x2 - negative for acute retroperitoneal bleed  - Had several episodes of melena, EGD performed 4/9 negative for acute bleeding, revealed nonerosive gastritis, c/w PPI once daily   - Hemolysis workup negative, DIC unlikely cause for dec in Hb, plt ct as pt did not have acute bleeding, PT/PTT within normal limits, fibrinogen borderline low  - HIT panel done for acute thrombocytopenia - negative    #Hx of Paroxysmal AFib on Eliquis  #CHADSVASC Score: 4, HASBLED 2  #HTN  - Has been rate controlled, on Metoprolol Succinate 100 mg once daily at home, was dec to 50 mg once daily due to bradycardia, has HR in 40s-50s at times while at rest, asymptomatic  - Takes Eliquis at home, was receiving Lovenox while here, transitioned to Heparin and developed worsening drop in Hb (5.6); now off AC  - Takes Amlodipine at home, on hold as pt has been normotensive    #Acute R popliteal vein DVT  - LE Duplex on 4/5 revealed acute DVT, initiated on Lovenox, then transitioned to Heparin in case of acute bleeding, then developed acute drop in Hb  - Now off AC as is high risk for acute bleeding  - s/p IVC filter placement for acute RLE DVT on 4/9    #Sepsis of unknown source - resolved  - Hospital course complicated by hypotension, unresponsive to IVF boluses, required low dose Levo, as well as hypothermia  - Blood Cx NGTD, Urine Cx grew gut kike (has urostomy), Sputum Cx grew normal kike  - Procalc, CRP within normal limits, fungitell, galactomannan within normal limits   - Completed week course of Meropenem  - Now improved, off Abx, remains normotensive, afebrile, WBC within normal limits   - Had episodes of tremors, nonresponsiveness with spontaneous resolution, likely due to state of sepsis - now resolved, is AAOx4, no neurological deficits    #Hx of DMII  - HbA1c 8.1%  - Cont to monitor FS  - C/w Lantus to 21 U, Lispro 8 U    #HX of Bladder Ca, s/p cystoprostatectomy with urostomy  #LORRAINE due to LINDSAY - resolved  #CKD III - stable  #Episodic hematuria with blood clots - now resolved  - Nephrology following  - Cr has been stable  - Urine in urostomy bag appears clear, site appears clean and intact  - Initiated on Lasix 20 mg BID to avoid fluid overload    DVT ppx: On hold, high risk for bleeding, no SCDs as pt has DVT  GI ppx: PPI  Diet: DASH, CC  Activity: AAT  Full Code  Dispo: COVID neg 4/12, rapid swab 4/14 pos, will repeat and likely d/c to SNF depending on result   81 yo M with PMHx of AFib, on Eliquis, HTN, DMII, CKDIII, hx of Bladder Ca s/p Urostomy, bilateral hip/knee replacements, obesity admitted for acute hypoxemic respiratory failure due to COVID PNA.    #Acute hypoxemic Respiratory Failure due to COVID - resolved  - s/p Toci, course of Decadron  - Treated with Levaquin, Cefepime for suspected superimposed bacterial infection  - Was on HFNC in CEU, transitioned to O2 NC 3L, downgraded to 3A and now on RA, saturating 94-96%  - COVID neg 4/12, rapid swab pos 4/14    #Acute drop in Hb - source unclear  #Acute thrombocytopenia - improving  - Had gradual decrease in Hb from baseline 12-13 to 5.6, had total of 4 U pRBCS throughout hospital stay  - Hb has been stable at ~7-8, s/p 1U 4/12, Hb stable at 7-8  - Maintain active T+S (4/16)  - Had CT Abd x2 - negative for acute retroperitoneal bleed  - Had several episodes of melena, EGD performed 4/9 negative for acute bleeding, revealed nonerosive gastritis, c/w PPI once daily   - Hemolysis workup negative, DIC unlikely cause for dec in Hb, plt ct as pt did not have acute bleeding, PT/PTT within normal limits, fibrinogen borderline low  - HIT panel done for acute thrombocytopenia - negative    #Hx of Paroxysmal AFib on Eliquis  #CHADSVASC Score: 4, HASBLED 2  #HTN  - Has been rate controlled, on Metoprolol Succinate 100 mg once daily at home, was dec to 50 mg once daily due to bradycardia, has HR in 40s-50s at times while at rest, asymptomatic  - Takes Eliquis at home, was receiving Lovenox while here, transitioned to Heparin and developed worsening drop in Hb (5.6); now off AC  - Takes Amlodipine at home, on hold as pt has been normotensive    #Acute R popliteal vein DVT  - LE Duplex on 4/5 revealed acute DVT, initiated on Lovenox, then transitioned to Heparin in case of acute bleeding, then developed acute drop in Hb  - Now off AC as is high risk for acute bleeding  - s/p IVC filter placement for acute RLE DVT on 4/9    #Sepsis of unknown source - resolved  - Hospital course complicated by hypotension, unresponsive to IVF boluses, required low dose Levo, as well as hypothermia  - Blood Cx NGTD, Urine Cx grew gut kike (has urostomy), Sputum Cx grew normal kike  - Procalc, CRP within normal limits, fungitell, galactomannan within normal limits   - Completed week course of Meropenem  - Now improved, off Abx, remains normotensive, afebrile, WBC within normal limits   - Had episodes of tremors, nonresponsiveness with spontaneous resolution, likely due to state of sepsis - now resolved, is AAOx4, no neurological deficits    #Hx of DMII  - HbA1c 8.1%  - Cont to monitor FS  - C/w Lantus to 21 U, Lispro 8 U    #HX of Bladder Ca, s/p cystoprostatectomy with urostomy  #LORRAINE due to LINDSAY - resolved  #CKD III - stable  #Episodic hematuria with blood clots - now resolved  - Nephrology following  - Cr has been stable  - Urine in urostomy bag appears clear, site appears clean and intact  - Initiated on Lasix 20 mg BID to avoid fluid overload    DVT ppx: On hold, high risk for bleeding, no SCDs as pt has DVT  GI ppx: PPI  Diet: DASH, CC  Activity: AAT  Full Code  Dispo: COVID neg 4/12, rapid swab 4/14 pos, pending auth to COVID SNF

## 2021-04-16 NOTE — PROGRESS NOTE ADULT - ASSESSMENT
81yo M c PMHx chronic afib on noac, htn, dm2, ckd3 bladder ca s/p urostomy, b/l hip/ knee replacements obesity, here with acute hypoxic respiratory failure 2/2 severe covid 19 viral pneumonia, lorraine on ckd3 now better, elevated ddimer    Sepsis on admission  Covid viral PNA. acute hypoxic RF  underlying JARETH, [place]  LORRAINE on CKD  Transaminitis  Anemia sp transfusion  EGD 4/9/21, nonerosive gastritis and duodenitis  E coli UTI  R lower ext DVT, sp IVC Filter 4/9  Hx of HTN, ASHD, Afib/Eliquis  DM II, CKD  DLD  Bladder ca, sp urostomy, sp cystoprostatectomy, exposure to 911  OA, DDD, DJD, sp BL hip and knee surgeries, mobility dysfunction  GERD, HH, diverticulosis, bowel resection and reanastomosis      pt doing well  on RA pulse ox 97%, encourage spirometry  low H/H but stable 8.9/29  GI:   EGD 4/9 showed nonerosive gastritis and duodenitis and no active bleed  CT of abd/pelvis non contrast  to R/O retroperitoneal hematoma  IR, sp IVC filter for DVT 4/9, off AC  add folic acid, B12 daily  add ergocal 50,000u q wk  low K and Mg replete and monitor  ID, off all Abx  check Covid swab + 4/14    Rehab for PT    social SVC and D/C planning for SNF, Covid accepting SNF if possible

## 2021-04-17 LAB
ANION GAP SERPL CALC-SCNC: 13 MMOL/L — SIGNIFICANT CHANGE UP (ref 7–14)
BASOPHILS # BLD AUTO: 0.03 K/UL — SIGNIFICANT CHANGE UP (ref 0–0.2)
BASOPHILS NFR BLD AUTO: 0.7 % — SIGNIFICANT CHANGE UP (ref 0–1)
BUN SERPL-MCNC: 14 MG/DL — SIGNIFICANT CHANGE UP (ref 10–20)
CALCIUM SERPL-MCNC: 8 MG/DL — LOW (ref 8.5–10.1)
CHLORIDE SERPL-SCNC: 104 MMOL/L — SIGNIFICANT CHANGE UP (ref 98–110)
CO2 SERPL-SCNC: 23 MMOL/L — SIGNIFICANT CHANGE UP (ref 17–32)
CREAT SERPL-MCNC: 1 MG/DL — SIGNIFICANT CHANGE UP (ref 0.7–1.5)
EOSINOPHIL # BLD AUTO: 0.23 K/UL — SIGNIFICANT CHANGE UP (ref 0–0.7)
EOSINOPHIL NFR BLD AUTO: 5.4 % — SIGNIFICANT CHANGE UP (ref 0–8)
GLUCOSE BLDC GLUCOMTR-MCNC: 136 MG/DL — HIGH (ref 70–99)
GLUCOSE BLDC GLUCOMTR-MCNC: 162 MG/DL — HIGH (ref 70–99)
GLUCOSE BLDC GLUCOMTR-MCNC: 171 MG/DL — HIGH (ref 70–99)
GLUCOSE BLDC GLUCOMTR-MCNC: 86 MG/DL — SIGNIFICANT CHANGE UP (ref 70–99)
GLUCOSE SERPL-MCNC: 67 MG/DL — LOW (ref 70–99)
HCT VFR BLD CALC: 25.5 % — LOW (ref 42–52)
HGB BLD-MCNC: 7.9 G/DL — LOW (ref 14–18)
IMM GRANULOCYTES NFR BLD AUTO: 0.9 % — HIGH (ref 0.1–0.3)
LYMPHOCYTES # BLD AUTO: 0.65 K/UL — LOW (ref 1.2–3.4)
LYMPHOCYTES # BLD AUTO: 15.4 % — LOW (ref 20.5–51.1)
MCHC RBC-ENTMCNC: 28.2 PG — SIGNIFICANT CHANGE UP (ref 27–31)
MCHC RBC-ENTMCNC: 31 G/DL — LOW (ref 32–37)
MCV RBC AUTO: 91.1 FL — SIGNIFICANT CHANGE UP (ref 80–94)
MONOCYTES # BLD AUTO: 0.58 K/UL — SIGNIFICANT CHANGE UP (ref 0.1–0.6)
MONOCYTES NFR BLD AUTO: 13.7 % — HIGH (ref 1.7–9.3)
NEUTROPHILS # BLD AUTO: 2.7 K/UL — SIGNIFICANT CHANGE UP (ref 1.4–6.5)
NEUTROPHILS NFR BLD AUTO: 63.9 % — SIGNIFICANT CHANGE UP (ref 42.2–75.2)
NRBC # BLD: 0 /100 WBCS — SIGNIFICANT CHANGE UP (ref 0–0)
PLATELET # BLD AUTO: 134 K/UL — SIGNIFICANT CHANGE UP (ref 130–400)
POTASSIUM SERPL-MCNC: 4.2 MMOL/L — SIGNIFICANT CHANGE UP (ref 3.5–5)
POTASSIUM SERPL-SCNC: 4.2 MMOL/L — SIGNIFICANT CHANGE UP (ref 3.5–5)
RBC # BLD: 2.8 M/UL — LOW (ref 4.7–6.1)
RBC # FLD: 16.4 % — HIGH (ref 11.5–14.5)
SODIUM SERPL-SCNC: 140 MMOL/L — SIGNIFICANT CHANGE UP (ref 135–146)
WBC # BLD: 4.23 K/UL — LOW (ref 4.8–10.8)
WBC # FLD AUTO: 4.23 K/UL — LOW (ref 4.8–10.8)

## 2021-04-17 RX ORDER — FERROUS SULFATE 325(65) MG
325 TABLET ORAL DAILY
Refills: 0 | Status: DISCONTINUED | OUTPATIENT
Start: 2021-04-17 | End: 2021-04-20

## 2021-04-17 RX ORDER — INSULIN GLARGINE 100 [IU]/ML
18 INJECTION, SOLUTION SUBCUTANEOUS AT BEDTIME
Refills: 0 | Status: DISCONTINUED | OUTPATIENT
Start: 2021-04-17 | End: 2021-04-20

## 2021-04-17 RX ORDER — INSULIN LISPRO 100/ML
6 VIAL (ML) SUBCUTANEOUS
Refills: 0 | Status: DISCONTINUED | OUTPATIENT
Start: 2021-04-17 | End: 2021-04-20

## 2021-04-17 RX ADMIN — Medication 2: at 17:05

## 2021-04-17 RX ADMIN — MUPIROCIN 1 APPLICATION(S): 20 OINTMENT TOPICAL at 18:09

## 2021-04-17 RX ADMIN — Medication 20 MILLIEQUIVALENT(S): at 17:04

## 2021-04-17 RX ADMIN — Medication 1 MILLIGRAM(S): at 12:07

## 2021-04-17 RX ADMIN — MUPIROCIN 1 APPLICATION(S): 20 OINTMENT TOPICAL at 05:47

## 2021-04-17 RX ADMIN — Medication 20 MILLIGRAM(S): at 05:46

## 2021-04-17 RX ADMIN — Medication 5 MILLIGRAM(S): at 21:39

## 2021-04-17 RX ADMIN — ATORVASTATIN CALCIUM 80 MILLIGRAM(S): 80 TABLET, FILM COATED ORAL at 21:39

## 2021-04-17 RX ADMIN — Medication 50 MILLIGRAM(S): at 05:47

## 2021-04-17 RX ADMIN — Medication 325 MILLIGRAM(S): at 21:39

## 2021-04-17 RX ADMIN — INSULIN GLARGINE 18 UNIT(S): 100 INJECTION, SOLUTION SUBCUTANEOUS at 21:41

## 2021-04-17 RX ADMIN — SENNA PLUS 2 TABLET(S): 8.6 TABLET ORAL at 21:41

## 2021-04-17 RX ADMIN — PANTOPRAZOLE SODIUM 40 MILLIGRAM(S): 20 TABLET, DELAYED RELEASE ORAL at 05:46

## 2021-04-17 RX ADMIN — PREGABALIN 1000 MICROGRAM(S): 225 CAPSULE ORAL at 12:11

## 2021-04-17 RX ADMIN — Medication 20 MILLIEQUIVALENT(S): at 00:49

## 2021-04-17 RX ADMIN — PANTOPRAZOLE SODIUM 40 MILLIGRAM(S): 20 TABLET, DELAYED RELEASE ORAL at 09:34

## 2021-04-17 RX ADMIN — Medication 20 MILLIGRAM(S): at 17:06

## 2021-04-17 RX ADMIN — Medication 6 UNIT(S): at 12:06

## 2021-04-17 RX ADMIN — Medication 6 UNIT(S): at 17:05

## 2021-04-17 RX ADMIN — Medication 500 MILLIGRAM(S): at 12:07

## 2021-04-17 RX ADMIN — Medication 2: at 12:06

## 2021-04-17 RX ADMIN — POLYETHYLENE GLYCOL 3350 17 GRAM(S): 17 POWDER, FOR SOLUTION ORAL at 12:08

## 2021-04-17 RX ADMIN — CHLORHEXIDINE GLUCONATE 1 APPLICATION(S): 213 SOLUTION TOPICAL at 05:46

## 2021-04-17 NOTE — PROGRESS NOTE ADULT - ASSESSMENT
79 yo M with PMHx of AFib, on Eliquis, HTN, DMII, CKDIII, hx of Bladder Ca s/p Urostomy, bilateral hip/knee replacements, obesity admitted for acute hypoxemic respiratory failure due to COVID PNA.    #Acute hypoxemic Respiratory Failure due to COVID - resolved  - s/p Toci, course of Decadron  - Treated with Levaquin, Cefepime for suspected superimposed bacterial infection  - Was on HFNC in CEU, transitioned to O2 NC 3L, downgraded to 3A and now on RA, saturating 94-96%  - COVID neg 4/12, rapid swab pos 4/14, repeat 4/16 negative (positive may have been false positive)  - Was pending auth to COVID SNF, however son requested repeat COVID swab, may be able to d/c to non COVID SNF on SI    #Acute drop in Hb - source unclear  #Acute thrombocytopenia - improving  - Had gradual decrease in Hb from baseline 12-13 to 5.6, had total of 4 U pRBCS throughout hospital stay  - Hb has been stable at ~7-8, s/p 1U 4/12, Hb stable at 7-8  - Maintain active T+S (4/16)  - Had CT Abd x2 - negative for acute retroperitoneal bleed  - Had several episodes of melena, EGD performed 4/9 negative for acute bleeding, revealed nonerosive gastritis, c/w PPI once daily   - Hemolysis workup negative, DIC unlikely cause for dec in Hb, plt ct as pt did not have acute bleeding, PT/PTT within normal limits, fibrinogen borderline low  - HIT panel done for acute thrombocytopenia - negative    #Hx of Paroxysmal AFib on Eliquis  #CHADSVASC Score: 4, HASBLED 2  #HTN  - Has been rate controlled, on Metoprolol Succinate 100 mg once daily at home, was dec to 50 mg once daily due to bradycardia, has HR in 40s-50s at times while at rest, asymptomatic  - Takes Eliquis at home, was receiving Lovenox while here, transitioned to Heparin and developed worsening drop in Hb (5.6); now off AC  - Takes Amlodipine at home, on hold as pt has been normotensive    #Acute R popliteal vein DVT  - LE Duplex on 4/5 revealed acute DVT, initiated on Lovenox, then transitioned to Heparin in case of acute bleeding, then developed acute drop in Hb  - Now off AC as is high risk for acute bleeding  - s/p IVC filter placement for acute RLE DVT on 4/9    #Sepsis of unknown source - resolved  - Hospital course complicated by hypotension, unresponsive to IVF boluses, required low dose Levo, as well as hypothermia  - Blood Cx NGTD, Urine Cx grew gut kike (has urostomy), Sputum Cx grew normal kike  - Procalc, CRP within normal limits, fungitell, galactomannan within normal limits   - Completed week course of Meropenem  - Now improved, off Abx, remains normotensive, afebrile, WBC within normal limits   - Had episodes of tremors, nonresponsiveness with spontaneous resolution, likely due to state of sepsis - now resolved, is AAOx4, no neurological deficits    #Hx of DMII  - HbA1c 8.1%  - Cont to monitor FS  - C/w Lantus to 21 U, Lispro 8 U    #HX of Bladder Ca, s/p cystoprostatectomy with urostomy  #LORRAINE due to LINDSAY - resolved  #CKD III - stable  #Episodic hematuria with blood clots - now resolved  - Nephrology following  - Cr has been stable  - Urine in urostomy bag appears clear, site appears clean and intact  - Initiated on Lasix 20 mg BID to avoid fluid overload    DVT ppx: On hold, high risk for bleeding, no SCDs as pt has DVT  GI ppx: PPI  Diet: DASH, CC  Activity: AAT  Full Code  Dispo: COVID neg 4/12, rapid swab 4/14 pos, repeat 4/16 negative, pending auth to SNF       81 yo M with PMHx of AFib, on Eliquis, HTN, DMII, CKDIII, hx of Bladder Ca s/p Urostomy, bilateral hip/knee replacements, obesity admitted for acute hypoxemic respiratory failure due to COVID PNA.    #Acute hypoxemic Respiratory Failure due to COVID - resolved  - s/p Toci, course of Decadron  - Treated with Levaquin, Cefepime for suspected superimposed bacterial infection  - Was on HFNC in CEU, transitioned to O2 NC 3L, downgraded to 3A and now on RA, saturating 94-96%  - COVID neg 4/12, rapid swab pos 4/14, repeat 4/16 negative (positive may have been false positive)  - Was pending auth to COVID SNF, however son requested repeat COVID swab, may be able to d/c to non COVID SNF on SI    #Acute drop in Hb - source unclear  #Acute thrombocytopenia - improving  - Had gradual decrease in Hb from baseline 12-13 to 5.6, had total of 4 U pRBCS throughout hospital stay  - Hb has been stable at ~7-8, s/p 1U 4/12, Hb stable at 7-8  - Maintain active T+S (4/16)  - Had CT Abd x2 - negative for acute retroperitoneal bleed  - Had several episodes of melena, EGD performed 4/9 negative for acute bleeding, revealed nonerosive gastritis, c/w PPI once daily   - Hemolysis workup negative, DIC unlikely cause for dec in Hb, plt ct as pt did not have acute bleeding, PT/PTT within normal limits, fibrinogen borderline low  - HIT panel done for acute thrombocytopenia - negative    #Hx of Paroxysmal AFib on Eliquis  #CHADSVASC Score: 4, HASBLED 2  #HTN  - Has been rate controlled, on Metoprolol Succinate 100 mg once daily at home, was dec to 50 mg once daily due to bradycardia, has HR in 40s-50s at times while at rest, asymptomatic  - Takes Eliquis at home, was receiving Lovenox while here, transitioned to Heparin and developed worsening drop in Hb (5.6); now off AC  - Takes Amlodipine at home, on hold as pt has been normotensive    #Acute R popliteal vein DVT  - LE Duplex on 4/5 revealed acute DVT, initiated on Lovenox, then transitioned to Heparin in case of acute bleeding, then developed acute drop in Hb  - Now off AC as is high risk for acute bleeding  - s/p IVC filter placement for acute RLE DVT on 4/9    #Sepsis of unknown source - resolved  - Hospital course complicated by hypotension, unresponsive to IVF boluses, required low dose Levo, as well as hypothermia  - Blood Cx NGTD, Urine Cx grew gut kike (has urostomy), Sputum Cx grew normal kike  - Procalc, CRP within normal limits, fungitell, galactomannan within normal limits   - Completed week course of Meropenem  - Now improved, off Abx, remains normotensive, afebrile, WBC within normal limits   - Had episodes of tremors, nonresponsiveness with spontaneous resolution, likely due to state of sepsis - now resolved, is AAOx4, no neurological deficits    #Hx of DMII  - HbA1c 8.1%  - Cont to monitor FS  - C/w Lantus to 18 U, Lispro 6 U    #HX of Bladder Ca, s/p cystoprostatectomy with urostomy  #LORRAINE due to LINDSAY - resolved  #CKD III - stable  #Episodic hematuria with blood clots - now resolved  - Nephrology following  - Cr has been stable  - Urine in urostomy bag appears clear, site appears clean and intact  - Initiated on Lasix 20 mg BID to avoid fluid overload    DVT ppx: On hold, high risk for bleeding, no SCDs as pt has DVT  GI ppx: PPI  Diet: DASH, CC  Activity: AAT  Full Code  Dispo: COVID neg 4/12, rapid swab 4/14 pos, repeat 4/16 negative, pending auth to SNF

## 2021-04-17 NOTE — PROGRESS NOTE ADULT - SUBJECTIVE AND OBJECTIVE BOX
patient seen and examined.  offers no c/o.  oxygenating well on RA  Vital Signs Last 24 Hrs  T(C): 37.3 (17 Apr 2021 14:40), Max: 37.3 (17 Apr 2021 14:40)  T(F): 99.2 (17 Apr 2021 14:40), Max: 99.2 (17 Apr 2021 14:40)  HR: 82 (17 Apr 2021 14:40) (68 - 82)  BP: 101/56 (17 Apr 2021 14:40) (101/56 - 118/55)  RR: 18 (17 Apr 2021 14:40) (18 - 19)  SpO2: 94% (17 Apr 2021 14:40) (94% - 97%)       conj pale, no jaundice  neck supple no JVD  lungs good air entry bilaterally  heart regular rate and rhythm  abd. obese.  bs Pos. soft nontender.  patient has urostomy right mid-abdomen, draining clear urine. no tenderness, redness or drainage at ostomy site  extremities no edema noted no cyanosis.  Labs:                 7.9    4.23  )-----------( 134      ( 17 Apr 2021 06:56 )             25.5   04-17    140  |  104  |  14  ----------------------------<  67<L>  4.2   |  23  |  1.0    Ca    8.0<L>      17 Apr 2021 06:56

## 2021-04-17 NOTE — PROGRESS NOTE ADULT - ASSESSMENT
79yo M c PMHx chronic afib on noac, htn, dm2, ckd3 bladder ca s/p urostomy, b/l hip/ knee replacements obesity, here with acute hypoxic respiratory failure 2/2 severe covid 19 viral pneumonia, lorraine on ckd3 now better, elevated ddimer    Sepsis on admission  Covid viral PNA. acute hypoxic RF  underlying J[place], [place]  LORRAINE on CKD  Transaminitis  Anemia sp transfusion  EGD 4/9/21, nonerosive gastritis and duodenitis  E coli UTI  R lower ext DVT, sp IVC Filter 4/9  Hx of HTN, ASHD, Afib/Eliquis  DM II, CKD  DLD  Bladder ca, sp urostomy, sp cystoprostatectomy, exposure to 911  OA, DDD, DJD, sp BL hip and knee surgeries, mobility dysfunction  GERD, HH, diverticulosis, bowel resection and reanastomosis      pt doing well  on RA pulse ox 94%, encourage spirometry  low H/H but stable 8.9/29  GI:   EGD 4/9 showed nonerosive gastritis and duodenitis and no active bleed  CT of abd/pelvis non contrast  to R/O retroperitoneal hematoma  IR, sp IVC filter for DVT 4/9, off AC  add folic acid, B12 daily, add FESO4  add ergocal 50,000u q wk  monitor electrolytes  ID, off all Abx  check Covid swab    Rehab for PT    social SVC and D/C planning for SNF, Covid accepting SNF if possible

## 2021-04-17 NOTE — PROGRESS NOTE ADULT - SUBJECTIVE AND OBJECTIVE BOX
ROSA MARIA CASEY  Patient is a 80y old  Male who presents with a chief complaint of acute hypoxic RF due to Covid Viral Syndrome. Underlying Hx of DMII, CKD,  DLD, [place],  JARETH, bladder ca, sp urostomy, Hx of 911 exposure), OA, DDD, DJD, GERD, diverticulosis, abd wall hernias. bowel resection and reanastomosis.  Hospital course:  the pt remained in CEU  with HFNC O2 therapy, DEXA, TOCI therapy.  The hospital course was c/by LORRAINE, hematuria in the urostomy bag,  transaminitis, GIB req transfusions, RLE DVT req IVC filter placement and super infections req IV Abx and Caspofungin.  Eventually the renal and liver parameters improved. The pt slowly transitioned to NC O2.  He underwent EGD which showed nonerosive gastritis and duodenitis with no active BGIB.  He had several melanotic stools.      Overnight Events:  pt on RA 94%, , H/H 7.9/25.5 renal parameters at base line,  K 3.3 today 4.2 pt on Lasix 20 BID, po KCl  pt being prepared for SNF, needs rehab and gait reconditioning, Covid swabs needed    Vital Signs:   T:  99.2  HR:  82  BP:  101/56  RR: 18  Pulse ox:  RA  94%        PHYSICAL EXAM:  GENERAL: A&O,  NAD, on RA 94% today  Neck:  short,  thick but supple, no JVD, no bruit, no stridor  Lungs:  shallow resp, scattered rhonchi, no wheezing, improving air entry  hEART s1s2 REG, II/VI  KONSTANTIN  Abd:   distended soft and benign, + BS  Urinary:  RLQ urostomy  Ext:  arthritic changes, moves all limbs  Skin:  no rash  Psych stable    LABS:                        7.9  4----------( 134           25.5    140 |  104 | 14  ----------------------------<67  4.2     23    1.0    GFR 64, 58, 72, 64, 35, 26, 30, 35, 64, 72,   Ca    8.8, 7.7      Phos  3.4      Mg     2.0, 2.6, 3.0, 2.8, 2.2, 2.4, 2.0, 1.9, 1.8      MB 63    trop <0.01 x2  ferritin 535, 462, 737, 339  procal 0.26, 0.11, 0.07  CRP 25, 180. <3  LDH  515  fibrinogen 199  DDIMER 4298  COVID AB + 85.60  Covid swab + 4/14  3/30 LA 5.1,  3.7    TPro  5.9,  5.1, 4.1, 4.5/  Alb  3.2,  3.1, 2.7, 2.9  /  TBili  0.9  /  DBili  x   /  AST  45, 88, 88, 32  /  ALT  53, 93, 99, 36  /  AlkPhos  43  03-14      Urinalysis Basic - ( 12 Mar 2021 16:38 )    Color: Yellow / Appearance: Slightly Turbid / S.020 / pH: x  Gluc: x / Ketone: Negative  / Bili: Negative / Urobili: 3 mg/dL   Blood: x / Protein: 30 mg/dL / Nitrite: Negative   Leuk Esterase: Moderate / RBC: 5 /HPF / WBC 90 /HPF   Sq Epi: x / Non Sq Epi: 0 /HPF / Bacteria: Ma       RADIOLOGY & ADDITIONAL TESTS:  Venous duplex:  negative for DVT  CXR:  BL opacities L>R  CXR:  stable BL opacities    CT angio:  neg for central PE    CT abd/pelvis:  BIbasilar densities, hepatobiliary/spleen/pancreas WNL, mild BL adrenal thickening, no hydro, bl renal cysts, bl layering densities/ sm stones, "milk of ca", post cystoprostatectomy and ilealconduit, , post bowel resection and reanastomosis, ventral abd wall hernias, bl hip replacements    CTH 3/30:  moderate atrophy, chronic microvascular changes,  no evidence of acute pathology    EEG 3/30 gen slowing , no focal epileptiform activity    Venous duplex of lower ext:  + RLE popliteal vein thrombosis    EGD :  nonerosive gastritis and duodenitis, no active bleed

## 2021-04-17 NOTE — PROGRESS NOTE ADULT - SUBJECTIVE AND OBJECTIVE BOX
Patient Information:  ROSA MARIA CASEY / 80y / Male / MRN#:[id number]    Hospital Day: 36d    Interval History:  Patient seen and examined at bedside. No acute events overnight.    Past Medical History:  Hypertension    Hyperlipidemia    Diabetes mellitus, type 2    History of atrial fibrillation    Bladder cancer    Chronic kidney disease (CKD)      Past Surgical History:  History of total hip replacement, bilateral    History of total knee replacement, bilateral    History of total cystectomy      Allergies:  No Known Allergies    Medications:  PRN:  acetaminophen   Tablet .. 650 milliGRAM(s) Oral every 6 hours PRN Temp greater or equal to 38C (100.4F), Mild Pain (1 - 3)  guaifenesin/dextromethorphan  Syrup 10 milliLiter(s) Oral every 6 hours PRN Cough    Standing:  atorvastatin 80 milliGRAM(s) Oral at bedtime  atropine Injectable 0.5 milliGRAM(s) IntraMuscular once  chlorhexidine 4% Liquid 1 Application(s) Topical <User Schedule>  cyanocobalamin 1000 MICROGram(s) Oral daily  ergocalciferol 54268 Unit(s) Oral <User Schedule>  folic acid 1 milliGRAM(s) Oral daily  furosemide    Tablet 20 milliGRAM(s) Oral two times a day  influenza   Vaccine 0.5 milliLiter(s) IntraMuscular once  insulin glargine Injectable (LANTUS) 21 Unit(s) SubCutaneous at bedtime  insulin lispro (ADMELOG) corrective regimen sliding scale   SubCutaneous three times a day before meals  insulin lispro Injectable (ADMELOG) 8 Unit(s) SubCutaneous three times a day before meals  magnesium gluconate 500 milliGRAM(s) Oral daily  melatonin 5 milliGRAM(s) Oral at bedtime  metoprolol succinate ER 50 milliGRAM(s) Oral daily  mupirocin 2% Nasal 1 Application(s) Nasal two times a day  pantoprazole    Tablet 40 milliGRAM(s) Oral before breakfast  polyethylene glycol 3350 17 Gram(s) Oral daily  potassium chloride    Tablet ER 20 milliEquivalent(s) Oral daily  senna 2 Tablet(s) Oral at bedtime    Home:  furosemide 20 mg oral tablet: 1 tab(s) orally once a day  furosemide 20 mg oral tablet: 1 tab(s) orally 2 times a day  glimepiride 2 mg oral tablet: 1 tab(s) orally once a day  metoprolol succinate 50 mg oral tablet, extended release: 1 tab(s) orally once a day  Ozempic (1 mg dose) 2 mg/1.5 mL subcutaneous solution:   potassium citrate 10 mEq oral tablet, extended release: 1 tab(s) orally once a day  rosuvastatin 20 mg oral tablet: 1 tab(s) orally once a day    Vitals:  T(C): 37.1, Max: 37.1 (04-17-21 @ 05:27)  T(F): 98.8, Max: 98.8 (04-17-21 @ 05:27)  HR: 73 (68 - 80)  BP: 112/57 (102/58 - 118/55)  RR: 18 (18 - 19)  SpO2: 95% (95% - 97%)    Physical Exam:  General: Awake, alert, NAD, sitting up in bed, on RA  HEENT: Head NC/AT  Heart: RRR; systolic murmur apprec  Lungs: Good air entry bilaterally  Abdomen: Soft, nontender, nondistended, BS+, urostomy bag in place, appears clean, intact  Extremities: Venous stasis skin changes of LE bilaterally, 1+ pitting edema of bilateral lower ext  Neuro: AAOx3, NFD    Labs:  CBC (04-16 @ 07:08)                        Hgb: 8.1    WBC: 4.12  )-----------------( Plts: 116                              Hct: 25.9     Chem (04-16 @ 07:08)  Na: 133  |     Cl: 99     |  BUN: 16  -----------------------------------------< Gluc: 85    K: 3.3   |    HCO3: 24  |  Cr: 1.0    Ca 8.1 (04-16 @ 07:08)

## 2021-04-17 NOTE — PROGRESS NOTE ADULT - ASSESSMENT
A: Severe COVID 19 pneumonia with acute hypoxemic respiratory failure - patient now oxygenating well on RA  LORRAINE - patient back to baseline with serum creatinine 1.0 mg/dL  hypokalemia - resolved  hypotension -  no BP less than 100 mm Hg last 24 hours  h/o bladder CA      P:  monitor renal function/electrolytes/I and O  continue current regimen including furosemide 20 mg po bid  patient awaiting neg COVID test and transfer to [facility].

## 2021-04-18 LAB
ANION GAP SERPL CALC-SCNC: 14 MMOL/L — SIGNIFICANT CHANGE UP (ref 7–14)
BASOPHILS # BLD AUTO: 0.02 K/UL — SIGNIFICANT CHANGE UP (ref 0–0.2)
BASOPHILS NFR BLD AUTO: 0.5 % — SIGNIFICANT CHANGE UP (ref 0–1)
BUN SERPL-MCNC: 14 MG/DL — SIGNIFICANT CHANGE UP (ref 10–20)
CALCIUM SERPL-MCNC: 8.3 MG/DL — LOW (ref 8.5–10.1)
CHLORIDE SERPL-SCNC: 103 MMOL/L — SIGNIFICANT CHANGE UP (ref 98–110)
CO2 SERPL-SCNC: 21 MMOL/L — SIGNIFICANT CHANGE UP (ref 17–32)
CREAT SERPL-MCNC: 1.2 MG/DL — SIGNIFICANT CHANGE UP (ref 0.7–1.5)
EOSINOPHIL # BLD AUTO: 0.22 K/UL — SIGNIFICANT CHANGE UP (ref 0–0.7)
EOSINOPHIL NFR BLD AUTO: 5.6 % — SIGNIFICANT CHANGE UP (ref 0–8)
GLUCOSE BLDC GLUCOMTR-MCNC: 113 MG/DL — HIGH (ref 70–99)
GLUCOSE BLDC GLUCOMTR-MCNC: 166 MG/DL — HIGH (ref 70–99)
GLUCOSE BLDC GLUCOMTR-MCNC: 183 MG/DL — HIGH (ref 70–99)
GLUCOSE BLDC GLUCOMTR-MCNC: 215 MG/DL — HIGH (ref 70–99)
GLUCOSE SERPL-MCNC: 101 MG/DL — HIGH (ref 70–99)
HCT VFR BLD CALC: 25.5 % — LOW (ref 42–52)
HGB BLD-MCNC: 7.8 G/DL — LOW (ref 14–18)
IMM GRANULOCYTES NFR BLD AUTO: 1 % — HIGH (ref 0.1–0.3)
LYMPHOCYTES # BLD AUTO: 0.49 K/UL — LOW (ref 1.2–3.4)
LYMPHOCYTES # BLD AUTO: 12.5 % — LOW (ref 20.5–51.1)
MCHC RBC-ENTMCNC: 27.7 PG — SIGNIFICANT CHANGE UP (ref 27–31)
MCHC RBC-ENTMCNC: 30.6 G/DL — LOW (ref 32–37)
MCV RBC AUTO: 90.4 FL — SIGNIFICANT CHANGE UP (ref 80–94)
MONOCYTES # BLD AUTO: 0.53 K/UL — SIGNIFICANT CHANGE UP (ref 0.1–0.6)
MONOCYTES NFR BLD AUTO: 13.6 % — HIGH (ref 1.7–9.3)
NEUTROPHILS # BLD AUTO: 2.61 K/UL — SIGNIFICANT CHANGE UP (ref 1.4–6.5)
NEUTROPHILS NFR BLD AUTO: 66.8 % — SIGNIFICANT CHANGE UP (ref 42.2–75.2)
NRBC # BLD: 0 /100 WBCS — SIGNIFICANT CHANGE UP (ref 0–0)
PLATELET # BLD AUTO: 154 K/UL — SIGNIFICANT CHANGE UP (ref 130–400)
POTASSIUM SERPL-MCNC: 3.7 MMOL/L — SIGNIFICANT CHANGE UP (ref 3.5–5)
POTASSIUM SERPL-SCNC: 3.7 MMOL/L — SIGNIFICANT CHANGE UP (ref 3.5–5)
RBC # BLD: 2.82 M/UL — LOW (ref 4.7–6.1)
RBC # FLD: 16.5 % — HIGH (ref 11.5–14.5)
SARS-COV-2 RNA SPEC QL NAA+PROBE: SIGNIFICANT CHANGE UP
SODIUM SERPL-SCNC: 138 MMOL/L — SIGNIFICANT CHANGE UP (ref 135–146)
WBC # BLD: 3.91 K/UL — LOW (ref 4.8–10.8)
WBC # FLD AUTO: 3.91 K/UL — LOW (ref 4.8–10.8)

## 2021-04-18 RX ADMIN — Medication 20 MILLIGRAM(S): at 17:15

## 2021-04-18 RX ADMIN — ERGOCALCIFEROL 50000 UNIT(S): 1.25 CAPSULE ORAL at 12:09

## 2021-04-18 RX ADMIN — Medication 325 MILLIGRAM(S): at 12:09

## 2021-04-18 RX ADMIN — Medication 50 MILLIGRAM(S): at 12:08

## 2021-04-18 RX ADMIN — MUPIROCIN 1 APPLICATION(S): 20 OINTMENT TOPICAL at 17:32

## 2021-04-18 RX ADMIN — MUPIROCIN 1 APPLICATION(S): 20 OINTMENT TOPICAL at 05:20

## 2021-04-18 RX ADMIN — Medication 5 MILLIGRAM(S): at 22:32

## 2021-04-18 RX ADMIN — Medication 2: at 12:03

## 2021-04-18 RX ADMIN — Medication 20 MILLIGRAM(S): at 05:19

## 2021-04-18 RX ADMIN — Medication 6 UNIT(S): at 08:06

## 2021-04-18 RX ADMIN — Medication 20 MILLIEQUIVALENT(S): at 12:11

## 2021-04-18 RX ADMIN — Medication 4: at 17:13

## 2021-04-18 RX ADMIN — SENNA PLUS 2 TABLET(S): 8.6 TABLET ORAL at 22:33

## 2021-04-18 RX ADMIN — PREGABALIN 1000 MICROGRAM(S): 225 CAPSULE ORAL at 12:10

## 2021-04-18 RX ADMIN — Medication 500 MILLIGRAM(S): at 12:10

## 2021-04-18 RX ADMIN — Medication 1 MILLIGRAM(S): at 12:10

## 2021-04-18 RX ADMIN — CHLORHEXIDINE GLUCONATE 1 APPLICATION(S): 213 SOLUTION TOPICAL at 05:19

## 2021-04-18 RX ADMIN — Medication 6 UNIT(S): at 12:03

## 2021-04-18 RX ADMIN — POLYETHYLENE GLYCOL 3350 17 GRAM(S): 17 POWDER, FOR SOLUTION ORAL at 12:11

## 2021-04-18 RX ADMIN — INSULIN GLARGINE 18 UNIT(S): 100 INJECTION, SOLUTION SUBCUTANEOUS at 22:32

## 2021-04-18 RX ADMIN — Medication 6 UNIT(S): at 17:12

## 2021-04-18 RX ADMIN — ATORVASTATIN CALCIUM 80 MILLIGRAM(S): 80 TABLET, FILM COATED ORAL at 22:33

## 2021-04-18 NOTE — PROGRESS NOTE ADULT - ASSESSMENT
79yo M c PMHx chronic afib on noac, htn, dm2, ckd3 bladder ca s/p urostomy, b/l hip/ knee replacements obesity, here with acute hypoxic respiratory failure 2/2 severe covid 19 viral pneumonia, lorraine on ckd3 now better, elevated ddimer    Sepsis on admission  Covid viral PNA. acute hypoxic RF  underlying JARETH, MO  LORRAINE on CKD  Transaminitis  Anemia sp transfusion  EGD 4/9/21, nonerosive gastritis and duodenitis  E coli UTI  R lower ext DVT, sp IVC Filter 4/9  Hx of HTN, ASHD, Afib/Eliquis  DM II, CKD  DLD  Bladder ca, sp urostomy, sp cystoprostatectomy, exposure to 911  OA, DDD, DJD, sp BL hip and knee surgeries, mobility dysfunction  GERD, HH, diverticulosis, bowel resection and reanastomosis      pt doing well  on RA pulse ox 94%, encourage spirometry  low H/H but stable 7.8.9/25.5  GI:   EGD 4/9 showed nonerosive gastritis and duodenitis and no active bleed, colonoscopy as out pt when stable  CT of abd/pelvis non contrast  to R/O retroperitoneal hematoma  IR, sp IVC filter for DVT 4/9, off AC  add folic acid, B12 daily, add FESO4  add ergocal 50,000u q wk  monitor electrolytes  ID, off all Abx  check Covid swab    Rehab for PT    social SVC and D/C planning for SNF, Covid to be repeated

## 2021-04-18 NOTE — PROGRESS NOTE ADULT - SUBJECTIVE AND OBJECTIVE BOX
ROSA MARIA CASEY  Patient is a 80y old  Male who presents with a chief complaint of acute hypoxic RF due to Covid Viral Syndrome. Underlying Hx of DMII, CKD,  DLD, MO,  JARETH, bladder ca, sp urostomy, Hx of 911 exposure), OA, DDD, DJD, GERD, diverticulosis, abd wall hernias. bowel resection and reanastomosis.  Hospital course:  the pt remained in CEU  with HFNC O2 therapy, DEXA, TOCI therapy.  The hospital course was c/by LORRAINE, hematuria in the urostomy bag,  transaminitis, GIB req transfusions, RLE DVT req IVC filter placement and super infections req IV Abx and Caspofungin.  Eventually the renal and liver parameters improved. The pt slowly transitioned to NC O2.  He underwent EGD which showed nonerosive gastritis and duodenitis with no active BGIB.  He had several melanotic stools.      Overnight Events:  pt on RA 94%, , H/H 7.8/25.5 renal parameters at base line,  K 3.7 pt on Lasix 20 BID, po KCl,  pt being prepared for SNF, needs rehab and gait reconditioning, Covid swab repeat    Vital Signs:   T:  97.5  HR:  71  BP:  108/61  RR: 18  Pulse ox:  RA  94%        PHYSICAL EXAM:  GENERAL: A&O,  NAD, on RA 94% today  Neck:  short,  thick but supple, no JVD, no bruit, no stridor  Lungs:  shallow resp, scattered rhonchi, no wheezing, improving air entry  hEART s1s2 REG, II/VI  KONSTANTIN  Abd:   distended soft and benign, + BS  Urinary:  RLQ urostomy  Ext:  arthritic changes, moves all limbs  Skin:  no rash  Psych stable    LABS:                        7.8  3.9----------( 154           25.5    138 |  103 | 14  ----------------------------<101  3.7     21    1.2    GFR 64, 58, 72, 64, 35, 26, 30, 35, 64, 72, 58   Ca    8.8, 7.7      Phos  3.4      Mg     2.0, 2.6, 3.0, 2.8, 2.2, 2.4, 2.0, 1.9, 1.8      MB 63    trop <0.01 x2  ferritin 535, 462, 737, 339  procal 0.26, 0.11, 0.07  CRP 25, 180. <3  LDH  515  fibrinogen 199  DDIMER 4298  COVID AB + 85.60  Covid swab + 4/14  3/30 LA 5.1,  3.7    TPro  5.9,  5.1, 4.1, 4.5/  Alb  3.2,  3.1, 2.7, 2.9  /  TBili  0.9  /  DBili  x   /  AST  45, 88, 88, 32  /  ALT  53, 93, 99, 36  /  AlkPhos  43  03-14      Urinalysis Basic - ( 12 Mar 2021 16:38 )    Color: Yellow / Appearance: Slightly Turbid / S.020 / pH: x  Gluc: x / Ketone: Negative  / Bili: Negative / Urobili: 3 mg/dL   Blood: x / Protein: 30 mg/dL / Nitrite: Negative   Leuk Esterase: Moderate / RBC: 5 /HPF / WBC 90 /HPF   Sq Epi: x / Non Sq Epi: 0 /HPF / Bacteria: Ma       RADIOLOGY & ADDITIONAL TESTS:  Venous duplex:  negative for DVT  CXR:  BL opacities L>R  CXR:  stable BL opacities    CT angio:  neg for central PE    CT abd/pelvis:  BIbasilar densities, hepatobiliary/spleen/pancreas WNL, mild BL adrenal thickening, no hydro, bl renal cysts, bl layering densities/ sm stones, "milk of ca", post cystoprostatectomy and ilealconduit, , post bowel resection and reanastomosis, ventral abd wall hernias, bl hip replacements    CTH 3/30:  moderate atrophy, chronic microvascular changes,  no evidence of acute pathology    EEG 3/30 gen slowing , no focal epileptiform activity    Venous duplex of lower ext:  + RLE popliteal vein thrombosis    EGD :  nonerosive gastritis and duodenitis, no active bleed

## 2021-04-19 LAB
ANION GAP SERPL CALC-SCNC: 15 MMOL/L — HIGH (ref 7–14)
BASOPHILS # BLD AUTO: 0.03 K/UL — SIGNIFICANT CHANGE UP (ref 0–0.2)
BASOPHILS NFR BLD AUTO: 0.6 % — SIGNIFICANT CHANGE UP (ref 0–1)
BUN SERPL-MCNC: 15 MG/DL — SIGNIFICANT CHANGE UP (ref 10–20)
CALCIUM SERPL-MCNC: 8.4 MG/DL — LOW (ref 8.5–10.1)
CHLORIDE SERPL-SCNC: 102 MMOL/L — SIGNIFICANT CHANGE UP (ref 98–110)
CO2 SERPL-SCNC: 21 MMOL/L — SIGNIFICANT CHANGE UP (ref 17–32)
CREAT SERPL-MCNC: 1.2 MG/DL — SIGNIFICANT CHANGE UP (ref 0.7–1.5)
EOSINOPHIL # BLD AUTO: 0.29 K/UL — SIGNIFICANT CHANGE UP (ref 0–0.7)
EOSINOPHIL NFR BLD AUTO: 6.3 % — SIGNIFICANT CHANGE UP (ref 0–8)
GLUCOSE BLDC GLUCOMTR-MCNC: 110 MG/DL — HIGH (ref 70–99)
GLUCOSE BLDC GLUCOMTR-MCNC: 139 MG/DL — HIGH (ref 70–99)
GLUCOSE BLDC GLUCOMTR-MCNC: 166 MG/DL — HIGH (ref 70–99)
GLUCOSE BLDC GLUCOMTR-MCNC: 199 MG/DL — HIGH (ref 70–99)
GLUCOSE SERPL-MCNC: 138 MG/DL — HIGH (ref 70–99)
HCT VFR BLD CALC: 26.5 % — LOW (ref 42–52)
HGB BLD-MCNC: 8.2 G/DL — LOW (ref 14–18)
IMM GRANULOCYTES NFR BLD AUTO: 1.1 % — HIGH (ref 0.1–0.3)
LYMPHOCYTES # BLD AUTO: 0.6 K/UL — LOW (ref 1.2–3.4)
LYMPHOCYTES # BLD AUTO: 13 % — LOW (ref 20.5–51.1)
MCHC RBC-ENTMCNC: 28 PG — SIGNIFICANT CHANGE UP (ref 27–31)
MCHC RBC-ENTMCNC: 30.9 G/DL — LOW (ref 32–37)
MCV RBC AUTO: 90.4 FL — SIGNIFICANT CHANGE UP (ref 80–94)
MONOCYTES # BLD AUTO: 0.59 K/UL — SIGNIFICANT CHANGE UP (ref 0.1–0.6)
MONOCYTES NFR BLD AUTO: 12.7 % — HIGH (ref 1.7–9.3)
NEUTROPHILS # BLD AUTO: 3.07 K/UL — SIGNIFICANT CHANGE UP (ref 1.4–6.5)
NEUTROPHILS NFR BLD AUTO: 66.3 % — SIGNIFICANT CHANGE UP (ref 42.2–75.2)
NRBC # BLD: 0 /100 WBCS — SIGNIFICANT CHANGE UP (ref 0–0)
PLATELET # BLD AUTO: 178 K/UL — SIGNIFICANT CHANGE UP (ref 130–400)
POTASSIUM SERPL-MCNC: 3.8 MMOL/L — SIGNIFICANT CHANGE UP (ref 3.5–5)
POTASSIUM SERPL-SCNC: 3.8 MMOL/L — SIGNIFICANT CHANGE UP (ref 3.5–5)
RBC # BLD: 2.93 M/UL — LOW (ref 4.7–6.1)
RBC # FLD: 16.7 % — HIGH (ref 11.5–14.5)
SODIUM SERPL-SCNC: 138 MMOL/L — SIGNIFICANT CHANGE UP (ref 135–146)
WBC # BLD: 4.63 K/UL — LOW (ref 4.8–10.8)
WBC # FLD AUTO: 4.63 K/UL — LOW (ref 4.8–10.8)

## 2021-04-19 RX ADMIN — INSULIN GLARGINE 18 UNIT(S): 100 INJECTION, SOLUTION SUBCUTANEOUS at 22:23

## 2021-04-19 RX ADMIN — Medication 2: at 12:21

## 2021-04-19 RX ADMIN — PANTOPRAZOLE SODIUM 40 MILLIGRAM(S): 20 TABLET, DELAYED RELEASE ORAL at 05:21

## 2021-04-19 RX ADMIN — Medication 20 MILLIEQUIVALENT(S): at 11:19

## 2021-04-19 RX ADMIN — Medication 6 UNIT(S): at 12:20

## 2021-04-19 RX ADMIN — MUPIROCIN 1 APPLICATION(S): 20 OINTMENT TOPICAL at 17:29

## 2021-04-19 RX ADMIN — Medication 6 UNIT(S): at 17:27

## 2021-04-19 RX ADMIN — Medication 1 MILLIGRAM(S): at 11:19

## 2021-04-19 RX ADMIN — Medication 2: at 17:27

## 2021-04-19 RX ADMIN — Medication 20 MILLIGRAM(S): at 05:21

## 2021-04-19 RX ADMIN — Medication 500 MILLIGRAM(S): at 11:25

## 2021-04-19 RX ADMIN — SENNA PLUS 2 TABLET(S): 8.6 TABLET ORAL at 22:23

## 2021-04-19 RX ADMIN — ATORVASTATIN CALCIUM 80 MILLIGRAM(S): 80 TABLET, FILM COATED ORAL at 22:23

## 2021-04-19 RX ADMIN — Medication 6 UNIT(S): at 08:22

## 2021-04-19 RX ADMIN — POLYETHYLENE GLYCOL 3350 17 GRAM(S): 17 POWDER, FOR SOLUTION ORAL at 11:19

## 2021-04-19 RX ADMIN — Medication 50 MILLIGRAM(S): at 05:22

## 2021-04-19 RX ADMIN — Medication 5 MILLIGRAM(S): at 22:23

## 2021-04-19 RX ADMIN — Medication 325 MILLIGRAM(S): at 11:19

## 2021-04-19 RX ADMIN — MUPIROCIN 1 APPLICATION(S): 20 OINTMENT TOPICAL at 05:23

## 2021-04-19 RX ADMIN — PREGABALIN 1000 MICROGRAM(S): 225 CAPSULE ORAL at 11:20

## 2021-04-19 RX ADMIN — Medication 20 MILLIGRAM(S): at 17:28

## 2021-04-19 RX ADMIN — CHLORHEXIDINE GLUCONATE 1 APPLICATION(S): 213 SOLUTION TOPICAL at 05:22

## 2021-04-19 NOTE — PROGRESS NOTE ADULT - ASSESSMENT
81yo M c PMHx chronic afib on noac, htn, dm2, ckd3 bladder ca s/p urostomy, b/l hip/ knee replacements obesity, here with acute hypoxic respiratory failure 2/2 severe covid 19 viral pneumonia, lorraine on ckd3 now better, elevated ddimer    Sepsis on admission  Covid viral PNA. acute hypoxic RF  underlying JARETH, MO  LORRAINE on CKD  Transaminitis  Anemia sp transfusion  EGD 4/9/21, nonerosive gastritis and duodenitis  E coli UTI  R lower ext DVT, sp IVC Filter 4/9  Hx of HTN, ASHD, Afib/Eliquis  DM II, CKD  DLD  Bladder ca, sp urostomy, sp cystoprostatectomy, exposure to 911  OA, DDD, DJD, sp BL hip and knee surgeries, mobility dysfunction  GERD, HH, diverticulosis, bowel resection and reanastomosis      pt doing well  on RA pulse ox 93-94%, encourage spirometry  low H/H but stable 8.2/26.5  GI:   EGD 4/9 showed nonerosive gastritis and duodenitis and no active bleed, colonoscopy as out pt when stable  CT of abd/pelvis non contrast  to R/O retroperitoneal hematoma  IR, sp IVC filter for DVT 4/9, off AC  add folic acid, B12 daily, add FESO4  add ergocal 50,000u q wk  monitor electrolytes  ID, off all Abx  check Covid swabs:  negative    Rehab for PT    social SVC and D/C planning for SNF, repeat Covid negative, plan for EGER SNF

## 2021-04-19 NOTE — PROGRESS NOTE ADULT - ASSESSMENT
1. Acute hypoxemic respiratory failure secondary to COVID-19 pneumonia, s/p Toci and dexamethasone  2. Acute kidney injury, likely pre-renal azotemia vs contrast nephropathy  3. CKD III, baseline creatinine 1.2-1.5.  4. Hx of Bladder Ca, s/p cystoprostatectomy, urostomy  5. Hypokalemia  6. Hypotension  7. Anemia    Plan:    Daily BMP  Monitor I/O  Continue furosemide 20mg PO BID  Low salt diet  1L fluid restriction

## 2021-04-19 NOTE — PROGRESS NOTE ADULT - SUBJECTIVE AND OBJECTIVE BOX
ROSA MARIA CASEY  Patient is a 80y old  Male who presents with a chief complaint of acute hypoxic RF due to Covid Viral Syndrome. Underlying Hx of DMII, CKD,  DLD, MO,  JARETH, bladder ca, sp urostomy, Hx of 911 exposure), OA, DDD, DJD, GERD, diverticulosis, abd wall hernias. bowel resection and reanastomosis.  Hospital course:  the pt remained in CEU  with HFNC O2 therapy, DEXA, TOCI therapy.  The hospital course was c/by LORRAINE, hematuria in the urostomy bag,  transaminitis, GIB req transfusions, RLE DVT req IVC filter placement and super infections req IV Abx and Caspofungin.  Eventually the renal and liver parameters improved. The pt slowly transitioned to NC O2.  He underwent EGD which showed nonerosive gastritis and duodenitis with no active BGIB.  He had several melanotic stools.      Overnight Events:  pt on RA 94%, , H/H 8.2/25.5 renal parameters at base line,  K 3.8 pt on Lasix 20 BID, po KCl,  pt being prepared for SNF, needs rehab and gait reconditioning, Covid  repeat swabs are negative, plan for EGER    Vital Signs:   T:  97.7  HR:  72  BP:  112/56  RR: 18  Pulse ox:  RA  93- 94%        PHYSICAL EXAM:  GENERAL: A&O,  NAD, on RA 93- 94% today  Neck:  short,  thick but supple, no JVD, no bruit, no stridor  Lungs:  shallow resp, scattered rhonchi, no wheezing, improving air entry  hEART s1s2 REG, II/VI  KONSTANTIN  Abd:   distended soft and benign, + BS  Urinary:  RLQ urostomy  Ext:  arthritic changes, moves all limbs  Skin:  no rash  Psych stable    LABS:                        8.2  4.6----------( 178           26.5    138 |  102 | 15  ----------------------------<138  3.8     21    1.2    GFR 64, 58, 72, 64, 35, 26, 30, 35, 64, 72, 58   Ca    8.4   Phos  3.4      Mg     2.0, 2.6, 3.0, 2.8, 2.2, 2.4, 2.0, 1.9, 1.8      MB 63    trop <0.01 x2  ferritin 535, 462, 737, 339  procal 0.26, 0.11, 0.07  CRP 25, 180. <3  LDH  515  fibrinogen 199  DDIMER 4298  COVID AB + 85.60  Covid swab + 4/14  3/30 LA 5.1,  3.7    TPro  5.9,  5.1, 4.1, 4.5/  Alb  3.2,  3.1, 2.7, 2.9  /  TBili  0.9  /  DBili  x   /  AST  45, 88, 88, 32  /  ALT  53, 93, 99, 36  /  AlkPhos  43  -14      Urinalysis Basic - ( 12 Mar 2021 16:38 )    Color: Yellow / Appearance: Slightly Turbid / S.020 / pH: x  Gluc: x / Ketone: Negative  / Bili: Negative / Urobili: 3 mg/dL   Blood: x / Protein: 30 mg/dL / Nitrite: Negative   Leuk Esterase: Moderate / RBC: 5 /HPF / WBC 90 /HPF   Sq Epi: x / Non Sq Epi: 0 /HPF / Bacteria: Ma       RADIOLOGY & ADDITIONAL TESTS:  Venous duplex:  negative for DVT  CXR:  BL opacities L>R  CXR:  stable BL opacities    CT angio:  neg for central PE    CT abd/pelvis:  BIbasilar densities, hepatobiliary/spleen/pancreas WNL, mild BL adrenal thickening, no hydro, bl renal cysts, bl layering densities/ sm stones, "milk of ca", post cystoprostatectomy and ilealconduit, , post bowel resection and reanastomosis, ventral abd wall hernias, bl hip replacements    CTH 3/30:  moderate atrophy, chronic microvascular changes,  no evidence of acute pathology    EEG 3/30 gen slowing , no focal epileptiform activity    Venous duplex of lower ext:  + RLE popliteal vein thrombosis    EGD :  nonerosive gastritis and duodenitis, no active bleed

## 2021-04-19 NOTE — PROGRESS NOTE ADULT - SUBJECTIVE AND OBJECTIVE BOX
[facility] FOLLOW UP NOTE  --------------------------------------------------------------------------------  24 hour events/subjective: Patient examined. Appears comfortable.    PAST HISTORY  --------------------------------------------------------------------------------  No significant changes to PMH, PSH, FHx, SHx, unless otherwise noted    ALLERGIES & MEDICATIONS  --------------------------------------------------------------------------------  Allergies    No Known Allergies    Standing Inpatient Medications  atorvastatin 80 milliGRAM(s) Oral at bedtime  atropine Injectable 0.5 milliGRAM(s) IntraMuscular once  chlorhexidine 4% Liquid 1 Application(s) Topical <User Schedule>  cyanocobalamin 1000 MICROGram(s) Oral daily  ergocalciferol 50087 Unit(s) Oral <User Schedule>  ferrous    sulfate 325 milliGRAM(s) Oral daily  folic acid 1 milliGRAM(s) Oral daily  furosemide    Tablet 20 milliGRAM(s) Oral two times a day  influenza   Vaccine 0.5 milliLiter(s) IntraMuscular once  insulin glargine Injectable (LANTUS) 18 Unit(s) SubCutaneous at bedtime  insulin lispro (ADMELOG) corrective regimen sliding scale   SubCutaneous three times a day before meals  insulin lispro Injectable (ADMELOG) 6 Unit(s) SubCutaneous three times a day before meals  magnesium gluconate 500 milliGRAM(s) Oral daily  melatonin 5 milliGRAM(s) Oral at bedtime  metoprolol succinate ER 50 milliGRAM(s) Oral daily  mupirocin 2% Nasal 1 Application(s) Nasal two times a day  pantoprazole    Tablet 40 milliGRAM(s) Oral before breakfast  polyethylene glycol 3350 17 Gram(s) Oral daily  potassium chloride    Tablet ER 20 milliEquivalent(s) Oral daily  senna 2 Tablet(s) Oral at bedtime    PRN Inpatient Medications  acetaminophen   Tablet .. 650 milliGRAM(s) Oral every 6 hours PRN  guaifenesin/dextromethorphan  Syrup 10 milliLiter(s) Oral every 6 hours PRN      VITALS/PHYSICAL EXAM  --------------------------------------------------------------------------------  T(C): 36.8 (04-19-21 @ 05:32), Max: 36.8 (04-19-21 @ 05:32)  HR: 73 (04-19-21 @ 05:32) (73 - 79)  BP: 113/57 (04-19-21 @ 05:32) (104/59 - 113/57)  RR: 18 (04-19-21 @ 05:32) (18 - 18)  SpO2: 94% (04-19-21 @ 05:32) (93% - 94%)    04-18-21 @ 07:01  -  04-19-21 @ 07:00  --------------------------------------------------------  IN: 0 mL / OUT: 1400 mL / NET: -1400 mL    Physical Exam:  	Gen: NAD  	Pulm: CTA B/L  	CV: RRR, S1S2  	Abd: +BS, soft, nontender/nondistended  	: No suprapubic tenderness  	LE: Warm,  edema    LABS/STUDIES  --------------------------------------------------------------------------------              8.2    4.63  >-----------<  178      [04-19-21 @ 05:48]              26.5     138  |  102  |  15  ----------------------------<  138      [04-19-21 @ 05:48]  3.8   |  21  |  1.2        Ca     8.4     [04-19-21 @ 05:48]    Creatinine Trend:  SCr 1.2 [04-19 @ 05:48]  SCr 1.2 [04-18 @ 07:44]  SCr 1.0 [04-17 @ 06:56]  SCr 1.0 [04-16 @ 07:08]  SCr 1.0 [04-15 @ 09:43]    Urinalysis - [03-30-21 @ 04:27]      Color Yellow / Appearance Slightly Turbid / SG 1.017 / pH 6.5      Gluc Negative / Ketone Negative  / Bili Negative / Urobili <2 mg/dL       Blood Large / Protein Trace / Leuk Est Large / Nitrite Negative       / WBC 84 / Hyaline 25 / Gran  / Sq Epi  / Non Sq Epi 0 / Bacteria Negative    Iron 178, TIBC 305, %sat 58      [03-30-21 @ 07:11]  Ferritin 339      [04-02-21 @ 09:04]  Vitamin D (25OH) 23      [04-03-21 @ 08:42]

## 2021-04-19 NOTE — PROGRESS NOTE ADULT - SUBJECTIVE AND OBJECTIVE BOX
Hospital Day:  38d    Subjective:    Patient is a 80y old  Male who presents with a chief complaint of acute hypoxemic respiratory failure secondary to COVID-19 pneumonia (18 Apr 2021 14:08)      Admitted to medicine for a primary diagnosis of     Past Medical Hx:   Hypertension    Hyperlipidemia    Diabetes mellitus, type 2    History of atrial fibrillation    Bladder cancer    Chronic kidney disease (CKD)      Past Sx:  History of total hip replacement, bilateral    History of total knee replacement, bilateral    History of total cystectomy      Allergies:  No Known Allergies    Current Meds:   Standng Meds:  atorvastatin 80 milliGRAM(s) Oral at bedtime  atropine Injectable 0.5 milliGRAM(s) IntraMuscular once  chlorhexidine 4% Liquid 1 Application(s) Topical <User Schedule>  cyanocobalamin 1000 MICROGram(s) Oral daily  ergocalciferol 89347 Unit(s) Oral <User Schedule>  ferrous    sulfate 325 milliGRAM(s) Oral daily  folic acid 1 milliGRAM(s) Oral daily  furosemide    Tablet 20 milliGRAM(s) Oral two times a day  influenza   Vaccine 0.5 milliLiter(s) IntraMuscular once  insulin glargine Injectable (LANTUS) 18 Unit(s) SubCutaneous at bedtime  insulin lispro (ADMELOG) corrective regimen sliding scale   SubCutaneous three times a day before meals  insulin lispro Injectable (ADMELOG) 6 Unit(s) SubCutaneous three times a day before meals  magnesium gluconate 500 milliGRAM(s) Oral daily  melatonin 5 milliGRAM(s) Oral at bedtime  metoprolol succinate ER 50 milliGRAM(s) Oral daily  mupirocin 2% Nasal 1 Application(s) Nasal two times a day  pantoprazole    Tablet 40 milliGRAM(s) Oral before breakfast  polyethylene glycol 3350 17 Gram(s) Oral daily  potassium chloride    Tablet ER 20 milliEquivalent(s) Oral daily  senna 2 Tablet(s) Oral at bedtime    PRN Meds:  acetaminophen   Tablet .. 650 milliGRAM(s) Oral every 6 hours PRN Temp greater or equal to 38C (100.4F), Mild Pain (1 - 3)  guaifenesin/dextromethorphan  Syrup 10 milliLiter(s) Oral every 6 hours PRN Cough    HOME MEDICATIONS:  furosemide 20 mg oral tablet: 1 tab(s) orally once a day  furosemide 20 mg oral tablet: 1 tab(s) orally 2 times a day  glimepiride 2 mg oral tablet: 1 tab(s) orally once a day  metoprolol succinate 50 mg oral tablet, extended release: 1 tab(s) orally once a day  Ozempic (1 mg dose) 2 mg/1.5 mL subcutaneous solution:   potassium citrate 10 mEq oral tablet, extended release: 1 tab(s) orally once a day  rosuvastatin 20 mg oral tablet: 1 tab(s) orally once a day      Vital Signs:   T(F): 98.2 (04-19-21 @ 05:32), Max: 98.2 (04-19-21 @ 05:32)  HR: 73 (04-19-21 @ 05:32) (73 - 79)  BP: 113/57 (04-19-21 @ 05:32) (104/59 - 113/57)  RR: 18 (04-19-21 @ 05:32) (18 - 18)  SpO2: 94% (04-19-21 @ 05:32) (93% - 94%)      04-18-21 @ 07:01  -  04-19-21 @ 07:00  --------------------------------------------------------  IN: 0 mL / OUT: 1400 mL / NET: -1400 mL        Physical Exam:   GENERAL: NAD  HEENT: NCAT  CHEST/LUNG: CTAB  HEART: Regular rate and rhythm; s1 s2 appreciated, No murmurs, rubs, or gallops  ABDOMEN: Soft, Nontender, Nondistended; Bowel sounds present  EXTREMITIES: No LE edema b/l  SKIN: no rashes, no new lesions  NERVOUS SYSTEM:  Alert & Oriented X3  LINES/CATHETERS:        Labs:                         7.8    3.91  )-----------( 154      ( 18 Apr 2021 07:44 )             25.5     Neutophil% 66.8, Lymphocyte% 12.5, Monocyte% 13.6, Bands% 1.0 04-18-21 @ 07:44    18 Apr 2021 07:44    138    |  103    |  14     ----------------------------<  101    3.7     |  21     |  1.2      Ca    8.3        18 Apr 2021 07:44                               Hospital Day:  38d    Subjective:    Patient is a 80y old  Male who presents with a chief complaint of acute hypoxemic respiratory failure secondary to COVID-19 pneumonia. No overnight events. No complaints this am.       Admitted to medicine for a primary diagnosis of COVID PNA     Past Medical Hx:   Hypertension    Hyperlipidemia    Diabetes mellitus, type 2    History of atrial fibrillation    Bladder cancer    Chronic kidney disease (CKD)      Past Sx:  History of total hip replacement, bilateral    History of total knee replacement, bilateral    History of total cystectomy      Allergies:  No Known Allergies    Current Meds:   Standng Meds:  atorvastatin 80 milliGRAM(s) Oral at bedtime  atropine Injectable 0.5 milliGRAM(s) IntraMuscular once  chlorhexidine 4% Liquid 1 Application(s) Topical <User Schedule>  cyanocobalamin 1000 MICROGram(s) Oral daily  ergocalciferol 36297 Unit(s) Oral <User Schedule>  ferrous    sulfate 325 milliGRAM(s) Oral daily  folic acid 1 milliGRAM(s) Oral daily  furosemide    Tablet 20 milliGRAM(s) Oral two times a day  influenza   Vaccine 0.5 milliLiter(s) IntraMuscular once  insulin glargine Injectable (LANTUS) 18 Unit(s) SubCutaneous at bedtime  insulin lispro (ADMELOG) corrective regimen sliding scale   SubCutaneous three times a day before meals  insulin lispro Injectable (ADMELOG) 6 Unit(s) SubCutaneous three times a day before meals  magnesium gluconate 500 milliGRAM(s) Oral daily  melatonin 5 milliGRAM(s) Oral at bedtime  metoprolol succinate ER 50 milliGRAM(s) Oral daily  mupirocin 2% Nasal 1 Application(s) Nasal two times a day  pantoprazole    Tablet 40 milliGRAM(s) Oral before breakfast  polyethylene glycol 3350 17 Gram(s) Oral daily  potassium chloride    Tablet ER 20 milliEquivalent(s) Oral daily  senna 2 Tablet(s) Oral at bedtime    PRN Meds:  acetaminophen   Tablet .. 650 milliGRAM(s) Oral every 6 hours PRN Temp greater or equal to 38C (100.4F), Mild Pain (1 - 3)  guaifenesin/dextromethorphan  Syrup 10 milliLiter(s) Oral every 6 hours PRN Cough    HOME MEDICATIONS:  furosemide 20 mg oral tablet: 1 tab(s) orally once a day  furosemide 20 mg oral tablet: 1 tab(s) orally 2 times a day  glimepiride 2 mg oral tablet: 1 tab(s) orally once a day  metoprolol succinate 50 mg oral tablet, extended release: 1 tab(s) orally once a day  Ozempic (1 mg dose) 2 mg/1.5 mL subcutaneous solution:   potassium citrate 10 mEq oral tablet, extended release: 1 tab(s) orally once a day  rosuvastatin 20 mg oral tablet: 1 tab(s) orally once a day      Vital Signs:   T(F): 98.2 (04-19-21 @ 05:32), Max: 98.2 (04-19-21 @ 05:32)  HR: 73 (04-19-21 @ 05:32) (73 - 79)  BP: 113/57 (04-19-21 @ 05:32) (104/59 - 113/57)  RR: 18 (04-19-21 @ 05:32) (18 - 18)  SpO2: 94% (04-19-21 @ 05:32) (93% - 94%)      04-18-21 @ 07:01  -  04-19-21 @ 07:00  --------------------------------------------------------  IN: 0 mL / OUT: 1400 mL / NET: -1400 mL        Physical Exam:   GENERAL: NAD  HEENT: NCAT  CHEST/LUNG: CTAB  HEART: Regular rate and rhythm; s1 s2 appreciated, No murmurs, rubs, or gallops  ABDOMEN: Soft, Nontender, Nondistended; Bowel sounds present  EXTREMITIES: No LE edema b/l  SKIN: no rashes, no new lesions  NERVOUS SYSTEM:  Alert & Oriented X3  LINES/CATHETERS: IVs        Labs:                         7.8    3.91  )-----------( 154      ( 18 Apr 2021 07:44 )             25.5     Neutophil% 66.8, Lymphocyte% 12.5, Monocyte% 13.6, Bands% 1.0 04-18-21 @ 07:44    18 Apr 2021 07:44    138    |  103    |  14     ----------------------------<  101    3.7     |  21     |  1.2      Ca    8.3        18 Apr 2021 07:44

## 2021-04-19 NOTE — PROGRESS NOTE ADULT - ASSESSMENT
79 yo M with PMHx of AFib, on Eliquis, HTN, DMII, CKDIII, hx of Bladder Ca s/p Urostomy, bilateral hip/knee replacements, obesity admitted for acute hypoxemic respiratory failure due to COVID PNA.    #Acute hypoxemic Respiratory Failure due to COVID - resolved  - s/p Toci, course of Decadron  - Treated with Levaquin, Cefepime for suspected superimposed bacterial infection  - Was on HFNC in CEU, transitioned to O2 NC 3L, downgraded to 3A and now on RA, saturating 94-96%  - COVID neg 4/12, rapid swab pos 4/14, repeat 4/16 negative (positive may have been false positive). Again negative on 4/18  - Was pending auth to COVID SNF, however son requested repeat COVID swab, may be able to d/c to non COVID SNF on SI    #Acute drop in Hb - source unclear  #Acute thrombocytopenia - improving  - Had gradual decrease in Hb from baseline 12-13 to 5.6, had total of 4 U pRBCS throughout hospital stay  - Hb has been stable at ~7-8, s/p 1U 4/12, Hb stable at 7-8  - Maintain active T+S (4/16)  - Had CT Abd x2 - negative for acute retroperitoneal bleed  - Had several episodes of melena, EGD performed 4/9 negative for acute bleeding, revealed nonerosive gastritis, c/w PPI once daily   - Hemolysis workup negative, DIC unlikely cause for dec in Hb, plt ct as pt did not have acute bleeding, PT/PTT within normal limits, fibrinogen borderline low  - HIT panel done for acute thrombocytopenia - negative    #Hx of Paroxysmal AFib on Eliquis  #CHADSVASC Score: 4, HASBLED 2  #HTN  - Has been rate controlled, on Metoprolol Succinate 100 mg once daily at home, was dec to 50 mg once daily due to bradycardia, has HR in 40s-50s at times while at rest, asymptomatic  - Takes Eliquis at home, was receiving Lovenox while here, transitioned to Heparin and developed worsening drop in Hb (5.6); now off AC  - Takes Amlodipine at home, on hold as pt has been normotensive    #Acute R popliteal vein DVT  - LE Duplex on 4/5 revealed acute DVT, initiated on Lovenox, then transitioned to Heparin in case of acute bleeding, then developed acute drop in Hb  - Now off AC as is high risk for acute bleeding  - s/p IVC filter placement for acute RLE DVT on 4/9    #Sepsis of unknown source - resolved  - Hospital course complicated by hypotension, unresponsive to IVF boluses, required low dose Levo, as well as hypothermia  - Blood Cx NGTD, Urine Cx grew gut kike (has urostomy), Sputum Cx grew normal kike  - Procalc, CRP within normal limits, fungitell, galactomannan within normal limits   - Completed week course of Meropenem  - Now improved, off Abx, remains normotensive, afebrile, WBC within normal limits   - Had episodes of tremors, nonresponsiveness with spontaneous resolution, likely due to state of sepsis - now resolved, is AAOx4, no neurological deficits    #Hx of DMII  - HbA1c 8.1%  - Cont to monitor FS  - C/w Lantus to 18 U, Lispro 6 U    #HX of Bladder Ca, s/p cystoprostatectomy with urostomy  #LORRAINE due to LINDSAY - resolved  #CKD III - stable  #Episodic hematuria with blood clots - now resolved  - Nephrology following  - Cr has been stable  - Urine in urostomy bag appears clear, site appears clean and intact  - Initiated on Lasix 20 mg BID to avoid fluid overload    DVT ppx: On hold, high risk for bleeding, no SCDs as pt has DVT  GI ppx: PPI  Diet: DASH, CC  Activity: AAT  Full Code  Dispo: COVID neg 4/12, rapid swab 4/14 pos, repeat 4/16 and 4/18 both negative, pending auth to SNF       81 yo M with PMHx of AFib, on Eliquis, HTN, DMII, CKDIII, hx of Bladder Ca s/p Urostomy, bilateral hip/knee replacements, obesity admitted for acute hypoxemic respiratory failure due to COVID PNA.    #Acute hypoxemic Respiratory Failure due to COVID - resolved  - s/p Toci, course of Decadron  - Treated with Levaquin, Cefepime for suspected superimposed bacterial infection  - Was on HFNC in CEU, transitioned to O2 NC 3L, downgraded to 3A and now on RA, saturating 94-96%  - COVID neg 4/12, rapid swab pos 4/14, repeat 4/16 negative (positive may have been false positive). Again negative on 4/18  - Pending auth to COVID SNF    #Acute drop in Hb - source unclear  #Acute thrombocytopenia - improving  - Had gradual decrease in Hb from baseline 12-13 to 5.6, had total of 4 U pRBCS throughout hospital stay  - Hb has been stable at ~7-8, s/p 1U 4/12  - Maintain active T+S (4/16)  - Had CT Abd x2 - negative for acute retroperitoneal bleed  - Had several episodes of melena, EGD performed 4/9 negative for acute bleeding, revealed nonerosive gastritis, c/w PPI once daily   - Hemolysis workup negative, DIC unlikely cause for dec in Hb, plt ct as pt did not have acute bleeding, PT/PTT within normal limits, fibrinogen borderline low  - HIT panel done for acute thrombocytopenia - negative    #Hx of Paroxysmal AFib on Eliquis  #CHADSVASC Score: 4, HASBLED 2  #HTN  - rate controlled on Metoprolol Succinate 100 mg once daily at home, was dec to 50 mg once daily due to bradycardia, has HR in 40s-50s at times while at rest, asymptomatic  - Takes Eliquis at home, was receiving Lovenox while here, transitioned to Heparin and developed worsening drop in Hb (5.6); now off AC  - Takes Amlodipine at home, on hold as pt has been normotensive    #Acute R popliteal vein DVT  - LE Duplex on 4/5 revealed acute DVT, initiated on Lovenox, then transitioned to Heparin in case of acute bleeding, then developed acute drop in Hb  - Now off AC as is high risk for acute bleeding  - s/p IVC filter placement for acute RLE DVT on 4/9    #Sepsis of unknown source - resolved  - Hospital course complicated by hypotension, unresponsive to IVF boluses, required low dose Levo, as well as hypothermia  - Blood Cx NGTD, Urine Cx grew gut kike (has urostomy), Sputum Cx grew normal kike  - Procalc, CRP within normal limits, fungitell, galactomannan within normal limits   - Completed week course of Meropenem  - Now improved, off Abx, remains normotensive, afebrile, WBC within normal limits   - Had episodes of tremors, nonresponsiveness with spontaneous resolution, likely due to state of sepsis - now resolved, is AAOx4, no neurological deficits    #Hx of DMII  - HbA1c 8.1%  - Cont to monitor FS  - C/w Lantus to 18 U, Lispro 6 U    #HX of Bladder Ca, s/p cystoprostatectomy with urostomy  #LORRAINE due to LINDSAY - resolved  #CKD III - stable  #Episodic hematuria with blood clots - now resolved  - Nephrology following  - Cr has been stable  - Urine in urostomy bag appears clear, site appears clean and intact  - Initiated on Lasix 20 mg BID to avoid fluid overload    DVT ppx: On hold, high risk for bleeding, no SCDs as pt has DVT  GI ppx: PPI  Diet: DASH, CC  Activity: AAT  Full Code  Dispo: COVID neg 4/12, rapid swab 4/14 pos, repeat 4/16 and 4/18 both negative, pending auth to SNF

## 2021-04-20 ENCOUNTER — TRANSCRIPTION ENCOUNTER (OUTPATIENT)
Age: 81
End: 2021-04-20

## 2021-04-20 VITALS — SYSTOLIC BLOOD PRESSURE: 103 MMHG | HEART RATE: 84 BPM | RESPIRATION RATE: 18 BRPM | DIASTOLIC BLOOD PRESSURE: 56 MMHG

## 2021-04-20 LAB
GLUCOSE BLDC GLUCOMTR-MCNC: 158 MG/DL — HIGH (ref 70–99)
GLUCOSE BLDC GLUCOMTR-MCNC: 163 MG/DL — HIGH (ref 70–99)
GLUCOSE BLDC GLUCOMTR-MCNC: 191 MG/DL — HIGH (ref 70–99)

## 2021-04-20 RX ORDER — FERROUS SULFATE 325(65) MG
1 TABLET ORAL
Qty: 30 | Refills: 0
Start: 2021-04-20 | End: 2021-05-19

## 2021-04-20 RX ORDER — POTASSIUM CITRATE MONOHYDRATE 100 %
1 POWDER (GRAM) MISCELLANEOUS
Qty: 0 | Refills: 0 | DISCHARGE

## 2021-04-20 RX ADMIN — PANTOPRAZOLE SODIUM 40 MILLIGRAM(S): 20 TABLET, DELAYED RELEASE ORAL at 05:24

## 2021-04-20 RX ADMIN — CHLORHEXIDINE GLUCONATE 1 APPLICATION(S): 213 SOLUTION TOPICAL at 05:24

## 2021-04-20 RX ADMIN — Medication 20 MILLIGRAM(S): at 05:24

## 2021-04-20 RX ADMIN — Medication 325 MILLIGRAM(S): at 11:38

## 2021-04-20 RX ADMIN — Medication 2: at 11:36

## 2021-04-20 RX ADMIN — Medication 500 MILLIGRAM(S): at 11:38

## 2021-04-20 RX ADMIN — Medication 1 MILLIGRAM(S): at 11:38

## 2021-04-20 RX ADMIN — Medication 6 UNIT(S): at 08:06

## 2021-04-20 RX ADMIN — MUPIROCIN 1 APPLICATION(S): 20 OINTMENT TOPICAL at 05:25

## 2021-04-20 RX ADMIN — PREGABALIN 1000 MICROGRAM(S): 225 CAPSULE ORAL at 11:38

## 2021-04-20 RX ADMIN — Medication 20 MILLIEQUIVALENT(S): at 11:38

## 2021-04-20 RX ADMIN — Medication 2: at 08:07

## 2021-04-20 RX ADMIN — Medication 10 MILLILITER(S): at 05:25

## 2021-04-20 RX ADMIN — Medication 50 MILLIGRAM(S): at 08:36

## 2021-04-20 RX ADMIN — Medication 6 UNIT(S): at 11:36

## 2021-04-20 NOTE — CHART NOTE - NSCHARTNOTESELECT_GEN_ALL_CORE
COVID Communication Team/Event Note
COVID Communication Team/Event Note
Event Note
Event Note
communication/Event Note
communication/Event Note
COVID Communication Team/Event Note
Chart/Event Note
Communication/Event Note
Communication/Event Note
Event Note
PACU/Transfer Note
RD Limited Follow-Up/Event Note
RD Limited/Event Note
RD follow up/Event Note
Transfer Note
communication/Event Note
downgrade/Transfer Note
f/u/Event Note

## 2021-04-20 NOTE — DISCHARGE NOTE NURSING/CASE MANAGEMENT/SOCIAL WORK - PATIENT PORTAL LINK FT
You can access the FollowMyHealth Patient Portal offered by [facility] by registering at the following website: [email or web address]. By joining [email or web address]’s FollowMyHealth portal, you will also be able to view your health information using other applications (apps) compatible with our system.

## 2021-04-20 NOTE — CHART NOTE - NSCHARTNOTEFT_GEN_A_CORE
Registered Dietitian Limited Follow-Up (Assessment completed by SUJATHA Eckert)    Pt admitted with acute hypoxic respiratory failure 2/2 severe COVID19 viral PNA; noted LORRAINE on CKD this admit improved. Currently receives Consistent Carbohydrate diet (no snacks) + DASH/TLC diet with no concentrated Potassium & 1 L fluid restriction. Consuming % of meals, indicating a good appetite. Labs/meds reviewed. Discussed DM & Heart Healthy nutrition concepts. No N/V/D/C reported. Tolerating current nutrition regimen. Remains not at nutrition risk. Will review in 7 days.

## 2021-04-20 NOTE — PROGRESS NOTE ADULT - ASSESSMENT
79yo M c PMHx chronic afib on noac, htn, dm2, ckd3 bladder ca s/p urostomy, b/l hip/ knee replacements obesity, here with acute hypoxic respiratory failure 2/2 severe covid 19 viral pneumonia, lorraine on ckd3 now better, elevated ddimer    Sepsis on admission  Covid viral PNA. acute hypoxic RF  underlying JARETH, [place]  LORRAINE on CKD  Transaminitis  Anemia sp transfusion  EGD 4/9/21, nonerosive gastritis and duodenitis  E coli UTI  R lower ext DVT, sp IVC Filter 4/9  Hx of HTN, ASHD, Afib/Eliquis  DM II, CKD  DLD  Bladder ca, sp urostomy, sp cystoprostatectomy, exposure to 911  OA, DDD, DJD, sp BL hip and knee surgeries, mobility dysfunction  GERD, HH, diverticulosis, bowel resection and reanastomosis      pt doing well  on RA pulse ox 94%, encourage spirometry  low H/H but stable 8.2/26.5  GI:   EGD 4/9 showed nonerosive gastritis and duodenitis and no active bleed, colonoscopy as out pt when stable  CT of abd/pelvis non contrast  to R/O retroperitoneal hematoma  IR, sp IVC filter for DVT 4/9, off AC  add folic acid, B12 daily, add FESO4  add ergocal 50,000u q wk  monitor electrolytes  ID, off all Abx  check Covid swabs:  negative 4/16 and 4/18      social SVC and D/C planning for SNF, repeat Covid negative x 2,  med stable for D/C today to [facility]/ SNF

## 2021-04-20 NOTE — PROGRESS NOTE ADULT - ASSESSMENT
1. Acute hypoxemic respiratory failure secondary to COVID-19 pneumonia, s/p Toci and dexamethasone  2. Acute kidney injury, likely pre-renal azotemia vs contrast nephropathy - resolved  3. CKD III, baseline creatinine 1.2-1.5.  4. Hx of Bladder Ca, s/p cystoprostatectomy, urostomy  5. Hypokalemia  6. Hypotension  7. Anemia    Plan:    Daily BMP  Monitor I/O  Continue furosemide 20mg PO BID  Low salt diet  1L fluid restriction

## 2021-04-20 NOTE — PROGRESS NOTE ADULT - SUBJECTIVE AND OBJECTIVE BOX
ROSA MARIA CASEY  Patient is a 80y old  Male who presents with a chief complaint of acute hypoxic RF due to Covid Viral Syndrome. Underlying Hx of DMII, CKD,  DLD, MO,  JARETH, bladder ca, sp urostomy, Hx of 911 exposure), OA, DDD, DJD, GERD, diverticulosis, abd wall hernias. bowel resection and reanastomosis.  Hospital course:  the pt remained in CEU  with HFNC O2 therapy, DEXA, TOCI therapy.  The hospital course was c/by LORRAINE, hematuria in the urostomy bag,  transaminitis, GIB req transfusions, RLE DVT req IVC filter placement and super infections req IV Abx and Caspofungin.  Eventually the renal and liver parameters improved. The pt slowly transitioned to NC O2.  He underwent EGD which showed nonerosive gastritis and duodenitis with no active BGIB.  He had several melanotic stools.      Overnight Events:  pt on RA 94%, , H/H 8.2/26.5 renal parameters at base line,  K 3.8 pt on Lasix 20 BID, po KCl,  pt med stable to transfer to E[facility]/SNF, needs rehab and gait reconditioning, Covid  repeat swabs are negative X 2     Vital Signs:   T:  97.6  HR:  72  BP:  1o1/56  RR: 18  Pulse ox:  RA   94%        PHYSICAL EXAM:  GENERAL: A&O,  NAD, on RA 93- 94% today  Neck:  short,  thick but supple, no JVD, no bruit, no stridor  Lungs:  shallow resp, scattered rhonchi, no wheezing, improving air entry  hEART s1s2 REG, II/VI  KONSTANTIN  Abd:   distended soft and benign, + BS  Urinary:  RLQ urostomy  Ext:  arthritic changes, moves all limbs  Skin:  no rash  Psych stable    LABS:                        8.2  4.6----------( 178           26.5    138 |  102 | 15  ----------------------------<138  3.8     21    1.2    GFR 64, 58, 72, 64, 35, 26, 30, 35, 64, 72, 58   Ca    8.4   Phos  3.4      Mg     2.0, 2.6, 3.0, 2.8, 2.2, 2.4, 2.0, 1.9, 1.8      MB 63    trop <0.01 x2  ferritin 535, 462, 737, 339  procal 0.26, 0.11, 0.07  CRP 25, 180. <3  LDH  515  fibrinogen 199  DDIMER 4298  COVID AB + 85.60  Covid swab + 4/14  3/30 LA 5.1,  3.7    TPro  5.9,  5.1, 4.1, 4.5/  Alb  3.2,  3.1, 2.7, 2.9  /  TBili  0.9  /  DBili  x   /  AST  45, 88, 88, 32  /  ALT  53, 93, 99, 36  /  AlkPhos  43  -      Urinalysis Basic - ( 12 Mar 2021 16:38 )    Color: Yellow / Appearance: Slightly Turbid / S.020 / pH: x  Gluc: x / Ketone: Negative  / Bili: Negative / Urobili: 3 mg/dL   Blood: x / Protein: 30 mg/dL / Nitrite: Negative   Leuk Esterase: Moderate / RBC: 5 /HPF / WBC 90 /HPF   Sq Epi: x / Non Sq Epi: 0 /HPF / Bacteria: Ma       RADIOLOGY & ADDITIONAL TESTS:  Venous duplex:  negative for DVT  CXR:  BL opacities L>R  CXR:  stable BL opacities    CT angio:  neg for central PE    CT abd/pelvis:  BIbasilar densities, hepatobiliary/spleen/pancreas WNL, mild BL adrenal thickening, no hydro, bl renal cysts, bl layering densities/ sm stones, "milk of ca", post cystoprostatectomy and ilealconduit, , post bowel resection and reanastomosis, ventral abd wall hernias, bl hip replacements    CTH 3/30:  moderate atrophy, chronic microvascular changes,  no evidence of acute pathology    EEG 3/30 gen slowing , no focal epileptiform activity    Venous duplex of lower ext:  + RLE popliteal vein thrombosis    EGD :  nonerosive gastritis and duodenitis, no active bleed

## 2021-04-20 NOTE — PROGRESS NOTE ADULT - REASON FOR ADMISSION
acute hypoxemic respiratory failure secondary to COVID-19 pneumonia
Epsis on ad, acute hypoxemic respiratory failure secondary to COVID-19 pneumonia
acute hypoxemic respiratory failure secondary to COVID-19 pneumonia
acute hypoxemic respiratory failure secondary to COVID-19 pneumonia, severe disease
acute hypoxemic respiratory failure secondary to COVID-19 pneumonia

## 2021-04-20 NOTE — PROGRESS NOTE ADULT - NSICDXPILOT_GEN_ALL_CORE
[place]

## 2021-04-20 NOTE — PROGRESS NOTE ADULT - PROVIDER SPECIALTY LIST ADULT
Infectious Disease
Infectious Disease
Internal Medicine
Nephrology
Nephrology
Pulmonology
Pulmonology
Urology
Internal Medicine
Nephrology
Pulmonology
Pulmonology
Infectious Disease
Internal Medicine
Nephrology
Pulmonology
Gastroenterology
Hospitalist
Infectious Disease
Internal Medicine
Nephrology
Nephrology
Urology
Urology
Internal Medicine
Nephrology
Nephrology
Pulmonology
Infectious Disease

## 2021-04-20 NOTE — PROGRESS NOTE ADULT - SUBJECTIVE AND OBJECTIVE BOX
[facility] FOLLOW UP NOTE  --------------------------------------------------------------------------------  24 hour events/subjective: Patient examined. Appears comfortable.    PAST HISTORY  --------------------------------------------------------------------------------  No significant changes to PMH, PSH, FHx, SHx, unless otherwise noted    ALLERGIES & MEDICATIONS  --------------------------------------------------------------------------------  Allergies    No Known Allergies    Intolerances      Standing Inpatient Medications  atorvastatin 80 milliGRAM(s) Oral at bedtime  atropine Injectable 0.5 milliGRAM(s) IntraMuscular once  chlorhexidine 4% Liquid 1 Application(s) Topical <User Schedule>  cyanocobalamin 1000 MICROGram(s) Oral daily  ergocalciferol 69136 Unit(s) Oral <User Schedule>  ferrous    sulfate 325 milliGRAM(s) Oral daily  folic acid 1 milliGRAM(s) Oral daily  furosemide    Tablet 20 milliGRAM(s) Oral two times a day  influenza   Vaccine 0.5 milliLiter(s) IntraMuscular once  insulin glargine Injectable (LANTUS) 18 Unit(s) SubCutaneous at bedtime  insulin lispro (ADMELOG) corrective regimen sliding scale   SubCutaneous three times a day before meals  insulin lispro Injectable (ADMELOG) 6 Unit(s) SubCutaneous three times a day before meals  magnesium gluconate 500 milliGRAM(s) Oral daily  melatonin 5 milliGRAM(s) Oral at bedtime  metoprolol succinate ER 50 milliGRAM(s) Oral daily  mupirocin 2% Nasal 1 Application(s) Nasal two times a day  pantoprazole    Tablet 40 milliGRAM(s) Oral before breakfast  polyethylene glycol 3350 17 Gram(s) Oral daily  potassium chloride    Tablet ER 20 milliEquivalent(s) Oral daily  senna 2 Tablet(s) Oral at bedtime    PRN Inpatient Medications  acetaminophen   Tablet .. 650 milliGRAM(s) Oral every 6 hours PRN  guaifenesin/dextromethorphan  Syrup 10 milliLiter(s) Oral every 6 hours PRN      VITALS/PHYSICAL EXAM  --------------------------------------------------------------------------------  T(C): 36.4 (04-20-21 @ 05:00), Max: 36.5 (04-19-21 @ 21:18)  HR: 72 (04-20-21 @ 08:35) (67 - 73)  BP: 101/56 (04-20-21 @ 08:35) (97/54 - 112/56)  RR: 18 (04-20-21 @ 08:35) (18 - 18)  SpO2: 94% (04-20-21 @ 08:35) (93% - 94%)      04-19-21 @ 07:01  -  04-20-21 @ 07:00  --------------------------------------------------------  IN: 0 mL / OUT: 1000 mL / NET: -1000 mL    Physical Exam:  	Gen: NAD  	Pulm: CTA B/L  	CV: RRR, S1S2  	Abd: +BS, soft, nontender/nondistended  	: No suprapubic tenderness  	LE: Warm, FROM, no clubbing, intact strength; no edema  	Vascular access:    LABS/STUDIES  --------------------------------------------------------------------------------              8.2    4.63  >-----------<  178      [04-19-21 @ 05:48]              26.5     138  |  102  |  15  ----------------------------<  138      [04-19-21 @ 05:48]  3.8   |  21  |  1.2        Ca     8.4     [04-19-21 @ 05:48]    Creatinine Trend:  SCr 1.2 [04-19 @ 05:48]  SCr 1.2 [04-18 @ 07:44]  SCr 1.0 [04-17 @ 06:56]  SCr 1.0 [04-16 @ 07:08]  SCr 1.0 [04-15 @ 09:43]    Urinalysis - [03-30-21 @ 04:27]      Color Yellow / Appearance Slightly Turbid / SG 1.017 / pH 6.5      Gluc Negative / Ketone Negative  / Bili Negative / Urobili <2 mg/dL       Blood Large / Protein Trace / Leuk Est Large / Nitrite Negative       / WBC 84 / Hyaline 25 / Gran  / Sq Epi  / Non Sq Epi 0 / Bacteria Negative      Iron 178, TIBC 305, %sat 58      [03-30-21 @ 07:11]  Ferritin 339      [04-02-21 @ 09:04]  Vitamin D (25OH) 23      [04-03-21 @ 08:42]

## 2021-04-23 DIAGNOSIS — Z93.6 OTHER ARTIFICIAL OPENINGS OF URINARY TRACT STATUS: ICD-10-CM

## 2021-04-23 DIAGNOSIS — E87.2 ACIDOSIS: ICD-10-CM

## 2021-04-23 DIAGNOSIS — T50.8X5A ADVERSE EFFECT OF DIAGNOSTIC AGENTS, INITIAL ENCOUNTER: ICD-10-CM

## 2021-04-23 DIAGNOSIS — N14.1 NEPHROPATHY INDUCED BY OTHER DRUGS, MEDICAMENTS AND BIOLOGICAL SUBSTANCES: ICD-10-CM

## 2021-04-23 DIAGNOSIS — A41.89 OTHER SPECIFIED SEPSIS: ICD-10-CM

## 2021-04-23 DIAGNOSIS — R68.0 HYPOTHERMIA, NOT ASSOCIATED WITH LOW ENVIRONMENTAL TEMPERATURE: ICD-10-CM

## 2021-04-23 DIAGNOSIS — Z87.891 PERSONAL HISTORY OF NICOTINE DEPENDENCE: ICD-10-CM

## 2021-04-23 DIAGNOSIS — N18.31 CHRONIC KIDNEY DISEASE, STAGE 3A: ICD-10-CM

## 2021-04-23 DIAGNOSIS — R65.21 SEVERE SEPSIS WITH SEPTIC SHOCK: ICD-10-CM

## 2021-04-23 DIAGNOSIS — I82.431 ACUTE EMBOLISM AND THROMBOSIS OF RIGHT POPLITEAL VEIN: ICD-10-CM

## 2021-04-23 DIAGNOSIS — R56.9 UNSPECIFIED CONVULSIONS: ICD-10-CM

## 2021-04-23 DIAGNOSIS — I12.9 HYPERTENSIVE CHRONIC KIDNEY DISEASE WITH STAGE 1 THROUGH STAGE 4 CHRONIC KIDNEY DISEASE, OR UNSPECIFIED CHRONIC KIDNEY DISEASE: ICD-10-CM

## 2021-04-23 DIAGNOSIS — R53.81 OTHER MALAISE: ICD-10-CM

## 2021-04-23 DIAGNOSIS — D69.6 THROMBOCYTOPENIA, UNSPECIFIED: ICD-10-CM

## 2021-04-23 DIAGNOSIS — N17.9 ACUTE KIDNEY FAILURE, UNSPECIFIED: ICD-10-CM

## 2021-04-23 DIAGNOSIS — J12.82 PNEUMONIA DUE TO CORONAVIRUS DISEASE 2019: ICD-10-CM
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
## 2021-04-23 DIAGNOSIS — I95.9 HYPOTENSION, UNSPECIFIED: ICD-10-CM

## 2021-04-23 DIAGNOSIS — E11.22 TYPE 2 DIABETES MELLITUS WITH DIABETIC CHRONIC KIDNEY DISEASE: ICD-10-CM

## 2021-04-23 DIAGNOSIS — R06.02 SHORTNESS OF BREATH: ICD-10-CM

## 2021-04-23 DIAGNOSIS — G93.41 METABOLIC ENCEPHALOPATHY: ICD-10-CM

## 2021-04-23 DIAGNOSIS — I48.20 CHRONIC ATRIAL FIBRILLATION, UNSPECIFIED: ICD-10-CM

## 2021-04-23 DIAGNOSIS — Z79.84 LONG TERM (CURRENT) USE OF ORAL HYPOGLYCEMIC DRUGS: ICD-10-CM

## 2021-04-23 DIAGNOSIS — B96.20 UNSPECIFIED ESCHERICHIA COLI [E. COLI] AS THE CAUSE OF DISEASES CLASSIFIED ELSEWHERE: ICD-10-CM

## 2021-04-23 DIAGNOSIS — N39.0 URINARY TRACT INFECTION, SITE NOT SPECIFIED: ICD-10-CM

## 2021-04-23 DIAGNOSIS — K92.1 MELENA: ICD-10-CM

## 2021-04-23 DIAGNOSIS — U07.1 COVID-19: ICD-10-CM

## 2021-04-23 DIAGNOSIS — J96.01 ACUTE RESPIRATORY FAILURE WITH HYPOXIA: ICD-10-CM

## 2021-04-23 DIAGNOSIS — D62 ACUTE POSTHEMORRHAGIC ANEMIA: ICD-10-CM

## 2021-04-23 DIAGNOSIS — R26.9 UNSPECIFIED ABNORMALITIES OF GAIT AND MOBILITY: ICD-10-CM

## 2021-04-23 DIAGNOSIS — R00.1 BRADYCARDIA, UNSPECIFIED: ICD-10-CM

## 2021-04-26 ENCOUNTER — INPATIENT (INPATIENT)
Facility: HOSPITAL | Age: 81
LOS: 15 days | Discharge: NURSING, REHAB OR LONG-TERM CARE | End: 2021-05-12
Attending: SURGERY | Admitting: SURGERY
Payer: MEDICARE

## 2021-04-26 VITALS
RESPIRATION RATE: 20 BRPM | HEIGHT: 72 IN | HEART RATE: 102 BPM | OXYGEN SATURATION: 98 % | SYSTOLIC BLOOD PRESSURE: 85 MMHG | TEMPERATURE: 99 F | DIASTOLIC BLOOD PRESSURE: 55 MMHG | WEIGHT: 274.92 LBS

## 2021-04-26 DIAGNOSIS — Z90.6 ACQUIRED ABSENCE OF OTHER PARTS OF URINARY TRACT: Chronic | ICD-10-CM

## 2021-04-26 DIAGNOSIS — Z96.643 PRESENCE OF ARTIFICIAL HIP JOINT, BILATERAL: Chronic | ICD-10-CM

## 2021-04-26 DIAGNOSIS — Z96.653 PRESENCE OF ARTIFICIAL KNEE JOINT, BILATERAL: Chronic | ICD-10-CM

## 2021-04-26 PROBLEM — Z86.79 PERSONAL HISTORY OF OTHER DISEASES OF THE CIRCULATORY SYSTEM: Chronic | Status: ACTIVE | Noted: 2021-03-12

## 2021-04-26 PROBLEM — N18.9 CHRONIC KIDNEY DISEASE, UNSPECIFIED: Chronic | Status: ACTIVE | Noted: 2021-03-12

## 2021-04-26 PROBLEM — E78.5 HYPERLIPIDEMIA, UNSPECIFIED: Chronic | Status: ACTIVE | Noted: 2021-03-12

## 2021-04-26 PROBLEM — C67.9 MALIGNANT NEOPLASM OF BLADDER, UNSPECIFIED: Chronic | Status: ACTIVE | Noted: 2021-03-12

## 2021-04-26 PROBLEM — I10 ESSENTIAL (PRIMARY) HYPERTENSION: Chronic | Status: ACTIVE | Noted: 2021-03-12

## 2021-04-26 PROBLEM — E11.9 TYPE 2 DIABETES MELLITUS WITHOUT COMPLICATIONS: Chronic | Status: ACTIVE | Noted: 2021-03-12

## 2021-04-26 LAB
ALBUMIN SERPL ELPH-MCNC: 3.3 G/DL — LOW (ref 3.5–5.2)
ALP SERPL-CCNC: 91 U/L — SIGNIFICANT CHANGE UP (ref 30–115)
ALT FLD-CCNC: 12 U/L — SIGNIFICANT CHANGE UP (ref 0–41)
ANION GAP SERPL CALC-SCNC: 13 MMOL/L — SIGNIFICANT CHANGE UP (ref 7–14)
ANION GAP SERPL CALC-SCNC: 16 MMOL/L — HIGH (ref 7–14)
ANISOCYTOSIS BLD QL: SIGNIFICANT CHANGE UP
AST SERPL-CCNC: 16 U/L — SIGNIFICANT CHANGE UP (ref 0–41)
BASOPHILS # BLD AUTO: 0 K/UL — SIGNIFICANT CHANGE UP (ref 0–0.2)
BASOPHILS NFR BLD AUTO: 0 % — SIGNIFICANT CHANGE UP (ref 0–1)
BILIRUB SERPL-MCNC: 1.1 MG/DL — SIGNIFICANT CHANGE UP (ref 0.2–1.2)
BLD GP AB SCN SERPL QL: SIGNIFICANT CHANGE UP
BUN SERPL-MCNC: 45 MG/DL — HIGH (ref 10–20)
BUN SERPL-MCNC: 46 MG/DL — HIGH (ref 10–20)
CALCIUM SERPL-MCNC: 8.8 MG/DL — SIGNIFICANT CHANGE UP (ref 8.5–10.1)
CALCIUM SERPL-MCNC: 9.2 MG/DL — SIGNIFICANT CHANGE UP (ref 8.5–10.1)
CHLORIDE SERPL-SCNC: 90 MMOL/L — LOW (ref 98–110)
CHLORIDE SERPL-SCNC: 96 MMOL/L — LOW (ref 98–110)
CO2 SERPL-SCNC: 19 MMOL/L — SIGNIFICANT CHANGE UP (ref 17–32)
CO2 SERPL-SCNC: 21 MMOL/L — SIGNIFICANT CHANGE UP (ref 17–32)
CREAT SERPL-MCNC: 2.8 MG/DL — HIGH (ref 0.7–1.5)
CREAT SERPL-MCNC: 3.1 MG/DL — HIGH (ref 0.7–1.5)
EOSINOPHIL # BLD AUTO: 0 K/UL — SIGNIFICANT CHANGE UP (ref 0–0.7)
EOSINOPHIL NFR BLD AUTO: 0 % — SIGNIFICANT CHANGE UP (ref 0–8)
GIANT PLATELETS BLD QL SMEAR: PRESENT — SIGNIFICANT CHANGE UP
GLUCOSE BLDC GLUCOMTR-MCNC: 113 MG/DL — HIGH (ref 70–99)
GLUCOSE BLDC GLUCOMTR-MCNC: 118 MG/DL — HIGH (ref 70–99)
GLUCOSE BLDC GLUCOMTR-MCNC: 143 MG/DL — HIGH (ref 70–99)
GLUCOSE BLDC GLUCOMTR-MCNC: 78 MG/DL — SIGNIFICANT CHANGE UP (ref 70–99)
GLUCOSE SERPL-MCNC: 164 MG/DL — HIGH (ref 70–99)
GLUCOSE SERPL-MCNC: 75 MG/DL — SIGNIFICANT CHANGE UP (ref 70–99)
HCT VFR BLD CALC: 26.4 % — LOW (ref 42–52)
HGB BLD-MCNC: 8.3 G/DL — LOW (ref 14–18)
HYPOCHROMIA BLD QL: SLIGHT — SIGNIFICANT CHANGE UP
LYMPHOCYTES # BLD AUTO: 0.9 K/UL — LOW (ref 1.2–3.4)
LYMPHOCYTES # BLD AUTO: 6.1 % — LOW (ref 20.5–51.1)
MANUAL SMEAR VERIFICATION: SIGNIFICANT CHANGE UP
MCHC RBC-ENTMCNC: 27.7 PG — SIGNIFICANT CHANGE UP (ref 27–31)
MCHC RBC-ENTMCNC: 31.4 G/DL — LOW (ref 32–37)
MCV RBC AUTO: 88 FL — SIGNIFICANT CHANGE UP (ref 80–94)
MICROCYTES BLD QL: SIGNIFICANT CHANGE UP
MONOCYTES # BLD AUTO: 1.17 K/UL — HIGH (ref 0.1–0.6)
MONOCYTES NFR BLD AUTO: 7.9 % — SIGNIFICANT CHANGE UP (ref 1.7–9.3)
MYELOCYTES NFR BLD: 0.9 % — HIGH (ref 0–0)
NEUTROPHILS # BLD AUTO: 12.42 K/UL — HIGH (ref 1.4–6.5)
NEUTROPHILS NFR BLD AUTO: 84.2 % — HIGH (ref 42.2–75.2)
NT-PROBNP SERPL-SCNC: 820 PG/ML — HIGH (ref 0–300)
OSMOLALITY SERPL: 285 MOS/KG — SIGNIFICANT CHANGE UP (ref 280–301)
PLAT MORPH BLD: NORMAL — SIGNIFICANT CHANGE UP
PLATELET # BLD AUTO: 267 K/UL — SIGNIFICANT CHANGE UP (ref 130–400)
POIKILOCYTOSIS BLD QL AUTO: SLIGHT — SIGNIFICANT CHANGE UP
POLYCHROMASIA BLD QL SMEAR: SIGNIFICANT CHANGE UP
POTASSIUM SERPL-MCNC: 5.2 MMOL/L — HIGH (ref 3.5–5)
POTASSIUM SERPL-MCNC: 5.6 MMOL/L — HIGH (ref 3.5–5)
POTASSIUM SERPL-SCNC: 5.2 MMOL/L — HIGH (ref 3.5–5)
POTASSIUM SERPL-SCNC: 5.6 MMOL/L — HIGH (ref 3.5–5)
PROMYELOCYTES # FLD: 0.9 % — HIGH (ref 0–0)
PROT SERPL-MCNC: 6 G/DL — SIGNIFICANT CHANGE UP (ref 6–8)
RAPID RVP RESULT: SIGNIFICANT CHANGE UP
RBC # BLD: 3 M/UL — LOW (ref 4.7–6.1)
RBC # FLD: 17.2 % — HIGH (ref 11.5–14.5)
RBC BLD AUTO: NORMAL — SIGNIFICANT CHANGE UP
SARS-COV-2 RNA SPEC QL NAA+PROBE: SIGNIFICANT CHANGE UP
SODIUM SERPL-SCNC: 127 MMOL/L — LOW (ref 135–146)
SODIUM SERPL-SCNC: 128 MMOL/L — LOW (ref 135–146)
TROPONIN T SERPL-MCNC: 0.04 NG/ML — CRITICAL HIGH
WBC # BLD: 14.75 K/UL — HIGH (ref 4.8–10.8)
WBC # FLD AUTO: 14.75 K/UL — HIGH (ref 4.8–10.8)

## 2021-04-26 PROCEDURE — 93306 TTE W/DOPPLER COMPLETE: CPT | Mod: 26

## 2021-04-26 PROCEDURE — 93970 EXTREMITY STUDY: CPT | Mod: 26

## 2021-04-26 PROCEDURE — 99233 SBSQ HOSP IP/OBS HIGH 50: CPT

## 2021-04-26 PROCEDURE — 93010 ELECTROCARDIOGRAM REPORT: CPT

## 2021-04-26 PROCEDURE — 71045 X-RAY EXAM CHEST 1 VIEW: CPT | Mod: 26

## 2021-04-26 PROCEDURE — 99285 EMERGENCY DEPT VISIT HI MDM: CPT

## 2021-04-26 RX ORDER — CEFEPIME 1 G/1
1000 INJECTION, POWDER, FOR SOLUTION INTRAMUSCULAR; INTRAVENOUS ONCE
Refills: 0 | Status: COMPLETED | OUTPATIENT
Start: 2021-04-26 | End: 2021-04-26

## 2021-04-26 RX ORDER — METOPROLOL TARTRATE 50 MG
50 TABLET ORAL DAILY
Refills: 0 | Status: DISCONTINUED | OUTPATIENT
Start: 2021-04-26 | End: 2021-05-05

## 2021-04-26 RX ORDER — MIDODRINE HYDROCHLORIDE 2.5 MG/1
10 TABLET ORAL THREE TIMES A DAY
Refills: 0 | Status: DISCONTINUED | OUTPATIENT
Start: 2021-04-26 | End: 2021-05-05

## 2021-04-26 RX ORDER — INSULIN LISPRO 100/ML
VIAL (ML) SUBCUTANEOUS
Refills: 0 | Status: DISCONTINUED | OUTPATIENT
Start: 2021-04-26 | End: 2021-05-05

## 2021-04-26 RX ORDER — VANCOMYCIN HCL 1 G
1000 VIAL (EA) INTRAVENOUS ONCE
Refills: 0 | Status: DISCONTINUED | OUTPATIENT
Start: 2021-04-26 | End: 2021-04-26

## 2021-04-26 RX ORDER — FUROSEMIDE 40 MG
20 TABLET ORAL
Refills: 0 | Status: DISCONTINUED | OUTPATIENT
Start: 2021-04-26 | End: 2021-04-26

## 2021-04-26 RX ORDER — SODIUM ZIRCONIUM CYCLOSILICATE 10 G/10G
5 POWDER, FOR SUSPENSION ORAL EVERY 12 HOURS
Refills: 0 | Status: COMPLETED | OUTPATIENT
Start: 2021-04-26 | End: 2021-04-27

## 2021-04-26 RX ORDER — CHLORHEXIDINE GLUCONATE 213 G/1000ML
1 SOLUTION TOPICAL
Refills: 0 | Status: DISCONTINUED | OUTPATIENT
Start: 2021-04-26 | End: 2021-05-05

## 2021-04-26 RX ORDER — DEXTROSE 50 % IN WATER 50 %
15 SYRINGE (ML) INTRAVENOUS ONCE
Refills: 0 | Status: DISCONTINUED | OUTPATIENT
Start: 2021-04-26 | End: 2021-05-05

## 2021-04-26 RX ORDER — DEXTROSE 50 % IN WATER 50 %
25 SYRINGE (ML) INTRAVENOUS ONCE
Refills: 0 | Status: DISCONTINUED | OUTPATIENT
Start: 2021-04-26 | End: 2021-05-05

## 2021-04-26 RX ORDER — ATORVASTATIN CALCIUM 80 MG/1
40 TABLET, FILM COATED ORAL AT BEDTIME
Refills: 0 | Status: DISCONTINUED | OUTPATIENT
Start: 2021-04-26 | End: 2021-05-05

## 2021-04-26 RX ORDER — SODIUM CHLORIDE 9 MG/ML
1000 INJECTION, SOLUTION INTRAVENOUS
Refills: 0 | Status: DISCONTINUED | OUTPATIENT
Start: 2021-04-26 | End: 2021-05-05

## 2021-04-26 RX ORDER — MIDODRINE HYDROCHLORIDE 2.5 MG/1
10 TABLET ORAL ONCE
Refills: 0 | Status: COMPLETED | OUTPATIENT
Start: 2021-04-26 | End: 2021-04-26

## 2021-04-26 RX ORDER — ACETAMINOPHEN 500 MG
650 TABLET ORAL EVERY 6 HOURS
Refills: 0 | Status: DISCONTINUED | OUTPATIENT
Start: 2021-04-26 | End: 2021-05-05

## 2021-04-26 RX ORDER — SODIUM CHLORIDE 9 MG/ML
500 INJECTION INTRAMUSCULAR; INTRAVENOUS; SUBCUTANEOUS ONCE
Refills: 0 | Status: COMPLETED | OUTPATIENT
Start: 2021-04-26 | End: 2021-04-26

## 2021-04-26 RX ORDER — CEFEPIME 1 G/1
2000 INJECTION, POWDER, FOR SOLUTION INTRAMUSCULAR; INTRAVENOUS EVERY 24 HOURS
Refills: 0 | Status: DISCONTINUED | OUTPATIENT
Start: 2021-04-27 | End: 2021-04-30

## 2021-04-26 RX ORDER — APIXABAN 2.5 MG/1
1 TABLET, FILM COATED ORAL
Qty: 0 | Refills: 0 | DISCHARGE

## 2021-04-26 RX ORDER — GLUCAGON INJECTION, SOLUTION 0.5 MG/.1ML
1 INJECTION, SOLUTION SUBCUTANEOUS ONCE
Refills: 0 | Status: DISCONTINUED | OUTPATIENT
Start: 2021-04-26 | End: 2021-05-05

## 2021-04-26 RX ORDER — PANTOPRAZOLE SODIUM 20 MG/1
40 TABLET, DELAYED RELEASE ORAL
Refills: 0 | Status: DISCONTINUED | OUTPATIENT
Start: 2021-04-26 | End: 2021-05-05

## 2021-04-26 RX ORDER — SODIUM CHLORIDE 9 MG/ML
2000 INJECTION, SOLUTION INTRAVENOUS ONCE
Refills: 0 | Status: COMPLETED | OUTPATIENT
Start: 2021-04-26 | End: 2021-04-26

## 2021-04-26 RX ORDER — HEPARIN SODIUM 5000 [USP'U]/ML
5000 INJECTION INTRAVENOUS; SUBCUTANEOUS EVERY 12 HOURS
Refills: 0 | Status: DISCONTINUED | OUTPATIENT
Start: 2021-04-26 | End: 2021-04-29

## 2021-04-26 RX ORDER — FERROUS SULFATE 325(65) MG
325 TABLET ORAL DAILY
Refills: 0 | Status: DISCONTINUED | OUTPATIENT
Start: 2021-04-26 | End: 2021-05-05

## 2021-04-26 RX ORDER — AMLODIPINE BESYLATE AND BENAZEPRIL HYDROCHLORIDE 10; 20 MG/1; MG/1
1 CAPSULE ORAL
Qty: 0 | Refills: 0 | DISCHARGE

## 2021-04-26 RX ADMIN — HEPARIN SODIUM 5000 UNIT(S): 5000 INJECTION INTRAVENOUS; SUBCUTANEOUS at 17:01

## 2021-04-26 RX ADMIN — SODIUM ZIRCONIUM CYCLOSILICATE 5 GRAM(S): 10 POWDER, FOR SUSPENSION ORAL at 18:30

## 2021-04-26 RX ADMIN — MIDODRINE HYDROCHLORIDE 10 MILLIGRAM(S): 2.5 TABLET ORAL at 17:00

## 2021-04-26 RX ADMIN — SODIUM CHLORIDE 2000 MILLILITER(S): 9 INJECTION INTRAMUSCULAR; INTRAVENOUS; SUBCUTANEOUS at 09:45

## 2021-04-26 RX ADMIN — ATORVASTATIN CALCIUM 40 MILLIGRAM(S): 80 TABLET, FILM COATED ORAL at 22:47

## 2021-04-26 RX ADMIN — SODIUM CHLORIDE 2000 MILLILITER(S): 9 INJECTION, SOLUTION INTRAVENOUS at 06:13

## 2021-04-26 RX ADMIN — CEFEPIME 100 MILLIGRAM(S): 1 INJECTION, POWDER, FOR SOLUTION INTRAMUSCULAR; INTRAVENOUS at 06:13

## 2021-04-26 RX ADMIN — CEFEPIME 100 MILLIGRAM(S): 1 INJECTION, POWDER, FOR SOLUTION INTRAMUSCULAR; INTRAVENOUS at 17:01

## 2021-04-26 RX ADMIN — MIDODRINE HYDROCHLORIDE 10 MILLIGRAM(S): 2.5 TABLET ORAL at 12:17

## 2021-04-26 RX ADMIN — MIDODRINE HYDROCHLORIDE 10 MILLIGRAM(S): 2.5 TABLET ORAL at 09:46

## 2021-04-26 RX ADMIN — SODIUM CHLORIDE 1000 MILLILITER(S): 9 INJECTION INTRAMUSCULAR; INTRAVENOUS; SUBCUTANEOUS at 06:13

## 2021-04-26 NOTE — ED PROVIDER NOTE - OBJECTIVE STATEMENT
81yo male with PMH of hypertension, hyperlipidemia, diabetes mellitus type 2, atrial fibrillation on Eliquis, CKD stage 3A, bladder cancer s/p cystectomy with urostomy, and bilateral hip and knee arthroplasties BIBEMS from [facility] for b/l hand and foot tingling sensation x few weeks -sent for US evaluation. Patient denie any complaints otherwise including shortness of breath, chest pain, abdominal pain, fevers/chills. 79yo male with PMH of hypertension, hyperlipidemia, diabetes mellitus type 2, atrial fibrillation no a/c on ASA, CKD stage 3A, bladder cancer s/p cystectomy with urostomy, and bilateral hip and knee arthroplasties BIBEMS from [facility] for b/l hand and foot tingling sensation x few weeks -sent for US evaluation. Patient denie any complaints otherwise including shortness of breath, chest pain, abdominal pain, fevers/chills. 79yo male with PMH of hypertension, hyperlipidemia, diabetes mellitus type 2, atrial fibrillation no a/c on ASA, CKD stage 3A, bladder cancer s/p cystectomy with urostomy, and bilateral hip and knee arthroplasties BIBEMS from [facility] for b/l hand and foot tingling sensation x few weeks -sent for US evaluation. Patient denies any complaints otherwise including shortness of breath, chest pain, abdominal pain, fevers/chills.

## 2021-04-26 NOTE — ED PROVIDER NOTE - PHYSICAL EXAMINATION
VITAL SIGNS: I have reviewed nursing notes and confirm.  CONSTITUTIONAL: well-appearing, non-toxic, NAD  SKIN: Warm dry, normal skin turgor  HEAD: NCAT  EYES: EOMI, PERRLA, no scleral icterus  ENT: Moist mucous membranes, normal pharynx   NECK: Supple; non tender. Full ROM.   CARD: RRR, no murmurs, rubs or gallops  RESP: clear to ausculation b/l.  No rales, rhonchi, or wheezing.  ABD: soft, + BS, non-tender, non-distended, no rebound or guarding. No CVA tenderness  EXT: Full ROM, no bony tenderness, 2+ pedal edema, venous stasis changes   NEURO: normal motor. b/l stocking glove distribution decreased sensation in foot and hands - otherwise normal - CNII-CX12 intact,   PSYCH: Cooperative, appropriate.

## 2021-04-26 NOTE — H&P ADULT - ATTENDING COMMENTS
Patient is a 81 y/o man with PMH of HTN, HLD, DM II, Afib (not on AC secondary to suspected GI bleed during last hospitalization March-April 2021), CKD stage 3A, bladder cancer (s/p cystectomy with urostomy), right lower extremity DVT (4/5; s/p IVC filter 4/8), diverticulosis (s/p bowel resection and reanastomosis) who presents from [facility] due to bilateral lower-extremity paresthesias for 7 days. Patient previously hospitalized (3/12/21-4/20/21) at [facility] for COVID-19 pneumonia; hospital course complicated by sepsis, presumed GI bleed, and DVT (s/p IVC filter)--patient discharged to [facility] for rehab.     Since prior admission, patient has been minimally mobile, he was previously fully functional. He has had worsening pain/parasthesias and swelling of b/l LE since last admission. In ED patient w/ tachycardia, hypotension, elevated wbc on admission, meeting sepsis criteria. Hb stable since prior admission, Na 127, AG/ BUN elevated, Cr 3.1 from 1.2 previously. Duplex LE revealing for extensive DVT burden including bilateral external iliac, common femoral, popliteal and posterior tibial vein DVT, acute left femoral dvt, acute left gastrocnemius vein thrombosis. CXR w/ improved b/l opacities.      Assessment and Plan:     #Lower extremity swelling   #Acute on Chronic DVT not on AC s/p IVC filter   - US 4/5 w/ acute dvt R popliteal   - US today w/ extensive DVT burden including bilateral external iliac, common femoral, popliteal and posterior tibial vein DVT, acute left femoral dvt, acute left gastrocnemius vein thrombosis  -Has IVC filter from 4/8 placed by IR; unable to AC due to presumed GI bleed ( Hb 13--> 5 last adm )   Plan:   - Vascular Surgery and Interventional Radiology consults placed   - Unable to AC currently, given high risk for GI bleed   -F/U BNP and echo to r/o HF   - hold lasix due to severe LORRAINE     #Hypotension  -ddx: intravascular volume depletion vs sepsis with unknown source  -, leukocytosis to 14.75, hypotensive to 79/46 in ED  -Received 3L total IV fluids during ED stay with improvement   -Cefepime 2g q24hrs for now  - Blood and Urine Cx pending , procal pending   -CXR 4/26 showing stable bilateral opacities from prior admission  -Midodrine 10mg TID    # LORRAINE on CKD IIIa  - prerenal 2/2 dehydration vs ATN 2/2 sepsis/ hypotension   -Baseline creatinine 1.0-1.2 from previous admission; 3.1 on current admission   - f/u urine lytes and repeat BMP     #Normocytic Anemia  -Hb stable compared to prior adm   -EGD w/ gastritis/duodenitis no active bleeding last adm  -Pantoprazole 40mg q24hrs  - may need repeat GI eval pending need for AC for extensive dvt     # Afib  - home metoprolol succinate for now, but if hypotensive again, switch to metop tartrate to minimize BP effect     # DM II  - monitor BG, ISS if needed     # HLD -Continue atorvastatin       -GI PPX: Pantoprazole    FULL CODE    #Progress Note Handoff:  Pending (specify): improvement in clinical condition, Vasc/IR to see   Disposition: Home___/SNF___/Other________/Unknown at this time_____x___        Roro Yu, DO Patient is a 79 y/o man with PMH of HTN, HLD, DM II, Afib (not on AC secondary to suspected GI bleed during last hospitalization March-April 2021), CKD stage 3A, bladder cancer (s/p cystectomy with urostomy), right lower extremity DVT (4/5; s/p IVC filter 4/8), diverticulosis (s/p bowel resection and reanastomosis) who presents from [facility] due to bilateral lower-extremity paresthesias for 7 days. Patient previously hospitalized (3/12/21-4/20/21) at [facility] for COVID-19 pneumonia; hospital course complicated by sepsis, presumed GI bleed, and DVT (s/p IVC filter)--patient discharged to [facility] for rehab.     Since prior admission, patient has been minimally mobile, he was previously fully functional. He has had worsening pain/parasthesias and swelling of b/l LE since last admission. In ED patient w/ tachycardia, hypotension, elevated wbc on admission, meeting sepsis criteria. Hb stable since prior admission, Na 127, AG/ BUN elevated, Cr 3.1 from 1.2 previously. Duplex LE revealing for extensive DVT burden including bilateral external iliac, common femoral, popliteal and posterior tibial vein DVT, acute left femoral dvt, acute left gastrocnemius vein thrombosis. CXR w/ improved b/l opacities.    Physical Exam:  GENERAL: pleasant no acute distress  HEENT:  EOMI, PERRLA  CHEST/LUNG: clear to auscultation anteriorally   HEART: Regular rate and rhythm; No murmurs, rubs, or gallops  ABDOMEN: Soft, Nontender, obese  EXTREMITIES:  Pitting edema B/L lower extremities to level of knees  NEUROLOGY: non-focal, AAOx3   SKIN: No rashes/lesions appreciated    Assessment and Plan:     #Lower extremity swelling   #Acute on Chronic DVT not on AC s/p IVC filter   - US 4/5 w/ acute dvt R popliteal   - US today w/ extensive DVT burden including bilateral external iliac, common femoral, popliteal and posterior tibial vein DVT, acute left femoral dvt, acute left gastrocnemius vein thrombosis  -Has IVC filter from 4/8 placed by IR; unable to AC due to presumed GI bleed ( Hb 13--> 5 last adm )   Plan:   - Vascular Surgery and Interventional Radiology consults placed   - Unable to AC currently, given high risk for GI bleed, may need to reconsult GI   -F/U BNP and echo to r/o HF   - hold lasix due to severe LORRAINE     #Hypotension  -ddx: intravascular volume depletion vs sepsis with unknown source  -, leukocytosis to 14.75, hypotensive to 79/46 in ED  -Received 3L total IV fluids during ED stay with improvement   -Cefepime 2g q24hrs for now  - Blood and Urine Cx pending , procal pending   -CXR 4/26 showing stable bilateral opacities from prior admission  -Midodrine 10mg TID    # LORRAINE on CKD IIIa  - prerenal 2/2 dehydration vs ATN 2/2 sepsis/ hypotension   -Baseline creatinine 1.0-1.2 from previous admission; 3.1 on current admission   - f/u urine lytes and repeat BMP     #Normocytic Anemia  -Hb stable compared to prior adm   -EGD w/ gastritis/duodenitis no active bleeding last adm  -Pantoprazole 40mg q24hrs  - may need repeat GI eval pending need for AC for extensive dvt     # Afib  - home metoprolol succinate for now, but if hypotensive again, switch to metop tartrate to minimize BP effect     # DM II  - monitor BG, ISS if needed     # HLD -Continue atorvastatin     #Poor PO intake - Nutrition consult     -GI PPX: Pantoprazole    FULL CODE    #Progress Note Handoff:  Pending (specify): improvement in clinical condition, Vasc/IR to see , consider GI re-eval   Discussed at Length w/ son Juancho ( [phone] )   Disposition: Home___/SNF___/Other________/Unknown at this time_____x___        Roro Yu, DO

## 2021-04-26 NOTE — ED PROVIDER NOTE - CLINICAL SUMMARY MEDICAL DECISION MAKING FREE TEXT BOX
79yo male with PMH of hypertension, hyperlipidemia, diabetes mellitus type 2, atrial fibrillation no a/c on ASA, CKD stage 3A, bladder cancer s/p cystectomy with urostomy, and bilateral hip and knee arthroplasties BIBEMS from [facility] for b/l hand and foot tingling sensation x few weeks -sent for US evaluation. Patient denies any complaints otherwise including shortness of breath, chest pain, abdominal pain, fevers/chills. Plan: admission

## 2021-04-26 NOTE — H&P ADULT - NSHPLABSRESULTS_GEN_ALL_CORE
LABS:                          8.3    14.75 )-----------( 267      ( 26 Apr 2021 03:10 )             26.4     04-26    127<L>  |  90<L>  |  45<H>  ----------------------------<  164<H>  5.2<H>   |  21  |  3.1<H>    Ca    9.2      26 Apr 2021 03:10    TPro  6.0  /  Alb  3.3<L>  /  TBili  1.1  /  DBili  x   /  AST  16  /  ALT  12  /  AlkPhos  91  04-26    LIVER FUNCTIONS - ( 26 Apr 2021 03:10 )  Alb: 3.3 g/dL / Pro: 6.0 g/dL / ALK PHOS: 91 U/L / ALT: 12 U/L / AST: 16 U/L / GGT: x           Radiology:     < from: Xray Chest 1 View-PORTABLE IMMEDIATE (04.26.21 @ 04:03) >    Impression:    Bilateral opacities. Improved.        < end of copied text >

## 2021-04-26 NOTE — ED ADULT NURSE NOTE - OBJECTIVE STATEMENT
pt is a 81yo male BIBEMS from [facility] for b/l hand and foot tingling sensation x few weeks -sent for US evaluation. Patient denies any complaints otherwise including shortness of breath, chest pain, abdominal pain, fevers/chills

## 2021-04-26 NOTE — CHART NOTE - NSCHARTNOTEFT_GEN_A_CORE
Discussed case with vascular sx about new onset of B/L extensive DVT. Vascular sx thinks likely secondary to IVC filter thrombosis and recommends IR consult. IR consult placed and will f/u.

## 2021-04-26 NOTE — ED PROVIDER NOTE - ATTENDING CONTRIBUTION TO CARE
81yo male with PMH of hypertension, hyperlipidemia, diabetes mellitus type 2, atrial fibrillation no a/c on ASA, CKD stage 3A, bladder cancer s/p cystectomy with urostomy, and bilateral hip and knee arthroplasties BIBEMS from [facility] for b/l hand and foot tingling sensation x few weeks -sent for US evaluation. Patient denies any complaints otherwise including shortness of breath, chest pain, abdominal pain, fevers/chills. Exam:     HEAD: Normocephalic; atraumatic.  EYES: no conjunctival injection. PERRL.   ENT: No nasal discharge; airway clear.  NECK: Supple; non tender.  CARD: S1, S2 normal; no murmurs, gallops, or rubs. Regular rate and rhythm.   RESP: No wheezes, rales or rhonchi.  ABD: soft ntnd  EXT: Normal ROM.  No clubbing, cyanosis or edema.   LYMPH: No acute cervical adenopathy.  NEURO: Alert, oriented, grossly unremarkable  PSYCH: Cooperative, appropriate. 81yo male with PMH of hypertension, hyperlipidemia, diabetes mellitus type 2, atrial fibrillation no a/c on ASA, CKD stage 3A, bladder cancer s/p cystectomy with urostomy, and bilateral hip and knee arthroplasties BIBEMS from [facility] for b/l hand and foot tingling sensation x few weeks -sent for US evaluation. Patient denies any complaints otherwise including shortness of breath, chest pain, abdominal pain, fevers/chills. Exam:     HEAD: Normocephalic; atraumatic.  EYES: no conjunctival injection. PERRL.   ENT: No nasal discharge; airway clear.  NECK: Supple; non tender.  CARD: S1, S2 normal; no murmurs, gallops, or rubs. Regular rate and rhythm.   RESP: No wheezes, rales or rhonchi.  ABD: soft ntnd  EXT: Normal ROM.  No clubbing, cyanosis or edema.   LYMPH: No acute cervical adenopathy.  NEURO: Alert, oriented, grossly unremarkable  PSYCH: Cooperative, appropriate.    Plan: labs/ekg reeval

## 2021-04-26 NOTE — ED ADULT NURSE NOTE - NSIMPLEMENTINTERV_GEN_ALL_ED
Implemented All Fall Risk Interventions:  [place] to call system. Call bell, personal items and telephone within reach. Instruct patient to call for assistance. Room bathroom lighting operational. Non-slip footwear when patient is off stretcher. Physically safe environment: no spills, clutter or unnecessary equipment. Stretcher in lowest position, wheels locked, appropriate side rails in place. Provide visual cue, wrist band, yellow gown, etc. Monitor gait and stability. Monitor for mental status changes and reorient to person, place, and time. Review medications for side effects contributing to fall risk. Reinforce activity limits and safety measures with patient and family.

## 2021-04-26 NOTE — H&P ADULT - ASSESSMENT
Patient is a 81 y/o man with PMH of HTN, HLD, DM II, Afib (not on AC secondary to suspected GI bleed during last hospitalization March-April 2021), CKD stage 3A, bladder cancer (s/p cystectomy with urostomy), right lower extremity DVT (4/5; s/p IVC filter 4/8), diverticulosis (s/p bowel resection and reanastomosis) who presents from [facility] due to bilateral lower-extremity paresthesias for 7 days. Admitted for work-up of lower extremity swelling; septic on admission.     1) Lower extremity swelling with paresthesias  -Likely due to new acute B/L DVT with component of heart failure  -No previous documented history of CHF but on furosemide 20mg BID outpatient; continue for now  -Daily weights, strict I+O   -Will hold IV diuretics for now  -Preliminary US new bilateral DVT (previously had R popliteal DVT); f/u final read  -Has IVC filter from 4/8 placed by IR; unable to AC due to presumed GI bleed and drop in hemoglobin last stay  -Vascular consult for potential thrombectomy  -F/U BNP, troponins  -F/U echo    2) Hypotension  -Likely due to intravascular volume depletion, less likely sepsis with unknown source  -, leukocytosis to 14.75, hypotensive to 79/46 in ED  -Received 3L total IV fluids during ED stay with improvement   -Cefepime 2g q24hrs for now  -CXR 4/26 showing stable bilateral opacities from prior admission  -Midodrine 10mg TID  -F/U blood cultures, urine legionella/strep  -F/U MRSA nares  -F/U procal    3) LORRAINE on CKD IIIa  -Intravascular volume depletion vs cardiorenal  -Baseline creatinine 1.0-1.2 from previous admission; 3.1 on current admission   -F/U urinalysis, urine Na, urine osmolality, serum osmolality  -F/U BMP    4) Afib  -Continue metoprolol succinate 50mg q24hrs  -Not on AC given previous drop in hemoglobin    5) DM II  -Carb-consistent diet  -POCT glucose  -Basal/bolus if >180 blood glucose    6) Normocytic Anemia  -Hemoglobin 8.3, stable from previous admission  -EGD 4/9 with no active bleeding  -Pantoprazole 40mg q24hrs  -Will need colonoscopy outpatient  -Iron studies from 3/30: Total iron-178, TIBC- 305, % saturation 58%  -Keep active T+S  -Keep HgB>8    7) HLD  -Continue atorvastatin 40mg qhs    MISC:  -DVT PPX: High bleeding risk; cannot place SCD due to DVT; has IVC filter  -GI PPX: Pantoprazole  -Diet: DASH/Carb-Consistent  -Activity: As tolerated  -Code: FULL  -Disposition: Acute       Patient is a 81 y/o man with PMH of HTN, HLD, DM II, Afib (not on AC secondary to suspected GI bleed during last hospitalization March-April 2021), CKD stage 3A, bladder cancer (s/p cystectomy with urostomy), right lower extremity DVT (4/5; s/p IVC filter 4/8), diverticulosis (s/p bowel resection and reanastomosis) who presents from [facility] due to bilateral lower-extremity paresthesias for 7 days. Admitted for work-up of lower extremity swelling; septic on admission.     1) Lower extremity swelling with paresthesias  -Likely due to new acute B/L DVT   -other DDx include new onset of HF   -No previous documented history of CHF, hold furosemide 20mg BID for now given LORRAINE and hypotension  -Daily weights, strict I+O   -Preliminary US new bilateral DVT (previously had R popliteal DVT); f/u final read  -Has IVC filter from 4/8 placed by IR; unable to AC due to presumed GI bleed and drop in hemoglobin last stay  -Vascular consult for potential thrombectomy  -F/U BNP, troponins  -F/U echo    2) Hypotension  -Likely due to intravascular volume depletion, less likely sepsis with unknown source  -, leukocytosis to 14.75, hypotensive to 79/46 in ED  -Received 3L total IV fluids during ED stay with improvement   -Cefepime 2g q24hrs for now  -CXR 4/26 showing stable bilateral opacities from prior admission  -Midodrine 10mg TID  -F/U blood cultures, urine legionella/strep  -F/U MRSA nares  -F/U procal    3) LORRAINE on CKD IIIa  -likely Intravascular volume depletion   -doubt cardiorenal given CXR looks clear  -Baseline creatinine 1.0-1.2 from previous admission; 3.1 on current admission   -F/U urinalysis, urine Na, urine osmolality, serum osmolality  -F/U BMP    4) Afib  -Continue metoprolol succinate 50mg q24hrs  -Not on AC given previous drop in hemoglobin    5) DM II  -Carb-consistent diet  -POCT glucose  -Basal/bolus if >180 blood glucose    6) Normocytic Anemia  -Hemoglobin 8.3, stable from previous admission  -EGD 4/9 with no active bleeding  -Pantoprazole 40mg q24hrs  -Will need colonoscopy outpatient  -Iron studies from 3/30: Total iron-178, TIBC- 305, % saturation 58%  -Keep active T+S  -Keep HgB>8    7) HLD  -Continue atorvastatin 40mg qhs    MISC:  -DVT PPX: High bleeding risk; cannot place SCD due to DVT; has IVC filter  -GI PPX: Pantoprazole  -Diet: DASH/Carb-Consistent  -Activity: As tolerated  -Code: FULL  -Disposition: Acute       Patient is a 79 y/o man with PMH of HTN, HLD, DM II, Afib (not on AC secondary to suspected GI bleed during last hospitalization March-April 2021), CKD stage 3A, bladder cancer (s/p cystectomy with urostomy), right lower extremity DVT (4/5; s/p IVC filter 4/8), diverticulosis (s/p bowel resection and reanastomosis) who presents from [facility] due to bilateral lower-extremity paresthesias for 7 days. Admitted for work-up of lower extremity swelling; hypotensive with LORRAINE on admission.     1) Lower extremity swelling with paresthesias  -Likely due to new acute B/L DVT   -other DDx include new onset of HF   -No previous documented history of CHF, hold furosemide 20mg BID for now given LORRAINE and hypotension  -Daily weights, strict I+O   -Preliminary US new bilateral DVT (previously had R popliteal DVT); f/u final read  -Has IVC filter from 4/8 placed by IR; unable to AC due to presumed GI bleed and drop in hemoglobin last stay  -Vascular consult for potential thrombectomy  -F/U BNP, troponins  -F/U echo    2) Hypotension  -Likely due to intravascular volume depletion, less likely sepsis with unknown source  -, leukocytosis to 14.75, hypotensive to 79/46 in ED  -Received 3L total IV fluids during ED stay with improvement   -Cefepime 2g q24hrs for now  -CXR 4/26 showing stable bilateral opacities from prior admission  -Midodrine 10mg TID  -F/U blood cultures, urine legionella/strep  -F/U MRSA nares  -F/U procal    3) LORRAINE on CKD IIIa  -likely Intravascular volume depletion   -doubt cardiorenal given CXR looks clear  -Baseline creatinine 1.0-1.2 from previous admission; 3.1 on current admission   -F/U urinalysis, urine Na, urine osmolality, serum osmolality  -F/U BMP    4) Afib  -Continue metoprolol succinate 50mg q24hrs  -Not on AC given previous drop in hemoglobin    5) DM II  -Carb-consistent diet  -POCT glucose  -Basal/bolus if >180 blood glucose    6) Normocytic Anemia  -Hemoglobin 8.3, stable from previous admission  -EGD 4/9 with no active bleeding  -Pantoprazole 40mg q24hrs  -Will need colonoscopy outpatient  -Iron studies from 3/30: Total iron-178, TIBC- 305, % saturation 58%  -Keep active T+S  -Keep HgB>8    7) HLD  -Continue atorvastatin 40mg qhs    MISC:  -DVT PPX: High bleeding risk; cannot place SCD due to DVT; has IVC filter  -GI PPX: Pantoprazole  -Diet: DASH/Carb-Consistent  -Activity: As tolerated  -Code: FULL  -Disposition: Acute

## 2021-04-26 NOTE — H&P ADULT - NSICDXPASTMEDICALHX_GEN_ALL_CORE_FT
PAST MEDICAL HISTORY:  Bladder cancer secondary to exposure at [employer] 9/11 s/p cystectomy with urostomy    Chronic kidney disease (CKD) stage 3A, baseline creatinine 1.4    Diabetes mellitus, type 2     DVT, lower extremity     History of atrial fibrillation     Hyperlipidemia     Hypertension

## 2021-04-26 NOTE — H&P ADULT - HISTORY OF PRESENT ILLNESS
Patient is a 81 y/o man with PMH of HTN, HLD, DM II, Afib (not on AC secondary to suspected GI bleed during last hospitalization March-April 2021), CKD stage 3A, bladder cancer (s/p cystectomy with urostomy), right lower extremity DVT (4/5; s/p IVC filter 4/8), diverticulosis (s/p bowel resection and reanastomosis) who presents from [facility] due to bilateral lower-extremity paresthesias for 7 days. Patient previously hospitalized (3/12/21-4/20/21) at [facility] for COVID-19 pneumonia; hospital course complicated by sepsis, presumed GI bleed, and DVT (s/p IVC filter)--patient discharged to [facility] for rehab. Patient states that since his hospitalization he has been immobile and that when attempting to stand he becomes hypotensive and thus he has not moved much. Patient fully functional prior to previous hospitalization. Patient states that a week prior to presentation he felt his feet tingling (R>L). Patient states that this was shortly followed by swelling of his bilateral lower extremities. Patient also endorsing pain in the feet, 8/10 in intensity, stable in intensity with no radiation, alleviating/exacerbating factors. Patient also endorsing paresthesias of bilateral hands during this time. Of note, patient states he was treated for a pneumonia during his stay at [facility]; no records from [facility] in chart and unable to reach anyone after multiple attempts. Patient denying any recent trauma, shortness of breath, chest pain, fevers, chills, abdominal pain, productive cough.    In ED, vitals were: HR: 102, BP: 85/55, T: 98.8- initially placed on 3L NC (unclear why) but has been on room air saturating well since.

## 2021-04-26 NOTE — H&P ADULT - NSHPREVIEWOFSYSTEMS_GEN_ALL_CORE
Patient endorsing depressive symptoms since previous hospitalization with wife passing away. Endorsing this is leading to decreased PO intake, sleep.

## 2021-04-26 NOTE — CONSULT NOTE ADULT - SUBJECTIVE AND OBJECTIVE BOX
INTERVENTIONAL RADIOLOGY CONSULT:     Procedure Requested:     HPI:  Patient is a 79 y/o man with PMH of HTN, HLD, DM II, Afib (not on AC secondary to suspected GI bleed during last hospitalization March-April 2021), CKD stage 3A, bladder cancer (s/p cystectomy with urostomy), right lower extremity DVT (4/5; s/p IVC filter 4/8), diverticulosis (s/p bowel resection and reanastomosis) who presents from [facility] due to bilateral lower-extremity paresthesias for 7 days. Patient previously hospitalized (3/12/21-4/20/21) at [facility] for COVID-19 pneumonia; hospital course complicated by sepsis, presumed GI bleed, and DVT (s/p IVC filter)--patient discharged to [facility] for rehab. Patient states that since his hospitalization he has been immobile and that when attempting to stand he becomes hypotensive and thus he has not moved much. Patient fully functional prior to previous hospitalization. Patient states that a week prior to presentation he felt his feet tingling (R>L). Patient states that this was shortly followed by swelling of his bilateral lower extremities. Patient also endorsing pain in the feet, 8/10 in intensity, stable in intensity with no radiation, alleviating/exacerbating factors. Patient also endorsing paresthesias of bilateral hands during this time. Of note, patient states he was treated for a pneumonia during his stay at [facility]; no records from [facility] in chart and unable to reach anyone after multiple attempts. Patient denying any recent trauma, shortness of breath, chest pain, fevers, chills, abdominal pain, productive cough.    In ED, vitals were: HR: 102, BP: 85/55, T: 98.8- initially placed on 3L NC (unclear why) but has been on room air saturating well since.      (26 Apr 2021 11:38)      PAST MEDICAL & SURGICAL HISTORY:  Hypertension    Hyperlipidemia    Diabetes mellitus, type 2    History of atrial fibrillation    Bladder cancer  secondary to exposure at [employer] [employer] 9/11 s/p cystectomy with urostomy    Chronic kidney disease (CKD)  stage 3A, baseline creatinine 1.4    DVT, lower extremity    History of total hip replacement, bilateral    History of total knee replacement, bilateral    History of total cystectomy  with resultant urostomy        MEDICATIONS  (STANDING):  atorvastatin 40 milliGRAM(s) Oral at bedtime  chlorhexidine 4% Liquid 1 Application(s) Topical <User Schedule>  dextrose 40% Gel 15 Gram(s) Oral once  dextrose 5%. 1000 milliLiter(s) (50 mL/Hr) IV Continuous <Continuous>  dextrose 50% Injectable 25 Gram(s) IV Push once  ferrous    sulfate 325 milliGRAM(s) Oral daily  glucagon  Injectable 1 milliGRAM(s) IntraMuscular once  heparin   Injectable 5000 Unit(s) SubCutaneous every 12 hours  insulin lispro (ADMELOG) corrective regimen sliding scale   SubCutaneous three times a day before meals  metoprolol succinate ER 50 milliGRAM(s) Oral daily  midodrine. 10 milliGRAM(s) Oral three times a day  pantoprazole    Tablet 40 milliGRAM(s) Oral before breakfast    MEDICATIONS  (PRN):  acetaminophen   Tablet .. 650 milliGRAM(s) Oral every 6 hours PRN Temp greater or equal to 38C (100.4F), Mild Pain (1 - 3)      Allergies    No Known Allergies    Intolerances    Physical Exam:   Vital Signs Last 24 Hrs  T(C): 37.1 (26 Apr 2021 16:09), Max: 37.4 (26 Apr 2021 04:14)  T(F): 98.7 (26 Apr 2021 16:09), Max: 99.4 (26 Apr 2021 04:14)  HR: 95 (26 Apr 2021 16:09) (95 - 102)  BP: 91/53 (26 Apr 2021 16:09) (79/46 - 96/54)  BP(mean): 71 (26 Apr 2021 10:35) (71 - 71)  RR: 18 (26 Apr 2021 16:09) (18 - 20)  SpO2: 97% (26 Apr 2021 16:09) (97% - 98%)          Labs:                         8.3    14.75 )-----------( 267      ( 26 Apr 2021 03:10 )             26.4     04-26    127<L>  |  90<L>  |  45<H>  ----------------------------<  164<H>  5.2<H>   |  21  |  3.1<H>    Ca    9.2      26 Apr 2021 03:10    TPro  6.0  /  Alb  3.3<L>  /  TBili  1.1  /  DBili  x   /  AST  16  /  ALT  12  /  AlkPhos  91  04-26        Pertinent labs:                      8.3    14.75 )-----------( 267      ( 26 Apr 2021 03:10 )             26.4       04-26    127<L>  |  90<L>  |  45<H>  ----------------------------<  164<H>  5.2<H>   |  21  |  3.1<H>    Ca    9.2      26 Apr 2021 03:10    TPro  6.0  /  Alb  3.3<L>  /  TBili  1.1  /  DBili  x   /  AST  16  /  ALT  12  /  AlkPhos  91  04-26          Radiology & Additional Studies:     Radiology imaging reviewed.       ASSESSMENT AND PLAN:  79yo M s/p IVC filter placement with b/l LE edema, found to have b/l acute DVTs. Creatinine elevated to ~3, LORRAINE.  - most recent LE u/s with new b/l DVT  - recommend conservative management with trial of anticoagulation. If fails, IR will consider percutaneous thrombectomy.      Thank you for the courtesy of this consult, please call [phone] with any further questions.

## 2021-04-26 NOTE — H&P ADULT - NSHPSOCIALHISTORY_GEN_ALL_CORE
From [place]  Tobacco Use: former smoker, smoked 1 pack per day for 4 years from [age], quit at age 22  Alcohol Use: denies

## 2021-04-26 NOTE — H&P ADULT - NSHPPHYSICALEXAM_GEN_ALL_CORE
ICU Vital Signs Last 24 Hrs  T(C): 36.5 (26 Apr 2021 09:36), Max: 37.4 (26 Apr 2021 04:14)  T(F): 97.7 (26 Apr 2021 09:36), Max: 99.4 (26 Apr 2021 04:14)  HR: 95 (26 Apr 2021 06:14) (95 - 102)  BP: 96/54 (26 Apr 2021 10:35) (79/46 - 96/54)  BP(mean): 71 (26 Apr 2021 10:35) (71 - 71)  ABP: --  ABP(mean): --  RR: 18 (26 Apr 2021 09:36) (18 - 20)  SpO2: 98% (26 Apr 2021 09:36) (98% - 98%)    PHYSICAL EXAM:  GENERAL: Laying supine in no acute distress  HEENT:  Atraumatic, Normocephalic; EOMI, PERRLA, conjunctiva pink, sclera white  CHEST/LUNG: Crackles throughout R lung appreciated, decreased breath sounds on left  HEART: Regular rate and rhythm; No murmurs, rubs, or gallops  ABDOMEN: Urostomy RLQ, Soft, Nontender, Nondistended; Bowel sounds present  EXTREMITIES:  Pitting edema B/L lower extremities to level of knees  PSYCH: AAOx3  NEUROLOGY: non-focal  SKIN: No rashes/lesions appreciated

## 2021-04-27 LAB
ANION GAP SERPL CALC-SCNC: 19 MMOL/L — HIGH (ref 7–14)
APTT BLD: 30.6 SEC — SIGNIFICANT CHANGE UP (ref 27–39.2)
BASOPHILS # BLD AUTO: 0.1 K/UL — SIGNIFICANT CHANGE UP (ref 0–0.2)
BASOPHILS NFR BLD AUTO: 0.8 % — SIGNIFICANT CHANGE UP (ref 0–1)
BUN SERPL-MCNC: 48 MG/DL — HIGH (ref 10–20)
CALCIUM SERPL-MCNC: 8.6 MG/DL — SIGNIFICANT CHANGE UP (ref 8.5–10.1)
CHLORIDE SERPL-SCNC: 96 MMOL/L — LOW (ref 98–110)
CO2 SERPL-SCNC: 18 MMOL/L — SIGNIFICANT CHANGE UP (ref 17–32)
CREAT SERPL-MCNC: 2.6 MG/DL — HIGH (ref 0.7–1.5)
D DIMER BLD IA.RAPID-MCNC: 5793 NG/ML DDU — HIGH (ref 0–230)
EOSINOPHIL # BLD AUTO: 0.08 K/UL — SIGNIFICANT CHANGE UP (ref 0–0.7)
EOSINOPHIL NFR BLD AUTO: 0.6 % — SIGNIFICANT CHANGE UP (ref 0–8)
GLUCOSE BLDC GLUCOMTR-MCNC: 103 MG/DL — HIGH (ref 70–99)
GLUCOSE BLDC GLUCOMTR-MCNC: 131 MG/DL — HIGH (ref 70–99)
GLUCOSE BLDC GLUCOMTR-MCNC: 156 MG/DL — HIGH (ref 70–99)
GLUCOSE BLDC GLUCOMTR-MCNC: 89 MG/DL — SIGNIFICANT CHANGE UP (ref 70–99)
GLUCOSE SERPL-MCNC: 80 MG/DL — SIGNIFICANT CHANGE UP (ref 70–99)
HCT VFR BLD CALC: 25.6 % — LOW (ref 42–52)
HGB BLD-MCNC: 7.9 G/DL — LOW (ref 14–18)
IMM GRANULOCYTES NFR BLD AUTO: 5.6 % — HIGH (ref 0.1–0.3)
INR BLD: 1.38 RATIO — HIGH (ref 0.65–1.3)
LACTATE SERPL-SCNC: 1.1 MMOL/L — SIGNIFICANT CHANGE UP (ref 0.7–2)
LYMPHOCYTES # BLD AUTO: 0.8 K/UL — LOW (ref 1.2–3.4)
LYMPHOCYTES # BLD AUTO: 6.3 % — LOW (ref 20.5–51.1)
MAGNESIUM SERPL-MCNC: 2.1 MG/DL — SIGNIFICANT CHANGE UP (ref 1.8–2.4)
MCHC RBC-ENTMCNC: 27.1 PG — SIGNIFICANT CHANGE UP (ref 27–31)
MCHC RBC-ENTMCNC: 30.9 G/DL — LOW (ref 32–37)
MCV RBC AUTO: 87.7 FL — SIGNIFICANT CHANGE UP (ref 80–94)
MONOCYTES # BLD AUTO: 1.4 K/UL — HIGH (ref 0.1–0.6)
MONOCYTES NFR BLD AUTO: 11.1 % — HIGH (ref 1.7–9.3)
NEUTROPHILS # BLD AUTO: 9.53 K/UL — HIGH (ref 1.4–6.5)
NEUTROPHILS NFR BLD AUTO: 75.6 % — HIGH (ref 42.2–75.2)
NRBC # BLD: 0 /100 WBCS — SIGNIFICANT CHANGE UP (ref 0–0)
PHOSPHATE SERPL-MCNC: 5 MG/DL — HIGH (ref 2.1–4.9)
PLATELET # BLD AUTO: 282 K/UL — SIGNIFICANT CHANGE UP (ref 130–400)
POTASSIUM SERPL-MCNC: 5.1 MMOL/L — HIGH (ref 3.5–5)
POTASSIUM SERPL-SCNC: 5.1 MMOL/L — HIGH (ref 3.5–5)
PROCALCITONIN SERPL-MCNC: 0.54 NG/ML — HIGH (ref 0.02–0.1)
PROTHROM AB SERPL-ACNC: 15.9 SEC — HIGH (ref 9.95–12.87)
RBC # BLD: 2.92 M/UL — LOW (ref 4.7–6.1)
RBC # FLD: 17.2 % — HIGH (ref 11.5–14.5)
SODIUM SERPL-SCNC: 133 MMOL/L — LOW (ref 135–146)
TROPONIN T SERPL-MCNC: 0.03 NG/ML — CRITICAL HIGH
TROPONIN T SERPL-MCNC: 0.04 NG/ML — CRITICAL HIGH
TROPONIN T SERPL-MCNC: 0.04 NG/ML — CRITICAL HIGH
WBC # BLD: 12.62 K/UL — HIGH (ref 4.8–10.8)
WBC # FLD AUTO: 12.62 K/UL — HIGH (ref 4.8–10.8)

## 2021-04-27 PROCEDURE — 76775 US EXAM ABDO BACK WALL LIM: CPT | Mod: 26

## 2021-04-27 PROCEDURE — 71045 X-RAY EXAM CHEST 1 VIEW: CPT | Mod: 26,77

## 2021-04-27 PROCEDURE — 71045 X-RAY EXAM CHEST 1 VIEW: CPT | Mod: 26

## 2021-04-27 RX ORDER — LANOLIN ALCOHOL/MO/W.PET/CERES
5 CREAM (GRAM) TOPICAL AT BEDTIME
Refills: 0 | Status: DISCONTINUED | OUTPATIENT
Start: 2021-04-27 | End: 2021-05-05

## 2021-04-27 RX ORDER — LANOLIN ALCOHOL/MO/W.PET/CERES
5 CREAM (GRAM) TOPICAL ONCE
Refills: 0 | Status: COMPLETED | OUTPATIENT
Start: 2021-04-27 | End: 2021-04-27

## 2021-04-27 RX ADMIN — CEFEPIME 100 MILLIGRAM(S): 1 INJECTION, POWDER, FOR SOLUTION INTRAMUSCULAR; INTRAVENOUS at 05:22

## 2021-04-27 RX ADMIN — SODIUM ZIRCONIUM CYCLOSILICATE 5 GRAM(S): 10 POWDER, FOR SUSPENSION ORAL at 05:23

## 2021-04-27 RX ADMIN — PANTOPRAZOLE SODIUM 40 MILLIGRAM(S): 20 TABLET, DELAYED RELEASE ORAL at 05:23

## 2021-04-27 RX ADMIN — MIDODRINE HYDROCHLORIDE 10 MILLIGRAM(S): 2.5 TABLET ORAL at 17:27

## 2021-04-27 RX ADMIN — HEPARIN SODIUM 5000 UNIT(S): 5000 INJECTION INTRAVENOUS; SUBCUTANEOUS at 17:26

## 2021-04-27 RX ADMIN — Medication 2: at 12:37

## 2021-04-27 RX ADMIN — HEPARIN SODIUM 5000 UNIT(S): 5000 INJECTION INTRAVENOUS; SUBCUTANEOUS at 05:23

## 2021-04-27 RX ADMIN — ATORVASTATIN CALCIUM 40 MILLIGRAM(S): 80 TABLET, FILM COATED ORAL at 21:19

## 2021-04-27 RX ADMIN — Medication 325 MILLIGRAM(S): at 12:37

## 2021-04-27 RX ADMIN — Medication 5 MILLIGRAM(S): at 22:46

## 2021-04-27 RX ADMIN — MIDODRINE HYDROCHLORIDE 10 MILLIGRAM(S): 2.5 TABLET ORAL at 12:37

## 2021-04-27 RX ADMIN — MIDODRINE HYDROCHLORIDE 10 MILLIGRAM(S): 2.5 TABLET ORAL at 05:23

## 2021-04-27 RX ADMIN — Medication 5 MILLIGRAM(S): at 00:29

## 2021-04-27 NOTE — DIETITIAN INITIAL EVALUATION ADULT. - PERSON TAUGHT/METHOD
Discussed use of oral nutrition supplements to optimize po intake/verbal instruction/patient instructed

## 2021-04-27 NOTE — PROGRESS NOTE ADULT - SUBJECTIVE AND OBJECTIVE BOX
DAILY PROGRESS NOTE  ===========================================================    Patient Information:  ROSA MARIA CASEY  /  80y  /  Male  /  MRN#: [id number]    Hospital Day: 1d   Location: [place] [address] [address] ([address])    |:::::::::::::::::::::::::::| SUBJECTIVE |:::::::::::::::::::::::::::|    OVERNIGHT EVENTS: No acute events overnight.   TODAY: Patient was seen today at bedside. Numbness and tingling of b/l legs. Review of systems is otherwise negative.    |:::::::::::::::::::::::::::| ADMISSION HPI |:::::::::::::::::::::::::::|    HPI:  Patient is a 81 y/o man with PMH of HTN, HLD, DM II, Afib (not on AC secondary to suspected GI bleed during last hospitalization March-April 2021), CKD stage 3A, bladder cancer (s/p cystectomy with urostomy), right lower extremity DVT (4/5; s/p IVC filter 4/8), diverticulosis (s/p bowel resection and reanastomosis) who presents from [facility] due to bilateral lower-extremity paresthesias for 7 days. Patient previously hospitalized (3/12/21-4/20/21) at [facility] for COVID-19 pneumonia; hospital course complicated by sepsis, presumed GI bleed, and DVT (s/p IVC filter)--patient discharged to [facility] for rehab. Patient states that since his hospitalization he has been immobile and that when attempting to stand he becomes hypotensive and thus he has not moved much. Patient fully functional prior to previous hospitalization. Patient states that a week prior to presentation he felt his feet tingling (R>L). Patient states that this was shortly followed by swelling of his bilateral lower extremities. Patient also endorsing pain in the feet, 8/10 in intensity, stable in intensity with no radiation, alleviating/exacerbating factors. Patient also endorsing paresthesias of bilateral hands during this time. Of note, patient states he was treated for a pneumonia during his stay at [facility]; no records from [facility] in chart and unable to reach anyone after multiple attempts. Patient denying any recent trauma, shortness of breath, chest pain, fevers, chills, abdominal pain, productive cough.    In ED, vitals were: HR: 102, BP: 85/55, T: 98.8- initially placed on 3L NC (unclear why) but has been on room air saturating well since.      (26 Apr 2021 11:38)      |:::::::::::::::::::::::::::| OBJECTIVE |:::::::::::::::::::::::::::|    STANDING MEDICATIONS  atorvastatin 40 milliGRAM(s) Oral at bedtime  cefepime   IVPB 2000 milliGRAM(s) IV Intermittent every 24 hours  chlorhexidine 4% Liquid 1 Application(s) Topical <User Schedule>  dextrose 40% Gel 15 Gram(s) Oral once  dextrose 5%. 1000 milliLiter(s) IV Continuous <Continuous>  dextrose 50% Injectable 25 Gram(s) IV Push once  ferrous    sulfate 325 milliGRAM(s) Oral daily  glucagon  Injectable 1 milliGRAM(s) IntraMuscular once  heparin   Injectable 5000 Unit(s) SubCutaneous every 12 hours  insulin lispro (ADMELOG) corrective regimen sliding scale   SubCutaneous three times a day before meals  metoprolol succinate ER 50 milliGRAM(s) Oral daily  midodrine. 10 milliGRAM(s) Oral three times a day  pantoprazole    Tablet 40 milliGRAM(s) Oral before breakfast    PRN MEDICATIONS  acetaminophen   Tablet .. 650 milliGRAM(s) Oral every 6 hours PRN    HOME MEDICATIONS  ferrous sulfate 325 mg (65 mg elemental iron) oral tablet: 1 tab(s) orally once a day  furosemide 20 mg oral tablet: 1 tab(s) orally 2 times a day  glimepiride 2 mg oral tablet: 1 tab(s) orally once a day  metoprolol succinate 50 mg oral tablet, extended release: 1 tab(s) orally once a day  Ozempic (1 mg dose) 2 mg/1.5 mL subcutaneous solution:   rosuvastatin 20 mg oral tablet: 1 tab(s) orally once a day      VITAL SIGNS: Last 24 Hours  T(C): 36.5 (27 Apr 2021 08:18), Max: 37.1 (26 Apr 2021 16:09)  T(F): 97.7 (27 Apr 2021 08:18), Max: 98.7 (26 Apr 2021 16:09)  HR: 96 (27 Apr 2021 08:18) (95 - 106)  BP: 99/56 (27 Apr 2021 08:18) (83/56 - 99/56)  BP(mean): 71 (26 Apr 2021 10:35) (71 - 71)  RR: 18 (27 Apr 2021 08:18) (18 - 18)  SpO2: 97% (26 Apr 2021 16:09) (97% - 97%)    Input-Output      PHYSICAL EXAM:  GEN: No acute distress.  LUNGS: Clear to auscultation bilaterally.  HEART: Regular rate and rhythm. No murmurs.  ABD: Soft, non-tender, non-distended.  EXT: Normal range of motion. B/l leg edema  NEURO: Alert, attentive, oriented. Clear, fluent speech. Eye movements intact. Moves all extremities.  PSYCH: Cooperative, appropriate.    LAB RESULTS:                        7.9    12.62 )-----------( 282      ( 27 Apr 2021 06:36 )             25.6     04-27    133<L>  |  96<L>  |  48<H>  ----------------------------<  80  5.1<H>   |  18  |  2.6<H>    Ca    8.6      27 Apr 2021 09:15  Phos  5.0     04-27  Mg     2.1     04-27    TPro  6.0  /  Alb  3.3<L>  /  TBili  1.1  /  DBili  x   /  AST  16  /  ALT  12  /  AlkPhos  91  04-26    PT/INR - ( 27 Apr 2021 06:36 )   PT: 15.90 sec;   INR: 1.38 ratio         PTT - ( 27 Apr 2021 06:36 )  PTT:30.6 sec      Lactate, Blood: 1.1 mmol/L [0.7 - 2.0] (04-27-21 @ 06:36)  Troponin T, Serum: 0.04 ng/mL *HH* [Hemolyzed. Interpret with caution  Critical value:] (04-27-21 @ 00:42)  Troponin T, Serum: 0.04 ng/mL *HH* [Critical value:] (04-26-21 @ 21:17)  Troponin T, Serum: 0.04 ng/mL *HH* [TYPE:(C=Critical, N=Notification, A=Abnormal) n  TESTS: tropt  DATE/TIME CALLED: 04/26/2021 18:02:07 EDT  CALLED TO: md glenn  READ BACK (2 Patient Identifiers)(Y/N): y  READ BACK VALUES (Y/N): y  CALLED BY: isabel wahl  Critical value:] (04-26-21 @ 16:36)    CARDIAC MARKERS ( 27 Apr 2021 00:42 )  x     / 0.04 ng/mL / x     / x     / x      CARDIAC MARKERS ( 26 Apr 2021 21:17 )  x     / 0.04 ng/mL / x     / x     / x      CARDIAC MARKERS ( 26 Apr 2021 16:36 )  x     / 0.04 ng/mL / x     / x     / x          MICROBIOLOGY:    IMAGING/STUDIES:    < from: VA Duplex Lower Ext Vein Scan, Bilat (04.26.21 @ 08:17) >  IMPRESSION:  Bilateral external iliac, commonfemoral, popliteal and posterior tibial vein DVT. Acute left femoral vein DVT. Bilateral great saphenous vein superficial thrombophlebitis. Acute left gastrocnemius vein thrombosis    < end of copied text >    < from: TTE Echo Complete w/o Contrast w/ Doppler (04.26.21 @ 13:11) >  Summary:   1. Normal global left ventricular systolic function.   2. LV Ejection Fraction by Mejia's Method with a biplane EF of 59 %.   3. Normal left ventricular internal cavity size.   4. Normal left atrial size.   5. Normal right atrial size.   6. No evidence of mitral valve regurgitation.   7. Mild tricuspid regurgitation.   8. Bicuspid aortic valve with decreased opening.   9. Mild pulmonic valve regurgitation.  10. Estimated pulmonary artery systolic pressure is 35.3 mmHg assuming a right atrial pressure of 3 mmHg, which is consistent with borderline pulmonary hypertension.  11. LA volume Index is 9.5 ml/m² ml/m2.    < end of copied text >      ALLERGIES:  No Known Allergies      ===========================================================

## 2021-04-27 NOTE — PHYSICAL THERAPY INITIAL EVALUATION ADULT - PERTINENT HX OF CURRENT PROBLEM, REHAB EVAL
Patient is a 81 y/o man with PMH of HTN, HLD, DM II, Afib (not on AC secondary to suspected GI bleed during last hospitalization March-April 2021), CKD stage 3A, bladder cancer (s/p cystectomy with urostomy), right lower extremity DVT (4/5; s/p IVC filter 4/8), diverticulosis (s/p bowel resection and reanastomosis) who presents from [facility] due to bilateral lower-extremity paresthesias for 7 days. Patient previously hospitalized (3/12/21-4/20/21) at [facility] for COVID-19 pneumonia;

## 2021-04-27 NOTE — DIETITIAN INITIAL EVALUATION ADULT. - ADD RECOMMEND
Continue current diet order as tolerated, add soft diet modifier. Order glucerna BID, ensure pudding BID and prosourc gelatin sugar-free BID to optimize po intake. If not contraindicated, order MVI with minerals 500mg q24hrs to optimize po intake

## 2021-04-27 NOTE — PROGRESS NOTE ADULT - ASSESSMENT
pt with recent prolonged and complicated hosp stay for Covid Viral Syn with Acute Hypoxic RF, R DVT, drop in H/H req PRBCs, LORRAINE, transaminitis, sp IVC Fplacement on 4/8 returns for lower ext pain, swelling and  sensation of pins and needles with hypotension and LORRAINE on CKD.    lower ext pain, swelling and pins and needles with BL acute on chronic DVT, sp IVC filter 4/8/21  Hypotension  LORRAINE on CKD  Debilitated, deconditioned state, mobility dysfunction  Hx of recnt prolonged hosp due to COVID PNA, Acute Hypoxic RF  Hx of HTN, ASHD, Afib  ( was on AC with Eliquis)  Hx of HDLD  Hx of  DM II, CKD, neuropathy  Hx of Bladder Ca, sp cystectomy and urostomy, 911 exposure  Hx of GERD, diverticulosis, sp bowel resection with reanastomosis  Hx of OA, DDD, DJD, sp BL TKR, sp hip surgery    venous duplex of lower ext now shows BL extensive acute on chronic DVT  pt is sp IVC filter 4/8 due to inability to AC due to GIB, sp EGD gastritis and duodenitis 4/21, unable to do colonoscopy due to unstable state at the last ad  pt was cultured and placed on cefepime  pt had 2l of IV fluids to support  BP and is on middodrine 10mg TID  pt is Covid negative  check cultures, procal MRSA, check Covid Ab level    ID consult    Vasc consult for evaluation of BL lower ext DVTs/ ? need for thrombectomy    GI for possible colonoscopy as pt is im more stable clinical state and may tolerate the GI prep, pt is Covid negative and optimum situation is to be on AC for afib and the DVT s of the lower ext which have progressed    Rehab, pt v deconditioned and need rigorous gait reconditioning    OOB to  chair  cont all meds  emotional support rendered

## 2021-04-27 NOTE — PHYSICAL THERAPY INITIAL EVALUATION ADULT - GENERAL OBSERVATIONS, REHAB EVAL
Attempted to see pt for b/s PT; however, pt is currently off the unit for Echo as per RN Radha. Will follow up as appropriate once pt is available.
8:40-9:15; Pt encountered in bed, RAHEL Leon present. Agreeable to PT with encouragement.

## 2021-04-27 NOTE — CONSULT NOTE ADULT - SUBJECTIVE AND OBJECTIVE BOX
Gastroenterology Consultation:    Patient is a 80y old  Male who presents with a chief complaint of Paresthesias of bilateral feet (27 Apr 2021 12:31)      Admitted on: 04-26-21  HPI:  Patient is a 81 y/o man with PMH of HTN, HLD, DM II, Afib (not on AC secondary to suspected GI bleed during last hospitalization March-April 2021), CKD stage 3A, bladder cancer (s/p cystectomy with urostomy), right lower extremity DVT (4/5; s/p IVC filter 4/8), diverticulosis (s/p bowel resection and reanastomosis) who presents from [facility] due to bilateral lower-extremity paresthesias for 7 days. Patient previously hospitalized (3/12/21-4/20/21) at [facility] for COVID-19 pneumonia; hospital course complicated by sepsis, presumed GI bleed, and DVT (s/p IVC filter)--patient discharged to [facility] for rehab. Patient was found to have acute of chronic DVT , IR recommended anticoagulation . GI consulted for clearance for AC given recent history of GI bleed and drop in HGB. Patient underwent EGD during the last visit for melena with no evidence of bleeding  , and colonoscopy was planned to be done as outpatient once COVID is resolved. Now patient denies abdominal pain , no nausea or vomiting , no melena , fresh blood per rectum or hematemesis.           Prior records Reviewed (Y/N): Y  History obtained from person other than patient (Y/N): Y    Prior EGD: < from: EGD (04.09.21 @ 10:30) >   Normal mucosa in the whole esophagus.    Erythema in the stomach compatible with non-erosive gastritis.    Erythemain the duodenum compatible with duodenitis.     < end of copied text >    Prior Colonoscopy: none in chart       PAST MEDICAL & SURGICAL HISTORY:  Hypertension    Hyperlipidemia    Diabetes mellitus, type 2    History of atrial fibrillation    Bladder cancer  secondary to exposure at [employer] 9/11 s/p cystectomy with urostomy    Chronic kidney disease (CKD)  stage 3A, baseline creatinine 1.4    DVT, lower extremity    History of total hip replacement, bilateral    History of total knee replacement, bilateral    History of total cystectomy  with resultant urostomy        FAMILY HISTORY: No pertinent family history       Social History:  Alcohol: denies   Drugs: denies     Home Medications:  furosemide 20 mg oral tablet: 1 tab(s) orally 2 times a day (26 Apr 2021 12:04)  glimepiride 2 mg oral tablet: 1 tab(s) orally once a day (26 Apr 2021 12:04)  metoprolol succinate 50 mg oral tablet, extended release: 1 tab(s) orally once a day (26 Apr 2021 12:04)  Ozempic (1 mg dose) 2 mg/1.5 mL subcutaneous solution:  (26 Apr 2021 12:04)  rosuvastatin 20 mg oral tablet: 1 tab(s) orally once a day (26 Apr 2021 12:04)    MEDICATIONS  (STANDING):  atorvastatin 40 milliGRAM(s) Oral at bedtime  cefepime   IVPB 2000 milliGRAM(s) IV Intermittent every 24 hours  chlorhexidine 4% Liquid 1 Application(s) Topical <User Schedule>  dextrose 40% Gel 15 Gram(s) Oral once  dextrose 5%. 1000 milliLiter(s) (50 mL/Hr) IV Continuous <Continuous>  dextrose 50% Injectable 25 Gram(s) IV Push once  ferrous    sulfate 325 milliGRAM(s) Oral daily  glucagon  Injectable 1 milliGRAM(s) IntraMuscular once  heparin   Injectable 5000 Unit(s) SubCutaneous every 12 hours  insulin lispro (ADMELOG) corrective regimen sliding scale   SubCutaneous three times a day before meals  metoprolol succinate ER 50 milliGRAM(s) Oral daily  midodrine. 10 milliGRAM(s) Oral three times a day  pantoprazole    Tablet 40 milliGRAM(s) Oral before breakfast    MEDICATIONS  (PRN):  acetaminophen   Tablet .. 650 milliGRAM(s) Oral every 6 hours PRN Temp greater or equal to 38C (100.4F), Mild Pain (1 - 3)      Allergies  No Known Allergies      Review of Systems:   Constitutional:  No Fever, No Chills  ENT/Mouth:  No Hearing Changes,  No Difficulty Swallowing  Eyes:  No Eye Pain, No Vision Changes  Cardiovascular:  No Chest Pain, No Palpitations  Respiratory:  No Cough, No Dyspnea  Gastrointestinal:  As described in HPI  Musculoskeletal:  No Joint Swelling, No Back Pain  Skin:  No Skin Lesions, No Jaundice  Neuro:  No Syncope, No Dizziness  Heme/Lymph:  No Bruising, No Bleeding.          Physical Examination:  T(C): 36.2 (04-27-21 @ 15:37), Max: 37.1 (04-26-21 @ 16:09)  HR: 93 (04-27-21 @ 15:37) (93 - 106)  BP: 113/55 (04-27-21 @ 15:37) (83/56 - 113/55)  RR: 19 (04-27-21 @ 15:37) (18 - 19)  SpO2: 97% (04-26-21 @ 16:09) (97% - 97%)      04-27-21 @ 07:01  -  04-27-21 @ 15:51  --------------------------------------------------------  IN: 0 mL / OUT: 200 mL / NET: -200 mL        Constitutional: No acute distress.  Eyes:. Conjunctivae are clear, Sclera is non-icteric.  Ears Nose and Throat: The external ears are normal appearing,  Oral mucosa is pink and moist.  Respiratory:  No signs of respiratory distress. Lung sounds are clear bilaterally.  Cardiovascular:  S1 S2, Regular rate and rhythm.  GI: Abdomen is soft, symmetric, and non-tender without distention.  Bowel sounds are present and normoactive in all four quadrants. No masses, hepatomegaly, or splenomegaly are noted.   Neuro: No Tremor, No involuntary movements  Skin: No rashes, No Jaundice.          Data: (reviewed by attending)                        7.9    12.62 )-----------( 282      ( 27 Apr 2021 06:36 )             25.6     Hgb Trend:  7.9  04-27-21 @ 06:36  8.3  04-26-21 @ 03:10        04-27    133<L>  |  96<L>  |  48<H>  ----------------------------<  80  5.1<H>   |  18  |  2.6<H>    Ca    8.6      27 Apr 2021 09:15  Phos  5.0     04-27  Mg     2.1     04-27    TPro  6.0  /  Alb  3.3<L>  /  TBili  1.1  /  DBili  x   /  AST  16  /  ALT  12  /  AlkPhos  91  04-26    Liver panel trend:  TBili 1.1   /   AST 16   /   ALT 12   /   AlkP 91   /   Tptn 6.0   /   Alb 3.3    /   DBili --      04-26      PT/INR - ( 27 Apr 2021 06:36 )   PT: 15.90 sec;   INR: 1.38 ratio         PTT - ( 27 Apr 2021 06:36 )  PTT:30.6 sec    Culture - Blood (collected 26 Apr 2021 05:30)  Source: .Blood Blood-Peripheral  Preliminary Report (27 Apr 2021 13:01):    No growth to date.    Culture - Blood (collected 26 Apr 2021 05:30)  Source: .Blood Blood-Peripheral  Preliminary Report (27 Apr 2021 13:01):    No growth to date.          Radiology:(reviewed by attending)

## 2021-04-27 NOTE — CONSULT NOTE ADULT - ASSESSMENT
Patient is a 79 y/o man with PMH of HTN, HLD, DM II, Afib (not on AC secondary to suspected GI bleed during last hospitalization March-April 2021) S/P EGD 4/2021 for melena  showing non erosive gastritis , CKD stage 3A, bladder cancer (s/p cystectomy with urostomy), right lower extremity DVT (4/5; s/p IVC filter 4/8), diverticulosis (s/p bowel resection and reanastomosis) now admitted with acute on chronic DVT requiring Anticoagulation. GI consulted for alan for anticoagulation.       #history of GI bleed , S/P EGD showing non erosive gastritis ,  no evidence of gross GI bleed now   #acute normocytic anemia   #resolved COVID on room air now     REC:  patient will benefit from colonoscopy for further evaluation of anemia and history of GI bleed  Clear liquid diet tomorrow   NPO tomorrow after midnight  Golytely 4L tomorrow afternoon  Dulcolax 20 mg once afternoon    patient can be on heparin drip for AC if deemed necessary     discussion with attending to follow      Patient is a 81 y/o man with PMH of HTN, HLD, DM II, Afib (not on AC secondary to suspected GI bleed during last hospitalization March-April 2021) S/P EGD 4/2021 for melena  showing non erosive gastritis , CKD stage 3A, bladder cancer (s/p cystectomy with urostomy), right lower extremity DVT (4/5; s/p IVC filter 4/8), diverticulosis (s/p bowel resection and reanastomosis) now admitted with acute on chronic DVT requiring Anticoagulation. GI consulted for alan for anticoagulation.       #history of GI bleed , S/P EGD showing non erosive gastritis ,  no evidence of gross GI bleed now   #acute normocytic anemia   #resolved COVID on room air now     REC:  patient will benefit from colonoscopy for further evaluation of anemia and history of GI bleed  Clear liquid diet tomorrow   NPO tomorrow after midnight  Golytely 4L tomorrow afternoon  Dulcolax 20 mg once afternoon    transfuse one unit of pRBC and keep HGB above 8  patient can be on heparin drip for AC if deemed necessary     discussion with attending to follow      Patient is a 79 y/o man with PMH of HTN, HLD, DM II, Afib (not on AC secondary to suspected GI bleed during last hospitalization March-April 2021) S/P EGD 4/2021 for melena  showing non erosive gastritis , CKD stage 3A, bladder cancer (s/p cystectomy with urostomy), right lower extremity DVT (4/5; s/p IVC filter 4/8), diverticulosis (s/p bowel resection and reanastomosis) now admitted with acute on chronic DVT requiring Anticoagulation. GI consulted for alan for anticoagulation.       #history of GI bleed , S/P EGD showing non erosive gastritis ,  no evidence of gross GI bleed now   #acute normocytic anemia   #resolved COVID on room air now   #patient medical status is discussed with primary team and patients , and the benefits of procedure outweigh the risks     REC:  patient will benefit from colonoscopy for further evaluation of anemia and history of GI bleed  Clear liquid diet on Wednesday  NPO after midnight   Golytely 4L Wednesday afternoon  Dulcolax 20 mg once Wednesday afternoon    transfuse one unit of pRBC and keep HGB above 8  patient can be on heparin drip for AC if deemed necessary , hold heparin after midnight if started

## 2021-04-27 NOTE — DIETITIAN INITIAL EVALUATION ADULT. - PERTINENT LABORATORY DATA
(4/27) H/H 7.9/25.6, Na 133, K 5.1, BUN 48, Cr 2.6, eGFR 23, A1c 8.1% (3/13)  CAPILLARY BLOOD GLUCOSE  POCT Blood Glucose.: 156 mg/dL (27 Apr 2021 11:29)  POCT Blood Glucose.: 89 mg/dL (27 Apr 2021 08:18)  POCT Blood Glucose.: 113 mg/dL (26 Apr 2021 21:24)  POCT Blood Glucose.: 78 mg/dL (26 Apr 2021 16:46)

## 2021-04-27 NOTE — DIETITIAN INITIAL EVALUATION ADULT. - OTHER CALCULATIONS
wt used: 81kg IBW -- ?accuracy of CBW, bmi>30 kcal: 2025-2430kcal (25-30kcal/kg IBW) protein: 73-81g (.9-1g/kg IBW)--LORRAINE on CKD3 considered, will re-adjust as needed   fluids: 1ml/kcal or per LIP

## 2021-04-27 NOTE — PROGRESS NOTE ADULT - SUBJECTIVE AND OBJECTIVE BOX
ROSA MARIA CASEY 80y WM Male transferred from [facility] due to lower ext weakness, numbness, tingling ans swelling ff by tingling of the hands.  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  The pt  was cultured and placed on Cefepime, given IV fluids for BP.  Of note pt has recent complicated  Covid hospitalization c/by prolonged bedbound state, LORRAINE, transaminitis,  bacterial PNA,, RLE DVT, GIB req transfusions, thus, pacement of IVC filter on 4 /8.        INTERVAL HPI/OVERNIGHT EVENTS:    MEDICATIONS  (STANDING):  atorvastatin 40 milliGRAM(s) Oral at bedtime  cefepime   IVPB 2000 milliGRAM(s) IV Intermittent every 24 hours  chlorhexidine 4% Liquid 1 Application(s) Topical <User Schedule>  dextrose 40% Gel 15 Gram(s) Oral once  dextrose 5%. 1000 milliLiter(s) (50 mL/Hr) IV Continuous <Continuous>  dextrose 50% Injectable 25 Gram(s) IV Push once  ferrous    sulfate 325 milliGRAM(s) Oral daily  glucagon  Injectable 1 milliGRAM(s) IntraMuscular once  heparin   Injectable 5000 Unit(s) SubCutaneous every 12 hours  insulin lispro (ADMELOG) corrective regimen sliding scale   SubCutaneous three times a day before meals  metoprolol succinate ER 50 milliGRAM(s) Oral daily  midodrine. 10 milliGRAM(s) Oral three times a day  pantoprazole    Tablet 40 milliGRAM(s) Oral before breakfast    MEDICATIONS  (PRN):  acetaminophen   Tablet .. 650 milliGRAM(s) Oral every 6 hours PRN Temp greater or equal to 38C (100.4F), Mild Pain (1 - 3)      Allergies    No Known Allergies    Intolerances  	    Vital Signs Last 24 Hrs  T(C): 36.5 (27 Apr 2021 08:18), Max: 37.1 (26 Apr 2021 16:09)  T(F): 97.7 (27 Apr 2021 08:18), Max: 98.7 (26 Apr 2021 16:09)  HR: 96 (27 Apr 2021 08:18) (95 - 106)  BP: 99/56 (27 Apr 2021 08:18) (83/56 - 99/56)  BP(mean): --  RR: 18 (27 Apr 2021 08:18) (18 - 18)  SpO2: 97% (26 Apr 2021 16:09) (97% - 97%)    PHYSICAL EXAM:      Constitutional:  A&O, elderly weak and chronically ill looking but  NAD    Eyes:  nonicteric    ENMT: dry oral mucosa, dental defects    Neck:  short, thick supple, no bruits, mild JVD    Respiratory: shallow resp    Cardiovascular: S1S2 irreg    Gastrointestinal:  obese, distended but soft and benign,     Genitourinary:  urostomy    Extremities: moves all ext, dec motor strength of kower ext, + edema, + arthritic changes    Vascular: dec pedal pulses    Neurological: nonfocal    Skin: no rash    Lymph Nodes:  not enlarged    Musculoskeletal:  nl    Psychiatric: depressed but stable        LABS:                        7.9    12.62 )-----------( 282      WBC on ad 14             25.6     04-27    133<L>  |  96<L>  |  48<H>  ----------------------------<  80  5.1<H>   |  18  |  2.6<H>  GFR   Ca    8.6      27 Apr 2021 09:15  Phos  5.0     04-27  Mg     2.1       trop 0.04 x 3  COCVID neg    TPro  6.0  /  Alb  3.3<L>  /  TBili  1.1  /  DBili  x   /  AST  16  /  ALT  12  /  AlkPhos  91  04-26    PT/INR - ( 27 Apr 2021 06:36 )   PT: 15.90 sec;   INR: 1.38 ratio         PTT - ( 27 Apr 2021 06:36 )  PTT:30.6 sec      RADIOLOGY & ADDITIONAL TESTS:    CXR:  BL opacities  lower ext duplex: BL ext acute and chronic DVT    ECHO:  EF  59%, mild TR, borderline pul HTN

## 2021-04-27 NOTE — PROGRESS NOTE ADULT - ASSESSMENT
Patient is a 79 y/o man with PMH of HTN, HLD, DM II, Afib (not on AC secondary to suspected GI bleed during last hospitalization March-April 2021), CKD stage 3A, bladder cancer (s/p cystectomy with urostomy), right lower extremity DVT (4/5; s/p IVC filter 4/8), diverticulosis (s/p bowel resection and reanastomosis) who presents from [facility] due to bilateral lower-extremity paresthesias for 7 days. Admitted for work-up of lower extremity swelling; hypotensive with LORRAINE on admission.     1) Lower extremity swelling with paresthesias  - Likely due to new acute B/L DVT  - other DDx include new onset of HF  - No previous documented history of CHF, hold furosemide 20mg BID for now given LORRAINE and hypotension  - Daily weights, strict I+O   - Preliminary US new bilateral DVT (previously had R popliteal DVT); f/u final read  - Has IVC filter from 4/8 placed by IR; unable to AC due to presumed GI bleed and drop in hemoglobin last stay  - IR recommended conservative management with AC and will consider percutaneous thrombectomy if treatment fails  - Trop 0.04,   - Echo EF 59%    2) Hypotension  - Likely due to intravascular volume depletion, less likely sepsis with unknown source  - , leukocytosis to 14.75, hypotensive to 79/46 in ED  - Received 3L total IV fluids during ED stay with improvement   - Cefepime 2g q24hrs for now  - CXR 4/26 showing stable bilateral opacities from prior admission  - Midodrine 10mg TID  - F/U blood cultures, urine legionella/strep  - F/U MRSA nares  - Procal 0.54 4/26  - ID consult    3) LORRAINE on CKD IIIa  - likely Intravascular volume depletion   - Baseline creatinine 1.0-1.2 from previous admission; 3.1 on current admission   - F/u urine studies    4) Afib  - Continue metoprolol succinate 50mg q24hrs  - Not on AC given previous drop in hemoglobin    5) DM II  - Carb-consistent diet  - POCT glucose  - Basal/bolus if >180 blood glucose    6) Normocytic Anemia  - Hemoglobin 8.3, stable from previous admission  - EGD 4/9 with no active bleeding  - Pantoprazole 40mg q24hrs  - Will need colonoscopy outpatient  - Iron studies from 3/30: Total iron-178, TIBC- 305, % saturation 58%  - Keep active T+S    7) HLD  - Continue atorvastatin 40mg qhs    MISC:  -DVT PPX: High bleeding risk; cannot place SCD due to DVT; has IVC filter  -GI PPX: Pantoprazole  -Diet: DASH/Carb-Consistent  -Activity: As tolerated  -Code: FULL  -Disposition: Acute Patient is a 79 y/o man with PMH of HTN, HLD, DM II, Afib (not on AC secondary to suspected GI bleed during last hospitalization March-April 2021), CKD stage 3A, bladder cancer (s/p cystectomy with urostomy), right lower extremity DVT (4/5; s/p IVC filter 4/8), diverticulosis (s/p bowel resection and reanastomosis) who presents from [facility] due to bilateral lower-extremity paresthesias for 7 days. Admitted for work-up of lower extremity swelling; hypotensive with LORRAINE on admission.     1) Lower extremity swelling with paresthesias  - Likely due to new acute B/L DVT  - other DDx include new onset of HF  - No previous documented history of CHF, hold furosemide 20mg BID for now given LORRAINE and hypotension  - Daily weights, strict I+O   - Preliminary US new bilateral DVT (previously had R popliteal DVT); f/u final read  - Has IVC filter from 4/8 placed by IR; unable to AC due to presumed GI bleed and drop in hemoglobin last stay  - IR recommended conservative management with AC and will consider percutaneous thrombectomy if treatment fails  - Trop 0.04,   - Echo EF 59%    2) Hypotension  - Likely due to intravascular volume depletion, less likely sepsis with unknown source  - , leukocytosis to 14.75, hypotensive to 79/46 in ED  - Received 3L total IV fluids during ED stay with improvement   - Cefepime 2g q24hrs for now  - CXR 4/26 showing stable bilateral opacities from prior admission  - Midodrine 10mg TID  - F/U blood cultures, urine legionella/strep  - F/U MRSA nares  - Procal 0.54 4/26    3) LORRAINE on CKD IIIa  - likely Intravascular volume depletion   - Baseline creatinine 1.0-1.2 from previous admission; 3.1 on current admission   - F/u urine studies    4) Afib  - Continue metoprolol succinate 50mg q24hrs  - Not on AC given previous drop in hemoglobin    5) DM II  - Carb-consistent diet  - POCT glucose  - Basal/bolus if >180 blood glucose    6) Normocytic Anemia  - Hemoglobin 8.3, stable from previous admission  - EGD 4/9 with no active bleeding  - Pantoprazole 40mg q24hrs  - Will need colonoscopy outpatient  - Iron studies from 3/30: Total iron-178, TIBC- 305, % saturation 58%  - Keep active T+S    7) HLD  - Continue atorvastatin 40mg qhs    MISC:  -DVT PPX: High bleeding risk; cannot place SCD due to DVT; has IVC filter  -GI PPX: Pantoprazole  -Diet: DASH/Carb-Consistent  -Activity: As tolerated  -Code: FULL  -Disposition: Acute

## 2021-04-27 NOTE — DIETITIAN INITIAL EVALUATION ADULT. - NAME AND PHONE
Nutrition Interventions: meals and snacks, medical food supplements, vitamin/mineral supplementation RD to monitor diet order, energy intake, body composition, NFPF, renal/glucose/electrolyte profile

## 2021-04-27 NOTE — DIETITIAN INITIAL EVALUATION ADULT. - MALNUTRITION
Malnutrition criteria not met at this time. ?accuracy of CBW, will f/u when second wt available to confirm wt loss

## 2021-04-28 LAB
ALBUMIN SERPL ELPH-MCNC: 3 G/DL — LOW (ref 3.5–5.2)
ALP SERPL-CCNC: 81 U/L — SIGNIFICANT CHANGE UP (ref 30–115)
ALT FLD-CCNC: 25 U/L — SIGNIFICANT CHANGE UP (ref 0–41)
ANION GAP SERPL CALC-SCNC: 14 MMOL/L — SIGNIFICANT CHANGE UP (ref 7–14)
ANION GAP SERPL CALC-SCNC: 15 MMOL/L — HIGH (ref 7–14)
AST SERPL-CCNC: 42 U/L — HIGH (ref 0–41)
BILIRUB SERPL-MCNC: 0.8 MG/DL — SIGNIFICANT CHANGE UP (ref 0.2–1.2)
BLD GP AB SCN SERPL QL: SIGNIFICANT CHANGE UP
BUN SERPL-MCNC: 48 MG/DL — HIGH (ref 10–20)
BUN SERPL-MCNC: 51 MG/DL — HIGH (ref 10–20)
CALCIUM SERPL-MCNC: 8.7 MG/DL — SIGNIFICANT CHANGE UP (ref 8.5–10.1)
CALCIUM SERPL-MCNC: 8.9 MG/DL — SIGNIFICANT CHANGE UP (ref 8.5–10.1)
CHLORIDE SERPL-SCNC: 100 MMOL/L — SIGNIFICANT CHANGE UP (ref 98–110)
CHLORIDE SERPL-SCNC: 98 MMOL/L — SIGNIFICANT CHANGE UP (ref 98–110)
CO2 SERPL-SCNC: 18 MMOL/L — SIGNIFICANT CHANGE UP (ref 17–32)
CO2 SERPL-SCNC: 19 MMOL/L — SIGNIFICANT CHANGE UP (ref 17–32)
COVID-19 SPIKE DOMAIN AB INTERP: POSITIVE
COVID-19 SPIKE DOMAIN ANTIBODY RESULT: >250 U/ML — HIGH
CREAT SERPL-MCNC: 2.3 MG/DL — HIGH (ref 0.7–1.5)
CREAT SERPL-MCNC: 2.4 MG/DL — HIGH (ref 0.7–1.5)
GLUCOSE BLDC GLUCOMTR-MCNC: 108 MG/DL — HIGH (ref 70–99)
GLUCOSE BLDC GLUCOMTR-MCNC: 112 MG/DL — HIGH (ref 70–99)
GLUCOSE BLDC GLUCOMTR-MCNC: 122 MG/DL — HIGH (ref 70–99)
GLUCOSE BLDC GLUCOMTR-MCNC: 178 MG/DL — HIGH (ref 70–99)
GLUCOSE SERPL-MCNC: 111 MG/DL — HIGH (ref 70–99)
GLUCOSE SERPL-MCNC: 97 MG/DL — SIGNIFICANT CHANGE UP (ref 70–99)
HCT VFR BLD CALC: 23.8 % — LOW (ref 42–52)
HCT VFR BLD CALC: 26.8 % — LOW (ref 42–52)
HGB BLD-MCNC: 7.5 G/DL — LOW (ref 14–18)
HGB BLD-MCNC: 8.5 G/DL — LOW (ref 14–18)
MAGNESIUM SERPL-MCNC: 2.2 MG/DL — SIGNIFICANT CHANGE UP (ref 1.8–2.4)
MAGNESIUM SERPL-MCNC: 2.3 MG/DL — SIGNIFICANT CHANGE UP (ref 1.8–2.4)
MCHC RBC-ENTMCNC: 27.5 PG — SIGNIFICANT CHANGE UP (ref 27–31)
MCHC RBC-ENTMCNC: 27.7 PG — SIGNIFICANT CHANGE UP (ref 27–31)
MCHC RBC-ENTMCNC: 31.5 G/DL — LOW (ref 32–37)
MCHC RBC-ENTMCNC: 31.7 G/DL — LOW (ref 32–37)
MCV RBC AUTO: 86.7 FL — SIGNIFICANT CHANGE UP (ref 80–94)
MCV RBC AUTO: 87.8 FL — SIGNIFICANT CHANGE UP (ref 80–94)
NRBC # BLD: 0 /100 WBCS — SIGNIFICANT CHANGE UP (ref 0–0)
NRBC # BLD: 0 /100 WBCS — SIGNIFICANT CHANGE UP (ref 0–0)
PLATELET # BLD AUTO: 297 K/UL — SIGNIFICANT CHANGE UP (ref 130–400)
PLATELET # BLD AUTO: 309 K/UL — SIGNIFICANT CHANGE UP (ref 130–400)
POTASSIUM SERPL-MCNC: 5.1 MMOL/L — HIGH (ref 3.5–5)
POTASSIUM SERPL-MCNC: 5.1 MMOL/L — HIGH (ref 3.5–5)
POTASSIUM SERPL-SCNC: 5.1 MMOL/L — HIGH (ref 3.5–5)
POTASSIUM SERPL-SCNC: 5.1 MMOL/L — HIGH (ref 3.5–5)
PROCALCITONIN SERPL-MCNC: 0.13 NG/ML — HIGH (ref 0.02–0.1)
PROT SERPL-MCNC: 5.6 G/DL — LOW (ref 6–8)
RBC # BLD: 2.71 M/UL — LOW (ref 4.7–6.1)
RBC # BLD: 3.09 M/UL — LOW (ref 4.7–6.1)
RBC # FLD: 16.8 % — HIGH (ref 11.5–14.5)
RBC # FLD: 17.3 % — HIGH (ref 11.5–14.5)
SARS-COV-2 IGG+IGM SERPL QL IA: >250 U/ML — HIGH
SARS-COV-2 IGG+IGM SERPL QL IA: POSITIVE
SODIUM SERPL-SCNC: 132 MMOL/L — LOW (ref 135–146)
SODIUM SERPL-SCNC: 132 MMOL/L — LOW (ref 135–146)
WBC # BLD: 11.61 K/UL — HIGH (ref 4.8–10.8)
WBC # BLD: 11.81 K/UL — HIGH (ref 4.8–10.8)
WBC # FLD AUTO: 11.61 K/UL — HIGH (ref 4.8–10.8)
WBC # FLD AUTO: 11.81 K/UL — HIGH (ref 4.8–10.8)

## 2021-04-28 PROCEDURE — 99223 1ST HOSP IP/OBS HIGH 75: CPT

## 2021-04-28 RX ORDER — SOD SULF/SODIUM/NAHCO3/KCL/PEG
4000 SOLUTION, RECONSTITUTED, ORAL ORAL ONCE
Refills: 0 | Status: COMPLETED | OUTPATIENT
Start: 2021-04-28 | End: 2021-04-28

## 2021-04-28 RX ORDER — SODIUM CHLORIDE 9 MG/ML
1000 INJECTION INTRAMUSCULAR; INTRAVENOUS; SUBCUTANEOUS
Refills: 0 | Status: DISCONTINUED | OUTPATIENT
Start: 2021-04-28 | End: 2021-05-05

## 2021-04-28 RX ADMIN — Medication 2: at 11:27

## 2021-04-28 RX ADMIN — HEPARIN SODIUM 5000 UNIT(S): 5000 INJECTION INTRAVENOUS; SUBCUTANEOUS at 05:27

## 2021-04-28 RX ADMIN — HEPARIN SODIUM 5000 UNIT(S): 5000 INJECTION INTRAVENOUS; SUBCUTANEOUS at 17:22

## 2021-04-28 RX ADMIN — Medication 50 MILLIGRAM(S): at 05:27

## 2021-04-28 RX ADMIN — CEFEPIME 100 MILLIGRAM(S): 1 INJECTION, POWDER, FOR SOLUTION INTRAMUSCULAR; INTRAVENOUS at 05:12

## 2021-04-28 RX ADMIN — MIDODRINE HYDROCHLORIDE 10 MILLIGRAM(S): 2.5 TABLET ORAL at 05:27

## 2021-04-28 RX ADMIN — MIDODRINE HYDROCHLORIDE 10 MILLIGRAM(S): 2.5 TABLET ORAL at 11:28

## 2021-04-28 RX ADMIN — ATORVASTATIN CALCIUM 40 MILLIGRAM(S): 80 TABLET, FILM COATED ORAL at 21:02

## 2021-04-28 RX ADMIN — MIDODRINE HYDROCHLORIDE 10 MILLIGRAM(S): 2.5 TABLET ORAL at 17:22

## 2021-04-28 RX ADMIN — PANTOPRAZOLE SODIUM 40 MILLIGRAM(S): 20 TABLET, DELAYED RELEASE ORAL at 05:27

## 2021-04-28 RX ADMIN — CHLORHEXIDINE GLUCONATE 1 APPLICATION(S): 213 SOLUTION TOPICAL at 05:40

## 2021-04-28 RX ADMIN — SODIUM CHLORIDE 60 MILLILITER(S): 9 INJECTION INTRAMUSCULAR; INTRAVENOUS; SUBCUTANEOUS at 13:38

## 2021-04-28 RX ADMIN — Medication 325 MILLIGRAM(S): at 11:28

## 2021-04-28 RX ADMIN — Medication 20 MILLIGRAM(S): at 21:03

## 2021-04-28 RX ADMIN — Medication 4000 MILLILITER(S): at 14:10

## 2021-04-28 NOTE — PROGRESS NOTE ADULT - ASSESSMENT
Patient is a 81 y/o man with PMH of HTN, HLD, DM II, Afib (not on AC secondary to suspected GI bleed during last hospitalization March-April 2021), CKD stage 3A, bladder cancer (s/p cystectomy with urostomy), right lower extremity DVT (4/5; s/p IVC filter 4/8), diverticulosis (s/p bowel resection and reanastomosis) who presents from [facility] due to bilateral lower-extremity paresthesias for 7 days. Admitted for work-up of lower extremity swelling; hypotensive with LORRAINE on admission.     1) Lower extremity swelling with paresthesias  - Due to new acute extensive B/L DVT  - Holding Lasix for LORRAINE hypotension  - Daily weights, strict I+O   - Has IVC filter from 4/8 placed by IR; unable to AC due to presumed GI bleed and drop in hemoglobin last stay  - IR recommended conservative management with AC and will consider percutaneous thrombectomy if treatment fails  - Trop 0.04,   - Echo EF 59%    2) Hypotension  - Likely due to intravascular volume depletion, less likely sepsis with unknown source  - , leukocytosis to 14.75, hypotensive to 79/46 in ED  - Received 3L total IV fluids during ED stay with improvement   - Cefepime 2g q24hrs for now  - CXR 4/26 showing stable bilateral opacities from prior admission, 4/27 showing worsening  - Midodrine 10mg TID  - F/U blood cultures, urine legionella/strep  - F/U MRSA nares  - Procal 0.54 4/26, 0.13 4/27    3) LORRAINE on CKD IIIa  - likely Intravascular volume depletion   - Baseline creatinine 1.0-1.2 from previous admission; 3.1 on current admission   - F/u urine studies    4) Afib  - Continue metoprolol succinate 50mg q24hrs  - Not on AC given previous drop in hemoglobin    5) DM II  - Carb-consistent diet  - POCT glucose  - Basal/bolus if >180 blood glucose    6) Normocytic Anemia  - Hemoglobin 8.3, stable from previous admission  - EGD 4/9 with no active bleeding  - Pantoprazole 40mg q24hrs  - Colonoscopy tomorrow 4/29  - Iron studies from 3/30: Total iron-178, TIBC- 305, % saturation 58%  - Keep active T+S    7) HLD  - Continue atorvastatin 40mg qhs    MISC:  -DVT PPX: High bleeding risk; cannot place SCD due to DVT; has IVC filter  -GI PPX: Pantoprazole  -Diet: NPO@MN Colonoscopy 4/29 (otherwise DASH/Carb-Consistent)  -Activity: As tolerated  -Code: FULL  -Disposition: Acute      Prep today for colonoscopy tomorrow

## 2021-04-28 NOTE — PROGRESS NOTE ADULT - ASSESSMENT
pt with recent prolonged and complicated hosp stay for Covid Viral Syn with Acute Hypoxic RF, R DVT, drop in H/H req PRBCs, LORRAINE, transaminitis, sp IVC Fplacement on 4/8 returns for lower ext pain, swelling and  sensation of pins and needles with hypotension and LORRAINE on CKD.    lower ext pain, swelling and pins and needles with BL acute on chronic DVT, sp IVC filter 4/8/21  Hypotension  LORRAINE on CKD  Debilitated, deconditioned state, mobility dysfunction  Hx of recnt prolonged hosp due to COVID PNA, Acute Hypoxic RF  Hx of HTN, ASHD, Afib  ( was on AC with Eliquis)  Hx of HDLD  Hx of  DM II, CKD, neuropathy  Hx of Bladder Ca, sp cystectomy and urostomy, 911 exposure  Hx of GERD, diverticulosis, sp bowel resection with reanastomosis  Hx of OA, DDD, DJD, sp BL TKR, sp hip surgery    venous duplex of lower ext now shows BL extensive acute on chronic DVT  pt is sp IVC filter 4/8 due to inability to AC due to GIB, sp EGD gastritis and duodenitis 4/21, unable to do colonoscopy due to unstable state at that time    pt was cultured and placed on cefepime, CXR shows LLL opacity but this may be residual changes from the recent  hosp, pt oxygenating well on RA    pt had 2l of IV fluids to support  BP and is on midodrine 10mg TID,   pt is Covid negative  check cultures, procal MRSA, check Covid Ab level    ID consult    Vasc consult for evaluation of BL lower ext DVTs/ ? need for thrombectomy    GI for colonoscopy tomorrow  H/H low but stable but given low BP and P colonoscopy procedure would transfuse 1u PRBC  BUN/ creat51/2.4, IV fluids NS  Clear liquids today, GI prep tonight and NPO after midnight      Rehab, pt v deconditioned and need rigorous gait reconditioning    OOB to  chair  cont all meds  emotional support rendered

## 2021-04-28 NOTE — PROGRESS NOTE ADULT - SUBJECTIVE AND OBJECTIVE BOX
ROSA MARIA CASEY 80y WM Male transferred from [facility] due to lower ext weakness, numbness, tingling ans swelling ff by tingling of the hands.  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  The pt  was cultured and placed on Cefepime, given IV fluids for BP.  Of note pt has recent complicated  Covid hospitalization c/by prolonged bedbound state, LORRAINE, transaminitis,  bacterial PNA,, RLE DVT, GIB req transfusions, thus, pacement of IVC filter on 4 /8.        INTERVAL HPI/OVERNIGHT EVENTS: overnight afebrile, cont low BP 91/53,  pulse ox on RA 94 %, H/H 7.5/23.8,  BUN/creat 51/2.4, colonoscopy scheduled for tomorrow, will need GI prep tonight, IV fluids and would transfuse 1 u today    MEDICATIONS  (STANDING):  atorvastatin 40 milliGRAM(s) Oral at bedtime  cefepime   IVPB 2000 milliGRAM(s) IV Intermittent every 24 hours  chlorhexidine 4% Liquid 1 Application(s) Topical <User Schedule>  dextrose 40% Gel 15 Gram(s) Oral once  dextrose 5%. 1000 milliLiter(s) (50 mL/Hr) IV Continuous <Continuous>  dextrose 50% Injectable 25 Gram(s) IV Push once  ferrous    sulfate 325 milliGRAM(s) Oral daily  glucagon  Injectable 1 milliGRAM(s) IntraMuscular once  heparin   Injectable 5000 Unit(s) SubCutaneous every 12 hours  insulin lispro (ADMELOG) corrective regimen sliding scale   SubCutaneous three times a day before meals  metoprolol succinate ER 50 milliGRAM(s) Oral daily  midodrine. 10 milliGRAM(s) Oral three times a day  pantoprazole    Tablet 40 milliGRAM(s) Oral before breakfast    MEDICATIONS  (PRN):  acetaminophen   Tablet .. 650 milliGRAM(s) Oral every 6 hours PRN Temp greater or equal to 38C (100.4F), Mild Pain (1 - 3)      Allergies    No Known Allergies    Intolerances  	    Vital Signs Last 24 Hrs:  T(F): 97.4  HR:  89  BP: 91/53    RR: 18   SpO2: 94%     PHYSICAL EXAM:      Constitutional:  A&O, elderly weak and chronically ill looking but  NAD    Eyes:  nonicteric    ENMT: dry oral mucosa, dental defects    Neck:  short, thick supple, no bruits, mild JVD    Respiratory: shallow resp    Cardiovascular: S1S2 irreg    Gastrointestinal:  obese, distended but soft and benign,     Genitourinary:  urostomy    Extremities: moves all ext, dec motor strength of kower ext, + edema, + arthritic changes    Vascular: dec pedal pulses    Neurological: nonfocal    Skin: no rash    Lymph Nodes:  not enlarged    Musculoskeletal:  nl    Psychiatric: depressed but stable        LABS:                        7.5   11.6 )-----------( 297     WBC on ad 14             23.8    04-27    132  |  98 |  51  ----------------------------<  97  5.1   |  19  |  2.4  GFR 25  Ca    8.6       Phos  5.0      Mg     2.1, 2.3       trop 0.04 x 3  COVID neg  DD [id number]    TPro  6.0  /  Alb  3.3<L>  /  TBili  1.1  /  DBili  x   /  AST  16  /  ALT  12  /  AlkPhos  91  04-26    PT/INR - ( 27 Apr 2021 06:36 )   PT: 15.90 sec;   INR: 1.38 ratio         PTT - ( 27 Apr 2021 06:36 )  PTT:30.6 sec      RADIOLOGY & ADDITIONAL TESTS:    CXR:  BL opacities  lower ext duplex: BL ext acute and chronic DVT    ECHO:  EF  59%, mild TR, borderline pul HTN

## 2021-04-28 NOTE — PROGRESS NOTE ADULT - SUBJECTIVE AND OBJECTIVE BOX
DAILY PROGRESS NOTE  ===========================================================    Patient Information:  ROSA MARIA CASEY  /  80y  /  Male  /  MRN#: [id number]    Hospital Day: 2d   Location: [place] [address] [address] ([address])    |:::::::::::::::::::::::::::| SUBJECTIVE |:::::::::::::::::::::::::::|    OVERNIGHT EVENTS: No acute events overnight.   TODAY: Patient was seen today at bedside. Review of systems is otherwise negative.    |:::::::::::::::::::::::::::| ADMISSION HPI |:::::::::::::::::::::::::::|    HPI:  Patient is a 79 y/o man with PMH of HTN, HLD, DM II, Afib (not on AC secondary to suspected GI bleed during last hospitalization March-April 2021), CKD stage 3A, bladder cancer (s/p cystectomy with urostomy), right lower extremity DVT (4/5; s/p IVC filter 4/8), diverticulosis (s/p bowel resection and reanastomosis) who presents from [facility] due to bilateral lower-extremity paresthesias for 7 days. Patient previously hospitalized (3/12/21-4/20/21) at [facility] for COVID-19 pneumonia; hospital course complicated by sepsis, presumed GI bleed, and DVT (s/p IVC filter)--patient discharged to [facility] for rehab. Patient states that since his hospitalization he has been immobile and that when attempting to stand he becomes hypotensive and thus he has not moved much. Patient fully functional prior to previous hospitalization. Patient states that a week prior to presentation he felt his feet tingling (R>L). Patient states that this was shortly followed by swelling of his bilateral lower extremities. Patient also endorsing pain in the feet, 8/10 in intensity, stable in intensity with no radiation, alleviating/exacerbating factors. Patient also endorsing paresthesias of bilateral hands during this time. Of note, patient states he was treated for a pneumonia during his stay at [facility]; no records from [facility] in chart and unable to reach anyone after multiple attempts. Patient denying any recent trauma, shortness of breath, chest pain, fevers, chills, abdominal pain, productive cough.    In ED, vitals were: HR: 102, BP: 85/55, T: 98.8- initially placed on 3L NC (unclear why) but has been on room air saturating well since.      (26 Apr 2021 11:38)      |:::::::::::::::::::::::::::| OBJECTIVE |:::::::::::::::::::::::::::|    STANDING MEDICATIONS  atorvastatin 40 milliGRAM(s) Oral at bedtime  bisacodyl 20 milliGRAM(s) Oral once  cefepime   IVPB 2000 milliGRAM(s) IV Intermittent every 24 hours  chlorhexidine 4% Liquid 1 Application(s) Topical <User Schedule>  dextrose 40% Gel 15 Gram(s) Oral once  dextrose 5%. 1000 milliLiter(s) IV Continuous <Continuous>  dextrose 50% Injectable 25 Gram(s) IV Push once  ferrous    sulfate 325 milliGRAM(s) Oral daily  glucagon  Injectable 1 milliGRAM(s) IntraMuscular once  heparin   Injectable 5000 Unit(s) SubCutaneous every 12 hours  insulin lispro (ADMELOG) corrective regimen sliding scale   SubCutaneous three times a day before meals  metoprolol succinate ER 50 milliGRAM(s) Oral daily  midodrine. 10 milliGRAM(s) Oral three times a day  pantoprazole    Tablet 40 milliGRAM(s) Oral before breakfast  polyethylene glycol/electrolyte Solution. 4000 milliLiter(s) Oral once    PRN MEDICATIONS  acetaminophen   Tablet .. 650 milliGRAM(s) Oral every 6 hours PRN  melatonin 5 milliGRAM(s) Oral at bedtime PRN    HOME MEDICATIONS  ferrous sulfate 325 mg (65 mg elemental iron) oral tablet: 1 tab(s) orally once a day  furosemide 20 mg oral tablet: 1 tab(s) orally 2 times a day  glimepiride 2 mg oral tablet: 1 tab(s) orally once a day  metoprolol succinate 50 mg oral tablet, extended release: 1 tab(s) orally once a day  Ozempic (1 mg dose) 2 mg/1.5 mL subcutaneous solution:   rosuvastatin 20 mg oral tablet: 1 tab(s) orally once a day      VITAL SIGNS: Last 24 Hours  T(C): 36.3 (28 Apr 2021 07:33), Max: 36.8 (28 Apr 2021 00:46)  T(F): 97.4 (28 Apr 2021 07:33), Max: 98.2 (28 Apr 2021 00:46)  HR: 89 (28 Apr 2021 07:33) (89 - 97)  BP: 91/53 (28 Apr 2021 07:33) (91/50 - 113/55)  BP(mean): --  RR: 18 (28 Apr 2021 07:33) (18 - 19)  SpO2: 94% (28 Apr 2021 05:11) (93% - 96%)    Input-Output    04-27-21 @ 07:01 - 04-28-21 @ 07:00  --------------------------------------------------------  IN:    Oral Fluid: 240 mL  Total IN: 240 mL    OUT:    Urostomy (mL): 1200 mL  Total OUT: 1200 mL    Total NET: -960 mL      04-28-21 @ 07:01  - 04-28-21 @ 10:56  --------------------------------------------------------  IN:    Oral Fluid: 360 mL  Total IN: 360 mL    OUT:  Total OUT: 0 mL    Total NET: 360 mL          PHYSICAL EXAM:  GEN: No acute distress.  LUNGS: Clear to auscultation bilaterally.  HEART: Regular rate and rhythm. No murmurs.  ABD: Soft, non-tender, non-distended.  EXT: Normal range of motion. B/l leg edema  NEURO: Alert, attentive, oriented. Clear, fluent speech. Eye movements intact. Moves all extremities.  PSYCH: Cooperative, appropriate.    LAB RESULTS:                        7.5    11.61 )-----------( 297      ( 28 Apr 2021 06:51 )             23.8     04-28    132<L>  |  98  |  51<H>  ----------------------------<  97  5.1<H>   |  19  |  2.4<H>    Ca    8.9      28 Apr 2021 06:51  Phos  5.0     04-27  Mg     2.3     04-28    TPro  5.6<L>  /  Alb  3.0<L>  /  TBili  0.8  /  DBili  x   /  AST  42<H>  /  ALT  25  /  AlkPhos  81  04-28    PT/INR - ( 27 Apr 2021 06:36 )   PT: 15.90 sec;   INR: 1.38 ratio         PTT - ( 27 Apr 2021 06:36 )  PTT:30.6 sec      Troponin T, Serum: 0.03 ng/mL *HH* [Critical value:] (04-27-21 @ 21:20)  Lactate, Blood: 1.1 mmol/L [0.7 - 2.0] (04-27-21 @ 06:36)  Troponin T, Serum: 0.04 ng/mL *HH* [Hemolyzed. Interpret with caution  Critical value:] (04-27-21 @ 00:42)  Troponin T, Serum: 0.04 ng/mL *HH* [Critical value:] (04-26-21 @ 21:17)  Troponin T, Serum: 0.04 ng/mL *HH* [TYPE:(C=Critical, N=Notification, A=Abnormal) n  TESTS: tropt  DATE/TIME CALLED: 04/26/2021 18:02:07 EDT  CALLED TO: md glenn  READ BACK (2 Patient Identifiers)(Y/N): y  READ BACK VALUES (Y/N): y  CALLED BY: isabel wahl  Critical value:] (04-26-21 @ 16:36)    CARDIAC MARKERS ( 27 Apr 2021 21:20 )  x     / 0.03 ng/mL / x     / x     / x      CARDIAC MARKERS ( 27 Apr 2021 00:42 )  x     / 0.04 ng/mL / x     / x     / x      CARDIAC MARKERS ( 26 Apr 2021 21:17 )  x     / 0.04 ng/mL / x     / x     / x      CARDIAC MARKERS ( 26 Apr 2021 16:36 )  x     / 0.04 ng/mL / x     / x     / x          MICROBIOLOGY:    Culture - Blood (collected 04-26-21 @ 05:30)  Source: .Blood Blood-Peripheral  Preliminary Report (04-27-21 @ 13:01):    No growth to date.    Culture - Blood (collected 04-26-21 @ 05:30)  Source: .Blood Blood-Peripheral  Preliminary Report (04-27-21 @ 13:01):    No growth to date.      IMAGING/STUDIES:    ALLERGIES:  No Known Allergies      ===========================================================

## 2021-04-29 ENCOUNTER — TRANSCRIPTION ENCOUNTER (OUTPATIENT)
Age: 81
End: 2021-04-29

## 2021-04-29 LAB
ANION GAP SERPL CALC-SCNC: 13 MMOL/L — SIGNIFICANT CHANGE UP (ref 7–14)
APTT BLD: 50.3 SEC — HIGH (ref 27–39.2)
BUN SERPL-MCNC: 41 MG/DL — HIGH (ref 10–20)
CALCIUM SERPL-MCNC: 8.5 MG/DL — SIGNIFICANT CHANGE UP (ref 8.5–10.1)
CHLORIDE SERPL-SCNC: 100 MMOL/L — SIGNIFICANT CHANGE UP (ref 98–110)
CO2 SERPL-SCNC: 21 MMOL/L — SIGNIFICANT CHANGE UP (ref 17–32)
CREAT SERPL-MCNC: 2 MG/DL — HIGH (ref 0.7–1.5)
GLUCOSE BLDC GLUCOMTR-MCNC: 109 MG/DL — HIGH (ref 70–99)
GLUCOSE BLDC GLUCOMTR-MCNC: 111 MG/DL — HIGH (ref 70–99)
GLUCOSE BLDC GLUCOMTR-MCNC: 144 MG/DL — HIGH (ref 70–99)
GLUCOSE BLDC GLUCOMTR-MCNC: 77 MG/DL — SIGNIFICANT CHANGE UP (ref 70–99)
GLUCOSE BLDC GLUCOMTR-MCNC: 97 MG/DL — SIGNIFICANT CHANGE UP (ref 70–99)
GLUCOSE SERPL-MCNC: 128 MG/DL — HIGH (ref 70–99)
HCT VFR BLD CALC: 25.3 % — LOW (ref 42–52)
HGB BLD-MCNC: 7.8 G/DL — LOW (ref 14–18)
INR BLD: 1.27 RATIO — SIGNIFICANT CHANGE UP (ref 0.65–1.3)
MAGNESIUM SERPL-MCNC: 2.1 MG/DL — SIGNIFICANT CHANGE UP (ref 1.8–2.4)
MCHC RBC-ENTMCNC: 26.9 PG — LOW (ref 27–31)
MCHC RBC-ENTMCNC: 30.8 G/DL — LOW (ref 32–37)
MCV RBC AUTO: 87.2 FL — SIGNIFICANT CHANGE UP (ref 80–94)
NRBC # BLD: 0 /100 WBCS — SIGNIFICANT CHANGE UP (ref 0–0)
PLATELET # BLD AUTO: 306 K/UL — SIGNIFICANT CHANGE UP (ref 130–400)
POTASSIUM SERPL-MCNC: 3.8 MMOL/L — SIGNIFICANT CHANGE UP (ref 3.5–5)
POTASSIUM SERPL-SCNC: 3.8 MMOL/L — SIGNIFICANT CHANGE UP (ref 3.5–5)
PROTHROM AB SERPL-ACNC: 14.6 SEC — HIGH (ref 9.95–12.87)
RBC # BLD: 2.9 M/UL — LOW (ref 4.7–6.1)
RBC # FLD: 17 % — HIGH (ref 11.5–14.5)
SODIUM SERPL-SCNC: 134 MMOL/L — LOW (ref 135–146)
WBC # BLD: 10.42 K/UL — SIGNIFICANT CHANGE UP (ref 4.8–10.8)
WBC # FLD AUTO: 10.42 K/UL — SIGNIFICANT CHANGE UP (ref 4.8–10.8)

## 2021-04-29 PROCEDURE — 45378 DIAGNOSTIC COLONOSCOPY: CPT | Mod: 53

## 2021-04-29 RX ORDER — HEPARIN SODIUM 5000 [USP'U]/ML
1200 INJECTION INTRAVENOUS; SUBCUTANEOUS
Qty: 25000 | Refills: 0 | Status: DISCONTINUED | OUTPATIENT
Start: 2021-04-29 | End: 2021-04-30

## 2021-04-29 RX ADMIN — CHLORHEXIDINE GLUCONATE 1 APPLICATION(S): 213 SOLUTION TOPICAL at 06:00

## 2021-04-29 RX ADMIN — MIDODRINE HYDROCHLORIDE 10 MILLIGRAM(S): 2.5 TABLET ORAL at 05:59

## 2021-04-29 RX ADMIN — Medication 325 MILLIGRAM(S): at 11:26

## 2021-04-29 RX ADMIN — HEPARIN SODIUM 12 UNIT(S)/HR: 5000 INJECTION INTRAVENOUS; SUBCUTANEOUS at 17:37

## 2021-04-29 RX ADMIN — SODIUM CHLORIDE 60 MILLILITER(S): 9 INJECTION INTRAMUSCULAR; INTRAVENOUS; SUBCUTANEOUS at 22:39

## 2021-04-29 RX ADMIN — Medication 5 MILLIGRAM(S): at 22:45

## 2021-04-29 RX ADMIN — ATORVASTATIN CALCIUM 40 MILLIGRAM(S): 80 TABLET, FILM COATED ORAL at 21:34

## 2021-04-29 RX ADMIN — MIDODRINE HYDROCHLORIDE 10 MILLIGRAM(S): 2.5 TABLET ORAL at 17:37

## 2021-04-29 RX ADMIN — Medication 50 MILLIGRAM(S): at 05:59

## 2021-04-29 RX ADMIN — HEPARIN SODIUM 5000 UNIT(S): 5000 INJECTION INTRAVENOUS; SUBCUTANEOUS at 05:59

## 2021-04-29 RX ADMIN — MIDODRINE HYDROCHLORIDE 10 MILLIGRAM(S): 2.5 TABLET ORAL at 11:26

## 2021-04-29 RX ADMIN — PANTOPRAZOLE SODIUM 40 MILLIGRAM(S): 20 TABLET, DELAYED RELEASE ORAL at 06:00

## 2021-04-29 RX ADMIN — CEFEPIME 100 MILLIGRAM(S): 1 INJECTION, POWDER, FOR SOLUTION INTRAMUSCULAR; INTRAVENOUS at 06:00

## 2021-04-29 RX ADMIN — HEPARIN SODIUM 13 UNIT(S)/HR: 5000 INJECTION INTRAVENOUS; SUBCUTANEOUS at 22:40

## 2021-04-29 NOTE — CHART NOTE - NSCHARTNOTEFT_GEN_A_CORE
PACU ANESTHESIA ADMISSION NOTE      Procedure:   Post op diagnosis:      ____  Intubated  TV:______       Rate: ______      FiO2: ______    __x__  Patent Airway    __x__  Full return of protective reflexes    __x__  Full recovery from anesthesia / back to baseline     Vitals:   T: 97.4          R:  20                BP: 103/56                 Sat:  94                 P: 63      Mental Status:  __x__ Awake   ___x__ Alert   _____ Drowsy   _____ Sedated    Nausea/Vomiting:  _x___ NO  ______Yes,   See Post - Op Orders          Pain Scale (0-10):  _____    Treatment: __x__ None    ____ See Post - Op/PCA Orders    Post - Operative Fluids:   ____ Oral   ___x_ See Post - Op Orders    Plan: Discharge:   __x__Home       _____Floor     _____Critical Care    _____  Other:_________________    Comments:    Uneventful anesthesia. Patient transported to  spontaneously breathing and hemodynamically stable.

## 2021-04-29 NOTE — PROGRESS NOTE ADULT - SUBJECTIVE AND OBJECTIVE BOX
ROSA MARIA CASEY 80y WM Male transferred from [facility] due to lower ext weakness, numbness, tingling ans swelling ff by tingling of the hands.  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  The pt  was cultured and placed on Cefepime, given IV fluids for BP.  Of note pt has recent complicated  Covid hospitalization c/by prolonged bedbound state, LORRAINE, transaminitis,  bacterial PNA,, RLE DVT, GIB req transfusions, thus, pacement of IVC filter on 4 /8.        INTERVAL HPI/OVERNIGHT EVENTS: overnight afebrile, H/H 8.5/26.8, pt seen in endoscopy suite during endoscopy, endoscope coiled in large ventral hernia and procedure could not be completed, the length of colon visualized showed no sign ot bleeding, plan for apsule endoscopy tomorrow, may start IV heparin drip  tonight, GI recommends out pt CYT colonography    MEDICATIONS  (STANDING):  atorvastatin 40 milliGRAM(s) Oral at bedtime  cefepime   IVPB 2000 milliGRAM(s) IV Intermittent every 24 hours  chlorhexidine 4% Liquid 1 Application(s) Topical <User Schedule>  dextrose 40% Gel 15 Gram(s) Oral once  dextrose 5%. 1000 milliLiter(s) (50 mL/Hr) IV Continuous <Continuous>  dextrose 50% Injectable 25 Gram(s) IV Push once  ferrous    sulfate 325 milliGRAM(s) Oral daily  glucagon  Injectable 1 milliGRAM(s) IntraMuscular once  heparin   Injectable 5000 Unit(s) SubCutaneous every 12 hours  insulin lispro (ADMELOG) corrective regimen sliding scale   SubCutaneous three times a day before meals  metoprolol succinate ER 50 milliGRAM(s) Oral daily  midodrine. 10 milliGRAM(s) Oral three times a day  pantoprazole    Tablet 40 milliGRAM(s) Oral before breakfast    MEDICATIONS  (PRN):  acetaminophen   Tablet .. 650 milliGRAM(s) Oral every 6 hours PRN Temp greater or equal to 38C (100.4F), Mild Pain (1 - 3)      Allergies    No Known Allergies    Intolerances  	    Vital Signs Last 24 Hrs:  T(F): 98  HR:  85  BP: 105/61    RR: 18   SpO2: 92%  RA    PHYSICAL EXAM:      Constitutional:  A&O, elderly weak and chronically ill looking but  NAD    Eyes:  nonicteric    ENMT: dry oral mucosa, dental defects    Neck:  short, thick supple, no bruits, mild JVD    Respiratory: shallow resp    Cardiovascular: S1S2 irreg    Gastrointestinal:  obese, distended but soft and benign,     Genitourinary:  urostomy    Extremities: moves all ext, dec motor strength of kower ext, + edema, + arthritic changes    Vascular: dec pedal pulses    Neurological: nonfocal    Skin: no rash    Lymph Nodes:  not enlarged    Musculoskeletal:  nl    Psychiatric: depressed but stable        LABS:                        8.5   11.8 )-----------( 309     WBC on ad 14             26.8      132  |  100 |  48  ----------------------------<  111  5.1   |  18  |  2.3  GFR 25  Ca    8.6       Phos  5.0      Mg     2.1, 2.3       trop 0.04 x 3  COVID neg  DD [id number]    TPro  6.0  /  Alb  3.3<L>  /  TBili  1.1  /  DBili  x   /  AST  16  /  ALT  12  /  AlkPhos  91  04-26    PT/INR - ( 27 Apr 2021 06:36 )   PT: 15.90 sec;   INR: 1.38 ratio         PTT - ( 27 Apr 2021 06:36 )  PTT:30.6 sec      RADIOLOGY & ADDITIONAL TESTS:    CXR:  BL opacities  lower ext duplex: BL ext acute and chronic DVT    ECHO:  EF  59%, mild TR, borderline pul HTN

## 2021-04-29 NOTE — PROGRESS NOTE ADULT - SUBJECTIVE AND OBJECTIVE BOX
DAILY PROGRESS NOTE  ===========================================================    Patient Information:  ROSA MARIA CASEY  /  80y  /  Male  /  MRN#: [id number]    Hospital Day: 3d   Location: [place] [address] ([facility])    |:::::::::::::::::::::::::::| SUBJECTIVE |:::::::::::::::::::::::::::|    OVERNIGHT EVENTS: No acute events overnight. Had poor sleep due to difficulty completing Gol[place]  TODAY: Patient was seen today at bedside. Review of systems is otherwise negative.    |:::::::::::::::::::::::::::| ADMISSION HPI |:::::::::::::::::::::::::::|    HPI:  Patient is a 79 y/o man with PMH of HTN, HLD, DM II, Afib (not on AC secondary to suspected GI bleed during last hospitalization March-April 2021), CKD stage 3A, bladder cancer (s/p cystectomy with urostomy), right lower extremity DVT (4/5; s/p IVC filter 4/8), diverticulosis (s/p bowel resection and reanastomosis) who presents from [facility] due to bilateral lower-extremity paresthesias for 7 days. Patient previously hospitalized (3/12/21-4/20/21) at [facility] for COVID-19 pneumonia; hospital course complicated by sepsis, presumed GI bleed, and DVT (s/p IVC filter)--patient discharged to [facility] for rehab. Patient states that since his hospitalization he has been immobile and that when attempting to stand he becomes hypotensive and thus he has not moved much. Patient fully functional prior to previous hospitalization. Patient states that a week prior to presentation he felt his feet tingling (R>L). Patient states that this was shortly followed by swelling of his bilateral lower extremities. Patient also endorsing pain in the feet, 8/10 in intensity, stable in intensity with no radiation, alleviating/exacerbating factors. Patient also endorsing paresthesias of bilateral hands during this time. Of note, patient states he was treated for a pneumonia during his stay at [facility]; no records from [facility] in chart and unable to reach anyone after multiple attempts. Patient denying any recent trauma, shortness of breath, chest pain, fevers, chills, abdominal pain, productive cough.    In ED, vitals were: HR: 102, BP: 85/55, T: 98.8- initially placed on 3L NC (unclear why) but has been on room air saturating well since.      (26 Apr 2021 11:38)      |:::::::::::::::::::::::::::| OBJECTIVE |:::::::::::::::::::::::::::|    STANDING MEDICATIONS  atorvastatin 40 milliGRAM(s) Oral at bedtime  cefepime   IVPB 2000 milliGRAM(s) IV Intermittent every 24 hours  chlorhexidine 4% Liquid 1 Application(s) Topical <User Schedule>  dextrose 40% Gel 15 Gram(s) Oral once  dextrose 5%. 1000 milliLiter(s) IV Continuous <Continuous>  dextrose 50% Injectable 25 Gram(s) IV Push once  ferrous    sulfate 325 milliGRAM(s) Oral daily  glucagon  Injectable 1 milliGRAM(s) IntraMuscular once  heparin   Injectable 5000 Unit(s) SubCutaneous every 12 hours  insulin lispro (ADMELOG) corrective regimen sliding scale   SubCutaneous three times a day before meals  metoprolol succinate ER 50 milliGRAM(s) Oral daily  midodrine. 10 milliGRAM(s) Oral three times a day  pantoprazole    Tablet 40 milliGRAM(s) Oral before breakfast  sodium chloride 0.9%. 1000 milliLiter(s) IV Continuous <Continuous>    PRN MEDICATIONS  acetaminophen   Tablet .. 650 milliGRAM(s) Oral every 6 hours PRN  melatonin 5 milliGRAM(s) Oral at bedtime PRN    HOME MEDICATIONS  ferrous sulfate 325 mg (65 mg elemental iron) oral tablet: 1 tab(s) orally once a day  furosemide 20 mg oral tablet: 1 tab(s) orally 2 times a day  glimepiride 2 mg oral tablet: 1 tab(s) orally once a day  metoprolol succinate 50 mg oral tablet, extended release: 1 tab(s) orally once a day  Ozempic (1 mg dose) 2 mg/1.5 mL subcutaneous solution:   rosuvastatin 20 mg oral tablet: 1 tab(s) orally once a day      VITAL SIGNS: Last 24 Hours  T(C): 36.7 (29 Apr 2021 07:53), Max: 36.8 (28 Apr 2021 16:45)  T(F): 98 (29 Apr 2021 07:53), Max: 98.2 (28 Apr 2021 16:45)  HR: 85 (29 Apr 2021 07:53) (75 - 90)  BP: 105/61 (29 Apr 2021 07:53) (92/54 - 110/59)  BP(mean): --  RR: 18 (29 Apr 2021 07:53) (18 - 18)  SpO2: 92% (29 Apr 2021 07:53) (92% - 95%)    Input-Output    04-28-21 @ 07:01  -  04-29-21 @ 07:00  --------------------------------------------------------  IN:    Oral Fluid: 360 mL    sodium chloride 0.9%: 420 mL  Total IN: 780 mL    OUT:    Urostomy (mL): 700 mL  Total OUT: 700 mL    Total NET: 80 mL          PHYSICAL EXAM:  GEN: No acute distress.  LUNGS: Clear to auscultation bilaterally.  HEART: Regular rate and rhythm. No murmurs.  ABD: Soft, non-tender, non-distended.  EXT: Normal range of motion. No edema.  NEURO: Alert, attentive, oriented. Clear, fluent speech. Eye movements intact. Moves all extremities.  PSYCH: Cooperative, appropriate.    LAB RESULTS:                        8.5    11.81 )-----------( 309      ( 28 Apr 2021 21:00 )             26.8     04-28    132<L>  |  100  |  48<H>  ----------------------------<  111<H>  5.1<H>   |  18  |  2.3<H>    Ca    8.7      28 Apr 2021 21:00  Mg     2.2     04-28    TPro  5.6<L>  /  Alb  3.0<L>  /  TBili  0.8  /  DBili  x   /  AST  42<H>  /  ALT  25  /  AlkPhos  81  04-28          Troponin T, Serum: 0.03 ng/mL *HH* [Critical value:] (04-27-21 @ 21:20)    CARDIAC MARKERS ( 27 Apr 2021 21:20 )  x     / 0.03 ng/mL / x     / x     / x          MICROBIOLOGY:    Culture - Blood (collected 04-27-21 @ 04:30)  Source: .Blood Blood  Preliminary Report (04-28-21 @ 18:01):    No growth to date.    Culture - Blood (collected 04-26-21 @ 05:30)  Source: .Blood Blood-Peripheral  Preliminary Report (04-27-21 @ 13:01):    No growth to date.    Culture - Blood (collected 04-26-21 @ 05:30)  Source: .Blood Blood-Peripheral  Preliminary Report (04-27-21 @ 13:01):    No growth to date.      IMAGING/STUDIES:    ALLERGIES:  No Known Allergies      ===========================================================

## 2021-04-29 NOTE — CONSULT NOTE ADULT - SUBJECTIVE AND OBJECTIVE BOX
CARMELA ROSA MARIA  80y, Male  Allergy: No Known Allergies    CHIEF COMPLAINT: Paresthesias of bilateral feet (29 Apr 2021 09:31)    HPI:  80y Male with PMHx of HTN, HLD, DM II, Afib (not on AC 2/2 suspected GI bleed during last hospitalization [id number]), CKD3a, h/o bladder cancer (s/p cystectomy with urostomy), RLE DVT  (4/5/21; s/p IVC filter 4/8/21), diverticulosis (s/p bowel resection and reanastamosis), presented from [facility] for rehab due to B/L lower extremity paresthesias for 7 days. Pt hospitalized ([id number]) for COVID pneumonia, hospital course complicated by sepsis, presumed GI bleed, and DVT (s/p IVC filter). Pt reports that since his recent discharge he has been immobile and that he becomes hypotensive when he tries to stand. Pt was fully functional prior to previous hospitalization. Pt's developed B/L lower extremity tingling and pain (8/10) R>L about 1 week ago, with swelling of his B/L LE following the tingling. Also endorses tingling of B/L upper extremities.     ED: , BP 85/55, T 98.8, placed on 3L NC, but later switched to RA with good saturation.     Infectious Diseases History:  Old Micro Data/Cultures:   Culture - Sputum . (04.05.21 @ 09:08)   Gram Stain:   Rare polymorphonuclear leukocytes per low power field   Few Squamous epithelial cells per low power field   Numerous Gram positive cocci in pairs seen per oil power field   Moderate Yeast like cells seen per oil power field   Specimen Source: .Sputum Sputum   Culture Results:   Normal Respiratory Abeba present       Culture - Blood in AM (03.31.21 @ 12:09)   Specimen Source: .Blood None   Culture Results:   No Growth Final       Culture - Urine (03.30.21 @ 04:27)   - Ampicillin: S <=2 Predicts results to ampicillin/sulbactam, amoxacillin-clavulanate and piperacillin-tazobactam.   - Ciprofloxacin: S <=1   - Levofloxacin: S 2   - Nitrofurantoin: S <=32 Should not be used to treat pyelonephritis.   - Tetra/Doxy: S <=4   - Vancomycin: S 4   Specimen Source: .Urine Clean Catch (Midstream)   Culture Results:   >100,000 CFU/ml Enterococcus faecalis   <10,000 CFU/ml of other organisms   Organism Identification: Enterococcus faecalis   Organism: Enterococcus faecalis   Method Type: HALI       FAMILY HISTORY:      PAST MEDICAL & SURGICAL HISTORY:  Hypertension  Hyperlipidemia  Diabetes mellitus, type 2  History of atrial fibrillation  Bladder cancer - secondary to exposure at [facility] 9/11 s/p cystectomy with urostomy  Chronic kidney disease (CKD) - stage 3A, baseline creatinine 1.4  DVT, lower extremity    History of total hip replacement, bilateral  History of total knee replacement, bilateral  History of total cystectomy with resultant urostomy    SOCIAL HISTORY  From [facility]  Tobacco Use: former smoker, smoked 1 pack per day for 4 years from [age], quit at age 22  Alcohol Use: denies (26 Apr 2021 11:38)    Recent Travel:  Other Exposures:     ROS  General: Denies rigors, nightsweats  HEENT: Denies headache, rhinorrhea, sore throat, eye pain  CV: Denies CP, palpitations  PULM: Denies wheezing, hemoptysis  GI: Denies hematemesis, hematochezia, melena  : Denies discharge, hematuria  MSK: Denies arthralgias, myalgias  SKIN: Denies rash, lesions  NEURO: Denies paresthesias, weakness  PSYCH: Denies depression, anxiety    VITALS:  T(F): 98, Max: 98.2 (04-28-21 @ 16:45)  HR: 85  BP: 105/61  RR: 18Vital Signs Last 24 Hrs  T(C): 36.7 (29 Apr 2021 07:53), Max: 36.8 (28 Apr 2021 16:45)  T(F): 98 (29 Apr 2021 07:53), Max: 98.2 (28 Apr 2021 16:45)  HR: 85 (29 Apr 2021 07:53) (75 - 90)  BP: 105/61 (29 Apr 2021 07:53) (92/54 - 110/59)  BP(mean): --  RR: 18 (29 Apr 2021 07:53) (18 - 18)  SpO2: 92% (29 Apr 2021 07:53) (92% - 95%)    PHYSICAL EXAM:  Gen: NAD, resting in bed  HEENT: Normocephalic, atraumatic  Neck: supple, no lymphadenopathy  CV: Regular rate & regular rhythm  Lungs: CTAB  Abdomen: Soft, BS present  Ext: Warm, well perfused  Neuro: non focal, awake  Skin: no rash, no lesions  Lines: no phlebitis    TESTS & MEASUREMENTS:                        8.5    11.81 )-----------( 309      ( 28 Apr 2021 21:00 )             26.8     04-28    132<L>  |  100  |  48<H>  ----------------------------<  111<H>  5.1<H>   |  18  |  2.3<H>    Ca    8.7      28 Apr 2021 21:00  Mg     2.2     04-28    TPro  5.6<L>  /  Alb  3.0<L>  /  TBili  0.8  /  DBili  x   /  AST  42<H>  /  ALT  25  /  AlkPhos  81  04-28    eGFR if Non African American: 26 mL/min/1.73M2 (04-28-21 @ 21:00)  eGFR if African American: 30 mL/min/1.73M2 (04-28-21 @ 21:00)    LIVER FUNCTIONS - ( 28 Apr 2021 06:51 )  Alb: 3.0 g/dL / Pro: 5.6 g/dL / ALK PHOS: 81 U/L / ALT: 25 U/L / AST: 42 U/L / GGT: x             Culture - Blood (collected 04-27-21 @ 04:30)  Source: .Blood Blood  Preliminary Report (04-28-21 @ 18:01):    No growth to date.    Culture - Blood (collected 04-26-21 @ 05:30)  Source: .Blood Blood-Peripheral  Preliminary Report (04-27-21 @ 13:01):    No growth to date.    Culture - Blood (collected 04-26-21 @ 05:30)  Source: .Blood Blood-Peripheral  Preliminary Report (04-27-21 @ 13:01):    No growth to date.      Lactate, Blood: 1.1 mmol/L (04-27-21 @ 06:36)      INFECTIOUS DISEASES TESTING  Aspergillus Galactomannan Antigen: <0.500 index (04-02-21 @ 09:04)  Fungitell: <31 pg/mL (03-31-21 @ 12:09)      RADIOLOGY & ADDITIONAL TESTS:  I have personally reviewed the last available Chest xray  Xray Chest 1 View- PORTABLE-Urgent:    PROCEDURE DATE:  04/27/2021    Impression:  -Left lower lobe opacity. Follow-up as needed.      Xray Chest 1 View AP/PA:  EXAM:  XR CHEST 1 VIEW      PROCEDURE DATE:  04/27/2021    Impression:  -Low lung volumes with stable left greater than right interstitial opacities.      Xray Chest 1 View-PORTABLE IMMEDIATE (04.26.21 @ 04:03)  Comparison : Chest radiograph April 7, 2021.  Impression:  -Bilateral opacities. Improved.      US Renal (04.27.21 @ 10:11)  IMPRESSION:  -Simple cysts within the kidneys without nephrolithiasis or hydronephrosis.      VA Duplex Lower Ext Vein Scan, Bilat (04.26.21 @ 08:17)   IMPRESSION  -Bilateral external iliac, commonfemoral, popliteal and posterior tibial vein DVT. Acute left femoral vein DVT. Bilateral great saphenous vein superficial thrombophlebitis. Acute left gastrocnemius vein thrombosis      CARDIOLOGY TESTING  12 Lead ECG:   Ventricular Rate 87 BPM  Atrial Rate 87 BPM  P-R Interval 196 ms  QRS Duration 106 ms  Q-T Interval 352 ms  QTC Calculation(Bazett) 423 ms  P Axis -19 degrees  R Axis -14 degrees  T Axis 24 degrees    Diagnosis Line Normal sinus rhythm  Possible Lateral infarct , age undetermined  Abnormal ECG    Confirmed by JEREMÍAS LEMUS MD ([id number]) on 4/26/2021 12:36:43 PM (04-26-21 @ 11:35    All available historical records have been reviewed    MEDICATIONS  atorvastatin 40  cefepime   IVPB 2000  chlorhexidine 4% Liquid 1  dextrose 40% Gel 15  dextrose 5%. 1000  dextrose 50% Injectable 25  ferrous    sulfate 325  glucagon  Injectable 1  heparin   Injectable 5000  insulin lispro (ADMELOG) corrective regimen sliding scale   metoprolol succinate ER 50  midodrine. 10  pantoprazole    Tablet 40  sodium chloride 0.9%. 1000    ANTIBIOTICS:  cefepime   IVPB 2000 milliGRAM(s) IV Intermittent every 24 hours    All available historical data has been reviewed      ASSESSMENT  80y Male with PMHx of HTN, HLD, DM II, Afib (not on AC 2/2 suspected GI bleed during last hospitalization [id number]), CKD3a, h/o bladder cancer (s/p cystectomy with urostomy), RLE DVT  (4/5/21; s/p IVC filter 4/8/21), diverticulosis (s/p bowel resection and reanastamosis), presented from [facility] for rehab due to B/L lower extremity paresthesias for 7 days.    IMPRESSION  # (?) Sepsis on admission (2 or more of the following T<96.8F, T>101F, Pulse>90, Resp Rate>20, WBC>12, wbc<4, Bands>10%), PLUS (+) lactic acidosis, OR metabolic encephalopathy, OR LORRAINE, OR bacteremia . Otherwise just SIRS on admission, sepsis ruled out  #  #    RECOMMENDATIONS  -   -    This is a pended note. All final recommendations to follow pending discussion with ID Attending    CARMELAROSA MARIA  80y, Male  Allergy: No Known Allergies    CHIEF COMPLAINT: Paresthesias of bilateral feet (29 Apr 2021 09:31)    HPI:  80y Male with PMHx of HTN, HLD, DM II, Afib (not on AC 2/2 suspected GI bleed during last hospitalization [id number]), CKD3a, h/o bladder cancer (s/p cystectomy with urostomy), RLE DVT  (4/5/21; s/p IVC filter 4/8/21), diverticulosis (s/p bowel resection and reanastamosis), presented from [facility] for rehab due to B/L lower extremity paresthesias for 7 days. Pt hospitalized ([id number]) for COVID pneumonia, hospital course complicated by sepsis, presumed GI bleed, and DVT (s/p IVC filter). Pt reports that since his recent discharge during his rehab he was starting to be able to sit up on his own and move to and sit in a chair, with PT he was able to tolerate some walking but with noted SOB. The day prior to this admission the patient was unable to get out of bed secondary to his B/L lower extremity tingling/pain. The patient's son noted that he had decreased mental status the day prior to this admission.  Pt was fully functional prior to previous hospitalization. Pt's developed B/L lower extremity tingling and pain (8/10) R>L about 1 week ago, with swelling of his B/L LE following the tingling. Also endorses tingling of B/L upper extremities. Pt denies since discharge from his previous admission any headache, changes in vision/hearing, chest pain, palpitations, cough, sputum production, fevers, chills, N/V, changes in bowel or urinary habits. Pt's RLE DVT on 4/5/2021 was the patients first ever DVT.    ED: , BP 85/55, T 98.8, placed on 3L NC, but later switched to RA with good saturation.     Infectious Diseases History:  Old Micro Data/Cultures:   Culture - Sputum . (04.05.21 @ 09:08)   Gram Stain:   Rare polymorphonuclear leukocytes per low power field   Few Squamous epithelial cells per low power field   Numerous Gram positive cocci in pairs seen per oil power field   Moderate Yeast like cells seen per oil power field   Specimen Source: .Sputum Sputum   Culture Results:   Normal Respiratory Abeba present       Culture - Blood in AM (03.31.21 @ 12:09)   Specimen Source: .Blood None   Culture Results:   No Growth Final       Culture - Urine (03.30.21 @ 04:27)   - Ampicillin: S <=2 Predicts results to ampicillin/sulbactam, amoxacillin-clavulanate and piperacillin-tazobactam.   - Ciprofloxacin: S <=1   - Levofloxacin: S 2   - Nitrofurantoin: S <=32 Should not be used to treat pyelonephritis.   - Tetra/Doxy: S <=4   - Vancomycin: S 4   Specimen Source: .Urine Clean Catch (Midstream)   Culture Results:   >100,000 CFU/ml Enterococcus faecalis   <10,000 CFU/ml of other organisms   Organism Identification: Enterococcus faecalis   Organism: Enterococcus faecalis   Method Type: HALI       PAST MEDICAL & SURGICAL HISTORY:  Hypertension  Hyperlipidemia  Diabetes mellitus, type 2  History of atrial fibrillation  Bladder cancer - secondary to exposure at [employer] 9/11 s/p cystectomy with urostomy  Chronic kidney disease (CKD) - stage 3A, baseline creatinine 1.4  DVT, lower extremity    History of total hip replacement, bilateral  History of total knee replacement, bilateral  History of total cystectomy with resultant urostomy    SOCIAL HISTORY  From [facility]  Tobacco Use: former smoker, smoked 1 pack per day for 4 years from [age], quit at age 22  Alcohol Use: denies     Recent Travel: denies  Other Exposures: denies    ROS  General: Denies fevers, chills, nightsweats  HEENT: Denies headache, rhinorrhea, sore throat, eye pain  CV: Denies CP, palpitations  PULM: Denies wheezing, cough, sputum production hemoptysis  GI: Denies hematemesis, hematochezia, melena, diarrhea  : Denies discharge, hematuria  MSK: Denies arthralgias, myalgias  SKIN: Denies rash, lesions  NEURO: Denies paresthesias, weakness  PSYCH: Denies depression, anxiety    VITALS:  T(F): 98, Max: 98.2 (04-28-21 @ 16:45)  HR: 85  BP: 105/61  RR: 18Vital Signs Last 24 Hrs  T(C): 36.7 (29 Apr 2021 07:53), Max: 36.8 (28 Apr 2021 16:45)  T(F): 98 (29 Apr 2021 07:53), Max: 98.2 (28 Apr 2021 16:45)  HR: 85 (29 Apr 2021 07:53) (75 - 90)  BP: 105/61 (29 Apr 2021 07:53) (92/54 - 110/59)  BP(mean): --  RR: 18 (29 Apr 2021 07:53) (18 - 18)  SpO2: 92% (29 Apr 2021 07:53) (92% - 95%)    PHYSICAL EXAM:  Gen: NAD, resting in bed. Pt seen and examined at bedside.  HEENT: Normocephalic, atraumatic  Neck: supple, no lymphadenopathy  CV: Regular rate & regular rhythm  Lungs: CTAB, no wheezing, rhonchi, crackles  Abdomen: Soft, BS present, non-distended, non-tender  Ext: Warm, well perfused  Neuro: non focal, awake  Skin: no rash, no lesions  Lines: no phlebitis    TESTS & MEASUREMENTS:                        8.5    11.81 )-----------( 309      ( 28 Apr 2021 21:00 )             26.8     04-28    132<L>  |  100  |  48<H>  ----------------------------<  111<H>  5.1<H>   |  18  |  2.3<H>    Ca    8.7      28 Apr 2021 21:00  Mg     2.2     04-28    TPro  5.6<L>  /  Alb  3.0<L>  /  TBili  0.8  /  DBili  x   /  AST  42<H>  /  ALT  25  /  AlkPhos  81  04-28    eGFR if Non African American: 26 mL/min/1.73M2 (04-28-21 @ 21:00)  eGFR if African American: 30 mL/min/1.73M2 (04-28-21 @ 21:00)    LIVER FUNCTIONS - ( 28 Apr 2021 06:51 )  Alb: 3.0 g/dL / Pro: 5.6 g/dL / ALK PHOS: 81 U/L / ALT: 25 U/L / AST: 42 U/L / GGT: x             Culture - Blood (collected 04-27-21 @ 04:30)  Source: .Blood Blood  Preliminary Report (04-28-21 @ 18:01):    No growth to date.    Culture - Blood (collected 04-26-21 @ 05:30)  Source: .Blood Blood-Peripheral  Preliminary Report (04-27-21 @ 13:01):    No growth to date.    Culture - Blood (collected 04-26-21 @ 05:30)  Source: .Blood Blood-Peripheral  Preliminary Report (04-27-21 @ 13:01):    No growth to date.    Lactate, Blood: 1.1 mmol/L (04-27-21 @ 06:36)    INFECTIOUS DISEASES TESTING  Aspergillus Galactomannan Antigen: <0.500 index (04-02-21 @ 09:04)  Fungitell: <31 pg/mL (03-31-21 @ 12:09)      RADIOLOGY & ADDITIONAL TESTS:  I have personally reviewed the last available Chest xray  Xray Chest 1 View- PORTABLE-Urgent:    PROCEDURE DATE:  04/27/2021    Impression:  -Left lower lobe opacity. Follow-up as needed.      Xray Chest 1 View AP/PA:  EXAM:  XR CHEST 1 VIEW      PROCEDURE DATE:  04/27/2021    Impression:  -Low lung volumes with stable left greater than right interstitial opacities.      Xray Chest 1 View-PORTABLE IMMEDIATE (04.26.21 @ 04:03)  Comparison : Chest radiograph April 7, 2021.  Impression:  -Bilateral opacities. Improved.      US Renal (04.27.21 @ 10:11)  IMPRESSION:  -Simple cysts within the kidneys without nephrolithiasis or hydronephrosis.      VA Duplex Lower Ext Vein Scan, Bilat (04.26.21 @ 08:17)   IMPRESSION  -Bilateral external iliac, commonfemoral, popliteal and posterior tibial vein DVT. Acute left femoral vein DVT. Bilateral great saphenous vein superficial thrombophlebitis. Acute left gastrocnemius vein thrombosis      CARDIOLOGY TESTING  12 Lead ECG:   Ventricular Rate 87 BPM  Atrial Rate 87 BPM  P-R Interval 196 ms  QRS Duration 106 ms  Q-T Interval 352 ms  QTC Calculation(Bazett) 423 ms  P Axis -19 degrees  R Axis -14 degrees  T Axis 24 degrees    Diagnosis Line Normal sinus rhythm  Possible Lateral infarct , age undetermined  Abnormal ECG    Confirmed by JEREMÍAS LEMUS MD ([id number]) on 4/26/2021 12:36:43 PM (04-26-21 @ 11:35    All available historical records have been reviewed    MEDICATIONS  atorvastatin 40  cefepime   IVPB 2000  chlorhexidine 4% Liquid 1  dextrose 40% Gel 15  dextrose 5%. 1000  dextrose 50% Injectable 25  ferrous    sulfate 325  glucagon  Injectable 1  heparin   Injectable 5000  insulin lispro (ADMELOG) corrective regimen sliding scale   metoprolol succinate ER 50  midodrine. 10  pantoprazole    Tablet 40  sodium chloride 0.9%. 1000    ANTIBIOTICS:  cefepime   IVPB 2000 milliGRAM(s) IV Intermittent every 24 hours    All available historical data has been reviewed      ASSESSMENT  80y Male with PMHx of HTN, HLD, DM II, Afib (not on AC 2/2 suspected GI bleed during last hospitalization [id number]), CKD3a, h/o bladder cancer (s/p cystectomy with urostomy), RLE DVT  (4/5/21; s/p IVC filter 4/8/21), diverticulosis (s/p bowel resection and reanastamosis), presented from [facility] for rehab due to B/L lower extremity paresthesias for 7 days.    IMPRESSION  #B/L Lower Extremity extensive DVT  #No evidence of pneumonia  -s/p IVC filter 4/8/2021 secondary to newly found R LE DVT on 4/5/2021. No prior history of DVT before previous admission.  -Pt denies fevers, chills, coughing, night sweats, sputum production.   -Multiple CXR show stable L>R intersitial opacities, with improvement from prior CXRs from patient's previous admission for COVID in Mar-Apr2021.  -No evidence of active infectious process, BCx x 3 no growth to date  -Leukocytosis 2/2 B/L extensive DVT and post-COVID endotheliitis    #CKD3a  #h/o bladder cancer  #HTN  #HLD  #DM  #Afib  #h/o COVID in Mar2021    RECOMMENDATIONS  - Discontinue cefepime    This is a pended note. All final recommendations to follow pending discussion with ID Attending    CARMELAROSA MARIA  80y, Male  Allergy: No Known Allergies    CHIEF COMPLAINT: Paresthesias of bilateral feet (29 Apr 2021 09:31)    HPI:  80y Male with PMHx of HTN, HLD, DM II, Afib (not on AC 2/2 suspected GI bleed during last hospitalization [id number]), CKD3a, h/o bladder cancer (s/p cystectomy with urostomy), RLE DVT  (4/5/21; s/p IVC filter 4/8/21), diverticulosis (s/p bowel resection and reanastamosis), presented from [facility] for rehab due to B/L lower extremity paresthesias for 7 days. Pt hospitalized ([id number]) for COVID pneumonia, hospital course complicated by sepsis, presumed GI bleed, and DVT (s/p IVC filter). Pt reports that since his recent discharge during his rehab he was starting to be able to sit up on his own and move to and sit in a chair, with PT he was able to tolerate some walking but with noted SOB. The day prior to this admission the patient was unable to get out of bed secondary to his B/L lower extremity tingling/pain. The patient's son noted that he had decreased mental status the day prior to this admission.  Pt was fully functional prior to previous hospitalization. Pt's developed B/L lower extremity tingling and pain (8/10) R>L about 1 week ago, with swelling of his B/L LE following the tingling. Also endorses tingling of B/L upper extremities. Pt denies since discharge from his previous admission any headache, changes in vision/hearing, chest pain, palpitations, cough, sputum production, fevers, chills, N/V, changes in bowel or urinary habits. Pt's RLE DVT on 4/5/2021 was the patients first ever DVT.    ED: , BP 85/55, T 98.8, placed on 3L NC, but later switched to RA with good saturation.     Infectious Diseases History:  Old Micro Data/Cultures:   Culture - Sputum . (04.05.21 @ 09:08)   Gram Stain:   Rare polymorphonuclear leukocytes per low power field   Few Squamous epithelial cells per low power field   Numerous Gram positive cocci in pairs seen per oil power field   Moderate Yeast like cells seen per oil power field   Specimen Source: .Sputum Sputum   Culture Results:   Normal Respiratory Abeba present       Culture - Blood in AM (03.31.21 @ 12:09)   Specimen Source: .Blood None   Culture Results:   No Growth Final       Culture - Urine (03.30.21 @ 04:27)   - Ampicillin: S <=2 Predicts results to ampicillin/sulbactam, amoxacillin-clavulanate and piperacillin-tazobactam.   - Ciprofloxacin: S <=1   - Levofloxacin: S 2   - Nitrofurantoin: S <=32 Should not be used to treat pyelonephritis.   - Tetra/Doxy: S <=4   - Vancomycin: S 4   Specimen Source: .Urine Clean Catch (Midstream)   Culture Results:   >100,000 CFU/ml Enterococcus faecalis   <10,000 CFU/ml of other organisms   Organism Identification: Enterococcus faecalis   Organism: Enterococcus faecalis   Method Type: HALI       PAST MEDICAL & SURGICAL HISTORY:  Hypertension  Hyperlipidemia  Diabetes mellitus, type 2  History of atrial fibrillation  Bladder cancer - secondary to exposure at [employer] 9/11 s/p cystectomy with urostomy  Chronic kidney disease (CKD) - stage 3A, baseline creatinine 1.4  DVT, lower extremity    History of total hip replacement, bilateral  History of total knee replacement, bilateral  History of total cystectomy with resultant urostomy    SOCIAL HISTORY  From [facility]  Tobacco Use: former smoker, smoked 1 pack per day for 4 years from [age], quit at age 22  Alcohol Use: denies     Recent Travel: denies  Other Exposures: denies    ROS  General: Denies fevers, chills, nightsweats  HEENT: Denies headache, rhinorrhea, sore throat, eye pain  CV: Denies CP, palpitations  PULM: Denies wheezing, cough, sputum production hemoptysis  GI: Denies hematemesis, hematochezia, melena, diarrhea  : Denies discharge, hematuria  MSK: Denies arthralgias, myalgias  SKIN: + B/L LE edema, Denies rash, lesions  NEURO: +B/L LE tingling and pain, Denies weakness  PSYCH: Denies depression, anxiety    VITALS:  T(F): 98, Max: 98.2 (04-28-21 @ 16:45)  HR: 85  BP: 105/61  RR: 18Vital Signs Last 24 Hrs  T(C): 36.7 (29 Apr 2021 07:53), Max: 36.8 (28 Apr 2021 16:45)  T(F): 98 (29 Apr 2021 07:53), Max: 98.2 (28 Apr 2021 16:45)  HR: 85 (29 Apr 2021 07:53) (75 - 90)  BP: 105/61 (29 Apr 2021 07:53) (92/54 - 110/59)  BP(mean): --  RR: 18 (29 Apr 2021 07:53) (18 - 18)  SpO2: 92% (29 Apr 2021 07:53) (92% - 95%)    PHYSICAL EXAM:  Gen: NAD, resting in bed. Pt seen and examined at bedside.  HEENT: Normocephalic, atraumatic  Neck: supple, no lymphadenopathy  CV: Regular rate & regular rhythm  Lungs: CTAB, no wheezing, rhonchi, crackles  Abdomen: Soft, BS present, non-distended, non-tender  Ext: +B/L 3+ pitting edema, + B/L calf pain. Warm, well perfused  Neuro: non focal, awake  Skin: no rash, no lesions  Lines: no phlebitis    TESTS & MEASUREMENTS:                        8.5    11.81 )-----------( 309      ( 28 Apr 2021 21:00 )             26.8     04-28    132<L>  |  100  |  48<H>  ----------------------------<  111<H>  5.1<H>   |  18  |  2.3<H>    Ca    8.7      28 Apr 2021 21:00  Mg     2.2     04-28    TPro  5.6<L>  /  Alb  3.0<L>  /  TBili  0.8  /  DBili  x   /  AST  42<H>  /  ALT  25  /  AlkPhos  81  04-28    eGFR if Non African American: 26 mL/min/1.73M2 (04-28-21 @ 21:00)  eGFR if African American: 30 mL/min/1.73M2 (04-28-21 @ 21:00)    LIVER FUNCTIONS - ( 28 Apr 2021 06:51 )  Alb: 3.0 g/dL / Pro: 5.6 g/dL / ALK PHOS: 81 U/L / ALT: 25 U/L / AST: 42 U/L / GGT: x             Culture - Blood (collected 04-27-21 @ 04:30)  Source: .Blood Blood  Preliminary Report (04-28-21 @ 18:01):    No growth to date.    Culture - Blood (collected 04-26-21 @ 05:30)  Source: .Blood Blood-Peripheral  Preliminary Report (04-27-21 @ 13:01):    No growth to date.    Culture - Blood (collected 04-26-21 @ 05:30)  Source: .Blood Blood-Peripheral  Preliminary Report (04-27-21 @ 13:01):    No growth to date.    Lactate, Blood: 1.1 mmol/L (04-27-21 @ 06:36)    INFECTIOUS DISEASES TESTING  Aspergillus Galactomannan Antigen: <0.500 index (04-02-21 @ 09:04)  Fungitell: <31 pg/mL (03-31-21 @ 12:09)      RADIOLOGY & ADDITIONAL TESTS:  I have personally reviewed the last available Chest xray  Xray Chest 1 View- PORTABLE-Urgent:    PROCEDURE DATE:  04/27/2021    Impression:  -Left lower lobe opacity. Follow-up as needed.      Xray Chest 1 View AP/PA:  EXAM:  XR CHEST 1 VIEW      PROCEDURE DATE:  04/27/2021    Impression:  -Low lung volumes with stable left greater than right interstitial opacities.      Xray Chest 1 View-PORTABLE IMMEDIATE (04.26.21 @ 04:03)  Comparison : Chest radiograph April 7, 2021.  Impression:  -Bilateral opacities. Improved.      US Renal (04.27.21 @ 10:11)  IMPRESSION:  -Simple cysts within the kidneys without nephrolithiasis or hydronephrosis.      VA Duplex Lower Ext Vein Scan, Bilat (04.26.21 @ 08:17)   IMPRESSION  -Bilateral external iliac, commonfemoral, popliteal and posterior tibial vein DVT. Acute left femoral vein DVT. Bilateral great saphenous vein superficial thrombophlebitis. Acute left gastrocnemius vein thrombosis      CARDIOLOGY TESTING  12 Lead ECG:   Ventricular Rate 87 BPM  Atrial Rate 87 BPM  P-R Interval 196 ms  QRS Duration 106 ms  Q-T Interval 352 ms  QTC Calculation(Bazett) 423 ms  P Axis -19 degrees  R Axis -14 degrees  T Axis 24 degrees    Diagnosis Line Normal sinus rhythm  Possible Lateral infarct , age undetermined  Abnormal ECG    Confirmed by JEREMÍAS LEMUS MD ([id number]) on 4/26/2021 12:36:43 PM (04-26-21 @ 11:35    All available historical records have been reviewed    MEDICATIONS  atorvastatin 40  cefepime   IVPB 2000  chlorhexidine 4% Liquid 1  dextrose 40% Gel 15  dextrose 5%. 1000  dextrose 50% Injectable 25  ferrous    sulfate 325  glucagon  Injectable 1  heparin   Injectable 5000  insulin lispro (ADMELOG) corrective regimen sliding scale   metoprolol succinate ER 50  midodrine. 10  pantoprazole    Tablet 40  sodium chloride 0.9%. 1000    ANTIBIOTICS:  cefepime   IVPB 2000 milliGRAM(s) IV Intermittent every 24 hours    All available historical data has been reviewed      ASSESSMENT  80y Male with PMHx of HTN, HLD, DM II, Afib (not on AC 2/2 suspected GI bleed during last hospitalization [id number]), CKD3a, h/o bladder cancer (s/p cystectomy with urostomy), RLE DVT  (4/5/21; s/p IVC filter 4/8/21), diverticulosis (s/p bowel resection and reanastamosis), presented from [facility] for rehab due to B/L lower extremity paresthesias for 7 days.    IMPRESSION  #B/L Lower Extremity extensive DVT  #No evidence of pneumonia  -s/p IVC filter 4/8/2021 secondary to newly found R LE DVT on 4/5/2021. No prior history of DVT before previous admission.  -Pt denies fevers, chills, coughing, night sweats, sputum production.   -Multiple CXR show stable L>R intersitial opacities, with improvement from prior CXRs from patient's previous admission for COVID in [id number].  -No evidence of active infectious process, BCx x 3 no growth to date  -Leukocytosis 2/2 B/L extensive DVT and post-COVID endotheliitis    #CKD3a  #h/o bladder cancer  #HTN  #HLD  #DM  #Afib  #h/o COVID in Mar2021    RECOMMENDATIONS  - Discontinue cefepime    This is a pended note. All final recommendations to follow pending discussion with ID Attending    CARMELAROSA MARIA  80y, Male  Allergy: No Known Allergies    CHIEF COMPLAINT: Paresthesias of bilateral feet (29 Apr 2021 09:31)    HPI:  80y Male with PMHx of HTN, HLD, DM II, Afib (not on AC 2/2 suspected GI bleed during last hospitalization [id number]), CKD3a, h/o bladder cancer (s/p cystectomy with urostomy), RLE DVT  (4/5/21; s/p IVC filter 4/8/21), diverticulosis (s/p bowel resection and reanastamosis), presented from [facility] for rehab due to B/L lower extremity paresthesias for 7 days. Pt hospitalized ([id number]) for COVID pneumonia, hospital course complicated by sepsis, presumed GI bleed, and DVT (s/p IVC filter). Pt reports that since his recent discharge during his rehab he was starting to be able to sit up on his own and move to and sit in a chair, with PT he was able to tolerate some walking but with noted SOB. The day prior to this admission the patient was unable to get out of bed secondary to his B/L lower extremity tingling/pain. The patient's son noted that he had decreased mental status the day prior to this admission.  Pt was fully functional prior to previous hospitalization. Pt's developed B/L lower extremity tingling and pain (8/10) R>L about 1 week ago, with swelling of his B/L LE following the tingling. Also endorses tingling of B/L upper extremities. Pt denies since discharge from his previous admission any headache, changes in vision/hearing, chest pain, palpitations, cough, sputum production, fevers, chills, N/V, changes in bowel or urinary habits. Pt's RLE DVT on 4/5/2021 was the patients first ever DVT.    ED: , BP 85/55, T 98.8, placed on 3L NC, but later switched to RA with good saturation.     Infectious Diseases History:  Old Micro Data/Cultures:   Culture - Sputum . (04.05.21 @ 09:08)   Gram Stain:   Rare polymorphonuclear leukocytes per low power field   Few Squamous epithelial cells per low power field   Numerous Gram positive cocci in pairs seen per oil power field   Moderate Yeast like cells seen per oil power field   Specimen Source: .Sputum Sputum   Culture Results:   Normal Respiratory Abeba present       Culture - Blood in AM (03.31.21 @ 12:09)   Specimen Source: .Blood None   Culture Results:   No Growth Final       Culture - Urine (03.30.21 @ 04:27)   - Ampicillin: S <=2 Predicts results to ampicillin/sulbactam, amoxacillin-clavulanate and piperacillin-tazobactam.   - Ciprofloxacin: S <=1   - Levofloxacin: S 2   - Nitrofurantoin: S <=32 Should not be used to treat pyelonephritis.   - Tetra/Doxy: S <=4   - Vancomycin: S 4   Specimen Source: .Urine Clean Catch (Midstream)   Culture Results:   >100,000 CFU/ml Enterococcus faecalis   <10,000 CFU/ml of other organisms   Organism Identification: Enterococcus faecalis   Organism: Enterococcus faecalis   Method Type: HALI       PAST MEDICAL & SURGICAL HISTORY:  Hypertension  Hyperlipidemia  Diabetes mellitus, type 2  History of atrial fibrillation  Bladder cancer - secondary to exposure at [employer] 9/11 s/p cystectomy with urostomy  Chronic kidney disease (CKD) - stage 3A, baseline creatinine 1.4  DVT, lower extremity    History of total hip replacement, bilateral  History of total knee replacement, bilateral  History of total cystectomy with resultant urostomy    SOCIAL HISTORY  From [facility]  Tobacco Use: former smoker, smoked 1 pack per day for 4 years from [age], quit at age 22  Alcohol Use: denies     Recent Travel: denies  Other Exposures: denies    ROS  General: Denies fevers, chills, nightsweats  HEENT: Denies headache, rhinorrhea, sore throat, eye pain  CV: Denies CP, palpitations  PULM: Denies wheezing, cough, sputum production hemoptysis  GI: Denies hematemesis, hematochezia, melena, diarrhea  : Denies discharge, hematuria  MSK: Denies arthralgias, myalgias  SKIN: + B/L LE edema, Denies rash, lesions  NEURO: +B/L LE tingling and pain, Denies weakness  PSYCH: Denies depression, anxiety    VITALS:  T(F): 98, Max: 98.2 (04-28-21 @ 16:45)  HR: 85  BP: 105/61  RR: 18Vital Signs Last 24 Hrs  T(C): 36.7 (29 Apr 2021 07:53), Max: 36.8 (28 Apr 2021 16:45)  T(F): 98 (29 Apr 2021 07:53), Max: 98.2 (28 Apr 2021 16:45)  HR: 85 (29 Apr 2021 07:53) (75 - 90)  BP: 105/61 (29 Apr 2021 07:53) (92/54 - 110/59)  BP(mean): --  RR: 18 (29 Apr 2021 07:53) (18 - 18)  SpO2: 92% (29 Apr 2021 07:53) (92% - 95%)    PHYSICAL EXAM:  Gen: NAD, resting in bed. Pt seen and examined at bedside.  HEENT: Normocephalic, atraumatic  Neck: supple, no lymphadenopathy  CV: Regular rate & regular rhythm  Lungs: CTAB, no wheezing, rhonchi, crackles  Abdomen: Soft, BS present, non-distended, non-tender  Ext: +B/L 3+ pitting edema, + B/L calf pain. Warm, well perfused  Neuro: non focal, awake  Skin: no rash, no lesions  Lines: no phlebitis    TESTS & MEASUREMENTS:                        8.5    11.81 )-----------( 309      ( 28 Apr 2021 21:00 )             26.8     04-28    132<L>  |  100  |  48<H>  ----------------------------<  111<H>  5.1<H>   |  18  |  2.3<H>    Ca    8.7      28 Apr 2021 21:00  Mg     2.2     04-28    TPro  5.6<L>  /  Alb  3.0<L>  /  TBili  0.8  /  DBili  x   /  AST  42<H>  /  ALT  25  /  AlkPhos  81  04-28    eGFR if Non African American: 26 mL/min/1.73M2 (04-28-21 @ 21:00)  eGFR if African American: 30 mL/min/1.73M2 (04-28-21 @ 21:00)    LIVER FUNCTIONS - ( 28 Apr 2021 06:51 )  Alb: 3.0 g/dL / Pro: 5.6 g/dL / ALK PHOS: 81 U/L / ALT: 25 U/L / AST: 42 U/L / GGT: x             Culture - Blood (collected 04-27-21 @ 04:30)  Source: .Blood Blood  Preliminary Report (04-28-21 @ 18:01):    No growth to date.    Culture - Blood (collected 04-26-21 @ 05:30)  Source: .Blood Blood-Peripheral  Preliminary Report (04-27-21 @ 13:01):    No growth to date.    Culture - Blood (collected 04-26-21 @ 05:30)  Source: .Blood Blood-Peripheral  Preliminary Report (04-27-21 @ 13:01):    No growth to date.    Lactate, Blood: 1.1 mmol/L (04-27-21 @ 06:36)    INFECTIOUS DISEASES TESTING  Aspergillus Galactomannan Antigen: <0.500 index (04-02-21 @ 09:04)  Fungitell: <31 pg/mL (03-31-21 @ 12:09)      RADIOLOGY & ADDITIONAL TESTS:  I have personally reviewed the last available Chest xray  Xray Chest 1 View- PORTABLE-Urgent:    PROCEDURE DATE:  04/27/2021    Impression:  -Left lower lobe opacity. Follow-up as needed.      Xray Chest 1 View AP/PA:  EXAM:  XR CHEST 1 VIEW      PROCEDURE DATE:  04/27/2021    Impression:  -Low lung volumes with stable left greater than right interstitial opacities.      Xray Chest 1 View-PORTABLE IMMEDIATE (04.26.21 @ 04:03)  Comparison : Chest radiograph April 7, 2021.  Impression:  -Bilateral opacities. Improved.      US Renal (04.27.21 @ 10:11)  IMPRESSION:  -Simple cysts within the kidneys without nephrolithiasis or hydronephrosis.      VA Duplex Lower Ext Vein Scan, Bilat (04.26.21 @ 08:17)   IMPRESSION  -Bilateral external iliac, commonfemoral, popliteal and posterior tibial vein DVT. Acute left femoral vein DVT. Bilateral great saphenous vein superficial thrombophlebitis. Acute left gastrocnemius vein thrombosis      CARDIOLOGY TESTING  12 Lead ECG:   Ventricular Rate 87 BPM  Atrial Rate 87 BPM  P-R Interval 196 ms  QRS Duration 106 ms  Q-T Interval 352 ms  QTC Calculation(Bazett) 423 ms  P Axis -19 degrees  R Axis -14 degrees  T Axis 24 degrees    Diagnosis Line Normal sinus rhythm  Possible Lateral infarct , age undetermined  Abnormal ECG    Confirmed by JEREMÍAS LEMUS MD ([id number]) on 4/26/2021 12:36:43 PM (04-26-21 @ 11:35    All available historical records have been reviewed    MEDICATIONS  atorvastatin 40  cefepime   IVPB 2000  chlorhexidine 4% Liquid 1  dextrose 40% Gel 15  dextrose 5%. 1000  dextrose 50% Injectable 25  ferrous    sulfate 325  glucagon  Injectable 1  heparin   Injectable 5000  insulin lispro (ADMELOG) corrective regimen sliding scale   metoprolol succinate ER 50  midodrine. 10  pantoprazole    Tablet 40  sodium chloride 0.9%. 1000    ANTIBIOTICS:  cefepime   IVPB 2000 milliGRAM(s) IV Intermittent every 24 hours    All available historical data has been reviewed      ASSESSMENT  80y Male with PMHx of HTN, HLD, DM II, Afib (not on AC 2/2 suspected GI bleed during last hospitalization [id number]), CKD3a, h/o bladder cancer (s/p cystectomy with urostomy), RLE DVT  (4/5/21; s/p IVC filter 4/8/21), diverticulosis (s/p bowel resection and reanastamosis), presented from [facility] for rehab due to B/L lower extremity paresthesias for 7 days.    IMPRESSION  #B/L Lower Extremity extensive DVT with SIRS with leucocytosis  -s/p IVC filter 4/8/2021 secondary to newly found R LE DVT on 4/5/2021. No prior history of DVT before previous admission.  #No evidence of pneumonia ( either clinically or radiographically )  -Pt denies fevers, chills, coughing, night sweats, sputum production.   -Multiple CXR show stable L>R intersitial opacities, with no change from prior CXRs from patient's previous admission for COVID in Mar-Apr2021.  -No evidence of active infectious process, BCx x 3 no growth to date  #Leukocytosis 2/2 B/L extensive DVT secondary to COVID related endotheliitis  #Paresthesias : COVID-19 related in all probability ( no diagnostic tests available )    #CKD3a  #h/o bladder cancer  #HTN  #HLD  #DM  #Afib  #h/o COVID in Mar2021    RECOMMENDATIONS  - Discontinue cefepime

## 2021-04-29 NOTE — PROGRESS NOTE ADULT - ASSESSMENT
pt with recent prolonged and complicated hosp stay for Covid Viral Syn with Acute Hypoxic RF, R DVT, drop in H/H req PRBCs, LORRAINE, transaminitis, sp IVC Fplacement on 4/8 returns for lower ext pain, swelling and  sensation of pins and needles with hypotension and LORRAINE on CKD.    lower ext pain, swelling and pins and needles with BL acute on chronic DVT, sp IVC filter 4/8/21  Hypotension  LORRAINE on CKD  Debilitated, deconditioned state, mobility dysfunction  Hx of recnt prolonged hosp due to COVID PNA, Acute Hypoxic RF  Hx of HTN, ASHD, Afib  ( was on AC with Eliquis)  Hx of HDLD  Hx of  DM II, CKD, neuropathy  Hx of Bladder Ca, sp cystectomy and urostomy, 911 exposure  Hx of GERD, diverticulosis, sp bowel resection with reanastomosis  Hx of OA, DDD, DJD, sp BL TKR, sp hip surgery    venous duplex of lower ext now shows BL extensive acute on chronic DVT  pt is sp IVC filter 4/8 due to inability to AC due to GIB, sp EGD gastritis and duodenitis 4/21, unable to do colonoscopy due to unstable state at that time    pt was cultured and placed on cefepime, CXR shows LLL opacity but this may be residual changes from the recent  hosp, pt oxygenating well on RA    pt had 2l of IV fluids to support  BP and is on midodrine 10mg TID,   pt is Covid negative  check cultures, procal MRSA, check Covid Ab level    ID consult    Vasc consult for evaluation of BL lower ext DVTs/ ? need for thrombectomy    GI colonoscopy attempt was incomplete as pt has v large ventrakl hernia and the endoscope coiled within the ventral hernia, the colon visualized to that pint showed no signs of bleeding, recommend start heparin drip, monitor H/h, capsule endoscopy in the AM, as last resort out pt  CT colonography if capsule endoscopy is inadequate  H/H low but stable 8.5/26.8, sp 1 u PRBC  BUN/ creat51/2.4, IV fluids NS  Clear liquids cont for today,  and NPO after midnight for capsule colonoscopy      Rehab, pt v deconditioned and need rigorous gait reconditioning    OOB to  chair  cont all meds  emotional support rendered

## 2021-04-29 NOTE — PROGRESS NOTE ADULT - ASSESSMENT
Patient is a 81 y/o man with PMH of HTN, HLD, DM II, Afib (not on AC secondary to suspected GI bleed during last hospitalization March-April 2021), CKD stage 3A, bladder cancer (s/p cystectomy with urostomy), right lower extremity DVT (4/5; s/p IVC filter 4/8), diverticulosis (s/p bowel resection and reanastomosis) who presents from [facility] due to bilateral lower-extremity paresthesias for 7 days. Admitted for work-up of lower extremity swelling; hypotensive with LORRAINE on admission.     1) Lower extremity swelling with paresthesias  - Due to new acute extensive B/L DVT  - Holding Lasix for LORRAINE + hypotension  - Daily weights, strict I+O   - Has IVC filter from 4/8 placed by IR; unable to AC due to presumed GI bleed and drop in hemoglobin last stay  - IR recommended conservative management with AC and will consider percutaneous thrombectomy if treatment fails  - Trop 0.04,   - Echo EF 59%    2) Hypotension  - Likely due to intravascular volume depletion, less likely sepsis with unknown source  - , leukocytosis to 14.75, hypotensive to 79/46 in ED  - Received 3L total IV fluids during ED stay with improvement   - Cefepime 2g q24hrs for now  - CXR 4/26 showing stable bilateral opacities from prior admission, 4/27 showing worsening  - Midodrine 10mg TID  - F/U blood cultures, urine legionella/strep  - F/U MRSA nares  - Procal 0.54 4/26, 0.13 4/27    3) LORRAINE on CKD IIIa  - likely Intravascular volume depletion   - Baseline creatinine 1.0 - 1.2 from previous admission; 3.1 on current admission, now improving    4) Afib  - Continue metoprolol succinate 50mg q24hrs  - Not on AC given previous drop in hemoglobin    5) DM II  - Carb-consistent diet  - POCT glucose  - Basal/bolus if >180 blood glucose    6) Normocytic Anemia  - Hemoglobin stable from previous admission  - EGD 4/9 with no active bleeding  - Pantoprazole 40mg q24hrs  - Colonoscopy tomorrow 4/29  - Iron studies from 3/30: Total iron-178, TIBC- 305, % saturation 58%  - Keep active T+S    7) HLD  - Continue atorvastatin 40mg qhs    MISC:  -DVT PPX: High bleeding risk; cannot place SCD due to DVT; has IVC filter  -GI PPX: Pantoprazole  -Diet: NPO@MN Colonoscopy 4/29 (otherwise DASH/Carb-Consistent)  -Activity: As tolerated  -Code: FULL  -Disposition: Acute

## 2021-04-30 LAB
ANION GAP SERPL CALC-SCNC: 13 MMOL/L — SIGNIFICANT CHANGE UP (ref 7–14)
APTT BLD: 31 SEC — SIGNIFICANT CHANGE UP (ref 27–39.2)
APTT BLD: 44.2 SEC — HIGH (ref 27–39.2)
APTT BLD: 52.9 SEC — HIGH (ref 27–39.2)
BUN SERPL-MCNC: 37 MG/DL — HIGH (ref 10–20)
CALCIUM SERPL-MCNC: 8.6 MG/DL — SIGNIFICANT CHANGE UP (ref 8.5–10.1)
CHLORIDE SERPL-SCNC: 103 MMOL/L — SIGNIFICANT CHANGE UP (ref 98–110)
CO2 SERPL-SCNC: 20 MMOL/L — SIGNIFICANT CHANGE UP (ref 17–32)
CREAT SERPL-MCNC: 1.7 MG/DL — HIGH (ref 0.7–1.5)
GLUCOSE BLDC GLUCOMTR-MCNC: 101 MG/DL — HIGH (ref 70–99)
GLUCOSE BLDC GLUCOMTR-MCNC: 105 MG/DL — HIGH (ref 70–99)
GLUCOSE BLDC GLUCOMTR-MCNC: 93 MG/DL — SIGNIFICANT CHANGE UP (ref 70–99)
GLUCOSE BLDC GLUCOMTR-MCNC: 98 MG/DL — SIGNIFICANT CHANGE UP (ref 70–99)
GLUCOSE SERPL-MCNC: 85 MG/DL — SIGNIFICANT CHANGE UP (ref 70–99)
HCT VFR BLD CALC: 27.1 % — LOW (ref 42–52)
HCT VFR BLD CALC: 27.4 % — LOW (ref 42–52)
HGB BLD-MCNC: 8.3 G/DL — LOW (ref 14–18)
HGB BLD-MCNC: 8.5 G/DL — LOW (ref 14–18)
MAGNESIUM SERPL-MCNC: 2.1 MG/DL — SIGNIFICANT CHANGE UP (ref 1.8–2.4)
MCHC RBC-ENTMCNC: 27 PG — SIGNIFICANT CHANGE UP (ref 27–31)
MCHC RBC-ENTMCNC: 27.2 PG — SIGNIFICANT CHANGE UP (ref 27–31)
MCHC RBC-ENTMCNC: 30.6 G/DL — LOW (ref 32–37)
MCHC RBC-ENTMCNC: 31 G/DL — LOW (ref 32–37)
MCV RBC AUTO: 87.5 FL — SIGNIFICANT CHANGE UP (ref 80–94)
MCV RBC AUTO: 88.3 FL — SIGNIFICANT CHANGE UP (ref 80–94)
NRBC # BLD: 0 /100 WBCS — SIGNIFICANT CHANGE UP (ref 0–0)
NRBC # BLD: 0 /100 WBCS — SIGNIFICANT CHANGE UP (ref 0–0)
PLATELET # BLD AUTO: 344 K/UL — SIGNIFICANT CHANGE UP (ref 130–400)
PLATELET # BLD AUTO: 366 K/UL — SIGNIFICANT CHANGE UP (ref 130–400)
POTASSIUM SERPL-MCNC: 4.1 MMOL/L — SIGNIFICANT CHANGE UP (ref 3.5–5)
POTASSIUM SERPL-SCNC: 4.1 MMOL/L — SIGNIFICANT CHANGE UP (ref 3.5–5)
RBC # BLD: 3.07 M/UL — LOW (ref 4.7–6.1)
RBC # BLD: 3.13 M/UL — LOW (ref 4.7–6.1)
RBC # FLD: 17.2 % — HIGH (ref 11.5–14.5)
RBC # FLD: 17.2 % — HIGH (ref 11.5–14.5)
SODIUM SERPL-SCNC: 136 MMOL/L — SIGNIFICANT CHANGE UP (ref 135–146)
WBC # BLD: 10.49 K/UL — SIGNIFICANT CHANGE UP (ref 4.8–10.8)
WBC # BLD: 10.98 K/UL — HIGH (ref 4.8–10.8)
WBC # FLD AUTO: 10.49 K/UL — SIGNIFICANT CHANGE UP (ref 4.8–10.8)
WBC # FLD AUTO: 10.98 K/UL — HIGH (ref 4.8–10.8)

## 2021-04-30 PROCEDURE — 91110 GI TRC IMG INTRAL ESOPH-ILE: CPT | Mod: 26

## 2021-04-30 RX ORDER — HEPARIN SODIUM 5000 [USP'U]/ML
1300 INJECTION INTRAVENOUS; SUBCUTANEOUS
Qty: 25000 | Refills: 0 | Status: DISCONTINUED | OUTPATIENT
Start: 2021-04-30 | End: 2021-05-05

## 2021-04-30 RX ADMIN — MIDODRINE HYDROCHLORIDE 10 MILLIGRAM(S): 2.5 TABLET ORAL at 05:58

## 2021-04-30 RX ADMIN — Medication 50 MILLIGRAM(S): at 05:58

## 2021-04-30 RX ADMIN — Medication 325 MILLIGRAM(S): at 16:38

## 2021-04-30 RX ADMIN — ATORVASTATIN CALCIUM 40 MILLIGRAM(S): 80 TABLET, FILM COATED ORAL at 21:31

## 2021-04-30 RX ADMIN — HEPARIN SODIUM 13 UNIT(S)/HR: 5000 INJECTION INTRAVENOUS; SUBCUTANEOUS at 18:00

## 2021-04-30 RX ADMIN — CHLORHEXIDINE GLUCONATE 1 APPLICATION(S): 213 SOLUTION TOPICAL at 05:58

## 2021-04-30 RX ADMIN — CEFEPIME 100 MILLIGRAM(S): 1 INJECTION, POWDER, FOR SOLUTION INTRAMUSCULAR; INTRAVENOUS at 05:58

## 2021-04-30 RX ADMIN — Medication 5 MILLIGRAM(S): at 23:52

## 2021-04-30 RX ADMIN — SODIUM CHLORIDE 60 MILLILITER(S): 9 INJECTION INTRAMUSCULAR; INTRAVENOUS; SUBCUTANEOUS at 11:15

## 2021-04-30 RX ADMIN — MIDODRINE HYDROCHLORIDE 10 MILLIGRAM(S): 2.5 TABLET ORAL at 16:38

## 2021-04-30 NOTE — PROGRESS NOTE ADULT - EYES
1/31/2020    2007  Sebastien Olmos  [address] [facility] [address]    To Whom It May Concern:    This is to certify that Crowe BO Olmos was evaluated in the Urgent Care on 1/31/2020 and can return to school on 2/3/2020.    Restrictions: none            SANDOR Bruno    [facility] [place] [facility]  [address]  [place] [place] [address]  [phone]     EOMI; PERRL; no drainage or redness

## 2021-04-30 NOTE — PROGRESS NOTE ADULT - ASSESSMENT
Patient is a 79 y/o man with PMH of HTN, HLD, DM II, Afib (not on AC secondary to suspected GI bleed during last hospitalization March-April 2021), CKD stage 3A, bladder cancer (s/p cystectomy with urostomy), right lower extremity DVT (4/5; s/p IVC filter 4/8), diverticulosis (s/p bowel resection and reanastomosis) who presents from [facility] due to bilateral lower-extremity paresthesias for 7 days. Admitted for work-up of lower extremity swelling; hypotensive with LORRAINE on admission.    # Lower extremity swelling with paresthesias  - Due to new acute extensive B/L DVT  - Holding Lasix for LORRAINE + hypotension  - Daily weights, strict I+O  - Has IVC filter from 4/8 placed by IR; was off AC due to presumed GI bleed and drop in hemoglobin last stay  - IR recommended conservative management with AC and will consider percutaneous thrombectomy if treatment fails  - Trop 0.04,   - Echo EF 59%    # Hypotension  - Midodrine 10mg TID    # LORRAINE on CKD IIIa  - likely Intravascular volume depletion   - Baseline creatinine 1.0 - 1.2 from previous admission; 3.1 on current admission, downtrending    # Afib  - Continue metoprolol succinate 50mg q24hrs    # DM II  - Carb-consistent diet  - POCT glucose  - Basal/bolus if >180 blood glucose    # Normocytic Anemia  - Hemoglobin stable from previous admission  - EGD 4/9 with no active bleeding  - Pantoprazole 40mg q24hrs  - Iron studies from 3/30: Total iron-178, TIBC- 305, % saturation 58%  - Keep active T+S    # HLD  - Continue atorvastatin 40mg qhs    MISC:  -DVT PPX: Heparin drip (held @6am for capsule endoscopy today)  -GI PPX: Pantoprazole  -Diet: NPO@MN capsule endoscopy 4/30 (otherwise DASH/Carb-Consistent)  -Activity: As tolerated  -Code: FULL  -Disposition: Acute   Patient is a 79 y/o man with PMH of HTN, HLD, DM II, Afib (not on AC secondary to suspected GI bleed during last hospitalization March-April 2021), CKD stage 3A, bladder cancer (s/p cystectomy with urostomy), right lower extremity DVT (4/5; s/p IVC filter 4/8), diverticulosis (s/p bowel resection and reanastomosis) who presents from [facility] due to bilateral lower-extremity paresthesias for 7 days. Admitted for work-up of lower extremity swelling; hypotensive with LORRAINE on admission.    # Lower extremity swelling with paresthesias  - Due to new acute extensive B/L DVT  - Holding Lasix for LORRAINE + hypotension  - Daily weights, strict I+O  - Has IVC filter from 4/8 placed by IR; was off AC due to presumed GI bleed and drop in hemoglobin last stay  - IR recommended conservative management with AC and will consider percutaneous thrombectomy if treatment fails  - Trop 0.04,   - Echo EF 59%    # Hypotension  - Midodrine 10mg TID    # LORRAINE on CKD IIIa  - likely Intravascular volume depletion   - Baseline creatinine 1.0 - 1.2 from previous admission; 3.1 on current admission, downtrending    # Afib  - Continue metoprolol succinate 50mg q24hrs    # DM II  - Carb-consistent diet  - POCT glucose  - Basal/bolus if >180 blood glucose    # Normocytic Anemia  - Hemoglobin stable from previous admission  - EGD 4/9 with no active bleeding  - Pantoprazole 40mg q24hrs  - Iron studies from 3/30: Total iron-178, TIBC- 305, % saturation 58%  - Keep active T+S  - Received 1u pRBC this admission (to raise above 8 for colonoscopy)    # HLD  - Continue atorvastatin 40mg qhs    MISC:  -DVT PPX: Heparin drip (held @6am for capsule endoscopy today)  -GI PPX: Pantoprazole  -Diet: NPO@MN capsule endoscopy 4/30 (otherwise DASH/Carb-Consistent)  -Activity: As tolerated  -Code: FULL  -Disposition: Acute

## 2021-04-30 NOTE — PROGRESS NOTE ADULT - NEUROLOGICAL
Alert & oriented; no sensory, motor or coordination deficits, normal reflexes Where Do You Want The Question To Include Opioid Counseling Located?: Case Summary Tab

## 2021-04-30 NOTE — CHART NOTE - NSCHARTNOTEFT_GEN_A_CORE
Registered Dietitian Follow-Up     Patient Profile Reviewed                           Yes [x]   No []     Nutrition History Previously Obtained        Yes [x]  No []       Pertinent Subjective Information: Pt was on phone @ time of RD visit, did not want to participate with assessment. Pt remains on clear liquids v NPO, pending capsule EGD. Po intake recorded as 25-75% when pt was previously on a po diet.     Pertinent Medical Interventions: LE swelling with paresthesias d/t new acute extensive b/l DVT; conservative management per IR. Colonoscopy was attempted, but incomplete r/t large ventral hernia-- endoscope coiled within; therefore pending capsule EGD today (4/30).      Diet order: Diet, NPO after Midnight:      NPO Start Date: 29-Apr-2021,   NPO Start Time: 23:59 (04-29-21 @ 17:53) [Active]  Diet, NPO after Midnight:      NPO Start Date: 29-Apr-2021,   NPO Start Time: 23:59  Except Medications (04-29-21 @ 15:32) [Active]  Diet, Clear Liquid:   No Red Dye  No Tomatoes (04-28-21 @ 07:06) [Active]    Anthropometrics:  Height (cm): 182.9 (04-30-21 @ 09:55)  Weight (kg): 124.7 (04-30-21 @ 09:55)  BMI (kg/m2): 37.3 (04-30-21 @ 09:55)  IBW: 80.9 kg    Daily   % Weight Change no new wts recorded.     MEDICATIONS  (STANDING):  atorvastatin 40 milliGRAM(s) Oral at bedtime  chlorhexidine 4% Liquid 1 Application(s) Topical <User Schedule>  dextrose 40% Gel 15 Gram(s) Oral once  dextrose 5%. 1000 milliLiter(s) (50 mL/Hr) IV Continuous <Continuous>  dextrose 50% Injectable 25 Gram(s) IV Push once  ferrous    sulfate 325 milliGRAM(s) Oral daily  glucagon  Injectable 1 milliGRAM(s) IntraMuscular once  heparin  Infusion 1200 Unit(s)/Hr (13 mL/Hr) IV Continuous <Continuous>  insulin lispro (ADMELOG) corrective regimen sliding scale   SubCutaneous three times a day before meals  metoprolol succinate ER 50 milliGRAM(s) Oral daily  midodrine. 10 milliGRAM(s) Oral three times a day  pantoprazole    Tablet 40 milliGRAM(s) Oral before breakfast  sodium chloride 0.9%. 1000 milliLiter(s) (60 mL/Hr) IV Continuous <Continuous>    MEDICATIONS  (PRN):  acetaminophen   Tablet .. 650 milliGRAM(s) Oral every 6 hours PRN Temp greater or equal to 38C (100.4F), Mild Pain (1 - 3)  melatonin 5 milliGRAM(s) Oral at bedtime PRN Insomnia    Pertinent Labs: 04-29 RBC 2.90, H/H 7.8/25.3, Na 134, BUN 41, Cr 2.0, glucose 128    Finger Sticks:  POCT Blood Glucose.: 98 mg/dL (04-30 @ 11:28)  POCT Blood Glucose.: 101 mg/dL (04-30 @ 07:40)  POCT Blood Glucose.: 97 mg/dL (04-29 @ 23:01)  POCT Blood Glucose.: 144 mg/dL (04-29 @ 21:16)  POCT Blood Glucose.: 77 mg/dL (04-29 @ 16:37)    Physical Findings:  - Appearance: obese; (4/28) +1 generalized edema.  - GI function: diarrhea noted per flow sheets, last BM 4/29. pt pending EGD today.  - Tubes: N/A  - Oral/Mouth cavity: no chewing/swallowing issues noted; softer foods preferred.  - Skin: (4/30) blanchable redness; ecchymosis.     Nutrition Requirements:  Weight Used: IBW 81 kg (continued from initial assessment on 4/27)     Estimated Energy Needs    Continue [x]  2025-2430kcal (25-30kcal/kg IBW)     Estimated Protein Needs    Continue [x]  73-81g (.9-1g/kg IBW)--LORRAINE on CKD3 considered, will re-adjust as needed     Estimated Fluid Needs        Continue [x]  1ml/kcal or per LIP     Nutrient Intake: not meeting est protein/kcal needs at this time     [x] Previous Nutrition Diagnosis: Inadequate protein/energy intake.            [x] Ongoing               Nutrition Intervention meals and snacks, medical food supplements, coordination of care.     Goal/Expected Outcome: pt diet to be advanced as medically feasible within the next 4 days.     Indicator/Monitoring: RD to monitor diet order, energy intake, body composition, NFPF, glucose/renal/electrolyte profiles.     Recommendation: when medically feasible, resume the previously ordered diet (DASH/TLC, soft, CHO, PSG SF BID + Glucerna BID + Ensure pudding BID), encourage po intake.

## 2021-04-30 NOTE — PROGRESS NOTE ADULT - SUBJECTIVE AND OBJECTIVE BOX
ROSA MARIA CASEY  80y, Male    All available historical data reviewed    OVERNIGHT EVENTS:  no fevers  minimal paresthesias LEs    ROS:  General: Denies rigors, nightsweats  HEENT: Denies headache, rhinorrhea, sore throat, eye pain  CV: Denies CP, palpitations  PULM: Denies wheezing, hemoptysis  GI: Denies hematemesis, hematochezia, melena  : Denies discharge, hematuria  MSK: Denies arthralgias, myalgias  SKIN: Denies rash, lesions  NEURO: Denies paresthesias, weakness  PSYCH: Denies depression, anxiety    VITALS:  T(F): 97, Max: 97.6 (04-29-21 @ 14:14)  HR: 70  BP: 100/64  RR: 18Vital Signs Last 24 Hrs  T(C): 36.1 (30 Apr 2021 09:55), Max: 36.4 (29 Apr 2021 14:14)  T(F): 97 (30 Apr 2021 09:55), Max: 97.6 (29 Apr 2021 14:14)  HR: 70 (30 Apr 2021 09:55) (69 - 73)  BP: 100/64 (30 Apr 2021 09:55) (75/62 - 111/56)  BP(mean): --  RR: 18 (30 Apr 2021 09:55) (15 - 18)  SpO2: 96% (29 Apr 2021 15:45) (91% - 96%)    TESTS & MEASUREMENTS:                        7.8    10.42 )-----------( 306      ( 29 Apr 2021 21:28 )             25.3     04-29    134<L>  |  100  |  41<H>  ----------------------------<  128<H>  3.8   |  21  |  2.0<H>    Ca    8.5      29 Apr 2021 21:28  Mg     2.1     04-29          Culture - Blood (collected 04-27-21 @ 04:30)  Source: .Blood Blood  Preliminary Report (04-28-21 @ 18:01):    No growth to date.    Culture - Blood (collected 04-26-21 @ 05:30)  Source: .Blood Blood-Peripheral  Preliminary Report (04-27-21 @ 13:01):    No growth to date.    Culture - Blood (collected 04-26-21 @ 05:30)  Source: .Blood Blood-Peripheral  Preliminary Report (04-27-21 @ 13:01):    No growth to date.            RADIOLOGY & ADDITIONAL TESTS:  Personal review of radiological diagnostics performed  Echo and EKG results noted when applicable.     MEDICATIONS:  acetaminophen   Tablet .. 650 milliGRAM(s) Oral every 6 hours PRN  atorvastatin 40 milliGRAM(s) Oral at bedtime  chlorhexidine 4% Liquid 1 Application(s) Topical <User Schedule>  dextrose 40% Gel 15 Gram(s) Oral once  dextrose 5%. 1000 milliLiter(s) IV Continuous <Continuous>  dextrose 50% Injectable 25 Gram(s) IV Push once  ferrous    sulfate 325 milliGRAM(s) Oral daily  glucagon  Injectable 1 milliGRAM(s) IntraMuscular once  heparin  Infusion 1200 Unit(s)/Hr IV Continuous <Continuous>  insulin lispro (ADMELOG) corrective regimen sliding scale   SubCutaneous three times a day before meals  melatonin 5 milliGRAM(s) Oral at bedtime PRN  metoprolol succinate ER 50 milliGRAM(s) Oral daily  midodrine. 10 milliGRAM(s) Oral three times a day  pantoprazole    Tablet 40 milliGRAM(s) Oral before breakfast  sodium chloride 0.9%. 1000 milliLiter(s) IV Continuous <Continuous>      ANTIBIOTICS:

## 2021-04-30 NOTE — PROGRESS NOTE ADULT - ASSESSMENT
pt with recent prolonged and complicated hosp stay for Covid Viral Syn with Acute Hypoxic RF, R DVT, drop in H/H req PRBCs, LORRAINE, transaminitis, sp IVC Fplacement on 4/8 returns for lower ext pain, swelling and  sensation of pins and needles with hypotension and LORRAINE on CKD.    lower ext pain, swelling and paresthesias with BL acute on chronic DVT, sp IVC filter 4/8/21  Hypotension  LORRAINE on CKD  Debilitated, deconditioned state, mobility dysfunction  Hx of recnt prolonged hosp due to COVID PNA, Acute Hypoxic RF  Hx of HTN, ASHD, Afib  ( was on AC with Eliquis)  Hx of HDLD  Hx of  DM II, CKD, neuropathy  Hx of Bladder Ca, sp cystectomy and urostomy, 911 exposure  Hx of GERD, diverticulosis, sp bowel resection with reanastomosis  Hx of OA, DDD, DJD, sp BL TKR, sp hip surgery    venous duplex of lower ext now shows BL extensive acute on chronic DVT  resume AV with heparin    pt is sp IVC filter 4/8 due to inability to AC due to GIB, sp EGD gastritis and duodenitis 4/21, unable to do colonoscopy due to unstable state at that time    pt was cultured and placed on cefepime, CXR shows LLL opacity but this may be residual changes from the recent  hosp, pt oxygenating well on RA  ID:  cultures NTD, D/C Abx and observe    pt had 2l of IV fluids to support  BP and is on midodrine 10mg TID,   pt is Covid negative  check cultures, procal MRSA, check Covid Ab level    GI colonoscopy attempt was incomplete as pt has v large ventrakl hernia and the endoscope coiled within the ventral hernia, the colon visualized to that pint showed no signs of bleeding, recommend start heparin drip, monitor H/h, capsule endoscopy in the AM, as last resort out pt  CT colonography if capsule endoscopy is inadequate    H/H low but stable 7.8/25,  sp 1 u PRBC  BUN/ creat 37/1.7, IV fluids, observe for overload  Clear liquids cont for today,  and NPO after midnight for capsule colonoscopy      Rehab, pt v deconditioned and need rigorous gait reconditioning    OOB to  chair  cont all meds

## 2021-04-30 NOTE — PROGRESS NOTE ADULT - ASSESSMENT
ASSESSMENT  80y Male with PMHx of HTN, HLD, DM II, Afib (not on AC 2/2 suspected GI bleed during last hospitalization [id number]), CKD3a, h/o bladder cancer (s/p cystectomy with urostomy), RLE DVT  (4/5/21; s/p IVC filter 4/8/21), diverticulosis (s/p bowel resection and reanastamosis), presented from [facility] for rehab due to B/L lower extremity paresthesias for 7 days.    IMPRESSION  #B/L Lower Extremity extensive DVT with SIRS with leucocytosis  -s/p IVC filter 4/8/2021 secondary to newly found R LE DVT on 4/5/2021. No prior history of DVT before previous admission.  #No evidence of pneumonia ( either clinically or radiographically )  -Pt denies fevers, chills, coughing, night sweats, sputum production.   -Multiple CXR show stable L>R intersitial opacities, with no change from prior CXRs from patient's previous admission for COVID in Mar-Apr2021.  -No evidence of active infectious process, BCx x 3 no growth to date  #Leukocytosis 2/2 B/L extensive DVT secondary to COVID related endotheliitis> resolved  #Paresthesias : COVID-19 related in all probability ( no diagnostic tests available )    #CKD3a  #h/o bladder cancer  #HTN  #HLD  #DM  #Afib  #h/o COVID in Mar2021    RECOMMENDATIONS  maintain off ABx  recall prn please

## 2021-04-30 NOTE — PROGRESS NOTE ADULT - SUBJECTIVE AND OBJECTIVE BOX
DAILY PROGRESS NOTE  ===========================================================    Patient Information:  ROSA MARIA CASEY  /  80y  /  Male  /  MRN#: [id number]    Hospital Day: 4d   Location: [place] [address] [address] ([address])    |:::::::::::::::::::::::::::| SUBJECTIVE |:::::::::::::::::::::::::::|    OVERNIGHT EVENTS: No acute events overnight.   TODAY: Patient was seen today at bedside. Felt his legs were more mobile after getting heparin drip for a few hours. Review of systems is otherwise negative.    |:::::::::::::::::::::::::::| ADMISSION HPI |:::::::::::::::::::::::::::|    HPI:  Patient is a 79 y/o man with PMH of HTN, HLD, DM II, Afib (not on AC secondary to suspected GI bleed during last hospitalization March-April 2021), CKD stage 3A, bladder cancer (s/p cystectomy with urostomy), right lower extremity DVT (4/5; s/p IVC filter 4/8), diverticulosis (s/p bowel resection and reanastomosis) who presents from [facility] due to bilateral lower-extremity paresthesias for 7 days. Patient previously hospitalized (3/12/21-4/20/21) at [facility] for COVID-19 pneumonia; hospital course complicated by sepsis, presumed GI bleed, and DVT (s/p IVC filter)--patient discharged to [facility] for rehab. Patient states that since his hospitalization he has been immobile and that when attempting to stand he becomes hypotensive and thus he has not moved much. Patient fully functional prior to previous hospitalization. Patient states that a week prior to presentation he felt his feet tingling (R>L). Patient states that this was shortly followed by swelling of his bilateral lower extremities. Patient also endorsing pain in the feet, 8/10 in intensity, stable in intensity with no radiation, alleviating/exacerbating factors. Patient also endorsing paresthesias of bilateral hands during this time. Of note, patient states he was treated for a pneumonia during his stay at [facility]; no records from [facility] in chart and unable to reach anyone after multiple attempts. Patient denying any recent trauma, shortness of breath, chest pain, fevers, chills, abdominal pain, productive cough.    In ED, vitals were: HR: 102, BP: 85/55, T: 98.8- initially placed on 3L NC (unclear why) but has been on room air saturating well since.      (26 Apr 2021 11:38)      |:::::::::::::::::::::::::::| OBJECTIVE |:::::::::::::::::::::::::::|    STANDING MEDICATIONS  atorvastatin 40 milliGRAM(s) Oral at bedtime  cefepime   IVPB 2000 milliGRAM(s) IV Intermittent every 24 hours  chlorhexidine 4% Liquid 1 Application(s) Topical <User Schedule>  dextrose 40% Gel 15 Gram(s) Oral once  dextrose 5%. 1000 milliLiter(s) IV Continuous <Continuous>  dextrose 50% Injectable 25 Gram(s) IV Push once  ferrous    sulfate 325 milliGRAM(s) Oral daily  glucagon  Injectable 1 milliGRAM(s) IntraMuscular once  heparin  Infusion 1200 Unit(s)/Hr IV Continuous <Continuous>  insulin lispro (ADMELOG) corrective regimen sliding scale   SubCutaneous three times a day before meals  metoprolol succinate ER 50 milliGRAM(s) Oral daily  midodrine. 10 milliGRAM(s) Oral three times a day  pantoprazole    Tablet 40 milliGRAM(s) Oral before breakfast  sodium chloride 0.9%. 1000 milliLiter(s) IV Continuous <Continuous>    PRN MEDICATIONS  acetaminophen   Tablet .. 650 milliGRAM(s) Oral every 6 hours PRN  melatonin 5 milliGRAM(s) Oral at bedtime PRN    HOME MEDICATIONS  ferrous sulfate 325 mg (65 mg elemental iron) oral tablet: 1 tab(s) orally once a day  furosemide 20 mg oral tablet: 1 tab(s) orally 2 times a day  glimepiride 2 mg oral tablet: 1 tab(s) orally once a day  metoprolol succinate 50 mg oral tablet, extended release: 1 tab(s) orally once a day  Ozempic (1 mg dose) 2 mg/1.5 mL subcutaneous solution:   rosuvastatin 20 mg oral tablet: 1 tab(s) orally once a day      VITAL SIGNS: Last 24 Hours  T(C): 36.1 (30 Apr 2021 09:55), Max: 36.4 (29 Apr 2021 14:14)  T(F): 97 (30 Apr 2021 09:55), Max: 97.6 (29 Apr 2021 14:14)  HR: 70 (30 Apr 2021 09:55) (69 - 73)  BP: 100/64 (30 Apr 2021 09:55) (75/62 - 111/56)  BP(mean): --  RR: 18 (30 Apr 2021 09:55) (15 - 18)  SpO2: 96% (29 Apr 2021 15:45) (91% - 96%)    Input-Output    04-29-21 @ 07:01  -  04-30-21 @ 07:00  --------------------------------------------------------  IN:  Total IN: 0 mL    OUT:    Urostomy (mL): 1125 mL  Total OUT: 1125 mL    Total NET: -1125 mL          PHYSICAL EXAM:  GEN: No acute distress.  LUNGS: Clear to auscultation bilaterally.  HEART: Regular rate and rhythm. No murmurs.  ABD: Soft, non-tender, non-distended.  EXT: Normal range of motion. No edema.  NEURO: Alert, attentive, oriented. Clear, fluent speech. Eye movements intact. Moves all extremities.  PSYCH: Cooperative, appropriate.      LAB RESULTS:                        7.8    10.42 )-----------( 306      ( 29 Apr 2021 21:28 )             25.3     04-29    134<L>  |  100  |  41<H>  ----------------------------<  128<H>  3.8   |  21  |  2.0<H>    Ca    8.5      29 Apr 2021 21:28  Mg     2.1     04-29      PT/INR - ( 29 Apr 2021 21:28 )   PT: 14.60 sec;   INR: 1.27 ratio         PTT - ( 30 Apr 2021 07:48 )  PTT:52.9 sec            MICROBIOLOGY:    Culture - Blood (collected 04-27-21 @ 04:30)  Source: .Blood Blood  Preliminary Report (04-28-21 @ 18:01):    No growth to date.    Culture - Blood (collected 04-26-21 @ 05:30)  Source: .Blood Blood-Peripheral  Preliminary Report (04-27-21 @ 13:01):    No growth to date.    Culture - Blood (collected 04-26-21 @ 05:30)  Source: .Blood Blood-Peripheral  Preliminary Report (04-27-21 @ 13:01):    No growth to date.      IMAGING/STUDIES:    ALLERGIES:  No Known Allergies      ===========================================================

## 2021-04-30 NOTE — PROGRESS NOTE ADULT - SUBJECTIVE AND OBJECTIVE BOX
ROSA MARIA CASEY 80y WM Male transferred from [facility] due to lower ext weakness, numbness, tingling ans swelling ff by tingling of the hands.  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  The pt  was cultured and placed on Cefepime, given IV fluids for BP.  Of note pt has recent complicated  Covid hospitalization c/by prolonged bedbound state, LORRAINE, transaminitis,  bacterial PNA,, RLE DVT, GIB req transfusions, thus, pacement of IVC filter on 4 /8.        INTERVAL HPI/OVERNIGHT EVENTS: afebrile, cultures negative, pt off Abx, partial colonoscopy to 40cm as colon in large ventral hernia, capsule endoscopy today, restart AC with IV heparin    MEDICATIONS  (STANDING):  atorvastatin 40 milliGRAM(s) Oral at bedtime  cefepime   IVPB 2000 milliGRAM(s) IV Intermittent every 24 hours  chlorhexidine 4% Liquid 1 Application(s) Topical <User Schedule>  dextrose 40% Gel 15 Gram(s) Oral once  dextrose 5%. 1000 milliLiter(s) (50 mL/Hr) IV Continuous <Continuous>  dextrose 50% Injectable 25 Gram(s) IV Push once  ferrous    sulfate 325 milliGRAM(s) Oral daily  glucagon  Injectable 1 milliGRAM(s) IntraMuscular once  heparin   Injectable 5000 Unit(s) SubCutaneous every 12 hours  insulin lispro (ADMELOG) corrective regimen sliding scale   SubCutaneous three times a day before meals  metoprolol succinate ER 50 milliGRAM(s) Oral daily  midodrine. 10 milliGRAM(s) Oral three times a day  pantoprazole    Tablet 40 milliGRAM(s) Oral before breakfast    MEDICATIONS  (PRN):  acetaminophen   Tablet .. 650 milliGRAM(s) Oral every 6 hours PRN Temp greater or equal to 38C (100.4F), Mild Pain (1 - 3)      Allergies    No Known Allergies    Intolerances  	    Vital Signs Last 24 Hrs:  T(F): 97  HR:  70  BP: 100/64    RR: 18   SpO2: 92%  RA    PHYSICAL EXAM:      Constitutional:  A&O, elderly weak and chronically ill looking but  NAD    Eyes:  nonicteric    ENMT: dry oral mucosa, dental defects    Neck:  short, thick supple, no bruits, mild JVD    Respiratory: shallow resp    Cardiovascular: S1S2 irreg    Gastrointestinal:  obese, distended but soft and benign,     Genitourinary:  urostomy    Extremities: moves all ext, dec motor strength of kower ext, + edema, + arthritic changes    Vascular: dec pedal pulses    Neurological: nonfocal    Skin: no rash    Lymph Nodes:  not enlarged    Musculoskeletal:  nl    Psychiatric: depressed but stable        LABS:                        7.8  10)-----------( 306     WBC on ad 14             25      136  |  103 |  37  ----------------------------<  85  4.1   |  20  |  1.7  GFR 25, 38  Ca    8.6       Phos  5.0      Mg     2.1, 2.3, 2.1      trop 0.04 x 3  COVID neg  DD [id number]    TPro  6.0  /  Alb  3.3<L>  /  TBili  1.1  /  DBili  x   /  AST  16  /  ALT  12  /  AlkPhos  91  04-26    PT/INR - ( 27 Apr 2021 06:36 )   PT: 15.90 sec;   INR: 1.38 ratio         PTT - ( 27 Apr 2021 06:36 )  PTT:30.6 sec      RADIOLOGY & ADDITIONAL TESTS:    CXR:  BL opacities  lower ext duplex: BL ext acute and chronic DVT    ECHO:  EF  59%, mild TR, borderline pul HTN    colonoscopy 4/29:  partial procedure to 40cm, clean, colon within large ventral hernia, endoscope could not be advanced safely    capsule endoscopy 4/30

## 2021-05-01 LAB
ANION GAP SERPL CALC-SCNC: 14 MMOL/L — SIGNIFICANT CHANGE UP (ref 7–14)
APTT BLD: 104.5 SEC — CRITICAL HIGH (ref 27–39.2)
APTT BLD: 80.6 SEC — CRITICAL HIGH (ref 27–39.2)
APTT BLD: 81.6 SEC — CRITICAL HIGH (ref 27–39.2)
BUN SERPL-MCNC: 36 MG/DL — HIGH (ref 10–20)
CALCIUM SERPL-MCNC: 8.5 MG/DL — SIGNIFICANT CHANGE UP (ref 8.5–10.1)
CHLORIDE SERPL-SCNC: 103 MMOL/L — SIGNIFICANT CHANGE UP (ref 98–110)
CO2 SERPL-SCNC: 21 MMOL/L — SIGNIFICANT CHANGE UP (ref 17–32)
CREAT SERPL-MCNC: 1.5 MG/DL — SIGNIFICANT CHANGE UP (ref 0.7–1.5)
CULTURE RESULTS: SIGNIFICANT CHANGE UP
CULTURE RESULTS: SIGNIFICANT CHANGE UP
GLUCOSE BLDC GLUCOMTR-MCNC: 102 MG/DL — HIGH (ref 70–99)
GLUCOSE BLDC GLUCOMTR-MCNC: 133 MG/DL — HIGH (ref 70–99)
GLUCOSE BLDC GLUCOMTR-MCNC: 156 MG/DL — HIGH (ref 70–99)
GLUCOSE BLDC GLUCOMTR-MCNC: 165 MG/DL — HIGH (ref 70–99)
GLUCOSE SERPL-MCNC: 154 MG/DL — HIGH (ref 70–99)
HCT VFR BLD CALC: 28 % — LOW (ref 42–52)
HGB BLD-MCNC: 8.7 G/DL — LOW (ref 14–18)
MAGNESIUM SERPL-MCNC: 2.2 MG/DL — SIGNIFICANT CHANGE UP (ref 1.8–2.4)
MCHC RBC-ENTMCNC: 27.6 PG — SIGNIFICANT CHANGE UP (ref 27–31)
MCHC RBC-ENTMCNC: 31.1 G/DL — LOW (ref 32–37)
MCV RBC AUTO: 88.9 FL — SIGNIFICANT CHANGE UP (ref 80–94)
NRBC # BLD: 0 /100 WBCS — SIGNIFICANT CHANGE UP (ref 0–0)
PHOSPHATE SERPL-MCNC: 4.4 MG/DL — SIGNIFICANT CHANGE UP (ref 2.1–4.9)
PLATELET # BLD AUTO: 346 K/UL — SIGNIFICANT CHANGE UP (ref 130–400)
POTASSIUM SERPL-MCNC: 3.9 MMOL/L — SIGNIFICANT CHANGE UP (ref 3.5–5)
POTASSIUM SERPL-SCNC: 3.9 MMOL/L — SIGNIFICANT CHANGE UP (ref 3.5–5)
RBC # BLD: 3.15 M/UL — LOW (ref 4.7–6.1)
RBC # FLD: 17.2 % — HIGH (ref 11.5–14.5)
SODIUM SERPL-SCNC: 138 MMOL/L — SIGNIFICANT CHANGE UP (ref 135–146)
SPECIMEN SOURCE: SIGNIFICANT CHANGE UP
SPECIMEN SOURCE: SIGNIFICANT CHANGE UP
WBC # BLD: 10.69 K/UL — SIGNIFICANT CHANGE UP (ref 4.8–10.8)
WBC # FLD AUTO: 10.69 K/UL — SIGNIFICANT CHANGE UP (ref 4.8–10.8)

## 2021-05-01 RX ORDER — TRAMADOL HYDROCHLORIDE 50 MG/1
25 TABLET ORAL ONCE
Refills: 0 | Status: DISCONTINUED | OUTPATIENT
Start: 2021-05-01 | End: 2021-05-01

## 2021-05-01 RX ADMIN — Medication 650 MILLIGRAM(S): at 23:55

## 2021-05-01 RX ADMIN — Medication 5 MILLIGRAM(S): at 23:55

## 2021-05-01 RX ADMIN — PANTOPRAZOLE SODIUM 40 MILLIGRAM(S): 20 TABLET, DELAYED RELEASE ORAL at 05:40

## 2021-05-01 RX ADMIN — Medication 650 MILLIGRAM(S): at 03:44

## 2021-05-01 RX ADMIN — ATORVASTATIN CALCIUM 40 MILLIGRAM(S): 80 TABLET, FILM COATED ORAL at 21:35

## 2021-05-01 RX ADMIN — MIDODRINE HYDROCHLORIDE 10 MILLIGRAM(S): 2.5 TABLET ORAL at 17:14

## 2021-05-01 RX ADMIN — Medication 2: at 17:13

## 2021-05-01 RX ADMIN — Medication 50 MILLIGRAM(S): at 05:39

## 2021-05-01 RX ADMIN — HEPARIN SODIUM 11 UNIT(S)/HR: 5000 INJECTION INTRAVENOUS; SUBCUTANEOUS at 13:36

## 2021-05-01 RX ADMIN — SODIUM CHLORIDE 60 MILLILITER(S): 9 INJECTION INTRAMUSCULAR; INTRAVENOUS; SUBCUTANEOUS at 21:36

## 2021-05-01 RX ADMIN — MIDODRINE HYDROCHLORIDE 10 MILLIGRAM(S): 2.5 TABLET ORAL at 05:39

## 2021-05-01 RX ADMIN — Medication 325 MILLIGRAM(S): at 11:43

## 2021-05-01 RX ADMIN — HEPARIN SODIUM 11 UNIT(S)/HR: 5000 INJECTION INTRAVENOUS; SUBCUTANEOUS at 21:35

## 2021-05-01 RX ADMIN — MIDODRINE HYDROCHLORIDE 10 MILLIGRAM(S): 2.5 TABLET ORAL at 11:43

## 2021-05-01 RX ADMIN — TRAMADOL HYDROCHLORIDE 25 MILLIGRAM(S): 50 TABLET ORAL at 04:48

## 2021-05-01 RX ADMIN — HEPARIN SODIUM 14 UNIT(S)/HR: 5000 INJECTION INTRAVENOUS; SUBCUTANEOUS at 11:43

## 2021-05-01 NOTE — PROGRESS NOTE ADULT - SUBJECTIVE AND OBJECTIVE BOX
DAILY PROGRESS NOTE  ===========================================================    Patient Information:  ROSA MARIA CASEY  /  80y  /  Male  /  MRN#: [id number]    Hospital Day: 5d   Location: [place] [address] [address] ([address])    |:::::::::::::::::::::::::::| SUBJECTIVE |:::::::::::::::::::::::::::|    OVERNIGHT EVENTS: No acute events overnight.   TODAY: Patient was seen today at bedside. Felt pain in the left foot and calf last night. Review of systems is otherwise negative.    |:::::::::::::::::::::::::::| ADMISSION HPI |:::::::::::::::::::::::::::|    HPI:  Patient is a 81 y/o man with PMH of HTN, HLD, DM II, Afib (not on AC secondary to suspected GI bleed during last hospitalization March-April 2021), CKD stage 3A, bladder cancer (s/p cystectomy with urostomy), right lower extremity DVT (4/5; s/p IVC filter 4/8), diverticulosis (s/p bowel resection and reanastomosis) who presents from [facility] due to bilateral lower-extremity paresthesias for 7 days. Patient previously hospitalized (3/12/21-4/20/21) at [facility] for COVID-19 pneumonia; hospital course complicated by sepsis, presumed GI bleed, and DVT (s/p IVC filter)--patient discharged to [facility] for rehab. Patient states that since his hospitalization he has been immobile and that when attempting to stand he becomes hypotensive and thus he has not moved much. Patient fully functional prior to previous hospitalization. Patient states that a week prior to presentation he felt his feet tingling (R>L). Patient states that this was shortly followed by swelling of his bilateral lower extremities. Patient also endorsing pain in the feet, 8/10 in intensity, stable in intensity with no radiation, alleviating/exacerbating factors. Patient also endorsing paresthesias of bilateral hands during this time. Of note, patient states he was treated for a pneumonia during his stay at [facility]; no records from [facility] in chart and unable to reach anyone after multiple attempts. Patient denying any recent trauma, shortness of breath, chest pain, fevers, chills, abdominal pain, productive cough.    In ED, vitals were: HR: 102, BP: 85/55, T: 98.8- initially placed on 3L NC (unclear why) but has been on room air saturating well since.      (26 Apr 2021 11:38)      |:::::::::::::::::::::::::::| OBJECTIVE |:::::::::::::::::::::::::::|    STANDING MEDICATIONS  atorvastatin 40 milliGRAM(s) Oral at bedtime  chlorhexidine 4% Liquid 1 Application(s) Topical <User Schedule>  dextrose 40% Gel 15 Gram(s) Oral once  dextrose 5%. 1000 milliLiter(s) IV Continuous <Continuous>  dextrose 50% Injectable 25 Gram(s) IV Push once  ferrous    sulfate 325 milliGRAM(s) Oral daily  glucagon  Injectable 1 milliGRAM(s) IntraMuscular once  heparin  Infusion 1300 Unit(s)/Hr IV Continuous <Continuous>  insulin lispro (ADMELOG) corrective regimen sliding scale   SubCutaneous three times a day before meals  metoprolol succinate ER 50 milliGRAM(s) Oral daily  midodrine. 10 milliGRAM(s) Oral three times a day  pantoprazole    Tablet 40 milliGRAM(s) Oral before breakfast  sodium chloride 0.9%. 1000 milliLiter(s) IV Continuous <Continuous>    PRN MEDICATIONS  acetaminophen   Tablet .. 650 milliGRAM(s) Oral every 6 hours PRN  melatonin 5 milliGRAM(s) Oral at bedtime PRN    HOME MEDICATIONS  ferrous sulfate 325 mg (65 mg elemental iron) oral tablet: 1 tab(s) orally once a day  furosemide 20 mg oral tablet: 1 tab(s) orally 2 times a day  glimepiride 2 mg oral tablet: 1 tab(s) orally once a day  metoprolol succinate 50 mg oral tablet, extended release: 1 tab(s) orally once a day  Ozempic (1 mg dose) 2 mg/1.5 mL subcutaneous solution:   rosuvastatin 20 mg oral tablet: 1 tab(s) orally once a day      VITAL SIGNS: Last 24 Hours  T(C): 36 (01 May 2021 07:45), Max: 36.3 (01 May 2021 00:00)  T(F): 96.8 (01 May 2021 07:45), Max: 97.3 (01 May 2021 00:00)  HR: 68 (01 May 2021 07:45) (68 - 76)  BP: 100/54 (01 May 2021 07:45) (100/54 - 113/57)  BP(mean): --  RR: 18 (01 May 2021 07:45) (18 - 18)  SpO2: --    Input-Output    04-30-21 @ 07:01  -  05-01-21 @ 07:00  --------------------------------------------------------  IN:    Oral Fluid: 950 mL  Total IN: 950 mL    OUT:    Urostomy (mL): 600 mL  Total OUT: 600 mL    Total NET: 350 mL          PHYSICAL EXAM:  GEN: No acute distress.  LUNGS: Clear to auscultation bilaterally.  HEART: Regular rate and rhythm. No murmurs.  ABD: Soft, non-tender, non-distended.  EXT: Normal range of motion. No edema.  NEURO: Alert, attentive, oriented. Clear, fluent speech. Eye movements intact. Moves all extremities.  PSYCH: Cooperative, appropriate.    LAB RESULTS:                        8.3    10.98 )-----------( 366      ( 30 Apr 2021 21:12 )             27.1     04-30    136  |  103  |  37<H>  ----------------------------<  85  4.1   |  20  |  1.7<H>    Ca    8.6      30 Apr 2021 17:12  Mg     2.1     04-30      PT/INR - ( 29 Apr 2021 21:28 )   PT: 14.60 sec;   INR: 1.27 ratio         PTT - ( 30 Apr 2021 21:12 )  PTT:44.2 sec            MICROBIOLOGY:    Culture - Blood (collected 04-27-21 @ 04:30)  Source: .Blood Blood  Preliminary Report (04-28-21 @ 18:01):    No growth to date.    Culture - Blood (collected 04-26-21 @ 05:30)  Source: .Blood Blood-Peripheral  Preliminary Report (04-27-21 @ 13:01):    No growth to date.    Culture - Blood (collected 04-26-21 @ 05:30)  Source: .Blood Blood-Peripheral  Preliminary Report (04-27-21 @ 13:01):    No growth to date.      IMAGING/STUDIES:    ALLERGIES:  No Known Allergies      ===========================================================

## 2021-05-01 NOTE — PROGRESS NOTE ADULT - ASSESSMENT
80 year-old man with PMH of HTN, HLD, DM II, Afib (not on AC secondary to suspected GI bleed during last hospitalization March-April 2021), CKD stage 3A, bladder cancer (s/p cystectomy with urostomy), right lower extremity DVT (4/5; s/p IVC filter 4/8), diverticulosis (s/p bowel resection and reanastomosis) who presents from [facility] due to bilateral lower-extremity paresthesias for 7 days. Admitted for work-up of lower extremity swelling; hypotensive with LORRAINE on admission.    # Bilateral extensive leg DVTs  # Lower extremity swelling with paresthesias  - Holding Lasix for LORRAINE + hypotension  - Daily weights, strict I+O  - Has IVC filter from 4/8 placed by IR; was off AC due to presumed GI bleed and drop in hemoglobin last stay  - IR recommended conservative management with AC and will consider percutaneous thrombectomy if treatment fails  - Trop 0.04,   - Echo EF 59%  - C/w heparin drip, keep PTT 60-90    # Hypotension  - Midodrine 10mg TID    # LORRAINE on CKD IIIa  - likely Intravascular volume depletion   - Baseline creatinine 1.0 - 1.2 from previous admission; 3.1 on current admission, downtrending    # Afib  - Continue metoprolol succinate 50mg q24hrs    # DM II  - Carb-consistent diet  - POCT glucose  - Basal/bolus insulin if >180 blood glucose    # Normocytic Anemia  - Hemoglobin stable from previous admission  - EGD 4/9 with no active bleeding  - Pantoprazole 40mg q24hrs  - Iron studies from 3/30: Total iron-178, TIBC- 305, % saturation 58%  - Keep active T+S  - Received 1u pRBC this admission (to raise above 8 for colonoscopy), no active bleeding    # HLD  - Continue atorvastatin 40mg qhs    MISC:  - DVT PPX: Heparin drip  - GI PPX: Pantoprazole  - Diet: DASH/carb consistent  - Activity: As tolerated  - Code: FULL  - Disposition: Acute

## 2021-05-01 NOTE — PROGRESS NOTE ADULT - ASSESSMENT
pt with recent prolonged and complicated hosp stay for Covid Viral Syn with Acute Hypoxic RF, R DVT, drop in H/H req PRBCs, LORRAINE, transaminitis, sp IVC Fplacement on 4/8 returns for lower ext pain, swelling and  sensation of pins and needles with hypotension and LORRAINE on CKD.    lower ext pain, swelling and paresthesias with BL acute on chronic DVT, sp IVC filter 4/8/21  Hypotension  LORRAINE on CKD  Debilitated, deconditioned state, mobility dysfunction  Hx of recnt prolonged hosp due to COVID PNA, Acute Hypoxic RF  Hx of HTN, ASHD, Afib  ( was on AC with Eliquis)  Hx of HDLD  Hx of  DM II, CKD, neuropathy  Hx of Bladder Ca, sp cystectomy and urostomy, 911 exposure  Hx of GERD, diverticulosis, sp bowel resection with reanastomosis  Hx of OA, DDD, DJD, sp BL TKR, sp hip surgery      today pt med stable, H/h remains low but stable and no overt signs of bleeding on IV heparin, monitor pT and keep lower end of herapeutin    venous duplex of lower ext now shows BL extensive acute on chronic DVT  resume AV with heparin    pt is sp IVC filter 4/8 due to inability to AC due to GIB, sp EGD gastritis and duodenitis 4/21, unable to do colonoscopy due to unstable state at that time    pt was cultured and placed on cefepime, CXR shows LLL opacity but this may be residual changes from the recent  hosp, pt oxygenating well on RA  ID:  cultures NTD, D/C Abx and observe    pt had 2l of IV fluids to support  BP and is on midodrine 10mg TID,   pt is Covid negative    GI colonoscopy attempt was incomplete as pt has v large ventrakl hernia and the endoscope coiled within the ventral hernia, the colon visualized to that pint showed no signs of bleeding, recommend start heparin drip, monitor H/h, capsule endoscopy in the AM, as last resort out pt  CT colonography if capsule endoscopy is inadequate    H/H low but stable 87/28,  sp 1 u PRBC  BUN/ creat 36/1.5, IV fluids, observe for overload  Clear liquids cont for today,  and NPO after midnight for capsule colonoscopy      Rehab, pt v deconditioned and need rigorous gait reconditioning    OOB to  chair  cont all meds

## 2021-05-01 NOTE — PROGRESS NOTE ADULT - SUBJECTIVE AND OBJECTIVE BOX
ROSA MARIA CASEY 80y WM Male transferred from [facility] due to lower ext weakness, numbness, tingling ans swelling ff by tingling of the hands.  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  The pt  was cultured and placed on Cefepime, given IV fluids for BP.  Of note pt has recent complicated  Covid hospitalization c/by prolonged bedbound state, LORRAINE, transaminitis,  bacterial PNA,, RLE DVT, GIB req transfusions, thus, pacement of IVC filter on 4 /8.        INTERVAL HPI/OVERNIGHT EVENTS: afebrile, cultures negative, pt off Abx, partial colonoscopy to 40cm as colon in large ventral hernia, capsule endoscopy yesterday and results P, in the meantime, restarted AC with IV heparin, monitor H/H and for any sign of bleeding, keep PTT lower end of therapeutic    MEDICATIONS  (STANDING):  atorvastatin 40 milliGRAM(s) Oral at bedtime  cefepime   IVPB 2000 milliGRAM(s) IV Intermittent every 24 hours  chlorhexidine 4% Liquid 1 Application(s) Topical <User Schedule>  dextrose 40% Gel 15 Gram(s) Oral once  dextrose 5%. 1000 milliLiter(s) (50 mL/Hr) IV Continuous <Continuous>  dextrose 50% Injectable 25 Gram(s) IV Push once  ferrous    sulfate 325 milliGRAM(s) Oral daily  glucagon  Injectable 1 milliGRAM(s) IntraMuscular once  heparin IV  insulin lispro (ADMELOG) corrective regimen sliding scale   SubCutaneous three times a day before meals  metoprolol succinate ER 50 milliGRAM(s) Oral daily  midodrine. 10 milliGRAM(s) Oral three times a day  pantoprazole    Tablet 40 milliGRAM(s) Oral before breakfast    MEDICATIONS  (PRN):  acetaminophen   Tablet .. 650 milliGRAM(s) Oral every 6 hours PRN Temp greater or equal to 38C (100.4F), Mild Pain (1 - 3)      Allergies    No Known Allergies    Intolerances  	    Vital Signs Last 24 Hrs:  T(F): 96.8  HR:  68  BP: 100/54    RR: 18   SpO2: 92%  RA    PHYSICAL EXAM:      Constitutional:  A&O, elderly weak and chronically ill looking but  NAD    Eyes:  nonicteric    ENMT: dry oral mucosa, dental defects    Neck:  short, thick supple, no bruits, mild JVD    Respiratory: shallow resp    Cardiovascular: S1S2 irreg    Gastrointestinal:  obese, distended but soft and benign,     Genitourinary:  urostomy    Extremities: moves all ext, dec motor strength of kower ext, + edema, + arthritic changes    Vascular: dec pedal pulses    Neurological: nonfocal    Skin: no rash    Lymph Nodes:  not enlarged    Musculoskeletal:  nl    Psychiatric: depressed but stable        LABS:                        8.7  10.6)-----------( 346     WBC on ad 14             28      138  |  103 |  36  ----------------------------<  154  3.9   |  21  |  1.5    GFR 25, 38, 44  Ca    8.6       Phos  5.0      Mg     2.1, 2.3, 2.1, 2.2      trop 0.04 x 3  COVID neg  DD [id number]    TPro  6.0  /  Alb  3.3<L>  /  TBili  1.1  /  DBili  x   /  AST  16  /  ALT  12  /  AlkPhos  91  04-26    PT/INR - ( 27 Apr 2021 06:36 )   PT: 15.90 sec;   INR: 1.38 ratio         PTT - ( 27 Apr 2021 06:36 )  PTT:30.6 sec      RADIOLOGY & ADDITIONAL TESTS:    CXR:  BL opacities  lower ext duplex: BL ext acute and chronic DVT    ECHO:  EF  59%, mild TR, borderline pul HTN    colonoscopy 4/29:  partial procedure to 40cm, clean, colon within large ventral hernia, endoscope could not be advanced safely    capsule endoscopy 4/30

## 2021-05-02 LAB
ANION GAP SERPL CALC-SCNC: 14 MMOL/L — SIGNIFICANT CHANGE UP (ref 7–14)
APTT BLD: 63.6 SEC — HIGH (ref 27–39.2)
APTT BLD: 75.7 SEC — CRITICAL HIGH (ref 27–39.2)
APTT BLD: 78.2 SEC — CRITICAL HIGH (ref 27–39.2)
APTT BLD: 88.1 SEC — CRITICAL HIGH (ref 27–39.2)
BUN SERPL-MCNC: 34 MG/DL — HIGH (ref 10–20)
CALCIUM SERPL-MCNC: 8.5 MG/DL — SIGNIFICANT CHANGE UP (ref 8.5–10.1)
CHLORIDE SERPL-SCNC: 105 MMOL/L — SIGNIFICANT CHANGE UP (ref 98–110)
CO2 SERPL-SCNC: 17 MMOL/L — SIGNIFICANT CHANGE UP (ref 17–32)
CREAT SERPL-MCNC: 1.4 MG/DL — SIGNIFICANT CHANGE UP (ref 0.7–1.5)
CULTURE RESULTS: SIGNIFICANT CHANGE UP
GLUCOSE BLDC GLUCOMTR-MCNC: 115 MG/DL — HIGH (ref 70–99)
GLUCOSE BLDC GLUCOMTR-MCNC: 118 MG/DL — HIGH (ref 70–99)
GLUCOSE BLDC GLUCOMTR-MCNC: 118 MG/DL — HIGH (ref 70–99)
GLUCOSE BLDC GLUCOMTR-MCNC: 162 MG/DL — HIGH (ref 70–99)
GLUCOSE SERPL-MCNC: 104 MG/DL — HIGH (ref 70–99)
HCT VFR BLD CALC: 27.9 % — LOW (ref 42–52)
HGB BLD-MCNC: 8.3 G/DL — LOW (ref 14–18)
MCHC RBC-ENTMCNC: 26.8 PG — LOW (ref 27–31)
MCHC RBC-ENTMCNC: 29.7 G/DL — LOW (ref 32–37)
MCV RBC AUTO: 90 FL — SIGNIFICANT CHANGE UP (ref 80–94)
NRBC # BLD: 0 /100 WBCS — SIGNIFICANT CHANGE UP (ref 0–0)
PLATELET # BLD AUTO: 384 K/UL — SIGNIFICANT CHANGE UP (ref 130–400)
POTASSIUM SERPL-MCNC: 4.3 MMOL/L — SIGNIFICANT CHANGE UP (ref 3.5–5)
POTASSIUM SERPL-SCNC: 4.3 MMOL/L — SIGNIFICANT CHANGE UP (ref 3.5–5)
RBC # BLD: 3.1 M/UL — LOW (ref 4.7–6.1)
RBC # FLD: 17.5 % — HIGH (ref 11.5–14.5)
SODIUM SERPL-SCNC: 136 MMOL/L — SIGNIFICANT CHANGE UP (ref 135–146)
SPECIMEN SOURCE: SIGNIFICANT CHANGE UP
WBC # BLD: 11.05 K/UL — HIGH (ref 4.8–10.8)
WBC # FLD AUTO: 11.05 K/UL — HIGH (ref 4.8–10.8)

## 2021-05-02 RX ORDER — FUROSEMIDE 40 MG
40 TABLET ORAL ONCE
Refills: 0 | Status: COMPLETED | OUTPATIENT
Start: 2021-05-02 | End: 2021-05-03

## 2021-05-02 RX ORDER — TRAMADOL HYDROCHLORIDE 50 MG/1
25 TABLET ORAL ONCE
Refills: 0 | Status: DISCONTINUED | OUTPATIENT
Start: 2021-05-02 | End: 2021-05-02

## 2021-05-02 RX ORDER — ACETAMINOPHEN 500 MG
650 TABLET ORAL EVERY 6 HOURS
Refills: 0 | Status: DISCONTINUED | OUTPATIENT
Start: 2021-05-02 | End: 2021-05-05

## 2021-05-02 RX ADMIN — Medication 650 MILLIGRAM(S): at 13:46

## 2021-05-02 RX ADMIN — ATORVASTATIN CALCIUM 40 MILLIGRAM(S): 80 TABLET, FILM COATED ORAL at 21:21

## 2021-05-02 RX ADMIN — MIDODRINE HYDROCHLORIDE 10 MILLIGRAM(S): 2.5 TABLET ORAL at 12:07

## 2021-05-02 RX ADMIN — Medication 2: at 12:06

## 2021-05-02 RX ADMIN — MIDODRINE HYDROCHLORIDE 10 MILLIGRAM(S): 2.5 TABLET ORAL at 05:15

## 2021-05-02 RX ADMIN — PANTOPRAZOLE SODIUM 40 MILLIGRAM(S): 20 TABLET, DELAYED RELEASE ORAL at 06:31

## 2021-05-02 RX ADMIN — TRAMADOL HYDROCHLORIDE 25 MILLIGRAM(S): 50 TABLET ORAL at 04:30

## 2021-05-02 RX ADMIN — Medication 325 MILLIGRAM(S): at 12:07

## 2021-05-02 RX ADMIN — Medication 650 MILLIGRAM(S): at 00:25

## 2021-05-02 RX ADMIN — Medication 50 MILLIGRAM(S): at 05:15

## 2021-05-02 RX ADMIN — MIDODRINE HYDROCHLORIDE 10 MILLIGRAM(S): 2.5 TABLET ORAL at 17:32

## 2021-05-02 RX ADMIN — TRAMADOL HYDROCHLORIDE 25 MILLIGRAM(S): 50 TABLET ORAL at 04:00

## 2021-05-02 NOTE — PROGRESS NOTE ADULT - ASSESSMENT
pt with recent prolonged and complicated hosp stay for Covid Viral Syn with Acute Hypoxic RF, R DVT, drop in H/H req PRBCs, LORRAINE, transaminitis, sp IVC Fplacement on 4/8 returns for lower ext pain, swelling and  sensation of pins and needles with hypotension and LORRAINE on CKD.    lower ext pain, swelling and paresthesias with BL acute on chronic DVT, sp IVC filter 4/8/21  Hypotension  LORRAINE on CKD  Debilitated, deconditioned state, mobility dysfunction  Hx of recnt prolonged hosp due to COVID PNA, Acute Hypoxic RF  Hx of HTN, ASHD, Afib  ( was on AC with Eliquis)  Hx of HDLD  Hx of  DM II, CKD, neuropathy  Hx of Bladder Ca, sp cystectomy and urostomy, 911 exposure  Hx of GERD, diverticulosis, sp bowel resection with reanastomosis  Hx of OA, DDD, DJD, sp BL TKR, sp hip surgery    H/H remains low but stable and no overt signs of bleeding on IV heparin, monitor PT and keep lower end of therapeutic    pt is sp IVC filter 4/8 due to inability to AC due to GIB, sp EGD gastritis and duodenitis 4/21, unable to do colonoscopy due to unstable state at that time    pt was cultured and placed on cefepime, CXR shows LLL opacity but this may be residual changes from the recent  hosp, pt oxygenating well on RA  ID:  cultures NTD, D/C Abx and observe    pt had 2l of IV fluids to support  BP and is on midodrine 10mg TID,   pt is Covid negative    GI colonoscopy attempt was incomplete as pt has v large ventrakl hernia and the endoscope coiled within the ventral hernia, the colon visualized to that pint showed no signs of bleeding, recommend start heparin drip, monitor H/h, capsule endoscopy in the AM, as last resort out pt  CT colonography if capsule endoscopy is inadequate    H/H low but stable 87/28,  sp 1 u PRBC  BUN/ creat 36/1.5, IV fluids, observe for overload  Clear liquids cont for today,  and NPO after midnight for capsule colonoscopy      Rehab, pt v deconditioned and need rigorous gait reconditioning    OOB to  chair  cont all meds

## 2021-05-02 NOTE — PROGRESS NOTE ADULT - SUBJECTIVE AND OBJECTIVE BOX
ROSA MARIA CASEY 80y WM Male transferred from [facility] due to lower ext weakness, numbness, tingling ans swelling ff by tingling of the hands.  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  The pt  was cultured and placed on Cefepime, given IV fluids for BP.  Of note pt has recent complicated  Covid hospitalization c/by prolonged bedbound state, LORRAINE, transaminitis,  bacterial PNA,, RLE DVT, GIB req transfusions, thus, pacement of IVC filter on 4 /8.        INTERVAL HPI/OVERNIGHT EVENTS: afebrile, off ABx, on IV heparin, H/H low but stable 8.3/27, sp partial colonoscopy, results of capsule endoscopy P    MEDICATIONS  (STANDING):  atorvastatin 40 milliGRAM(s) Oral at bedtime  cefepime   IVPB 2000 milliGRAM(s) IV Intermittent every 24 hours  chlorhexidine 4% Liquid 1 Application(s) Topical <User Schedule>  dextrose 40% Gel 15 Gram(s) Oral once  dextrose 5%. 1000 milliLiter(s) (50 mL/Hr) IV Continuous <Continuous>  dextrose 50% Injectable 25 Gram(s) IV Push once  ferrous    sulfate 325 milliGRAM(s) Oral daily  glucagon  Injectable 1 milliGRAM(s) IntraMuscular once  heparin IV  insulin lispro (ADMELOG) corrective regimen sliding scale   SubCutaneous three times a day before meals  metoprolol succinate ER 50 milliGRAM(s) Oral daily  midodrine. 10 milliGRAM(s) Oral three times a day  pantoprazole    Tablet 40 milliGRAM(s) Oral before breakfast    MEDICATIONS  (PRN):  acetaminophen   Tablet .. 650 milliGRAM(s) Oral every 6 hours PRN Temp greater or equal to 38C (100.4F), Mild Pain (1 - 3)      Allergies    No Known Allergies    Intolerances  	    Vital Signs Last 24 Hrs:  T(F): 96  HR:  66  BP: 102/58    RR: 18   SpO2: 92%  RA    PHYSICAL EXAM:      Constitutional:  A&O, elderly weak and chronically ill looking but  NAD    Eyes:  nonicteric    ENMT: dry oral mucosa, dental defects    Neck:  short, thick supple, no bruits, mild JVD    Respiratory: shallow resp    Cardiovascular: S1S2 irreg    Gastrointestinal:  obese, distended but soft and benign,     Genitourinary:  urostomy    Extremities: moves all ext, dec motor strength of kower ext, + edema, + arthritic changes    Vascular: dec pedal pulses    Neurological: nonfocal    Skin: no rash    Lymph Nodes:  not enlarged    Musculoskeletal:  nl    Psychiatric: depressed but stable        LABS:                       8.3  11)-----------( 384    WBC on ad 14             27      136  |  105 |  34  ----------------------------<  104  4.3   |  17  |  1.4    GFR 25, 38, 44, 48  Ca    8.6       Phos  5.0      Mg     2.1, 2.3, 2.1, 2.2      trop 0.04 x 3  COVID neg  DD [id number]    TPro  6.0  /  Alb  3.3<L>  /  TBili  1.1  /  DBili  x   /  AST  16  /  ALT  12  /  AlkPhos  91  04-26    PT/INR - ( 27 Apr 2021 06:36 )   PT: 15.90 sec;   INR: 1.38 ratio         PTT - ( 27 Apr 2021 06:36 )  PTT:30.6 sec      RADIOLOGY & ADDITIONAL TESTS:    CXR:  BL opacities  lower ext duplex: BL ext acute and chronic DVT    ECHO:  EF  59%, mild TR, borderline pul HTN    colonoscopy 4/29:  partial procedure to 40cm, clean, colon within large ventral hernia, endoscope could not be advanced safely    capsule endoscopy 4/30

## 2021-05-03 LAB
ANION GAP SERPL CALC-SCNC: 14 MMOL/L — SIGNIFICANT CHANGE UP (ref 7–14)
APTT BLD: 67.2 SEC — HIGH (ref 27–39.2)
APTT BLD: 74 SEC — CRITICAL HIGH (ref 27–39.2)
APTT BLD: 86.6 SEC — CRITICAL HIGH (ref 27–39.2)
APTT BLD: 86.7 SEC — CRITICAL HIGH (ref 27–39.2)
APTT BLD: 90.3 SEC — CRITICAL HIGH (ref 27–39.2)
BLD GP AB SCN SERPL QL: SIGNIFICANT CHANGE UP
BUN SERPL-MCNC: 30 MG/DL — HIGH (ref 10–20)
CALCIUM SERPL-MCNC: 8.3 MG/DL — LOW (ref 8.5–10.1)
CHLORIDE SERPL-SCNC: 107 MMOL/L — SIGNIFICANT CHANGE UP (ref 98–110)
CO2 SERPL-SCNC: 18 MMOL/L — SIGNIFICANT CHANGE UP (ref 17–32)
CREAT SERPL-MCNC: 1.2 MG/DL — SIGNIFICANT CHANGE UP (ref 0.7–1.5)
GLUCOSE BLDC GLUCOMTR-MCNC: 107 MG/DL — HIGH (ref 70–99)
GLUCOSE BLDC GLUCOMTR-MCNC: 122 MG/DL — HIGH (ref 70–99)
GLUCOSE BLDC GLUCOMTR-MCNC: 127 MG/DL — HIGH (ref 70–99)
GLUCOSE BLDC GLUCOMTR-MCNC: 151 MG/DL — HIGH (ref 70–99)
GLUCOSE SERPL-MCNC: 101 MG/DL — HIGH (ref 70–99)
HCT VFR BLD CALC: 27.8 % — LOW (ref 42–52)
HGB BLD-MCNC: 8.5 G/DL — LOW (ref 14–18)
MAGNESIUM SERPL-MCNC: 2.1 MG/DL — SIGNIFICANT CHANGE UP (ref 1.8–2.4)
MCHC RBC-ENTMCNC: 27.1 PG — SIGNIFICANT CHANGE UP (ref 27–31)
MCHC RBC-ENTMCNC: 30.6 G/DL — LOW (ref 32–37)
MCV RBC AUTO: 88.5 FL — SIGNIFICANT CHANGE UP (ref 80–94)
NRBC # BLD: 0 /100 WBCS — SIGNIFICANT CHANGE UP (ref 0–0)
PHOSPHATE SERPL-MCNC: 3.7 MG/DL — SIGNIFICANT CHANGE UP (ref 2.1–4.9)
PLATELET # BLD AUTO: 391 K/UL — SIGNIFICANT CHANGE UP (ref 130–400)
POTASSIUM SERPL-MCNC: 4 MMOL/L — SIGNIFICANT CHANGE UP (ref 3.5–5)
POTASSIUM SERPL-SCNC: 4 MMOL/L — SIGNIFICANT CHANGE UP (ref 3.5–5)
RBC # BLD: 3.14 M/UL — LOW (ref 4.7–6.1)
RBC # FLD: 17.6 % — HIGH (ref 11.5–14.5)
SODIUM SERPL-SCNC: 139 MMOL/L — SIGNIFICANT CHANGE UP (ref 135–146)
WBC # BLD: 10.17 K/UL — SIGNIFICANT CHANGE UP (ref 4.8–10.8)
WBC # FLD AUTO: 10.17 K/UL — SIGNIFICANT CHANGE UP (ref 4.8–10.8)

## 2021-05-03 PROCEDURE — 99232 SBSQ HOSP IP/OBS MODERATE 35: CPT

## 2021-05-03 PROCEDURE — 74018 RADEX ABDOMEN 1 VIEW: CPT | Mod: 26

## 2021-05-03 RX ORDER — TRAMADOL HYDROCHLORIDE 50 MG/1
25 TABLET ORAL DAILY
Refills: 0 | Status: DISCONTINUED | OUTPATIENT
Start: 2021-05-03 | End: 2021-05-05

## 2021-05-03 RX ADMIN — MIDODRINE HYDROCHLORIDE 10 MILLIGRAM(S): 2.5 TABLET ORAL at 17:03

## 2021-05-03 RX ADMIN — MIDODRINE HYDROCHLORIDE 10 MILLIGRAM(S): 2.5 TABLET ORAL at 05:43

## 2021-05-03 RX ADMIN — CHLORHEXIDINE GLUCONATE 1 APPLICATION(S): 213 SOLUTION TOPICAL at 05:42

## 2021-05-03 RX ADMIN — Medication 325 MILLIGRAM(S): at 12:03

## 2021-05-03 RX ADMIN — Medication 50 MILLIGRAM(S): at 05:43

## 2021-05-03 RX ADMIN — ATORVASTATIN CALCIUM 40 MILLIGRAM(S): 80 TABLET, FILM COATED ORAL at 21:22

## 2021-05-03 RX ADMIN — TRAMADOL HYDROCHLORIDE 25 MILLIGRAM(S): 50 TABLET ORAL at 01:18

## 2021-05-03 RX ADMIN — TRAMADOL HYDROCHLORIDE 25 MILLIGRAM(S): 50 TABLET ORAL at 03:09

## 2021-05-03 RX ADMIN — PANTOPRAZOLE SODIUM 40 MILLIGRAM(S): 20 TABLET, DELAYED RELEASE ORAL at 05:43

## 2021-05-03 RX ADMIN — Medication 40 MILLIGRAM(S): at 10:10

## 2021-05-03 RX ADMIN — MIDODRINE HYDROCHLORIDE 10 MILLIGRAM(S): 2.5 TABLET ORAL at 12:03

## 2021-05-03 RX ADMIN — HEPARIN SODIUM 11 UNIT(S)/HR: 5000 INJECTION INTRAVENOUS; SUBCUTANEOUS at 05:43

## 2021-05-03 NOTE — PROGRESS NOTE ADULT - ASSESSMENT
Patient is a 79 y/o man with PMH of HTN, HLD, DM II, Afib (not on AC secondary to suspected GI bleed during last hospitalization March-April 2021) S/P EGD 4/2021 for melena  showing non erosive gastritis , CKD stage 3A, bladder cancer (s/p cystectomy with urostomy), right lower extremity DVT (4/5; s/p IVC filter 4/8), diverticulosis (s/p bowel resection and reanastomosis) now admitted with acute on chronic DVT requiring Anticoagulation. GI consulted for clered for anticoagulation.       #history of GI bleed , S/P EGD showing non erosive gastritis ,  no evidence of gross GI bleed   # normocytic anemia with stable HGB , s/p incomplete colonoscopy secondary to large ventral hernia , s/p capsule endoscopy with multiple clean based small duodenal ulcers and non erosive duodenitis with no active GI bleed   #resolved COVID on room air now     REC:  continue therapeutic anticoagulation  KUB abdomen and pelvis   Serial CBC  and keep HGB above 8  outpatient CT colonography        Patient is a 81 y/o man with PMH of HTN, HLD, DM II, Afib (not on AC secondary to suspected GI bleed during last hospitalization March-April 2021) S/P EGD 4/2021 for melena  showing non erosive gastritis , CKD stage 3A, bladder cancer (s/p cystectomy with urostomy), right lower extremity DVT (4/5; s/p IVC filter 4/8), diverticulosis (s/p bowel resection and reanastomosis) now admitted with acute on chronic DVT requiring Anticoagulation. GI consulted for clered for anticoagulation.       #history of GI bleed , S/P EGD showing non erosive gastritis ,  no evidence of gross GI bleed   # normocytic anemia with stable HGB , s/p incomplete colonoscopy secondary to large ventral hernia , s/p capsule endoscopy with multiple clean based small duodenal ulcers and non erosive duodenitis with no active GI bleed   #resolved COVID on room air now     REC:  continue therapeutic anticoagulation  KUB abdomen and pelvis   Serial CBC and keep HGB above 8  outpatient CT colonography

## 2021-05-03 NOTE — PROGRESS NOTE ADULT - ATTENDING COMMENTS
pt with hx of DVT requiring anticoagulation, had suspected GIB during last hospitalization for COVID, but EGD showed no source of bleed. now asking for clearance for a/c. Pt is now on a/c and having no recurrent bleed. Colonoscopy could not be completed because of large amount of colon in a ventral hernia (concern for entrapment of the scope within the hernia). Will plan for outpt CT colonography. VCE showed no active bleeding or possible sources of bleed.

## 2021-05-03 NOTE — PROGRESS NOTE ADULT - SUBJECTIVE AND OBJECTIVE BOX
ROSA MARIA CASEY 80y WM Male transferred from [facility] due to lower ext weakness, numbness, tingling ans swelling ff by tingling of the hands.  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  The pt  was cultured and placed on Cefepime, given IV fluids for BP.  Of note pt has recent complicated  Covid hospitalization c/by prolonged bedbound state, LORRAINE, transaminitis,  bacterial PNA,, RLE DVT, GIB req transfusions, thus, pacement of IVC filter on 4 /8.        INTERVAL HPI/OVERNIGHT EVENTS: afebrile, off ABx, on heparin drip for BL extensive lower ext DVTs, legs remain v edematous, pt c/o pain, H/H 8.5/27 stable with no sign of bleed,as sp partial colonoscopy due to large ventral henia, sp capsule endoscopy + multiple clean based ulcers and duodenitis    MEDICATIONS  (STANDING):  atorvastatin 40 milliGRAM(s) Oral at bedtime  cefepime   IVPB 2000 milliGRAM(s) IV Intermittent every 24 hours  chlorhexidine 4% Liquid 1 Application(s) Topical <User Schedule>  dextrose 40% Gel 15 Gram(s) Oral once  dextrose 5%. 1000 milliLiter(s) (50 mL/Hr) IV Continuous <Continuous>  dextrose 50% Injectable 25 Gram(s) IV Push once  ferrous    sulfate 325 milliGRAM(s) Oral daily  glucagon  Injectable 1 milliGRAM(s) IntraMuscular once  heparin IV  insulin lispro (ADMELOG) corrective regimen sliding scale   SubCutaneous three times a day before meals  metoprolol succinate ER 50 milliGRAM(s) Oral daily  midodrine. 10 milliGRAM(s) Oral three times a day  pantoprazole    Tablet 40 milliGRAM(s) Oral before breakfast    MEDICATIONS  (PRN):  acetaminophen   Tablet .. 650 milliGRAM(s) Oral every 6 hours PRN Temp greater or equal to 38C (100.4F), Mild Pain (1 - 3)      Allergies    No Known Allergies    Intolerances  	    Vital Signs Last 24 Hrs:  T(F): 96.3  HR:  66  BP: 103/52    RR: 18   SpO2: 95%  RA    PHYSICAL EXAM:      Constitutional:  A&O, elderly large male,  weak and chronically ill looking but  NAD    Eyes:  nonicteric    ENMT: dry oral mucosa, dental defects    Neck:  short, thick supple, no bruits, mild JVD    Respiratory: shallow resp    Cardiovascular: S1S2 irreg    Gastrointestinal:  obese, distended but soft and benign,     Genitourinary:  urostomy    Extremities: moves all ext, arthritic changes, dec motor strength of kower ext, + pitting edema    Vascular: dec pedal pulses    Neurological: nonfocal    Skin: no rash    Lymph Nodes:  not enlarged    Musculoskeletal:  nl    Psychiatric: depressed but stable        LABS:                       8.5  10)-----------( 391   WBC on ad 14             27      139  |  107 |  30  ----------------------------<  101  4.0   |  18  |  1.2    GFR 25, 38, 44, 48, 58  Ca    8.6       Phos  5.0      Mg     2.1, 2.3, 2.1, 2.2, 2.1      trop 0.04 x 3  COVID neg  DD [id number]    TPro  6.0  /  Alb  3.3<L>  /  TBili  1.1  /  DBili  x   /  AST  16  /  ALT  12  /  AlkPhos  91  04-26    PT/INR - ( 27 Apr 2021 06:36 )   PT: 15.90 sec;   INR: 1.38 ratio         PTT - ( 27 Apr 2021 06:36 )  PTT:30.6 sec      RADIOLOGY & ADDITIONAL TESTS:    CXR:  BL opacities  lower ext duplex: BL ext acute and chronic DVT    ECHO:  EF  59%, mild TR, borderline pul HTN    colonoscopy 4/29:  partial procedure to 40cm, clean, colon within large ventral hernia, endoscope could not be advanced safely    capsule endoscopy 4/30:  multiple clean based duodenal ulcers, duodenitis

## 2021-05-03 NOTE — PROGRESS NOTE ADULT - SUBJECTIVE AND OBJECTIVE BOX
Gastroenterology progress note:     Patient is a 80y old  Male who presents with a chief complaint of Paresthesias of bilateral feet (03 May 2021 08:40)       Admitted on: 04-26-21    We are following the patient for: patient with anemia requiring clearance for anticoagulation      Interval History: No GI bleed on heparin drip, patient with brown stool , s/p incomplete colonoscopy secondary to large ventral hernia , s/p capsule endoscopy showing multiple small clean based duodenal ulcers , non erosive duodenitis.     Patient's medical problems are stable    Prior records reviewed (Y/N): Y  History obtained from someone other than patient (Y/N): Y      PAST MEDICAL & SURGICAL HISTORY:  Hypertension    Hyperlipidemia    Diabetes mellitus, type 2    History of atrial fibrillation    Bladder cancer  secondary to exposure at [employer] 9/11 s/p cystectomy with urostomy    Chronic kidney disease (CKD)  stage 3A, baseline creatinine 1.4    DVT, lower extremity    History of total hip replacement, bilateral    History of total knee replacement, bilateral    History of total cystectomy  with resultant urostomy        MEDICATIONS  (STANDING):  atorvastatin 40 milliGRAM(s) Oral at bedtime  chlorhexidine 4% Liquid 1 Application(s) Topical <User Schedule>  dextrose 40% Gel 15 Gram(s) Oral once  dextrose 5%. 1000 milliLiter(s) (50 mL/Hr) IV Continuous <Continuous>  dextrose 50% Injectable 25 Gram(s) IV Push once  ferrous    sulfate 325 milliGRAM(s) Oral daily  glucagon  Injectable 1 milliGRAM(s) IntraMuscular once  heparin  Infusion 1300 Unit(s)/Hr (11 mL/Hr) IV Continuous <Continuous>  insulin lispro (ADMELOG) corrective regimen sliding scale   SubCutaneous three times a day before meals  metoprolol succinate ER 50 milliGRAM(s) Oral daily  midodrine. 10 milliGRAM(s) Oral three times a day  pantoprazole    Tablet 40 milliGRAM(s) Oral before breakfast  sodium chloride 0.9%. 1000 milliLiter(s) (60 mL/Hr) IV Continuous <Continuous>    MEDICATIONS  (PRN):  acetaminophen   Tablet .. 650 milliGRAM(s) Oral every 6 hours PRN Mild Pain (1 - 3)  acetaminophen   Tablet .. 650 milliGRAM(s) Oral every 6 hours PRN Temp greater or equal to 38C (100.4F), Mild Pain (1 - 3)  melatonin 5 milliGRAM(s) Oral at bedtime PRN Insomnia  traMADol 25 milliGRAM(s) Oral daily PRN Severe Pain (7 - 10)      Allergies  No Known Allergies      Review of Systems:   Cardiovascular:  No Chest Pain, No Palpitations  Respiratory:  No Cough, No Dyspnea  Gastrointestinal:  As described in HPI    Physical Examination:  T(C): 35.6 (05-03-21 @ 09:20), Max: 36 (05-03-21 @ 00:00)  HR: 69 (05-03-21 @ 12:00) (62 - 79)  BP: 99/58 (05-03-21 @ 12:00) (99/58 - 113/55)  RR: 18 (05-03-21 @ 09:20) (18 - 19)  SpO2: 95% (05-03-21 @ 08:00) (95% - 95%)      05-02-21 @ 07:01  -  05-03-21 @ 07:00  --------------------------------------------------------  IN: 191 mL / OUT: 900 mL / NET: -709 mL    05-03-21 @ 07:01  -  05-03-21 @ 12:38  --------------------------------------------------------  IN: 595 mL / OUT: 0 mL / NET: 595 mL      Constitutional: No acute distress.  Respiratory:  No signs of respiratory distress. Lung sounds are clear bilaterally.  Cardiovascular:  S1 S2, Regular rate and rhythm.  Abdominal: Abdomen is soft, symmetric, and non-tender without distention. Bowel sounds are present and normoactive in all four quadrants. No masses, hepatomegaly, or splenomegaly are noted.   Skin: No rashes, No Jaundice.        Data: (reviewed by attending)                        8.5    10.17 )-----------( 391      ( 03 May 2021 06:38 )             27.8     Hgb trend:  8.5  05-03-21 @ 06:38  8.3  05-02-21 @ 06:56  8.7  05-01-21 @ 08:55  8.3  04-30-21 @ 21:12  8.5  04-30-21 @ 17:12        05-03    139  |  107  |  30<H>  ----------------------------<  101<H>  4.0   |  18  |  1.2    Ca    8.3<L>      03 May 2021 06:38  Phos  3.7     05-03  Mg     2.1     05-03      Liver panel trend:  TBili 0.8   /   AST 42   /   ALT 25   /   AlkP 81   /   Tptn 5.6   /   Alb 3.0    /   DBili --      04-28  TBili 1.1   /   AST 16   /   ALT 12   /   AlkP 91   /   Tptn 6.0   /   Alb 3.3    /   DBili --      04-26      PTT - ( 03 May 2021 06:38 )  PTT:86.6 sec       Radiology: (reviewed by attending)

## 2021-05-03 NOTE — PROGRESS NOTE ADULT - SUBJECTIVE AND OBJECTIVE BOX
SUBJECTIVE  Patient is a 80y old Male who presents with a chief complaint of Paresthesias of bilateral feet, extensive BL DVT (02 May 2021 19:59)  Currently admitted to medicine with the primary diagnosis of Hypotension    Today is hospital day 7d, and this morning he is in chronic pain from B/L feet/legs and reports no overnight events. on IV heparin, no signs of bleeding. H/H stable.   s/p capsule endoscopy, pending reads.   fu PTT    OBJECTIVE  PAST MEDICAL & SURGICAL HISTORY  Hypertension    Hyperlipidemia    Diabetes mellitus, type 2    History of atrial fibrillation    Bladder cancer  secondary to exposure at [employer] 9/11 s/p cystectomy with urostomy    Chronic kidney disease (CKD)  stage 3A, baseline creatinine 1.4    DVT, lower extremity    History of total hip replacement, bilateral    History of total knee replacement, bilateral    History of total cystectomy  with resultant urostomy      ALLERGIES:  No Known Allergies    MEDICATIONS:  STANDING MEDICATIONS  atorvastatin 40 milliGRAM(s) Oral at bedtime  chlorhexidine 4% Liquid 1 Application(s) Topical <User Schedule>  dextrose 40% Gel 15 Gram(s) Oral once  dextrose 5%. 1000 milliLiter(s) IV Continuous <Continuous>  dextrose 50% Injectable 25 Gram(s) IV Push once  ferrous    sulfate 325 milliGRAM(s) Oral daily  furosemide   Injectable 40 milliGRAM(s) IV Push once  glucagon  Injectable 1 milliGRAM(s) IntraMuscular once  heparin  Infusion 1300 Unit(s)/Hr IV Continuous <Continuous>  insulin lispro (ADMELOG) corrective regimen sliding scale   SubCutaneous three times a day before meals  metoprolol succinate ER 50 milliGRAM(s) Oral daily  midodrine. 10 milliGRAM(s) Oral three times a day  pantoprazole    Tablet 40 milliGRAM(s) Oral before breakfast  sodium chloride 0.9%. 1000 milliLiter(s) IV Continuous <Continuous>    PRN MEDICATIONS  acetaminophen   Tablet .. 650 milliGRAM(s) Oral every 6 hours PRN  acetaminophen   Tablet .. 650 milliGRAM(s) Oral every 6 hours PRN  melatonin 5 milliGRAM(s) Oral at bedtime PRN  traMADol 25 milliGRAM(s) Oral daily PRN      VITAL SIGNS: Last 24 Hours  T(C): 36 (03 May 2021 00:00), Max: 36 (03 May 2021 00:00)  T(F): 96.8 (03 May 2021 00:00), Max: 96.8 (03 May 2021 00:00)  HR: 69 (03 May 2021 08:00) (62 - 79)  BP: 103/59 (03 May 2021 05:41) (100/54 - 103/59)  BP(mean): --  RR: 19 (03 May 2021 08:00) (18 - 19)  SpO2: 95% (03 May 2021 08:00) (95% - 95%)    LABS:                        8.5    10.17 )-----------( 391      ( 03 May 2021 06:38 )             27.8     05-03    139  |  107  |  30<H>  ----------------------------<  101<H>  4.0   |  18  |  1.2    Ca    8.3<L>      03 May 2021 06:38  Phos  3.7     05-03  Mg     2.1     05-03      PTT - ( 03 May 2021 06:38 )  PTT:86.6 sec              RADIOLOGY:      PHYSICAL EXAM:    GENERAL: obese man, not in distress.   HEENT:  Atraumatic, Normocephalic. EOMI, PERRLA, conjunctiva and sclera clear, No JVD  PULMONARY: Clear to auscultation bilaterally; No wheeze  CARDIOVASCULAR: Regular rate and rhythm; No murmurs, rubs, or gallops  GASTROINTESTINAL: Soft, Nontender, Nondistended; Bowel sounds present  MUSCULOSKELETAL:  2+ Peripheral Pulses, No clubbing, cyanosis, or edema  NEUROLOGY: non-focal  SKIN: No rashes or lesions      ADMISSION SUMMARY  80 year-old man with PMH of HTN, HLD, DM II, Afib (not on AC secondary to suspected GI bleed during last hospitalization March-April 2021), CKD stage 3A, bladder cancer (s/p cystectomy with urostomy), right lower extremity DVT (4/5; s/p IVC filter 4/8), diverticulosis (s/p bowel resection and reanastomosis) who presents from [facility] due to bilateral lower-extremity paresthesias for 7 days. Admitted for work-up of lower extremity swelling; hypotensive with LORRAINE on admission.    # Bilateral extensive leg DVTs  # Lower extremity swelling with paresthesias  - Holding Lasix for LORRAINE + hypotension  - Daily weights, strict I+O  - Has IVC filter from 4/8 placed by IR; was off AC due to presumed GI bleed and drop in hemoglobin last stay  - IR recommended conservative management with AC and will consider percutaneous thrombectomy if treatment fails  - Trop 0.04,   - Echo EF 59%  - C/w heparin drip, keep PTT 60-90    # Hypotension  - Midodrine 10mg TID    # LORRAINE on CKD IIIa  - likely Intravascular volume depletion   - Baseline creatinine 1.0 - 1.2 from previous admission; 3.1 on current admission, downtrending  - Creatinine Trend: 1.2<--, 1.4<--, 1.5<--, 1.7<--, 2.0<--, 2.3<--    # Afib  - Continue metoprolol succinate 50mg q24hrs    # DM II  - Carb-consistent diet  - POCT glucose  - Basal/bolus insulin if >180 blood glucose    # Normocytic Anemia  - Hemoglobin stable from previous admission  - EGD 4/9 with no active bleeding  - Pantoprazole 40mg q24hrs  - Iron studies from 3/30: Total iron-178, TIBC- 305, % saturation 58%  - Keep active T+S  - Received 1u pRBC this admission no active bleeding  - GI colonoscopy attempt was incomplete as pt has v large ventrakl hernia and the endoscope coiled within the ventral hernia, the colon visualized to that pint showed no signs of bleeding, recommend start heparin drip, monitor H/h, capsule endoscopy done on 4/30, to be read today on 5/3.  -OP  CT colonography if capsule endoscopy is inadequate    # HLD  - Continue atorvastatin 40mg qhs    MISC:  - DVT PPX: Heparin drip, FU PTT  - GI PPX: Pantoprazole  - Diet: DASH/carb consistent  - Activity: As tolerated  - Code: FULL  - Disposition: Acute

## 2021-05-03 NOTE — PROGRESS NOTE ADULT - ASSESSMENT
pt with recent prolonged and complicated hosp stay for Covid Viral Syn with Acute Hypoxic RF, R DVT, drop in H/H req PRBCs, LORRAINE, transaminitis, sp IVC Fplacement on 4/8 returns for lower ext pain, swelling and  sensation of pins and needles with hypotension and LORRAINE on CKD.    lower ext pain, swelling and paresthesias with BL acute on chronic DVT, sp IVC filter 4/8/21  Hypotension  LORRAINE on CKD  Debilitated, deconditioned state, mobility dysfunction  Hx of recnt prolonged hosp due to COVID PNA, Acute Hypoxic RF  Hx of HTN, ASHD, Afib  ( was on AC with Eliquis)  Hx of HDLD  Hx of  DM II, CKD, neuropathy  Hx of Bladder Ca, sp cystectomy and urostomy, 911 exposure  Hx of GERD, diverticulosis, sp bowel resection with reanastomosis  Hx of OA, DDD, DJD, sp BL TKR, sp hip surgery    H/H remains low but stable   8.5/27, received 1 u PRBC prior to colonoscopy attempt, no overt signs of bleeding on IV heparin, monitor PT and keep lower end of therapeutic    pt is sp IVC filter 4/8 due to inability to AC due to GIB, sp EGD gastritis and duodenitis 4/21, unable to do colonoscopy due to unstable state at that time    GI colonoscopy attempt was incomplete as pt has v large ventrakl hernia and the endoscope coiled within the ventral hernia, the colon visualized to that pint showed no signs of bleeding, recommend start heparin drip, monitor H/h, capsule endoscopy in the AM, as last resort out pt  CT colonography if capsule endoscopy is inadequate    capsule endoscopy:  multiple clean based ulcers of the duodenum, duodenitis    BUN/ creat 30/1.2  sp one conde IV lasix  Rehab, pt v deconditioned and need rigorous gait reconditioning  order overhead trapeze  OOB to  chair  cont all meds

## 2021-05-04 LAB
ANION GAP SERPL CALC-SCNC: 12 MMOL/L — SIGNIFICANT CHANGE UP (ref 7–14)
ANISOCYTOSIS BLD QL: SIGNIFICANT CHANGE UP
APTT BLD: 51.7 SEC — HIGH (ref 27–39.2)
APTT BLD: 60.2 SEC — HIGH (ref 27–39.2)
APTT BLD: 76 SEC — CRITICAL HIGH (ref 27–39.2)
APTT BLD: 81.8 SEC — CRITICAL HIGH (ref 27–39.2)
APTT BLD: 82.1 SEC — CRITICAL HIGH (ref 27–39.2)
BASOPHILS # BLD AUTO: 0.19 K/UL — SIGNIFICANT CHANGE UP (ref 0–0.2)
BASOPHILS NFR BLD AUTO: 1.8 % — HIGH (ref 0–1)
BLD GP AB SCN SERPL QL: SIGNIFICANT CHANGE UP
BUN SERPL-MCNC: 29 MG/DL — HIGH (ref 10–20)
BURR CELLS BLD QL SMEAR: PRESENT — SIGNIFICANT CHANGE UP
CALCIUM SERPL-MCNC: 8.4 MG/DL — LOW (ref 8.5–10.1)
CHLORIDE SERPL-SCNC: 107 MMOL/L — SIGNIFICANT CHANGE UP (ref 98–110)
CO2 SERPL-SCNC: 18 MMOL/L — SIGNIFICANT CHANGE UP (ref 17–32)
CREAT SERPL-MCNC: 1.3 MG/DL — SIGNIFICANT CHANGE UP (ref 0.7–1.5)
EOSINOPHIL # BLD AUTO: 0.27 K/UL — SIGNIFICANT CHANGE UP (ref 0–0.7)
EOSINOPHIL NFR BLD AUTO: 2.6 % — SIGNIFICANT CHANGE UP (ref 0–8)
GIANT PLATELETS BLD QL SMEAR: PRESENT — SIGNIFICANT CHANGE UP
GLUCOSE BLDC GLUCOMTR-MCNC: 113 MG/DL — HIGH (ref 70–99)
GLUCOSE BLDC GLUCOMTR-MCNC: 126 MG/DL — HIGH (ref 70–99)
GLUCOSE BLDC GLUCOMTR-MCNC: 133 MG/DL — HIGH (ref 70–99)
GLUCOSE BLDC GLUCOMTR-MCNC: 165 MG/DL — HIGH (ref 70–99)
GLUCOSE SERPL-MCNC: 104 MG/DL — HIGH (ref 70–99)
HCT VFR BLD CALC: 27.9 % — LOW (ref 42–52)
HGB BLD-MCNC: 8.3 G/DL — LOW (ref 14–18)
HYPOCHROMIA BLD QL: SIGNIFICANT CHANGE UP
INR BLD: 1.13 RATIO — SIGNIFICANT CHANGE UP (ref 0.65–1.3)
LYMPHOCYTES # BLD AUTO: 0.82 K/UL — LOW (ref 1.2–3.4)
LYMPHOCYTES # BLD AUTO: 7.9 % — LOW (ref 20.5–51.1)
MAGNESIUM SERPL-MCNC: 1.7 MG/DL — LOW (ref 1.8–2.4)
MANUAL SMEAR VERIFICATION: SIGNIFICANT CHANGE UP
MCHC RBC-ENTMCNC: 26.8 PG — LOW (ref 27–31)
MCHC RBC-ENTMCNC: 29.7 G/DL — LOW (ref 32–37)
MCV RBC AUTO: 90 FL — SIGNIFICANT CHANGE UP (ref 80–94)
METAMYELOCYTES # FLD: 3.5 % — HIGH (ref 0–0)
MICROCYTES BLD QL: SIGNIFICANT CHANGE UP
MONOCYTES # BLD AUTO: 0.64 K/UL — HIGH (ref 0.1–0.6)
MONOCYTES NFR BLD AUTO: 6.2 % — SIGNIFICANT CHANGE UP (ref 1.7–9.3)
MYELOCYTES NFR BLD: 2.6 % — HIGH (ref 0–0)
NEUTROPHILS # BLD AUTO: 7.8 K/UL — HIGH (ref 1.4–6.5)
NEUTROPHILS NFR BLD AUTO: 75.4 % — HIGH (ref 42.2–75.2)
PLAT MORPH BLD: NORMAL — SIGNIFICANT CHANGE UP
PLATELET # BLD AUTO: 399 K/UL — SIGNIFICANT CHANGE UP (ref 130–400)
POIKILOCYTOSIS BLD QL AUTO: SLIGHT — SIGNIFICANT CHANGE UP
POLYCHROMASIA BLD QL SMEAR: SLIGHT — SIGNIFICANT CHANGE UP
POTASSIUM SERPL-MCNC: 4.2 MMOL/L — SIGNIFICANT CHANGE UP (ref 3.5–5)
POTASSIUM SERPL-SCNC: 4.2 MMOL/L — SIGNIFICANT CHANGE UP (ref 3.5–5)
PROTHROM AB SERPL-ACNC: 13 SEC — HIGH (ref 9.95–12.87)
RBC # BLD: 3.1 M/UL — LOW (ref 4.7–6.1)
RBC # FLD: 18 % — HIGH (ref 11.5–14.5)
RBC BLD AUTO: ABNORMAL
SARS-COV-2 RNA SPEC QL NAA+PROBE: SIGNIFICANT CHANGE UP
SODIUM SERPL-SCNC: 137 MMOL/L — SIGNIFICANT CHANGE UP (ref 135–146)
TOXIC GRANULES BLD QL SMEAR: PRESENT — SIGNIFICANT CHANGE UP
WBC # BLD: 10.35 K/UL — SIGNIFICANT CHANGE UP (ref 4.8–10.8)
WBC # FLD AUTO: 10.35 K/UL — SIGNIFICANT CHANGE UP (ref 4.8–10.8)

## 2021-05-04 PROCEDURE — 99232 SBSQ HOSP IP/OBS MODERATE 35: CPT

## 2021-05-04 PROCEDURE — 99223 1ST HOSP IP/OBS HIGH 75: CPT

## 2021-05-04 PROCEDURE — 74018 RADEX ABDOMEN 1 VIEW: CPT | Mod: 26

## 2021-05-04 RX ORDER — MAGNESIUM SULFATE 500 MG/ML
2 VIAL (ML) INJECTION ONCE
Refills: 0 | Status: COMPLETED | OUTPATIENT
Start: 2021-05-04 | End: 2021-05-04

## 2021-05-04 RX ORDER — FUROSEMIDE 40 MG
40 TABLET ORAL ONCE
Refills: 0 | Status: COMPLETED | OUTPATIENT
Start: 2021-05-04 | End: 2021-05-04

## 2021-05-04 RX ORDER — POLYETHYLENE GLYCOL 3350 17 G/17G
17 POWDER, FOR SOLUTION ORAL DAILY
Refills: 0 | Status: DISCONTINUED | OUTPATIENT
Start: 2021-05-04 | End: 2021-05-05

## 2021-05-04 RX ORDER — SENNA PLUS 8.6 MG/1
2 TABLET ORAL DAILY
Refills: 0 | Status: DISCONTINUED | OUTPATIENT
Start: 2021-05-04 | End: 2021-05-05

## 2021-05-04 RX ADMIN — SODIUM CHLORIDE 60 MILLILITER(S): 9 INJECTION INTRAMUSCULAR; INTRAVENOUS; SUBCUTANEOUS at 23:23

## 2021-05-04 RX ADMIN — HEPARIN SODIUM 12 UNIT(S)/HR: 5000 INJECTION INTRAVENOUS; SUBCUTANEOUS at 23:23

## 2021-05-04 RX ADMIN — MIDODRINE HYDROCHLORIDE 10 MILLIGRAM(S): 2.5 TABLET ORAL at 17:08

## 2021-05-04 RX ADMIN — Medication 50 GRAM(S): at 12:23

## 2021-05-04 RX ADMIN — PANTOPRAZOLE SODIUM 40 MILLIGRAM(S): 20 TABLET, DELAYED RELEASE ORAL at 05:31

## 2021-05-04 RX ADMIN — POLYETHYLENE GLYCOL 3350 17 GRAM(S): 17 POWDER, FOR SOLUTION ORAL at 11:13

## 2021-05-04 RX ADMIN — SODIUM CHLORIDE 60 MILLILITER(S): 9 INJECTION INTRAMUSCULAR; INTRAVENOUS; SUBCUTANEOUS at 14:27

## 2021-05-04 RX ADMIN — MIDODRINE HYDROCHLORIDE 10 MILLIGRAM(S): 2.5 TABLET ORAL at 05:31

## 2021-05-04 RX ADMIN — ATORVASTATIN CALCIUM 40 MILLIGRAM(S): 80 TABLET, FILM COATED ORAL at 21:25

## 2021-05-04 RX ADMIN — Medication 40 MILLIGRAM(S): at 10:41

## 2021-05-04 RX ADMIN — Medication 2: at 12:22

## 2021-05-04 RX ADMIN — CHLORHEXIDINE GLUCONATE 1 APPLICATION(S): 213 SOLUTION TOPICAL at 05:31

## 2021-05-04 RX ADMIN — TRAMADOL HYDROCHLORIDE 25 MILLIGRAM(S): 50 TABLET ORAL at 01:30

## 2021-05-04 RX ADMIN — Medication 650 MILLIGRAM(S): at 23:43

## 2021-05-04 RX ADMIN — TRAMADOL HYDROCHLORIDE 25 MILLIGRAM(S): 50 TABLET ORAL at 00:58

## 2021-05-04 RX ADMIN — SENNA PLUS 2 TABLET(S): 8.6 TABLET ORAL at 11:13

## 2021-05-04 RX ADMIN — MIDODRINE HYDROCHLORIDE 10 MILLIGRAM(S): 2.5 TABLET ORAL at 11:14

## 2021-05-04 RX ADMIN — Medication 50 MILLIGRAM(S): at 05:31

## 2021-05-04 RX ADMIN — HEPARIN SODIUM 9 UNIT(S)/HR: 5000 INJECTION INTRAVENOUS; SUBCUTANEOUS at 05:26

## 2021-05-04 RX ADMIN — Medication 325 MILLIGRAM(S): at 11:13

## 2021-05-04 NOTE — PROGRESS NOTE ADULT - SUBJECTIVE AND OBJECTIVE BOX
ROSA MARIA CASEY 80y WM Male transferred from [facility] due to lower ext weakness, numbness, tingling ans swelling ff by tingling of the hands.  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  The pt  was cultured and placed on Cefepime, given IV fluids for BP.  Of note pt has recent complicated  Covid hospitalization c/by prolonged bedbound state, LORRAINE, transaminitis,  bacterial PNA,, RLE DVT, GIB req transfusions, thus, pacement of IVC filter on 4 /8.        INTERVAL HPI/OVERNIGHT EVENTS: afebrile, off ABx, on heparin drip for BL extensive lower ext DVTs, legs remain v edematous, pt c/o pain, H/H 8.3/27 stable, req for vasc evaluation, fore venogram and possible thombectomy vs thrombolysis, transfuse 1u PRBC today, NPO after midnight, pt medicall stable and cleared as a low risk    MEDICATIONS  (STANDING):  atorvastatin 40 milliGRAM(s) Oral at bedtime  cefepime   IVPB 2000 milliGRAM(s) IV Intermittent every 24 hours  chlorhexidine 4% Liquid 1 Application(s) Topical <User Schedule>  dextrose 40% Gel 15 Gram(s) Oral once  dextrose 5%. 1000 milliLiter(s) (50 mL/Hr) IV Continuous <Continuous>  dextrose 50% Injectable 25 Gram(s) IV Push once  ferrous    sulfate 325 milliGRAM(s) Oral daily  glucagon  Injectable 1 milliGRAM(s) IntraMuscular once  heparin IV  insulin lispro (ADMELOG) corrective regimen sliding scale   SubCutaneous three times a day before meals  metoprolol succinate ER 50 milliGRAM(s) Oral daily  midodrine. 10 milliGRAM(s) Oral three times a day  pantoprazole    Tablet 40 milliGRAM(s) Oral before breakfast    MEDICATIONS  (PRN):  acetaminophen   Tablet .. 650 milliGRAM(s) Oral every 6 hours PRN Temp greater or equal to 38C (100.4F), Mild Pain (1 - 3)      Allergies    No Known Allergies    Intolerances  	    Vital Signs Last 24 Hrs:  T(F): 96.5  HR:  75  BP: 121/56    RR: 18   SpO2: 95%  RA    PHYSICAL EXAM:      Constitutional:  A&O, elderly large male,  weak and chronically ill looking but  NAD    Eyes:  nonicteric    ENMT: dry oral mucosa, dental defects    Neck:  short, thick supple, no bruits, mild JVD    Respiratory: shallow resp    Cardiovascular: S1S2 irreg    Gastrointestinal:  obese, distended but soft and benign,     Genitourinary:  urostomy    Extremities: moves all ext, arthritic changes, dec motor strength of kower ext, + pitting edema    Vascular: dec pedal pulses    Neurological: nonfocal    Skin: no rash    Lymph Nodes:  not enlarged    Musculoskeletal:  nl    Psychiatric: depressed but stable        LABS:                       8.3  10)-----------( 399   WBC on ad 14 27      137  |  107 |  29  ----------------------------<  104  4.2   |  18  |  1.3    GFR 25, 38, 44, 48, 58  Ca    8.6       Phos  5.0      Mg     2.1, 2.3, 2.1, 2.2, 2.1, 1.7      trop 0.04 x 3  COVID neg  DD [id number]    TPro  6.0  /  Alb  3.3<L>  /  TBili  1.1  /  DBili  x   /  AST  16  /  ALT  12  /  AlkPhos  91  04-26    PT/INR - ( 27 Apr 2021 06:36 )   PT: 15.90 sec;   INR: 1.38 ratio         PTT - ( 27 Apr 2021 06:36 )  PTT:30.6 sec      RADIOLOGY & ADDITIONAL TESTS:    CXR:  BL opacities  lower ext duplex: BL ext acute and chronic DVT    ECHO:  EF  59%, mild TR, borderline pul HTN    colonoscopy 4/29:  partial procedure to 40cm, clean, colon within large ventral hernia, endoscope could not be advanced safely    capsule endoscopy 4/30:  multiple clean based duodenal ulcers, duodenitis

## 2021-05-04 NOTE — CONSULT NOTE ADULT - ASSESSMENT
81 y/o man with PMH of HTN, HLD, DM II, Afib (not on AC secondary to suspected GI bleed during last hospitalization March-April 2021), CKD stage 3A, bladder cancer (s/p cystectomy with urostomy), right lower extremity DVT (4/5; s/p IVC filter 4/8), diverticulosis (s/p bowel resection and reanastomosis) who presents from [facility] due to bilateral lower-extremity paresthesias for 7 days. Patient previously hospitalized (3/12/21-4/20/21) at [facility] for COVID-19 pneumonia; hospital course complicated by sepsis, presumed GI bleed, and DVT (s/p IVC filter)--patient discharged to [facility] for rehab. Patient states that since his hospitalization he has been immobile and that when attempting to stand he becomes hypotensive and thus he has not moved much. Patient fully functional prior to previous hospitalization. Patient states that a week prior to presentation he felt his feet tingling (R>L). Patient states that this was shortly followed by swelling of his bilateral lower extremities.  Found bilateral lower extremity extensive DVTs, suspected thrombosis of the IVC filter    Plan:  - for bilateral lower extremity venogram, possible thrombectomy, possible thrombolysis  - continue Heparin drip (hold in pre-op holding prior procedure)  - NPO post MN  - medical clearance      SPECTRA [id number]  79 y/o man with PMH of HTN, HLD, DM II, Afib (not on AC secondary to suspected GI bleed during last hospitalization March-April 2021), CKD stage 3A, bladder cancer (s/p cystectomy with urostomy), right lower extremity DVT (4/5; s/p IVC filter 4/8), diverticulosis (s/p bowel resection and reanastomosis) who presents from [facility] due to bilateral lower-extremity paresthesias for 7 days. Patient previously hospitalized (3/12/21-4/20/21) at [facility] for COVID-19 pneumonia; hospital course complicated by sepsis, presumed GI bleed, and DVT (s/p IVC filter)--patient discharged to [facility] for rehab. Patient states that since his hospitalization he has been immobile and that when attempting to stand he becomes hypotensive and thus he has not moved much. Patient fully functional prior to previous hospitalization. Patient states that a week prior to presentation he felt his feet tingling (R>L). Patient states that this was shortly followed by swelling of his bilateral lower extremities.  Found bilateral lower extremity extensive DVTs, suspected thrombosis of the IVC filter    Plan:  - for bilateral lower extremity venogram, possible thrombectomy, possible thrombolysis  - continue Heparin drip (hold in pre-op holding prior procedure)  - transfuse 1 u PRBC for pre-op optimization  - NPO post MN  - medical clearance      SPECTRA [id number]

## 2021-05-04 NOTE — PROGRESS NOTE ADULT - ATTENDING COMMENTS
no further GIB, can resume oral a/c. serial KUBs while inpatient to ensure capsule has passed. CT colonography as outpt.

## 2021-05-04 NOTE — PROGRESS NOTE ADULT - ASSESSMENT
pt with recent prolonged and complicated hosp stay for Covid Viral Syn with Acute Hypoxic RF, R DVT, drop in H/H req PRBCs, LORRAINE, transaminitis, sp IVC Fplacement on 4/8 returns for lower ext pain, swelling and  sensation of pins and needles with hypotension and LORRAINE on CKD.    lower ext pain, swelling and paresthesias with BL acute on chronic DVT, sp IVC filter 4/8/21  Hypotension  LORRAINE on CKD  Debilitated, deconditioned state, mobility dysfunction  Hx of recnt prolonged hosp due to COVID PNA, Acute Hypoxic RF  Hx of HTN, ASHD, Afib  ( was on AC with Eliquis)  Hx of HDLD  Hx of  DM II, CKD, neuropathy  Hx of Bladder Ca, sp cystectomy and urostomy, 911 exposure  Hx of GERD, diverticulosis, sp bowel resection with reanastomosis  Hx of OA, DDD, DJD, sp BL TKR, sp hip surgery    H/H remains low but stable   8.3/27,on IV heparin, monitor PT   Vasc surgery:  BL lower venogram, possible thrombolysis vs thrombectomy  NPO after midnight  transfuse i1 PRBC today  replete Mg give 2gms IV today  pt medically stable and cleared  for vasc procedure as a low risk    pt is sp IVC filter 4/8 due to inability to AC due to GIB, sp EGD gastritis and duodenitis 4/21,    GI colonoscopy attempt was incomplete as pt has v large ventral hernia and the endoscope coiled within the ventral hernia, the colon visualized to that pint showed no signs of bleed    capsule endoscopy:  multiple clean based ulcers of the duodenum, duodenitis    BUN/ creat 29/1.3    Rehab, pt v deconditioned and need rigorous gait reconditioning  order overhead trapeze  OOB to  chair  cont all meds

## 2021-05-04 NOTE — PROGRESS NOTE ADULT - SUBJECTIVE AND OBJECTIVE BOX
SUBJECTIVE  Patient is a 80y old Male who presents with a chief complaint of Paresthesias of bilateral feet, extensive BL DVTs, recent Covid PNA (03 May 2021 16:28)  Currently admitted to medicine with the primary diagnosis of Hypotension    Today is hospital day 8d, and this morning he is alert oriented, no signs of bleeding and reports no overnight events.   on IV heparin, FU PTT.   pt reports constipation as well.   Attending requesting evaluation by Vascular surgery for possible additional interventions.   will give one dose of lasix.     OBJECTIVE  PAST MEDICAL & SURGICAL HISTORY  Hypertension    Hyperlipidemia    Diabetes mellitus, type 2    History of atrial fibrillation    Bladder cancer  secondary to exposure at [employer] 9/11 s/p cystectomy with urostomy    Chronic kidney disease (CKD)  stage 3A, baseline creatinine 1.4    DVT, lower extremity    History of total hip replacement, bilateral    History of total knee replacement, bilateral    History of total cystectomy  with resultant urostomy      ALLERGIES:  No Known Allergies    MEDICATIONS:  STANDING MEDICATIONS  atorvastatin 40 milliGRAM(s) Oral at bedtime  chlorhexidine 4% Liquid 1 Application(s) Topical <User Schedule>  dextrose 40% Gel 15 Gram(s) Oral once  dextrose 5%. 1000 milliLiter(s) IV Continuous <Continuous>  dextrose 50% Injectable 25 Gram(s) IV Push once  ferrous    sulfate 325 milliGRAM(s) Oral daily  glucagon  Injectable 1 milliGRAM(s) IntraMuscular once  heparin  Infusion 1300 Unit(s)/Hr IV Continuous <Continuous>  insulin lispro (ADMELOG) corrective regimen sliding scale   SubCutaneous three times a day before meals  metoprolol succinate ER 50 milliGRAM(s) Oral daily  midodrine. 10 milliGRAM(s) Oral three times a day  pantoprazole    Tablet 40 milliGRAM(s) Oral before breakfast  sodium chloride 0.9%. 1000 milliLiter(s) IV Continuous <Continuous>    PRN MEDICATIONS  acetaminophen   Tablet .. 650 milliGRAM(s) Oral every 6 hours PRN  acetaminophen   Tablet .. 650 milliGRAM(s) Oral every 6 hours PRN  melatonin 5 milliGRAM(s) Oral at bedtime PRN  traMADol 25 milliGRAM(s) Oral daily PRN      VITAL SIGNS: Last 24 Hours  T(C): 35.8 (04 May 2021 08:09), Max: 36.4 (04 May 2021 00:00)  T(F): 96.5 (04 May 2021 08:09), Max: 97.6 (04 May 2021 00:00)  HR: 75 (04 May 2021 08:09) (66 - 76)  BP: 121/56 (04 May 2021 08:09) (99/58 - 121/56)  BP(mean): 73 (03 May 2021 12:00) (73 - 73)  RR: 18 (04 May 2021 08:09) (17 - 18)  SpO2: --    LABS:                        8.3    10.35 )-----------( 399      ( 04 May 2021 05:16 )             27.9     05-04    137  |  107  |  29<H>  ----------------------------<  104<H>  4.2   |  18  |  1.3    Ca    8.4<L>      04 May 2021 05:16  Phos  3.7     05-03  Mg     1.7     05-04      PTT - ( 04 May 2021 05:16 )  PTT:51.7 sec              RADIOLOGY:      PHYSICAL EXAM:    GENERAL: NAD, well-developed, AAOx3  HEENT:  Atraumatic, Normocephalic. EOMI, PERRLA, conjunctiva and sclera clear, No JVD  PULMONARY: Clear to auscultation bilaterally; No wheeze  CARDIOVASCULAR: Regular rate and rhythm; No murmurs, rubs, or gallops  GASTROINTESTINAL: Soft, Nontender, Nondistended; Bowel sounds present  MUSCULOSKELETAL:  B/L chronic lower extremity edema.   NEUROLOGY: non-focal  SKIN: No rashes or lesions      ADMISSION SUMMARY  80 year-old man with PMH of HTN, HLD, DM II, Afib (not on AC secondary to suspected GI bleed during last hospitalization March-April 2021), CKD stage 3A, bladder cancer (s/p cystectomy with urostomy), right lower extremity DVT (4/5; s/p IVC filter 4/8), diverticulosis (s/p bowel resection and reanastomosis) who presents from [facility] due to bilateral lower-extremity paresthesias for 7 days. Admitted for work-up of lower extremity swelling; hypotensive with LORRAINE on admission.    # Bilateral extensive leg DVTs  # Lower extremity swelling with paresthesias  - Daily weights, strict I+O  - Has IVC filter from 4/8 placed by IR; was off AC due to presumed GI bleed and drop in hemoglobin last stay  - IR recommended conservative management with AC and will consider percutaneous thrombectomy if treatment fails  - Trop 0.04,   - Echo EF 59%  - C/w heparin drip, keep PTT 60-90  - Will consult VX surgery Dr Ames.   - will give one time dose of Lasix.     # Hypotension  - Midodrine 10mg TID    # LORRAINE on CKD IIIa  - likely Intravascular volume depletion   - Baseline creatinine 1.0 - 1.2 from previous admission; 3.1 on current admission, downtrending  - Creatinine Trend: 1.2<--, 1.4<--, 1.5<--, 1.7<--, 2.0<--, 2.3<--    # Afib  - Continue metoprolol succinate 50mg q24hrs    # DM II  - Carb-consistent diet  - POCT glucose  - Basal/bolus insulin if >180 blood glucose    # Normocytic Anemia  - Hemoglobin stable from previous admission  - EGD 4/9 with no active bleeding  - Pantoprazole 40mg q24hrs  - Iron studies from 3/30: Total iron-178, TIBC- 305,  saturation 58%  - Keep active T+S  - Received 1u pRBC this admission no active bleeding  - GI colonoscopy attempt was incomplete as pt has v large ventrakl hernia and the endoscope coiled within the ventral hernia, the colon visualized to that pint showed no signs of bleeding, recommend start heparin drip, monitor H/h, capsule endoscopy done on 4/30, to be read today on 5/3.  -OP  CT colonography if capsule endoscopy is inadequate    # HLD  - Continue atorvastatin 40mg qhs    MISC:  - DVT PPX: Heparin drip, FU PTT  - GI PPX: Pantoprazole  - Diet: DASH/carb consistent  - Activity: As tolerated  - Code: FULL  - Disposition: Acute

## 2021-05-04 NOTE — PROGRESS NOTE ADULT - ASSESSMENT
Patient is a 79 y/o man with PMH of HTN, HLD, DM II, Afib (not on AC secondary to suspected GI bleed during last hospitalization March-April 2021) S/P EGD 4/2021 for melena  showing non erosive gastritis , CKD stage 3A, bladder cancer (s/p cystectomy with urostomy), right lower extremity DVT (4/5; s/p IVC filter 4/8), diverticulosis (s/p bowel resection and reanastomosis) now admitted with acute on chronic DVT requiring Anticoagulation. GI consulted for clerance for anticoagulation.       #history of GI bleed , S/P EGD showing non erosive gastritis ,  no evidence of gross GI bleed   # normocytic anemia with stable HGB , s/p incomplete colonoscopy secondary to large ventral hernia , s/p capsule endoscopy with multiple clean based small duodenal ulcers and non erosive duodenitis with no active GI bleed   #resolved COVID on room air now     REC:  continue therapeutic anticoagulation , can switch to oral anticoagulation if deemed necessary   KUB abdomen and pelvis in 3 days as inpatient if patient is kept admitted for other reasons , otherwise KUB as outpatient to confirm no capsule retention   Serial CBC and keep HGB above 8  outpatient CT colonography

## 2021-05-04 NOTE — CHART NOTE - NSCHARTNOTEFT_GEN_A_CORE
Registered Dietitian Follow-Up     Patient Profile Reviewed                           Yes [x]   No []     Nutrition History Previously Obtained        Yes [x]  No []       Pertinent Subjective Information: Pt reports po intake improved, eating % of his meals. Daughter brings food from home. Continues to endorse no taste, prefers sweet snacks as it seems to be the only think he can taste. Eats/drinks all of the glucerna, prosource gelatin and ensure pudding. C/o constipation, miralax ordered.      Pertinent Medical Interventions:  Bilateral extensive leg DVTs  possible thrombectomy, possible thrombolysis per vascular sx   LORRAINE on CKD3, Cr downtrending to baseline from previous adm   Normocytic Anemia  no evidence of gross GI bleed per GI     Diet order: soft  consistent carbohydrate   prosource gelatin sugar-free BID   ensure pudding BID  glucerna BID      Anthropometrics:  - Ht. 72"  - Wt.  274.9lbs (4/29); dosing -- no new wts  - %wt change  - BMI 37.3 -- based on dosing   - IBW 178lbs     Pertinent Lab Data: (5/4) H/H 8.3/27.9, BUN 29, , eGFR 52, Mg 1.7, A1c 8.1% (3/13)  CAPILLARY BLOOD GLUCOSE  POCT Blood Glucose.: 113 mg/dL (04 May 2021 07:56)  POCT Blood Glucose.: 151 mg/dL (03 May 2021 21:13)  POCT Blood Glucose.: 122 mg/dL (03 May 2021 16:42)       Pertinent Meds: heparin, sodium chloride 0.9%, admelog, lasix, miralax, senna, lipitor, ferrous sulfate, protonix      Physical Findings:  - Appearance: alert. +3 R+L foot, leg edema noted per RN flow sheets  - GI function: c/o constipation, no BM for a few days  - Tubes: N/A  - Oral/Mouth cavity: prefers softer to chew/swallow food choices  - Skin: WDL     Nutrition Requirements:  Weight Used: IBW 81 kg (continued from initial assessment on 4/27)     Estimated Energy Needs    Continue [x]  2025-2430kcal (25-30kcal/kg IBW)     Estimated Protein Needs    Continue [x]  73-81g (.9-1g/kg IBW)--LORRAINE on CKD3 considered, will re-adjust as needed     Estimated Fluid Needs        Continue [x]  1ml/kcal or per LIP     Nutrient Intake: % + eating most of the oral nutrition supplements ordered         [x] Previous Nutrition Diagnosis: Inadequate protein/energy intake            [] Ongoing          [x] Resolved       Nutrition Intervention: meals and snacks, medical food supplements     Goal/Expected Outcome: Pt to meet greater than 75% of estimated needs within 7 days      Indicator/Monitoring: energy intake, body composition, NFPF, electrolytes/glucose    Recommendations:  Add mechanical soft diet modifier to current diet order per pt's preference  Continue to provide oral nutrition supplements   Obtain new wt

## 2021-05-04 NOTE — CONSULT NOTE ADULT - SUBJECTIVE AND OBJECTIVE BOX
VASCULAR SURGERY CONSULT NOTE      HPI:  Patient is a 81 y/o man with PMH of HTN, HLD, DM II, Afib (not on AC secondary to suspected GI bleed during last hospitalization March-April 2021), CKD stage 3A, bladder cancer (s/p cystectomy with urostomy), right lower extremity DVT (4/5; s/p IVC filter 4/8), diverticulosis (s/p bowel resection and reanastomosis) who presents from [facility] due to bilateral lower-extremity paresthesias for 7 days. Patient previously hospitalized (3/12/21-4/20/21) at [facility] for COVID-19 pneumonia; hospital course complicated by sepsis, presumed GI bleed, and DVT (s/p IVC filter)--patient discharged to [facility] for rehab. Patient states that since his hospitalization he has been immobile and that when attempting to stand he becomes hypotensive and thus he has not moved much. Patient fully functional prior to previous hospitalization. Patient states that a week prior to presentation he felt his feet tingling (R>L). Patient states that this was shortly followed by swelling of his bilateral lower extremities. Patient also endorsing pain in the feet, 8/10 in intensity, stable in intensity with no radiation, alleviating/exacerbating factors. Patient also endorsing paresthesias of bilateral hands during this time. Of note, patient states he was treated for a pneumonia during his stay at [facility]; no records from [facility] in chart and unable to reach anyone after multiple attempts. Patient denying any recent trauma, shortness of breath, chest pain, fevers, chills, abdominal pain, productive cough.    In ED, vitals were: HR: 102, BP: 85/55, T: 98.8- initially placed on 3L NC (unclear why) but has been on room air saturating well since.      (26 Apr 2021 11:38)        PAST MEDICAL & SURGICAL HISTORY:  Hypertension    Hyperlipidemia    Diabetes mellitus, type 2    History of atrial fibrillation    Bladder cancer  secondary to exposure at [employer] 9/11 s/p cystectomy with urostomy    Chronic kidney disease (CKD)  stage 3A, baseline creatinine 1.4    DVT, lower extremity    History of total hip replacement, bilateral    History of total knee replacement, bilateral    History of total cystectomy  with resultant urostomy      No Known Allergies    Home Medications:  furosemide 20 mg oral tablet: 1 tab(s) orally 2 times a day (26 Apr 2021 12:04)  glimepiride 2 mg oral tablet: 1 tab(s) orally once a day (26 Apr 2021 12:04)  metoprolol succinate 50 mg oral tablet, extended release: 1 tab(s) orally once a day (26 Apr 2021 12:04)  Ozempic (1 mg dose) 2 mg/1.5 mL subcutaneous solution:  (26 Apr 2021 12:04)  rosuvastatin 20 mg oral tablet: 1 tab(s) orally once a day (26 Apr 2021 12:04)    No permtinent family history of PVD    REVIEW OF SYSTEMS:  GENERAL:                                         negative  SKIN:                                                 negative  OPTHALMOLOGIC:                          negative  ENMT:                                               negative  RESPIRATORY AND THORAX:        see HPI  CARDIOVASCULAR:                        see HPI  GASTROINTESTINAL:                       negative  NEPHROLOGY:                                  negative  MUSCULOSKELETAL:                       negative  NEUROLOGIC:                                   negative  PSYCHIATRIC:                                    negative  HEMATOLOGY/LYMPHATICS:         negative  ENDOCRINE:                                    see HPI  ALLERGIC/IMMUNOLOGIC:            negative    12 point ROS otherwise normal except as stated in HPI    PHYSICAL EXAM  Vital Signs Last 24 Hrs  T(C): 35.8 (04 May 2021 08:09), Max: 36.4 (04 May 2021 00:00)  T(F): 96.5 (04 May 2021 08:09), Max: 97.6 (04 May 2021 00:00)  HR: 75 (04 May 2021 08:09) (66 - 75)  BP: 121/56 (04 May 2021 08:09) (99/58 - 121/56)  BP(mean): 73 (03 May 2021 12:00) (73 - 73)  RR: 18 (04 May 2021 08:09) (17 - 18)  SpO2: --    Appearance: Normal	  HEENT:   Normal oral mucosa, PERRL, EOMI	  Neck: Supple, - JVD;   Cardiovascular: Normal S1 S2, No JVD, No murmurs,   Respiratory: Lungs clear to auscultation, No Rales, Rhonchi, Wheezing	  Gastrointestinal:  Soft, Non-tender, positive BS	  Skin: No rashes, No ecchymoses, No cyanosis  Extremities: Normal range of motion, No clubbing, cyanosis  b/l 3+ lower extremity edema  Neurologic: Non-focal  Psychiatry: A & O x 3, Mood & affect appropriate          MEDICATIONS:   MEDICATIONS  (STANDING):  atorvastatin 40 milliGRAM(s) Oral at bedtime  chlorhexidine 4% Liquid 1 Application(s) Topical <User Schedule>  dextrose 40% Gel 15 Gram(s) Oral once  dextrose 5%. 1000 milliLiter(s) (50 mL/Hr) IV Continuous <Continuous>  dextrose 50% Injectable 25 Gram(s) IV Push once  ferrous    sulfate 325 milliGRAM(s) Oral daily  furosemide   Injectable 40 milliGRAM(s) IV Push once  glucagon  Injectable 1 milliGRAM(s) IntraMuscular once  heparin  Infusion 1300 Unit(s)/Hr (12 mL/Hr) IV Continuous <Continuous>  insulin lispro (ADMELOG) corrective regimen sliding scale   SubCutaneous three times a day before meals  magnesium sulfate  IVPB 2 Gram(s) IV Intermittent once  metoprolol succinate ER 50 milliGRAM(s) Oral daily  midodrine. 10 milliGRAM(s) Oral three times a day  pantoprazole    Tablet 40 milliGRAM(s) Oral before breakfast  polyethylene glycol 3350 17 Gram(s) Oral daily  senna 2 Tablet(s) Oral daily  sodium chloride 0.9%. 1000 milliLiter(s) (60 mL/Hr) IV Continuous <Continuous>    MEDICATIONS  (PRN):  acetaminophen   Tablet .. 650 milliGRAM(s) Oral every 6 hours PRN Mild Pain (1 - 3)  acetaminophen   Tablet .. 650 milliGRAM(s) Oral every 6 hours PRN Temp greater or equal to 38C (100.4F), Mild Pain (1 - 3)  melatonin 5 milliGRAM(s) Oral at bedtime PRN Insomnia  traMADol 25 milliGRAM(s) Oral daily PRN Severe Pain (7 - 10)      LAB/STUDIES:                        8.3    10.35 )-----------( 399      ( 04 May 2021 05:16 )             27.9     05-04    137  |  107  |  29<H>  ----------------------------<  104<H>  4.2   |  18  |  1.3    Ca    8.4<L>      04 May 2021 05:16  Phos  3.7     05-03  Mg     1.7     05-04      PTT - ( 04 May 2021 05:16 )  PTT:51.7 sec      IMAGING:      < from: [facility] Duplex Lower Ext Vein Scan, Bilat (04.26.21 @ 08:17) >  Bilateral external iliac, commonfemoral, popliteal and posterior tibial vein DVT. Acute left femoral vein DVT. Bilateral great saphenous vein superficial thrombophlebitis. Acute left gastrocnemius vein thrombosis      < end of copied text >  < from: Xray Chest 1 View- PORTABLE-Urgent (Xray Chest 1 View- PORTABLE-Urgent .) (04.27.21 @ 22:52) >  Left lower lobe opacity. Follow-up as needed.

## 2021-05-04 NOTE — PROGRESS NOTE ADULT - SUBJECTIVE AND OBJECTIVE BOX
Gastroenterology progress note:     Patient is a 80y old  Male who presents with a chief complaint of Paresthesias of bilateral feet, BL extensive lower ext DVTs, recent Covid PNA (04 May 2021 12:07)       Admitted on: 04-26-21    We are following the patient for: anemia , clearance for anticoagulation      Interval History: No GI bleed , patient did not have bowel movement yesterday , but passing gas. No abdominal pain , tolerating diet     Patient's medical problems are  stable    Prior records reviewed (Y/N): Y  History obtained from someone other than patient (Y/N): Y      PAST MEDICAL & SURGICAL HISTORY:  Hypertension    Hyperlipidemia    Diabetes mellitus, type 2    History of atrial fibrillation    Bladder cancer  secondary to exposure at [employer] 9/11 s/p cystectomy with urostomy    Chronic kidney disease (CKD)  stage 3A, baseline creatinine 1.4    DVT, lower extremity    History of total hip replacement, bilateral    History of total knee replacement, bilateral    History of total cystectomy  with resultant urostomy        MEDICATIONS  (STANDING):  atorvastatin 40 milliGRAM(s) Oral at bedtime  chlorhexidine 4% Liquid 1 Application(s) Topical <User Schedule>  dextrose 40% Gel 15 Gram(s) Oral once  dextrose 5%. 1000 milliLiter(s) (50 mL/Hr) IV Continuous <Continuous>  dextrose 50% Injectable 25 Gram(s) IV Push once  ferrous    sulfate 325 milliGRAM(s) Oral daily  glucagon  Injectable 1 milliGRAM(s) IntraMuscular once  heparin  Infusion 1300 Unit(s)/Hr (12 mL/Hr) IV Continuous <Continuous>  insulin lispro (ADMELOG) corrective regimen sliding scale   SubCutaneous three times a day before meals  metoprolol succinate ER 50 milliGRAM(s) Oral daily  midodrine. 10 milliGRAM(s) Oral three times a day  pantoprazole    Tablet 40 milliGRAM(s) Oral before breakfast  polyethylene glycol 3350 17 Gram(s) Oral daily  senna 2 Tablet(s) Oral daily  sodium chloride 0.9%. 1000 milliLiter(s) (60 mL/Hr) IV Continuous <Continuous>    MEDICATIONS  (PRN):  acetaminophen   Tablet .. 650 milliGRAM(s) Oral every 6 hours PRN Mild Pain (1 - 3)  acetaminophen   Tablet .. 650 milliGRAM(s) Oral every 6 hours PRN Temp greater or equal to 38C (100.4F), Mild Pain (1 - 3)  melatonin 5 milliGRAM(s) Oral at bedtime PRN Insomnia  traMADol 25 milliGRAM(s) Oral daily PRN Severe Pain (7 - 10)      Allergies  No Known Allergies      Review of Systems:   Cardiovascular:  No Chest Pain, No Palpitations  Respiratory:  No Cough, No Dyspnea  Gastrointestinal:  As described in HPI    Physical Examination:  T(C): 35.8 (05-04-21 @ 08:09), Max: 36.4 (05-04-21 @ 00:00)  HR: 75 (05-04-21 @ 08:09) (66 - 75)  BP: 121/56 (05-04-21 @ 08:09) (103/52 - 121/56)  RR: 18 (05-04-21 @ 08:09) (17 - 18)  SpO2: --      05-03-21 @ 07:01  -  05-04-21 @ 07:00  --------------------------------------------------------  IN: 1452 mL / OUT: 1650 mL / NET: -198 mL    05-04-21 @ 07:01  -  05-04-21 @ 12:50  --------------------------------------------------------  IN: 0 mL / OUT: 200 mL / NET: -200 mL      Constitutional: No acute distress.  Respiratory:  No signs of respiratory distress. Lung sounds are clear bilaterally.  Cardiovascular:  S1 S2, Regular rate and rhythm.  Abdominal: Abdomen is soft, symmetric, and non-tender without distention. Non complicated ventral hernia . Bowel sounds are present and normoactive in all four quadrants. No masses, hepatomegaly, or splenomegaly are noted.   Skin: No rashes, No Jaundice.        Data: (reviewed by attending)                        8.3    10.35 )-----------( 399      ( 04 May 2021 05:16 )             27.9     Hgb trend:  8.3  05-04-21 @ 05:16  8.5  05-03-21 @ 06:38  8.3  05-02-21 @ 06:56        05-04    137  |  107  |  29<H>  ----------------------------<  104<H>  4.2   |  18  |  1.3    Ca    8.4<L>      04 May 2021 05:16  Phos  3.7     05-03  Mg     1.7     05-04      Liver panel trend:  TBili 0.8   /   AST 42   /   ALT 25   /   AlkP 81   /   Tptn 5.6   /   Alb 3.0    /   DBili --      04-28  TBili 1.1   /   AST 16   /   ALT 12   /   AlkP 91   /   Tptn 6.0   /   Alb 3.3    /   DBili --      04-26      PTT - ( 04 May 2021 05:16 )  PTT:51.7 sec       Radiology: (reviewed by attending)

## 2021-05-05 ENCOUNTER — RESULT REVIEW (OUTPATIENT)
Age: 81
End: 2021-05-05

## 2021-05-05 LAB
ANION GAP SERPL CALC-SCNC: 12 MMOL/L — SIGNIFICANT CHANGE UP (ref 7–14)
ANION GAP SERPL CALC-SCNC: 14 MMOL/L — SIGNIFICANT CHANGE UP (ref 7–14)
APTT BLD: 53.1 SEC — HIGH (ref 27–39.2)
APTT BLD: 88.6 SEC — CRITICAL HIGH (ref 27–39.2)
APTT BLD: 92.2 SEC — CRITICAL HIGH (ref 27–39.2)
BASOPHILS # BLD AUTO: 0.22 K/UL — HIGH (ref 0–0.2)
BASOPHILS NFR BLD AUTO: 2 % — HIGH (ref 0–1)
BUN SERPL-MCNC: 25 MG/DL — HIGH (ref 10–20)
BUN SERPL-MCNC: 27 MG/DL — HIGH (ref 10–20)
CALCIUM SERPL-MCNC: 8.4 MG/DL — LOW (ref 8.5–10.1)
CALCIUM SERPL-MCNC: 8.6 MG/DL — SIGNIFICANT CHANGE UP (ref 8.5–10.1)
CHLORIDE SERPL-SCNC: 105 MMOL/L — SIGNIFICANT CHANGE UP (ref 98–110)
CHLORIDE SERPL-SCNC: 107 MMOL/L — SIGNIFICANT CHANGE UP (ref 98–110)
CK MB CFR SERPL CALC: 2.6 NG/ML — SIGNIFICANT CHANGE UP (ref 0.6–6.3)
CK SERPL-CCNC: 41 U/L — SIGNIFICANT CHANGE UP (ref 0–225)
CO2 SERPL-SCNC: 17 MMOL/L — SIGNIFICANT CHANGE UP (ref 17–32)
CO2 SERPL-SCNC: 18 MMOL/L — SIGNIFICANT CHANGE UP (ref 17–32)
CREAT SERPL-MCNC: 1.2 MG/DL — SIGNIFICANT CHANGE UP (ref 0.7–1.5)
CREAT SERPL-MCNC: 1.2 MG/DL — SIGNIFICANT CHANGE UP (ref 0.7–1.5)
EOSINOPHIL # BLD AUTO: 0.26 K/UL — SIGNIFICANT CHANGE UP (ref 0–0.7)
EOSINOPHIL NFR BLD AUTO: 2.4 % — SIGNIFICANT CHANGE UP (ref 0–8)
GLUCOSE BLDC GLUCOMTR-MCNC: 113 MG/DL — HIGH (ref 70–99)
GLUCOSE BLDC GLUCOMTR-MCNC: 145 MG/DL — HIGH (ref 70–99)
GLUCOSE BLDC GLUCOMTR-MCNC: 145 MG/DL — HIGH (ref 70–99)
GLUCOSE SERPL-MCNC: 107 MG/DL — HIGH (ref 70–99)
GLUCOSE SERPL-MCNC: 118 MG/DL — HIGH (ref 70–99)
HCT VFR BLD CALC: 30.8 % — LOW (ref 42–52)
HCT VFR BLD CALC: 31.3 % — LOW (ref 42–52)
HGB BLD-MCNC: 9.6 G/DL — LOW (ref 14–18)
HGB BLD-MCNC: 9.7 G/DL — LOW (ref 14–18)
IMM GRANULOCYTES NFR BLD AUTO: 9.7 % — HIGH (ref 0.1–0.3)
INR BLD: 1.17 RATIO — SIGNIFICANT CHANGE UP (ref 0.65–1.3)
LYMPHOCYTES # BLD AUTO: 1.29 K/UL — SIGNIFICANT CHANGE UP (ref 1.2–3.4)
LYMPHOCYTES # BLD AUTO: 11.9 % — LOW (ref 20.5–51.1)
MAGNESIUM SERPL-MCNC: 1.9 MG/DL — SIGNIFICANT CHANGE UP (ref 1.8–2.4)
MAGNESIUM SERPL-MCNC: 1.9 MG/DL — SIGNIFICANT CHANGE UP (ref 1.8–2.4)
MCHC RBC-ENTMCNC: 27.5 PG — SIGNIFICANT CHANGE UP (ref 27–31)
MCHC RBC-ENTMCNC: 27.9 PG — SIGNIFICANT CHANGE UP (ref 27–31)
MCHC RBC-ENTMCNC: 30.7 G/DL — LOW (ref 32–37)
MCHC RBC-ENTMCNC: 31.5 G/DL — LOW (ref 32–37)
MCV RBC AUTO: 88.5 FL — SIGNIFICANT CHANGE UP (ref 80–94)
MCV RBC AUTO: 89.7 FL — SIGNIFICANT CHANGE UP (ref 80–94)
MONOCYTES # BLD AUTO: 0.98 K/UL — HIGH (ref 0.1–0.6)
MONOCYTES NFR BLD AUTO: 9.1 % — SIGNIFICANT CHANGE UP (ref 1.7–9.3)
NEUTROPHILS # BLD AUTO: 7 K/UL — HIGH (ref 1.4–6.5)
NEUTROPHILS NFR BLD AUTO: 64.9 % — SIGNIFICANT CHANGE UP (ref 42.2–75.2)
NRBC # BLD: 0 /100 WBCS — SIGNIFICANT CHANGE UP (ref 0–0)
NRBC # BLD: 0 /100 WBCS — SIGNIFICANT CHANGE UP (ref 0–0)
PHOSPHATE SERPL-MCNC: 4 MG/DL — SIGNIFICANT CHANGE UP (ref 2.1–4.9)
PLATELET # BLD AUTO: 302 K/UL — SIGNIFICANT CHANGE UP (ref 130–400)
PLATELET # BLD AUTO: 345 K/UL — SIGNIFICANT CHANGE UP (ref 130–400)
POTASSIUM SERPL-MCNC: 4.4 MMOL/L — SIGNIFICANT CHANGE UP (ref 3.5–5)
POTASSIUM SERPL-MCNC: 4.6 MMOL/L — SIGNIFICANT CHANGE UP (ref 3.5–5)
POTASSIUM SERPL-SCNC: 4.4 MMOL/L — SIGNIFICANT CHANGE UP (ref 3.5–5)
POTASSIUM SERPL-SCNC: 4.6 MMOL/L — SIGNIFICANT CHANGE UP (ref 3.5–5)
PROTHROM AB SERPL-ACNC: 13.5 SEC — HIGH (ref 9.95–12.87)
RBC # BLD: 3.48 M/UL — LOW (ref 4.7–6.1)
RBC # BLD: 3.49 M/UL — LOW (ref 4.7–6.1)
RBC # FLD: 18 % — HIGH (ref 11.5–14.5)
RBC # FLD: 18.3 % — HIGH (ref 11.5–14.5)
SODIUM SERPL-SCNC: 135 MMOL/L — SIGNIFICANT CHANGE UP (ref 135–146)
SODIUM SERPL-SCNC: 138 MMOL/L — SIGNIFICANT CHANGE UP (ref 135–146)
TROPONIN T SERPL-MCNC: 0.03 NG/ML — CRITICAL HIGH
WBC # BLD: 10.8 K/UL — SIGNIFICANT CHANGE UP (ref 4.8–10.8)
WBC # BLD: 18.68 K/UL — HIGH (ref 4.8–10.8)
WBC # FLD AUTO: 10.8 K/UL — SIGNIFICANT CHANGE UP (ref 4.8–10.8)
WBC # FLD AUTO: 18.68 K/UL — HIGH (ref 4.8–10.8)

## 2021-05-05 PROCEDURE — 99221 1ST HOSP IP/OBS SF/LOW 40: CPT

## 2021-05-05 PROCEDURE — 75822 VEIN X-RAY ARMS/LEGS: CPT | Mod: 26,59

## 2021-05-05 PROCEDURE — 99291 CRITICAL CARE FIRST HOUR: CPT

## 2021-05-05 PROCEDURE — 36010 PLACE CATHETER IN VEIN: CPT

## 2021-05-05 PROCEDURE — 71045 X-RAY EXAM CHEST 1 VIEW: CPT | Mod: 26

## 2021-05-05 PROCEDURE — 93010 ELECTROCARDIOGRAM REPORT: CPT | Mod: 76

## 2021-05-05 PROCEDURE — 37187 VENOUS MECH THROMBECTOMY: CPT | Mod: 50

## 2021-05-05 PROCEDURE — 74018 RADEX ABDOMEN 1 VIEW: CPT | Mod: 26

## 2021-05-05 PROCEDURE — 88304 TISSUE EXAM BY PATHOLOGIST: CPT | Mod: 26

## 2021-05-05 PROCEDURE — 76937 US GUIDE VASCULAR ACCESS: CPT | Mod: 26

## 2021-05-05 RX ORDER — SODIUM CHLORIDE 9 MG/ML
1000 INJECTION INTRAMUSCULAR; INTRAVENOUS; SUBCUTANEOUS
Refills: 0 | Status: DISCONTINUED | OUTPATIENT
Start: 2021-05-05 | End: 2021-05-06

## 2021-05-05 RX ORDER — MORPHINE SULFATE 50 MG/1
2 CAPSULE, EXTENDED RELEASE ORAL EVERY 4 HOURS
Refills: 0 | Status: DISCONTINUED | OUTPATIENT
Start: 2021-05-05 | End: 2021-05-05

## 2021-05-05 RX ORDER — SENNA PLUS 8.6 MG/1
2 TABLET ORAL AT BEDTIME
Refills: 0 | Status: DISCONTINUED | OUTPATIENT
Start: 2021-05-05 | End: 2021-05-06

## 2021-05-05 RX ORDER — MAGNESIUM SULFATE 500 MG/ML
1 VIAL (ML) INJECTION ONCE
Refills: 0 | Status: DISCONTINUED | OUTPATIENT
Start: 2021-05-05 | End: 2021-05-06

## 2021-05-05 RX ORDER — PANTOPRAZOLE SODIUM 20 MG/1
40 TABLET, DELAYED RELEASE ORAL
Refills: 0 | Status: DISCONTINUED | OUTPATIENT
Start: 2021-05-05 | End: 2021-05-06

## 2021-05-05 RX ORDER — LANOLIN ALCOHOL/MO/W.PET/CERES
5 CREAM (GRAM) TOPICAL AT BEDTIME
Refills: 0 | Status: DISCONTINUED | OUTPATIENT
Start: 2021-05-05 | End: 2021-05-06

## 2021-05-05 RX ORDER — SODIUM CHLORIDE 9 MG/ML
1000 INJECTION, SOLUTION INTRAVENOUS
Refills: 0 | Status: DISCONTINUED | OUTPATIENT
Start: 2021-05-05 | End: 2021-05-05

## 2021-05-05 RX ORDER — OXYCODONE AND ACETAMINOPHEN 5; 325 MG/1; MG/1
1 TABLET ORAL EVERY 4 HOURS
Refills: 0 | Status: DISCONTINUED | OUTPATIENT
Start: 2021-05-05 | End: 2021-05-05

## 2021-05-05 RX ORDER — TRAMADOL HYDROCHLORIDE 50 MG/1
25 TABLET ORAL DAILY
Refills: 0 | Status: DISCONTINUED | OUTPATIENT
Start: 2021-05-05 | End: 2021-05-06

## 2021-05-05 RX ORDER — ALTEPLASE 100 MG
0.5 KIT INTRAVENOUS
Qty: 10 | Refills: 0 | Status: DISCONTINUED | OUTPATIENT
Start: 2021-05-05 | End: 2021-05-06

## 2021-05-05 RX ORDER — HEPARIN SODIUM 5000 [USP'U]/ML
500 INJECTION INTRAVENOUS; SUBCUTANEOUS
Qty: 25000 | Refills: 0 | Status: DISCONTINUED | OUTPATIENT
Start: 2021-05-05 | End: 2021-05-06

## 2021-05-05 RX ORDER — ATORVASTATIN CALCIUM 80 MG/1
40 TABLET, FILM COATED ORAL AT BEDTIME
Refills: 0 | Status: DISCONTINUED | OUTPATIENT
Start: 2021-05-05 | End: 2021-05-06

## 2021-05-05 RX ORDER — INSULIN HUMAN 100 [IU]/ML
INJECTION, SOLUTION SUBCUTANEOUS
Refills: 0 | Status: DISCONTINUED | OUTPATIENT
Start: 2021-05-05 | End: 2021-05-06

## 2021-05-05 RX ORDER — SODIUM CHLORIDE 9 MG/ML
1000 INJECTION, SOLUTION INTRAVENOUS
Refills: 0 | Status: DISCONTINUED | OUTPATIENT
Start: 2021-05-05 | End: 2021-05-06

## 2021-05-05 RX ORDER — MIDODRINE HYDROCHLORIDE 2.5 MG/1
10 TABLET ORAL THREE TIMES A DAY
Refills: 0 | Status: DISCONTINUED | OUTPATIENT
Start: 2021-05-05 | End: 2021-05-06

## 2021-05-05 RX ORDER — METOPROLOL TARTRATE 50 MG
50 TABLET ORAL DAILY
Refills: 0 | Status: DISCONTINUED | OUTPATIENT
Start: 2021-05-05 | End: 2021-05-05

## 2021-05-05 RX ORDER — FERROUS SULFATE 325(65) MG
325 TABLET ORAL DAILY
Refills: 0 | Status: DISCONTINUED | OUTPATIENT
Start: 2021-05-05 | End: 2021-05-06

## 2021-05-05 RX ORDER — ACETAMINOPHEN 500 MG
650 TABLET ORAL EVERY 6 HOURS
Refills: 0 | Status: DISCONTINUED | OUTPATIENT
Start: 2021-05-05 | End: 2021-05-06

## 2021-05-05 RX ORDER — METOPROLOL TARTRATE 50 MG
100 TABLET ORAL DAILY
Refills: 0 | Status: DISCONTINUED | OUTPATIENT
Start: 2021-05-06 | End: 2021-05-06

## 2021-05-05 RX ADMIN — ATORVASTATIN CALCIUM 40 MILLIGRAM(S): 80 TABLET, FILM COATED ORAL at 21:59

## 2021-05-05 RX ADMIN — MORPHINE SULFATE 2 MILLIGRAM(S): 50 CAPSULE, EXTENDED RELEASE ORAL at 15:00

## 2021-05-05 RX ADMIN — MORPHINE SULFATE 2 MILLIGRAM(S): 50 CAPSULE, EXTENDED RELEASE ORAL at 14:33

## 2021-05-05 RX ADMIN — HEPARIN SODIUM 500 UNIT(S)/HR: 5000 INJECTION INTRAVENOUS; SUBCUTANEOUS at 15:39

## 2021-05-05 RX ADMIN — CHLORHEXIDINE GLUCONATE 1 APPLICATION(S): 213 SOLUTION TOPICAL at 05:20

## 2021-05-05 RX ADMIN — Medication 325 MILLIGRAM(S): at 18:29

## 2021-05-05 RX ADMIN — ALTEPLASE 12.5 MG/HR: KIT at 15:29

## 2021-05-05 RX ADMIN — SODIUM CHLORIDE 60 MILLILITER(S): 9 INJECTION INTRAMUSCULAR; INTRAVENOUS; SUBCUTANEOUS at 05:20

## 2021-05-05 RX ADMIN — PANTOPRAZOLE SODIUM 40 MILLIGRAM(S): 20 TABLET, DELAYED RELEASE ORAL at 05:21

## 2021-05-05 RX ADMIN — MIDODRINE HYDROCHLORIDE 10 MILLIGRAM(S): 2.5 TABLET ORAL at 16:13

## 2021-05-05 RX ADMIN — SODIUM CHLORIDE 100 MILLILITER(S): 9 INJECTION, SOLUTION INTRAVENOUS at 14:42

## 2021-05-05 RX ADMIN — Medication 50 MILLIGRAM(S): at 05:21

## 2021-05-05 RX ADMIN — SENNA PLUS 2 TABLET(S): 8.6 TABLET ORAL at 21:59

## 2021-05-05 RX ADMIN — PANTOPRAZOLE SODIUM 40 MILLIGRAM(S): 20 TABLET, DELAYED RELEASE ORAL at 18:29

## 2021-05-05 RX ADMIN — ALTEPLASE 12.5 MG/HR: KIT at 15:30

## 2021-05-05 RX ADMIN — MIDODRINE HYDROCHLORIDE 10 MILLIGRAM(S): 2.5 TABLET ORAL at 05:20

## 2021-05-05 NOTE — CONSULT NOTE ADULT - ASSESSMENT
ASSESSMENT:  80y Male ***IVC filter thrombosis (4/9 w IR) now s/p bilateral LE venogram with thrombectomy and thrombolysis of extensive clot, bilateral TPA catheter placement. SICU for serial CBCs and close monitoring while on TPA and heparin drips. Will RTOR with vascular team 5/6 AM.     PLAN:   Neurologic:   Respiratory:   Cardiovascular:   Gastrointestinal/Nutrition:   Renal/Genitourinary:   Hematologic:   Infectious Disease:   Lines/Tubes:  Endocrine:   Disposition:     --------------------------------------------------------------------------------------    Critical Care Diagnoses:       ASSESSMENT:  80y Male ***IVC filter thrombosis (4/9 w IR) now s/p bilateral LE venogram with thrombectomy and thrombolysis of extensive clot, bilateral TPA catheter placement. SICU for serial CBCs and close monitoring while on TPA and heparin drips. Will RTOR with vascular team 5/6 AM.     PLAN:   Neurologic:   Respiratory:   Cardiovascular:   Gastrointestinal/Nutrition:   Renal/Genitourinary: LORRAINE on this admission Cr max 3.1, baseline CKD with Cr 1.2  - LORRAINE resolved  -   Hematologic:   Infectious Disease:   Lines/Tubes:  Endocrine:   Disposition:     --------------------------------------------------------------------------------------    Critical Care Diagnoses:       ASSESSMENT:  80y Male  81 y/o male with PMH of HTN, HLD, DM II, COVID-19 PNA (s/p hospitalization 3/12/21-4/20/21 & ICU admission for hypoxia requiring HFNC, course complicated by LORRAINE on CKD, GI bleed requiring blood transfusion, sepsis requiring vasopressors), Afib (previously was not on AC 2/2 GI bleed, since has been cleared by GI via negative EGD, partial C-scope on 4/29 and capsule endoscopy 4/30), CKD stage 3A, bladder cancer (s/p cystectomy with urostomy), right lower extremity DVT (via duplex on 4/5; s/p IVC filter 4/8), diverticulosis (s/p bowel resection and reanastomosis), BL hip replacement, who [facility] from [facility] due to bilateral lower-extremity paresthesias and worsening edema. Found to have b/l acute LE DVTs, IVC filter thrombosis now s/p bilateral LE venogram with thrombectomy and thrombolysis of extensive clot, bilateral TPA catheter placement. SICU for serial CBCs and close monitoring while on TPA and heparin drips. Will RTOR with vascular team 5/6 AM.     PLAN:   Neurologic: A&Ox4   Pain control - prn tylenol, tramadol   Insomnia - prn melatonin     Respiratory: s/p COVID PNA, s/p prolonged hospitalization, never intubated, required HFNC, discharged on RA to SNF  - requiring 4L NC, wean as tolerated  - L pleural effusion post op CXR, breath sounds clear, consider prn IV lasix if needed     Cardiovascular: Hx afib on AC, HLD   #hx afib, stable  - c/w metoprolol 100mg XL for rate control, currently in sinus rhythm   - post op   #hx HLD   - c/w atorvastatin 40mg qd  # acute Hypotension on admission, likely orthostatic and hypovolemic in nature, resolved  - s/p IV fluid resuscitation and midodrine 5mg q8hr under medical service  - c/w midodrine regimen    Gastrointestinal/Nutrition: hx GIB last admission  - CC diet --> NPO at MN for OR in am with vascular  - IVL'd  GI ppx: PPI, senna    Renal/Genitourinary: hx bladder ca s/p urostomy  #LORRAINE on this admission Cr max 3.1, baseline CKD with Cr 1.2-1.4   - holding lasix post op given borderline BP, contrast load via venogram, EBL 200cc & recent LORRAINE  ---> will give lasix prn, home dose PO Lasix 20mg BID, received 40mg IV lasix (5/4)  138  |  107  |  25<H>  ----------------------------<  118<H>  4.6   |  17  |  1.2    Ca    8.6      05 May 2021 14:41  Phos  4.0     05-05  Mg     1.9     05-05  -monitor/replete elytes prn, given 1g Mg post op    Hematologic: extensive b/l DVT with thrombosed IVC filter placed 4/9 by IR during prolonged COVID hospitalization   S/P thrombectomy & thrombolysis via b/l TPA catheter placement  - TPA via bilateral catheters, running at 0.5mg/hr each with 10cc KVO of NS  - hep gtt @ 500c/hr started at 3pm, goal PTT 60-80  - H/H stable post op 9.6/31.3  - trend CBC, fibrinogen and coags q6hrs    Infectious Disease: no acute dx   - received 4 day course of cefepime on admission (4.26-4/30) for possible sepsis, cultures negative   - no el op abx per vascular    - monitor for signs of infection    Lines/Tubes: PIV    Endocrine: hx DM2  - Hg A1c 8.1%  - tight ISS     Disposition: SICU d/w attending

## 2021-05-05 NOTE — PROGRESS NOTE ADULT - SUBJECTIVE AND OBJECTIVE BOX
SUBJECTIVE  Patient is a 80y old Male who presents with a chief complaint of Paresthesias of bilateral feet (04 May 2021 12:50)  Currently admitted to medicine with the primary diagnosis of Hypotension  Today is hospital day 9d, and this morning he is not in distress, no signs of bleeding and reports no overnight events.   SP 1 PRBC yesterday. He's on heparin drip, held for surgery today. planned for thrombectomy     OBJECTIVE  PAST MEDICAL & SURGICAL HISTORY  Hypertension    Hyperlipidemia    Diabetes mellitus, type 2    History of atrial fibrillation    Bladder cancer  secondary to exposure at [facility] 9/11 s/p cystectomy with urostomy    Chronic kidney disease (CKD)  stage 3A, baseline creatinine 1.4    DVT, lower extremity    History of total hip replacement, bilateral    History of total knee replacement, bilateral    History of total cystectomy  with resultant urostomy      ALLERGIES:  No Known Allergies    MEDICATIONS:  STANDING MEDICATIONS  atorvastatin 40 milliGRAM(s) Oral at bedtime  chlorhexidine 4% Liquid 1 Application(s) Topical <User Schedule>  dextrose 40% Gel 15 Gram(s) Oral once  dextrose 5%. 1000 milliLiter(s) IV Continuous <Continuous>  dextrose 50% Injectable 25 Gram(s) IV Push once  ferrous    sulfate 325 milliGRAM(s) Oral daily  glucagon  Injectable 1 milliGRAM(s) IntraMuscular once  heparin  Infusion 1300 Unit(s)/Hr IV Continuous <Continuous>  insulin lispro (ADMELOG) corrective regimen sliding scale   SubCutaneous three times a day before meals  metoprolol succinate ER 50 milliGRAM(s) Oral daily  midodrine. 10 milliGRAM(s) Oral three times a day  pantoprazole    Tablet 40 milliGRAM(s) Oral before breakfast  polyethylene glycol 3350 17 Gram(s) Oral daily  senna 2 Tablet(s) Oral daily  sodium chloride 0.9%. 1000 milliLiter(s) IV Continuous <Continuous>    PRN MEDICATIONS  acetaminophen   Tablet .. 650 milliGRAM(s) Oral every 6 hours PRN  acetaminophen   Tablet .. 650 milliGRAM(s) Oral every 6 hours PRN  melatonin 5 milliGRAM(s) Oral at bedtime PRN  traMADol 25 milliGRAM(s) Oral daily PRN      VITAL SIGNS: Last 24 Hours  T(C): 35.7 (05 May 2021 07:28), Max: 37.1 (04 May 2021 18:13)  T(F): 96.2 (05 May 2021 07:28), Max: 98.7 (04 May 2021 18:13)  HR: 63 (05 May 2021 07:28) (55 - 73)  BP: 110/58 (05 May 2021 07:28) (106/57 - 129/73)  BP(mean): --  RR: 18 (05 May 2021 07:28) (17 - 19)  SpO2: --    LABS:                        9.7    10.80 )-----------( 345      ( 05 May 2021 07:01 )             30.8     05-04    137  |  107  |  29<H>  ----------------------------<  104<H>  4.2   |  18  |  1.3    Ca    8.4<L>      04 May 2021 05:16  Mg     1.7     05-04      PT/INR - ( 04 May 2021 12:12 )   PT: 13.00 sec;   INR: 1.13 ratio         PTT - ( 05 May 2021 01:07 )  PTT:88.6 sec              RADIOLOGY:      PHYSICAL EXAM:    GENERAL: NAD, well-developed, AAOx3  HEENT:  Atraumatic, Normocephalic. EOMI, PERRLA, conjunctiva and sclera clear, No JVD  PULMONARY: Clear to auscultation bilaterally; No wheeze  CARDIOVASCULAR: Regular rate and rhythm; No murmurs, rubs, or gallops  GASTROINTESTINAL: Soft, Nontender, Nondistended; Bowel sounds present  MUSCULOSKELETAL:  2+ Peripheral Pulses, No clubbing, cyanosis, or edema  NEUROLOGY: non-focal  SKIN: No rashes or lesions      ADMISSION SUMMARY  80 year-old man with PMH of HTN, HLD, DM II, Afib (not on AC secondary to suspected GI bleed during last hospitalization March-April 2021), CKD stage 3A, bladder cancer (s/p cystectomy with urostomy), right lower extremity DVT (4/5; s/p IVC filter 4/8), diverticulosis (s/p bowel resection and reanastomosis) who presents from [facility] due to bilateral lower-extremity paresthesias for 7 days. Admitted for work-up of lower extremity swelling; hypotensive with LORRAINE on admission.    # Bilateral extensive leg DVTs  # Lower extremity swelling with paresthesias  - Daily weights, strict I+O  - Has IVC filter from 4/8 placed by IR; was off AC due to presumed GI bleed and drop in hemoglobin last stay  - IR recommended conservative management with AC and will consider percutaneous thrombectomy if treatment fails  - Trop 0.04,   - Echo EF 59%  - C/w heparin drip, keep PTT 60-90  - VX surgery Dr Ames: planned for thrombectomy on 5/5  - SP dose of Lasix.     # Hypotension  - Midodrine 10mg TID    # LORRAINE on CKD IIIa  - likely Intravascular volume depletion   - Baseline creatinine 1.0 - 1.2 from previous admission; 3.1 on current admission, downtrending  - Creatinine Trend: 1.2<--, 1.4<--, 1.5<--, 1.7<--, 2.0<--, 2.3<--    # Afib  - Continue metoprolol succinate 50mg q24hrs    # DM II  - Carb-consistent diet  - POCT glucose  - Basal/bolus insulin if >180 blood glucose    # Normocytic Anemia  - Hemoglobin stable from previous admission  - EGD 4/9 with no active bleeding  - Pantoprazole 40mg q24hrs  - Iron studies from 3/30: Total iron-178, TIBC- 305,  saturation 58%  - Keep active T+S  - Received 1u pRBC this admission no active bleeding  - GI colonoscopy attempt was incomplete as pt has v large ventrakl hernia and the endoscope coiled within the ventral hernia, the colon visualized to that pint showed no signs of bleeding, recommend start heparin drip, monitor H/h, capsule endoscopy done on 4/30, to be read today on 5/3.  -OP  CT colonography if capsule endoscopy is inadequate    # HLD  - Continue atorvastatin 40mg qhs    MISC:  - DVT PPX: Heparin drip, FU PTT, held  - GI PPX: Pantoprazole  - Diet: DASH/carb consistent  - Activity: As tolerated  - Code: FULL  - Disposition: Acute

## 2021-05-05 NOTE — CONSULT NOTE ADULT - SUBJECTIVE AND OBJECTIVE BOX
SICU Consultation Note  =====================================================  HPI: 80y Male  HPI:  Patient is a 81 y/o man with PMH of HTN, HLD, DM II, Afib (previously was not on AC secondary to suspected GI bleed during last hospitalization March-April 2021 since was cleared by GI via negative EGD on  & C-scope on 4/29), CKD stage 3A, bladder cancer (s/p cystectomy with urostomy), right lower extremity DVT (4/5; s/p IVC filter 4/8), diverticulosis (s/p bowel resection and reanastomosis) who presents from [facility] due to bilateral lower-extremity paresthesias for 7 days. Patient previously hospitalized (3/12/21-4/20/21) at [facility] for COVID-19 pneumonia; hospital course complicated by sepsis, presumed GI bleed, and DVT (s/p IVC filter)--patient discharged to [facility] for rehab. Patient states that since his hospitalization he has been immobile and that when attempting to stand he becomes hypotensive and thus he has not moved much. Patient fully functional prior to previous hospitalization. Patient states that a week prior to presentation he felt his feet tingling (R>L). Patient states that this was shortly followed by swelling of his bilateral lower extremities. Patient also endorsing pain in the feet, 8/10 in intensity, stable in intensity with no radiation, alleviating/exacerbating factors. Patient also endorsing paresthesias of bilateral hands during this time. Of note, patient states he was treated for a pneumonia during his stay at [facility]; no records from [facility] in chart and unable to reach anyone after multiple attempts. Patient denying any recent trauma, shortness of breath, chest pain, fevers, chills, abdominal pain, productive cough.    In ED, vitals were: HR: 102, BP: 85/55, T: 98.8- initially placed on 3L NC (unclear why) but has been on room air saturating well since.      (26 Apr 2021 11:38)      Surgery Information  OR time:      EBL:          IV Fluids:       Blood Products:   UOP:          PAST MEDICAL & SURGICAL HISTORY:  Hypertension    Hyperlipidemia    Diabetes mellitus, type 2    History of atrial fibrillation    Bladder cancer  secondary to exposure at [employer] 9/11 s/p cystectomy with urostomy    Chronic kidney disease (CKD)  stage 3A, baseline creatinine 1.4    DVT, lower extremity    History of total hip replacement, bilateral    History of total knee replacement, bilateral    History of total cystectomy  with resultant urostomy      Home Meds: Home Medications:  furosemide 20 mg oral tablet: 1 tab(s) orally 2 times a day (26 Apr 2021 12:04)  glimepiride 2 mg oral tablet: 1 tab(s) orally once a day (26 Apr 2021 12:04)  metoprolol succinate 50 mg oral tablet, extended release: 1 tab(s) orally once a day (26 Apr 2021 12:04)  Ozempic (1 mg dose) 2 mg/1.5 mL subcutaneous solution:  (26 Apr 2021 12:04)  rosuvastatin 20 mg oral tablet: 1 tab(s) orally once a day (26 Apr 2021 12:04)    Allergies: Allergies    No Known Allergies    Intolerances      Soc:   Advanced Directives: Presumed Full Code     ROS:    REVIEW OF SYSTEMS    [ ] A ten-point review of systems was otherwise negative except as noted.  [ ] Due to altered mental status/intubation, subjective information were not able to be obtained from the patient. History was obtained, to the extent possible, from review of the chart and collateral sources of information.      CURRENT MEDICATIONS:   --------------------------------------------------------------------------------------  Neurologic Medications  morphine  - Injectable 2 milliGRAM(s) IV Push every 4 hours PRN Moderate Pain (4 - 6)  oxycodone    5 mG/acetaminophen 325 mG 1 Tablet(s) Oral every 4 hours PRN Moderate Pain (4 - 6)    Respiratory Medications    Cardiovascular Medications    Gastrointestinal Medications  lactated ringers. 1000 milliLiter(s) IV Continuous <Continuous>  lactated ringers. 1000 milliLiter(s) IV Continuous <Continuous>  pantoprazole    Tablet 40 milliGRAM(s) Oral before breakfast  senna 2 Tablet(s) Oral at bedtime    Genitourinary Medications    Hematologic/Oncologic Medications  alteplase    Infusion 0.5 mG/Hr IntraCatheter.. <Continuous>  alteplase    Infusion 0.5 mG/Hr IntraCatheter.. <Continuous>  heparin  Infusion. 500 Unit(s)/Hr IV Continuous <Continuous>    Antimicrobial/Immunologic Medications    Endocrine/Metabolic Medications    Topical/Other Medications    --------------------------------------------------------------------------------------    VITAL SIGNS, INS/OUTS (last 24 hours):  --------------------------------------------------------------------------------------  ICU Vital Signs Last 24 Hrs  T(C): 36 (05 May 2021 13:15), Max: 37.1 (04 May 2021 18:13)  T(F): 96.8 (05 May 2021 13:15), Max: 98.7 (04 May 2021 18:13)  HR: 1 (05 May 2021 14:15) (1 - 75)  BP: 121/63 (05 May 2021 14:15) (106/57 - 138/70)  BP(mean): --  ABP: --  ABP(mean): --  RR: 19 (05 May 2021 14:15) (16 - 21)  SpO2: 99% (05 May 2021 14:15) (97% - 99%)    I&O's Summary    04 May 2021 07:01  -  05 May 2021 07:00  --------------------------------------------------------  IN: 1878 mL / OUT: 1900 mL / NET: -22 mL      --------------------------------------------------------------------------------------    EXAM:  General/Neuro  RASS:   GCS:   Exam: Normal, NAD, alert, oriented x 3, no focal deficits. PERRLA  ***    Respiratory  Exam: Lungs clear to auscultation, Normal expansion/effort.  ***  [] Tracheostomy   [] Intubated  Mechanical Ventilation:     Cardiovascular  Exam: S1, S2.  Regular rate and rhythm.  Peripheral edema  ***  Cardiac Rhythm: Normal Sinus Rhythm  ECHO:     GI  Exam: Abdomen soft, Non-tender, Non-distended.  Gastrostomy / Jejunostomy tube in place.  Nasogastric tube in place.  Colostomy / Ileostomy.  ***  Wound:   ***  Current Diet:  NPO***      Tubes/Lines/Drains  ***  [x] Peripheral IV  [] Central Venous Line     	[] R	[] L	[] IJ	[] Fem	[] SC        Type:	    Date Placed:   [] Arterial Line		[] R	[] L	[] Fem	[] Rad	[] Ax	Date Placed:   [] PICC:         	[] Midline		[] Mediport           [] Urinary Catheter		Date Placed:     Extremities  Exam: Extremities warm, pink, well-perfused.        Derm:  Exam: Good skin turgor, no skin breakdown.      :   Exam: Chavez catheter in place.     LABS  --------------------------------------------------------------------------------------  Labs:  CAPILLARY BLOOD GLUCOSE      POCT Blood Glucose.: 113 mg/dL (05 May 2021 07:39)  POCT Blood Glucose.: 133 mg/dL (04 May 2021 20:57)  POCT Blood Glucose.: 126 mg/dL (04 May 2021 16:46)                          9.7    10.80 )-----------( 345      ( 05 May 2021 07:01 )             30.8       Auto Neutrophil %: 64.9 % (05-05-21 @ 07:01)  Auto Immature Granulocyte %: 9.7 % (05-05-21 @ 07:01)    05-05    135  |  105  |  27<H>  ----------------------------<  107<H>  4.4   |  18  |  1.2      Calcium, Total Serum: 8.4 mg/dL (05-05-21 @ 07:01)      LFTs:         Coags:     x      ----< x       ( 05 May 2021 07:01 )     92.2            Serum Pro-Brain Natriuretic Peptide: 820 pg/mL (04-26-21 @ 16:36)            --------------------------------------------------------------------------------------    OTHER LABS    IMAGING RESULTS      EXAM:  DUPLEX SCAN EXT VEINS LOWER BI            PROCEDURE DATE:  04/26/2021            INTERPRETATION:  CLINICAL INFORMATION: 80-year-old male with leg swelling    COMPARISON: None available.    TECHNIQUE: Duplex sonography of the BILATERAL LOWER extremity veins with color and spectral Doppler, with and without compression.    FINDINGS:    Right:    Acute right external iliac, common femoral, popliteal, posterior tibial vein DVT. The femoral vein is free of thrombus. Great saphenous vein thrombosis    Left:    Acute left external iliac, common femoral, femoral, popliteal, and posterior tibial vein DVT is visualized. Acute great saphenous vein thrombosis. Acute gastrocnemius vein thrombosis      IMPRESSION:  Bilateral external iliac, commonfemoral, popliteal and posterior tibial vein DVT. Acute left femoral vein DVT. Bilateral great saphenous vein superficial thrombophlebitis. Acute left gastrocnemius vein thrombosis       SICU Consultation Note  =====================================================  HPI: 80y Male  HPI:  Patient is a 79 y/o man with PMH of HTN, HLD, DM II, COVID-19 PNA (s/p hospitalization 3/12/21-4/20/21 & ICU admission for hypoxia requiring HFNC, course complicated by LORRAINE on CKD, GI bleed requiring blood transfusion, sepsis requiring vasopressors), Afib (previously was not on AC 2/2 GI bleed, since has been cleared by GI via negative EGD on 4/9 & C-scope on 4/29), CKD stage 3A, bladder cancer (s/p cystectomy with urostomy), right lower extremity DVT (via duplex on 4/5; s/p IVC filter 4/8), diverticulosis (s/p bowel resection and reanastomosis) who presents from [facility] due to bilateral lower-extremity paresthesias and worsening edema. On admission patient was found to have acute bilateral extensive DVTs, complicated by hypotension, LORRAINE on CKD.     Surgery Information  OR time: 2hr      EBL:  200cc        IV Fluids:  800cc      UOP:          PAST MEDICAL & SURGICAL HISTORY:  Hypertension    Hyperlipidemia    Diabetes mellitus, type 2    History of atrial fibrillation    Bladder cancer  secondary to exposure at [employer] 9/11 s/p cystectomy with urostomy    Chronic kidney disease (CKD)  stage 3A, baseline creatinine 1.4    DVT, lower extremity    History of total hip replacement, bilateral    History of total knee replacement, bilateral    History of total cystectomy  with resultant urostomy      Home Meds: Home Medications:  furosemide 20 mg oral tablet: 1 tab(s) orally 2 times a day (26 Apr 2021 12:04)  glimepiride 2 mg oral tablet: 1 tab(s) orally once a day (26 Apr 2021 12:04)  metoprolol succinate 50 mg oral tablet, extended release: 1 tab(s) orally once a day (26 Apr 2021 12:04)  Ozempic (1 mg dose) 2 mg/1.5 mL subcutaneous solution:  (26 Apr 2021 12:04)  rosuvastatin 20 mg oral tablet: 1 tab(s) orally once a day (26 Apr 2021 12:04)    Allergies: Allergies    No Known Allergies    Intolerances      Soc:   Advanced Directives: Presumed Full Code     ROS:    REVIEW OF SYSTEMS    [ ] A ten-point review of systems was otherwise negative except as noted.  [ ] Due to altered mental status/intubation, subjective information were not able to be obtained from the patient. History was obtained, to the extent possible, from review of the chart and collateral sources of information.      CURRENT MEDICATIONS:   --------------------------------------------------------------------------------------  Neurologic Medications  morphine  - Injectable 2 milliGRAM(s) IV Push every 4 hours PRN Moderate Pain (4 - 6)  oxycodone    5 mG/acetaminophen 325 mG 1 Tablet(s) Oral every 4 hours PRN Moderate Pain (4 - 6)    Respiratory Medications    Cardiovascular Medications    Gastrointestinal Medications  lactated ringers. 1000 milliLiter(s) IV Continuous <Continuous>  lactated ringers. 1000 milliLiter(s) IV Continuous <Continuous>  pantoprazole    Tablet 40 milliGRAM(s) Oral before breakfast  senna 2 Tablet(s) Oral at bedtime    Genitourinary Medications    Hematologic/Oncologic Medications  alteplase    Infusion 0.5 mG/Hr IntraCatheter.. <Continuous>  alteplase    Infusion 0.5 mG/Hr IntraCatheter.. <Continuous>  heparin  Infusion. 500 Unit(s)/Hr IV Continuous <Continuous>    Antimicrobial/Immunologic Medications    Endocrine/Metabolic Medications    Topical/Other Medications    --------------------------------------------------------------------------------------    VITAL SIGNS, INS/OUTS (last 24 hours):  --------------------------------------------------------------------------------------  ICU Vital Signs Last 24 Hrs  T(C): 36 (05 May 2021 13:15), Max: 37.1 (04 May 2021 18:13)  T(F): 96.8 (05 May 2021 13:15), Max: 98.7 (04 May 2021 18:13)  HR: 1 (05 May 2021 14:15) (1 - 75)  BP: 121/63 (05 May 2021 14:15) (106/57 - 138/70)  BP(mean): --  ABP: --  ABP(mean): --  RR: 19 (05 May 2021 14:15) (16 - 21)  SpO2: 99% (05 May 2021 14:15) (97% - 99%)    I&O's Summary    04 May 2021 07:01  -  05 May 2021 07:00  --------------------------------------------------------  IN: 1878 mL / OUT: 1900 mL / NET: -22 mL      --------------------------------------------------------------------------------------    EXAM:  General/Neuro  RASS:   GCS:   Exam: Normal, NAD, alert, oriented x 3, no focal deficits. PERRLA  ***    Respiratory  Exam: Lungs clear to auscultation, Normal expansion/effort.  ***  [] Tracheostomy   [] Intubated  Mechanical Ventilation:     Cardiovascular  Exam: S1, S2.  Regular rate and rhythm.  Peripheral edema  ***  Cardiac Rhythm: Normal Sinus Rhythm  ECHO:     GI  Exam: Abdomen soft, Non-tender, Non-distended.  Gastrostomy / Jejunostomy tube in place.  Nasogastric tube in place.  Colostomy / Ileostomy.  ***  Wound:   ***  Current Diet:  NPO***      Tubes/Lines/Drains  ***  [x] Peripheral IV  [] Central Venous Line     	[] R	[] L	[] IJ	[] Fem	[] SC        Type:	    Date Placed:   [] Arterial Line		[] R	[] L	[] Fem	[] Rad	[] Ax	Date Placed:   [] PICC:         	[] Midline		[] Mediport           [] Urinary Catheter		Date Placed:     Extremities  Exam: Extremities warm, pink, well-perfused.        Derm:  Exam: Good skin turgor, no skin breakdown.      :   Exam: Chavez catheter in place.     LABS  --------------------------------------------------------------------------------------  Labs:  CAPILLARY BLOOD GLUCOSE      POCT Blood Glucose.: 113 mg/dL (05 May 2021 07:39)  POCT Blood Glucose.: 133 mg/dL (04 May 2021 20:57)  POCT Blood Glucose.: 126 mg/dL (04 May 2021 16:46)                          9.7    10.80 )-----------( 345      ( 05 May 2021 07:01 )             30.8       Auto Neutrophil %: 64.9 % (05-05-21 @ 07:01)  Auto Immature Granulocyte %: 9.7 % (05-05-21 @ 07:01)    05-05    135  |  105  |  27<H>  ----------------------------<  107<H>  4.4   |  18  |  1.2      Calcium, Total Serum: 8.4 mg/dL (05-05-21 @ 07:01)      LFTs:         Coags:     x      ----< x       ( 05 May 2021 07:01 )     92.2            Serum Pro-Brain Natriuretic Peptide: 820 pg/mL (04-26-21 @ 16:36)            --------------------------------------------------------------------------------------    OTHER LABS    IMAGING RESULTS      EXAM:  DUPLEX SCAN EXT VEINS LOWER BI            PROCEDURE DATE:  04/26/2021            INTERPRETATION:  CLINICAL INFORMATION: 80-year-old male with leg swelling    COMPARISON: None available.    TECHNIQUE: Duplex sonography of the BILATERAL LOWER extremity veins with color and spectral Doppler, with and without compression.    FINDINGS:    Right:    Acute right external iliac, common femoral, popliteal, posterior tibial vein DVT. The femoral vein is free of thrombus. Great saphenous vein thrombosis    Left:    Acute left external iliac, common femoral, femoral, popliteal, and posterior tibial vein DVT is visualized. Acute great saphenous vein thrombosis. Acute gastrocnemius vein thrombosis      IMPRESSION:  Bilateral external iliac, commonfemoral, popliteal and posterior tibial vein DVT. Acute left femoral vein DVT. Bilateral great saphenous vein superficial thrombophlebitis. Acute left gastrocnemius vein thrombosis     SICU Consultation Note  =====================================================  HPI: 79 y/o male with PMH of HTN, HLD, DM II, COVID-19 PNA (s/p hospitalization 3/12/21-4/20/21 & ICU admission for hypoxia requiring HFNC, course complicated by LORRAINE on CKD, GI bleed requiring blood transfusion, sepsis requiring vasopressors), Afib (previously was not on AC 2/2 GI bleed, since has been cleared by GI via negative EGD, partial C-scope on 4/29 and capsule endoscopy 4/30), CKD stage 3A, bladder cancer (s/p cystectomy with urostomy), right lower extremity DVT (via duplex on 4/5; s/p IVC filter 4/8), diverticulosis (s/p bowel resection and reanastomosis)  BL  hip replacement, who presents from [facility] due to bilateral lower-extremity paresthesias and worsening edema. On admission patient was found to have acute bilateral extensive DVTs, complicated by hypotension, LORRAINE on CKD. Patient admitted to medicine, was fluid resuscitated and started on midodrine, LORRAINE and hypotension resolved. IR and vascular team consult for DVT management. Cleared by GI for full AC with IV heparin, s/p negative EGD on 4/9, negative partial c-scope due to large ventral hernia, and video capsule endoscopy 4/30 negative for bleeding +multiple clean based ulcers and duodenitis. Despite full AC, LE pain worsened, patient seen by [facility] surgery with plan for venogram, thrombectomy vs thrombolysis.    Today patient is s/p bilateral LE venogram with thrombectomy and thrombolysis of extensive clot, bilateral TPA catheter placement with Dr Ames. Intra op noted thormbosed IVC filter which remained in place. Patients case was uncomplicated and done under local anesthesia and sedation with versed. No hemodynamic instability. SICU for serial CBCs and close monitoring while on TPA and heparin drips. Will RTOR with vascular team on 5/6.       Surgery Information  OR time: 2hr      EBL:  200cc        IV Fluids:  800cc      UOP:          PAST MEDICAL & SURGICAL HISTORY:  Hypertension    Hyperlipidemia    Diabetes mellitus, type 2    History of atrial fibrillation    Bladder cancer  secondary to exposure at [employer] 9/11 s/p cystectomy with urostomy    Chronic kidney disease (CKD)  stage 3A, baseline creatinine 1.2- 1.4    DVT, lower extremity    History of total hip replacement, bilateral    History of total knee replacement, bilateral    History of total cystectomy  with resultant urostomy      Home Meds: Home Medications:  furosemide 20 mg oral tablet: 1 tab(s) orally 2 times a day (26 Apr 2021 12:04)  glimepiride 2 mg oral tablet: 1 tab(s) orally once a day (26 Apr 2021 12:04)  metoprolol succinate 50 mg oral tablet, extended release: 1 tab(s) orally once a day (26 Apr 2021 12:04)  Ozempic (1 mg dose) 2 mg/1.5 mL subcutaneous solution:  (26 Apr 2021 12:04)  rosuvastatin 20 mg oral tablet: 1 tab(s) orally once a day (26 Apr 2021 12:04)    Allergies: Allergies    No Known Allergies    Intolerances      Soc:   Advanced Directives: Presumed Full Code     ROS:    REVIEW OF SYSTEMS    [x] A ten-point review of systems was otherwise negative except as noted.  [ ] Due to altered mental status/intubation, subjective information were not able to be obtained from the patient. History was obtained, to the extent possible, from review of the chart and collateral sources of information.      CURRENT MEDICATIONS:   --------------------------------------------------------------------------------------  Neurologic Medications  morphine  - Injectable 2 milliGRAM(s) IV Push every 4 hours PRN Moderate Pain (4 - 6)  oxycodone    5 mG/acetaminophen 325 mG 1 Tablet(s) Oral every 4 hours PRN Moderate Pain (4 - 6)    Respiratory Medications    Cardiovascular Medications    Gastrointestinal Medications  lactated ringers. 1000 milliLiter(s) IV Continuous <Continuous>  lactated ringers. 1000 milliLiter(s) IV Continuous <Continuous>  pantoprazole    Tablet 40 milliGRAM(s) Oral before breakfast  senna 2 Tablet(s) Oral at bedtime    Genitourinary Medications    Hematologic/Oncologic Medications  alteplase    Infusion 0.5 mG/Hr IntraCatheter.. <Continuous>  alteplase    Infusion 0.5 mG/Hr IntraCatheter.. <Continuous>  heparin  Infusion. 500 Unit(s)/Hr IV Continuous <Continuous>    Antimicrobial/Immunologic Medications    Endocrine/Metabolic Medications    Topical/Other Medications    --------------------------------------------------------------------------------------    VITAL SIGNS, INS/OUTS (last 24 hours):  --------------------------------------------------------------------------------------  ICU Vital Signs Last 24 Hrs  T(C): 36 (05 May 2021 13:15), Max: 37.1 (04 May 2021 18:13)  T(F): 96.8 (05 May 2021 13:15), Max: 98.7 (04 May 2021 18:13)  HR: 1 (05 May 2021 14:15) (1 - 75)  BP: 121/63 (05 May 2021 14:15) (106/57 - 138/70)  BP(mean): --  ABP: --  ABP(mean): --  RR: 19 (05 May 2021 14:15) (16 - 21)  SpO2: 99% (05 May 2021 14:15) (97% - 99%)    I&O's Summary    04 May 2021 07:01  -  05 May 2021 07:00  --------------------------------------------------------  IN: 1878 mL / OUT: 1900 mL / NET: -22 mL      --------------------------------------------------------------------------------------    EXAM:  General/Neuro  GCS: 15  Exam: Normal, NAD, alert, oriented x 3, no focal deficits. PERRL    Respiratory  Exam: Lungs clear to auscultation, Normal expansion/effort. 4L NC    Cardiovascular  Exam: S1, S2.  Regular rate and rhythm.  +++ Peripheral edema  Cardiac Rhythm: Normal  Sinus Rhythm  ECHO:   Summary:   1. Normal global left ventricular systolic function.   2. LV Ejection Fraction by Mejia's Method with a biplane EF of 59 %.   3. Normal left ventricular internal cavity size.   4. Normal left atrial size.   5. Normal right atrial size.   6. No evidence of mitral valve regurgitation.   7. Mild tricuspid regurgitation.   8. Bicuspid aortic valve with decreased opening.   9. Mild pulmonic valve regurgitation.  10. Estimated pulmonary artery systolic pressure is 35.3 mmHg assuming a right atrial pressure of 3 mmHg, which is consistent with borderline pulmonary hypertension.  11. LA volume Index is 9.5 ml/m² ml/m2.   Sinus Rhythm    GI  Exam: Abdomen soft, Non-tender, Non-distended.   Current Diet:  NPO***      Tubes/Lines/Drains  ***  [x] Peripheral IV  Midline LUE     Extremities  Exam: Extremities warm, pink, well-perfused.        Derm:  Exam: Good skin turgor, no skin breakdown.      :   Exam: Chavez catheter in place.     LABS  --------------------------------------------------------------------------------------  Labs:  CAPILLARY BLOOD GLUCOSE      POCT Blood Glucose.: 113 mg/dL (05 May 2021 07:39)  POCT Blood Glucose.: 133 mg/dL (04 May 2021 20:57)  POCT Blood Glucose.: 126 mg/dL (04 May 2021 16:46)                          9.7    10.80 )-----------( 345      ( 05 May 2021 07:01 )             30.8       Auto Neutrophil %: 64.9 % (05-05-21 @ 07:01)  Auto Immature Granulocyte %: 9.7 % (05-05-21 @ 07:01)    05-05    135  |  105  |  27<H>  ----------------------------<  107<H>  4.4   |  18  |  1.2      Calcium, Total Serum: 8.4 mg/dL (05-05-21 @ 07:01)      LFTs:         Coags:     x      ----< x       ( 05 May 2021 07:01 )     92.2            Serum Pro-Brain Natriuretic Peptide: 820 pg/mL (04-26-21 @ 16:36)            --------------------------------------------------------------------------------------    OTHER LABS    IMAGING RESULTS      EXAM:  DUPLEX SCAN EXT VEINS LOWER BI            PROCEDURE DATE:  04/26/2021            INTERPRETATION:  CLINICAL INFORMATION: 80-year-old male with leg swelling    COMPARISON: None available.    TECHNIQUE: Duplex sonography of the BILATERAL LOWER extremity veins with color and spectral Doppler, with and without compression.    FINDINGS:    Right:    Acute right external iliac, common femoral, popliteal, posterior tibial vein DVT. The femoral vein is free of thrombus. Great saphenous vein thrombosis    Left:    Acute left external iliac, common femoral, femoral, popliteal, and posterior tibial vein DVT is visualized. Acute great saphenous vein thrombosis. Acute gastrocnemius vein thrombosis      IMPRESSION:  Bilateral external iliac, commonfemoral, popliteal and posterior tibial vein DVT. Acute left femoral vein DVT. Bilateral great saphenous vein superficial thrombophlebitis. Acute left gastrocnemius vein thrombosis

## 2021-05-05 NOTE — PRE-ANESTHESIA EVALUATION ADULT - NSANTHPEFT_GEN_ALL_CORE
Pt bedridden s/p COVID  Swollen lower extremities  Heart-No murmur  Lungs-Breath sounds equal
lungs - diffuse rhonchi, heart - rrr

## 2021-05-05 NOTE — CONSULT NOTE ADULT - ATTENDING COMMENTS
80 year old with thrombosed IVC filter    will plan for thrombolysis/thrombectomy  leg elevation
Counseled patient about diagnostic testing and treatment plan. All questions answered.
Patient needs clearance for anticoagulation  Rec Colonoscopy.
s/p bilateral LE venogram with thrombectomy and thrombolysis of extensive clot, bilateral TPA catheter placement. SICU for serial CBCs and close monitoring while on TPA and heparin drips. Will RTOR with vascular team 5/6 AM.        Pain control - prn tylenol, tramadol  requiring 4L NC, wean as tolerated       Hx afib on AC, HLD   #hx afib, stable  - c/w metoprolol 100mg XL for rate control, currently in sinus rhythm   - post op     # acute Hypotension on admission, likely orthostatic and hypovolemic in nature, resolved  - s/p IV fluid resuscitation and midodrine 5mg q8hr under medical service  - c/w midodrine regimen      - CC diet --> NPO at [facility] for OR in am with vascular    #LORRAINE on this admission Cr max 3.1, baseline CKD with Cr 1.2-1.4   - holding lasix post op given borderline BP, contrast load via venogram, EBL 200cc & recent LORRAINE    extensive b/l DVT with thrombosed IVC filter placed 4/9 by IR during prolonged COVID hospitalization   S/P thrombectomy & thrombolysis via b/l TPA catheter placement  - TPA via bilateral catheters, running at 0.5mg/hr each with 10cc KVO of NS  - hep gtt @ 500c/hr started at 3pm, goal PTT 60-80  - H/H stable post op 9.6/31.3  - trend CBC, fibrinogen and coags q6hrs      Dispo: sicu for management of TPA catheters

## 2021-05-06 LAB
ANION GAP SERPL CALC-SCNC: 10 MMOL/L — SIGNIFICANT CHANGE UP (ref 7–14)
ANION GAP SERPL CALC-SCNC: 10 MMOL/L — SIGNIFICANT CHANGE UP (ref 7–14)
ANION GAP SERPL CALC-SCNC: 8 MMOL/L — SIGNIFICANT CHANGE UP (ref 7–14)
ANION GAP SERPL CALC-SCNC: 9 MMOL/L — SIGNIFICANT CHANGE UP (ref 7–14)
APTT BLD: 44.4 SEC — HIGH (ref 27–39.2)
APTT BLD: 46.4 SEC — HIGH (ref 27–39.2)
APTT BLD: 62 SEC — HIGH (ref 27–39.2)
BASOPHILS # BLD AUTO: 0.09 K/UL — SIGNIFICANT CHANGE UP (ref 0–0.2)
BASOPHILS # BLD AUTO: 0.11 K/UL — SIGNIFICANT CHANGE UP (ref 0–0.2)
BASOPHILS # BLD AUTO: 0.14 K/UL — SIGNIFICANT CHANGE UP (ref 0–0.2)
BASOPHILS NFR BLD AUTO: 0.5 % — SIGNIFICANT CHANGE UP (ref 0–1)
BASOPHILS NFR BLD AUTO: 0.8 % — SIGNIFICANT CHANGE UP (ref 0–1)
BASOPHILS NFR BLD AUTO: 0.9 % — SIGNIFICANT CHANGE UP (ref 0–1)
BLD GP AB SCN SERPL QL: SIGNIFICANT CHANGE UP
BUN SERPL-MCNC: 31 MG/DL — HIGH (ref 10–20)
BUN SERPL-MCNC: 33 MG/DL — HIGH (ref 10–20)
BUN SERPL-MCNC: 34 MG/DL — HIGH (ref 10–20)
BUN SERPL-MCNC: 35 MG/DL — HIGH (ref 10–20)
CALCIUM SERPL-MCNC: 7.6 MG/DL — LOW (ref 8.5–10.1)
CALCIUM SERPL-MCNC: 8 MG/DL — LOW (ref 8.5–10.1)
CALCIUM SERPL-MCNC: 8.2 MG/DL — LOW (ref 8.5–10.1)
CALCIUM SERPL-MCNC: 8.3 MG/DL — LOW (ref 8.5–10.1)
CHLORIDE SERPL-SCNC: 106 MMOL/L — SIGNIFICANT CHANGE UP (ref 98–110)
CHLORIDE SERPL-SCNC: 107 MMOL/L — SIGNIFICANT CHANGE UP (ref 98–110)
CHLORIDE SERPL-SCNC: 107 MMOL/L — SIGNIFICANT CHANGE UP (ref 98–110)
CHLORIDE SERPL-SCNC: 108 MMOL/L — SIGNIFICANT CHANGE UP (ref 98–110)
CK MB CFR SERPL CALC: 1.6 NG/ML — SIGNIFICANT CHANGE UP (ref 0.6–6.3)
CK MB CFR SERPL CALC: 1.7 NG/ML — SIGNIFICANT CHANGE UP (ref 0.6–6.3)
CK SERPL-CCNC: 29 U/L — SIGNIFICANT CHANGE UP (ref 0–225)
CK SERPL-CCNC: 30 U/L — SIGNIFICANT CHANGE UP (ref 0–225)
CK SERPL-CCNC: 35 U/L — SIGNIFICANT CHANGE UP (ref 0–225)
CO2 SERPL-SCNC: 18 MMOL/L — SIGNIFICANT CHANGE UP (ref 17–32)
CO2 SERPL-SCNC: 19 MMOL/L — SIGNIFICANT CHANGE UP (ref 17–32)
CO2 SERPL-SCNC: 21 MMOL/L — SIGNIFICANT CHANGE UP (ref 17–32)
CO2 SERPL-SCNC: 22 MMOL/L — SIGNIFICANT CHANGE UP (ref 17–32)
CREAT SERPL-MCNC: 1.4 MG/DL — SIGNIFICANT CHANGE UP (ref 0.7–1.5)
CREAT SERPL-MCNC: 1.6 MG/DL — HIGH (ref 0.7–1.5)
CREAT SERPL-MCNC: 1.7 MG/DL — HIGH (ref 0.7–1.5)
CREAT SERPL-MCNC: 1.8 MG/DL — HIGH (ref 0.7–1.5)
EOSINOPHIL # BLD AUTO: 0.12 K/UL — SIGNIFICANT CHANGE UP (ref 0–0.7)
EOSINOPHIL # BLD AUTO: 0.13 K/UL — SIGNIFICANT CHANGE UP (ref 0–0.7)
EOSINOPHIL # BLD AUTO: 0.23 K/UL — SIGNIFICANT CHANGE UP (ref 0–0.7)
EOSINOPHIL NFR BLD AUTO: 0.7 % — SIGNIFICANT CHANGE UP (ref 0–8)
EOSINOPHIL NFR BLD AUTO: 0.8 % — SIGNIFICANT CHANGE UP (ref 0–8)
EOSINOPHIL NFR BLD AUTO: 1.6 % — SIGNIFICANT CHANGE UP (ref 0–8)
FIBRINOGEN PPP-MCNC: 304 MG/DL — SIGNIFICANT CHANGE UP (ref 204.4–570.6)
FIBRINOGEN PPP-MCNC: 325 MG/DL — SIGNIFICANT CHANGE UP (ref 204.4–570.6)
FIBRINOGEN PPP-MCNC: 336 MG/DL — SIGNIFICANT CHANGE UP (ref 204.4–570.6)
GLUCOSE BLDC GLUCOMTR-MCNC: 121 MG/DL — HIGH (ref 70–99)
GLUCOSE BLDC GLUCOMTR-MCNC: 143 MG/DL — HIGH (ref 70–99)
GLUCOSE BLDC GLUCOMTR-MCNC: 158 MG/DL — HIGH (ref 70–99)
GLUCOSE BLDC GLUCOMTR-MCNC: 166 MG/DL — HIGH (ref 70–99)
GLUCOSE SERPL-MCNC: 128 MG/DL — HIGH (ref 70–99)
GLUCOSE SERPL-MCNC: 137 MG/DL — HIGH (ref 70–99)
GLUCOSE SERPL-MCNC: 143 MG/DL — HIGH (ref 70–99)
GLUCOSE SERPL-MCNC: 150 MG/DL — HIGH (ref 70–99)
HCT VFR BLD CALC: 19.2 % — LOW (ref 42–52)
HCT VFR BLD CALC: 23.5 % — LOW (ref 42–52)
HCT VFR BLD CALC: 24.1 % — LOW (ref 42–52)
HCT VFR BLD CALC: 25 % — LOW (ref 42–52)
HCT VFR BLD CALC: 25.5 % — LOW (ref 42–52)
HGB BLD-MCNC: 6.2 G/DL — CRITICAL LOW (ref 14–18)
HGB BLD-MCNC: 7.2 G/DL — LOW (ref 14–18)
HGB BLD-MCNC: 7.6 G/DL — LOW (ref 14–18)
HGB BLD-MCNC: 7.8 G/DL — LOW (ref 14–18)
HGB BLD-MCNC: 7.9 G/DL — LOW (ref 14–18)
IMM GRANULOCYTES NFR BLD AUTO: 5.4 % — HIGH (ref 0.1–0.3)
IMM GRANULOCYTES NFR BLD AUTO: 6.3 % — HIGH (ref 0.1–0.3)
IMM GRANULOCYTES NFR BLD AUTO: 6.5 % — HIGH (ref 0.1–0.3)
INR BLD: 1.37 RATIO — HIGH (ref 0.65–1.3)
INR BLD: 1.44 RATIO — HIGH (ref 0.65–1.3)
INR BLD: 1.51 RATIO — HIGH (ref 0.65–1.3)
LYMPHOCYTES # BLD AUTO: 1.06 K/UL — LOW (ref 1.2–3.4)
LYMPHOCYTES # BLD AUTO: 1.15 K/UL — LOW (ref 1.2–3.4)
LYMPHOCYTES # BLD AUTO: 1.28 K/UL — SIGNIFICANT CHANGE UP (ref 1.2–3.4)
LYMPHOCYTES # BLD AUTO: 6.7 % — LOW (ref 20.5–51.1)
LYMPHOCYTES # BLD AUTO: 7 % — LOW (ref 20.5–51.1)
LYMPHOCYTES # BLD AUTO: 9.1 % — LOW (ref 20.5–51.1)
MAGNESIUM SERPL-MCNC: 1.7 MG/DL — LOW (ref 1.8–2.4)
MAGNESIUM SERPL-MCNC: 1.8 MG/DL — SIGNIFICANT CHANGE UP (ref 1.8–2.4)
MAGNESIUM SERPL-MCNC: 2 MG/DL — SIGNIFICANT CHANGE UP (ref 1.8–2.4)
MAGNESIUM SERPL-MCNC: 2 MG/DL — SIGNIFICANT CHANGE UP (ref 1.8–2.4)
MCHC RBC-ENTMCNC: 26.8 PG — LOW (ref 27–31)
MCHC RBC-ENTMCNC: 27.1 PG — SIGNIFICANT CHANGE UP (ref 27–31)
MCHC RBC-ENTMCNC: 27.8 PG — SIGNIFICANT CHANGE UP (ref 27–31)
MCHC RBC-ENTMCNC: 28.2 PG — SIGNIFICANT CHANGE UP (ref 27–31)
MCHC RBC-ENTMCNC: 29.4 PG — SIGNIFICANT CHANGE UP (ref 27–31)
MCHC RBC-ENTMCNC: 29.9 G/DL — LOW (ref 32–37)
MCHC RBC-ENTMCNC: 30.4 G/DL — LOW (ref 32–37)
MCHC RBC-ENTMCNC: 31 G/DL — LOW (ref 32–37)
MCHC RBC-ENTMCNC: 32.3 G/DL — SIGNIFICANT CHANGE UP (ref 32–37)
MCHC RBC-ENTMCNC: 33.2 G/DL — SIGNIFICANT CHANGE UP (ref 32–37)
MCV RBC AUTO: 87.3 FL — SIGNIFICANT CHANGE UP (ref 80–94)
MCV RBC AUTO: 88.7 FL — SIGNIFICANT CHANGE UP (ref 80–94)
MCV RBC AUTO: 89.3 FL — SIGNIFICANT CHANGE UP (ref 80–94)
MCV RBC AUTO: 89.6 FL — SIGNIFICANT CHANGE UP (ref 80–94)
MCV RBC AUTO: 89.8 FL — SIGNIFICANT CHANGE UP (ref 80–94)
MONOCYTES # BLD AUTO: 1.01 K/UL — HIGH (ref 0.1–0.6)
MONOCYTES # BLD AUTO: 1.16 K/UL — HIGH (ref 0.1–0.6)
MONOCYTES # BLD AUTO: 1.41 K/UL — HIGH (ref 0.1–0.6)
MONOCYTES NFR BLD AUTO: 7.2 % — SIGNIFICANT CHANGE UP (ref 1.7–9.3)
MONOCYTES NFR BLD AUTO: 7.3 % — SIGNIFICANT CHANGE UP (ref 1.7–9.3)
MONOCYTES NFR BLD AUTO: 8.6 % — SIGNIFICANT CHANGE UP (ref 1.7–9.3)
NEUTROPHILS # BLD AUTO: 10.61 K/UL — HIGH (ref 1.4–6.5)
NEUTROPHILS # BLD AUTO: 12.32 K/UL — HIGH (ref 1.4–6.5)
NEUTROPHILS # BLD AUTO: 12.59 K/UL — HIGH (ref 1.4–6.5)
NEUTROPHILS NFR BLD AUTO: 75.9 % — HIGH (ref 42.2–75.2)
NEUTROPHILS NFR BLD AUTO: 76.9 % — HIGH (ref 42.2–75.2)
NEUTROPHILS NFR BLD AUTO: 77.8 % — HIGH (ref 42.2–75.2)
NRBC # BLD: 0 /100 WBCS — SIGNIFICANT CHANGE UP (ref 0–0)
PHOSPHATE SERPL-MCNC: 4 MG/DL — SIGNIFICANT CHANGE UP (ref 2.1–4.9)
PHOSPHATE SERPL-MCNC: 4 MG/DL — SIGNIFICANT CHANGE UP (ref 2.1–4.9)
PHOSPHATE SERPL-MCNC: 4.1 MG/DL — SIGNIFICANT CHANGE UP (ref 2.1–4.9)
PHOSPHATE SERPL-MCNC: 4.2 MG/DL — SIGNIFICANT CHANGE UP (ref 2.1–4.9)
PLATELET # BLD AUTO: 142 K/UL — SIGNIFICANT CHANGE UP (ref 130–400)
PLATELET # BLD AUTO: 180 K/UL — SIGNIFICANT CHANGE UP (ref 130–400)
PLATELET # BLD AUTO: 235 K/UL — SIGNIFICANT CHANGE UP (ref 130–400)
PLATELET # BLD AUTO: 297 K/UL — SIGNIFICANT CHANGE UP (ref 130–400)
PLATELET # BLD AUTO: 529 K/UL — HIGH (ref 130–400)
POTASSIUM SERPL-MCNC: 4.2 MMOL/L — SIGNIFICANT CHANGE UP (ref 3.5–5)
POTASSIUM SERPL-MCNC: 4.2 MMOL/L — SIGNIFICANT CHANGE UP (ref 3.5–5)
POTASSIUM SERPL-MCNC: 4.3 MMOL/L — SIGNIFICANT CHANGE UP (ref 3.5–5)
POTASSIUM SERPL-MCNC: 4.5 MMOL/L — SIGNIFICANT CHANGE UP (ref 3.5–5)
POTASSIUM SERPL-SCNC: 4.2 MMOL/L — SIGNIFICANT CHANGE UP (ref 3.5–5)
POTASSIUM SERPL-SCNC: 4.2 MMOL/L — SIGNIFICANT CHANGE UP (ref 3.5–5)
POTASSIUM SERPL-SCNC: 4.3 MMOL/L — SIGNIFICANT CHANGE UP (ref 3.5–5)
POTASSIUM SERPL-SCNC: 4.5 MMOL/L — SIGNIFICANT CHANGE UP (ref 3.5–5)
PROTHROM AB SERPL-ACNC: 15.7 SEC — HIGH (ref 9.95–12.87)
PROTHROM AB SERPL-ACNC: 16.6 SEC — HIGH (ref 9.95–12.87)
PROTHROM AB SERPL-ACNC: 17.4 SEC — HIGH (ref 9.95–12.87)
RBC # BLD: 2.2 M/UL — LOW (ref 4.7–6.1)
RBC # BLD: 2.65 M/UL — LOW (ref 4.7–6.1)
RBC # BLD: 2.69 M/UL — LOW (ref 4.7–6.1)
RBC # BLD: 2.8 M/UL — LOW (ref 4.7–6.1)
RBC # BLD: 2.84 M/UL — LOW (ref 4.7–6.1)
RBC # FLD: 17.4 % — HIGH (ref 11.5–14.5)
RBC # FLD: 17.4 % — HIGH (ref 11.5–14.5)
RBC # FLD: 17.9 % — HIGH (ref 11.5–14.5)
RBC # FLD: 18.2 % — HIGH (ref 11.5–14.5)
RBC # FLD: 20.8 % — HIGH (ref 11.5–14.5)
SODIUM SERPL-SCNC: 135 MMOL/L — SIGNIFICANT CHANGE UP (ref 135–146)
SODIUM SERPL-SCNC: 136 MMOL/L — SIGNIFICANT CHANGE UP (ref 135–146)
SODIUM SERPL-SCNC: 137 MMOL/L — SIGNIFICANT CHANGE UP (ref 135–146)
SODIUM SERPL-SCNC: 137 MMOL/L — SIGNIFICANT CHANGE UP (ref 135–146)
SURGICAL PATHOLOGY STUDY: SIGNIFICANT CHANGE UP
TROPONIN T SERPL-MCNC: 0.04 NG/ML — CRITICAL HIGH
TROPONIN T SERPL-MCNC: 0.05 NG/ML — CRITICAL HIGH
WBC # BLD: 13.99 K/UL — HIGH (ref 4.8–10.8)
WBC # BLD: 15.84 K/UL — HIGH (ref 4.8–10.8)
WBC # BLD: 16.14 K/UL — HIGH (ref 4.8–10.8)
WBC # BLD: 16.39 K/UL — HIGH (ref 4.8–10.8)
WBC # BLD: 16.49 K/UL — HIGH (ref 4.8–10.8)
WBC # FLD AUTO: 13.99 K/UL — HIGH (ref 4.8–10.8)
WBC # FLD AUTO: 15.84 K/UL — HIGH (ref 4.8–10.8)
WBC # FLD AUTO: 16.14 K/UL — HIGH (ref 4.8–10.8)
WBC # FLD AUTO: 16.39 K/UL — HIGH (ref 4.8–10.8)
WBC # FLD AUTO: 16.49 K/UL — HIGH (ref 4.8–10.8)

## 2021-05-06 PROCEDURE — 71045 X-RAY EXAM CHEST 1 VIEW: CPT | Mod: 26

## 2021-05-06 PROCEDURE — 99291 CRITICAL CARE FIRST HOUR: CPT

## 2021-05-06 PROCEDURE — 37248 TRLUML BALO ANGIOP 1ST VEIN: CPT | Mod: GC

## 2021-05-06 RX ORDER — MIDODRINE HYDROCHLORIDE 2.5 MG/1
10 TABLET ORAL THREE TIMES A DAY
Refills: 0 | Status: DISCONTINUED | OUTPATIENT
Start: 2021-05-06 | End: 2021-05-07

## 2021-05-06 RX ORDER — LANOLIN ALCOHOL/MO/W.PET/CERES
5 CREAM (GRAM) TOPICAL AT BEDTIME
Refills: 0 | Status: DISCONTINUED | OUTPATIENT
Start: 2021-05-06 | End: 2021-05-12

## 2021-05-06 RX ORDER — HEPARIN SODIUM 5000 [USP'U]/ML
10000 INJECTION INTRAVENOUS; SUBCUTANEOUS EVERY 6 HOURS
Refills: 0 | Status: DISCONTINUED | OUTPATIENT
Start: 2021-05-06 | End: 2021-05-06

## 2021-05-06 RX ORDER — TRAMADOL HYDROCHLORIDE 50 MG/1
25 TABLET ORAL DAILY
Refills: 0 | Status: DISCONTINUED | OUTPATIENT
Start: 2021-05-06 | End: 2021-05-12

## 2021-05-06 RX ORDER — INSULIN LISPRO 100/ML
VIAL (ML) SUBCUTANEOUS
Refills: 0 | Status: DISCONTINUED | OUTPATIENT
Start: 2021-05-06 | End: 2021-05-12

## 2021-05-06 RX ORDER — POLYETHYLENE GLYCOL 3350 17 G/17G
17 POWDER, FOR SOLUTION ORAL DAILY
Refills: 0 | Status: DISCONTINUED | OUTPATIENT
Start: 2021-05-06 | End: 2021-05-12

## 2021-05-06 RX ORDER — CHLORHEXIDINE GLUCONATE 213 G/1000ML
1 SOLUTION TOPICAL
Refills: 0 | Status: DISCONTINUED | OUTPATIENT
Start: 2021-05-06 | End: 2021-05-12

## 2021-05-06 RX ORDER — SENNA PLUS 8.6 MG/1
2 TABLET ORAL AT BEDTIME
Refills: 0 | Status: DISCONTINUED | OUTPATIENT
Start: 2021-05-06 | End: 2021-05-12

## 2021-05-06 RX ORDER — FERROUS SULFATE 325(65) MG
325 TABLET ORAL DAILY
Refills: 0 | Status: DISCONTINUED | OUTPATIENT
Start: 2021-05-06 | End: 2021-05-06

## 2021-05-06 RX ORDER — MAGNESIUM SULFATE 500 MG/ML
2 VIAL (ML) INJECTION ONCE
Refills: 0 | Status: COMPLETED | OUTPATIENT
Start: 2021-05-06 | End: 2021-05-06

## 2021-05-06 RX ORDER — DEXTROSE 50 % IN WATER 50 %
15 SYRINGE (ML) INTRAVENOUS ONCE
Refills: 0 | Status: DISCONTINUED | OUTPATIENT
Start: 2021-05-06 | End: 2021-05-07

## 2021-05-06 RX ORDER — ATORVASTATIN CALCIUM 80 MG/1
40 TABLET, FILM COATED ORAL AT BEDTIME
Refills: 0 | Status: DISCONTINUED | OUTPATIENT
Start: 2021-05-06 | End: 2021-05-12

## 2021-05-06 RX ORDER — SODIUM CHLORIDE 9 MG/ML
1000 INJECTION, SOLUTION INTRAVENOUS
Refills: 0 | Status: DISCONTINUED | OUTPATIENT
Start: 2021-05-06 | End: 2021-05-06

## 2021-05-06 RX ORDER — ACETAMINOPHEN 500 MG
650 TABLET ORAL EVERY 6 HOURS
Refills: 0 | Status: DISCONTINUED | OUTPATIENT
Start: 2021-05-06 | End: 2021-05-12

## 2021-05-06 RX ORDER — METOPROLOL TARTRATE 50 MG
12.5 TABLET ORAL EVERY 12 HOURS
Refills: 0 | Status: DISCONTINUED | OUTPATIENT
Start: 2021-05-06 | End: 2021-05-12

## 2021-05-06 RX ORDER — MAGNESIUM SULFATE 500 MG/ML
1 VIAL (ML) INJECTION ONCE
Refills: 0 | Status: COMPLETED | OUTPATIENT
Start: 2021-05-06 | End: 2021-05-07

## 2021-05-06 RX ORDER — METOPROLOL TARTRATE 50 MG
12.5 TABLET ORAL EVERY 12 HOURS
Refills: 0 | Status: DISCONTINUED | OUTPATIENT
Start: 2021-05-06 | End: 2021-05-06

## 2021-05-06 RX ORDER — GLUCAGON INJECTION, SOLUTION 0.5 MG/.1ML
1 INJECTION, SOLUTION SUBCUTANEOUS ONCE
Refills: 0 | Status: DISCONTINUED | OUTPATIENT
Start: 2021-05-06 | End: 2021-05-07

## 2021-05-06 RX ORDER — PANTOPRAZOLE SODIUM 20 MG/1
40 TABLET, DELAYED RELEASE ORAL
Refills: 0 | Status: DISCONTINUED | OUTPATIENT
Start: 2021-05-06 | End: 2021-05-06

## 2021-05-06 RX ORDER — MIDODRINE HYDROCHLORIDE 2.5 MG/1
10 TABLET ORAL THREE TIMES A DAY
Refills: 0 | Status: DISCONTINUED | OUTPATIENT
Start: 2021-05-06 | End: 2021-05-12

## 2021-05-06 RX ORDER — SODIUM CHLORIDE 9 MG/ML
1000 INJECTION, SOLUTION INTRAVENOUS
Refills: 0 | Status: DISCONTINUED | OUTPATIENT
Start: 2021-05-06 | End: 2021-05-07

## 2021-05-06 RX ORDER — HEPARIN SODIUM 5000 [USP'U]/ML
2400 INJECTION INTRAVENOUS; SUBCUTANEOUS
Qty: 25000 | Refills: 0 | Status: DISCONTINUED | OUTPATIENT
Start: 2021-05-06 | End: 2021-05-06

## 2021-05-06 RX ORDER — DEXTROSE 50 % IN WATER 50 %
25 SYRINGE (ML) INTRAVENOUS ONCE
Refills: 0 | Status: DISCONTINUED | OUTPATIENT
Start: 2021-05-06 | End: 2021-05-07

## 2021-05-06 RX ORDER — PANTOPRAZOLE SODIUM 20 MG/1
40 TABLET, DELAYED RELEASE ORAL EVERY 12 HOURS
Refills: 0 | Status: DISCONTINUED | OUTPATIENT
Start: 2021-05-06 | End: 2021-05-12

## 2021-05-06 RX ORDER — INSULIN HUMAN 100 [IU]/ML
INJECTION, SOLUTION SUBCUTANEOUS
Refills: 0 | Status: DISCONTINUED | OUTPATIENT
Start: 2021-05-06 | End: 2021-05-07

## 2021-05-06 RX ORDER — HEPARIN SODIUM 5000 [USP'U]/ML
5000 INJECTION INTRAVENOUS; SUBCUTANEOUS EVERY 6 HOURS
Refills: 0 | Status: DISCONTINUED | OUTPATIENT
Start: 2021-05-06 | End: 2021-05-06

## 2021-05-06 RX ADMIN — MIDODRINE HYDROCHLORIDE 10 MILLIGRAM(S): 2.5 TABLET ORAL at 17:28

## 2021-05-06 RX ADMIN — INSULIN HUMAN 3: 100 INJECTION, SOLUTION SUBCUTANEOUS at 21:45

## 2021-05-06 RX ADMIN — Medication 25 GRAM(S): at 02:15

## 2021-05-06 RX ADMIN — INSULIN HUMAN 3: 100 INJECTION, SOLUTION SUBCUTANEOUS at 09:05

## 2021-05-06 RX ADMIN — MIDODRINE HYDROCHLORIDE 10 MILLIGRAM(S): 2.5 TABLET ORAL at 05:11

## 2021-05-06 RX ADMIN — Medication 325 MILLIGRAM(S): at 11:39

## 2021-05-06 RX ADMIN — TRAMADOL HYDROCHLORIDE 25 MILLIGRAM(S): 50 TABLET ORAL at 19:04

## 2021-05-06 RX ADMIN — POLYETHYLENE GLYCOL 3350 17 GRAM(S): 17 POWDER, FOR SOLUTION ORAL at 19:04

## 2021-05-06 RX ADMIN — MIDODRINE HYDROCHLORIDE 10 MILLIGRAM(S): 2.5 TABLET ORAL at 11:39

## 2021-05-06 RX ADMIN — SENNA PLUS 2 TABLET(S): 8.6 TABLET ORAL at 21:22

## 2021-05-06 RX ADMIN — SODIUM CHLORIDE 50 MILLILITER(S): 9 INJECTION, SOLUTION INTRAVENOUS at 15:51

## 2021-05-06 RX ADMIN — Medication 650 MILLIGRAM(S): at 17:28

## 2021-05-06 RX ADMIN — PANTOPRAZOLE SODIUM 40 MILLIGRAM(S): 20 TABLET, DELAYED RELEASE ORAL at 06:42

## 2021-05-06 RX ADMIN — ATORVASTATIN CALCIUM 40 MILLIGRAM(S): 80 TABLET, FILM COATED ORAL at 21:22

## 2021-05-06 RX ADMIN — HEPARIN SODIUM 2400 UNIT(S)/HR: 5000 INJECTION INTRAVENOUS; SUBCUTANEOUS at 15:45

## 2021-05-06 NOTE — PROGRESS NOTE ADULT - SUBJECTIVE AND OBJECTIVE BOX
Vascular Surgery Post Operative Note      Procedure: Status post Venogram, lower extremity     Operative Findings:  · Operative Findings	bilateral venogram with thrombectomy and thrombolysis. TPA lysis catheter insertion    Post Operative day #1    Patient is resting comfortably  Overnight events: bleeding from RLE pop tpa catheter insertion site, changes dressing holding heparin drip ,hemoglobin drop 9.2-7.2        T(C): 36.7 (21 @ 23:00), Max: 37 (21 @ 09:20)  HR: 66 (21 @ 00:00) (58 - 81)  BP: 108/53 (21 @ 00:00) (91/53 - 150/70)  RR: 24 (21 @ 00:00) (16 - 31)  SpO2: 98% (21 @ 00:00) (95% - 100%)    21 @ 07:01  -  21 @ 07:00  --------------------------------------------------------  IN: 1878 mL / OUT: 1900 mL / NET: -22 mL    21 @ 07:01  -  21 @ 03:14  --------------------------------------------------------  IN: 985 mL / OUT: 90 mL / NET: 895 mL        General:Alert and oriented times 3, not in acute distress   Heart: Regular rate and rhythm, no rubs , murmurs or gallops  Lungs: Clear to auscultation bilaterally, no wheezes, rales, rhonci appreciated  Abdomen: Soft , positive bowel sounds, no tenderness, no distention, no peritoneal signs   Exremites:bilateral LE wrapped in curlex and ace wrap Groin warm extremities,  good color, no swelling, motor and sensation , pulses                   7.6    16.14 )-----------( 297      (  @ 00:50 )             25.0                7.8    16.49 )-----------( 529      (  @ 23:50 )             23.5                9.6    18.68 )-----------( 302      (  @ 14:41 )             31.3                9.7    10.80 )-----------( 345      (  @ 07:01 )             30.8                8.3    10.35 )-----------( 399      (  @ 05:16 )             27.9                    135   |  107   |  31                 Ca: 8.3    BMP:   ----------------------------< 143    M.7   (21 @ 23:50)             4.2    |  18    | 1.4                Ph: 4.0      LFT:     TPro: 5.6 / Alb: 3.0 / TBili: 0.8 / DBili: x / AST: 42 / ALT: 25 / AlkPhos: 81   (21 @ 06:51)          PT/INR - ( 05 May 2021 23:50 )   PT: 17.40 sec;   INR: 1.51 ratio         PTT - ( 05 May 2021 23:50 )  PTT:62.0 sec    CARDIAC MARKERS ( 05 May 2021 23:50 )  x     / x     / 35 U/L / x     / x      CARDIAC MARKERS ( 05 May 2021 14:41 )  x     / 0.03 ng/mL / 41 U/L / x     / 2.6 ng/mL

## 2021-05-06 NOTE — CHART NOTE - NSCHARTNOTEFT_GEN_A_CORE
Called to patient bedside due to new RLE bleeding. Patient evaluated, extremity warm, no pallor, saturation of dressing/leg wrap at popliteal site. Patient hemodynamically stable, no HYPO or HYPER tension, non tachycardic. Patient on TPA infusion, heparin d/c overnight. Patient scheduled for OR this afternoon with vascular surgery.    DVT prophylaxis-, SCDs    Labs: Hb/Hct:  7.6/25.0  -->,  7.2/24.1  -->                      Plts:  235  -->,  297  -->                 PTT/INR:  62.0/1.51  (05-05) --->,  46.4/1.44  (05-06) --->                 T&S: (05-06)    -Vascular fellow Dr. Ku made aware via teams approx [phone]  -Vascular KOSTA Miller made aware via spectra [phone] approx [phone], continue to monitor  -SICU team and RN made aware of plan

## 2021-05-06 NOTE — PROGRESS NOTE ADULT - SUBJECTIVE AND OBJECTIVE BOX
ROSA MARIA CASEY  [id number]  80y Male    Indication for ICU admission:  Admit Date:04-26-21  ICU Date:  OR Date:    No Known Allergies    PAST MEDICAL & SURGICAL HISTORY:  Hypertension    Hyperlipidemia    Diabetes mellitus, type 2    History of atrial fibrillation    Bladder cancer  secondary to exposure at [employer] 9/11 s/p cystectomy with urostomy    Chronic kidney disease (CKD)  stage 3A, baseline creatinine 1.4    DVT, lower extremity    History of total hip replacement, bilateral    History of total knee replacement, bilateral    History of total cystectomy  with resultant urostomy      Home Medications:  furosemide 20 mg oral tablet: 1 tab(s) orally 2 times a day (26 Apr 2021 12:04)  glimepiride 2 mg oral tablet: 1 tab(s) orally once a day (26 Apr 2021 12:04)  metoprolol succinate 50 mg oral tablet, extended release: 1 tab(s) orally once a day (26 Apr 2021 12:04)  Ozempic (1 mg dose) 2 mg/1.5 mL subcutaneous solution:  (26 Apr 2021 12:04)  rosuvastatin 20 mg oral tablet: 1 tab(s) orally once a day (26 Apr 2021 12:04)        24HRS EVENT:      DVT PTX:     GI PTX:pantoprazole    Tablet 40 milliGRAM(s) Oral before breakfast      ***Tubes/Lines/Drains  ***  Peripheral IV  Urinary Catheter		Indication: Strict I&O    Date Placed:       REVIEW OF SYSTEMS    [x ] A ten-point review of systems was otherwise negative except as noted.  [ ] Due to altered mental status/intubation, subjective information were not able to be obtained from the patient. History was obtained, to the extent possible, from review of the chart and collateral sources of information.                         ROSA MARIA CASEY  [id number]  80y Male    Indication for ICU admission:  Admit Date:04-26-21  ICU Date:  OR Date:    No Known Allergies    PAST MEDICAL & SURGICAL HISTORY:  Hypertension  Hyperlipidemia  Diabetes mellitus, type 2  History of atrial fibrillation  Bladder cancer --> secondary to exposure at [employer] 9/11 s/p cystectomy with urostomy  Chronic kidney disease (CKD) --> stage 3A, baseline creatinine 1.4  DVT, lower extremity  History of total hip replacement, bilateral  History of total knee replacement, bilateral  History of total cystectomy --> with resultant urostomy    Home Medications:  furosemide 20 mg oral tablet: 1 tab(s) orally 2 times a day (26 Apr 2021 12:04)  glimepiride 2 mg oral tablet: 1 tab(s) orally once a day (26 Apr 2021 12:04)  metoprolol succinate 50 mg oral tablet, extended release: 1 tab(s) orally once a day (26 Apr 2021 12:04)  Ozempic (1 mg dose) 2 mg/1.5 mL subcutaneous solution:  (26 Apr 2021 12:04)  rosuvastatin 20 mg oral tablet: 1 tab(s) orally once a day (26 Apr 2021 12:04)    24HRS EVENT:      DVT PTX:     GI PTX:pantoprazole    Tablet 40 milliGRAM(s) Oral before breakfast      ***Tubes/Lines/Drains  ***  Peripheral IV  Urinary Catheter		Indication: Strict I&O    Date Placed:       REVIEW OF SYSTEMS    [ ] A ten-point review of systems was otherwise negative except as noted.  [ ] Due to altered mental status/intubation, subjective information were not able to be obtained from the patient. History was obtained, to the extent possible, from review of the chart and collateral sources of information

## 2021-05-06 NOTE — BRIEF OPERATIVE NOTE - OPERATION/FINDINGS
bilateral lower extremity venogram bilateral lower extremity venogram and angioplasty of L CIV w 18 x 40 mm balloon

## 2021-05-06 NOTE — PROGRESS NOTE ADULT - ASSESSMENT
preop for removal of tpa catheters venogram and possible thombectomy  Pain management  Monitor for any bleeding  Trend hemoglobin   ICU management

## 2021-05-06 NOTE — BRIEF OPERATIVE NOTE - NSICDXBRIEFPOSTOP_GEN_ALL_CORE_FT
POST-OP DIAGNOSIS:  DVT, lower extremity 05-May-2021 14:00:51  Rodolfo Reese  
POST-OP DIAGNOSIS:  DVT, lower extremity 05-May-2021 14:00:51  Rodolfo Reese

## 2021-05-06 NOTE — PROGRESS NOTE ADULT - ASSESSMENT
ASSESSMENT:  80y Male  79 y/o male with PMH of HTN, HLD, DM II, COVID-19 PNA (s/p hospitalization 3/12/21-4/20/21 & ICU admission for hypoxia requiring HFNC, course complicated by LORRAINE on CKD, GI bleed requiring blood transfusion, sepsis requiring vasopressors), Afib (previously was not on AC 2/2 GI bleed, since has been cleared by GI via negative EGD, partial C-scope on 4/29 and capsule endoscopy 4/30), CKD stage 3A, bladder cancer (s/p cystectomy with urostomy), right lower extremity DVT (via duplex on 4/5; s/p IVC filter 4/8), diverticulosis (s/p bowel resection and reanastomosis), BL hip replacement, who presents from [facility] due to bilateral lower-extremity paresthesias and worsening edema. Found to have b/l acute LE DVTs, IVC filter thrombosis now s/p bilateral LE venogram with thrombectomy and thrombolysis of extensive clot, bilateral TPA catheter placement. SICU for serial CBCs and close monitoring while on TPA and heparin drips. Will RTOR with vascular team 5/6 AM.     PLAN:   Neurologic: A&Ox4   Pain control - prn tylenol, tramadol   Insomnia - prn melatonin     Respiratory: s/p COVID PNA, s/p prolonged hospitalization, never intubated, required HFNC, discharged on RA to SNF  - requiring 4L NC, wean as tolerated  - L pleural effusion post op CXR, breath sounds clear, consider prn IV lasix if needed     Cardiovascular: Hx afib on AC, HLD   #hx afib, stable  - c/w metoprolol 100mg XL for rate control, currently in sinus rhythm   - post op   #hx HLD   - c/w atorvastatin 40mg qd  # acute Hypotension on admission, likely orthostatic and hypovolemic in nature, resolved  - s/p IV fluid resuscitation and midodrine 5mg q8hr under medical service  - c/w midodrine regimen  #elevated trops post op   - 0.03, trend q6hrs   - 12 lead EKG pending    Gastrointestinal/Nutrition: hx GIB last admission   - CC diet --> NPO at [facility] for OR in am with vascular  - LR @ 50cc/hr  GI ppx: PPI, senna    Renal/Genitourinary: hx bladder ca s/p urostomy  #LORRAINE on this admission Cr max 3.1, baseline CKD with Cr 1.2-1.4   - holding lasix post op given borderline BP, contrast load via venogram, EBL 200cc & recent LORRAINE  ---> will give lasix prn, home dose PO Lasix 20mg BID, received 40mg IV lasix (5/4)  138  |  107  |  25<H>  ----------------------------<  118<H>  4.6   |  17  |  1.2    Ca    8.6      05 May 2021 14:41  Phos  4.0     05-05  Mg     1.9     05-05  -monitor/replete elytes prn, given 1g Mg post op    Hematologic: extensive b/l DVT with thrombosed IVC filter placed 4/9 by IR during prolonged COVID hospitalization   S/P thrombectomy & thrombolysis via b/l TPA catheter placement  - TPA via bilateral catheters, running at 0.5mg/hr each with 10cc KVO of NS  - hep gtt @ 500c/hr started at 3pm, goal PTT 60-80  - H/H stable post op 9.6/31.3  - trend CBC, fibrinogen and coags q6hrs    Infectious Disease: no acute dx   - received 4 day course of cefepime on admission (4/26-4/30) for possible sepsis, cultures negative   - no el op abx per vascular    - monitor for signs of infection    Lines/Tubes: PIV, LUE midline    Endocrine: hx DM2  - Hg A1c 8.1%  - tight ISS     Disposition: SICU d/w attending

## 2021-05-06 NOTE — BRIEF OPERATIVE NOTE - NSICDXBRIEFPREOP_GEN_ALL_CORE_FT
PRE-OP DIAGNOSIS:  DVT, lower extremity 05-May-2021 14:00:47  Rodolfo Reese  
PRE-OP DIAGNOSIS:  DVT, lower extremity 05-May-2021 14:00:47  Rodolfo Reese

## 2021-05-06 NOTE — PROGRESS NOTE ADULT - SUBJECTIVE AND OBJECTIVE BOX
ROSA MARIA CASEY  [id number]  80y Male    Indication for ICU admission:  Admit Date:04-26-21  ICU Date:  OR Date:    No Known Allergies    PAST MEDICAL & SURGICAL HISTORY:  Hypertension    Hyperlipidemia    Diabetes mellitus, type 2    History of atrial fibrillation    Bladder cancer  secondary to exposure at [employer] 9/11 s/p cystectomy with urostomy    Chronic kidney disease (CKD)  stage 3A, baseline creatinine 1.4    DVT, lower extremity    History of total hip replacement, bilateral    History of total knee replacement, bilateral    History of total cystectomy  with resultant urostomy      Home Medications:  furosemide 20 mg oral tablet: 1 tab(s) orally 2 times a day (26 Apr 2021 12:04)  glimepiride 2 mg oral tablet: 1 tab(s) orally once a day (26 Apr 2021 12:04)  metoprolol succinate 50 mg oral tablet, extended release: 1 tab(s) orally once a day (26 Apr 2021 12:04)  Ozempic (1 mg dose) 2 mg/1.5 mL subcutaneous solution:  (26 Apr 2021 12:04)  rosuvastatin 20 mg oral tablet: 1 tab(s) orally once a day (26 Apr 2021 12:04)        24HRS EVENT:      DVT PTX:     GI PTX:pantoprazole    Tablet 40 milliGRAM(s) Oral before breakfast      ***Tubes/Lines/Drains  ***  Peripheral IV  Urinary Catheter		Indication: Strict I&O    Date Placed:       REVIEW OF SYSTEMS    [x] A ten-point review of systems was otherwise negative except as noted.  [ ] Due to altered mental status/intubation, subjective information were not able to be obtained from the patient. History was obtained, to the extent possible, from review of the chart and collateral sources of information.                           ROSA MARIA CASEY  [id number]  80y Male    Indication for ICU admission:  Admit Date:04-26-21  ICU Date:  OR Date:    No Known Allergies    PAST MEDICAL & SURGICAL HISTORY:  Hypertension  Hyperlipidemia  Diabetes mellitus, type 2  History of atrial fibrillation  Bladder cancer --> secondary to exposure at [employer] 9/11 s/p cystectomy with urostomy  Chronic kidney disease (CKD) --> stage 3A, baseline creatinine 1.4  DVT, lower extremity  History of total hip replacement, bilateral  History of total knee replacement, bilateral  History of total cystectomy --> with resultant urostomy    Home Medications:  furosemide 20 mg oral tablet: 1 tab(s) orally 2 times a day (26 Apr 2021 12:04)  glimepiride 2 mg oral tablet: 1 tab(s) orally once a day (26 Apr 2021 12:04)  metoprolol succinate 50 mg oral tablet, extended release: 1 tab(s) orally once a day (26 Apr 2021 12:04)  Ozempic (1 mg dose) 2 mg/1.5 mL subcutaneous solution:  (26 Apr 2021 12:04)  rosuvastatin 20 mg oral tablet: 1 tab(s) orally once a day (26 Apr 2021 12:04)    24HRS EVENT:  -dressings and subhash became saturated overnight, vascular and senior resident bedside -- dressings taken down. Oozing noticed to be coming from tpa cath. Vascular fellow made aware, recommended heparin gtt be d/c'd at that time.   -hemoglobin 9.6 -> 7.8 -> 7.6 ------ nothing to do until follow up AM labs come back  -AM labs  -cardiac enzymes AM labs and 11AM labs  -UOP: 20/hr    DVT PTX:     GI PTX:pantoprazole    Tablet 40 milliGRAM(s) Oral before breakfast    ***Tubes/Lines/Drains  ***  Peripheral IV  Urinary Catheter		Indication: Strict I&O    Date Placed:       REVIEW OF SYSTEMS    [x] A ten-point review of systems was otherwise negative except as noted.  [ ] Due to altered mental status/intubation, subjective information were not able to be obtained from the patient. History was obtained, to the extent possible, from review of the chart and collateral sources of information.

## 2021-05-06 NOTE — CHART NOTE - NSCHARTNOTEFT_GEN_A_CORE
Patient seen and re examined with vascular and senior resident bedside. Patient hemoglobin dropped to 7.6 from 7.8 from 9.6. Right leg dressing and subhash saturated. Dressings taken down, TPA catheter in place with tegaderm securing it in place. Oozing noticed from around catheter. Dressings were changed. Patient remains vitally stable during this time. Vascular fellow made aware of hemoglobin and dressing change. Vascular fellow recommending heparin gtt held. SICU attending made aware at this time as well.

## 2021-05-06 NOTE — PROGRESS NOTE ADULT - ASSESSMENT
ASSESSMENT:  80y Male  81 y/o male with PMH of HTN, HLD, DM II, COVID-19 PNA (s/p hospitalization 3/12/21-4/20/21 & ICU admission for hypoxia requiring HFNC, course complicated by LORRAINE on CKD, GI bleed requiring blood transfusion, sepsis requiring vasopressors), Afib (previously was not on AC 2/2 GI bleed, since has been cleared by GI via negative EGD, partial C-scope on 4/29 and capsule endoscopy 4/30), CKD stage 3A, bladder cancer (s/p cystectomy with urostomy), right lower extremity DVT (via duplex on 4/5; s/p IVC filter 4/8), diverticulosis (s/p bowel resection and reanastomosis), BL hip replacement, who presents from [facility] due to bilateral lower-extremity paresthesias and worsening edema. Found to have b/l acute LE DVTs, IVC filter thrombosis now s/p bilateral LE venogram with thrombectomy and thrombolysis of extensive clot, bilateral TPA catheter placement. SICU for serial CBCs and close monitoring while on TPA and heparin drips. Will RTOR with vascular team 5/6 AM.     PLAN:   Neurologic: A&Ox4   Pain control - prn tylenol, tramadol   Insomnia - prn melatonin     Respiratory: s/p COVID PNA, s/p prolonged hospitalization, never intubated, required HFNC, discharged on RA to SNF  - requiring 4L NC, wean as tolerated  - L pleural effusion post op CXR, breath sounds clear, consider prn IV lasix if needed     Cardiovascular: Hx afib on AC, HLD   #hx afib, stable  - c/w metoprolol 100mg XL for rate control, currently in sinus rhythm   - post op   #hx HLD   - c/w atorvastatin 40mg qd  # acute Hypotension on admission, likely orthostatic and hypovolemic in nature, resolved  - s/p IV fluid resuscitation and midodrine 5mg q8hr under medical service  - c/w midodrine regimen  #elevated trops post op   - 0.03, trend q6hrs   - 12 lead EKG pending    Gastrointestinal/Nutrition: hx GIB last admission   - CC diet --> NPO at MN for OR in am with vascular  - LR @ 50cc/hr  GI ppx: PPI, senna    Renal/Genitourinary: hx bladder ca s/p urostomy  #LORRAINE on this admission Cr max 3.1, baseline CKD with Cr 1.2-1.4   - holding lasix post op given borderline BP, contrast load via venogram, EBL 200cc & recent LORRAINE  ---> will give lasix prn, home dose PO Lasix 20mg BID, received 40mg IV lasix (5/4)  138  |  107  |  25<H>  ----------------------------<  118<H>  4.6   |  17  |  1.2    Ca    8.6      05 May 2021 14:41  Phos  4.0     05-05  Mg     1.9     05-05  -monitor/replete elytes prn, given 1g Mg post op    Hematologic: extensive b/l DVT with thrombosed IVC filter placed 4/9 by IR during prolonged COVID hospitalization   S/P thrombectomy & thrombolysis via b/l TPA catheter placement  - TPA via bilateral catheters, running at 0.5mg/hr each with 10cc KVO of NS  - hep gtt @ 500c/hr started at 3pm, goal PTT 60-80  - H/H stable post op 9.6/31.3  - trend CBC, fibrinogen and coags q6hrs    Infectious Disease: no acute dx   - received 4 day course of cefepime on admission (4/26-4/30) for possible sepsis, cultures negative   - no el op abx per vascular    - monitor for signs of infection    Lines/Tubes: PIV, LUE midline    Endocrine: hx DM2  - Hg A1c 8.1%  - tight ISS     Disposition: SICU d/w attending ASSESSMENT:  80y Male  79 y/o male with PMH of HTN, HLD, DM II, COVID-19 PNA (s/p hospitalization 3/12/21-4/20/21 & ICU admission for hypoxia requiring HFNC, course complicated by LORRAINE on CKD, GI bleed requiring blood transfusion, sepsis requiring vasopressors), Afib (previously was not on AC 2/2 GI bleed, since has been cleared by GI via negative EGD, partial C-scope on 4/29 and capsule endoscopy 4/30), CKD stage 3A, bladder cancer (s/p cystectomy with urostomy), right lower extremity DVT (via duplex on 4/5; s/p IVC filter 4/8), diverticulosis (s/p bowel resection and reanastomosis), BL hip replacement, who presents from [facility] due to bilateral lower-extremity paresthesias and worsening edema. Found to have b/l acute LE DVTs, IVC filter thrombosis now s/p bilateral LE venogram with thrombectomy and thrombolysis of extensive clot, bilateral TPA catheter placement. SICU for serial CBCs and close monitoring while on TPA and heparin drips. Will RTOR with vascular team 5/6 AM.     PLAN:   Neurologic: A&Ox4   Pain control - prn tylenol, tramadol   Insomnia - prn melatonin     Respiratory: s/p COVID PNA, s/p prolonged hospitalization, never intubated, required HFNC, discharged on RA to SNF  - requiring 4L NC, wean as tolerated  - L pleural effusion post op CXR, breath sounds clear, consider prn IV lasix if needed     Cardiovascular: Hx afib on AC, HLD   #hx afib, stable  - c/w metoprolol 100mg XL for rate control, currently in sinus rhythm   - post op   #hx HLD   - c/w atorvastatin 40mg qd  # acute Hypotension on admission, likely orthostatic and hypovolemic in nature, resolved  - s/p IV fluid resuscitation and midodrine 5mg q8hr under medical service  - c/w midodrine regimen  #elevated trops post op   - 0.03 -->    -->   - EKG: NSR.     Gastrointestinal/Nutrition: hx GIB last admission   - CC diet --> NPO at MN for OR in am with vascular  - LR @ 50cc/hr  GI ppx: PPI, senna    Renal/Genitourinary: hx bladder ca s/p urostomy  #LORRAINE on this admission Cr max 3.1, baseline CKD with Cr 1.2-1.4   - holding lasix post op given borderline BP, contrast load via venogram, EBL 200cc & recent LORRAINE  ---> will give lasix prn, home dose PO Lasix 20mg BID, received 40mg IV lasix (5/4)  -Na 135, K 4.2, Mag 1.7, Phos 4.0  -monitor/replete elytes prn    Hematologic: extensive b/l DVT with thrombosed IVC filter placed 4/9 by IR during prolonged COVID hospitalization   S/P thrombectomy & thrombolysis via b/l TPA catheter placement  - TPA via bilateral catheters, running at 0.5mg/hr each with 10cc KVO of NS  - hep gtt being held in setting of downtrending hemoglobin  - H/H 9.6 -> 7.9 -> 7.6  - trend CBC, fibrinogen and coags q6hrs    Infectious Disease: no acute dx   - received 4 day course of cefepime on admission (4/26-4/30) for possible sepsis, cultures negative   - no el op abx per vascular    - monitor for signs of infection    Lines/Tubes: PIV, LUE midline    Endocrine: hx DM2  - Hg A1c 8.1%  - tight ISS     Disposition: SICU d/w attending

## 2021-05-06 NOTE — BRIEF OPERATIVE NOTE - NSICDXBRIEFPROCEDURE_GEN_ALL_CORE_FT
PROCEDURES:  Venogram, lower extremity 05-May-2021 14:00:39  Rodolfo Reese  
PROCEDURES:  Venogram, lower extremity 05-May-2021 14:00:39  Rodolfo Reese

## 2021-05-06 NOTE — PROGRESS NOTE ADULT - ATTENDING COMMENTS
acute blood loss anemai: 1 unit PRBC this am for downtrending hgb 7.2 with dec bp, oozing blood from catheter sites, stopped heparin     extensive b/l DVT with thrombosed IVC filter placed 4/9 by IR during prolonged COVID hospitalization   S/P thrombectomy & thrombolysis via b/l TPA catheter placement  - TPA via bilateral catheters, running at 0.5mg/hr each with 10cc KVO of NS  - hep gtt @ 500c/hr started at 3pm, goal PTT 60-80  - H/H downtrending 7.2 <9.9   - trend CBC, fibrinogen and coags q6hrs    dispo: or today

## 2021-05-06 NOTE — PACU DISCHARGE NOTE - COMMENTS
Uneventful course,mild sedation  Pt to be transferred to ICU for observation
Procedure performed with monitored anesthesia  No sedatives or anesthetics administered

## 2021-05-06 NOTE — CHART NOTE - NSCHARTNOTEFT_GEN_A_CORE
Patient had one episode of melena. SICU team and attending bedside. SICU notified vascular surgery at [phone] who spoke with vascular fellow. Vascular fellow recommending d/c heparin gtt and stat CBC. Heparin gtt was discontinued at that time, stat CBC significant for hemoglobin drop 7.9 to 6.2. Patient asymptomatic at this time, all vitals within normal limits. 2 units of PRBC ordered. Repeat CBC to follow.

## 2021-05-07 ENCOUNTER — TRANSCRIPTION ENCOUNTER (OUTPATIENT)
Age: 81
End: 2021-05-07

## 2021-05-07 LAB
ANION GAP SERPL CALC-SCNC: 12 MMOL/L — SIGNIFICANT CHANGE UP (ref 7–14)
ANION GAP SERPL CALC-SCNC: 9 MMOL/L — SIGNIFICANT CHANGE UP (ref 7–14)
ANISOCYTOSIS BLD QL: SIGNIFICANT CHANGE UP
APTT BLD: 33.6 SEC — SIGNIFICANT CHANGE UP (ref 27–39.2)
APTT BLD: >200 SEC — CRITICAL HIGH (ref 27–39.2)
BASOPHILS # BLD AUTO: 0 K/UL — SIGNIFICANT CHANGE UP (ref 0–0.2)
BASOPHILS # BLD AUTO: 0.1 K/UL — SIGNIFICANT CHANGE UP (ref 0–0.2)
BASOPHILS NFR BLD AUTO: 0 % — SIGNIFICANT CHANGE UP (ref 0–1)
BASOPHILS NFR BLD AUTO: 0.9 % — SIGNIFICANT CHANGE UP (ref 0–1)
BUN SERPL-MCNC: 36 MG/DL — HIGH (ref 10–20)
BUN SERPL-MCNC: 38 MG/DL — HIGH (ref 10–20)
CALCIUM SERPL-MCNC: 7.8 MG/DL — LOW (ref 8.5–10.1)
CALCIUM SERPL-MCNC: 7.8 MG/DL — LOW (ref 8.5–10.1)
CHLORIDE SERPL-SCNC: 108 MMOL/L — SIGNIFICANT CHANGE UP (ref 98–110)
CHLORIDE SERPL-SCNC: 109 MMOL/L — SIGNIFICANT CHANGE UP (ref 98–110)
CK MB CFR SERPL CALC: 3.7 NG/ML — SIGNIFICANT CHANGE UP (ref 0.6–6.3)
CK MB CFR SERPL CALC: 7 NG/ML — HIGH (ref 0.6–6.3)
CK SERPL-CCNC: 33 U/L — SIGNIFICANT CHANGE UP (ref 0–225)
CK SERPL-CCNC: 55 U/L — SIGNIFICANT CHANGE UP (ref 0–225)
CO2 SERPL-SCNC: 19 MMOL/L — SIGNIFICANT CHANGE UP (ref 17–32)
CO2 SERPL-SCNC: 19 MMOL/L — SIGNIFICANT CHANGE UP (ref 17–32)
CREAT SERPL-MCNC: 1.6 MG/DL — HIGH (ref 0.7–1.5)
CREAT SERPL-MCNC: 1.7 MG/DL — HIGH (ref 0.7–1.5)
EOSINOPHIL # BLD AUTO: 0 K/UL — SIGNIFICANT CHANGE UP (ref 0–0.7)
EOSINOPHIL # BLD AUTO: 0.21 K/UL — SIGNIFICANT CHANGE UP (ref 0–0.7)
EOSINOPHIL NFR BLD AUTO: 0 % — SIGNIFICANT CHANGE UP (ref 0–8)
EOSINOPHIL NFR BLD AUTO: 1.8 % — SIGNIFICANT CHANGE UP (ref 0–8)
GLUCOSE BLDC GLUCOMTR-MCNC: 119 MG/DL — HIGH (ref 70–99)
GLUCOSE BLDC GLUCOMTR-MCNC: 143 MG/DL — HIGH (ref 70–99)
GLUCOSE BLDC GLUCOMTR-MCNC: 146 MG/DL — HIGH (ref 70–99)
GLUCOSE BLDC GLUCOMTR-MCNC: 162 MG/DL — HIGH (ref 70–99)
GLUCOSE SERPL-MCNC: 122 MG/DL — HIGH (ref 70–99)
GLUCOSE SERPL-MCNC: 138 MG/DL — HIGH (ref 70–99)
HCT VFR BLD CALC: 24.8 % — LOW (ref 42–52)
HCT VFR BLD CALC: 24.9 % — LOW (ref 42–52)
HGB BLD-MCNC: 7.8 G/DL — LOW (ref 14–18)
HGB BLD-MCNC: 8.1 G/DL — LOW (ref 14–18)
IMM GRANULOCYTES NFR BLD AUTO: 5.1 % — HIGH (ref 0.1–0.3)
INR BLD: 1.26 RATIO — SIGNIFICANT CHANGE UP (ref 0.65–1.3)
LYMPHOCYTES # BLD AUTO: 0.13 K/UL — LOW (ref 1.2–3.4)
LYMPHOCYTES # BLD AUTO: 0.9 % — LOW (ref 20.5–51.1)
LYMPHOCYTES # BLD AUTO: 0.99 K/UL — LOW (ref 1.2–3.4)
LYMPHOCYTES # BLD AUTO: 8.4 % — LOW (ref 20.5–51.1)
MAGNESIUM SERPL-MCNC: 2 MG/DL — SIGNIFICANT CHANGE UP (ref 1.8–2.4)
MANUAL SMEAR VERIFICATION: SIGNIFICANT CHANGE UP
MCHC RBC-ENTMCNC: 27.6 PG — SIGNIFICANT CHANGE UP (ref 27–31)
MCHC RBC-ENTMCNC: 28.2 PG — SIGNIFICANT CHANGE UP (ref 27–31)
MCHC RBC-ENTMCNC: 31.5 G/DL — LOW (ref 32–37)
MCHC RBC-ENTMCNC: 32.5 G/DL — SIGNIFICANT CHANGE UP (ref 32–37)
MCV RBC AUTO: 86.8 FL — SIGNIFICANT CHANGE UP (ref 80–94)
MCV RBC AUTO: 87.6 FL — SIGNIFICANT CHANGE UP (ref 80–94)
METAMYELOCYTES # FLD: 0.9 % — HIGH (ref 0–0)
MICROCYTES BLD QL: SLIGHT — SIGNIFICANT CHANGE UP
MONOCYTES # BLD AUTO: 0.52 K/UL — SIGNIFICANT CHANGE UP (ref 0.1–0.6)
MONOCYTES # BLD AUTO: 0.88 K/UL — HIGH (ref 0.1–0.6)
MONOCYTES NFR BLD AUTO: 3.6 % — SIGNIFICANT CHANGE UP (ref 1.7–9.3)
MONOCYTES NFR BLD AUTO: 7.5 % — SIGNIFICANT CHANGE UP (ref 1.7–9.3)
MYELOCYTES NFR BLD: 0.9 % — HIGH (ref 0–0)
NEUTROPHILS # BLD AUTO: 13.5 K/UL — HIGH (ref 1.4–6.5)
NEUTROPHILS # BLD AUTO: 8.94 K/UL — HIGH (ref 1.4–6.5)
NEUTROPHILS NFR BLD AUTO: 76.3 % — HIGH (ref 42.2–75.2)
NEUTROPHILS NFR BLD AUTO: 93.7 % — HIGH (ref 42.2–75.2)
NRBC # BLD: 0 /100 WBCS — SIGNIFICANT CHANGE UP (ref 0–0)
OVALOCYTES BLD QL SMEAR: SLIGHT — SIGNIFICANT CHANGE UP
PHOSPHATE SERPL-MCNC: 4 MG/DL — SIGNIFICANT CHANGE UP (ref 2.1–4.9)
PLAT MORPH BLD: NORMAL — SIGNIFICANT CHANGE UP
PLATELET # BLD AUTO: 127 K/UL — LOW (ref 130–400)
PLATELET # BLD AUTO: 129 K/UL — LOW (ref 130–400)
POIKILOCYTOSIS BLD QL AUTO: SLIGHT — SIGNIFICANT CHANGE UP
POLYCHROMASIA BLD QL SMEAR: SLIGHT — SIGNIFICANT CHANGE UP
POTASSIUM SERPL-MCNC: 4.5 MMOL/L — SIGNIFICANT CHANGE UP (ref 3.5–5)
POTASSIUM SERPL-MCNC: 4.6 MMOL/L — SIGNIFICANT CHANGE UP (ref 3.5–5)
POTASSIUM SERPL-SCNC: 4.5 MMOL/L — SIGNIFICANT CHANGE UP (ref 3.5–5)
POTASSIUM SERPL-SCNC: 4.6 MMOL/L — SIGNIFICANT CHANGE UP (ref 3.5–5)
PROTHROM AB SERPL-ACNC: 14.5 SEC — HIGH (ref 9.95–12.87)
RBC # BLD: 2.83 M/UL — LOW (ref 4.7–6.1)
RBC # BLD: 2.87 M/UL — LOW (ref 4.7–6.1)
RBC # FLD: 17.2 % — HIGH (ref 11.5–14.5)
RBC # FLD: 17.6 % — HIGH (ref 11.5–14.5)
RBC BLD AUTO: ABNORMAL
SODIUM SERPL-SCNC: 137 MMOL/L — SIGNIFICANT CHANGE UP (ref 135–146)
SODIUM SERPL-SCNC: 139 MMOL/L — SIGNIFICANT CHANGE UP (ref 135–146)
TROPONIN T SERPL-MCNC: 0.06 NG/ML — CRITICAL HIGH
TROPONIN T SERPL-MCNC: 0.11 NG/ML — CRITICAL HIGH
WBC # BLD: 11.72 K/UL — HIGH (ref 4.8–10.8)
WBC # BLD: 14.41 K/UL — HIGH (ref 4.8–10.8)
WBC # FLD AUTO: 11.72 K/UL — HIGH (ref 4.8–10.8)
WBC # FLD AUTO: 14.41 K/UL — HIGH (ref 4.8–10.8)

## 2021-05-07 PROCEDURE — 43235 EGD DIAGNOSTIC BRUSH WASH: CPT

## 2021-05-07 PROCEDURE — 71045 X-RAY EXAM CHEST 1 VIEW: CPT | Mod: 26

## 2021-05-07 PROCEDURE — 99232 SBSQ HOSP IP/OBS MODERATE 35: CPT | Mod: 25

## 2021-05-07 PROCEDURE — 99291 CRITICAL CARE FIRST HOUR: CPT

## 2021-05-07 RX ORDER — HEPARIN SODIUM 5000 [USP'U]/ML
2000 INJECTION INTRAVENOUS; SUBCUTANEOUS
Qty: 25000 | Refills: 0 | Status: DISCONTINUED | OUTPATIENT
Start: 2021-05-07 | End: 2021-05-08

## 2021-05-07 RX ORDER — HYDROMORPHONE HYDROCHLORIDE 2 MG/ML
0.5 INJECTION INTRAMUSCULAR; INTRAVENOUS; SUBCUTANEOUS ONCE
Refills: 0 | Status: DISCONTINUED | OUTPATIENT
Start: 2021-05-07 | End: 2021-05-07

## 2021-05-07 RX ORDER — FUROSEMIDE 40 MG
40 TABLET ORAL DAILY
Refills: 0 | Status: DISCONTINUED | OUTPATIENT
Start: 2021-05-07 | End: 2021-05-12

## 2021-05-07 RX ADMIN — MIDODRINE HYDROCHLORIDE 10 MILLIGRAM(S): 2.5 TABLET ORAL at 12:29

## 2021-05-07 RX ADMIN — MIDODRINE HYDROCHLORIDE 10 MILLIGRAM(S): 2.5 TABLET ORAL at 16:58

## 2021-05-07 RX ADMIN — Medication 12.5 MILLIGRAM(S): at 19:19

## 2021-05-07 RX ADMIN — Medication 12.5 MILLIGRAM(S): at 05:39

## 2021-05-07 RX ADMIN — Medication 40 MILLIGRAM(S): at 12:29

## 2021-05-07 RX ADMIN — PANTOPRAZOLE SODIUM 40 MILLIGRAM(S): 20 TABLET, DELAYED RELEASE ORAL at 00:14

## 2021-05-07 RX ADMIN — ATORVASTATIN CALCIUM 40 MILLIGRAM(S): 80 TABLET, FILM COATED ORAL at 21:45

## 2021-05-07 RX ADMIN — PANTOPRAZOLE SODIUM 40 MILLIGRAM(S): 20 TABLET, DELAYED RELEASE ORAL at 05:39

## 2021-05-07 RX ADMIN — MIDODRINE HYDROCHLORIDE 10 MILLIGRAM(S): 2.5 TABLET ORAL at 05:39

## 2021-05-07 RX ADMIN — CHLORHEXIDINE GLUCONATE 1 APPLICATION(S): 213 SOLUTION TOPICAL at 05:39

## 2021-05-07 RX ADMIN — Medication 100 GRAM(S): at 02:24

## 2021-05-07 RX ADMIN — PANTOPRAZOLE SODIUM 40 MILLIGRAM(S): 20 TABLET, DELAYED RELEASE ORAL at 16:59

## 2021-05-07 RX ADMIN — SENNA PLUS 2 TABLET(S): 8.6 TABLET ORAL at 21:45

## 2021-05-07 RX ADMIN — HYDROMORPHONE HYDROCHLORIDE 0.5 MILLIGRAM(S): 2 INJECTION INTRAMUSCULAR; INTRAVENOUS; SUBCUTANEOUS at 00:23

## 2021-05-07 RX ADMIN — HYDROMORPHONE HYDROCHLORIDE 0.5 MILLIGRAM(S): 2 INJECTION INTRAMUSCULAR; INTRAVENOUS; SUBCUTANEOUS at 01:00

## 2021-05-07 NOTE — PROGRESS NOTE ADULT - ATTENDING COMMENTS
acute blood loss anemia: 2 unit PRBC yesterday due to downtrending hgb 6.2> 8.1, had a bloody bm yesterday, stopped heparin , PPI BID started for gi bleed, no bloody bms since yesterday afternoon     extensive b/l DVT with thrombosed IVC filter placed 4/9 by IR during prolonged COVID hospitalization   S/P thrombectomy & thrombolysis via b/l TPA catheter placement    f/u vascular if they want to restart the hep     afib rate controlled- metoprolol dose lowered 12.5 q 12    Midodrine 10 BID, BP map > 65    peripheral edema and congested cxr - IV lock , 40 lasix ordered    Continue statin   Reg Diet    CBC at 11am     LORRAINE 1.8 >1.7 , Urostomy functioning       Dispo: continue sicu management acute blood loss anemia: 2 unit PRBC yesterday due to downtrending hgb 6.2> 8.1, had a bloody bm yesterday, stopped heparin , PPI BID started for gi bleed, no bloody bms since yesterday afternoon     extensive b/l DVT with thrombosed IVC filter placed 4/9 by IR during prolonged COVID hospitalization   S/P thrombectomy & thrombolysis via b/l TPA catheter placement    f/u vascular if they want to restart the hep     afib rate controlled- metoprolol dose lowered 12.5 q 12    troponin 0.5>-0.4> 0.6  Midodrine 10 BID, BP map > 65    peripheral edema and congested cxr - IV lock , 40 lasix ordered    Continue statin   Reg Diet    CBC at 11am     LORRAINE 1.8 >1.7 , Urostomy functioning       Dispo: continue sicu management acute blood loss anemia: 2 unit PRBC yesterday due to downtrending hgb 6.2> 8.1, had a bloody bm yesterday, stopped heparin , PPI BID started for gi bleed, no bloody bms since yesterday afternoon     extensive b/l DVT with thrombosed IVC filter placed 4/9 by IR during prolonged COVID hospitalization   S/P thrombectomy & thrombolysis via b/l TPA catheter placement    f/u vascular if they want to restart the hep     afib rate controlled- metoprolol dose lowered 12.5 q 12    troponin 0.5>-0.4> 0.6> trend  Midodrine 10 BID, BP map > 65    peripheral edema and congested cxr - IV lock , 40 lasix ordered    Continue statin   Reg Diet    CBC at 11am     LORRAINE 1.8 >1.7 , Urostomy functioning       Dispo: continue sicu management

## 2021-05-07 NOTE — PROGRESS NOTE ADULT - SUBJECTIVE AND OBJECTIVE BOX
GENERAL SURGERY PROGRESS NOTE     ROSA MARIA CASEY  Male  Hospital day :11d  POD:  Procedure: Venogram, lower extremity      OVERNIGHT EVENTS:    T(F): 97.9 (05-06-21 @ 23:00), Max: 98.7 (05-06-21 @ 15:16)  HR: 70 (05-06-21 @ 22:00) (64 - 86)  BP: 87/54 (05-06-21 @ 22:00) (87/54 - 124/60)  RR: 14 (05-06-21 @ 22:00) (14 - 29)  SpO2: 98% (05-06-21 @ 22:00) (92% - 99%)    DIET/FLUIDS: lactated ringers. 1000 milliLiter(s) IV Continuous <Continuous>  magnesium sulfate  IVPB 1 Gram(s) IV Intermittent once      URINE:   05-05-21 @ 07:01  -  05-06-21 @ 07:00  --------------------------------------------------------  OUT: 155 mL       GI proph:  pantoprazole  Injectable 40 milliGRAM(s) IV Push every 12 hours    AC/ proph: Heparin GTT at 24-   ABx:     PHYSICAL EXAM:  GENERAL: NAD, well-appearing  CHEST/LUNG: Clear to auscultation bilaterally, symmetric chest rise, no labored breathing  HEART: Regular rate and rhythm  ABDOMEN: Soft, Nontender, Nondistended;   EXTREMITIES:  No clubbing, cyanosis, or edema; b/l LE dressed in ACE bandage from foot to mid thigh, no oozing from dressing, sensation and motor intact, feet with good coloring & temperature       LABS  CAPILLARY BLOOD GLUCOSE      POCT Blood Glucose.: 158 mg/dL (06 May 2021 21:40)  POCT Blood Glucose.: 121 mg/dL (06 May 2021 17:30)  POCT Blood Glucose.: 143 mg/dL (06 May 2021 11:42)  POCT Blood Glucose.: 166 mg/dL (06 May 2021 08:35)                          6.2    13.99 )-----------( 142      ( 06 May 2021 21:56 )             19.2       Auto Neutrophil %: 75.9 % (05-06-21 @ 21:56)  Auto Immature Granulocyte %: 5.4 % (05-06-21 @ 21:56)  Auto Neutrophil %: 77.8 % (05-06-21 @ 11:14)  Auto Immature Granulocyte %: 6.5 % (05-06-21 @ 11:14)  Auto Neutrophil %: 76.9 % (05-06-21 @ 05:34)  Auto Immature Granulocyte %: 6.3 % (05-06-21 @ 05:34)    05-06    137  |  108  |  35<H>  ----------------------------<  150<H>  4.5   |  19  |  1.8<H>      Calcium, Total Serum: 7.6 mg/dL (05-06-21 @ 21:56)    Coags:     15.70  ----< 1.37    ( 06 May 2021 11:14 )     44.4        CARDIAC MARKERS ( 06 May 2021 11:14 )  x     / 0.04 ng/mL / 30 U/L / x     / 1.6 ng/mL  CARDIAC MARKERS ( 06 May 2021 05:34 )  x     / 0.05 ng/mL / 29 U/L / x     / 1.7 ng/mL  CARDIAC MARKERS ( 05 May 2021 23:50 )  x     / x     / 35 U/L / x     / x      CARDIAC MARKERS ( 05 May 2021 14:41 )  x     / 0.03 ng/mL / 41 U/L / x     / 2.6 ng/mL        RADIOLOGY & ADDITIONAL TESTS:

## 2021-05-07 NOTE — PRE-ANESTHESIA EVALUATION ADULT - NSANTHOSAYNRD_GEN_A_CORE
No. JARETH screening performed.  STOP BANG Legend: 0-2 = LOW Risk; 3-4 = INTERMEDIATE Risk; 5-8 = HIGH Risk

## 2021-05-07 NOTE — PROGRESS NOTE ADULT - ASSESSMENT
Patient is a 81 y/o man with PMH of HTN, HLD, DM II, Afib (not on AC secondary to suspected GI bleed during last hospitalization March-April 2021) S/P EGD 4/2021 for melena  showing non erosive gastritis , CKD stage 3A, bladder cancer (s/p cystectomy with urostomy), right lower extremity DVT (4/5; s/p IVC filter 4/8), diverticulosis (s/p bowel resection and reanastomosis) now admitted with acute on chronic DVT requiring Anticoagulation.     #Melena with drop in HGB   #history of GI bleed , S/P EGD showing non erosive gastritis   # normocytic anemia with stable HGB , s/p incomplete colonoscopy secondary to large ventral hernia , s/p capsule endoscopy with multiple clean based small duodenal ulcers and non erosive duodenitis with no active GI bleed   #resolved COVID on room air now     REC:  Serial CBC and keep HGB above 8  Protonix drip  keep NPO  EGD today

## 2021-05-07 NOTE — PROGRESS NOTE ADULT - SUBJECTIVE AND OBJECTIVE BOX
ROSA MARIA CASEY  [id number]  80y Male    Indication for ICU admission:  Admit Date:04-26-21  ICU Date:05-06-21  OR Date:05-06-21    No Known Allergies    PAST MEDICAL & SURGICAL HISTORY:  Hypertension  Hyperlipidemia  Diabetes mellitus, type 2  History of atrial fibrillation  Bladder cancer --> secondary to exposure at [employer] 9/11 s/p cystectomy with urostomy  Chronic kidney disease (CKD) --> stage 3A, baseline creatinine 1.4  DVT, lower extremity  History of total hip replacement, bilateral  History of total knee replacement, bilateral  History of total cystectomy --> with resultant urostomy    Home Medications:  furosemide 20 mg oral tablet: 1 tab(s) orally 2 times a day (26 Apr 2021 12:04)  glimepiride 2 mg oral tablet: 1 tab(s) orally once a day (26 Apr 2021 12:04)  metoprolol succinate 50 mg oral tablet, extended release: 1 tab(s) orally once a day (26 Apr 2021 12:04)  Ozempic (1 mg dose) 2 mg/1.5 mL subcutaneous solution:  (26 Apr 2021 12:04)  rosuvastatin 20 mg oral tablet: 1 tab(s) orally once a day (26 Apr 2021 12:04)    24HRS EVENT:  5/6  DAY  -1u PRBC (hg 7.2)  -post transfusion Hgb  - OR TPA cath removed, B/L LE venogram   - Hep gtt f/u 23:30 ptt    NIGHT  -melena x1 --> hep gtt d/c'd --> stat CBC hgb 6.2 --> 2U PRBC  -AM CBC  -PTT elevated  -mag repleted  -dilaudid push x1    DVT PTX: holding per vascular surgery  GI PTX:pantoprazole    Tablet 40 milliGRAM(s) Oral before breakfast    ***Tubes/Lines/Drains  ***  Peripheral IV  Urinary Catheter		Indication: Strict I&O    Date Placed:       REVIEW OF SYSTEMS  [X ] A ten-point review of systems was otherwise negative except as noted.  [ ] Due to altered mental status/intubation, subjective information were not able to be obtained from the patient. History was obtained, to the extent possible, from review of the chart and collateral sources of information.

## 2021-05-07 NOTE — PROGRESS NOTE ADULT - ASSESSMENT
79 y/o M s/p b/l venogram and thrombectomies with insertion and removal of TPA catheters. Pt in NAD. Dressings dry, intact without oozing. Pt had episode of melena x1 approx 22:00 5/6. Heparin Gtt held. Repeat CBC showed hemoglobin of 6.2    Plan:   Hold heparin drip   given 1 unit PRBCs for hgb of 6.2- episode of melena (hx of GI bleeds)  pain control   vital monitoring   vascular checks   SICU management

## 2021-05-07 NOTE — CHART NOTE - NSCHARTNOTEFT_GEN_A_CORE
PACU ANESTHESIA ADMISSION NOTE      Procedure: Venogram, lower extremity      Post op diagnosis:  DVT, lower extremity        ____  Intubated  TV:______       Rate: ______      FiO2: ______    __x__  Patent Airway    __x__  Full return of protective reflexes    __x__  Full recovery from anesthesia / back to baseline     Vitals:   T:  98.6         R:  18                BP: 90/50                 Sat: 96                  P: 74      Mental Status:  _x___ Awake   __x___ Alert   _____ Drowsy   _____ Sedated    Nausea/Vomiting:  _x___ NO  ______Yes,   __x__ See Post - Op Orders          Pain Scale (0-10):  __0___    Treatment: ____ None    __x__ See Post - Op/PCA Orders    Post - Operative Fluids:   ____ Oral   __x__ See Post - Op Orders    Plan: Discharge:   ____Home       _____Floor     _____Critical Care    _x___  Other:_________________    Comments: When parameters met.

## 2021-05-07 NOTE — PROGRESS NOTE ADULT - SUBJECTIVE AND OBJECTIVE BOX
Gastroenterology progress note:     Patient is a 80y old  Male who presents with a chief complaint of Paresthesias of bilateral feet (07 May 2021 01:49)       Admitted on: 04-26-21    We are following the patient for: melena       Interval History: GI recalled for melena with 2 units drop in HGB. Patient was transfused 2 units and corrected appropriately     Patient's medical problems are stable    Prior records reviewed (Y/N): Y  History obtained from someone other than patient (Y/N): Y      PAST MEDICAL & SURGICAL HISTORY:  Hypertension    Hyperlipidemia    Diabetes mellitus, type 2    History of atrial fibrillation    Bladder cancer  secondary to exposure at [employer] 9/11 s/p cystectomy with urostomy    Chronic kidney disease (CKD)  stage 3A, baseline creatinine 1.4    DVT, lower extremity    History of total hip replacement, bilateral    History of total knee replacement, bilateral    History of total cystectomy  with resultant urostomy        MEDICATIONS  (STANDING):  atorvastatin 40 milliGRAM(s) Oral at bedtime  chlorhexidine 4% Liquid 1 Application(s) Topical <User Schedule>  furosemide   Injectable 40 milliGRAM(s) IV Push daily  insulin lispro (ADMELOG) corrective regimen sliding scale   SubCutaneous three times a day before meals  metoprolol tartrate 12.5 milliGRAM(s) Oral every 12 hours  midodrine. 10 milliGRAM(s) Oral three times a day  pantoprazole  Injectable 40 milliGRAM(s) IV Push every 12 hours  polyethylene glycol 3350 17 Gram(s) Oral daily  senna 2 Tablet(s) Oral at bedtime    MEDICATIONS  (PRN):  acetaminophen   Tablet .. 650 milliGRAM(s) Oral every 6 hours PRN Temp greater or equal to 38.5C (101.3F), Mild Pain (1 - 3)  melatonin 5 milliGRAM(s) Oral at bedtime PRN Insomnia  traMADol 25 milliGRAM(s) Oral daily PRN Severe Pain (7 - 10)      Allergies  No Known Allergies      Review of Systems:   Cardiovascular:  No Chest Pain, No Palpitations  Respiratory:  No Cough, No Dyspnea  Gastrointestinal:  As described in HPI    Physical Examination:  T(C): 36.2 (05-07-21 @ 12:00), Max: 37.1 (05-06-21 @ 15:16)  HR: 77 (05-07-21 @ 13:00) (58 - 93)  BP: 109/59 (05-07-21 @ 13:00) (82/52 - 134/69)  RR: 20 (05-07-21 @ 13:00) (14 - 35)  SpO2: 97% (05-07-21 @ 13:00) (92% - 99%)      05-06-21 @ 07:01  -  05-07-21 @ 07:00  --------------------------------------------------------  IN: 2819 mL / OUT: 1070 mL / NET: 1749 mL    05-07-21 @ 07:01  -  05-07-21 @ 14:48  --------------------------------------------------------  IN: 350 mL / OUT: 250 mL / NET: 100 mL      Constitutional: No acute distress.  Respiratory:  No signs of respiratory distress. Lung sounds are clear bilaterally.  Cardiovascular:  S1 S2, Regular rate and rhythm.  Abdominal: Abdomen is soft, symmetric, and non-tender without distention.  Bowel sounds are present and normoactive in all four quadrants. No masses, hepatomegaly, or splenomegaly are noted.   Skin: No rashes, No Jaundice.        Data: (reviewed by attending)                        7.8    11.72 )-----------( 127      ( 07 May 2021 12:35 )             24.8     Hgb trend:  7.8  05-07-21 @ 12:35  8.1  05-07-21 @ 05:40  6.2  05-06-21 @ 21:56  7.9  05-06-21 @ 11:14  7.2  05-06-21 @ 05:34  7.6  05-06-21 @ 00:50  7.8  05-05-21 @ 23:50  9.6  05-05-21 @ 14:41  9.7  05-05-21 @ 07:01      05-06-21 @ 07:01  -  05-07-21 @ 07:00  --------------------------------------------------------  IN: 909 mL      05-07    137  |  109  |  36<H>  ----------------------------<  138<H>  4.5   |  19  |  1.7<H>    Ca    7.8<L>      07 May 2021 05:40  Phos  4.0     05-07  Mg     2.0     05-07      Liver panel trend:  TBili 0.8   /   AST 42   /   ALT 25   /   AlkP 81   /   Tptn 5.6   /   Alb 3.0    /   DBili --      04-28      PT/INR - ( 07 May 2021 05:40 )   PT: 14.50 sec;   INR: 1.26 ratio         PTT - ( 07 May 2021 05:40 )  PTT:33.6 sec       Radiology: (reviewed by attending)

## 2021-05-07 NOTE — PROGRESS NOTE ADULT - ASSESSMENT
ASSESSMENT:  79 y/o male with PMH of HTN, HLD, DM II, COVID-19 PNA (s/p hospitalization 3/12/21-4/20/21 & ICU admission for hypoxia requiring HFNC, course complicated by LORRAINE on CKD, GI bleed requiring blood transfusion, sepsis requiring vasopressors), Afib (previously was not on AC 2/2 GI bleed, since has been cleared by GI via negative EGD, partial C-scope on 4/29 and capsule endoscopy 4/30), CKD stage 3A, bladder cancer (s/p cystectomy with urostomy), right lower extremity DVT (via duplex on 4/5; s/p IVC filter 4/8), diverticulosis (s/p bowel resection and reanastomosis), BL hip replacement, who presents from [facility] due to bilateral lower-extremity paresthesias and worsening edema. Found to have b/l acute LE DVTs, IVC filter thrombosis now s/p bilateral LE venogram with thrombectomy and thrombolysis of extensive clot, bilateral TPA catheter placement. SICU for serial CBCs q 6 and close monitoring while on TPA and heparin drips. Will RTOR with vascular team 5/6 AM.     PLAN:   Neurologic: A&Ox4   Pain control - prn tylenol, tramadol   Insomnia - prn melatonin     Respiratory: s/p COVID PNA, s/p prolonged hospitalization, never intubated, required HFNC, discharged on RA to SNF  - RA, oxygen PRN  - L pleural effusion post op CXR, breath sounds clear, consider prn IV lasix if needed     Cardiovascular: Hx afib on AC, HLD   #hx afib, stable  - hold metoprolol 100mg XL for rate control 2/2 soft BP - started metoprolol tartrate 12.5mg BID, currently in sinus rhythm   - post op   #hx HLD   - c/w atorvastatin 40mg qd  # acute Hypotension on admission, likely orthostatic and hypovolemic in nature, resolved  - s/p IV fluid resuscitation and midodrine 10mg q8hr under medical service  - c/w midodrine regimen  #elevated trops post op   - 0.03 --> 0.05 -->    Gastrointestinal/Nutrition: hx GIB last admission   - CC diet   - LR @ 50cc/hr  #Melena 5/6 PM  - Increase pantoprazole to 40 q12  - Holding heparin gtt at this time  - Monitor for melena    GI ppx: PPI, senna    Renal/Genitourinary: hx bladder ca s/p urostomy  #LORRAINE on this admission Cr max 3.1, baseline CKD with Cr 1.2-1.4   Monitor electrolytres  Na 137 / K 4.5 / Mag 1.8 Phos 4.0 --> magnesium repleted  Cr 1.2> 1.4 --> 1.6 --> 35/1.8    Hematologic: extensive b/l DVT with thrombosed IVC filter placed 4/9 by IR during prolonged COVID hospitalization   S/P thrombectomy & thrombolysis via b/l TPA catheter placement and removal on 5/6   -heparin gtt d/c'd second to episode of melena on 5/6 PM   -vascular aware     Infectious Disease: no acute dx   - no el op abx per vascular    - monitor for signs of infection    Lines/Tubes: PIV, LUE midline    Endocrine: hx DM2  - Hg A1c 8.1%  - tight ISS     Disposition: SICU d/w attending

## 2021-05-08 LAB
ANION GAP SERPL CALC-SCNC: 10 MMOL/L — SIGNIFICANT CHANGE UP (ref 7–14)
APTT BLD: 59 SEC — HIGH (ref 27–39.2)
APTT BLD: 66 SEC — HIGH (ref 27–39.2)
APTT BLD: >200 SEC — CRITICAL HIGH (ref 27–39.2)
BASOPHILS # BLD AUTO: 0.08 K/UL — SIGNIFICANT CHANGE UP (ref 0–0.2)
BASOPHILS NFR BLD AUTO: 0.8 % — SIGNIFICANT CHANGE UP (ref 0–1)
BUN SERPL-MCNC: 36 MG/DL — HIGH (ref 10–20)
CALCIUM SERPL-MCNC: 7.9 MG/DL — LOW (ref 8.5–10.1)
CHLORIDE SERPL-SCNC: 109 MMOL/L — SIGNIFICANT CHANGE UP (ref 98–110)
CO2 SERPL-SCNC: 21 MMOL/L — SIGNIFICANT CHANGE UP (ref 17–32)
CREAT SERPL-MCNC: 1.5 MG/DL — SIGNIFICANT CHANGE UP (ref 0.7–1.5)
EOSINOPHIL # BLD AUTO: 0.26 K/UL — SIGNIFICANT CHANGE UP (ref 0–0.7)
EOSINOPHIL NFR BLD AUTO: 2.5 % — SIGNIFICANT CHANGE UP (ref 0–8)
GLUCOSE BLDC GLUCOMTR-MCNC: 104 MG/DL — HIGH (ref 70–99)
GLUCOSE BLDC GLUCOMTR-MCNC: 111 MG/DL — HIGH (ref 70–99)
GLUCOSE BLDC GLUCOMTR-MCNC: 113 MG/DL — HIGH (ref 70–99)
GLUCOSE BLDC GLUCOMTR-MCNC: 118 MG/DL — HIGH (ref 70–99)
GLUCOSE SERPL-MCNC: 104 MG/DL — HIGH (ref 70–99)
HCT VFR BLD CALC: 23.9 % — LOW (ref 42–52)
HCT VFR BLD CALC: 24.5 % — LOW (ref 42–52)
HGB BLD-MCNC: 7.5 G/DL — LOW (ref 14–18)
HGB BLD-MCNC: 7.6 G/DL — LOW (ref 14–18)
IMM GRANULOCYTES NFR BLD AUTO: 5 % — HIGH (ref 0.1–0.3)
INR BLD: 1.16 RATIO — SIGNIFICANT CHANGE UP (ref 0.65–1.3)
INR BLD: 1.19 RATIO — SIGNIFICANT CHANGE UP (ref 0.65–1.3)
LYMPHOCYTES # BLD AUTO: 1.12 K/UL — LOW (ref 1.2–3.4)
LYMPHOCYTES # BLD AUTO: 10.9 % — LOW (ref 20.5–51.1)
MAGNESIUM SERPL-MCNC: 1.8 MG/DL — SIGNIFICANT CHANGE UP (ref 1.8–2.4)
MCHC RBC-ENTMCNC: 27.6 PG — SIGNIFICANT CHANGE UP (ref 27–31)
MCHC RBC-ENTMCNC: 27.8 PG — SIGNIFICANT CHANGE UP (ref 27–31)
MCHC RBC-ENTMCNC: 31 G/DL — LOW (ref 32–37)
MCHC RBC-ENTMCNC: 31.4 G/DL — LOW (ref 32–37)
MCV RBC AUTO: 87.9 FL — SIGNIFICANT CHANGE UP (ref 80–94)
MCV RBC AUTO: 89.7 FL — SIGNIFICANT CHANGE UP (ref 80–94)
MONOCYTES # BLD AUTO: 0.88 K/UL — HIGH (ref 0.1–0.6)
MONOCYTES NFR BLD AUTO: 8.6 % — SIGNIFICANT CHANGE UP (ref 1.7–9.3)
NEUTROPHILS # BLD AUTO: 7.42 K/UL — HIGH (ref 1.4–6.5)
NEUTROPHILS NFR BLD AUTO: 72.2 % — SIGNIFICANT CHANGE UP (ref 42.2–75.2)
NRBC # BLD: 0 /100 WBCS — SIGNIFICANT CHANGE UP (ref 0–0)
NRBC # BLD: 0 /100 WBCS — SIGNIFICANT CHANGE UP (ref 0–0)
PHOSPHATE SERPL-MCNC: 4.3 MG/DL — SIGNIFICANT CHANGE UP (ref 2.1–4.9)
PLATELET # BLD AUTO: 130 K/UL — SIGNIFICANT CHANGE UP (ref 130–400)
PLATELET # BLD AUTO: 140 K/UL — SIGNIFICANT CHANGE UP (ref 130–400)
POTASSIUM SERPL-MCNC: 4.1 MMOL/L — SIGNIFICANT CHANGE UP (ref 3.5–5)
POTASSIUM SERPL-SCNC: 4.1 MMOL/L — SIGNIFICANT CHANGE UP (ref 3.5–5)
PROTHROM AB SERPL-ACNC: 13.3 SEC — HIGH (ref 9.95–12.87)
PROTHROM AB SERPL-ACNC: 13.7 SEC — HIGH (ref 9.95–12.87)
RBC # BLD: 2.72 M/UL — LOW (ref 4.7–6.1)
RBC # BLD: 2.73 M/UL — LOW (ref 4.7–6.1)
RBC # FLD: 18.1 % — HIGH (ref 11.5–14.5)
RBC # FLD: 18.5 % — HIGH (ref 11.5–14.5)
SODIUM SERPL-SCNC: 140 MMOL/L — SIGNIFICANT CHANGE UP (ref 135–146)
TROPONIN T SERPL-MCNC: 0.15 NG/ML — CRITICAL HIGH
TROPONIN T SERPL-MCNC: 0.16 NG/ML — CRITICAL HIGH
TROPONIN T SERPL-MCNC: 0.17 NG/ML — CRITICAL HIGH
WBC # BLD: 10.27 K/UL — SIGNIFICANT CHANGE UP (ref 4.8–10.8)
WBC # BLD: 11.16 K/UL — HIGH (ref 4.8–10.8)
WBC # FLD AUTO: 10.27 K/UL — SIGNIFICANT CHANGE UP (ref 4.8–10.8)
WBC # FLD AUTO: 11.16 K/UL — HIGH (ref 4.8–10.8)

## 2021-05-08 PROCEDURE — 93010 ELECTROCARDIOGRAM REPORT: CPT

## 2021-05-08 PROCEDURE — 99291 CRITICAL CARE FIRST HOUR: CPT

## 2021-05-08 PROCEDURE — 71045 X-RAY EXAM CHEST 1 VIEW: CPT | Mod: 26

## 2021-05-08 RX ORDER — HEPARIN SODIUM 5000 [USP'U]/ML
1500 INJECTION INTRAVENOUS; SUBCUTANEOUS
Qty: 25000 | Refills: 0 | Status: DISCONTINUED | OUTPATIENT
Start: 2021-05-08 | End: 2021-05-08

## 2021-05-08 RX ORDER — HYDROMORPHONE HYDROCHLORIDE 2 MG/ML
0.5 INJECTION INTRAMUSCULAR; INTRAVENOUS; SUBCUTANEOUS ONCE
Refills: 0 | Status: DISCONTINUED | OUTPATIENT
Start: 2021-05-08 | End: 2021-05-08

## 2021-05-08 RX ORDER — MAGNESIUM SULFATE 500 MG/ML
1 VIAL (ML) INJECTION ONCE
Refills: 0 | Status: COMPLETED | OUTPATIENT
Start: 2021-05-08 | End: 2021-05-08

## 2021-05-08 RX ORDER — HEPARIN SODIUM 5000 [USP'U]/ML
1800 INJECTION INTRAVENOUS; SUBCUTANEOUS
Qty: 25000 | Refills: 0 | Status: DISCONTINUED | OUTPATIENT
Start: 2021-05-08 | End: 2021-05-08

## 2021-05-08 RX ADMIN — HEPARIN SODIUM 20 UNIT(S)/HR: 5000 INJECTION INTRAVENOUS; SUBCUTANEOUS at 00:10

## 2021-05-08 RX ADMIN — CHLORHEXIDINE GLUCONATE 1 APPLICATION(S): 213 SOLUTION TOPICAL at 05:06

## 2021-05-08 RX ADMIN — PANTOPRAZOLE SODIUM 40 MILLIGRAM(S): 20 TABLET, DELAYED RELEASE ORAL at 16:51

## 2021-05-08 RX ADMIN — HYDROMORPHONE HYDROCHLORIDE 0.5 MILLIGRAM(S): 2 INJECTION INTRAMUSCULAR; INTRAVENOUS; SUBCUTANEOUS at 01:11

## 2021-05-08 RX ADMIN — TRAMADOL HYDROCHLORIDE 25 MILLIGRAM(S): 50 TABLET ORAL at 00:10

## 2021-05-08 RX ADMIN — TRAMADOL HYDROCHLORIDE 25 MILLIGRAM(S): 50 TABLET ORAL at 01:04

## 2021-05-08 RX ADMIN — Medication 40 MILLIGRAM(S): at 05:06

## 2021-05-08 RX ADMIN — MIDODRINE HYDROCHLORIDE 10 MILLIGRAM(S): 2.5 TABLET ORAL at 16:51

## 2021-05-08 RX ADMIN — ATORVASTATIN CALCIUM 40 MILLIGRAM(S): 80 TABLET, FILM COATED ORAL at 21:47

## 2021-05-08 RX ADMIN — Medication 100 GRAM(S): at 03:34

## 2021-05-08 RX ADMIN — Medication 12.5 MILLIGRAM(S): at 16:51

## 2021-05-08 RX ADMIN — Medication 12.5 MILLIGRAM(S): at 05:06

## 2021-05-08 RX ADMIN — MIDODRINE HYDROCHLORIDE 10 MILLIGRAM(S): 2.5 TABLET ORAL at 05:06

## 2021-05-08 RX ADMIN — PANTOPRAZOLE SODIUM 40 MILLIGRAM(S): 20 TABLET, DELAYED RELEASE ORAL at 05:06

## 2021-05-08 RX ADMIN — HYDROMORPHONE HYDROCHLORIDE 0.5 MILLIGRAM(S): 2 INJECTION INTRAMUSCULAR; INTRAVENOUS; SUBCUTANEOUS at 21:47

## 2021-05-08 RX ADMIN — MIDODRINE HYDROCHLORIDE 10 MILLIGRAM(S): 2.5 TABLET ORAL at 11:47

## 2021-05-08 RX ADMIN — POLYETHYLENE GLYCOL 3350 17 GRAM(S): 17 POWDER, FOR SOLUTION ORAL at 11:48

## 2021-05-08 NOTE — PROGRESS NOTE ADULT - ASSESSMENT
ASSESSMENT:  81 y/o male with PMH of HTN, HLD, DM II, COVID-19 PNA (s/p hospitalization 3/12/21-4/20/21 & ICU admission for hypoxia requiring HFNC, course complicated by LORRAINE on CKD, GI bleed requiring blood transfusion, sepsis requiring vasopressors), Afib (previously was not on AC 2/2 GI bleed, since has been cleared by GI via negative EGD, partial C-scope on 4/29 and capsule endoscopy 4/30), CKD stage 3A, bladder cancer (s/p cystectomy with urostomy), right lower extremity DVT (via duplex on 4/5; s/p IVC filter 4/8), diverticulosis (s/p bowel resection and reanastomosis), BL hip replacement, who presents from [facility] due to bilateral lower-extremity paresthesias and worsening edema. Found to have b/l acute LE DVTs, IVC filter thrombosis now s/p bilateral LE venogram with thrombectomy and thrombolysis of extensive clot, bilateral TPA catheter placement. SICU for serial CBCs q 6 and close monitoring while on TPA and heparin drips. Will RTOR with vascular team 5/6 AM.     PLAN:   Neurologic: A&Ox4   Pain control - prn tylenol, tramadol   Insomnia - prn melatonin     Respiratory: s/p COVID PNA, s/p prolonged hospitalization, never intubated, required HFNC, discharged on RA to SNF  - RA, oxygen PRN  - L pleural effusion post op CXR, breath sounds clear, consider prn IV lasix if needed - received 40mg IV today     Cardiovascular: Hx afib on AC, HLD   #hx afib, stable  - hold metoprolol 100mg XL for rate control 2/2 soft BP - started metoprolol tartrate 12.5mg BID, currently in sinus rhythm   - post op   #hx HLD   - c/w atorvastatin 40mg qd  # acute Hypotension on admission, likely orthostatic and hypovolemic in nature, resolved  - s/p IV fluid resuscitation and midodrine 10mg q8hr under medical service  - c/w midodrine regimen  #elevated trops post op   - 0.03 --> 0.05 --> 0.06 --> 0.11 >.15, repeat EKG pending, 2 more sets of trop pending  -restarted hep gtt OV, approved by [facility]    Gastrointestinal/Nutrition: hx GIB last admission   - CC diet   #Melena 5/6 PM  - Increase pantoprazole to 40 q12  - Monitor for melena  - GI- EGD- neg  GI ppx: PPI, senna    Renal/Genitourinary: hx bladder ca s/p urostomy  #LORRAINE on this admission Cr max 3.1, baseline CKD with Cr 1.2-1.4   Monitor electrolytres  Na 140 // K 4.1 // Phos 4.3 //  Mg 1.8 (repleted)  Cr 1.2> 1.4 --> 1.6 --> 35/1.8 --> 38/1.6 > 36/1.5    Hematologic: extensive b/l DVT with thrombosed IVC filter placed 4/9 by IR during prolonged COVID hospitalization   S/P thrombectomy & thrombolysis via b/l TPA catheter placement and removal on 5/6   -hep gtt restarted as per [facility] 5/7 pm, at 2000U, ptt pending in am    Infectious Disease: no acute dx   - no el op abx per vascular    - monitor for signs of infection  -WBC 11.7 >10    Lines/Tubes: PIV, LUE midline    Endocrine: hx DM2  - Hg A1c 8.1%  - tight ISS     Disposition: SICU        ASSESSMENT:  79 y/o male with PMH of HTN, HLD, DM II, COVID-19 PNA (s/p hospitalization 3/12/21-4/20/21 & ICU admission for hypoxia requiring HFNC, course complicated by LORRAINE on CKD, GI bleed requiring blood transfusion, sepsis requiring vasopressors), Afib (previously was not on AC 2/2 GI bleed, since has been cleared by GI via negative EGD, partial C-scope on 4/29 and capsule endoscopy 4/30), CKD stage 3A, bladder cancer (s/p cystectomy with urostomy), right lower extremity DVT (via duplex on 4/5; s/p IVC filter 4/8), diverticulosis (s/p bowel resection and reanastomosis), BL hip replacement, who presents from [facility] due to bilateral lower-extremity paresthesias and worsening edema. Found to have b/l acute LE DVTs, IVC filter thrombosis now s/p bilateral LE venogram with thrombectomy and thrombolysis of extensive clot, bilateral TPA catheter placement. SICU for serial CBCs q 6 and close monitoring while on TPA and heparin drips. Will RTOR with vascular team 5/6 AM.     PLAN:   Neurologic: A&Ox4   Pain control - prn tylenol, tramadol   Insomnia - prn melatonin     Respiratory: s/p COVID PNA, s/p prolonged hospitalization, never intubated, required HFNC, discharged on RA to SNF  - RA, oxygen PRN  - L pleural effusion post op CXR, breath sounds clear, consider prn IV lasix if needed - started 40mg IV 5/7    Cardiovascular: Hx afib on AC, HLD   #hx afib, stable  - hold metoprolol 100mg XL for rate control 2/2 soft BP - started metoprolol tartrate 12.5mg BID, currently in sinus rhythm   - post op   #hx HLD   - c/w atorvastatin 40mg qd  # acute Hypotension on admission, likely orthostatic and hypovolemic in nature, resolved  - s/p IV fluid resuscitation and midodrine 10mg q8hr under medical service  - c/w midodrine regimen - 10 TID  #elevated trops post op   - 0.03 --> 0.05 --> 0.06 --> 0.11 >.15 > 0.17  -restarted hep gtt OV, approved by [facility]    Gastrointestinal/Nutrition: hx GIB last admission   - CC diet   #Melena 5/6 PM  - Increase pantoprazole to 40 q12  - Monitor for melena  - GI- EGD- neg  GI ppx: PPI, senna    Renal/Genitourinary: hx bladder ca s/p urostomy  #LORRAINE on this admission Cr max 3.1, baseline CKD with Cr 1.2-1.4   Monitor electrolytres  Na 140 // K 4.1 // Phos 4.3 //  Mg 1.8 (repleted)  Cr 1.2> 1.4 --> 1.6 --> 35/1.8 --> 38/1.6 > 36/1.5    Hematologic: extensive b/l DVT with thrombosed IVC filter placed 4/9 by IR during prolonged COVID hospitalization   S/P thrombectomy & thrombolysis via b/l TPA catheter placement and removal on 5/6   -hep gtt restarted as per [facility] 5/7 pm, at 2000U   -PTT >200, holding hep gtt    Infectious Disease: no acute dx   - no el op abx per vascular    - monitor for signs of infection  -WBC 11.7 >10    Lines/Tubes: PIV, LUE midline    Endocrine: hx DM2  - Hg A1c 8.1%  - tight ISS     Disposition: SICU

## 2021-05-08 NOTE — PROGRESS NOTE ADULT - ATTENDING COMMENTS
Negative EGD yesterday     hgb stable 7.8 repeat 11am     troponin slow inc 0.05>0.06>0.11>0.15> 0.17    extensive b/l DVT with thrombosed IVC filter placed 4/9 by IR during prolonged COVID hospitalization   S/P thrombectomy & thrombolysis via b/l TPA catheter placement    heparin gtt restarted ptt supra therapeutic , follow protocol     protonix 80 bid for gi bleeed     afib rate controlled- metoprolol dose lowered 12.5 q 12    Midodrine 10 BID, BP map > 65    peripheral edema and congested cxr - IV lock , 40 lasix daily, -2 L yesterday , 200cc/hr     Continue statin   Reg Diet      LORRAINE 1.8 >1.7>1.5 , Urostomy functioning       Dispo: continue sicu management.

## 2021-05-08 NOTE — CONSULT NOTE ADULT - CONSULT REASON
DVTs
IVC filter thrombosis (4/9 w IR) now s/p bilateral LE venogram with thrombectomy and thrombolysis of extensive clot, bilateral TPA catheter placement
Hypotension with severe sepsis on admission, chest X-ray opacities
clearance for anticoagulation , history of GI bleed
b/l extensive DVTs, thrombosis of IVC filter
debility

## 2021-05-08 NOTE — CONSULT NOTE ADULT - ASSESSMENT
IMPRESSION: Rehab of  debility, s/p thrombolysis    PRECAUTIONS: [ x ] Cardiac  [  ] Respiratory  [  ] Seizures [  ] Contact Isolation  [  ] Droplet Isolation  [  ] Other    Weight Bearing Status:     RECOMMENDATION:    Out of Bed to Chair     DVT/Decubiti Prophylaxis    REHAB PLAN:     [x   ] Bedside P/T 3-5 times a week   [   ]   Bedside O/T  2-3 times a week             [   ] No Rehab Therapy Indicated                   [   ]  Speech Therapy   Conditioning/ROM                                    ADL  Bed Mobility                                               Conditioning/ROM  Transfers                                                     Bed Mobility  Sitting /Standing Balance                         Transfers                                        Gait Training                                               Sitting/Standing Balance  Stair Training [   ]Applicable                    Home equipment Eval                                                                        Splinting  [   ] Only      GOALS:   ADL   [ x  ]   Independent                    Transfers  [x   ] Independent                          Ambulation  [ x  ] Independent     [ x   ] With device                            [   ]  CG                                                         [   ]  CG                                                                  [   ] CG                            [    ] Min A                                                   [   ] Min A                                                              [   ] Min  A          DISCHARGE PLAN:   [   ]  Good candidate for Intensive Rehabilitation/Hospital based-[facility]A [facility]                                             Will tolerate 3hrs Intensive Rehab Daily                                       [ x   ]  Return for Short Term Rehab in Skilled Nursing Facility                                       [    ]  Home with Outpatient or  services                                         [    ]  Possible Candidate for Intensive Hospital based Rehab

## 2021-05-08 NOTE — PROGRESS NOTE ADULT - SUBJECTIVE AND OBJECTIVE BOX
VASCULAR SURGERY PROGRESS NOTE     ROSA MARIA CASEY  80y  Male  Hospital day :12d  POD:3  Procedure: Venogram, lower extremity      OVERNIGHT EVENTS:    T(F): 98.6 (05-07-21 @ 18:10), Max: 98.6 (05-07-21 @ 18:10)  HR: 69 (05-08-21 @ 02:00) (58 - 93)  BP: 97/52 (05-08-21 @ 02:00) (82/52 - 134/69)  RR: 23 (05-08-21 @ 02:00) (14 - 35)  SpO2: 94% (05-08-21 @ 02:00) (92% - 98%)    DIET/FLUIDS: regular diet    URINE:   05-06-21 @ 07:01  -  05-07-21 @ 07:00  --------------------------------------------------------  OUT: 1070 mL       GI proph:  pantoprazole  Injectable 40 milliGRAM(s) IV Push every 12 hours    AC/ proph: heparin  Infusion 2000 Unit(s)/Hr IV Continuous <Continuous>    ABx:     PHYSICAL EXAM:  GENERAL: NAD, well-appearing  CHEST/LUNG: Clear to auscultation bilaterally, symmetric chest rise, no labored breathing   HEART: Regular rate and rhythm  ABDOMEN: Soft, Nontender, Nondistended;   EXTREMITIES:  No clubbing, cyanosis, or edema, incisions clean, dry, intact, no active oozing/hematoma, edema, pulses dopplerable      LABS  Labs:  CAPILLARY BLOOD GLUCOSE      POCT Blood Glucose.: 119 mg/dL (07 May 2021 16:12)  POCT Blood Glucose.: 162 mg/dL (07 May 2021 16:07)  POCT Blood Glucose.: 146 mg/dL (07 May 2021 12:33)  POCT Blood Glucose.: 143 mg/dL (07 May 2021 08:59)                          7.5    10.27 )-----------( 130      ( 08 May 2021 01:04 )             23.9       Auto Neutrophil %: 72.2 % (05-08-21 @ 01:04)  Auto Immature Granulocyte %: 5.0 % (05-08-21 @ 01:04)  Auto Neutrophil %: 76.3 % (05-07-21 @ 12:35)  Auto Immature Granulocyte %: 5.1 % (05-07-21 @ 12:35)  Auto Neutrophil %: 93.7 % (05-07-21 @ 05:40)    05-08    140  |  109  |  36<H>  ----------------------------<  104<H>  4.1   |  21  |  1.5      Calcium, Total Serum: 7.9 mg/dL (05-08-21 @ 01:04)      LFTs:         Coags:     13.70  ----< 1.19    ( 08 May 2021 01:04 )     59.0        CARDIAC MARKERS ( 07 May 2021 12:35 )  x     / 0.11 ng/mL / 55 U/L / x     / 7.0 ng/mL  CARDIAC MARKERS ( 07 May 2021 05:40 )  x     / 0.06 ng/mL / 33 U/L / x     / 3.7 ng/mL  CARDIAC MARKERS ( 06 May 2021 11:14 )  x     / 0.04 ng/mL / 30 U/L / x     / 1.6 ng/mL  CARDIAC MARKERS ( 06 May 2021 05:34 )  x     / 0.05 ng/mL / 29 U/L / x     / 1.7 ng/mL      RADIOLOGY & ADDITIONAL TESTS:  < from: Xray Chest 1 View- PORTABLE-Routine (05.07.21 @ 06:34) >  Low lung volumes. Increased left and stable right opacities.    < end of copied text >

## 2021-05-08 NOTE — CONSULT NOTE ADULT - REASON FOR ADMISSION
Paresthesias of bilateral feet

## 2021-05-08 NOTE — PROGRESS NOTE ADULT - ASSESSMENT
81 y/o M s/p b/l venogram and thrombectomies with insertion and removal of TPA catheters. Pt in NAD. Dressings dry, intact without oozing. Pt had episode of melena x1 approx 22:00 5/6. Heparin Gtt held. Pt went to GI for EGD which showed no evidence of bleeding. After 2 units- Pt hgb improved to 8.1.     Plan:   restart heparin gtt, repeat ptt    if hgb drops below 7- transfuse as needed   pain control   vital monitoring   vascular checks   SICU management

## 2021-05-08 NOTE — CHART NOTE - NSCHARTNOTEFT_GEN_A_CORE
SICU DOWNGRADE NOTE    80y Male transferred to floor from SICU    SUBJECTIVE:  -------------------------------------------------------------------------------------------  PMHx/PSHx: PAST MEDICAL & SURGICAL HISTORY:  Hypertension  Hyperlipidemia  Diabetes mellitus, type 2  History of atrial fibrillation  Bladder cancer  secondary to exposure at [employer] 9/11 s/p cystectomy with urostomy  Chronic kidney disease (CKD)  stage 3A, baseline creatinine 1.4  DVT, lower extremity  History of total hip replacement, bilateral  History of total knee replacement, bilateral  History of total cystectomy with resultant urostomy    Allergies: No Known Allergies  -------------------------------------------------------------------------------------------  OBJECTIVE:  -------------------------------------------------------------------------------------------  Vitals:  T(C): 35.6 (05-08-21 @ 08:07), Max: 37 (05-07-21 @ 18:10)  HR: 74 (05-08-21 @ 11:00) (61 - 82)  BP: 122/57 (05-08-21 @ 11:00) (85/49 - 130/68)  RR: 26 (05-08-21 @ 11:00) (17 - 26)  SpO2: 98% (05-08-21 @ 11:00) (93% - 98%)  -------------------------------------------------------------------------------------------  I&O's Summary    07 May 2021 07:01  -  08 May 2021 07:00  --------------------------------------------------------  IN: 510 mL / OUT: 3700 mL / NET: -3190 mL    08 May 2021 07:01  -  08 May 2021 15:02  --------------------------------------------------------  IN: 36 mL / OUT: 1700 mL / NET: -1664 mL  -------------------------------------------------------------------------------------------  Labs:  POCT Blood Glucose.: 118 mg/dL (08 May 2021 11:35)  POCT Blood Glucose.: 113 mg/dL (08 May 2021 05:22)  POCT Blood Glucose.: 119 mg/dL (07 May 2021 16:12)  POCT Blood Glucose.: 162 mg/dL (07 May 2021 16:07)                        7.6    11.16 )-----------( 140      ( 08 May 2021 13:45 )             24.5       Auto Neutrophil %: 72.2 % (05-08-21 @ 01:04)  Auto Immature Granulocyte %: 5.0 % (05-08-21 @ 01:04)    05-08    140  |  109  |  36<H>  ----------------------------<  104<H>  4.1   |  21  |  1.5    Calcium, Total Serum: 7.9 mg/dL (05-08-21 @ 01:04)    Coags:     13.30  ----< 1.16    ( 08 May 2021 13:45 )     66.0      CARDIAC MARKERS ( 08 May 2021 11:24 )  x     / 0.16 ng/mL / x     / x     / x      CARDIAC MARKERS ( 08 May 2021 07:21 )  x     / 0.17 ng/mL / x     / x     / x      CARDIAC MARKERS ( 08 May 2021 01:04 )  x     / 0.15 ng/mL / x     / x     / x      CARDIAC MARKERS ( 07 May 2021 12:35 )  x     / 0.11 ng/mL / 55 U/L / x     / 7.0 ng/mL  CARDIAC MARKERS ( 07 May 2021 05:40 )  x     / 0.06 ng/mL / 33 U/L / x     / 3.7 ng/mL  -------------------------------------------------------------------------------------------  Active Medications:  MEDICATIONS  (STANDING):  atorvastatin 40 milliGRAM(s) Oral at bedtime  chlorhexidine 4% Liquid 1 Application(s) Topical <User Schedule>  furosemide   Injectable 40 milliGRAM(s) IV Push daily  insulin lispro (ADMELOG) corrective regimen sliding scale   SubCutaneous three times a day before meals  metoprolol tartrate 12.5 milliGRAM(s) Oral every 12 hours  midodrine. 10 milliGRAM(s) Oral three times a day  pantoprazole  Injectable 40 milliGRAM(s) IV Push every 12 hours  polyethylene glycol 3350 17 Gram(s) Oral daily  senna 2 Tablet(s) Oral at bedtime    MEDICATIONS  (PRN):  acetaminophen   Tablet .. 650 milliGRAM(s) Oral every 6 hours PRN Temp greater or equal to 38.5C (101.3F), Mild Pain (1 - 3)  melatonin 5 milliGRAM(s) Oral at bedtime PRN Insomnia  traMADol 25 milliGRAM(s) Oral daily PRN Severe Pain (7 - 10)    -------------------------------------------------------------------------------------------  SIGN OUT AT 05-08-21 @ 15:02 GIVEN TO: SICU DOWNGRADE NOTE    80y Male transferred to floor from SICU.     SICU Consultation Note  =====================================================  HPI: 81 y/o male with PMH of HTN, HLD, DM II, COVID-19 PNA (s/p hospitalization 3/12/21-4/20/21 & ICU admission for hypoxia requiring HFNC, course complicated by LORRAINE on CKD, GI bleed requiring blood transfusion, sepsis requiring vasopressors), Afib (previously was not on AC 2/2 GI bleed, since has been cleared by GI via negative EGD, partial C-scope on 4/29 and capsule endoscopy 4/30), CKD stage 3A, bladder cancer (s/p cystectomy with urostomy), right lower extremity DVT (via duplex on 4/5; s/p IVC filter 4/8), diverticulosis (s/p bowel resection and reanastomosis)  BL hip replacement, who presents from [facility] due to bilateral lower-extremity paresthesias and worsening edema. On admission patient was found to have acute bilateral extensive DVTs, complicated by hypotension, LORRAINE on CKD. Patient admitted to medicine, was fluid resuscitated and started on midodrine, LORRAINE and hypotension resolved. IR and vascular team consult for DVT management. Cleared by GI for full AC with IV heparin, s/p negative EGD on 4/9, negative partial c-scope due to large ventral hernia, and video capsule endoscopy 4/30 negative for bleeding +multiple clean based ulcers and duodenitis. Despite full AC, LE pain worsened, patient seen by [facility] surgery with plan for venogram, thrombectomy vs thrombolysis.    Today patient is s/p bilateral LE venogram with thrombectomy and thrombolysis of extensive clot, bilateral TPA catheter placement with Dr Ames. Intra op noted thormbosed IVC filter which remained in place. Patients case was uncomplicated and done under local anesthesia and sedation with versed. No hemodynamic instability. SICU for serial CBCs and close monitoring while on TPA and heparin drips. Will RTOR with vascular team on 5/6.       Surgery Information  OR time: 2hr      EBL:  200cc        IV Fluids:  800cc      UOP:          5/5  -dressings and subhash became saturated overnight, vascular and senior resident bedside -- dressings taken down. Oozing noticed to be coming from tpa cath. Vascular fellow made aware, recommended heparin gtt be d/c'd at that time.   -hemoglobin 9.6 -> 7.8 -> 7.6 ------ nothing to do until follow up AM labs come back  -AM labs  -cardiac enzymes AM labs and 11AM labs  -UOP: 20/hr    5/6  DAY  -1u PRBC (hg 7.2)  -post transfusion Hgb  -OR TPA cath removed, B/L LE venogram   -Hep gtt f/u 23:30 ptt    NIGHT  -melena x1 --> hep gtt d/c'd --> stat CBC hgb 6.2 --> 2U PRBC  -AM CBC  -PTT elevated  -mag repleted  -dilaudid push x1  -protonix 40 q12      5/7  OV  started hep gtt (ok by vasc) at 2000U, ptt in am  trop trending up .15, EKG pending, repeat trop 430am, 11am pending      DAY  -s/w vasc taem approx 1931 regarding Hep restart  heparin on hold, f/u with vascular to restart  GI recall for melana - EGD- neg   repeat hgb 7.8  CE trending up .06>11  lasix 40mg IV x1 dose- 2L output  D/C IVF   PT consult   trend trop 0.06 --> 0.11    5/8  - PTT > 200, heparin gtt stopped  - melena -- stopped heparin gtt  - advanced to regular diet  - 2pm CBC/coags -- HB 7.6, PTT 63  - restarting heparin gtt at 15  - goal PTT low normal, ~60      -------------------------------------------------------------------------------------------  SUBJECTIVE:  -------------------------------------------------------------------------------------------  PMHx/PSHx: PAST MEDICAL & SURGICAL HISTORY:  Hypertension  Hyperlipidemia  Diabetes mellitus, type 2  History of atrial fibrillation  Bladder cancer  secondary to exposure at [employer] 9/11 s/p cystectomy with urostomy  Chronic kidney disease (CKD)  stage 3A, baseline creatinine 1.4  DVT, lower extremity  History of total hip replacement, bilateral  History of total knee replacement, bilateral  History of total cystectomy with resultant urostomy    Allergies: No Known Allergies  -------------------------------------------------------------------------------------------  OBJECTIVE:  -------------------------------------------------------------------------------------------  Vitals:  T(C): 35.6 (05-08-21 @ 08:07), Max: 37 (05-07-21 @ 18:10)  HR: 74 (05-08-21 @ 11:00) (61 - 82)  BP: 122/57 (05-08-21 @ 11:00) (85/49 - 130/68)  RR: 26 (05-08-21 @ 11:00) (17 - 26)  SpO2: 98% (05-08-21 @ 11:00) (93% - 98%)  -------------------------------------------------------------------------------------------  I&O's Summary    07 May 2021 07:01  -  08 May 2021 07:00  --------------------------------------------------------  IN: 510 mL / OUT: 3700 mL / NET: -3190 mL    08 May 2021 07:01  -  08 May 2021 15:02  --------------------------------------------------------  IN: 36 mL / OUT: 1700 mL / NET: -1664 mL  -------------------------------------------------------------------------------------------  Labs:  POCT Blood Glucose.: 118 mg/dL (08 May 2021 11:35)  POCT Blood Glucose.: 113 mg/dL (08 May 2021 05:22)  POCT Blood Glucose.: 119 mg/dL (07 May 2021 16:12)  POCT Blood Glucose.: 162 mg/dL (07 May 2021 16:07)                        7.6    11.16 )-----------( 140      ( 08 May 2021 13:45 )             24.5       Auto Neutrophil %: 72.2 % (05-08-21 @ 01:04)  Auto Immature Granulocyte %: 5.0 % (05-08-21 @ 01:04)    05-08    140  |  109  |  36<H>  ----------------------------<  104<H>  4.1   |  21  |  1.5    Calcium, Total Serum: 7.9 mg/dL (05-08-21 @ 01:04)    Coags:     13.30  ----< 1.16    ( 08 May 2021 13:45 )     66.0      CARDIAC MARKERS ( 08 May 2021 11:24 )  x     / 0.16 ng/mL / x     / x     / x      CARDIAC MARKERS ( 08 May 2021 07:21 )  x     / 0.17 ng/mL / x     / x     / x      CARDIAC MARKERS ( 08 May 2021 01:04 )  x     / 0.15 ng/mL / x     / x     / x      CARDIAC MARKERS ( 07 May 2021 12:35 )  x     / 0.11 ng/mL / 55 U/L / x     / 7.0 ng/mL  CARDIAC MARKERS ( 07 May 2021 05:40 )  x     / 0.06 ng/mL / 33 U/L / x     / 3.7 ng/mL  -------------------------------------------------------------------------------------------  Active Medications:  MEDICATIONS  (STANDING):  atorvastatin 40 milliGRAM(s) Oral at bedtime  chlorhexidine 4% Liquid 1 Application(s) Topical <User Schedule>  furosemide   Injectable 40 milliGRAM(s) IV Push daily  insulin lispro (ADMELOG) corrective regimen sliding scale   SubCutaneous three times a day before meals  metoprolol tartrate 12.5 milliGRAM(s) Oral every 12 hours  midodrine. 10 milliGRAM(s) Oral three times a day  pantoprazole  Injectable 40 milliGRAM(s) IV Push every 12 hours  polyethylene glycol 3350 17 Gram(s) Oral daily  senna 2 Tablet(s) Oral at bedtime    MEDICATIONS  (PRN):  acetaminophen   Tablet .. 650 milliGRAM(s) Oral every 6 hours PRN Temp greater or equal to 38.5C (101.3F), Mild Pain (1 - 3)  melatonin 5 milliGRAM(s) Oral at bedtime PRN Insomnia  traMADol 25 milliGRAM(s) Oral daily PRN Severe Pain (7 - 10)    -------------------------------------------------------------------------------------------            -------------------------------------------------------------------------------------------    SIGN OUT AT 05-08-21 @ 15:02 GIVEN TO: SICU DOWNGRADE NOTE  =====================================================  80y Male transferred to floor from SICU.     HPI: 79 y/o male with PMH of HTN, HLD, DM II, COVID-19 PNA (s/p hospitalization 3/12/21-4/20/21 & ICU admission for hypoxia requiring HFNC, course complicated by LORRAINE on CKD, GI bleed requiring blood transfusion, sepsis requiring vasopressors), Afib (previously was not on AC 2/2 GI bleed, since has been cleared by GI via negative EGD, partial C-scope on 4/29 and capsule endoscopy 4/30), CKD stage 3A, bladder cancer (s/p cystectomy with urostomy), right lower extremity DVT (via duplex on 4/5; s/p IVC filter 4/8), diverticulosis (s/p bowel resection and reanastomosis), and BL hip replacement who presents from [facility] due to bilateral lower-extremity paresthesias and worsening edema. On admission patient was found to have acute bilateral extensive DVTs, complicated by hypotension, LORRAINE on CKD. Patient admitted to medicine, was fluid resuscitated and started on midodrine, LORRAINE and hypotension resolved. IR and vascular team consulted for DVT management. Cleared by GI for full AC with IV heparin s/p negative EGD on 4/9, negative partial c-scope due to large ventral hernia, and video capsule endoscopy 4/30 negative for bleeding +multiple clean based ulcers and duodenitis. Despite full AC, LE pain worsened, patient seen by [facility] surgery with plan for venogram, thrombectomy vs thrombolysis.     On 5/5/21, pt underwent bilateral LE venogram with thrombectomy and thrombolysis of extensive clot, bilateral TPA catheter placement with Dr Ames. Intra op noted thrombosed IVC filter which remained in place. Patients case was uncomplicated and done under local anesthesia and sedation with versed. No hemodynamic instability. Pt returned to OR 5/6 for b/l LE venogram, TPA catheter removal, and angioplasty of L CIV w/ 18 x 40mm balloon. SICU was consulted for serial CBCs and close monitoring while on TPA and heparin drips.     5/5  - dressings and subhash became saturated overnight, dressings taken down  - oozing noticed to be coming from tpa cath, per vascular heparin gtt DC'd   - hemoglobin 9.6 -> 7.8 -> 7.6    5/6  DAY  - Hb 7.2 > +1u pRBC > 7.9  - OR for TPA catheter removal, B/L LE venogram   - heparin gtt resumed post-op    NIGHT  - melena x1 --> hep gtt d/c'd --> stat CBC hgb 6.2 --> 2U PRBC --> 8.1  - PTT>200, stopped  - started on protonix 40 q12    5/7  DAY  - GI recalled for melena; EGD negative for acute bleed  - Hb 7.8  - CE trending up, 0.06 > 0.11  - lasix 40mg IV x1 dose --> 2L output  - IVL    OV  - restarted heparin gtt per primary at 2000U  - troponin trending up: 0.11 > 0.15    5/8  - PTT > 200, heparin gtt stopped  - 2 episodes of melena -- stopped heparin gtt for today  - advanced to regular diet  - 2pm CBC/coags -- HB 7.6, PTT 63    -------------------------------------------------------------------------------------------  SUBJECTIVE:  -------------------------------------------------------------------------------------------  PMHx/PSHx: PAST MEDICAL & SURGICAL HISTORY:  Hypertension  Hyperlipidemia  Diabetes mellitus, type 2  History of atrial fibrillation  Bladder cancer  secondary to exposure at [employer] 9/11 s/p cystectomy with urostomy  Chronic kidney disease (CKD)  stage 3A, baseline creatinine 1.4  DVT, lower extremity  History of total hip replacement, bilateral  History of total knee replacement, bilateral  History of total cystectomy with resultant urostomy    Allergies: No Known Allergies  -------------------------------------------------------------------------------------------  OBJECTIVE:  -------------------------------------------------------------------------------------------  Vitals:  T(C): 35.6 (05-08-21 @ 08:07), Max: 37 (05-07-21 @ 18:10)  HR: 74 (05-08-21 @ 11:00) (61 - 82)  BP: 122/57 (05-08-21 @ 11:00) (85/49 - 130/68)  RR: 26 (05-08-21 @ 11:00) (17 - 26)  SpO2: 98% (05-08-21 @ 11:00) (93% - 98%)  -------------------------------------------------------------------------------------------  I&O's Summary    07 May 2021 07:01  -  08 May 2021 07:00  --------------------------------------------------------  IN: 510 mL / OUT: 3700 mL / NET: -3190 mL    08 May 2021 07:01  -  08 May 2021 15:02  --------------------------------------------------------  IN: 36 mL / OUT: 1700 mL / NET: -1664 mL  -------------------------------------------------------------------------------------------  Labs:  POCT Blood Glucose.: 118 mg/dL (08 May 2021 11:35)  POCT Blood Glucose.: 113 mg/dL (08 May 2021 05:22)  POCT Blood Glucose.: 119 mg/dL (07 May 2021 16:12)  POCT Blood Glucose.: 162 mg/dL (07 May 2021 16:07)                        7.6    11.16 )-----------( 140      ( 08 May 2021 13:45 )             24.5       Auto Neutrophil %: 72.2 % (05-08-21 @ 01:04)  Auto Immature Granulocyte %: 5.0 % (05-08-21 @ 01:04)    05-08    140  |  109  |  36<H>  ----------------------------<  104<H>  4.1   |  21  |  1.5    Calcium, Total Serum: 7.9 mg/dL (05-08-21 @ 01:04)    Coags:     13.30  ----< 1.16    ( 08 May 2021 13:45 )     66.0      CARDIAC MARKERS ( 08 May 2021 11:24 )  x     / 0.16 ng/mL / x     / x     / x      CARDIAC MARKERS ( 08 May 2021 07:21 )  x     / 0.17 ng/mL / x     / x     / x      CARDIAC MARKERS ( 08 May 2021 01:04 )  x     / 0.15 ng/mL / x     / x     / x      CARDIAC MARKERS ( 07 May 2021 12:35 )  x     / 0.11 ng/mL / 55 U/L / x     / 7.0 ng/mL  CARDIAC MARKERS ( 07 May 2021 05:40 )  x     / 0.06 ng/mL / 33 U/L / x     / 3.7 ng/mL  -------------------------------------------------------------------------------------------  Active Medications:  MEDICATIONS  (STANDING):  atorvastatin 40 milliGRAM(s) Oral at bedtime  chlorhexidine 4% Liquid 1 Application(s) Topical <User Schedule>  furosemide   Injectable 40 milliGRAM(s) IV Push daily  insulin lispro (ADMELOG) corrective regimen sliding scale   SubCutaneous three times a day before meals  metoprolol tartrate 12.5 milliGRAM(s) Oral every 12 hours  midodrine. 10 milliGRAM(s) Oral three times a day  pantoprazole  Injectable 40 milliGRAM(s) IV Push every 12 hours  polyethylene glycol 3350 17 Gram(s) Oral daily  senna 2 Tablet(s) Oral at bedtime    MEDICATIONS  (PRN):  acetaminophen   Tablet .. 650 milliGRAM(s) Oral every 6 hours PRN Temp greater or equal to 38.5C (101.3F), Mild Pain (1 - 3)  melatonin 5 milliGRAM(s) Oral at bedtime PRN Insomnia  traMADol 25 milliGRAM(s) Oral daily PRN Severe Pain (7 - 10)    -------------------------------------------------------------------------------------------  PLAN:   Neurologic: A&Ox4   Pain control - prn tylenol, tramadol   Insomnia - prn melatonin     Respiratory: s/p COVID PNA, s/p prolonged hospitalization, never intubated, required HFNC, discharged on RA to SNF  - RA, oxygen PRN  #L pleural effusion post op CXR, breath sounds clear, consider prn IV lasix if needed - started 40mg IV QD 5/7    Cardiovascular: Hx afib on AC, HLD   #hx afib, stable  - hold metoprolol 100mg XL for rate control 2/2 soft BP - on metoprolol tartrate 12.5mg BID, currently in sinus rhythm   - post op   #hx HLD  - c/w atorvastatin 40mg qd  # acute hypotension on admission, likely orthostatic and hypovolemic in nature, resolved  - started on midodrine 10mg q8hr under medical service -- continuing  #elevated trops post op  - 0.03 > 0.05 > 0.06 > 0.11 > 0.15 > 0.17 -- no longer trending, asymptomatic    Gastrointestinal/Nutrition: hx GIB last admission   - CC diet   #Melena (last 5/8 afternoon)  - pantoprazole to 40 q12  - EGD (5/7): negative for acute bleed, ulcer in anterior bulb  GI ppx: PPI  bowel regimen: senna, miralax    Renal/Genitourinary: hx bladder ca s/p urostomy  #LORRAINE on this admission -- improving  - max Cr 3.1, baseline CKD with Cr 1.2-1.4  - Cr 1.2> 1.4 --> 1.6 --> 35/1.8 --> 38/1.6 > 36/1.5  Na 140 // K 4.1 // Phos 4.3 //  Mg 1.8 (repleted)    Hematologic: extensive b/l DVT with thrombosed IVC filter placed 4/9 by IR during prolonged COVID hospitalization   #S/P thrombectomy & thrombolysis via b/l TPA catheter placement and removal on 5/6  - currently holding heparin gtt (5/8) d/t melena  - reevaluate restarting 5/9  - last PTT 66    Infectious Disease: no acute dx  - no el op abx  - afebrile  - WBC 11.7 >10    Lines/Tubes: PIV, LUE midline    Endocrine: hx DM2  - Hg A1c 8.1%  - tight ISS  -------------------------------------------------------------------------------------------  SIGN OUT AT 05-08-21 @ 15:45 GIVEN TO: Dr. Alberts    FOLLOW UP:   - restarting heparin gtt 5/9

## 2021-05-08 NOTE — PROGRESS NOTE ADULT - SUBJECTIVE AND OBJECTIVE BOX
ROSA MARIA CASEY  [id number]  80y Male    Indication for ICU admission: s/p  Admit Date:04-26-21  ICU Date:05-06-21  OR Date:05-06-21    No Known Allergies    PAST MEDICAL & SURGICAL HISTORY:  Hypertension  Hyperlipidemia  Diabetes mellitus, type 2  History of atrial fibrillation  Bladder cancer --> secondary to exposure at [employer] 9/11 s/p cystectomy with urostomy  Chronic kidney disease (CKD) --> stage 3A, baseline creatinine 1.4  DVT, lower extremity  History of total hip replacement, bilateral  History of total knee replacement, bilateral  History of total cystectomy --> with resultant urostomy    Home Medications:  furosemide 20 mg oral tablet: 1 tab(s) orally 2 times a day (26 Apr 2021 12:04)  glimepiride 2 mg oral tablet: 1 tab(s) orally once a day (26 Apr 2021 12:04)  metoprolol succinate 50 mg oral tablet, extended release: 1 tab(s) orally once a day (26 Apr 2021 12:04)  Ozempic (1 mg dose) 2 mg/1.5 mL subcutaneous solution:  (26 Apr 2021 12:04)  rosuvastatin 20 mg oral tablet: 1 tab(s) orally once a day (26 Apr 2021 12:04)    24HRS EVENT:  OV  started hep gtt (ok by vas) at 2000U, ptt in am  trop trending up .15, EKG pending, repeat trop 430am, 11am pending      DAY  -s/w vasc taem approx 1931 regarding Hep restart  heparin on hold, f/u with vascular to restart  GI recall for melana - EGD- neg   repeat hgb 7.8  CE trending up .06>11  lasix 40mg IV x1 dose- 2L output  D/C IVF   PT consult   trend trop 0.06 --> 0.11        DVT PTX: restarted hep gtt @ 2000U  GI PTX:pantoprazole    Tablet 40 milliGRAM(s) Oral before breakfast    ***Tubes/Lines/Drains  ***  Peripheral IV  Urinary Catheter		Indication: Strict I&O    Date Placed:       REVIEW OF SYSTEMS  [X ] A ten-point review of systems was otherwise negative except as noted.  [ ] Due to altered mental status/intubation, subjective information were not able to be obtained from the patient. History was obtained, to the extent possible, from review of the chart and collateral sources of information.     ROSA MARIA CASEY  [id number]  80y Male    Indication for ICU admission: s/p  Admit Date:04-26-21  ICU Date:05-06-21  OR Date:05-06-21    No Known Allergies    PAST MEDICAL & SURGICAL HISTORY:  Hypertension  Hyperlipidemia  Diabetes mellitus, type 2  History of atrial fibrillation  Bladder cancer --> secondary to exposure at [employer] 9/11 s/p cystectomy with urostomy  Chronic kidney disease (CKD) --> stage 3A, baseline creatinine 1.4  DVT, lower extremity  History of total hip replacement, bilateral  History of total knee replacement, bilateral  History of total cystectomy --> with resultant urostomy    Home Medications:  furosemide 20 mg oral tablet: 1 tab(s) orally 2 times a day (26 Apr 2021 12:04)  glimepiride 2 mg oral tablet: 1 tab(s) orally once a day (26 Apr 2021 12:04)  metoprolol succinate 50 mg oral tablet, extended release: 1 tab(s) orally once a day (26 Apr 2021 12:04)  Ozempic (1 mg dose) 2 mg/1.5 mL subcutaneous solution:  (26 Apr 2021 12:04)  rosuvastatin 20 mg oral tablet: 1 tab(s) orally once a day (26 Apr 2021 12:04)    24HRS EVENT:  OV  started hep gtt (ok by vas) at 2000U, ptt in am  trop trending up .15, EKG pending, repeat trop 430am, 11am pending      DAY  -s/w vasc taem approx 1931 regarding Hep restart  heparin on hold, f/u with vascular to restart  GI recall for melana - EGD- neg   repeat hgb 7.8  CE trending up .06>11  lasix 40mg IV x1 dose- 2L output  D/C IVF   PT consult   trend trop 0.06 --> 0.11        DVT PTX: restarted hep gtt @ 2000U  GI PTX:pantoprazole    Tablet 40 milliGRAM(s) Oral before breakfast    ***Tubes/Lines/Drains  ***  Peripheral IV  Urinary Catheter		Indication: Strict I&O    Date Placed:       REVIEW OF SYSTEMS  [X ] A ten-point review of systems was otherwise negative except as noted.  [ ] Due to altered mental status/intubation, subjective information were not able to be obtained from the patient. History was obtained, to the extent possible, from review of the chart and collateral sources of information.    HD: 12d  Daily Height in cm: 182.88 (07 May 2021 17:54)    Daily     Diet, Clear Liquid (05-07-21 @ 19:32)      CURRENT MEDS:  Neurologic Medications  acetaminophen   Tablet .. 650 milliGRAM(s) Oral every 6 hours PRN Temp greater or equal to 38.5C (101.3F), Mild Pain (1 - 3)  melatonin 5 milliGRAM(s) Oral at bedtime PRN Insomnia  traMADol 25 milliGRAM(s) Oral daily PRN Severe Pain (7 - 10)    Respiratory Medications    Cardiovascular Medications  furosemide   Injectable 40 milliGRAM(s) IV Push daily  metoprolol tartrate 12.5 milliGRAM(s) Oral every 12 hours  midodrine. 10 milliGRAM(s) Oral three times a day    Gastrointestinal Medications  pantoprazole  Injectable 40 milliGRAM(s) IV Push every 12 hours  polyethylene glycol 3350 17 Gram(s) Oral daily  senna 2 Tablet(s) Oral at bedtime    Genitourinary Medications    Hematologic/Oncologic Medications  heparin  Infusion 2000 Unit(s)/Hr IV Continuous <Continuous>    Antimicrobial/Immunologic Medications    Endocrine/Metabolic Medications  atorvastatin 40 milliGRAM(s) Oral at bedtime  insulin lispro (ADMELOG) corrective regimen sliding scale   SubCutaneous three times a day before meals    Topical/Other Medications  chlorhexidine 4% Liquid 1 Application(s) Topical <User Schedule>      ICU Vital Signs Last 24 Hrs  T(C): 35.6 (08 May 2021 08:07), Max: 37 (07 May 2021 18:10)  T(F): 96.1 (08 May 2021 08:07), Max: 98.6 (07 May 2021 18:10)  HR: 61 (08 May 2021 06:00) (61 - 93)  BP: 106/55 (08 May 2021 06:00) (82/52 - 130/68)  BP(mean): 74 (08 May 2021 06:00) (63 - 85)  RR: 19 (08 May 2021 06:00) (17 - 25)  SpO2: 94% (08 May 2021 08:47) (92% - 98%)      I&O's Summary    07 May 2021 07:01  -  08 May 2021 07:00  --------------------------------------------------------  IN: 510 mL / OUT: 3700 mL / NET: -3190 mL      I&O's Detail    07 May 2021 07:01  -  08 May 2021 07:00  --------------------------------------------------------  IN:    Heparin: 160 mL    Lactated Ringers: 100 mL    Oral Fluid: 250 mL  Total IN: 510 mL    OUT:    Urostomy (mL): 3700 mL  Total OUT: 3700 mL    Total NET: -3190 mL    PHYSICAL EXAM:    General/Neuro  GCS 15, A&O x 3    Lungs: Normal expansion/effort.     Cardiovascular : S1, S2.  Regular rate and rhythm  Cardiac Rhythm: Normal Sinus Rhythm    GI: Abdomen soft, Non-tender, Non-distended    Extremities: b/l LE wrapped, sensation/motor intact    Derm: Good skin turgor, no skin breakdown      LABS:  POCT Blood Glucose.: 113 mg/dL (08 May 2021 05:22)  POCT Blood Glucose.: 119 mg/dL (07 May 2021 16:12)  POCT Blood Glucose.: 162 mg/dL (07 May 2021 16:07)  POCT Blood Glucose.: 146 mg/dL (07 May 2021 12:33)                        7.5    10.27 )-----------( 130      ( 08 May 2021 01:04 )             23.9       Auto Neutrophil %: 72.2 % (05-08-21 @ 01:04)  Auto Immature Granulocyte %: 5.0 % (05-08-21 @ 01:04)  Auto Neutrophil %: 76.3 % (05-07-21 @ 12:35)  Auto Immature Granulocyte %: 5.1 % (05-07-21 @ 12:35)    05-08    140  |  109  |  36<H>  ----------------------------<  104<H>  4.1   |  21  |  1.5    Calcium, Total Serum: 7.9 mg/dL (05-08-21 @ 01:04)    Coags:     x      ----< x       ( 08 May 2021 04:30 )     >200.0    CARDIAC MARKERS ( 08 May 2021 07:21 )  x     / 0.17 ng/mL / x     / x     / x      CARDIAC MARKERS ( 08 May 2021 01:04 )  x     / 0.15 ng/mL / x     / x     / x      CARDIAC MARKERS ( 07 May 2021 12:35 )  x     / 0.11 ng/mL / 55 U/L / x     / 7.0 ng/mL  CARDIAC MARKERS ( 07 May 2021 05:40 )  x     / 0.06 ng/mL / 33 U/L / x     / 3.7 ng/mL  CARDIAC MARKERS ( 06 May 2021 11:14 )  x     / 0.04 ng/mL / 30 U/L / x     / 1.6 ng/mL

## 2021-05-08 NOTE — CONSULT NOTE ADULT - SUBJECTIVE AND OBJECTIVE BOX
HPI:  Patient is a 79 y/o man with PMH of HTN, HLD, DM II, Afib (not on AC secondary to suspected GI bleed during last hospitalization March-April 2021), CKD stage 3A, bladder cancer (s/p cystectomy with urostomy), right lower extremity DVT (4/5; s/p IVC filter 4/8), diverticulosis (s/p bowel resection and reanastomosis) who presents from [facility] due to bilateral lower-extremity paresthesias for 7 days. Patient previously hospitalized (3/12/21-4/20/21) at [facility] for COVID-19 pneumonia; hospital course complicated by sepsis, presumed GI bleed, and DVT (s/p IVC filter)--patient discharged to [facility] for rehab. Patient states that since his hospitalization he has been immobile and that when attempting to stand he becomes hypotensive and thus he has not moved much. Patient fully functional prior to previous hospitalization. Patient states that a week prior to presentation he felt his feet tingling (R>L). Patient states that this was shortly followed by swelling of his bilateral lower extremities. Patient also endorsing pain in the feet, 8/10 in intensity, stable in intensity with no radiation, alleviating/exacerbating factors. Patient also endorsing paresthesias of bilateral hands during this time. Of note, patient states he was treated for a pneumonia during his stay at [facility]; no records from [facility] in chart and unable to reach anyone after multiple attempts. Patient denying any recent trauma, shortness of breath, chest pain, fevers, chills, abdominal pain, productive cough.    In ED, vitals were: HR: 102, BP: 85/55, T: 98.8- initially placed on 3L NC (unclear why) but has been on room air saturating well since.      (26 Apr 2021 11:38)      PAST MEDICAL & SURGICAL HISTORY:  Hypertension    Hyperlipidemia    Diabetes mellitus, type 2    History of atrial fibrillation    Bladder cancer  secondary to exposure at [employer] 9/11 s/p cystectomy with urostomy    Chronic kidney disease (CKD)  stage 3A, baseline creatinine 1.4    DVT, lower extremity    History of total hip replacement, bilateral    History of total knee replacement, bilateral    History of total cystectomy  with resultant urostomy        Hospital Course:  from [facility].  S/P May 5 venogram with thrombectomy and thrombolysis with Dr. Ames. He has had some melena. He is deconditioned.  TODAY'S SUBJECTIVE & REVIEW OF SYMPTOMS:     Constitutional WNL   Cardio WNL   Resp WNL   GI melena/anemeia  Heme WNL  Endo WNL  Skin WNL  MSK WNL  Neuro WNL  Cognitive WNL  Psych WNL      MEDICATIONS  (STANDING):  atorvastatin 40 milliGRAM(s) Oral at bedtime  chlorhexidine 4% Liquid 1 Application(s) Topical <User Schedule>  furosemide   Injectable 40 milliGRAM(s) IV Push daily  insulin lispro (ADMELOG) corrective regimen sliding scale   SubCutaneous three times a day before meals  metoprolol tartrate 12.5 milliGRAM(s) Oral every 12 hours  midodrine. 10 milliGRAM(s) Oral three times a day  pantoprazole  Injectable 40 milliGRAM(s) IV Push every 12 hours  polyethylene glycol 3350 17 Gram(s) Oral daily  senna 2 Tablet(s) Oral at bedtime    MEDICATIONS  (PRN):  acetaminophen   Tablet .. 650 milliGRAM(s) Oral every 6 hours PRN Temp greater or equal to 38.5C (101.3F), Mild Pain (1 - 3)  melatonin 5 milliGRAM(s) Oral at bedtime PRN Insomnia  traMADol 25 milliGRAM(s) Oral daily PRN Severe Pain (7 - 10)      FAMILY HISTORY:      Allergies    No Known Allergies    Intolerances        SOCIAL HISTORY:    [  ] Etoh  [  ] Smoking  [  ] Substance abuse     Home Environment:  [  ] Home Alone  [  ] Lives with Family  [  ] Home Health Aid    Dwelling:  [  ] Apartment  [  ] Private House  [  ] Adult Home  [x  ] Skilled Nursing Facility      [ x ] Short Term  [  ] Long Term  [  ] Stairs       Elevator [  ]    FUNCTIONAL STATUS PTA: (Check all that apply)  Ambulation: [ x  ]Independent    [  ] Dependent     [  ] Non-Ambulatory   (before COVID hospitalization no ad needed)  Assistive Device: [  ] SA Cane  [  ]  Q Cane  [  ] Walker  [  ]  Wheelchair  ADL : [  ] Independent  [  ]  Dependent       Vital Signs Last 24 Hrs  T(C): 36.3 (08 May 2021 17:49), Max: 36.3 (08 May 2021 15:15)  T(F): 97.3 (08 May 2021 17:49), Max: 97.3 (08 May 2021 15:15)  HR: 79 (08 May 2021 17:49) (61 - 81)  BP: 108/56 (08 May 2021 17:49) (89/56 - 128/55)  BP(mean): 77 (08 May 2021 15:15) (67 - 84)  RR: 16 (08 May 2021 17:49) (16 - 26)  SpO2: 97% (08 May 2021 15:15) (93% - 98%)      PHYSICAL EXAM: Alert & Oriented X3  GENERAL: NAD, well-groomed, well-developed  HEAD:  Atraumatic, Normocephalic  EYES: EOMI, PERRLA, conjunctiva and sclera clear  NECK: Supple, No JVD, Normal thyroid  CHEST/LUNG: Clear bilaterally; No rales, rhonchi, wheezing, or rubs  HEART: Regular rate and rhythm; No murmurs, rubs, or gallops  ABDOMEN: Soft, Nontender, Nondistended; Bowel sounds present  EXTREMITIES:  improving swelling LE's; ace wrapped    NERVOUS SYSTEM:  Cranial Nerves 2-12 intact [  ] Abnormal  [  ]  ROM: WFL all extremities [  ]  Abnormal [  ]  Motor Strength: WFL all extremities  [  ]  Abnormal [ x ]  bilat hip flex 2/5 ankle DF 4/5  Sensation: intact to light touch [  ] Abnormal [  ]  Reflexes: Symmetric [  ]  Abnormal [  ]    FUNCTIONAL STATUS:  Bed Mobility: Independent [  ]  Supervision [  ]  Needs Assistance [  ]  N/A [  ]  Transfers: Independent [  ]  Supervision [  ]  Needs Assistance [  ]  N/A [  ]   Ambulation: Independent [  ]  Supervision [  ]  Needs Assistance [  ]  N/A [  ]  ADL: Independent [  ] Requires Assistance [  ] N/A [  ]      LABS:                        7.6    11.16 )-----------( 140      ( 08 May 2021 13:45 )             24.5     05-08    140  |  109  |  36<H>  ----------------------------<  104<H>  4.1   |  21  |  1.5    Ca    7.9<L>      08 May 2021 01:04  Phos  4.3     05-08  Mg     1.8     05-08      PT/INR - ( 08 May 2021 13:45 )   PT: 13.30 sec;   INR: 1.16 ratio         PTT - ( 08 May 2021 13:45 )  PTT:66.0 sec      RADIOLOGY & ADDITIONAL STUDIES:    Assesment:

## 2021-05-08 NOTE — CONSULT NOTE ADULT - CONSULT REQUESTED DATE/TIME
27-Apr-2021 15:51
28-Apr-2021
04-May-2021 10:12
08-May-2021 19:01
05-May-2021 14:34
26-Apr-2021 17:13

## 2021-05-09 LAB
ANION GAP SERPL CALC-SCNC: 10 MMOL/L — SIGNIFICANT CHANGE UP (ref 7–14)
ANION GAP SERPL CALC-SCNC: 12 MMOL/L — SIGNIFICANT CHANGE UP (ref 7–14)
APTT BLD: 28.6 SEC — SIGNIFICANT CHANGE UP (ref 27–39.2)
BASOPHILS # BLD AUTO: 0.08 K/UL — SIGNIFICANT CHANGE UP (ref 0–0.2)
BASOPHILS # BLD AUTO: 0.08 K/UL — SIGNIFICANT CHANGE UP (ref 0–0.2)
BASOPHILS NFR BLD AUTO: 0.9 % — SIGNIFICANT CHANGE UP (ref 0–1)
BASOPHILS NFR BLD AUTO: 0.9 % — SIGNIFICANT CHANGE UP (ref 0–1)
BUN SERPL-MCNC: 26 MG/DL — HIGH (ref 10–20)
BUN SERPL-MCNC: 31 MG/DL — HIGH (ref 10–20)
CALCIUM SERPL-MCNC: 7.9 MG/DL — LOW (ref 8.5–10.1)
CALCIUM SERPL-MCNC: 8.1 MG/DL — LOW (ref 8.5–10.1)
CHLORIDE SERPL-SCNC: 107 MMOL/L — SIGNIFICANT CHANGE UP (ref 98–110)
CHLORIDE SERPL-SCNC: 108 MMOL/L — SIGNIFICANT CHANGE UP (ref 98–110)
CO2 SERPL-SCNC: 20 MMOL/L — SIGNIFICANT CHANGE UP (ref 17–32)
CO2 SERPL-SCNC: 22 MMOL/L — SIGNIFICANT CHANGE UP (ref 17–32)
CREAT SERPL-MCNC: 1.2 MG/DL — SIGNIFICANT CHANGE UP (ref 0.7–1.5)
CREAT SERPL-MCNC: 1.2 MG/DL — SIGNIFICANT CHANGE UP (ref 0.7–1.5)
EOSINOPHIL # BLD AUTO: 0.27 K/UL — SIGNIFICANT CHANGE UP (ref 0–0.7)
EOSINOPHIL # BLD AUTO: 0.28 K/UL — SIGNIFICANT CHANGE UP (ref 0–0.7)
EOSINOPHIL NFR BLD AUTO: 2.9 % — SIGNIFICANT CHANGE UP (ref 0–8)
EOSINOPHIL NFR BLD AUTO: 3.3 % — SIGNIFICANT CHANGE UP (ref 0–8)
GLUCOSE BLDC GLUCOMTR-MCNC: 104 MG/DL — HIGH (ref 70–99)
GLUCOSE BLDC GLUCOMTR-MCNC: 127 MG/DL — HIGH (ref 70–99)
GLUCOSE BLDC GLUCOMTR-MCNC: 130 MG/DL — HIGH (ref 70–99)
GLUCOSE BLDC GLUCOMTR-MCNC: 141 MG/DL — HIGH (ref 70–99)
GLUCOSE SERPL-MCNC: 124 MG/DL — HIGH (ref 70–99)
GLUCOSE SERPL-MCNC: 87 MG/DL — SIGNIFICANT CHANGE UP (ref 70–99)
HCT VFR BLD CALC: 22.8 % — LOW (ref 42–52)
HCT VFR BLD CALC: 24.9 % — LOW (ref 42–52)
HCT VFR BLD CALC: 26.3 % — LOW (ref 42–52)
HGB BLD-MCNC: 7.1 G/DL — LOW (ref 14–18)
HGB BLD-MCNC: 7.9 G/DL — LOW (ref 14–18)
HGB BLD-MCNC: 8 G/DL — LOW (ref 14–18)
IMM GRANULOCYTES NFR BLD AUTO: 1.5 % — HIGH (ref 0.1–0.3)
IMM GRANULOCYTES NFR BLD AUTO: 3.7 % — HIGH (ref 0.1–0.3)
INR BLD: 1.15 RATIO — SIGNIFICANT CHANGE UP (ref 0.65–1.3)
LYMPHOCYTES # BLD AUTO: 0.79 K/UL — LOW (ref 1.2–3.4)
LYMPHOCYTES # BLD AUTO: 0.83 K/UL — LOW (ref 1.2–3.4)
LYMPHOCYTES # BLD AUTO: 9 % — LOW (ref 20.5–51.1)
LYMPHOCYTES # BLD AUTO: 9.3 % — LOW (ref 20.5–51.1)
MAGNESIUM SERPL-MCNC: 1.6 MG/DL — LOW (ref 1.8–2.4)
MAGNESIUM SERPL-MCNC: 1.7 MG/DL — LOW (ref 1.8–2.4)
MCHC RBC-ENTMCNC: 27.6 PG — SIGNIFICANT CHANGE UP (ref 27–31)
MCHC RBC-ENTMCNC: 28 PG — SIGNIFICANT CHANGE UP (ref 27–31)
MCHC RBC-ENTMCNC: 28.5 PG — SIGNIFICANT CHANGE UP (ref 27–31)
MCHC RBC-ENTMCNC: 30.4 G/DL — LOW (ref 32–37)
MCHC RBC-ENTMCNC: 31.1 G/DL — LOW (ref 32–37)
MCHC RBC-ENTMCNC: 31.7 G/DL — LOW (ref 32–37)
MCV RBC AUTO: 89.8 FL — SIGNIFICANT CHANGE UP (ref 80–94)
MCV RBC AUTO: 89.9 FL — SIGNIFICANT CHANGE UP (ref 80–94)
MCV RBC AUTO: 90.7 FL — SIGNIFICANT CHANGE UP (ref 80–94)
MONOCYTES # BLD AUTO: 0.72 K/UL — HIGH (ref 0.1–0.6)
MONOCYTES # BLD AUTO: 0.79 K/UL — HIGH (ref 0.1–0.6)
MONOCYTES NFR BLD AUTO: 8.5 % — SIGNIFICANT CHANGE UP (ref 1.7–9.3)
MONOCYTES NFR BLD AUTO: 8.6 % — SIGNIFICANT CHANGE UP (ref 1.7–9.3)
NEUTROPHILS # BLD AUTO: 6.28 K/UL — SIGNIFICANT CHANGE UP (ref 1.4–6.5)
NEUTROPHILS # BLD AUTO: 7.08 K/UL — HIGH (ref 1.4–6.5)
NEUTROPHILS NFR BLD AUTO: 74.3 % — SIGNIFICANT CHANGE UP (ref 42.2–75.2)
NEUTROPHILS NFR BLD AUTO: 77.1 % — HIGH (ref 42.2–75.2)
NRBC # BLD: 0 /100 WBCS — SIGNIFICANT CHANGE UP (ref 0–0)
PHOSPHATE SERPL-MCNC: 5 MG/DL — HIGH (ref 2.1–4.9)
PHOSPHATE SERPL-MCNC: 5.2 MG/DL — HIGH (ref 2.1–4.9)
PLATELET # BLD AUTO: 141 K/UL — SIGNIFICANT CHANGE UP (ref 130–400)
PLATELET # BLD AUTO: 149 K/UL — SIGNIFICANT CHANGE UP (ref 130–400)
PLATELET # BLD AUTO: 150 K/UL — SIGNIFICANT CHANGE UP (ref 130–400)
POTASSIUM SERPL-MCNC: 3.9 MMOL/L — SIGNIFICANT CHANGE UP (ref 3.5–5)
POTASSIUM SERPL-MCNC: 4.2 MMOL/L — SIGNIFICANT CHANGE UP (ref 3.5–5)
POTASSIUM SERPL-SCNC: 3.9 MMOL/L — SIGNIFICANT CHANGE UP (ref 3.5–5)
POTASSIUM SERPL-SCNC: 4.2 MMOL/L — SIGNIFICANT CHANGE UP (ref 3.5–5)
PROTHROM AB SERPL-ACNC: 13.2 SEC — HIGH (ref 9.95–12.87)
RBC # BLD: 2.54 M/UL — LOW (ref 4.7–6.1)
RBC # BLD: 2.77 M/UL — LOW (ref 4.7–6.1)
RBC # BLD: 2.9 M/UL — LOW (ref 4.7–6.1)
RBC # FLD: 18.5 % — HIGH (ref 11.5–14.5)
RBC # FLD: 18.9 % — HIGH (ref 11.5–14.5)
RBC # FLD: 19.1 % — HIGH (ref 11.5–14.5)
SODIUM SERPL-SCNC: 138 MMOL/L — SIGNIFICANT CHANGE UP (ref 135–146)
SODIUM SERPL-SCNC: 141 MMOL/L — SIGNIFICANT CHANGE UP (ref 135–146)
WBC # BLD: 8.08 K/UL — SIGNIFICANT CHANGE UP (ref 4.8–10.8)
WBC # BLD: 8.46 K/UL — SIGNIFICANT CHANGE UP (ref 4.8–10.8)
WBC # BLD: 9.19 K/UL — SIGNIFICANT CHANGE UP (ref 4.8–10.8)
WBC # FLD AUTO: 8.08 K/UL — SIGNIFICANT CHANGE UP (ref 4.8–10.8)
WBC # FLD AUTO: 8.46 K/UL — SIGNIFICANT CHANGE UP (ref 4.8–10.8)
WBC # FLD AUTO: 9.19 K/UL — SIGNIFICANT CHANGE UP (ref 4.8–10.8)

## 2021-05-09 PROCEDURE — 71045 X-RAY EXAM CHEST 1 VIEW: CPT | Mod: 26

## 2021-05-09 RX ORDER — HYDROMORPHONE HYDROCHLORIDE 2 MG/ML
0.5 INJECTION INTRAMUSCULAR; INTRAVENOUS; SUBCUTANEOUS ONCE
Refills: 0 | Status: DISCONTINUED | OUTPATIENT
Start: 2021-05-09 | End: 2021-05-09

## 2021-05-09 RX ORDER — APIXABAN 2.5 MG/1
5 TABLET, FILM COATED ORAL EVERY 12 HOURS
Refills: 0 | Status: DISCONTINUED | OUTPATIENT
Start: 2021-05-09 | End: 2021-05-12

## 2021-05-09 RX ORDER — MAGNESIUM SULFATE 500 MG/ML
2 VIAL (ML) INJECTION ONCE
Refills: 0 | Status: COMPLETED | OUTPATIENT
Start: 2021-05-09 | End: 2021-05-09

## 2021-05-09 RX ORDER — HYDROMORPHONE HYDROCHLORIDE 2 MG/ML
0.5 INJECTION INTRAMUSCULAR; INTRAVENOUS; SUBCUTANEOUS ONCE
Refills: 0 | Status: COMPLETED | OUTPATIENT
Start: 2021-05-09 | End: 2021-05-09

## 2021-05-09 RX ADMIN — HYDROMORPHONE HYDROCHLORIDE 0.5 MILLIGRAM(S): 2 INJECTION INTRAMUSCULAR; INTRAVENOUS; SUBCUTANEOUS at 23:23

## 2021-05-09 RX ADMIN — Medication 40 MILLIGRAM(S): at 05:42

## 2021-05-09 RX ADMIN — ATORVASTATIN CALCIUM 40 MILLIGRAM(S): 80 TABLET, FILM COATED ORAL at 22:02

## 2021-05-09 RX ADMIN — MIDODRINE HYDROCHLORIDE 10 MILLIGRAM(S): 2.5 TABLET ORAL at 05:42

## 2021-05-09 RX ADMIN — CHLORHEXIDINE GLUCONATE 1 APPLICATION(S): 213 SOLUTION TOPICAL at 05:44

## 2021-05-09 RX ADMIN — PANTOPRAZOLE SODIUM 40 MILLIGRAM(S): 20 TABLET, DELAYED RELEASE ORAL at 17:30

## 2021-05-09 RX ADMIN — MIDODRINE HYDROCHLORIDE 10 MILLIGRAM(S): 2.5 TABLET ORAL at 17:30

## 2021-05-09 RX ADMIN — PANTOPRAZOLE SODIUM 40 MILLIGRAM(S): 20 TABLET, DELAYED RELEASE ORAL at 05:43

## 2021-05-09 RX ADMIN — APIXABAN 5 MILLIGRAM(S): 2.5 TABLET, FILM COATED ORAL at 17:30

## 2021-05-09 RX ADMIN — Medication 25 GRAM(S): at 05:43

## 2021-05-09 RX ADMIN — Medication 12.5 MILLIGRAM(S): at 05:42

## 2021-05-09 RX ADMIN — MIDODRINE HYDROCHLORIDE 10 MILLIGRAM(S): 2.5 TABLET ORAL at 12:07

## 2021-05-09 RX ADMIN — SENNA PLUS 2 TABLET(S): 8.6 TABLET ORAL at 22:02

## 2021-05-09 NOTE — PROGRESS NOTE ADULT - SUBJECTIVE AND OBJECTIVE BOX
GENERAL SURGERY PROGRESS NOTE     ROSA MARIA CASEY  [address] [place] [address] day :13d  POD:  Procedure: Venogram, lower extremity    Surgical Attending: Cale Ames  Overnight events: No acute events overnight. Patient found in NAD. downgraded. off hep ggt for now given melena.    T(F): 97.1 (05-09-21 @ 00:47), Max: 97.6 (05-08-21 @ 22:55)  HR: 79 (05-09-21 @ 00:47) (61 - 79)  BP: 98/51 (05-09-21 @ 00:47) (89/56 - 136/58)  ABP: --  ABP(mean): --  RR: 20 (05-09-21 @ 00:47) (16 - 26)  SpO2: 96% (05-08-21 @ 22:55) (94% - 98%)      05-07-21 @ 07:01  -  05-08-21 @ 07:00  --------------------------------------------------------  IN:    Heparin: 160 mL    Lactated Ringers: 100 mL    Oral Fluid: 250 mL  Total IN: 510 mL    OUT:    Urostomy (mL): 3700 mL  Total OUT: 3700 mL    Total NET: -3190 mL    05-08-21 @ 07:01  -  05-09-21 @ 02:31  --------------------------------------------------------  IN:    Heparin: 36 mL    Oral Fluid: 800 mL  Total IN: 836 mL    OUT:    Heparin: 0 mL    Urostomy (mL): 3075 mL  Total OUT: 3075 mL    Total NET: -2239 mL    URINE:   05-07-21 @ 07:01  -  05-08-21 @ 07:00  --------------------------------------------------------  OUT: 3700 mL     GI proph:  pantoprazole  Injectable 40 milliGRAM(s) IV Push every 12 hours    AC/ proph:   ABx:     GEN: NAD,   HEENT: NC/AT  CHEST: Symmetric chest wall expansion. Regular heart rate  ABD: S/D, non-tender,  EXT: BLLE swelling decreasing    LABS  Labs:  CAPILLARY BLOOD GLUCOSE    POCT Blood Glucose.: 104 mg/dL (08 May 2021 21:28)  POCT Blood Glucose.: 111 mg/dL (08 May 2021 17:59)  POCT Blood Glucose.: 118 mg/dL (08 May 2021 11:35)  POCT Blood Glucose.: 113 mg/dL (08 May 2021 05:22)                          7.6    11.16 )-----------( 140      ( 08 May 2021 13:45 )             24.5         05-08    140  |  109  |  36<H>  ----------------------------<  104<H>  4.1   |  21  |  1.5    Coags:     13.30  ----< 1.16    ( 08 May 2021 13:45 )     66.0        CARDIAC MARKERS ( 08 May 2021 11:24 )  x     / 0.16 ng/mL / x     / x     / x      CARDIAC MARKERS ( 08 May 2021 07:21 )  x     / 0.17 ng/mL / x     / x     / x      CARDIAC MARKERS ( 08 May 2021 01:04 )  x     / 0.15 ng/mL / x     / x     / x      CARDIAC MARKERS ( 07 May 2021 12:35 )  x     / 0.11 ng/mL / 55 U/L / x     / 7.0 ng/mL  CARDIAC MARKERS ( 07 May 2021 05:40 )  x     / 0.06 ng/mL / 33 U/L / x     / 3.7 ng/mL    RADIOLOGY & ADDITIONAL TESTS:

## 2021-05-09 NOTE — PROGRESS NOTE ADULT - ASSESSMENT
Assessment: 81 y/o/M s/p venogram b/l. hep ggt help 2/2 melena. hgb now 7.1 from 7.6    Plan:  trend hgb  vitals  labs  PT  Rehab- STR

## 2021-05-10 LAB
GLUCOSE BLDC GLUCOMTR-MCNC: 113 MG/DL — HIGH (ref 70–99)
GLUCOSE BLDC GLUCOMTR-MCNC: 143 MG/DL — HIGH (ref 70–99)
GLUCOSE BLDC GLUCOMTR-MCNC: 151 MG/DL — HIGH (ref 70–99)
GLUCOSE BLDC GLUCOMTR-MCNC: 157 MG/DL — HIGH (ref 70–99)
GLUCOSE BLDC GLUCOMTR-MCNC: 157 MG/DL — HIGH (ref 70–99)

## 2021-05-10 RX ADMIN — Medication 12.5 MILLIGRAM(S): at 17:13

## 2021-05-10 RX ADMIN — Medication 2: at 17:14

## 2021-05-10 RX ADMIN — PANTOPRAZOLE SODIUM 40 MILLIGRAM(S): 20 TABLET, DELAYED RELEASE ORAL at 06:03

## 2021-05-10 RX ADMIN — Medication 40 MILLIGRAM(S): at 06:03

## 2021-05-10 RX ADMIN — CHLORHEXIDINE GLUCONATE 1 APPLICATION(S): 213 SOLUTION TOPICAL at 06:03

## 2021-05-10 RX ADMIN — ATORVASTATIN CALCIUM 40 MILLIGRAM(S): 80 TABLET, FILM COATED ORAL at 23:09

## 2021-05-10 RX ADMIN — Medication 12.5 MILLIGRAM(S): at 06:03

## 2021-05-10 RX ADMIN — MIDODRINE HYDROCHLORIDE 10 MILLIGRAM(S): 2.5 TABLET ORAL at 06:03

## 2021-05-10 RX ADMIN — MIDODRINE HYDROCHLORIDE 10 MILLIGRAM(S): 2.5 TABLET ORAL at 11:47

## 2021-05-10 RX ADMIN — Medication 2: at 11:47

## 2021-05-10 RX ADMIN — TRAMADOL HYDROCHLORIDE 25 MILLIGRAM(S): 50 TABLET ORAL at 23:09

## 2021-05-10 RX ADMIN — Medication 5 MILLIGRAM(S): at 23:08

## 2021-05-10 RX ADMIN — APIXABAN 5 MILLIGRAM(S): 2.5 TABLET, FILM COATED ORAL at 17:13

## 2021-05-10 RX ADMIN — SENNA PLUS 2 TABLET(S): 8.6 TABLET ORAL at 23:09

## 2021-05-10 RX ADMIN — MIDODRINE HYDROCHLORIDE 10 MILLIGRAM(S): 2.5 TABLET ORAL at 17:13

## 2021-05-10 RX ADMIN — APIXABAN 5 MILLIGRAM(S): 2.5 TABLET, FILM COATED ORAL at 06:03

## 2021-05-10 RX ADMIN — PANTOPRAZOLE SODIUM 40 MILLIGRAM(S): 20 TABLET, DELAYED RELEASE ORAL at 17:13

## 2021-05-10 NOTE — PROVIDER CONTACT NOTE (OTHER) - SITUATION
Pt had bloody BM
PT most recent aptt resulted- 60.2
Pt /59 HR 79. Due for 50 mg XL metoprolol and 10 mg midodrine
Pt aptt resulted from 0515. aptt 51.7
Pt had black tarry BM
Pt one time lasix order noted, time not specified. When should lasix be given?
Results for aptt 67.2
pt /54. Additionally, pt requesting to speak to MD
pt aptt 86.7

## 2021-05-10 NOTE — PROGRESS NOTE ADULT - ASSESSMENT
Assessment:  Patient is a 79 y/o/M s/p venogram b/l. hep ggt help 2/2 melena. hgb now 7.1 from 7.6    Plan:  -trend hgb  -vitals  -labs  -PT  -Rehab- STR  -Continue Pain Medications  - DVT and GI Prophylaxis  - Follow PT/Physiatry  - Contact social work/case management for necessary patient needs.           Date/Time: 05-10-21 @ 08:06

## 2021-05-10 NOTE — PROVIDER CONTACT NOTE (OTHER) - REASON
Black tarry BMs
Pt aptt results
Pt /59 HR 79
Pt Lasix order
Pt aptt resulted
pt /54
pt aptt resulted from 22:35
pt aptt results
Hypotension and bloody BM

## 2021-05-10 NOTE — PROGRESS NOTE ADULT - SUBJECTIVE AND OBJECTIVE BOX
Progress Note: General Surgery  Patient: ROSA MARIA CASEY , 80y (1940)Male   MRN: [id number]  Location: [address]  Visit: 04-26-21 Inpatient  Date: 05-10-21 @ 08:06    Admit Diagnosis/Chief Complaint: DVT, lower extremity        Procedure/Diagnosis: DVT, lower extremity     S/P Venogram, lower extremity         Events/ 24h: No acute events overnight. Pain controlled.    Vitals: T(F): 97.8 (05-10-21 @ 04:48), Max: 98.4 (05-09-21 @ 17:00)  HR: 84 (05-10-21 @ 04:48)  BP: 119/65 (05-10-21 @ 04:48) (92/46 - 119/65)  RR: 16 (05-10-21 @ 04:48)  SpO2: --    In:   05-09-21 @ 07:01  -  05-10-21 @ 07:00  --------------------------------------------------------  IN: 660 mL      Out:   05-09-21 @ 07:01  -  05-10-21 @ 07:00  --------------------------------------------------------  OUT:    Indwelling Catheter - Urethral (mL): 3150 mL    Urostomy (mL): 400 mL  Total OUT: 3550 mL        Net:   05-09-21 @ 07:01  -  05-10-21 @ 07:00  --------------------------------------------------------  NET: -2890 mL        Diet: Diet, Consistent Carbohydrate Renal/No Snacks (05-08-21 @ 15:43)    IV Fluids:       GEN: NAD,   HEENT: NC/AT  CHEST: Symmetric chest wall expansion. Regular heart rate  ABD: S/D, non-tender,  EXT: BLLE swelling decreasing        Medications: [Standing]  apixaban 5 milliGRAM(s) Oral every 12 hours  atorvastatin 40 milliGRAM(s) Oral at bedtime  chlorhexidine 4% Liquid 1 Application(s) Topical <User Schedule>  furosemide   Injectable 40 milliGRAM(s) IV Push daily  insulin lispro (ADMELOG) corrective regimen sliding scale   SubCutaneous three times a day before meals  metoprolol tartrate 12.5 milliGRAM(s) Oral every 12 hours  midodrine. 10 milliGRAM(s) Oral three times a day  pantoprazole  Injectable 40 milliGRAM(s) IV Push every 12 hours  polyethylene glycol 3350 17 Gram(s) Oral daily  senna 2 Tablet(s) Oral at bedtime    DVT Prophylaxis:   GI Prophylaxis: pantoprazole  Injectable 40 milliGRAM(s) IV Push every 12 hours    Antibiotics:   Anticoagulation: apixaban 5 milliGRAM(s) Oral every 12 hours    Medications:[PRN]  acetaminophen   Tablet .. 650 milliGRAM(s) Oral every 6 hours PRN  melatonin 5 milliGRAM(s) Oral at bedtime PRN  traMADol 25 milliGRAM(s) Oral daily PRN      Labs:                        7.9    9.19  )-----------( 150      ( 09 May 2021 18:37 )             24.9     05-09    141  |  107  |  26<H>  ----------------------------<  124<H>  3.9   |  22  |  1.2    Ca    7.9<L>      09 May 2021 18:37  Phos  5.2     05-09  Mg     1.6     05-09        PT/INR - ( 09 May 2021 00:53 )   PT: 13.20 sec;   INR: 1.15 ratio         PTT - ( 09 May 2021 00:53 )  PTT:28.6 sec    CARDIAC MARKERS ( 08 May 2021 11:24 )  x     / 0.16 ng/mL / x     / x     / x            Urine/Micro:        Imaging:   ***

## 2021-05-10 NOTE — PROVIDER CONTACT NOTE (OTHER) - ACTION/TREATMENT ORDERED:
No further intervention
As per MD Lancaster, assess BP, give Lasix now is Systolic BP above 105.
As per MD Lancaster, hold Lasix for now. Md States he will come to unit to speak to patient
As per MD Lancaster, result was called in by lab. As per MD Lancaster, no adjustment to be made to heparin
As per MD Lancaster; he will place adjustment order for heparin
as per MD Lancaster, give both medications as ordered.
as per MD Zhou, no adjustments to heparin needed
as per MD no changes to be made to heparin.
No intervention

## 2021-05-10 NOTE — PROVIDER CONTACT NOTE (OTHER) - NAME OF MD/NP/PA/DO NOTIFIED:
MD Lancaster
MD Lancaster [phone]
Md Lancaster
MD Lancaster
KOSTA Ga [phone]
MD Knight

## 2021-05-10 NOTE — PROVIDER CONTACT NOTE (OTHER) - DATE AND TIME:
02-May-2021 20:35
02-May-2021 20:54
03-May-2021 00:33
03-May-2021 01:45
03-May-2021 05:41
04-May-2021 03:22
04-May-2021 06:57
09-May-2021 13:08
10-May-2021 12:45

## 2021-05-11 ENCOUNTER — TRANSCRIPTION ENCOUNTER (OUTPATIENT)
Age: 81
End: 2021-05-11

## 2021-05-11 DIAGNOSIS — Z87.19 PERSONAL HISTORY OF OTHER DISEASES OF THE DIGESTIVE SYSTEM: ICD-10-CM

## 2021-05-11 LAB
ANION GAP SERPL CALC-SCNC: 12 MMOL/L — SIGNIFICANT CHANGE UP (ref 7–14)
BLD GP AB SCN SERPL QL: SIGNIFICANT CHANGE UP
BUN SERPL-MCNC: 17 MG/DL — SIGNIFICANT CHANGE UP (ref 10–20)
CALCIUM SERPL-MCNC: 8.2 MG/DL — LOW (ref 8.5–10.1)
CHLORIDE SERPL-SCNC: 105 MMOL/L — SIGNIFICANT CHANGE UP (ref 98–110)
CO2 SERPL-SCNC: 25 MMOL/L — SIGNIFICANT CHANGE UP (ref 17–32)
CREAT SERPL-MCNC: 1.1 MG/DL — SIGNIFICANT CHANGE UP (ref 0.7–1.5)
GLUCOSE BLDC GLUCOMTR-MCNC: 112 MG/DL — HIGH (ref 70–99)
GLUCOSE BLDC GLUCOMTR-MCNC: 114 MG/DL — HIGH (ref 70–99)
GLUCOSE BLDC GLUCOMTR-MCNC: 127 MG/DL — HIGH (ref 70–99)
GLUCOSE BLDC GLUCOMTR-MCNC: 132 MG/DL — HIGH (ref 70–99)
GLUCOSE BLDC GLUCOMTR-MCNC: 99 MG/DL — SIGNIFICANT CHANGE UP (ref 70–99)
GLUCOSE SERPL-MCNC: 91 MG/DL — SIGNIFICANT CHANGE UP (ref 70–99)
HCT VFR BLD CALC: 25.8 % — LOW (ref 42–52)
HGB BLD-MCNC: 8.1 G/DL — LOW (ref 14–18)
MAGNESIUM SERPL-MCNC: 1.4 MG/DL — LOW (ref 1.8–2.4)
MCHC RBC-ENTMCNC: 28.7 PG — SIGNIFICANT CHANGE UP (ref 27–31)
MCHC RBC-ENTMCNC: 31.4 G/DL — LOW (ref 32–37)
MCV RBC AUTO: 91.5 FL — SIGNIFICANT CHANGE UP (ref 80–94)
NRBC # BLD: 0 /100 WBCS — SIGNIFICANT CHANGE UP (ref 0–0)
PHOSPHATE SERPL-MCNC: 4.3 MG/DL — SIGNIFICANT CHANGE UP (ref 2.1–4.9)
PLATELET # BLD AUTO: 205 K/UL — SIGNIFICANT CHANGE UP (ref 130–400)
POTASSIUM SERPL-MCNC: 4.1 MMOL/L — SIGNIFICANT CHANGE UP (ref 3.5–5)
POTASSIUM SERPL-SCNC: 4.1 MMOL/L — SIGNIFICANT CHANGE UP (ref 3.5–5)
RBC # BLD: 2.82 M/UL — LOW (ref 4.7–6.1)
RBC # FLD: 18.9 % — HIGH (ref 11.5–14.5)
SARS-COV-2 RNA SPEC QL NAA+PROBE: SIGNIFICANT CHANGE UP
SODIUM SERPL-SCNC: 142 MMOL/L — SIGNIFICANT CHANGE UP (ref 135–146)
WBC # BLD: 7.08 K/UL — SIGNIFICANT CHANGE UP (ref 4.8–10.8)
WBC # FLD AUTO: 7.08 K/UL — SIGNIFICANT CHANGE UP (ref 4.8–10.8)

## 2021-05-11 RX ORDER — LANOLIN ALCOHOL/MO/W.PET/CERES
1 CREAM (GRAM) TOPICAL
Qty: 0 | Refills: 0 | DISCHARGE
Start: 2021-05-11

## 2021-05-11 RX ORDER — ACETAMINOPHEN 500 MG
2 TABLET ORAL
Qty: 0 | Refills: 0 | DISCHARGE
Start: 2021-05-11

## 2021-05-11 RX ORDER — MIDODRINE HYDROCHLORIDE 2.5 MG/1
1 TABLET ORAL
Qty: 0 | Refills: 0 | DISCHARGE
Start: 2021-05-11

## 2021-05-11 RX ORDER — APIXABAN 2.5 MG/1
1 TABLET, FILM COATED ORAL
Qty: 0 | Refills: 0 | DISCHARGE
Start: 2021-05-11

## 2021-05-11 RX ORDER — TRAMADOL HYDROCHLORIDE 50 MG/1
0.5 TABLET ORAL
Qty: 0 | Refills: 0 | DISCHARGE
Start: 2021-05-11

## 2021-05-11 RX ORDER — SENNA PLUS 8.6 MG/1
2 TABLET ORAL
Qty: 0 | Refills: 0 | DISCHARGE
Start: 2021-05-11

## 2021-05-11 RX ORDER — PANTOPRAZOLE SODIUM 20 MG/1
40 TABLET, DELAYED RELEASE ORAL
Qty: 0 | Refills: 0 | DISCHARGE
Start: 2021-05-11

## 2021-05-11 RX ADMIN — MIDODRINE HYDROCHLORIDE 10 MILLIGRAM(S): 2.5 TABLET ORAL at 11:45

## 2021-05-11 RX ADMIN — MIDODRINE HYDROCHLORIDE 10 MILLIGRAM(S): 2.5 TABLET ORAL at 06:50

## 2021-05-11 RX ADMIN — PANTOPRAZOLE SODIUM 40 MILLIGRAM(S): 20 TABLET, DELAYED RELEASE ORAL at 08:46

## 2021-05-11 RX ADMIN — Medication 12.5 MILLIGRAM(S): at 06:50

## 2021-05-11 RX ADMIN — APIXABAN 5 MILLIGRAM(S): 2.5 TABLET, FILM COATED ORAL at 17:02

## 2021-05-11 RX ADMIN — PANTOPRAZOLE SODIUM 40 MILLIGRAM(S): 20 TABLET, DELAYED RELEASE ORAL at 17:03

## 2021-05-11 RX ADMIN — Medication 12.5 MILLIGRAM(S): at 17:02

## 2021-05-11 RX ADMIN — MIDODRINE HYDROCHLORIDE 10 MILLIGRAM(S): 2.5 TABLET ORAL at 17:02

## 2021-05-11 RX ADMIN — ATORVASTATIN CALCIUM 40 MILLIGRAM(S): 80 TABLET, FILM COATED ORAL at 21:25

## 2021-05-11 RX ADMIN — CHLORHEXIDINE GLUCONATE 1 APPLICATION(S): 213 SOLUTION TOPICAL at 06:50

## 2021-05-11 RX ADMIN — TRAMADOL HYDROCHLORIDE 25 MILLIGRAM(S): 50 TABLET ORAL at 00:10

## 2021-05-11 RX ADMIN — APIXABAN 5 MILLIGRAM(S): 2.5 TABLET, FILM COATED ORAL at 06:50

## 2021-05-11 RX ADMIN — Medication 40 MILLIGRAM(S): at 06:50

## 2021-05-11 RX ADMIN — Medication 5 MILLIGRAM(S): at 23:00

## 2021-05-11 RX ADMIN — SENNA PLUS 2 TABLET(S): 8.6 TABLET ORAL at 21:25

## 2021-05-11 NOTE — DISCHARGE NOTE PROVIDER - CARE PROVIDERS DIRECT ADDRESSES
[email or web address]

## 2021-05-11 NOTE — DISCHARGE NOTE PROVIDER - NSDCCPCAREPLAN_GEN_ALL_CORE_FT
PRINCIPAL DISCHARGE DIAGNOSIS  Diagnosis: Hypotension  Assessment and Plan of Treatment:       SECONDARY DISCHARGE DIAGNOSES  Diagnosis: Deep vein thrombosis (DVT)  Assessment and Plan of Treatment:

## 2021-05-11 NOTE — DISCHARGE NOTE PROVIDER - HOSPITAL COURSE
79 y/o male with PMH of HTN, HLD, DM II, COVID-19 PNA (s/p hospitalization 3/12/21-4/20/21 & ICU admission for hypoxia requiring HFNC, course complicated by LORRAINE on CKD, GI bleed requiring blood transfusion, sepsis requiring vasopressors), Afib (previously was not on AC 2/2 GI bleed, since has been cleared by GI via negative EGD, partial C-scope on 4/29 and capsule endoscopy 4/30), CKD stage 3A, bladder cancer (s/p cystectomy with urostomy), right lower extremity DVT (via duplex on 4/5; s/p IVC filter 4/8), diverticulosis (s/p bowel resection and reanastomosis)  BL  hip replacement, who presents from [facility] due to bilateral lower-extremity paresthesias and worsening edema. On admission patient was found to have acute bilateral extensive DVTs, complicated by hypotension, LORRAINE on CKD. Patient admitted to medicine, was fluid resuscitated and started on midodrine, LORRAINE and hypotension resolved. IR and vascular team consult for DVT management. Cleared by GI for full AC with IV heparin, s/p negative EGD on 4/9, negative partial c-scope due to large ventral hernia, and video capsule endoscopy 4/30 negative for bleeding +multiple clean based ulcers and duodenitis. Despite full AC, LE pain worsened, patient seen by [facility] surgery with plan for venogram, thrombectomy vs thrombolysis. Pt was taken to OR on 5/5/21 and bilateral venogram with thrombectomy and thrombolysis, TPA lysis catheter insertion. Returned for repeat venogram 5/6/21: bilateral lower extremity venogram and angioplasty of L CIV w 18 x 40 mm balloon. After he was restarted on AC. Pt developed melena with drop in HGB. Pt was seen by GI for hx history of GI bleed with work up in the past: S/P EGD showing non erosive gastritis, and current episodes of melena   s/p incomplete colonoscopy secondary to large ventral hernia , s/p capsule endoscopy with multiple clean based small duodenal ulcers and non erosive duodenitis with no active GI bleed. Pt was taken for repeat EGD showing  Normal mucosa in the whole esophagus. Erythema in the stomach compatible with non-erosive gastritis. Ulcer in the anterior bulb. Recommended Protonix 40 mg twice a day  Pt was started on Eliquis 5 mg po BID. Thereafter, he experienced a few more episodes of melena without hemoglobin drop.  Deemed stable for discharge after PT evaluation.

## 2021-05-11 NOTE — DISCHARGE NOTE PROVIDER - PROVIDER TOKENS
PROVIDER:[TOKEN:[[id number]],FOLLOWUP:[2 weeks]],PROVIDER:[TOKEN:[[id number]],FOLLOWUP:[1 week]]

## 2021-05-11 NOTE — PROGRESS NOTE ADULT - ASSESSMENT
Dispo planning   Trend hemoglobin monitor for any bloody or dark bowel movements  Keep legs wrapped and elevated   Continue eliqus

## 2021-05-11 NOTE — DISCHARGE NOTE PROVIDER - CARE PROVIDER_API CALL
Cale Ames)  Surgery; Vascular Surgery  [address] [place] [address] [address]  [place] [address]  Phone: [phone]  Fax: [phone]  Follow Up Time: 2 weeks    Shari Guerrero)  Gastroenterology; Internal Medicine  [address] [place] [id number]  Phone: [phone]  Fax: [phone]  Follow Up Time: 1 week

## 2021-05-11 NOTE — CHART NOTE - NSCHARTNOTEFT_GEN_A_CORE
SUJATHA Limited Follow-Up - assessment completed by SUJATHA Eckert    Noted paresthesias of B/L feet; s/p venogram thrombectomy noted. Currently receives Consistent Carbohydrate Renal diet with no snacks. Good appetite reported. Consuming 100% of meals at this time. Wt fluctuations observed suspected to be 2/2 fluid shifts. On lasix. No wt loss from dosing wt at this time. Last BM reported 5/11. Skin: no surgical incision. Tolerating current nutrition regimen. No new nutrition intervention at this time. Remains not at nutrition risk.

## 2021-05-11 NOTE — PROGRESS NOTE ADULT - SUBJECTIVE AND OBJECTIVE BOX
ROSA MARIA CASEY  80y Male   [id number]    Hospital Day: 16  Post Operative Day:6  Procedure:s/p venogram thrombectomy , filter thrombectomy tpa catheters   Patient is a 80y old  Male who presents with a chief complaint of Paresthesias of bilateral feet (10 May 2021 08:06)    PAST MEDICAL & SURGICAL HISTORY:  Hypertension    Hyperlipidemia    Diabetes mellitus, type 2    History of atrial fibrillation    Bladder cancer  secondary to exposure at [employer]  s/p cystectomy with urostomy    Chronic kidney disease (CKD)  stage 3A, baseline creatinine 1.4    DVT, lower extremity    History of total hip replacement, bilateral    History of total knee replacement, bilateral    History of total cystectomy  with resultant urostomy        Events of the Last 24h:  Vital Signs Last 24 Hrs  T(C): 36.3 (10 May 2021 21:08), Max: 36.7 (10 May 2021 12:58)  T(F): 97.3 (10 May 2021 21:08), Max: 98.1 (10 May 2021 12:58)  HR: 73 (10 May 2021 21:08) (73 - 85)  BP: 128/60 (10 May 2021 21:08) (101/56 - 130/67)  BP(mean): --  RR: 18 (10 May 2021 21:08) (16 - 20)  SpO2: --        Diet, Consistent Carbohydrate Renal/No Snacks (21 @ 15:43)      I&O's Summary    09 May 2021 07:  -  10 May 2021 07:00  --------------------------------------------------------  IN: 660 mL / OUT: 3550 mL / NET: -2890 mL    10 May 2021 07:  -  11 May 2021 00:31  --------------------------------------------------------  IN: 450 mL / OUT: 3650 mL / NET: -3200 mL     I&O's Detail    09 May 2021 07:01  -  10 May 2021 07:00  --------------------------------------------------------  IN:    Oral Fluid: 660 mL  Total IN: 660 mL    OUT:    Indwelling Catheter - Urethral (mL): 3150 mL    Urostomy (mL): 400 mL  Total OUT: 3550 mL    Total NET: -2890 mL      10 May 2021 07:01  -  11 May 2021 00:31  --------------------------------------------------------  IN:    Oral Fluid: 450 mL  Total IN: 450 mL    OUT:    Urostomy (mL): 3650 mL  Total OUT: 3650 mL    Total NET: -3200 mL          MEDICATIONS  (STANDING):  apixaban 5 milliGRAM(s) Oral every 12 hours  atorvastatin 40 milliGRAM(s) Oral at bedtime  chlorhexidine 4% Liquid 1 Application(s) Topical <User Schedule>  furosemide   Injectable 40 milliGRAM(s) IV Push daily  insulin lispro (ADMELOG) corrective regimen sliding scale   SubCutaneous three times a day before meals  metoprolol tartrate 12.5 milliGRAM(s) Oral every 12 hours  midodrine. 10 milliGRAM(s) Oral three times a day  pantoprazole  Injectable 40 milliGRAM(s) IV Push every 12 hours  polyethylene glycol 3350 17 Gram(s) Oral daily  senna 2 Tablet(s) Oral at bedtime    MEDICATIONS  (PRN):  acetaminophen   Tablet .. 650 milliGRAM(s) Oral every 6 hours PRN Temp greater or equal to 38.5C (101.3F), Mild Pain (1 - 3)  melatonin 5 milliGRAM(s) Oral at bedtime PRN Insomnia  traMADol 25 milliGRAM(s) Oral daily PRN Severe Pain (7 - 10)      PHYSICAL EXAM:    GENERAL: NAD    HEENT: NCAT    CHEST/LUNGS: CTAB    HEART: RRR,  No murmurs, rubs, or gallops    ABDOMEN: SNTND +BS    EXTREMITIES:  FROM, No clubbing, cyanosis, or edema, palpable pulse, bilateral lower extremities wrapped in ace     NEURO: No focal neurological deficits    SKIN: No rashes or lesions    INCISION/WOUNDS:                          7.9    9.19  )-----------( 150      ( 09 May 2021 18:37 )             24.9        CBC Full  -  ( 09 May 2021 18:37 )  WBC Count : 9.19 K/uL  RBC Count : 2.77 M/uL  Hemoglobin : 7.9 g/dL  Hematocrit : 24.9 %  Platelet Count - Automated : 150 K/uL  Mean Cell Volume : 89.9 fL  Mean Cell Hemoglobin : 28.5 pg  Mean Cell Hemoglobin Concentration : 31.7 g/dL  Auto Neutrophil # : 7.08 K/uL  Auto Lymphocyte # : 0.83 K/uL  Auto Monocyte # : 0.79 K/uL  Auto Eosinophil # : 0.27 K/uL  Auto Basophil # : 0.08 K/uL  Auto Neutrophil % : 77.1 %  Auto Lymphocyte % : 9.0 %  Auto Monocyte % : 8.6 %  Auto Eosinophil % : 2.9 %  Auto Basophil % : 0.9 %               141   |  107   |  26                 Ca: 7.9    BMP:   ----------------------------< 124    M.6   (21 @ 18:37)             3.9    |  22    | 1.2                Ph: 5.2          PT/INR - ( 09 May 2021 00:53 )   PT: 13.20 sec;   INR: 1.15 ratio         PTT - ( 09 May 2021 00:53 )  PTT:28.6 sec

## 2021-05-11 NOTE — DISCHARGE NOTE PROVIDER - NSDCMRMEDTOKEN_GEN_ALL_CORE_FT
acetaminophen 325 mg oral tablet: 2 tab(s) orally every 6 hours, As needed, Temp greater or equal to 38.5C (101.3F), Mild Pain (1 - 3)  apixaban 5 mg oral tablet: 1 tab(s) orally every 12 hours  ferrous sulfate 325 mg (65 mg elemental iron) oral tablet: 1 tab(s) orally once a day  furosemide 20 mg oral tablet: 1 tab(s) orally 2 times a day  glimepiride 2 mg oral tablet: 1 tab(s) orally once a day  melatonin 5 mg oral tablet: 1 tab(s) orally once a day (at bedtime), As needed, Insomnia  metoprolol succinate 50 mg oral tablet, extended release: 1 tab(s) orally once a day  midodrine 10 mg oral tablet: 1 tab(s) orally 3 times a day  Ozempic (1 mg dose) 2 mg/1.5 mL subcutaneous solution:   pantoprazole 40 mg intravenous injection: 40 milligram(s) intravenous every 12 hours  rosuvastatin 20 mg oral tablet: 1 tab(s) orally once a day  senna oral tablet: 2 tab(s) orally once a day (at bedtime)  traMADol 50 mg oral tablet: 0.5 tab(s) orally once a day, As needed, Severe Pain (7 - 10)

## 2021-05-11 NOTE — CHART NOTE - NSCHARTNOTESELECT_GEN_ALL_CORE
Event Note
RD Limited Follow-Up/Event Note
Anesthesia/Event Note
Downgrade Note
Event Note
Event Note
Off Service Note
PACU/Transfer Note
RD follow up/Event Note
Right LE Bleed/Event Note

## 2021-05-12 ENCOUNTER — TRANSCRIPTION ENCOUNTER (OUTPATIENT)
Age: 81
End: 2021-05-12

## 2021-05-12 VITALS
SYSTOLIC BLOOD PRESSURE: 94 MMHG | HEART RATE: 79 BPM | TEMPERATURE: 98 F | RESPIRATION RATE: 18 BRPM | DIASTOLIC BLOOD PRESSURE: 53 MMHG

## 2021-05-12 LAB
GLUCOSE BLDC GLUCOMTR-MCNC: 107 MG/DL — HIGH (ref 70–99)
GLUCOSE BLDC GLUCOMTR-MCNC: 111 MG/DL — HIGH (ref 70–99)
GLUCOSE BLDC GLUCOMTR-MCNC: 127 MG/DL — HIGH (ref 70–99)
GLUCOSE BLDC GLUCOMTR-MCNC: 171 MG/DL — HIGH (ref 70–99)

## 2021-05-12 RX ORDER — LANOLIN ALCOHOL/MO/W.PET/CERES
5 CREAM (GRAM) TOPICAL AT BEDTIME
Refills: 0 | Status: DISCONTINUED | OUTPATIENT
Start: 2021-05-12 | End: 2021-05-12

## 2021-05-12 RX ADMIN — APIXABAN 5 MILLIGRAM(S): 2.5 TABLET, FILM COATED ORAL at 19:05

## 2021-05-12 RX ADMIN — MIDODRINE HYDROCHLORIDE 10 MILLIGRAM(S): 2.5 TABLET ORAL at 19:06

## 2021-05-12 RX ADMIN — CHLORHEXIDINE GLUCONATE 1 APPLICATION(S): 213 SOLUTION TOPICAL at 06:24

## 2021-05-12 RX ADMIN — Medication 2: at 12:53

## 2021-05-12 RX ADMIN — APIXABAN 5 MILLIGRAM(S): 2.5 TABLET, FILM COATED ORAL at 06:23

## 2021-05-12 RX ADMIN — Medication 12.5 MILLIGRAM(S): at 06:24

## 2021-05-12 RX ADMIN — Medication 5 MILLIGRAM(S): at 21:49

## 2021-05-12 RX ADMIN — MIDODRINE HYDROCHLORIDE 10 MILLIGRAM(S): 2.5 TABLET ORAL at 06:23

## 2021-05-12 RX ADMIN — PANTOPRAZOLE SODIUM 40 MILLIGRAM(S): 20 TABLET, DELAYED RELEASE ORAL at 06:23

## 2021-05-12 RX ADMIN — Medication 5 MILLIGRAM(S): at 03:06

## 2021-05-12 RX ADMIN — SENNA PLUS 2 TABLET(S): 8.6 TABLET ORAL at 21:49

## 2021-05-12 RX ADMIN — MIDODRINE HYDROCHLORIDE 10 MILLIGRAM(S): 2.5 TABLET ORAL at 12:53

## 2021-05-12 RX ADMIN — Medication 40 MILLIGRAM(S): at 06:23

## 2021-05-12 RX ADMIN — ATORVASTATIN CALCIUM 40 MILLIGRAM(S): 80 TABLET, FILM COATED ORAL at 21:49

## 2021-05-12 NOTE — DISCHARGE NOTE NURSING/CASE MANAGEMENT/SOCIAL WORK - PATIENT PORTAL LINK FT
You can access the FollowMyHealth Patient Portal offered by [facility] by registering at the following website: [email or web address]. By joining [facility]’s FollowMyHealth portal, you will also be able to view your health information using other applications (apps) compatible with our system.

## 2021-05-12 NOTE — PROGRESS NOTE ADULT - SUBJECTIVE AND OBJECTIVE BOX
ROSA MARIA CASEY  80y Male   [id number]    Hospital Day: 17  Post Operative Day:7  Procedure:s/p bilateral venogram thrombectomy ,filter thrombectomy , tpa catheters removed  Patient is a 80y old  Male who presents with a chief complaint of Paresthesias of bilateral feet (11 May 2021 14:27)    PAST MEDICAL & SURGICAL HISTORY:  Hypertension    Hyperlipidemia    Diabetes mellitus, type 2    History of atrial fibrillation    Bladder cancer  secondary to exposure at "[employer]  s/p cystectomy with urostomy    Chronic kidney disease (CKD)  stage 3A, baseline creatinine 1.4    DVT, lower extremity    History of total hip replacement, bilateral    History of total knee replacement, bilateral    History of total cystectomy  with resultant urostomy        Events of the Last 24h:  Vital Signs Last 24 Hrs  T(C): 35.9 (11 May 2021 21:00), Max: 36.6 (11 May 2021 04:55)  T(F): 96.7 (11 May 2021 21:00), Max: 97.8 (11 May 2021 04:55)  HR: 74 (11 May 2021 21:00) (74 - 85)  BP: 143/60 (11 May 2021 21:00) (106/53 - 143/60)  BP(mean): --  RR: 18 (11 May 2021 21:00) (18 - 18)  SpO2: --        Diet, Consistent Carbohydrate Renal/No Snacks (21 @ 15:43)      I&O's Summary    10 May 2021 07:  -  11 May 2021 07:00  --------------------------------------------------------  IN: 450 mL / OUT: 4150 mL / NET: -3700 mL    11 May 2021 07:  -  12 May 2021 00:57  --------------------------------------------------------  IN: 360 mL / OUT: 3200 mL / NET: -2840 mL     I&O's Detail    10 May 2021 07:01  -  11 May 2021 07:00  --------------------------------------------------------  IN:    Oral Fluid: 450 mL  Total IN: 450 mL    OUT:    Urostomy (mL): 4150 mL  Total OUT: 4150 mL    Total NET: -3700 mL      11 May 2021 07:01  -  12 May 2021 00:57  --------------------------------------------------------  IN:    Oral Fluid: 360 mL  Total IN: 360 mL    OUT:    Urostomy (mL): 3200 mL  Total OUT: 3200 mL    Total NET: -2840 mL          MEDICATIONS  (STANDING):  apixaban 5 milliGRAM(s) Oral every 12 hours  atorvastatin 40 milliGRAM(s) Oral at bedtime  chlorhexidine 4% Liquid 1 Application(s) Topical <User Schedule>  furosemide   Injectable 40 milliGRAM(s) IV Push daily  insulin lispro (ADMELOG) corrective regimen sliding scale   SubCutaneous three times a day before meals  metoprolol tartrate 12.5 milliGRAM(s) Oral every 12 hours  midodrine. 10 milliGRAM(s) Oral three times a day  pantoprazole  Injectable 40 milliGRAM(s) IV Push every 12 hours  polyethylene glycol 3350 17 Gram(s) Oral daily  senna 2 Tablet(s) Oral at bedtime    MEDICATIONS  (PRN):  acetaminophen   Tablet .. 650 milliGRAM(s) Oral every 6 hours PRN Temp greater or equal to 38.5C (101.3F), Mild Pain (1 - 3)  melatonin 5 milliGRAM(s) Oral at bedtime PRN Insomnia  traMADol 25 milliGRAM(s) Oral daily PRN Severe Pain (7 - 10)      PHYSICAL EXAM:    GENERAL: NAD    HEENT: NCAT    CHEST/LUNGS: CTAB    HEART: RRR,  No murmurs, rubs, or gallops    ABDOMEN: SNTND +BS    EXTREMITIES:  FROM, No clubbing, cyanosis, or edema, palpable pulse    NEURO: No focal neurological deficits    SKIN: No rashes or lesions    INCISION/WOUNDS:                          8.1    7.08  )-----------( 205      ( 11 May 2021 06:11 )             25.8        CBC Full  -  ( 11 May 2021 06:11 )  WBC Count : 7.08 K/uL  RBC Count : 2.82 M/uL  Hemoglobin : 8.1 g/dL  Hematocrit : 25.8 %  Platelet Count - Automated : 205 K/uL  Mean Cell Volume : 91.5 fL  Mean Cell Hemoglobin : 28.7 pg  Mean Cell Hemoglobin Concentration : 31.4 g/dL  Auto Neutrophil # : x  Auto Lymphocyte # : x  Auto Monocyte # : x  Auto Eosinophil # : x  Auto Basophil # : x  Auto Neutrophil % : x  Auto Lymphocyte % : x  Auto Monocyte % : x  Auto Eosinophil % : x  Auto Basophil % : x               142   |  105   |  17                 Ca: 8.2    BMP:   ----------------------------< 91     M.4   (21 @ 06:11)             4.1    |  25    | 1.1                Ph: 4.3

## 2021-05-12 NOTE — PROGRESS NOTE ADULT - REASON FOR ADMISSION
Paresthesias of bilateral feet
Paresthesias of bilateral feet, BL extensive lower ext DVTs, recent Covid PNA
Paresthesias of bilateral feet, extensive BL DVT
Paresthesias of bilateral feet, extensive BL DVTs
Paresthesias of bilateral feet, extensive BL DVTs, recent Covid PNA
Paresthesias of bilateral feet, lower ext edema,  progression of BL lower ext DVT, Dehydration, LORRAINE on CKD
Paresthesias of bilateral feet
Paresthesias of bilateral feet, extensive BL DVT, sp IVC filter 4/9
Paresthesias of bilateral feet
Paresthesias of bilateral feet, BL extensive lower ext DVT
Paresthesias of bilateral feet
Paresthesias of bilateral feet

## 2021-05-12 NOTE — PROGRESS NOTE ADULT - NSICDXPILOT_GEN_ALL_CORE
[place]

## 2021-05-12 NOTE — PROGRESS NOTE ADULT - PROVIDER SPECIALTY LIST ADULT
Internal Medicine
Vascular Surgery
Internal Medicine
Internal Medicine
Vascular Surgery
Vascular Surgery
Gastroenterology
Gastroenterology
Internal Medicine
SICU
Vascular Surgery
Vascular Surgery
Gastroenterology
Internal Medicine
Vascular Surgery
Vascular Surgery
Internal Medicine
Internal Medicine
SICU
Infectious Disease

## 2021-05-18 DIAGNOSIS — Z87.891 PERSONAL HISTORY OF NICOTINE DEPENDENCE: ICD-10-CM

## 2021-05-18 DIAGNOSIS — Z96.653 PRESENCE OF ARTIFICIAL KNEE JOINT, BILATERAL: ICD-10-CM

## 2021-05-18 DIAGNOSIS — N17.9 ACUTE KIDNEY FAILURE, UNSPECIFIED: ICD-10-CM

## 2021-05-18 DIAGNOSIS — I48.91 UNSPECIFIED ATRIAL FIBRILLATION: ICD-10-CM

## 2021-05-18 DIAGNOSIS — I82.4Z2 ACUTE EMBOLISM AND THROMBOSIS OF UNSPECIFIED DEEP VEINS OF LEFT DISTAL LOWER EXTREMITY: ICD-10-CM

## 2021-05-18 DIAGNOSIS — Z86.16 PERSONAL HISTORY OF COVID-19: ICD-10-CM

## 2021-05-18 DIAGNOSIS — Z79.01 LONG TERM (CURRENT) USE OF ANTICOAGULANTS: ICD-10-CM

## 2021-05-18 DIAGNOSIS — E11.22 TYPE 2 DIABETES MELLITUS WITH DIABETIC CHRONIC KIDNEY DISEASE: ICD-10-CM

## 2021-05-18 DIAGNOSIS — I12.9 HYPERTENSIVE CHRONIC KIDNEY DISEASE WITH STAGE 1 THROUGH STAGE 4 CHRONIC KIDNEY DISEASE, OR UNSPECIFIED CHRONIC KIDNEY DISEASE: ICD-10-CM

## 2021-05-18 DIAGNOSIS — K29.71 GASTRITIS, UNSPECIFIED, WITH BLEEDING: ICD-10-CM

## 2021-05-18 DIAGNOSIS — N18.31 CHRONIC KIDNEY DISEASE, STAGE 3A: ICD-10-CM

## 2021-05-18 DIAGNOSIS — D62 ACUTE POSTHEMORRHAGIC ANEMIA: ICD-10-CM

## 2021-05-18 DIAGNOSIS — K43.9 VENTRAL HERNIA WITHOUT OBSTRUCTION OR GANGRENE: ICD-10-CM

## 2021-05-18 DIAGNOSIS — Z85.51 PERSONAL HISTORY OF MALIGNANT NEOPLASM OF BLADDER: ICD-10-CM

## 2021-05-18 DIAGNOSIS — D68.32 HEMORRHAGIC DISORDER DUE TO EXTRINSIC CIRCULATING ANTICOAGULANTS: ICD-10-CM

## 2021-05-18 DIAGNOSIS — I82.220 ACUTE EMBOLISM AND THROMBOSIS OF INFERIOR VENA CAVA: ICD-10-CM

## 2021-05-18 DIAGNOSIS — E78.5 HYPERLIPIDEMIA, UNSPECIFIED: ICD-10-CM

## 2021-05-18 DIAGNOSIS — Z96.643 PRESENCE OF ARTIFICIAL HIP JOINT, BILATERAL: ICD-10-CM

## 2021-05-18 DIAGNOSIS — I95.9 HYPOTENSION, UNSPECIFIED: ICD-10-CM

## 2021-05-18 DIAGNOSIS — D63.1 ANEMIA IN CHRONIC KIDNEY DISEASE: ICD-10-CM

## 2021-05-18 DIAGNOSIS — I82.412 ACUTE EMBOLISM AND THROMBOSIS OF LEFT FEMORAL VEIN: ICD-10-CM

## 2021-05-18 DIAGNOSIS — K26.4 CHRONIC OR UNSPECIFIED DUODENAL ULCER WITH HEMORRHAGE: ICD-10-CM

## 2021-05-19 PROBLEM — I82.409 ACUTE EMBOLISM AND THROMBOSIS OF UNSPECIFIED DEEP VEINS OF UNSPECIFIED LOWER EXTREMITY: Chronic | Status: ACTIVE | Noted: 2021-04-26

## 2021-05-20 ENCOUNTER — APPOINTMENT (OUTPATIENT)
Dept: CARDIOLOGY | Facility: CLINIC | Age: 81
End: 2021-05-20

## 2021-06-01 ENCOUNTER — APPOINTMENT (OUTPATIENT)
Dept: VASCULAR SURGERY | Facility: CLINIC | Age: 81
End: 2021-06-01
Payer: MEDICARE

## 2021-06-01 VITALS
WEIGHT: 270 LBS | HEART RATE: 85 BPM | HEIGHT: 72 IN | TEMPERATURE: 95.6 F | DIASTOLIC BLOOD PRESSURE: 62 MMHG | BODY MASS INDEX: 36.57 KG/M2 | SYSTOLIC BLOOD PRESSURE: 107 MMHG

## 2021-06-01 DIAGNOSIS — Z86.39 PERSONAL HISTORY OF OTHER ENDOCRINE, NUTRITIONAL AND METABOLIC DISEASE: ICD-10-CM

## 2021-06-01 DIAGNOSIS — Z86.79 PERSONAL HISTORY OF OTHER DISEASES OF THE CIRCULATORY SYSTEM: ICD-10-CM

## 2021-06-01 DIAGNOSIS — Z85.51 PERSONAL HISTORY OF MALIGNANT NEOPLASM OF BLADDER: ICD-10-CM

## 2021-06-01 PROCEDURE — 99072 ADDL SUPL MATRL&STAF TM PHE: CPT

## 2021-06-01 PROCEDURE — 93970 EXTREMITY STUDY: CPT

## 2021-06-01 PROCEDURE — 99214 OFFICE O/P EST MOD 30 MIN: CPT

## 2021-06-01 RX ORDER — BENAZEPRIL HYDROCHLORIDE 20 MG/1
20 TABLET, FILM COATED ORAL DAILY
Refills: 0 | Status: COMPLETED | COMMUNITY
End: 2021-06-01

## 2021-06-01 RX ORDER — POTASSIUM CITRATE 10 MEQ/1
10 MEQ TABLET, EXTENDED RELEASE ORAL TWICE DAILY
Refills: 0 | Status: COMPLETED | COMMUNITY
End: 2021-06-01

## 2021-06-01 NOTE — HISTORY OF PRESENT ILLNESS
[FreeTextEntry1] : 79 y/o gentleman who was hospitalized for COVID 19 infection, presented with confusion and hypoxia, hospitalization complicated by LORRAINE, GI bleed, and extensive bilateral lower extremity DVT, underwent IVC filter by interventional radiology on 4/8/2021, developed worsening lower extremity symptoms and underwent EGD that was negative for active bleed, capsule endoscopy that showed multiple clean based duodenal ulcers, colonoscopy was limited due to ventral hernia. He underwent bilateral lower extremity venogram and thrombolysis and thrombectomy and IVC filter thrombectomy on 5/6/2021, and left CIV angioplasty.\par \par He is now on Eliquis, denies any GI bleed, ambulating with walker, leg swelling improving.

## 2021-06-01 NOTE — DATA REVIEWED
[FreeTextEntry1] : I performed a venous duplex which was medically necessary to evaluate for DVT and IVC filter. IVC filter and bilateral CIV were not visualized due to bowel gas, EIV were patent bilaterally, no DVT in the right lower extremity, partial resolution of thrombus in the proximal left femoral vein, minimal resolution of thrombus in the mid to distal femoral and popliteal veins.\par

## 2021-06-01 NOTE — ASSESSMENT
[FreeTextEntry1] : 79 y/o gentleman with provoked bilateral lower extremity DVT, s/p IVC filter, left CIV venoplasty and lower extremity thrombectomy. This was secondary to COVID infection and prolonged hospitalization.\par \par Today on duplex, IVC filter and bilateral CIV were not visualized due to bowel gas, EIV were patent bilaterally, no DVT in the right lower extremity, partial resolution of thrombus in the proximal left femoral vein, minimal resolution of thrombus in the mid to distal femoral and popliteal veins.\par \par He was advised to continue on anticoagulation and see me back in six months for follow up.

## 2021-06-17 ENCOUNTER — APPOINTMENT (OUTPATIENT)
Dept: CARDIOLOGY | Facility: CLINIC | Age: 81
End: 2021-06-17
Payer: MEDICARE

## 2021-06-17 ENCOUNTER — RESULT CHARGE (OUTPATIENT)
Age: 81
End: 2021-06-17

## 2021-06-17 VITALS
DIASTOLIC BLOOD PRESSURE: 70 MMHG | TEMPERATURE: 97 F | SYSTOLIC BLOOD PRESSURE: 138 MMHG | BODY MASS INDEX: 37.65 KG/M2 | HEART RATE: 76 BPM | WEIGHT: 278 LBS | HEIGHT: 72 IN

## 2021-06-17 PROCEDURE — 99072 ADDL SUPL MATRL&STAF TM PHE: CPT

## 2021-06-17 PROCEDURE — 93000 ELECTROCARDIOGRAM COMPLETE: CPT

## 2021-06-17 PROCEDURE — 99214 OFFICE O/P EST MOD 30 MIN: CPT

## 2021-06-17 PROCEDURE — 93308 TTE F-UP OR LMTD: CPT

## 2021-06-17 RX ORDER — TRAMADOL HYDROCHLORIDE 25 MG/1
TABLET, COATED ORAL
Refills: 0 | Status: COMPLETED | COMMUNITY
End: 2021-06-17

## 2021-07-30 ENCOUNTER — LABORATORY RESULT (OUTPATIENT)
Age: 81
End: 2021-07-30

## 2021-08-11 ENCOUNTER — RESULT CHARGE (OUTPATIENT)
Age: 81
End: 2021-08-11

## 2021-08-11 ENCOUNTER — APPOINTMENT (OUTPATIENT)
Dept: CARDIOLOGY | Facility: CLINIC | Age: 81
End: 2021-08-11
Payer: MEDICARE

## 2021-08-11 VITALS
WEIGHT: 267 LBS | HEART RATE: 63 BPM | SYSTOLIC BLOOD PRESSURE: 130 MMHG | DIASTOLIC BLOOD PRESSURE: 72 MMHG | BODY MASS INDEX: 36.16 KG/M2 | HEIGHT: 72 IN | TEMPERATURE: 97.6 F

## 2021-08-11 PROCEDURE — 99214 OFFICE O/P EST MOD 30 MIN: CPT

## 2021-08-11 PROCEDURE — 93000 ELECTROCARDIOGRAM COMPLETE: CPT

## 2021-08-11 RX ORDER — SENNOSIDES 8.6 MG
8.6 TABLET ORAL
Refills: 0 | Status: COMPLETED | COMMUNITY
End: 2021-08-11

## 2021-08-11 RX ORDER — POTASSIUM CITRATE 15 MEQ/1
15 MEQ TABLET, EXTENDED RELEASE ORAL DAILY
Refills: 0 | Status: DISCONTINUED | COMMUNITY

## 2021-08-11 RX ORDER — SEMAGLUTIDE 1.34 MG/ML
2 INJECTION, SOLUTION SUBCUTANEOUS
Refills: 0 | Status: COMPLETED | COMMUNITY
End: 2021-08-11

## 2021-08-11 RX ORDER — PANTOPRAZOLE SODIUM 40 MG/1
40 GRANULE, DELAYED RELEASE ORAL
Refills: 0 | Status: COMPLETED | COMMUNITY
End: 2021-08-11

## 2021-09-03 ENCOUNTER — TRANSCRIPTION ENCOUNTER (OUTPATIENT)
Age: 81
End: 2021-09-03

## 2021-11-22 ENCOUNTER — LABORATORY RESULT (OUTPATIENT)
Age: 81
End: 2021-11-22

## 2021-11-24 NOTE — PATIENT PROFILE ADULT - DEAF OR HARD OF HEARING?
Patient presents to ER for evaluation of left calf pain. Patient states that the calf pain started Thursday. Patient states that this was the day after her COVID shot. Patient reports taking aspirin and plavix.   
no

## 2021-11-30 ENCOUNTER — APPOINTMENT (OUTPATIENT)
Dept: CARDIOLOGY | Facility: CLINIC | Age: 81
End: 2021-11-30
Payer: MEDICARE

## 2021-11-30 VITALS
HEIGHT: 78 IN | BODY MASS INDEX: 30.89 KG/M2 | TEMPERATURE: 97.7 F | OXYGEN SATURATION: 95 % | HEART RATE: 63 BPM | SYSTOLIC BLOOD PRESSURE: 130 MMHG | DIASTOLIC BLOOD PRESSURE: 62 MMHG | WEIGHT: 267 LBS

## 2021-11-30 PROCEDURE — 93000 ELECTROCARDIOGRAM COMPLETE: CPT

## 2021-11-30 PROCEDURE — 99213 OFFICE O/P EST LOW 20 MIN: CPT

## 2021-12-07 ENCOUNTER — APPOINTMENT (OUTPATIENT)
Dept: VASCULAR SURGERY | Facility: CLINIC | Age: 81
End: 2021-12-07
Payer: MEDICARE

## 2021-12-07 VITALS
TEMPERATURE: 96.8 F | BODY MASS INDEX: 30.66 KG/M2 | SYSTOLIC BLOOD PRESSURE: 130 MMHG | DIASTOLIC BLOOD PRESSURE: 75 MMHG | HEART RATE: 65 BPM | OXYGEN SATURATION: 99 % | HEIGHT: 78 IN | WEIGHT: 265 LBS

## 2021-12-07 PROCEDURE — 99215 OFFICE O/P EST HI 40 MIN: CPT

## 2021-12-07 PROCEDURE — 93971 EXTREMITY STUDY: CPT

## 2021-12-07 NOTE — ASSESSMENT
[FreeTextEntry1] : 82 y/o gentleman with provoked bilateral lower extremity DVT, s/p IVC filter, left CIV venoplasty and lower extremity thrombectomy. This was secondary to COVID infection and prolonged hospitalization.\par \par Today on duplex there is no evidence of acute deep venous thrombosis or superficial thrombophlebitis. No major veins patent compressible. Left there is partial resolution of thrombus seen in the femoral vein. There is chronic changes of residual thrombus in the popliteal vein. The common femoral vein is patent and free of thrombus. IVC duplex very limited secondary to bowel gas collection in depth. The IVC with [place] filter and right common iliac veins were not visualized. The left common iliac vein was patent and free of thrombus. The body of the stent was not visualized. The external iliac vein was patent bilaterally with no evidence of thrombosis. From my standpoint I recommend the patient have an IVC filter atrial. The patient will remain on a coagulation for his atrial fibrillation.

## 2021-12-07 NOTE — HISTORY OF PRESENT ILLNESS
[FreeTextEntry1] : 82 y/o gentleman who was hospitalized for COVID 19 infection, presented with confusion and hypoxia, hospitalization complicated by LORRAINE, GI bleed, and extensive bilateral lower extremity DVT, underwent IVC filter by interventional radiology on 4/8/2021, developed worsening lower extremity symptoms and underwent EGD that was negative for active bleed, capsule endoscopy that showed multiple clean based duodenal ulcers, colonoscopy was limited due to ventral hernia. He underwent bilateral lower extremity venogram and thrombolysis and thrombectomy and IVC filter thrombectomy on 5/6/2021, and left CIV angioplasty.\par \par He has been on on Eliquis for the last months , He  denies any GI bleed, ambulating with walker, leg swelling improving.

## 2021-12-21 ENCOUNTER — RESULT REVIEW (OUTPATIENT)
Age: 81
End: 2021-12-21

## 2021-12-21 ENCOUNTER — OUTPATIENT (OUTPATIENT)
Dept: OUTPATIENT SERVICES | Facility: HOSPITAL | Age: 81
LOS: 1 days | Discharge: HOME | End: 2021-12-21
Payer: MEDICARE

## 2021-12-21 VITALS
WEIGHT: 265 LBS | HEART RATE: 70 BPM | TEMPERATURE: 97 F | SYSTOLIC BLOOD PRESSURE: 150 MMHG | DIASTOLIC BLOOD PRESSURE: 78 MMHG | RESPIRATION RATE: 18 BRPM | OXYGEN SATURATION: 97 % | HEIGHT: 72 IN

## 2021-12-21 DIAGNOSIS — Z90.6 ACQUIRED ABSENCE OF OTHER PARTS OF URINARY TRACT: Chronic | ICD-10-CM

## 2021-12-21 DIAGNOSIS — I82.409 ACUTE EMBOLISM AND THROMBOSIS OF UNSPECIFIED DEEP VEINS OF UNSPECIFIED LOWER EXTREMITY: ICD-10-CM

## 2021-12-21 DIAGNOSIS — Z01.818 ENCOUNTER FOR OTHER PREPROCEDURAL EXAMINATION: ICD-10-CM

## 2021-12-21 DIAGNOSIS — Z96.653 PRESENCE OF ARTIFICIAL KNEE JOINT, BILATERAL: Chronic | ICD-10-CM

## 2021-12-21 DIAGNOSIS — Z96.643 PRESENCE OF ARTIFICIAL HIP JOINT, BILATERAL: Chronic | ICD-10-CM

## 2021-12-21 LAB
ALBUMIN SERPL ELPH-MCNC: 4.7 G/DL — SIGNIFICANT CHANGE UP (ref 3.5–5.2)
ALP SERPL-CCNC: 112 U/L — SIGNIFICANT CHANGE UP (ref 30–115)
ALT FLD-CCNC: 11 U/L — SIGNIFICANT CHANGE UP (ref 0–41)
ANION GAP SERPL CALC-SCNC: 17 MMOL/L — HIGH (ref 7–14)
APTT BLD: 37.9 SEC — SIGNIFICANT CHANGE UP (ref 27–39.2)
AST SERPL-CCNC: 14 U/L — SIGNIFICANT CHANGE UP (ref 0–41)
BASOPHILS # BLD AUTO: 0.1 K/UL — SIGNIFICANT CHANGE UP (ref 0–0.2)
BASOPHILS NFR BLD AUTO: 1.3 % — HIGH (ref 0–1)
BILIRUB SERPL-MCNC: 1.6 MG/DL — HIGH (ref 0.2–1.2)
BUN SERPL-MCNC: 18 MG/DL — SIGNIFICANT CHANGE UP (ref 10–20)
CALCIUM SERPL-MCNC: 9.6 MG/DL — SIGNIFICANT CHANGE UP (ref 8.5–10.1)
CHLORIDE SERPL-SCNC: 98 MMOL/L — SIGNIFICANT CHANGE UP (ref 98–110)
CO2 SERPL-SCNC: 24 MMOL/L — SIGNIFICANT CHANGE UP (ref 17–32)
CREAT SERPL-MCNC: 1.2 MG/DL — SIGNIFICANT CHANGE UP (ref 0.7–1.5)
EOSINOPHIL # BLD AUTO: 0.31 K/UL — SIGNIFICANT CHANGE UP (ref 0–0.7)
EOSINOPHIL NFR BLD AUTO: 4 % — SIGNIFICANT CHANGE UP (ref 0–8)
GLUCOSE SERPL-MCNC: 176 MG/DL — HIGH (ref 70–99)
HCT VFR BLD CALC: 48.4 % — SIGNIFICANT CHANGE UP (ref 42–52)
HGB BLD-MCNC: 15.4 G/DL — SIGNIFICANT CHANGE UP (ref 14–18)
IMM GRANULOCYTES NFR BLD AUTO: 0.4 % — HIGH (ref 0.1–0.3)
INR BLD: 1.34 RATIO — HIGH (ref 0.65–1.3)
LYMPHOCYTES # BLD AUTO: 1.4 K/UL — SIGNIFICANT CHANGE UP (ref 1.2–3.4)
LYMPHOCYTES # BLD AUTO: 17.9 % — LOW (ref 20.5–51.1)
MCHC RBC-ENTMCNC: 27 PG — SIGNIFICANT CHANGE UP (ref 27–31)
MCHC RBC-ENTMCNC: 31.8 G/DL — LOW (ref 32–37)
MCV RBC AUTO: 84.9 FL — SIGNIFICANT CHANGE UP (ref 80–94)
MONOCYTES # BLD AUTO: 0.58 K/UL — SIGNIFICANT CHANGE UP (ref 0.1–0.6)
MONOCYTES NFR BLD AUTO: 7.4 % — SIGNIFICANT CHANGE UP (ref 1.7–9.3)
NEUTROPHILS # BLD AUTO: 5.38 K/UL — SIGNIFICANT CHANGE UP (ref 1.4–6.5)
NEUTROPHILS NFR BLD AUTO: 69 % — SIGNIFICANT CHANGE UP (ref 42.2–75.2)
NRBC # BLD: 0 /100 WBCS — SIGNIFICANT CHANGE UP (ref 0–0)
PLATELET # BLD AUTO: 240 K/UL — SIGNIFICANT CHANGE UP (ref 130–400)
POTASSIUM SERPL-MCNC: 4.4 MMOL/L — SIGNIFICANT CHANGE UP (ref 3.5–5)
POTASSIUM SERPL-SCNC: 4.4 MMOL/L — SIGNIFICANT CHANGE UP (ref 3.5–5)
PROT SERPL-MCNC: 7.9 G/DL — SIGNIFICANT CHANGE UP (ref 6–8)
PROTHROM AB SERPL-ACNC: 15.4 SEC — HIGH (ref 9.95–12.87)
RBC # BLD: 5.7 M/UL — SIGNIFICANT CHANGE UP (ref 4.7–6.1)
RBC # FLD: 15.5 % — HIGH (ref 11.5–14.5)
SODIUM SERPL-SCNC: 139 MMOL/L — SIGNIFICANT CHANGE UP (ref 135–146)
WBC # BLD: 7.8 K/UL — SIGNIFICANT CHANGE UP (ref 4.8–10.8)
WBC # FLD AUTO: 7.8 K/UL — SIGNIFICANT CHANGE UP (ref 4.8–10.8)

## 2021-12-21 PROCEDURE — 93010 ELECTROCARDIOGRAM REPORT: CPT

## 2021-12-21 PROCEDURE — 71046 X-RAY EXAM CHEST 2 VIEWS: CPT | Mod: 26

## 2021-12-21 RX ORDER — SEMAGLUTIDE 0.68 MG/ML
0 INJECTION, SOLUTION SUBCUTANEOUS
Qty: 0 | Refills: 0 | DISCHARGE

## 2021-12-21 NOTE — H&P PST ADULT - BP NONINVASIVE SYSTOLIC (MM HG)
normal appearance, without tenderness upon palpation, no deformities, no cervical lymphadenopathy, no masses, no thyroid nodules, Thyroid normal size, no JVD, thyroid nontender 150

## 2021-12-21 NOTE — H&P PST ADULT - HISTORY OF PRESENT ILLNESS
80 yo male  presents for PAST in preparation for RETRIEVAL OF INFERIOR VENA CAVA FILTER scheduled on 1/5 under [facility]  at [facility] OR by Dr Ames.   Pt states that he was hospitalized in March 2021 for COVID complications, stayed for 77 days  between Hospital and [facility] and developed multiple clots to both legs. Also had sepsis, bleeding, low H/H, blood transfusion. Pt states  that his wife also got  Covid positive but passed away in the hospital.   Denies any chest pain, difficulty breathing, SOB, palpitations, dysuria, URI, or any other infections in the last 2 weeks/1 month. Denies any recent travel, contact, or exposure to any persons with known or suspected COVID-19. Pt also denies COVID testing within the last 2 weeks. Pt advised to self quarantine until day of procedure. Exercise tolerance of 1-2 blocks  without dyspnea. JARETH reviewed with patient.  Anesthesia Alert  NO--Difficult Airway  NO--History of neck surgery or radiation  NO--Limited ROM of neck  NO--History of Malignant hyperthermia  NO--Personal or family history of Pseudocholinesterase deficiency.  NO--Prior Anesthesia Complication  NO--Latex Allergy  NO--Loose teeth  NO--History of Rheumatoid Arthritis  NO--JARETH  NO--Bleeding risk- ELIQUIS   NO--Other_____   written and verbal instructions with teach back on chlorhexidine shampoo provided,  pt verbalized understanding with returned demonstration  Patient verbalized understanding of instructions and was given the opportunity to ask questions and have them answered.   80 yo male  presents for PAST in preparation for RETRIEVAL OF INFERIOR VENA CAVA FILTER scheduled on 1/5 under [facility]  at [facility] OR by Dr Ames.   Pt states that he was hospitalized in March 2021 for COVID complications, stayed for 77 days  between Hospital and [facility] and developed multiple clots to both legs. Also had sepsis, bleeding, low H/H, blood transfusion, pneumonia.  Pt states  that his wife also got  Covid positive but passed away in the hospital.   Denies any chest pain, difficulty breathing, SOB, palpitations, dysuria, URI, or any other infections in the last 2 weeks/1 month. Denies any recent travel, contact, or exposure to any persons with known or suspected COVID-19. Pt also denies COVID testing within the last 2 weeks. Pt advised to self quarantine until day of procedure. Exercise tolerance of 1-2 blocks  without dyspnea. JARETH reviewed with patient.  Anesthesia Alert  NO--Difficult Airway  NO--History of neck surgery or radiation  NO--Limited ROM of neck  NO--History of Malignant hyperthermia  NO--Personal or family history of Pseudocholinesterase deficiency.  NO--Prior Anesthesia Complication  NO--Latex Allergy  NO--Loose teeth  NO--History of Rheumatoid Arthritis  NO--JARETH  NO--Bleeding risk- ELIQUIS   NO--Other_____   written and verbal instructions with teach back on chlorhexidine shampoo provided,  pt verbalized understanding with returned demonstration  Patient verbalized understanding of instructions and was given the opportunity to ask questions and have them answered.

## 2021-12-21 NOTE — H&P PST ADULT - NSICDXPASTMEDICALHX_GEN_ALL_CORE_FT
PAST MEDICAL HISTORY:  Bladder cancer secondary to exposure at [employer] 9/11 s/p cystectomy with urostomy    Chronic kidney disease (CKD) stage 3A, baseline creatinine 1.4    Diabetes mellitus, type 2     DVT, lower extremity     History of atrial fibrillation     Hyperlipidemia     Hypertension

## 2021-12-21 NOTE — H&P PST ADULT - REASON FOR ADMISSION
RETRIEVAL OF INFERIOR VENA CAVA FILTER scheduled on 1/5 under [facility]  at [facility] OR by Dr Ames

## 2021-12-22 LAB
A1C WITH ESTIMATED AVERAGE GLUCOSE RESULT: 8.9 % — HIGH (ref 4–5.6)
ESTIMATED AVERAGE GLUCOSE: 209 MG/DL — HIGH (ref 68–114)

## 2022-01-02 ENCOUNTER — LABORATORY RESULT (OUTPATIENT)
Age: 82
End: 2022-01-02

## 2022-01-11 ENCOUNTER — OUTPATIENT (OUTPATIENT)
Dept: OUTPATIENT SERVICES | Facility: HOSPITAL | Age: 82
LOS: 1 days | Discharge: HOME | End: 2022-01-11

## 2022-01-11 VITALS
WEIGHT: 265 LBS | HEART RATE: 65 BPM | TEMPERATURE: 96 F | HEIGHT: 72 IN | RESPIRATION RATE: 20 BRPM | SYSTOLIC BLOOD PRESSURE: 120 MMHG | OXYGEN SATURATION: 97 % | DIASTOLIC BLOOD PRESSURE: 80 MMHG

## 2022-01-11 DIAGNOSIS — I82.409 ACUTE EMBOLISM AND THROMBOSIS OF UNSPECIFIED DEEP VEINS OF UNSPECIFIED LOWER EXTREMITY: ICD-10-CM

## 2022-01-11 DIAGNOSIS — Z96.643 PRESENCE OF ARTIFICIAL HIP JOINT, BILATERAL: Chronic | ICD-10-CM

## 2022-01-11 DIAGNOSIS — Z01.818 ENCOUNTER FOR OTHER PREPROCEDURAL EXAMINATION: ICD-10-CM

## 2022-01-11 DIAGNOSIS — Z96.653 PRESENCE OF ARTIFICIAL KNEE JOINT, BILATERAL: Chronic | ICD-10-CM

## 2022-01-11 DIAGNOSIS — Z90.6 ACQUIRED ABSENCE OF OTHER PARTS OF URINARY TRACT: Chronic | ICD-10-CM

## 2022-01-11 NOTE — H&P PST ADULT - HISTORY OF PRESENT ILLNESS
12/21/21 past note  80 yo male  presents for PAST in preparation for RETRIEVAL OF INFERIOR VENA CAVA FILTER scheduled on 1/5 under LSB  at [facility] OR by Dr Ames.   Pt states that he was hospitalized in March 2021 for COVID complications, stayed for 77 days  between Hospital and [facility] and developed multiple clots to both legs. Also had sepsis, bleeding, low H/H, blood transfusion, pneumonia.  Pt states  that his wife also got  Covid positive but passed away in the hospital.   Denies any chest pain, difficulty breathing, SOB, palpitations, dysuria, URI, or any other infections in the last 2 weeks/1 month. Denies any recent travel, contact, or exposure to any persons with known or suspected COVID-19. Pt also denies COVID testing within the last 2 weeks. Pt advised to self quarantine until day of procedure. Exercise tolerance of 1-2 blocks  without dyspnea. JARETH reviewed with patient.  Anesthesia Alert  NO--Difficult Airway  NO--History of neck surgery or radiation  + -Limited ROM of neck w left and righ t turning and extension   NO--History of Malignant hyperthermia  NO--Personal or family history of Pseudocholinesterase deficiency.  NO--Prior Anesthesia Complication  NO--Latex Allergy  NO--Loose teeth  NO--History of Rheumatoid Arthritis  NO--JARETH  -Bleeding risk- ELIQUIS   NO--Other_____   written and verbal instructions with teach back on chlorhexidine shampoo provided,  pt verbalized understanding with returned demonstration  Patient verbalized understanding of instructions and was given the opportunity to ask questions and have them answered.      per pt the only change in medical hx was treated for uti by urology - per pt sample obtain from urostomy bag, other wise no interim changes in medical hx 12/21/21 past note  82 yo male  presents for PAST in preparation for RETRIEVAL OF INFERIOR VENA CAVA FILTER scheduled on 1/5 under LSB  at [facility] OR by Dr Ames.   Pt states that he was hospitalized in March 2021 for COVID complications, stayed for 77 days  between Hospital and [facility] and developed multiple clots to both legs. Also had sepsis, bleeding, low H/H, blood transfusion, pneumonia.  Pt states  that his wife also got  Covid positive but passed away in the hospital.   Denies any chest pain, difficulty breathing, SOB, palpitations, dysuria, URI, or any other infections in the last 2 weeks/1 month. Denies any recent travel, contact, or exposure to any persons with known or suspected COVID-19. Pt also denies COVID testing within the last 2 weeks. Pt advised to self quarantine until day of procedure. Exercise tolerance of 1-2 blocks  without dyspnea. JARETH reviewed with patient.  Anesthesia Alert  NO--Difficult Airway  NO--History of neck surgery or radiation  + -Limited ROM of neck w left and righ t turning and extension   NO--History of Malignant hyperthermia  NO--Personal or family history of Pseudocholinesterase deficiency.  NO--Prior Anesthesia Complication  NO--Latex Allergy  NO--Loose teeth  NO--History of Rheumatoid Arthritis  NO--JARETH  -Bleeding risk- ELIQUIS   NO--Other_____   written and verbal instructions with teach back on chlorhexidine shampoo provided,  pt verbalized understanding with returned demonstration  Patient verbalized understanding of instructions and was given the opportunity to ask questions and have them answered.    pt was canceled due to covid restrictions on elective surgeries   per pt the only change in medical hx was treated for uti by urology - per pt sample obtain from urostomy bag, other wise no interim changes in medical hx

## 2022-01-11 NOTE — H&P PST ADULT - NSICDXPASTMEDICALHX_GEN_ALL_CORE_FT
PAST MEDICAL HISTORY:  Bladder cancer secondary to exposure at [employer] 9/11 s/p cystectomy with urostomy    Chronic kidney disease (CKD) stage 3A, baseline creatinine 1.4    Diabetes mellitus, type 2     DVT, lower extremity     History of atrial fibrillation     Hyperlipidemia     Hypertension

## 2022-01-11 NOTE — H&P PST ADULT - REASON FOR ADMISSION
RETRIEVAL OF INFERIOR VENA CAVA FILTER scheduled on 1/5 under LSB  at [facility] OR by Dr Ames

## 2022-01-16 ENCOUNTER — LABORATORY RESULT (OUTPATIENT)
Age: 82
End: 2022-01-16

## 2022-01-18 NOTE — ASU PATIENT PROFILE, ADULT - NSICDXPASTMEDICALHX_GEN_ALL_CORE_FT
PAST MEDICAL HISTORY:  Bladder cancer secondary to exposure at [employer] 9/11 s/p cystectomy with urostomy    Chronic kidney disease (CKD) stage 3A, baseline creatinine 1.4    Diabetes mellitus, type 2     DVT, lower extremity     History of atrial fibrillation     Hyperlipidemia     Hypertension

## 2022-01-19 ENCOUNTER — APPOINTMENT (OUTPATIENT)
Dept: VASCULAR SURGERY | Facility: HOSPITAL | Age: 82
End: 2022-01-19

## 2022-01-19 ENCOUNTER — OUTPATIENT (OUTPATIENT)
Dept: OUTPATIENT SERVICES | Facility: HOSPITAL | Age: 82
LOS: 1 days | Discharge: HOME | End: 2022-01-19
Payer: MEDICARE

## 2022-01-19 ENCOUNTER — RESULT REVIEW (OUTPATIENT)
Age: 82
End: 2022-01-19

## 2022-01-19 VITALS
HEART RATE: 65 BPM | RESPIRATION RATE: 17 BRPM | OXYGEN SATURATION: 98 % | DIASTOLIC BLOOD PRESSURE: 78 MMHG | SYSTOLIC BLOOD PRESSURE: 155 MMHG

## 2022-01-19 VITALS
SYSTOLIC BLOOD PRESSURE: 141 MMHG | WEIGHT: 268.08 LBS | HEART RATE: 63 BPM | TEMPERATURE: 99 F | RESPIRATION RATE: 17 BRPM | DIASTOLIC BLOOD PRESSURE: 70 MMHG | OXYGEN SATURATION: 96 % | HEIGHT: 72 IN

## 2022-01-19 DIAGNOSIS — Z90.6 ACQUIRED ABSENCE OF OTHER PARTS OF URINARY TRACT: Chronic | ICD-10-CM

## 2022-01-19 DIAGNOSIS — Z96.643 PRESENCE OF ARTIFICIAL HIP JOINT, BILATERAL: Chronic | ICD-10-CM

## 2022-01-19 DIAGNOSIS — Z96.653 PRESENCE OF ARTIFICIAL KNEE JOINT, BILATERAL: Chronic | ICD-10-CM

## 2022-01-19 LAB
BLD GP AB SCN SERPL QL: SIGNIFICANT CHANGE UP
GLUCOSE BLDC GLUCOMTR-MCNC: 121 MG/DL — HIGH (ref 70–99)

## 2022-01-19 PROCEDURE — 88300 SURGICAL PATH GROSS: CPT | Mod: 26

## 2022-01-19 PROCEDURE — 37193 REM ENDOVAS VENA CAVA FILTER: CPT

## 2022-01-19 RX ORDER — HYDROMORPHONE HYDROCHLORIDE 2 MG/ML
1 INJECTION INTRAMUSCULAR; INTRAVENOUS; SUBCUTANEOUS
Refills: 0 | Status: DISCONTINUED | OUTPATIENT
Start: 2022-01-19 | End: 2022-01-19

## 2022-01-19 RX ORDER — MEPERIDINE HYDROCHLORIDE 50 MG/ML
12.5 INJECTION INTRAMUSCULAR; INTRAVENOUS; SUBCUTANEOUS
Refills: 0 | Status: DISCONTINUED | OUTPATIENT
Start: 2022-01-19 | End: 2022-01-19

## 2022-01-19 RX ORDER — ONDANSETRON 8 MG/1
4 TABLET, FILM COATED ORAL ONCE
Refills: 0 | Status: DISCONTINUED | OUTPATIENT
Start: 2022-01-19 | End: 2022-01-19

## 2022-01-19 RX ORDER — SODIUM CHLORIDE 9 MG/ML
1000 INJECTION, SOLUTION INTRAVENOUS
Refills: 0 | Status: DISCONTINUED | OUTPATIENT
Start: 2022-01-19 | End: 2022-01-19

## 2022-01-19 RX ORDER — HYDROMORPHONE HYDROCHLORIDE 2 MG/ML
0.5 INJECTION INTRAMUSCULAR; INTRAVENOUS; SUBCUTANEOUS
Refills: 0 | Status: DISCONTINUED | OUTPATIENT
Start: 2022-01-19 | End: 2022-01-19

## 2022-01-19 NOTE — ASU DISCHARGE PLAN (ADULT/PEDIATRIC) - CARE PROVIDER_API CALL
Cale Ames)  Surgery; Vascular Surgery  [address] [place] [address]  Phone: [phone]  Fax: [phone]  Follow Up Time:

## 2022-01-19 NOTE — CHART NOTE - NSCHARTNOTEFT_GEN_A_CORE
PACU ANESTHESIA ADMISSION NOTE      Procedure: IVC filter retrieval  Post op diagnosis:  h/o DVT    ____  Intubated  TV:______       Rate: ______      FiO2: ______    __x__  Patent Airway    __x__  Full return of protective reflexes    __x__  Full recovery from anesthesia / back to baseline status    Vitals:  T(C): 37.1 (01-19-22 @ 07:17), Max: 37.1 (01-19-22 @ 06:26)  HR: 63 (01-19-22 @ 07:17) (63 - 63)  BP: 141/70 (01-19-22 @ 07:17) (141/70 - 141/70)  RR: 17 (01-19-22 @ 07:17) (17 - 17)  SpO2: 96% (01-19-22 @ 07:17) (96% - 96%)    Mental Status:  __x__ Awake   ___x__ Alert   _____ Drowsy   _____ Sedated    Nausea/Vomiting:  __x__ NO  ______Yes,   See Post - Op Orders          Pain Scale (0-10):  _____    Treatment: ____ None    __x__ See Post - Op/PCA Orders    Post - Operative Fluids:   ____ Oral   __x__ See Post - Op Orders    Plan: Discharge:   x____Home       _____Floor     _____Critical Care    _____  Other:_________________    Comments: Patient had smooth intraoperative event, no anesthesia complication.  PACU Vital signs: HR:     65       BP: 155       /  65        RR: 18            O2 Sat:     97%     Temp 97.5

## 2022-01-20 LAB — SURGICAL PATHOLOGY STUDY: SIGNIFICANT CHANGE UP

## 2022-02-02 DIAGNOSIS — I12.9 HYPERTENSIVE CHRONIC KIDNEY DISEASE WITH STAGE 1 THROUGH STAGE 4 CHRONIC KIDNEY DISEASE, OR UNSPECIFIED CHRONIC KIDNEY DISEASE: ICD-10-CM

## 2022-02-02 DIAGNOSIS — Z79.84 LONG TERM (CURRENT) USE OF ORAL HYPOGLYCEMIC DRUGS: ICD-10-CM

## 2022-02-02 DIAGNOSIS — Z85.51 PERSONAL HISTORY OF MALIGNANT NEOPLASM OF BLADDER: ICD-10-CM

## 2022-02-02 DIAGNOSIS — E11.22 TYPE 2 DIABETES MELLITUS WITH DIABETIC CHRONIC KIDNEY DISEASE: ICD-10-CM

## 2022-02-02 DIAGNOSIS — I82.409 ACUTE EMBOLISM AND THROMBOSIS OF UNSPECIFIED DEEP VEINS OF UNSPECIFIED LOWER EXTREMITY: ICD-10-CM

## 2022-02-02 DIAGNOSIS — Z96.653 PRESENCE OF ARTIFICIAL KNEE JOINT, BILATERAL: ICD-10-CM

## 2022-02-02 DIAGNOSIS — Z96.643 PRESENCE OF ARTIFICIAL HIP JOINT, BILATERAL: ICD-10-CM

## 2022-02-02 DIAGNOSIS — E78.5 HYPERLIPIDEMIA, UNSPECIFIED: ICD-10-CM

## 2022-02-02 DIAGNOSIS — N18.31 CHRONIC KIDNEY DISEASE, STAGE 3A: ICD-10-CM

## 2022-02-08 ENCOUNTER — APPOINTMENT (OUTPATIENT)
Dept: VASCULAR SURGERY | Facility: CLINIC | Age: 82
End: 2022-02-08
Payer: MEDICARE

## 2022-02-08 VITALS
SYSTOLIC BLOOD PRESSURE: 130 MMHG | BODY MASS INDEX: 36.57 KG/M2 | WEIGHT: 270 LBS | HEIGHT: 72 IN | DIASTOLIC BLOOD PRESSURE: 80 MMHG

## 2022-02-08 PROCEDURE — 99212 OFFICE O/P EST SF 10 MIN: CPT

## 2022-02-08 NOTE — HISTORY OF PRESENT ILLNESS
[FreeTextEntry1] : 82 y/o gentleman who was hospitalized for COVID 19 infection, presented with confusion and hypoxia, hospitalization complicated by LORRAINE, GI bleed, and extensive bilateral lower extremity DVT, underwent IVC filter by interventional radiology on 4/8/2021, developed worsening lower extremity symptoms and underwent EGD that was negative for active bleed, capsule endoscopy that showed multiple clean based duodenal ulcers, colonoscopy was limited due to ventral hernia. He underwent bilateral lower extremity venogram and thrombolysis and thrombectomy and IVC filter thrombectomy on 5/6/2021, and left CIV angioplasty.\par \par He has been on on Eliquis for the last months , He  denies any GI bleed, ambulating with walker, leg swelling improving. [de-identified] : He returns today s/p filter removal.

## 2022-02-08 NOTE — ASSESSMENT
[FreeTextEntry1] : 80 y/o gentleman with provoked bilateral lower extremity DVT, s/p IVC filter, left CIV venoplasty and lower extremity thrombectomy. This was secondary to COVID infection and prolonged hospitalization.\par \par Today on duplex there is no evidence of acute deep venous thrombosis or superficial thrombophlebitis. No major veins patent compressible. Left there is partial resolution of thrombus seen in the femoral vein. There is chronic changes of residual thrombus in the popliteal vein. The common femoral vein is patent and free of thrombus. IVC duplex very limited secondary to bowel gas collection in depth. The IVC with Blunt filter and right common iliac veins were not visualized. The left common iliac vein was patent and free of thrombus. The body of the stent was not visualized. The external iliac vein was patent bilaterally with no evidence of thrombosis. From my standpoint I recommend the patient have an IVC filter atrial. The patient will remain on a coagulation for his atrial fibrillation. \par \par I removed the filter 2 weeks ago. He is doing well and the wound is healed. He will return to the office in 5 months for a repeat duplex.

## 2022-05-10 ENCOUNTER — APPOINTMENT (OUTPATIENT)
Dept: VASCULAR SURGERY | Facility: CLINIC | Age: 82
End: 2022-05-10
Payer: MEDICARE

## 2022-05-10 VITALS
HEIGHT: 72 IN | SYSTOLIC BLOOD PRESSURE: 135 MMHG | BODY MASS INDEX: 37.25 KG/M2 | DIASTOLIC BLOOD PRESSURE: 80 MMHG | WEIGHT: 275 LBS

## 2022-05-10 DIAGNOSIS — I82.409 ACUTE EMBOLISM AND THROMBOSIS OF UNSPECIFIED DEEP VEINS OF UNSPECIFIED LOWER EXTREMITY: ICD-10-CM

## 2022-05-10 PROCEDURE — 93971 EXTREMITY STUDY: CPT

## 2022-05-10 PROCEDURE — 99214 OFFICE O/P EST MOD 30 MIN: CPT

## 2022-05-10 NOTE — HISTORY OF PRESENT ILLNESS
[FreeTextEntry1] : 82 y/o gentleman who was hospitalized for COVID 19 infection, presented with confusion and hypoxia, hospitalization complicated by LORRAINE, GI bleed, and extensive bilateral lower extremity DVT, underwent IVC filter by interventional radiology on 4/8/2021, developed worsening lower extremity symptoms and underwent EGD that was negative for active bleed, capsule endoscopy that showed multiple clean based duodenal ulcers, colonoscopy was limited due to ventral hernia. He underwent bilateral lower extremity venogram and thrombolysis and thrombectomy and IVC filter thrombectomy on 5/6/2021, and left CIV angioplasty.\par \par He has been on on Eliquis for the last months , He  denies any GI bleed, ambulating with walker, leg swelling improving. [de-identified] : He returns today s/p filter removal.

## 2022-05-10 NOTE — DATA REVIEWED
[FreeTextEntry1] : I performed a venous duplex which was medically necessary to evaluate for DVT and bilateral CIV were not visualized due to bowel gas, EIV were patent bilaterally, no DVT in the right lower extremity, partial resolution of thrombus in the proximal left femoral vein,  resolution of thrombus in the mid to distal femoral and popliteal veins.\par

## 2022-05-10 NOTE — ASSESSMENT
[FreeTextEntry1] : 82 y/o gentleman with provoked bilateral lower extremity DVT, s/p IVC filter, left CIV venoplasty and lower extremity thrombectomy.April 26, 2021 This was secondary to COVID infection and prolonged hospitalization. the patient underwent an IVC filter retrieval in January of 2022\par \par Today on duplex there is no evidence of acute deep venous thrombosis or superficial thrombophlebitis. No major veins patent compressible. Left there is  resolution of thrombus seen in the femoral vein. There is chronic changes of residual thrombus in the popliteal vein. The common femoral vein is patent and free of thrombus. IVC duplex very limited secondary to bowel gas collection in depth. The IVC  and right common iliac veins were not visualized. The left common iliac vein was patent and free of thrombus.. The external iliac vein was patent bilaterally with no evidence of thrombosis. From my standpoint. The patient will remain on a coagulation for his atrial fibrillation. \par \par I would like to the patient back in my office in one years time or sooner for new symptoms develop

## 2022-05-10 NOTE — REASON FOR VISIT
[Follow-Up: _____] : a [unfilled] follow-up visit [FreeTextEntry1] : Hx DVT and Thrombectomy IVC filter placment 4/26/21 IVC filter retrieval 1/2022

## 2022-06-20 ENCOUNTER — LABORATORY RESULT (OUTPATIENT)
Age: 82
End: 2022-06-20

## 2022-06-20 ENCOUNTER — APPOINTMENT (OUTPATIENT)
Dept: CARDIOLOGY | Facility: CLINIC | Age: 82
End: 2022-06-20
Payer: MEDICARE

## 2022-06-20 PROCEDURE — 93306 TTE W/DOPPLER COMPLETE: CPT

## 2022-06-22 ENCOUNTER — APPOINTMENT (OUTPATIENT)
Dept: CARDIOLOGY | Facility: CLINIC | Age: 82
End: 2022-06-22
Payer: MEDICARE

## 2022-06-22 VITALS
BODY MASS INDEX: 38.74 KG/M2 | SYSTOLIC BLOOD PRESSURE: 140 MMHG | WEIGHT: 286 LBS | DIASTOLIC BLOOD PRESSURE: 80 MMHG | HEIGHT: 72 IN

## 2022-06-22 PROCEDURE — 93000 ELECTROCARDIOGRAM COMPLETE: CPT

## 2022-06-22 PROCEDURE — 99213 OFFICE O/P EST LOW 20 MIN: CPT

## 2022-06-22 RX ORDER — SODIUM BICARBONATE 650 MG/1
650 TABLET ORAL
Qty: 180 | Refills: 0 | Status: ACTIVE | COMMUNITY
Start: 2021-08-13

## 2022-06-22 RX ORDER — METOPROLOL SUCCINATE 50 MG/1
50 TABLET, EXTENDED RELEASE ORAL
Qty: 90 | Refills: 0 | Status: ACTIVE | COMMUNITY
Start: 2022-01-25

## 2022-06-22 RX ORDER — GLIMEPIRIDE 2 MG/1
2 TABLET ORAL DAILY
Refills: 0 | Status: ACTIVE | COMMUNITY

## 2022-06-22 RX ORDER — CHLORHEXIDINE GLUCONATE 4 %
325 (65 FE) LIQUID (ML) TOPICAL
Refills: 0 | Status: DISCONTINUED | COMMUNITY
End: 2022-06-22

## 2022-06-22 RX ORDER — METOPROLOL SUCCINATE 100 MG/1
100 TABLET, EXTENDED RELEASE ORAL DAILY
Refills: 0 | Status: DISCONTINUED | COMMUNITY
End: 2022-06-22

## 2022-06-22 RX ORDER — FUROSEMIDE 20 MG/1
20 TABLET ORAL DAILY
Refills: 0 | Status: ACTIVE | COMMUNITY

## 2022-06-22 RX ORDER — ORAL SEMAGLUTIDE 14 MG/1
14 TABLET ORAL
Qty: 15 | Refills: 0 | Status: ACTIVE | COMMUNITY
Start: 2021-12-08

## 2022-06-22 RX ORDER — ACETAMINOPHEN 325 MG/1
325 TABLET ORAL
Refills: 0 | Status: DISCONTINUED | COMMUNITY
End: 2022-06-22

## 2022-09-25 ENCOUNTER — NON-APPOINTMENT (OUTPATIENT)
Age: 82
End: 2022-09-25

## 2023-01-25 ENCOUNTER — APPOINTMENT (OUTPATIENT)
Dept: CARDIOLOGY | Facility: CLINIC | Age: 83
End: 2023-01-25

## 2023-03-28 ENCOUNTER — INPATIENT (INPATIENT)
Facility: HOSPITAL | Age: 83
LOS: 3 days | Discharge: HOME HEALTH | DRG: 871 | End: 2023-04-01
Attending: INTERNAL MEDICINE | Admitting: INTERNAL MEDICINE
Payer: MEDICARE

## 2023-03-28 VITALS
HEIGHT: 72 IN | TEMPERATURE: 102 F | OXYGEN SATURATION: 95 % | DIASTOLIC BLOOD PRESSURE: 55 MMHG | RESPIRATION RATE: 32 BRPM | SYSTOLIC BLOOD PRESSURE: 120 MMHG | HEART RATE: 68 BPM | WEIGHT: 285.06 LBS

## 2023-03-28 DIAGNOSIS — Z96.653 PRESENCE OF ARTIFICIAL KNEE JOINT, BILATERAL: Chronic | ICD-10-CM

## 2023-03-28 DIAGNOSIS — I95.9 HYPOTENSION, UNSPECIFIED: ICD-10-CM

## 2023-03-28 DIAGNOSIS — Z96.643 PRESENCE OF ARTIFICIAL HIP JOINT, BILATERAL: Chronic | ICD-10-CM

## 2023-03-28 DIAGNOSIS — Z90.6 ACQUIRED ABSENCE OF OTHER PARTS OF URINARY TRACT: Chronic | ICD-10-CM

## 2023-03-28 LAB
ALBUMIN SERPL ELPH-MCNC: 3.6 G/DL — SIGNIFICANT CHANGE UP (ref 3.5–5.2)
ALP SERPL-CCNC: 97 U/L — SIGNIFICANT CHANGE UP (ref 30–115)
ALT FLD-CCNC: 11 U/L — SIGNIFICANT CHANGE UP (ref 0–41)
ANION GAP SERPL CALC-SCNC: 17 MMOL/L — HIGH (ref 7–14)
APPEARANCE UR: ABNORMAL
APTT BLD: 30.4 SEC — SIGNIFICANT CHANGE UP (ref 27–39.2)
AST SERPL-CCNC: 24 U/L — SIGNIFICANT CHANGE UP (ref 0–41)
B-OH-BUTYR SERPL-SCNC: 0.4 MMOL/L — SIGNIFICANT CHANGE UP
BACTERIA # UR AUTO: ABNORMAL
BASE EXCESS BLDV CALC-SCNC: -6.2 MMOL/L — LOW (ref -2–3)
BASE EXCESS BLDV CALC-SCNC: -6.6 MMOL/L — LOW (ref -2–3)
BASOPHILS # BLD AUTO: 0.08 K/UL — SIGNIFICANT CHANGE UP (ref 0–0.2)
BASOPHILS NFR BLD AUTO: 2 % — HIGH (ref 0–1)
BILIRUB SERPL-MCNC: 1.6 MG/DL — HIGH (ref 0.2–1.2)
BILIRUB UR-MCNC: NEGATIVE — SIGNIFICANT CHANGE UP
BLD GP AB SCN SERPL QL: SIGNIFICANT CHANGE UP
BUN SERPL-MCNC: 50 MG/DL — HIGH (ref 10–20)
CA-I SERPL-SCNC: 1.14 MMOL/L — LOW (ref 1.15–1.33)
CA-I SERPL-SCNC: 1.2 MMOL/L — SIGNIFICANT CHANGE UP (ref 1.15–1.33)
CALCIUM SERPL-MCNC: 8.6 MG/DL — SIGNIFICANT CHANGE UP (ref 8.4–10.5)
CHLORIDE SERPL-SCNC: 98 MMOL/L — SIGNIFICANT CHANGE UP (ref 98–110)
CO2 SERPL-SCNC: 18 MMOL/L — SIGNIFICANT CHANGE UP (ref 17–32)
COLOR SPEC: YELLOW — SIGNIFICANT CHANGE UP
CREAT SERPL-MCNC: 2.8 MG/DL — HIGH (ref 0.7–1.5)
DIFF PNL FLD: ABNORMAL
EGFR: 22 ML/MIN/1.73M2 — LOW
EOSINOPHIL NFR BLD AUTO: 0 % — SIGNIFICANT CHANGE UP (ref 0–8)
EPI CELLS # UR: ABNORMAL /HPF
FLUAV AG NPH QL: SIGNIFICANT CHANGE UP
FLUBV AG NPH QL: SIGNIFICANT CHANGE UP
GAS PNL BLDV: 127 MMOL/L — LOW (ref 136–145)
GAS PNL BLDV: 132 MMOL/L — LOW (ref 136–145)
GAS PNL BLDV: SIGNIFICANT CHANGE UP
GIANT PLATELETS BLD QL SMEAR: PRESENT — SIGNIFICANT CHANGE UP
GLUCOSE BLDC GLUCOMTR-MCNC: 185 MG/DL — HIGH (ref 70–99)
GLUCOSE SERPL-MCNC: 141 MG/DL — HIGH (ref 70–99)
GLUCOSE UR QL: NEGATIVE MG/DL — SIGNIFICANT CHANGE UP
HCO3 BLDV-SCNC: 20 MMOL/L — LOW (ref 22–29)
HCO3 BLDV-SCNC: 20 MMOL/L — LOW (ref 22–29)
HCT VFR BLD CALC: 49.6 % — SIGNIFICANT CHANGE UP (ref 42–52)
HCT VFR BLDA CALC: 45 % — SIGNIFICANT CHANGE UP (ref 39–51)
HCT VFR BLDA CALC: 48 % — SIGNIFICANT CHANGE UP (ref 39–51)
HGB BLD CALC-MCNC: 14.9 G/DL — SIGNIFICANT CHANGE UP (ref 12.6–17.4)
HGB BLD CALC-MCNC: 16.1 G/DL — SIGNIFICANT CHANGE UP (ref 12.6–17.4)
HGB BLD-MCNC: 15.7 G/DL — SIGNIFICANT CHANGE UP (ref 14–18)
INR BLD: 1.56 RATIO — HIGH (ref 0.65–1.3)
KETONES UR-MCNC: ABNORMAL
LACTATE BLDV-MCNC: 6.7 MMOL/L — CRITICAL HIGH (ref 0.5–2)
LACTATE BLDV-MCNC: 8.8 MMOL/L — CRITICAL HIGH (ref 0.5–2)
LACTATE SERPL-SCNC: 6 MMOL/L — CRITICAL HIGH (ref 0.7–2)
LACTATE SERPL-SCNC: 8.3 MMOL/L — CRITICAL HIGH (ref 0.7–2)
LEUKOCYTE ESTERASE UR-ACNC: ABNORMAL
LG PLATELETS BLD QL AUTO: SLIGHT — SIGNIFICANT CHANGE UP
LYMPHOCYTES # BLD AUTO: 0.23 K/UL — LOW (ref 1.2–3.4)
LYMPHOCYTES # BLD AUTO: 6 % — LOW (ref 20.5–51.1)
MANUAL SMEAR VERIFICATION: YES — SIGNIFICANT CHANGE UP
MCHC RBC-ENTMCNC: 27.4 PG — SIGNIFICANT CHANGE UP (ref 27–31)
MCHC RBC-ENTMCNC: 31.7 G/DL — LOW (ref 32–37)
MCV RBC AUTO: 86.6 FL — SIGNIFICANT CHANGE UP (ref 80–94)
MONOCYTES # BLD AUTO: 0.04 K/UL — LOW (ref 0.1–0.6)
MONOCYTES NFR BLD AUTO: 1 % — LOW (ref 1.7–9.3)
NEUTROPHILS # BLD AUTO: 3.54 K/UL — SIGNIFICANT CHANGE UP (ref 1.4–6.5)
NEUTROPHILS NFR BLD AUTO: 83 % — HIGH (ref 42.2–75.2)
NEUTS BAND # BLD: 8 % — HIGH (ref 0–6)
NITRITE UR-MCNC: NEGATIVE — SIGNIFICANT CHANGE UP
NRBC # BLD: 0 /100 — SIGNIFICANT CHANGE UP (ref 0–0)
NRBC # BLD: SIGNIFICANT CHANGE UP /100 WBCS (ref 0–0)
PCO2 BLDV: 42 MMHG — SIGNIFICANT CHANGE UP (ref 42–55)
PCO2 BLDV: 44 MMHG — SIGNIFICANT CHANGE UP (ref 42–55)
PH BLDV: 7.27 — LOW (ref 7.32–7.43)
PH BLDV: 7.29 — LOW (ref 7.32–7.43)
PH UR: 6 — SIGNIFICANT CHANGE UP (ref 5–8)
PLAT MORPH BLD: NORMAL — SIGNIFICANT CHANGE UP
PLATELET # BLD AUTO: 160 K/UL — SIGNIFICANT CHANGE UP (ref 130–400)
PO2 BLDV: 15 MMHG — SIGNIFICANT CHANGE UP
PO2 BLDV: 19 MMHG — SIGNIFICANT CHANGE UP
POTASSIUM BLDV-SCNC: 4 MMOL/L — SIGNIFICANT CHANGE UP (ref 3.5–5.1)
POTASSIUM BLDV-SCNC: 4.7 MMOL/L — SIGNIFICANT CHANGE UP (ref 3.5–5.1)
POTASSIUM SERPL-MCNC: 3.9 MMOL/L — SIGNIFICANT CHANGE UP (ref 3.5–5)
POTASSIUM SERPL-SCNC: 3.9 MMOL/L — SIGNIFICANT CHANGE UP (ref 3.5–5)
PROT SERPL-MCNC: 6 G/DL — SIGNIFICANT CHANGE UP (ref 6–8)
PROT UR-MCNC: 100 MG/DL
PROTHROM AB SERPL-ACNC: 18 SEC — HIGH (ref 9.95–12.87)
RBC # BLD: 5.73 M/UL — SIGNIFICANT CHANGE UP (ref 4.7–6.1)
RBC # FLD: 15.9 % — HIGH (ref 11.5–14.5)
RBC BLD AUTO: NORMAL — SIGNIFICANT CHANGE UP
RBC CASTS # UR COMP ASSIST: ABNORMAL /HPF
RSV RNA NPH QL NAA+NON-PROBE: SIGNIFICANT CHANGE UP
SAO2 % BLDV: 22.6 % — SIGNIFICANT CHANGE UP
SAO2 % BLDV: 27.5 % — SIGNIFICANT CHANGE UP
SARS-COV-2 RNA SPEC QL NAA+PROBE: SIGNIFICANT CHANGE UP
SODIUM SERPL-SCNC: 133 MMOL/L — LOW (ref 135–146)
SP GR SPEC: 1.01 — SIGNIFICANT CHANGE UP (ref 1.01–1.03)
TROPONIN T SERPL-MCNC: 0.1 NG/ML — CRITICAL HIGH
UROBILINOGEN FLD QL: 0.2 MG/DL — SIGNIFICANT CHANGE UP
WBC # BLD: 3.89 K/UL — LOW (ref 4.8–10.8)
WBC # FLD AUTO: 3.89 K/UL — LOW (ref 4.8–10.8)
WBC UR QL: ABNORMAL /HPF

## 2023-03-28 PROCEDURE — 87040 BLOOD CULTURE FOR BACTERIA: CPT

## 2023-03-28 PROCEDURE — 83735 ASSAY OF MAGNESIUM: CPT

## 2023-03-28 PROCEDURE — 76705 ECHO EXAM OF ABDOMEN: CPT

## 2023-03-28 PROCEDURE — 85027 COMPLETE CBC AUTOMATED: CPT

## 2023-03-28 PROCEDURE — 99285 EMERGENCY DEPT VISIT HI MDM: CPT | Mod: 25

## 2023-03-28 PROCEDURE — 93005 ELECTROCARDIOGRAM TRACING: CPT

## 2023-03-28 PROCEDURE — 87186 SC STD MICRODIL/AGAR DIL: CPT

## 2023-03-28 PROCEDURE — 97163 PT EVAL HIGH COMPLEX 45 MIN: CPT | Mod: GP

## 2023-03-28 PROCEDURE — C9113: CPT

## 2023-03-28 PROCEDURE — 80053 COMPREHEN METABOLIC PANEL: CPT

## 2023-03-28 PROCEDURE — 50432 PLMT NEPHROSTOMY CATHETER: CPT | Mod: RT

## 2023-03-28 PROCEDURE — 85610 PROTHROMBIN TIME: CPT

## 2023-03-28 PROCEDURE — 36556 INSERT NON-TUNNEL CV CATH: CPT

## 2023-03-28 PROCEDURE — 71045 X-RAY EXAM CHEST 1 VIEW: CPT | Mod: 26

## 2023-03-28 PROCEDURE — 80061 LIPID PANEL: CPT

## 2023-03-28 PROCEDURE — C1729: CPT

## 2023-03-28 PROCEDURE — 82962 GLUCOSE BLOOD TEST: CPT

## 2023-03-28 PROCEDURE — 80202 ASSAY OF VANCOMYCIN: CPT

## 2023-03-28 PROCEDURE — C1769: CPT

## 2023-03-28 PROCEDURE — 85730 THROMBOPLASTIN TIME PARTIAL: CPT

## 2023-03-28 PROCEDURE — 71045 X-RAY EXAM CHEST 1 VIEW: CPT | Mod: 26,77

## 2023-03-28 PROCEDURE — 83036 HEMOGLOBIN GLYCOSYLATED A1C: CPT

## 2023-03-28 PROCEDURE — 71045 X-RAY EXAM CHEST 1 VIEW: CPT

## 2023-03-28 PROCEDURE — 83605 ASSAY OF LACTIC ACID: CPT

## 2023-03-28 PROCEDURE — 97162 PT EVAL MOD COMPLEX 30 MIN: CPT | Mod: GP

## 2023-03-28 PROCEDURE — 94760 N-INVAS EAR/PLS OXIMETRY 1: CPT

## 2023-03-28 PROCEDURE — 93306 TTE W/DOPPLER COMPLETE: CPT

## 2023-03-28 PROCEDURE — 82248 BILIRUBIN DIRECT: CPT

## 2023-03-28 PROCEDURE — 74177 CT ABD & PELVIS W/CONTRAST: CPT | Mod: 26,MC

## 2023-03-28 PROCEDURE — 82247 BILIRUBIN TOTAL: CPT

## 2023-03-28 PROCEDURE — 36415 COLL VENOUS BLD VENIPUNCTURE: CPT

## 2023-03-28 PROCEDURE — 87077 CULTURE AEROBIC IDENTIFY: CPT

## 2023-03-28 RX ORDER — SODIUM BICARBONATE 1 MEQ/ML
0 SYRINGE (ML) INTRAVENOUS
Qty: 0 | Refills: 0 | DISCHARGE

## 2023-03-28 RX ORDER — VANCOMYCIN HCL 1 G
1000 VIAL (EA) INTRAVENOUS ONCE
Refills: 0 | Status: COMPLETED | OUTPATIENT
Start: 2023-03-28 | End: 2023-03-28

## 2023-03-28 RX ORDER — FUROSEMIDE 40 MG
1 TABLET ORAL
Qty: 0 | Refills: 0 | DISCHARGE

## 2023-03-28 RX ORDER — CEFEPIME 1 G/1
1000 INJECTION, POWDER, FOR SOLUTION INTRAMUSCULAR; INTRAVENOUS ONCE
Refills: 0 | Status: COMPLETED | OUTPATIENT
Start: 2023-03-28 | End: 2023-03-28

## 2023-03-28 RX ORDER — ROSUVASTATIN CALCIUM 5 MG/1
1 TABLET ORAL
Qty: 0 | Refills: 0 | DISCHARGE

## 2023-03-28 RX ORDER — GLIMEPIRIDE 1 MG
1 TABLET ORAL
Qty: 0 | Refills: 0 | DISCHARGE

## 2023-03-28 RX ORDER — APIXABAN 2.5 MG/1
1 TABLET, FILM COATED ORAL
Refills: 0 | DISCHARGE

## 2023-03-28 RX ORDER — SODIUM CHLORIDE 9 MG/ML
1000 INJECTION, SOLUTION INTRAVENOUS
Refills: 0 | Status: DISCONTINUED | OUTPATIENT
Start: 2023-03-28 | End: 2023-04-01

## 2023-03-28 RX ORDER — SODIUM CHLORIDE 9 MG/ML
4000 INJECTION, SOLUTION INTRAVENOUS ONCE
Refills: 0 | Status: COMPLETED | OUTPATIENT
Start: 2023-03-28 | End: 2023-03-28

## 2023-03-28 RX ORDER — SEMAGLUTIDE 0.68 MG/ML
1 INJECTION, SOLUTION SUBCUTANEOUS
Qty: 0 | Refills: 0 | DISCHARGE

## 2023-03-28 RX ORDER — DEXTROSE 50 % IN WATER 50 %
12.5 SYRINGE (ML) INTRAVENOUS ONCE
Refills: 0 | Status: DISCONTINUED | OUTPATIENT
Start: 2023-03-28 | End: 2023-04-01

## 2023-03-28 RX ORDER — DEXTROSE 50 % IN WATER 50 %
15 SYRINGE (ML) INTRAVENOUS ONCE
Refills: 0 | Status: DISCONTINUED | OUTPATIENT
Start: 2023-03-28 | End: 2023-04-01

## 2023-03-28 RX ORDER — METOPROLOL TARTRATE 50 MG
5 TABLET ORAL ONCE
Refills: 0 | Status: COMPLETED | OUTPATIENT
Start: 2023-03-28 | End: 2023-03-28

## 2023-03-28 RX ORDER — SODIUM CHLORIDE 9 MG/ML
1000 INJECTION, SOLUTION INTRAVENOUS
Refills: 0 | Status: DISCONTINUED | OUTPATIENT
Start: 2023-03-28 | End: 2023-03-29

## 2023-03-28 RX ORDER — DEXTROSE 50 % IN WATER 50 %
25 SYRINGE (ML) INTRAVENOUS ONCE
Refills: 0 | Status: DISCONTINUED | OUTPATIENT
Start: 2023-03-28 | End: 2023-04-01

## 2023-03-28 RX ORDER — PROTHROMBIN COMPLEX CONCENTRATE (HUMAN) 25.5; 16.5; 24; 22; 22; 26 [IU]/ML; [IU]/ML; [IU]/ML; [IU]/ML; [IU]/ML; [IU]/ML
2000 POWDER, FOR SOLUTION INTRAVENOUS ONCE
Refills: 0 | Status: COMPLETED | OUTPATIENT
Start: 2023-03-28 | End: 2023-03-28

## 2023-03-28 RX ORDER — INSULIN LISPRO 100/ML
VIAL (ML) SUBCUTANEOUS
Refills: 0 | Status: DISCONTINUED | OUTPATIENT
Start: 2023-03-28 | End: 2023-04-01

## 2023-03-28 RX ORDER — CEFEPIME 1 G/1
1000 INJECTION, POWDER, FOR SOLUTION INTRAMUSCULAR; INTRAVENOUS EVERY 12 HOURS
Refills: 0 | Status: DISCONTINUED | OUTPATIENT
Start: 2023-03-29 | End: 2023-03-29

## 2023-03-28 RX ORDER — OXYCODONE AND ACETAMINOPHEN 5; 325 MG/1; MG/1
1 TABLET ORAL
Refills: 0 | DISCHARGE

## 2023-03-28 RX ORDER — METOPROLOL TARTRATE 50 MG
1 TABLET ORAL
Refills: 0 | DISCHARGE

## 2023-03-28 RX ORDER — SODIUM CHLORIDE 9 MG/ML
1000 INJECTION, SOLUTION INTRAVENOUS ONCE
Refills: 0 | Status: COMPLETED | OUTPATIENT
Start: 2023-03-28 | End: 2023-03-28

## 2023-03-28 RX ORDER — POTASSIUM CITRATE MONOHYDRATE 100 %
0 POWDER (GRAM) MISCELLANEOUS
Qty: 0 | Refills: 0 | DISCHARGE

## 2023-03-28 RX ORDER — ACETAMINOPHEN 500 MG
975 TABLET ORAL ONCE
Refills: 0 | Status: COMPLETED | OUTPATIENT
Start: 2023-03-28 | End: 2023-03-28

## 2023-03-28 RX ORDER — NOREPINEPHRINE BITARTRATE/D5W 8 MG/250ML
0.05 PLASTIC BAG, INJECTION (ML) INTRAVENOUS
Qty: 8 | Refills: 0 | Status: DISCONTINUED | OUTPATIENT
Start: 2023-03-28 | End: 2023-03-30

## 2023-03-28 RX ORDER — VANCOMYCIN HCL 1 G
1000 VIAL (EA) INTRAVENOUS EVERY 12 HOURS
Refills: 0 | Status: DISCONTINUED | OUTPATIENT
Start: 2023-03-28 | End: 2023-03-29

## 2023-03-28 RX ORDER — GLUCAGON INJECTION, SOLUTION 0.5 MG/.1ML
1 INJECTION, SOLUTION SUBCUTANEOUS ONCE
Refills: 0 | Status: DISCONTINUED | OUTPATIENT
Start: 2023-03-28 | End: 2023-04-01

## 2023-03-28 RX ADMIN — PROTHROMBIN COMPLEX CONCENTRATE (HUMAN) 400 INTERNATIONAL UNIT(S): 25.5; 16.5; 24; 22; 22; 26 POWDER, FOR SOLUTION INTRAVENOUS at 21:57

## 2023-03-28 RX ADMIN — CEFEPIME 100 MILLIGRAM(S): 1 INJECTION, POWDER, FOR SOLUTION INTRAMUSCULAR; INTRAVENOUS at 16:49

## 2023-03-28 RX ADMIN — Medication 975 MILLIGRAM(S): at 21:25

## 2023-03-28 RX ADMIN — Medication 250 MILLIGRAM(S): at 19:00

## 2023-03-28 RX ADMIN — Medication 975 MILLIGRAM(S): at 22:00

## 2023-03-28 RX ADMIN — Medication 5 MILLIGRAM(S): at 17:05

## 2023-03-28 RX ADMIN — Medication 12.1 MICROGRAM(S)/KG/MIN: at 18:07

## 2023-03-28 RX ADMIN — Medication 975 MILLIGRAM(S): at 15:39

## 2023-03-28 RX ADMIN — CEFEPIME 100 MILLIGRAM(S): 1 INJECTION, POWDER, FOR SOLUTION INTRAMUSCULAR; INTRAVENOUS at 17:02

## 2023-03-28 RX ADMIN — SODIUM CHLORIDE 1000 MILLILITER(S): 9 INJECTION, SOLUTION INTRAVENOUS at 20:05

## 2023-03-28 RX ADMIN — SODIUM CHLORIDE 4000 MILLILITER(S): 9 INJECTION, SOLUTION INTRAVENOUS at 15:49

## 2023-03-28 NOTE — ED PROVIDER NOTE - PROGRESS NOTE DETAILS
Patient feels better, heart rate 140s to 150s, missed his metoprolol, will give small dose concomitant with fluids, I independently reviewed the CAT scan and discussed with radiology, has obstructing stone, KOSTA Rodriguez from urology consulted, endorsed to Dr. Solares Received sign out from Dr. Gomez pending labs, urine and urology evaluation. Urology and ICU made aware Urology bedside who state that due to patients urostomy requesting [facility] transfer for IR. IR team made aware. Ambulance called for lights and sirens. Dr. handley at [facility] made aware. Patient septic at this time. Received 2L of fluids and abx given. Patient still hypotensive and tachy. Femoral central line placed and levophed started. Urology bedside who state that due to patients urostomy requesting [place] transfer for IR. IR team made aware. Ambulance called for lights and sirens. Dr. handley at [facility] made aware. Patient septic at this time. Received 2L of fluids and abx given. Patient still hypotensive and tachy.  levophed started with improvement in vitals prior to transfer. [place] - pt arrived to [place]. NAD. on levophed. pt to HD stable otherwise. a&ox4. urology, IR, and anesthsia paged. IR pending anesthesia assessment. Urology states no intervention b/c pt does have a bladder or urethra. pending reassesss, dispo. signed out to KOSTA Onofre [place] - spoke w/ Dr. Collier - rec pt have eliquis reversal as pt is a renal pt and needs to have held 6 doses of eliquis; rec reassess, abx, IR reeval. anesthesia is busy upstairs and does not have staff to come see patient. ICU approval by Dr. Burgess and Manuel. [place] - [facility] approved for eliquis reversal. pt to receive 2K units as INR is <2. no ddavp. no andexxanet. 82-year-old male with history of bladder cancer with urostomy, A-fib and DVT on Eliquis, CKD, HTN, HLD, sent from [facility] ED for septic stone, urology deferring to IR due to presence of ostomy.  I discussed with Carmen of urology, confirms deferring to IR at this time.  IR requesting Eliquis reversal.  On discussion with patient, last Eliquis was either 7 or 9pm last night, and IR would still like to proceed with reversal.  Approved for [facility].  Patient states he feels comfortable at this time.  On exam, NAD, nontoxic appearing, no WOB, abd soft NT ND, skin warm dry. Central line placed and restarted norepi drip. Admitted to ICU. FF: I spoke with anesthesia dr. sparks in regards to case and anesthesia consult. dr. kim reports that he spoke with IR and was told the procedure is not going to be until tomorrow but will call dr. khan himself to clarify the urgent need for anesthesia and stat procedure. dr. handley currently on the phone with dr. khan as well. Pt not currently candidate for andexanet as per policy must have been taken within 24 hours; d/w CMO of hospital who confirmed; will give Kcentra; IR coming in to do procedure. D/w Dr. Collier of IR, as well as Dr. Russ of anesthesia who is currently at bedside.

## 2023-03-28 NOTE — ED PROVIDER NOTE - DISCUSSED CASE WITH MULTISELECT OPTIONS
Please send the following letter:    Dear Fouzia,    Your recent mammogram was normal. Annual mammograms are recommended, so you will be due again in May 2018.  You may receive a separate result letter in the mail from the imaging center.    Please contact the clinic if you have additional questions.  Thank you.    Sincerely,    Jaqui Edwards PA-C  Physician extender for Dr. Karen Weiler   Consultant

## 2023-03-28 NOTE — ED PROVIDER NOTE - ATTENDING APP SHARED VISIT CONTRIBUTION OF CARE
82-year-old male above past medical history was at a gathering this weekend, developed nasal congestion, sore throat, and dry cough, was going to see his PMD but then developed rigors, also mildly confused per son, also had right flank pain which resolved, no change in ileostomy output, no vomiting or diarrhea, no chest pain or shortness of breath, did not take his morning meds including his Eliquis, last ate 2 hours ago, on exam vitals appreciated, patient awake and alert, tachypneic with rigors, head NC/AT, PERRL, conjunctive a pink, dry mucous membranes, neck supple, cor tachycardic, irregular, lungs clear, abdomen NABS, soft with ventral hernia defect and ostomy with malaika drainage, no CVAT, has bilateral lower extremity edema at baseline, neurovascularly intact, will treat as sepsis, check labs, urine, imaging

## 2023-03-28 NOTE — ED ADULT NURSE NOTE - CHIEF COMPLAINT QUOTE
Pt brought in by ambulance from Dr. Bull's office for rule out sepsis. As per pt "I have a cough and runny nose." Son adds pt's blood sugar has been elevated for last several days. FS on scene [id number].

## 2023-03-28 NOTE — ED PROVIDER NOTE - PHYSICAL EXAMINATION
VITAL SIGNS: I have reviewed nursing notes and confirm.  CONSTITUTIONAL: acutely ill-appearing   SKIN:  skin exam is warm and dry, no acute rash.    HEAD: Normocephalic; atraumatic.  EYES: conjunctiva and sclera clear.  ENT: No nasal discharge; airway clear.  CARD: S1, S2 normal; no murmurs, gallops, or rubs. Regular rate and rhythm.   RESP: No wheezes, rales or rhonchi.  ABD: Normal bowel sounds; soft; non-distended; non-tender  EXT: Normal ROM.  No clubbing, cyanosis or edema.   NEURO: Alert, oriented, grossly unremarkable

## 2023-03-28 NOTE — ED PROCEDURE NOTE - CPROC ED INDICATIONS1
hemodynamic monitoring/volume resuscitation emergency venous access/hemodynamic monitoring/hypertonic/irritant infusion/volume resuscitation

## 2023-03-28 NOTE — H&P ADULT - NSHPPHYSICALEXAM_GEN_ALL_CORE
LOS:     VITALS:   T(C): 39.2 (03-28-23 @ 22:02), Max: 40.2 (03-28-23 @ 15:10)  HR: 98 (03-28-23 @ 22:02) (68 - 150)  BP: 111/57 (03-28-23 @ 22:02) (65/50 - 120/55)  RR: 20 (03-28-23 @ 22:02) (20 - 32)  SpO2: 95% (03-28-23 @ 22:02) (94% - 96%)    GENERAL: NAD, lying in bed comfortably  HEAD:  Atraumatic, Normocephalic  EYES: EOMI, PERRLA, conjunctiva and sclera clear  ENT: Moist mucous membranes  NECK: Supple, No JVD  CHEST/LUNG: Clear to auscultation bilaterally; No rales, rhonchi, wheezing, or rubs. Unlabored respirations  HEART: irregular  ABDOMEN: BSx4; Soft, nontender, nondistended. urostomy bag  EXTREMITIES:  2+ Peripheral Pulses, brisk capillary refill. No clubbing, cyanosis, or edema. right CVA tenderness  NERVOUS SYSTEM:  A&Ox3, no focal deficits   SKIN: No rashes or lesions

## 2023-03-28 NOTE — CONSULT NOTE ADULT - NS ATTEND AMEND GEN_ALL_CORE FT
Pt was seen and examined  Hx/o Radical cystectomy w/IC and urethrectomy  Case d/w IR attending  Will need Anti-coag reversal for placement of PCN

## 2023-03-28 NOTE — ED PROVIDER NOTE - CLINICAL SUMMARY MEDICAL DECISION MAKING FREE TEXT BOX
Septic shock 2/2 infected kidney stone. [place] for [place]. Given abx, norepi drip. Admitted to ICU

## 2023-03-28 NOTE — CONSULT NOTE ADULT - SUBJECTIVE AND OBJECTIVE BOX
82-year-old male above past medical history was at a gathering this weekend, developed nasal congestion, sore throat, and dry cough, was going to see his PMD but then developed rigors, also mildly confused per son, also had right flank   pain which resolved, no change in ileostomy output, no vomiting or diarrhea, no chest pain or shortness of breath, did not take his morning meds including his Eliquis    pt known to Dr. 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   on eliquis for AFIB    in ed afib with rvr/ fever of 104 / lactic acidosis/ increased trop    central line placed and pressors started    PAST MEDICAL & SURGICAL HISTORY:  Hypertension  Hyperlipidemia  Diabetes mellitus, type 2  History of atrial fibrillation  Bladder cancer  secondary to exposure at [employer] 9/11 s/p cystectomy with urostomy  Chronic kidney disease (CKD)  stage 3A, baseline creatinine 1.4  DVT, lower extremity  History of total hip replacement, bilateral  History of total knee replacement, bilateral  History of total cystectomy  with resultant urostomy    Allergies    No Known Allergies    Vital Signs Last 24 Hrs  T(C): 40.2 (28 Mar 2023 15:10), Max: 40.2 (28 Mar 2023 15:10)  T(F): 104.4 (28 Mar 2023 15:10), Max: 104.4 (28 Mar 2023 15:10)  HR: 134 (28 Mar 2023 16:57) (68 - 134)  BP: 105/60 (28 Mar 2023 16:57) (105/60 - 120/55)  BP(mean): --  RR: 22 (28 Mar 2023 16:57) (22 - 32)  SpO2: 96% (28 Mar 2023 16:57) (94% - 96%)    Parameters below as of 28 Mar 2023 16:57  Patient On (Oxygen Delivery Method): nasal cannula  O2 Flow (L/min): 2    VITAL SIGNS: I have reviewed nursing notes and confirm.  CONSTITUTIONAL: acutely ill-appearing   SKIN:  skin exam is warm and dry, no acute rash.    HEAD: Normocephalic; atraumatic.  EYES: conjunctiva and sclera clear.  ENT: No nasal discharge; airway clear.  CARD: S1, S2 normal; no murmurs, gallops, or rubs. Regular rate and rhythm.   RESP: No wheezes, rales or rhonchi.  ABD: Normal bowel sounds; soft; non-distended; non-tender/ + urostomy with concentrated urine   EXT: Normal ROM.  No clubbing, cyanosis or edema.   NEURO: Alert, oriented, grossly unremarkable    03-28    133<L>  |  98  |  50<H>  ----------------------------<  141<H>  3.9   |  18  |  2.8<H>    Ca    8.6      28 Mar 2023 16:56    TPro  6.0  /  Alb  3.6  /  TBili  1.6<H>  /  DBili  x   /  AST  24  /  ALT  11  /  AlkPhos  97  03-28                            15.7   3.89  )-----------( 160      ( 28 Mar 2023 15:35 )             49.6     CAPILLARY BLOOD GLUCOSE      POCT Blood Glucose.: 185 mg/dL (28 Mar 2023 15:15)    CARDIAC MARKERS ( 28 Mar 2023 15:35 )  x     / 0.10 ng/mL / x     / x     / x        < from: CT Abdomen and Pelvis w/ IV Cont (03.28.23 @ 16:24) >  IMPRESSION: Right-sided hydronephrosis and right-sidedhydroureter.   Findings secondary to a stone within the mid aspect of the right ureter.   Extensive inflammatory changes are seen in the region of the stone,   extending superiorly surrounding a calcification, which may reflect a   previously ruptured stone. Bilateral intrarenal calculi as described.   Bilateral renal cysts.    Ventral hernia containing nonobstructed loops of bowel.    Emphysematous changes.    Large fatty liver.    Areas of nodularity within the adrenal glands, likely reflecting  adenomas. Could be further evaluated with MRI of the abdomen on an   outpatient basis    < end of copied text >

## 2023-03-28 NOTE — H&P ADULT - HISTORY OF PRESENT ILLNESS
81 yo M with history of HTN, DM, DL, Afib ( on eliquis), bladder CA s/p cystectomy and urostomy. Patient presenting with right sided ureteral stone with evidence of obstruction. Patient started having back pain yesterday right sided. urine in urostomy bag was cloudy and dark in color. He was going to his PCP but developed high fever and chills so he came to ED south. in south site patient was hypotensive and tachycardic requiring 5 liters fluids and was started on pressors. cultures were taken and he was given broad spectrum antibiotics. His HD status improved. CT abdomen showed Right-sided hydronephrosis and right-sided hydroureter. Findings secondary to a stone within the mid aspect of the right ureter. Extensive inflammatory changes are seen in the region of the stone,   extending superiorly surrounding a calcification, which may reflect a previously ruptured stone. Bilateral intrarenal calculi as described. [facility] He was transferred to north site for eliquis reversal before intervention by IR for PCN

## 2023-03-28 NOTE — PATIENT PROFILE ADULT - FALL HARM RISK - HARM RISK INTERVENTIONS

## 2023-03-28 NOTE — H&P ADULT - NSHPLABSRESULTS_GEN_ALL_CORE
( @ 15:35)                      15.7  3.89 )-----------( 160                 49.6    Neutrophils = 3.54 (83.0%)  Lymphocytes = 0.23 (6.0%)  Eosinophils = -- (0.0%)  Basophils = 0.08 (2.0%)  Monocytes = 0.04 (1.0%)  Bands = 8.0%        133<L>  |  98  |  50<H>  ----------------------------<  141<H>  3.9   |  18  |  2.8<H>    Ca    8.6      28 Mar 2023 16:56    TPro  6.0  /  Alb  3.6  /  TBili  1.6<H>  /  DBili  x   /  AST  24  /  ALT  11  /  AlkPhos  97      ( 28 Mar 2023 15:35 )   PT: 18.00 sec;   INR: 1.56 ratio;       PTT:30.4 sec  CARDIAC MARKERS ( 28 Mar 2023 15:35 )  Trop 0.10 ng/mL<HH> / CK x     / CKMB x           RVP:    Venous Blood Gas:   @ 18:56  7.27/44/19/20/22.6  VBG Lactate: 6.70  Venous Blood Gas:   @ 16:30  7.29/42/15/20/27.5  VBG Lactate: 8.80        Tox:         Urinalysis Basic - ( 28 Mar 2023 18:10 )    Color: Yellow / Appearance: Slightly Cloudy / S.010 / pH: x  Gluc: x / Ketone: Trace  / Bili: Negative / Urobili: 0.2 mg/dL   Blood: x / Protein: 100 mg/dL / Nitrite: Negative   Leuk Esterase: Small / RBC: 6-10 /HPF / WBC 10-25 /HPF   Sq Epi: x / Non Sq Epi: Few /HPF / Bacteria: Many

## 2023-03-28 NOTE — ED ADULT NURSE NOTE - NSICDXPASTMEDICALHX_GEN_ALL_CORE_FT
PAST MEDICAL HISTORY:  Bladder cancer secondary to exposure at [employer] 9/11 s/p cystectomy with urostomy    Chronic kidney disease (CKD) stage 3A, baseline creatinine 1.4    Diabetes mellitus, type 2     DVT, lower extremity     History of atrial fibrillation     Hyperlipidemia     Hypertension

## 2023-03-28 NOTE — ED ADULT NURSE REASSESSMENT NOTE - NS ED NURSE REASSESS COMMENT FT1
report called to ER [place] site spoke with charge RAHEL Mooney and Crit Lead RN Juliana Fisher, pt leaving with [facility] paramedics

## 2023-03-28 NOTE — CONSULT NOTE ADULT - ASSESSMENT
# SEPSIS   # obstruction right renal calculus   # h/o bladder ca / prostate ca and urethral ca with creation of urostomy     d/w dr gray   will need emergent right side pcn     will need to be transferred to [place]     d/w IR attending ALBIN MATOS   d/w er attending at [facility]   d/w [place] urology team     - iv abx   - labs in am   - f/u cultures   - ID consult   will need MICU bed

## 2023-03-28 NOTE — ED PROVIDER NOTE - CARE PLAN
1 Principal Discharge DX:	Kidney stone  Secondary Diagnosis:	Hypotension   Principal Discharge DX:	Kidney stone  Secondary Diagnosis:	Hypotension  Secondary Diagnosis:	Septic shock  Secondary Diagnosis:	LORRAINE (acute kidney injury)

## 2023-03-28 NOTE — CONSULT NOTE ADULT - SUBJECTIVE AND OBJECTIVE BOX
INTERVENTIONAL RADIOLOGY CONSULT:     Procedure Requested: Right PCN    HPI:  82-year-old male past history of diabetes, hypertension, hyperlipidemia, bladder cancer status post urostomy, ureteral cancer in remission here for evaluation of generalized malaise fatigue x2 days with associated right flank pain and rigors.  Patient denies nausea, vomiting, diarrhea, chest pain, shortness of breath    PAST MEDICAL & SURGICAL HISTORY:  Hypertension  Hyperlipidemia  Diabetes mellitus, type 2  History of atrial fibrillation  Bladder cancer, secondary to exposure at [employer] 9/11 s/p cystectomy with urostomy  Chronic kidney disease (CKD), stage 3A, baseline creatinine 1.4  DVT, lower extremity  History of total hip replacement, bilateral  History of total knee replacement, bilateral    MEDICATIONS  (STANDING):  lactated ringers Bolus 1000 milliLiter(s) IV Bolus once  norepinephrine Infusion 0.05 MICROgram(s)/kG/Min (12.1 mL/Hr) IV Continuous <Continuous>  vancomycin  IVPB. 1000 milliGRAM(s) IV Intermittent once    Allergies  No Known Allergies    Physical Exam:   Vital Signs Last 24 Hrs  T(C): 39.6 (28 Mar 2023 19:04), Max: 40.2 (28 Mar 2023 15:10)  T(F): 103.3 (28 Mar 2023 19:04), Max: 104.4 (28 Mar 2023 15:10)  HR: 120 (28 Mar 2023 18:00) (68 - 140)  BP: 113/62 (28 Mar 2023 18:00) (94/60 - 120/55)  RR: 24 (28 Mar 2023 18:00) (22 - 32)  SpO2: 95% (28 Mar 2023 18:00) (94% - 96%)    Parameters below as of 28 Mar 2023 18:00  Patient On (Oxygen Delivery Method): nasal cannula  O2 Flow (L/min): 2    Labs:                         15.7   3.89  )-----------( 160      ( 28 Mar 2023 15:35 )             49.6     03-28    133<L>  |  98  |  50<H>  ----------------------------<  141<H>  3.9   |  18  |  2.8<H>    Ca    8.6      28 Mar 2023 16:56    TPro  6.0  /  Alb  3.6  /  TBili  1.6<H>  /  DBili  x   /  AST  24  /  ALT  11  /  AlkPhos  97  03-28    PT/INR - ( 28 Mar 2023 15:35 )   PT: 18.00 sec;   INR: 1.56 ratio         PTT - ( 28 Mar 2023 15:35 )  PTT:30.4 sec    Radiology & Additional Studies:   Radiology imaging reviewed.     ASSESSMENT/ PLAN:   83 yo M with history of bladder CA s/p cystectomy and urostomy. Patient presenting with right sided ureteral stone with evidence of obstruction. IR consulted for right PCN. Patient initially presented to [facility] with fever, developing hypotension now requiring levophed to maintain BP, tachycardic (with history of afib). Patient creatinine 2.8, up from known CKD baseline. On aspirin and Eliquis.     Plan:  - Reverse Eliquis as patient has poor renal function and therefor is unable to clear Eliquis as quickly. Last dose was last night, per report and renal interventions such as nephrostomy tube placements are high risk  bleeding procedures.  - Anesthesia consult. Patient must be prone for procedure and able to tolerate positioning and sedation.  - ICU admission for post operative management. Risk of worsening sepsis after PCN placement, patient will need close monitoring.    Thank you for the courtesy of this consult, please call [phone] Monday-Friday from 8am to 5pm. All other times please call [phone].   INTERVENTIONAL RADIOLOGY CONSULT:     Procedure Requested: Right PCN    HPI:  82-year-old male past history of diabetes, hypertension, hyperlipidemia, bladder cancer status post urostomy, ureteral cancer in remission here for evaluation of generalized malaise fatigue x2 days with associated right flank pain and rigors.  Patient denies nausea, vomiting, diarrhea, chest pain, shortness of breath    PAST MEDICAL & SURGICAL HISTORY:  Hypertension  Hyperlipidemia  Diabetes mellitus, type 2  History of atrial fibrillation  Bladder cancer, secondary to exposure at [employer] 9/11 s/p cystectomy with urostomy  Chronic kidney disease (CKD), stage 3A, baseline creatinine 1.4  DVT, lower extremity  History of total hip replacement, bilateral  History of total knee replacement, bilateral    MEDICATIONS  (STANDING):  lactated ringers Bolus 1000 milliLiter(s) IV Bolus once  norepinephrine Infusion 0.05 MICROgram(s)/kG/Min (12.1 mL/Hr) IV Continuous <Continuous>  vancomycin  IVPB. 1000 milliGRAM(s) IV Intermittent once    Allergies  No Known Allergies    Physical Exam:   Vital Signs Last 24 Hrs  T(C): 39.6 (28 Mar 2023 19:04), Max: 40.2 (28 Mar 2023 15:10)  T(F): 103.3 (28 Mar 2023 19:04), Max: 104.4 (28 Mar 2023 15:10)  HR: 120 (28 Mar 2023 18:00) (68 - 140)  BP: 113/62 (28 Mar 2023 18:00) (94/60 - 120/55)  RR: 24 (28 Mar 2023 18:00) (22 - 32)  SpO2: 95% (28 Mar 2023 18:00) (94% - 96%)    Parameters below as of 28 Mar 2023 18:00  Patient On (Oxygen Delivery Method): nasal cannula  O2 Flow (L/min): 2    Labs:                         15.7   3.89  )-----------( 160      ( 28 Mar 2023 15:35 )             49.6     03-28    133<L>  |  98  |  50<H>  ----------------------------<  141<H>  3.9   |  18  |  2.8<H>    Ca    8.6      28 Mar 2023 16:56    TPro  6.0  /  Alb  3.6  /  TBili  1.6<H>  /  DBili  x   /  AST  24  /  ALT  11  /  AlkPhos  97  03-28    PT/INR - ( 28 Mar 2023 15:35 )   PT: 18.00 sec;   INR: 1.56 ratio         PTT - ( 28 Mar 2023 15:35 )  PTT:30.4 sec    Radiology & Additional Studies:   Radiology imaging reviewed.     ASSESSMENT/ PLAN:   83 yo M with history of bladder CA s/p cystectomy and urostomy. Patient presenting with right sided ureteral stone with evidence of obstruction. IR consulted for right PCN. Patient initially presented to [facility] with fever, developing hypotension now requiring levophed to maintain BP, tachycardic (with history of afib). Patient creatinine 2.8, up from known CKD baseline. On aspirin and Eliquis.     Plan:  - Reverse Eliquis as patient has poor renal function and therefore is unable to clear Eliquis as quickly. Last dose was last night, per patient report. Renal interventions such as a nephrostomy tube placement is a high risk  bleeding procedure.  - Anesthesia consult. Patient must be prone for procedure and able to tolerate positioning and sedation.  - ICU admission for post operative management. Risk of worsening sepsis after PCN placement, patient will need close monitoring.    Thank you for the courtesy of this consult, please call [phone] Monday-Friday from 8am to 5pm. All other times please call [phone].

## 2023-03-28 NOTE — PROGRESS NOTE ADULT - SUBJECTIVE AND OBJECTIVE BOX
Case discussed in detail with Dr Maciel and Dr Tijerina.  Pt is s/p Cystectomy/penectomy and ileal conduit.  Conventional method of treatment with cystoscopy/stent will not be successful due to pts prior surgical history.  The ureteral orifice will not be able to be cannulated for stent placement.  Therefore Dr Maciel and Dr Tijerina are requesting Interventional Radiolgy consult for PCN.  Pt will need reversal of anticoagulation prior to procedure.

## 2023-03-28 NOTE — H&P ADULT - NSICDXPASTMEDICALHX_GEN_ALL_CORE_FT
PAST MEDICAL HISTORY:  Bladder cancer secondary to exposure at [employer] 9/11 s/p cystectomy with urostomy    Chronic kidney disease (CKD) stage 3A, baseline creatinine 1.4    Diabetes mellitus, type 2     DVT, lower extremity     History of atrial fibrillation     Hyperlipidemia     Hypertension

## 2023-03-29 LAB
A1C WITH ESTIMATED AVERAGE GLUCOSE RESULT: 9 % — HIGH (ref 4–5.6)
ALBUMIN SERPL ELPH-MCNC: 3.3 G/DL — LOW (ref 3.5–5.2)
ALBUMIN SERPL ELPH-MCNC: 3.5 G/DL — SIGNIFICANT CHANGE UP (ref 3.5–5.2)
ALP SERPL-CCNC: 53 U/L — SIGNIFICANT CHANGE UP (ref 30–115)
ALP SERPL-CCNC: 59 U/L — SIGNIFICANT CHANGE UP (ref 30–115)
ALT FLD-CCNC: 11 U/L — SIGNIFICANT CHANGE UP (ref 0–41)
ALT FLD-CCNC: 15 U/L — SIGNIFICANT CHANGE UP (ref 0–41)
ANION GAP SERPL CALC-SCNC: 16 MMOL/L — HIGH (ref 7–14)
ANION GAP SERPL CALC-SCNC: 19 MMOL/L — HIGH (ref 7–14)
APTT BLD: 30.1 SEC — SIGNIFICANT CHANGE UP (ref 27–39.2)
APTT BLD: 33.5 SEC — SIGNIFICANT CHANGE UP (ref 27–39.2)
AST SERPL-CCNC: 41 U/L — SIGNIFICANT CHANGE UP (ref 0–41)
AST SERPL-CCNC: 63 U/L — HIGH (ref 0–41)
BILIRUB SERPL-MCNC: 1.7 MG/DL — HIGH (ref 0.2–1.2)
BILIRUB SERPL-MCNC: 1.9 MG/DL — HIGH (ref 0.2–1.2)
BUN SERPL-MCNC: 46 MG/DL — HIGH (ref 10–20)
BUN SERPL-MCNC: 49 MG/DL — HIGH (ref 10–20)
CALCIUM SERPL-MCNC: 8.5 MG/DL — SIGNIFICANT CHANGE UP (ref 8.4–10.5)
CALCIUM SERPL-MCNC: 8.9 MG/DL — SIGNIFICANT CHANGE UP (ref 8.4–10.4)
CHLORIDE SERPL-SCNC: 97 MMOL/L — LOW (ref 98–110)
CHLORIDE SERPL-SCNC: 97 MMOL/L — LOW (ref 98–110)
CHOLEST SERPL-MCNC: 106 MG/DL — SIGNIFICANT CHANGE UP
CO2 SERPL-SCNC: 18 MMOL/L — SIGNIFICANT CHANGE UP (ref 17–32)
CO2 SERPL-SCNC: 19 MMOL/L — SIGNIFICANT CHANGE UP (ref 17–32)
CREAT SERPL-MCNC: 2.6 MG/DL — HIGH (ref 0.7–1.5)
CREAT SERPL-MCNC: 2.7 MG/DL — HIGH (ref 0.7–1.5)
E COLI DNA BLD POS QL NAA+NON-PROBE: SIGNIFICANT CHANGE UP
EGFR: 23 ML/MIN/1.73M2 — LOW
EGFR: 24 ML/MIN/1.73M2 — LOW
ESTIMATED AVERAGE GLUCOSE: 212 MG/DL — HIGH (ref 68–114)
GLUCOSE BLDC GLUCOMTR-MCNC: 149 MG/DL — HIGH (ref 70–99)
GLUCOSE BLDC GLUCOMTR-MCNC: 153 MG/DL — HIGH (ref 70–99)
GLUCOSE BLDC GLUCOMTR-MCNC: 156 MG/DL — HIGH (ref 70–99)
GLUCOSE BLDC GLUCOMTR-MCNC: 214 MG/DL — HIGH (ref 70–99)
GLUCOSE SERPL-MCNC: 161 MG/DL — HIGH (ref 70–99)
GLUCOSE SERPL-MCNC: 171 MG/DL — HIGH (ref 70–99)
GRAM STN FLD: SIGNIFICANT CHANGE UP
HCT VFR BLD CALC: 43.5 % — SIGNIFICANT CHANGE UP (ref 42–52)
HCT VFR BLD CALC: 44.7 % — SIGNIFICANT CHANGE UP (ref 42–52)
HDLC SERPL-MCNC: 41 MG/DL — SIGNIFICANT CHANGE UP
HGB BLD-MCNC: 14.4 G/DL — SIGNIFICANT CHANGE UP (ref 14–18)
HGB BLD-MCNC: 14.6 G/DL — SIGNIFICANT CHANGE UP (ref 14–18)
INR BLD: 1.47 RATIO — HIGH (ref 0.65–1.3)
INR BLD: 1.52 RATIO — HIGH (ref 0.65–1.3)
LACTATE SERPL-SCNC: 2.6 MMOL/L — HIGH (ref 0.7–2)
LACTATE SERPL-SCNC: 3.4 MMOL/L — HIGH (ref 0.7–2)
LACTATE SERPL-SCNC: 4.2 MMOL/L — CRITICAL HIGH (ref 0.7–2)
LIPID PNL WITH DIRECT LDL SERPL: 5 MG/DL — SIGNIFICANT CHANGE UP
MAGNESIUM SERPL-MCNC: 1.4 MG/DL — LOW (ref 1.8–2.4)
MAGNESIUM SERPL-MCNC: 1.8 MG/DL — SIGNIFICANT CHANGE UP (ref 1.8–2.4)
MCHC RBC-ENTMCNC: 27.4 PG — SIGNIFICANT CHANGE UP (ref 27–31)
MCHC RBC-ENTMCNC: 28.5 PG — SIGNIFICANT CHANGE UP (ref 27–31)
MCHC RBC-ENTMCNC: 32.2 G/DL — SIGNIFICANT CHANGE UP (ref 32–37)
MCHC RBC-ENTMCNC: 33.6 G/DL — SIGNIFICANT CHANGE UP (ref 32–37)
MCV RBC AUTO: 84.8 FL — SIGNIFICANT CHANGE UP (ref 80–94)
MCV RBC AUTO: 85.1 FL — SIGNIFICANT CHANGE UP (ref 80–94)
METHOD TYPE: SIGNIFICANT CHANGE UP
NON HDL CHOLESTEROL: 65 MG/DL — SIGNIFICANT CHANGE UP
NRBC # BLD: 0 /100 WBCS — SIGNIFICANT CHANGE UP (ref 0–0)
NRBC # BLD: 0 /100 WBCS — SIGNIFICANT CHANGE UP (ref 0–0)
PLATELET # BLD AUTO: 141 K/UL — SIGNIFICANT CHANGE UP (ref 130–400)
PLATELET # BLD AUTO: 145 K/UL — SIGNIFICANT CHANGE UP (ref 130–400)
POTASSIUM SERPL-MCNC: 4.2 MMOL/L — SIGNIFICANT CHANGE UP (ref 3.5–5)
POTASSIUM SERPL-MCNC: 4.3 MMOL/L — SIGNIFICANT CHANGE UP (ref 3.5–5)
POTASSIUM SERPL-SCNC: 4.2 MMOL/L — SIGNIFICANT CHANGE UP (ref 3.5–5)
POTASSIUM SERPL-SCNC: 4.3 MMOL/L — SIGNIFICANT CHANGE UP (ref 3.5–5)
PROT SERPL-MCNC: 5.7 G/DL — LOW (ref 6–8)
PROT SERPL-MCNC: 5.8 G/DL — LOW (ref 6–8)
PROTHROM AB SERPL-ACNC: 17 SEC — HIGH (ref 9.95–12.87)
PROTHROM AB SERPL-ACNC: 17.5 SEC — HIGH (ref 9.95–12.87)
RBC # BLD: 5.13 M/UL — SIGNIFICANT CHANGE UP (ref 4.7–6.1)
RBC # BLD: 5.25 M/UL — SIGNIFICANT CHANGE UP (ref 4.7–6.1)
RBC # FLD: 15.9 % — HIGH (ref 11.5–14.5)
RBC # FLD: 16 % — HIGH (ref 11.5–14.5)
SODIUM SERPL-SCNC: 132 MMOL/L — LOW (ref 135–146)
SODIUM SERPL-SCNC: 134 MMOL/L — LOW (ref 135–146)
SPECIMEN SOURCE: SIGNIFICANT CHANGE UP
SPECIMEN SOURCE: SIGNIFICANT CHANGE UP
TRIGL SERPL-MCNC: 300 MG/DL — HIGH
WBC # BLD: 21.71 K/UL — HIGH (ref 4.8–10.8)
WBC # BLD: 22.1 K/UL — HIGH (ref 4.8–10.8)
WBC # FLD AUTO: 21.71 K/UL — HIGH (ref 4.8–10.8)
WBC # FLD AUTO: 22.1 K/UL — HIGH (ref 4.8–10.8)

## 2023-03-29 PROCEDURE — 99291 CRITICAL CARE FIRST HOUR: CPT

## 2023-03-29 PROCEDURE — 93306 TTE W/DOPPLER COMPLETE: CPT | Mod: 26

## 2023-03-29 PROCEDURE — 93010 ELECTROCARDIOGRAM REPORT: CPT

## 2023-03-29 PROCEDURE — 76705 ECHO EXAM OF ABDOMEN: CPT | Mod: 26

## 2023-03-29 PROCEDURE — 50432 PLMT NEPHROSTOMY CATHETER: CPT | Mod: RT

## 2023-03-29 RX ORDER — MEROPENEM 1 G/30ML
INJECTION INTRAVENOUS
Refills: 0 | Status: DISCONTINUED | OUTPATIENT
Start: 2023-03-29 | End: 2023-03-30

## 2023-03-29 RX ORDER — ACETAMINOPHEN 500 MG
650 TABLET ORAL EVERY 6 HOURS
Refills: 0 | Status: DISCONTINUED | OUTPATIENT
Start: 2023-03-29 | End: 2023-04-01

## 2023-03-29 RX ORDER — MEROPENEM 1 G/30ML
500 INJECTION INTRAVENOUS EVERY 12 HOURS
Refills: 0 | Status: DISCONTINUED | OUTPATIENT
Start: 2023-03-29 | End: 2023-03-30

## 2023-03-29 RX ORDER — PANTOPRAZOLE SODIUM 20 MG/1
40 TABLET, DELAYED RELEASE ORAL DAILY
Refills: 0 | Status: DISCONTINUED | OUTPATIENT
Start: 2023-03-29 | End: 2023-04-01

## 2023-03-29 RX ORDER — MEROPENEM 1 G/30ML
500 INJECTION INTRAVENOUS ONCE
Refills: 0 | Status: COMPLETED | OUTPATIENT
Start: 2023-03-29 | End: 2023-03-29

## 2023-03-29 RX ORDER — MAGNESIUM SULFATE 500 MG/ML
2 VIAL (ML) INJECTION ONCE
Refills: 0 | Status: COMPLETED | OUTPATIENT
Start: 2023-03-29 | End: 2023-03-29

## 2023-03-29 RX ADMIN — MEROPENEM 100 MILLIGRAM(S): 1 INJECTION INTRAVENOUS at 12:48

## 2023-03-29 RX ADMIN — MEROPENEM 100 MILLIGRAM(S): 1 INJECTION INTRAVENOUS at 18:00

## 2023-03-29 RX ADMIN — PANTOPRAZOLE SODIUM 40 MILLIGRAM(S): 20 TABLET, DELAYED RELEASE ORAL at 12:40

## 2023-03-29 RX ADMIN — Medication 650 MILLIGRAM(S): at 22:15

## 2023-03-29 RX ADMIN — SODIUM CHLORIDE 75 MILLILITER(S): 9 INJECTION, SOLUTION INTRAVENOUS at 00:47

## 2023-03-29 RX ADMIN — Medication 250 MILLIGRAM(S): at 05:24

## 2023-03-29 RX ADMIN — Medication 25 GRAM(S): at 04:13

## 2023-03-29 RX ADMIN — Medication 2: at 06:24

## 2023-03-29 RX ADMIN — CEFEPIME 100 MILLIGRAM(S): 1 INJECTION, POWDER, FOR SOLUTION INTRAMUSCULAR; INTRAVENOUS at 06:13

## 2023-03-29 RX ADMIN — SODIUM CHLORIDE 75 MILLILITER(S): 9 INJECTION, SOLUTION INTRAVENOUS at 12:00

## 2023-03-29 RX ADMIN — Medication 650 MILLIGRAM(S): at 22:17

## 2023-03-29 NOTE — PHARMACOTHERAPY INTERVENTION NOTE - COMMENTS
Recommended to hold vancomycin since the AUC/HALI is predicted to be supratherapuetic (> 600 mg/L*h), as per PrecisePK calculations.     Nanette Del Real, PharmD, BCIDP  Clinical Pharmacy Specialist, Infectious Diseases  Tele-Antimicrobial Stewardship Program (Tele-ASP)  Tele-ASP Phone: [phone]   Recommended to hold vancomycin since the AUC/HALI is predicted to be supratherapeutic (> 600 mg/L*h), as per PrecisePK calculations.     Nanette Del Real, PharmD, BCIDP  Clinical Pharmacy Specialist, Infectious Diseases  Tele-Antimicrobial Stewardship Program (Tele-ASP)  Tele-ASP Phone: [phone]

## 2023-03-29 NOTE — CONSULT NOTE ADULT - ASSESSMENT
IMPRESSION:    Septic shock resolved  Pyelonephritis  Right side obstructing stone  LORRAINE, likely prerenal from sepsis  Chronic A fib was on Eliquis  H/o bladder cancer s/p urostomy  H/o HTN      PLAN:    CNS: Avoid depressants    HEENT: Oral care    PULMONARY: Wean oxygen, goal target 92-95%. HOB @ 45 degrees.  Aspiration precautions. Repeat chest xray.    CARDIOVASCULAR: Avoid overload. Trend trops and lactate. Obtain echo.    GI: NPO for possible procedure.    RENAL: LR at 75. Trend lytes. Possible nephrostomy today.    INFECTIOUS DISEASE: Meropenem and vancomycin. ID consult. Follow up cultures.    HEMATOLOGICAL:  DVT prophylaxis: seq    ENDOCRINE:  Follow up FS.  Insulin protocol if needed.    MUSCULOSKELETAL: Bed rest for now.    DISPO: ICU         IMPRESSION:  Sepsis POA  Septic shock   E Coli bacteremia   Pyelonephritis  Right side obstructing stone  LORRAINE.  Likely prerenal / ATN / Obstructive   HO Chronic A fib was on Eliquis  H/o bladder cancer s/p urostomy  H/o HTN      PLAN:    CNS: Avoid depressants    HEENT: Oral care    PULMONARY: Wean oxygen, goal target 92-95%. HOB @ 45 degrees.  Aspiration precautions. Repeat chest xray.    CARDIOVASCULAR: Avoid overload. Trend trops and lactate. Obtain echo.  Continue gentle hydration     GI: NPO for possible procedure.    RENAL: Trend lytes.  Correct as needed.  Possible right nephrostomy today.    INFECTIOUS DISEASE: Cefepime for now.  Follow up cultures.    HEMATOLOGICAL:  DVT prophylaxis.  Keep off Eliquis for procedure     ENDOCRINE:  Follow up FS.  Insulin protocol if needed.    MUSCULOSKELETAL: Bed rest for now.    DISPO: ICU

## 2023-03-29 NOTE — CHART NOTE - NSCHARTNOTEFT_GEN_A_CORE
Right obstructing ureteral stone with hydro and sepsis.  Ileal conduit so difficult case for Urology to stent.  Last dose of Eliquis at 9 pm last night.  Would need 6 doses held to be fully cleared.  As per [facility] policy patient not a candidate for Andexxa.  Getting Kcentra for now.  IR agreed to place right nephrostomy tube due to patient's condition (sepsis on pressors).  Family currently still deciding on course of action due to increased risk of bleeding.  I explained risks of waiting and possible worsening of sepsis and death.  Patient's family have my number and will call me after they make a decision.
Spoke with the patients son at length regarding the intervention (PCN) being offered, indications and the associated risk of bleeding. The patient is AOx4 and states that he does not wish to pursue surgical intervention at this time given the bleeding risk and would like to wait at least until tomorrow. He expressed understanding of the associated risks with waiting for intervention and states that the risk of bleeding having only been off of eliquis for 24 hours concerns him the most. Reassured the patient we will be closely monitoring his Hemodynamics and assessing Mental status for the time being. The patient denied any active fevers, chills, nausea, vomitting, resp distress at the time of interview.
PACU ANESTHESIA ADMISSION NOTE      Procedure:   Post op diagnosis:      ____  Intubated  TV:______       Rate: ______      FiO2: ______    x___  Patent Airway    __x__  Full return of protective reflexes    _x___  Full recovery from anesthesia / back to baseline     Vitals:   T:   98        R:18                  BP:  126/65                Sat: 97                  P: 130      Mental Status:  __x__ Awake   _x____ Alert   _____ Drowsy   _____ Sedated    Nausea/Vomiting:  ____ NO  ______Yes,   See Post - Op Orders          Pain Scale (0-10):  _____    Treatment: ____ None    ____ See Post - Op/PCA Orders    Post - Operative Fluids:   ____ Oral   ____ See Post - Op Orders    Plan: Discharge:   ____Home       _____Floor    x _____Critical Care    _____  Other:_________________    Comments:

## 2023-03-29 NOTE — PRE-ANESTHESIA EVALUATION ADULT - NSANTHADDINFOFT_GEN_ALL_CORE
Under ICU service while in ER. Central line in place on Levophed.  Sick patient going for emergency surgery( to be determine by IR)
MAC planned, backup GA  Discussed risks, including dental injury and more serious complications including cardiac and pulmonary complications and stroke.  Patient expresses understanding with regard to risks of anesthesia and wishes to proceed.

## 2023-03-29 NOTE — PROGRESS NOTE ADULT - SUBJECTIVE AND OBJECTIVE BOX
UROLOGY DAILY PROGRESS NOTE  82 y.o M with PMH of prostate/ bladder / urethral CA with creation of urostomy on eliquis for AFIB transferred from [facility] to [facility] with Sepsis, Right flank pain with CT A/P findings of Right-sided hydronephrosis and right-sided hydroureter. Findings secondary to a stone within the mid aspect of the right ureter. Extensive inflammatory changes are seen in the region of the stone, extending superiorly surrounding a calcification, which may reflect a previously ruptured stone. Bilateral intrarenal calculi as described. Bilateral renal cysts.  Pt. was transferred for emergent Right PCNT by IR.   IR recommended, reversal of Eliquis, Anesthesia evaluation, ICU admission for post-op management.   Pt. seen and examined at bedside, awake and alert in NAD. Pt. reports he feels cold, but he feels cold at baseline at all the time, otherwise denies abdominal pain, N/V, chills, SOB, CP. Pt. 's son at bedside.   T max 104.4 yesterday at 15:10 PM, currently afebrile.   Blood Cx 3/28/23  preliminary - E. Coli       MEDICATIONS  (STANDING):  dextrose 5%. 1000 milliLiter(s) (100 mL/Hr) IV Continuous <Continuous>  dextrose 5%. 1000 milliLiter(s) (50 mL/Hr) IV Continuous <Continuous>  dextrose 50% Injectable 25 Gram(s) IV Push once  dextrose 50% Injectable 12.5 Gram(s) IV Push once  dextrose 50% Injectable 25 Gram(s) IV Push once  glucagon  Injectable 1 milliGRAM(s) IntraMuscular once  insulin lispro (ADMELOG) corrective regimen sliding scale   SubCutaneous three times a day before meals  lactated ringers. 1000 milliLiter(s) (75 mL/Hr) IV Continuous <Continuous>  meropenem  IVPB      meropenem  IVPB 500 milliGRAM(s) IV Intermittent once  meropenem  IVPB 500 milliGRAM(s) IV Intermittent every 12 hours  norepinephrine Infusion 0.05 MICROgram(s)/kG/Min (12.1 mL/Hr) IV Continuous <Continuous>  pantoprazole  Injectable 40 milliGRAM(s) IV Push daily  vancomycin  IVPB 1000 milliGRAM(s) IV Intermittent every 12 hours    MEDICATIONS  (PRN):  acetaminophen     Tablet .. 650 milliGRAM(s) Oral every 6 hours PRN Temp greater or equal to 38C (100.4F)  dextrose Oral Gel 15 Gram(s) Oral once PRN Blood Glucose LESS THAN 70 milliGRAM(s)/deciliter      REVIEW OF SYSTEMS   [x] A ten-point review of systems was otherwise negative except as noted.     Vital Signs Last 24 Hrs  T(C): 36.3 (29 Mar 2023 08:00), Max: 40.2 (28 Mar 2023 15:10)  T(F): 97.4 (29 Mar 2023 08:00), Max: 104.4 (28 Mar 2023 15:10)  HR: 84 (29 Mar 2023 09:00) (68 - 150)  BP: 124/65 (29 Mar 2023 09:00) (65/50 - 156/72)  BP(mean): 87 (29 Mar 2023 09:00) (65 - 103)  RR: 20 (29 Mar 2023 09:00) (3 - 35)  SpO2: 95% (29 Mar 2023 09:00) (93% - 98%)    Parameters below as of 29 Mar 2023 08:02  Patient On (Oxygen Delivery Method): nasal cannula  O2 Flow (L/min): 2      PHYSICAL EXAM:  GEN: NAD, awake and alert.  SKIN:  + diaphoretic.  Resp: No accessory muscle use.   ABDO: Soft, Distended,  + ileal conduit to right abdomen, stoma pink and soft draining to urostomy bag yellow urine   BACK: + Right CVAT, no left CVAT  : s/p penectomy   EXT: CASON x 4      I&O's Detail    28 Mar 2023 07:01  -  29 Mar 2023 07:00  --------------------------------------------------------  IN:    Lactated Ringers: 600 mL    Norepinephrine: 99.7 mL  Total IN: 699.7 mL    OUT:    Urostomy (mL): 750 mL  Total OUT: 750 mL    Total NET: -50.3 mL      29 Mar 2023 07:01  -  29 Mar 2023 11:22  --------------------------------------------------------  IN:    Lactated Ringers: 375 mL  Total IN: 375 mL    OUT:    Norepinephrine: 0 mL    Urostomy (mL): 1550 mL  Total OUT: 1550 mL    Total NET: -1175 mL      LABS:                        14.4   21.71 )-----------( 145      ( 29 Mar 2023 05:10 )             44.7     03-29    132<L>  |  97<L>  |  46<H>  ----------------------------<  171<H>  4.3   |  19  |  2.6<H>    Ca    8.9      29 Mar 2023 05:10  Mg     1.8     03-29    TPro  5.7<L>  /  Alb  3.3<L>  /  TBili  1.7<H>  /  DBili  x   /  AST  63<H>  /  ALT  15  /  AlkPhos  59  03-29    PT/INR - ( 29 Mar 2023 05:10 )   PT: 17.00 sec;   INR: 1.47 ratio         PTT - ( 29 Mar 2023 05:10 )  PTT:30.1 sec     Urinalysis (03.28.23 @ 18:10)    pH Urine: 6.0   Glucose Qualitative, Urine: Negative mg/dL   Blood, Urine: Large   Color: Yellow   Urine Appearance: Slightly Cloudy   Bilirubin: Negative   Ketone - Urine: Trace   Specific Gravity: 1.010   Protein, Urine: 100 mg/dL   Urobilinogen: 0.2 mg/dL   Nitrite: Negative   Leukocyte Esterase Concentration: Small    Culture - Blood (03.28.23 @ 15:35)    Gram Stain:   Growth in anaerobic bottle: Gram Negative Rods  Growth in aerobic bottle: Gram Negative Rods   Specimen Source: .Blood Blood-Peripheral   Culture Results:   Growth in anaerobic bottle: Gram Negative Rods  Growth in aerobic bottle: Gram Negative Rods    Culture - Blood (03.28.23 @ 15:35)    -  Escherichia coli: Detec   Gram Stain:   Growth in anaerobic bottle: Gram Negative Rods  Growth in aerobic bottle: Gram Negative Rods   Specimen Source: .Blood Blood-Peripheral   Organism: Blood Culture PCR   Culture Results:   Growth in aerobic bottle: Gram Negative Rods  Growth in anaerobic bottle: Gram Negative Rods  ***Blood Panel PCR results on this specimen are available  approximately 3 hours after the Gram stain result.***  Gram stain, PCR, and/or culture results may not always  correspond due to difference in methodologies.  ************************************************************  This PCR assay was performed by multiplex PCR. This  Assay tests for 66 bacterial and resistance gene targets.  Please refer to the [facility] test directory  at [email or web address] for details.   Organism Identification: Blood Culture PCR   Method Type: PCR        RADIOLOGY & ADDITIONAL STUDIES:  < from: CT Abdomen and Pelvis w/ IV Cont (03.28.23 @ 16:24) >  ACC: [id number] EXAM:  CT ABDOMEN AND PELVIS IC   ORDERED BY: LATASHA ELAM     PROCEDURE DATE:  03/28/2023          INTERPRETATION:  CLINICAL STATEMENT: Flank pain    TECHNIQUE: Contiguous axial CT images were obtained from the lower chest   to the pubic symphysis .  Oral contrast was not given.  Reformatted   images in the coronal and sagittal planes were acquired.    COMPARISON CT: None.    OTHER STUDIES USED FOR CORRELATION: None.      FINDINGS:    LOWER CHEST: Interstitial lung changesare seen within the lung bases   bilaterally, left more than right. Prominent epicardial fat within the   heart. Calcification of the aortic valve. Small hiatal hernia..    HEPATOBILIARY: Fatty infiltration of the liver. The liver is enlarged   measuring 20 cm in length. No calcified gallstones within the   gallbladder..    SPLEEN: Unremarkable.    PANCREAS: Unremarkable.    ADRENAL GLANDS: There are nodularity within the right adrenal gland   measures up to 1.1 cm. Thickening of the left adrenalgland..    KIDNEYS: Delayed nephrogram is seen within the right kidney. There is   mild right-sided hydronephrosis and hydroureter. Findings are secondary   to a stone within the mid right ureter measuring up to 5 mm. Hounsfield   units is approximately 321. There is surrounding inflammatory changes at   the level of the obstructing stone extending anteriorly. At the anterior   aspect of the inflammatory changes on series 301 image 56 is a   calcification measuring up to 4 mm. Question ruptured stone with   surrounding inflammatory change.. There are several stones within the   right kidney. Largest inferiorly and anteriorly measuring up to 7 mm.    There is fullness of the left renal pelvis. Milk of calcium or stone is   seen within the dependent portion of the left renal pelvis measuring up   to 1.1 cm. Stone is seen posteriorly within the mid aspect of the left   kidney measuring up to 1.2 cm. This measures approximately 336 Hounsfield   units. There are bilateral renal cysts. These measure up to approximately   3 cm within the left kidney.    ABDOMINOPELVIC NODES: Unremarkable.    PELVIC ORGANS: Beam hardening artifact from bilateral hip arthroplasties   limit evaluation of the pelvis. Urinary bladder and prostate gland not   well-visualized..    PERITONEUM/MESENTERY/BOWEL: There is no bowel obstruction. There is a   ventral hernia containing nonobstructed loops of bowel..    BONES/SOFT TISSUES: Ventral hernia containing bowel as described.   Multilevel degenerative changes. Bilateral hip arthroplasties. Mild   spondylolisthesis of L4 in relation to L5.    OTHER: Abdominal aorta is atherosclerotic but not frankly aneurysmal.   Iliac arteries are heavily calcified.      IMPRESSION: Right-sided hydronephrosis and right-sidedhydroureter.   Findings secondary to a stone within the mid aspect of the right ureter.   Extensive inflammatory changes are seen in the region of the stone,   extending superiorly surrounding a calcification, which may reflect a   previously ruptured stone. Bilateral intrarenal calculi as described.   Bilateral renal cysts.    Ventral hernia containing nonobstructed loops of bowel.    Emphysematous changes.    Large fatty liver.    Areas of nodularity within the adrenal glands, likely reflecting  adenomas. Could be further evaluated with MRI of the abdomen on an   outpatient basis    --- End of Report ---    TASH FRAUSTO MD; Attending Radiologist  This document has been electronically signed. Mar 28 2023  5:29PM    < end of copied text >

## 2023-03-29 NOTE — PROGRESS NOTE ADULT - SUBJECTIVE AND OBJECTIVE BOX
ROSA MARIA CASEY, 82y, Male; MRN# [id number]  Hospital Stay: 1d.    Patient is a 82y old Male who presents with a chief complaint of septic stone (29 Mar 2023 08:01)  Currently admitted to the ICU with the primary diagnosis of Kidney stone      SUBJECTIVE:  Interval/Overnight events: Discussion with patient regarding intervention timing was done. Pt is refusing intervention for now bcz of increased risk of bleeding. Last dose of eliquis was given 3/27 at 9pm. t aware of risks of sepsis     REVIEW OF SYSTEMS:  As per HPI  OBJECTIVE:  VITALS:  T(F): 97.4 (03-29-23 @ 08:00), Max: 104.4 (03-28-23 @ 15:10)  HR: 84 (03-29-23 @ 09:00) (68 - 150)  BP: 124/65 (03-29-23 @ 09:00) (65/50 - 156/72)  ABP: --  ABP(mean): --  RR: 20 (03-29-23 @ 09:00) (3 - 35)  SpO2: 95% (03-29-23 @ 09:00) (93% - 98%)    Vent Settings    Blood Gas    Drips  dextrose 5%. 1000 milliLiter(s) (100 mL/Hr) IV Continuous <Continuous>  dextrose 5%. 1000 milliLiter(s) (50 mL/Hr) IV Continuous <Continuous>  lactated ringers. 1000 milliLiter(s) (75 mL/Hr) IV Continuous <Continuous>  norepinephrine Infusion 0.05 MICROgram(s)/kG/Min (12.1 mL/Hr) IV Continuous <Continuous>    I&Os:    03-28-23 @ 07:01  -  03-29-23 @ 07:00  --------------------------------------------------------  IN: 699.7 mL / OUT: 750 mL / NET: -50.3 mL    03-29-23 @ 07:01  -  03-29-23 @ 11:02  --------------------------------------------------------  IN: 0 mL / OUT: 550 mL / NET: -550 mL      PHYSICAL EXAM:    ACTIVE MEDICATIONS:  Standing  dextrose 5%. 1000 milliLiter(s) (100 mL/Hr) IV Continuous <Continuous>  dextrose 5%. 1000 milliLiter(s) (50 mL/Hr) IV Continuous <Continuous>  dextrose 50% Injectable 25 Gram(s) IV Push once  dextrose 50% Injectable 12.5 Gram(s) IV Push once  dextrose 50% Injectable 25 Gram(s) IV Push once  glucagon  Injectable 1 milliGRAM(s) IntraMuscular once  insulin lispro (ADMELOG) corrective regimen sliding scale   SubCutaneous three times a day before meals  lactated ringers. 1000 milliLiter(s) (75 mL/Hr) IV Continuous <Continuous>  meropenem  IVPB      meropenem  IVPB 500 milliGRAM(s) IV Intermittent once  meropenem  IVPB 500 milliGRAM(s) IV Intermittent every 12 hours  norepinephrine Infusion 0.05 MICROgram(s)/kG/Min (12.1 mL/Hr) IV Continuous <Continuous>  pantoprazole  Injectable 40 milliGRAM(s) IV Push daily  vancomycin  IVPB 1000 milliGRAM(s) IV Intermittent every 12 hours    PRN  acetaminophen     Tablet .. 650 milliGRAM(s) Oral every 6 hours PRN  dextrose Oral Gel 15 Gram(s) Oral once PRN    LABS:  03-29-23 @ 05:10  WBC 21.71<H>, H/H 14.4/44.7, plt 145  Na 132<L>, K 4.3, Cl 97<L>, CO2 19, BUN 46<H>, Cr 2.6<H>, Glu 171<H>    Ca 8.9, Mg 1.8, Phosphorus --    Tot Protein 5.7<L>, Alb 3.3<L>, T. Bili 1.7<H>, D. Bili --  AST 63<H>, ALT 15, Alk phos 59    03-29-23 @ 00:10  WBC 22.10<H>, H/H 14.6/43.5, plt 141  Na 134<L>, K 4.2, Cl 97<L>, CO2 18, BUN 49<H>, Cr 2.7<H>, Glu 161<H>    Ca 8.5, Mg 1.4<L>, Phosphorus --    Tot Protein 5.8<L>, Alb 3.5, T. Bili 1.9<H>, D. Bili --  AST 41, ALT 11, Alk phos 53    03-28-23 @ 16:56  WBC --, H/H --/--, plt --  Na 133<L>, K 3.9, Cl 98, CO2 18, BUN 50<H>, Cr 2.8<H>, Glu 141<H>    Ca 8.6, Mg --, Phosphorus --    Tot Protein 6.0, Alb 3.6, T. Bili 1.6<H>, D. Bili --  AST 24, ALT 11, Alk phos 97    03-28-23 @ 15:35  WBC 3.89<L>, H/H 15.7/49.6, plt 160  Na --, K --, Cl --, CO2 --, BUN --, Cr --, Glu --    Ca --, Mg --, Phosphorus --    Tot Protein --, Alb --, T. Bili --, D. Bili --  AST --, ALT --, Alk phos --      03-29-23 @ 05:10:  PT 17.00<H>, INR 1.47<H>, aPTT 30.1  03-29-23 @ 00:10:  PT 17.50<H>, INR 1.52<H>, aPTT 33.5  03-28-23 @ 15:35:  PT 18.00<H>, INR 1.56<H>, aPTT 30.4        03-29-23 @ 05:10:  Hgb A1c 9.0<H>% | Mean plasma glucose 212<H>    03-29-23 @ 05:10:  Total Chol 106 | Non-HDL 65 | HDL 41  LDL-Direct -- | LDL-Calc 5 | <H>    03-28-23 @ 15:35:  Troponin T 0.10<HH>, BNP --          CULTURES:    Culture - Blood (collected 03-28-23 @ 15:35)  Source: .Blood Blood-Peripheral  Gram Stain (03-29-23 @ 06:01):    Growth in anaerobic bottle: Gram Negative Rods    Growth in aerobic bottle: Gram Negative Rods  Preliminary Report (03-29-23 @ 06:01):    Growth in anaerobic bottle: Gram Negative Rods    Growth in aerobic bottle: Gram Negative Rods    Culture - Blood (collected 03-28-23 @ 15:35)  Source: .Blood Blood-Peripheral  Gram Stain (03-29-23 @ 06:33):    Growth in anaerobic bottle: Gram Negative Rods    Growth in aerobic bottle: Gram Negative Rods  Preliminary Report (03-29-23 @ 06:34):    Growth in aerobic bottle: Gram Negative Rods    Growth in anaerobic bottle: Gram Negative Rods    ***Blood Panel PCR results on this specimen are available    approximately 3 hours after the Gram stain result.***    Gram stain, PCR, and/or culture results may not always    correspond due to difference in methodologies.    ************************************************************    This PCR assay was performed by multiplex PCR. This    Assay tests for 66 bacterial and resistance gene targets.    Please refer to the [facility] Labs test directory    at [email or web address] for details.  Organism: Blood Culture PCR (03-29-23 @ 06:46)  Organism: Blood Culture PCR (03-29-23 @ 06:46)      Method Type: PCR      -  Escherichia coli: Detec      PENDING:  Culture - Blood: AM Sched. Collection:30-Mar-2023 04:30  Specimen Source: Blood (03-29-23 @ 10:01)  Troponin T, Serum: 11:00 (03-29-23 @ 09:57)  Lactate, Blood: 11:00 (03-29-23 @ 09:57)  Lactate, Blood: STAT  Addl Info: Draw repeat Lactate, STAT (03-29-23 @ 08:36)  Culture - Urine: Routine  Specimen Source: Kidney  Addl Info: Urine collected from urostomy bag,     If indwelling urinary catheter > 14 days, obtain an (03-29-23 @ 05:49)  Bilirubin - Total and Direct: AM Sched. Collection: 29-Mar-2023 04:30 (03-29-23 @ 03:30)  Lactate, Blood: STAT  Addl Info: Draw repeat Lactate, STAT (03-29-23 @ 02:43)  Blood Gas Profile w/Lytes - Venous: STAT (03-28-23 @ 18:42)  Type + Screen: STAT (03-28-23 @ 18:40)    IMAGING:  Latest imaging reviewed.  Xray Chest 1 View- PORTABLE-Routine: AM   Indication: sepsis  Transport: Portable,  w/ Monitor (03-29-23 @ 09:57)  Xray Chest 1 View-PORTABLE IMMEDIATE: IMMEDIATE   Indication: central line  Transport: Portable  Exam Completed  Provider's Contact #: [phone] (03-28-23 @ 20:14)  Xray Chest 1 View- PORTABLE-Urgent: Urgent   Indication: Sepsis  Transport: Portable  Exam Completed (03-28-23 @ 15:33)    ECHO:    A&P:    82y year old Male with history of   HTN, DM, DL, Afib ( on eliquis), bladder CA s/p cystectomy and urostomy.   BIBEMS for  septic stone    Impression :  Septic stone  HTN  DM  DL  Afib  Bladder ca s/p Cystectomy/penectomy and ileal conduit      #Urosepsis 2/2 to obstructive stone, with hydronephrosis   #Hx Bladder Ca s/p cystectomy and urostomy  - s/p 5 L in Ed  - On admission: Lactate 8.3 -> improving trend lactate   - CT abdomen and pelvis with IV con  Right-sided hydronephrosis and right-sided hydroureter.   Findings secondary to a stone within the mid aspect of the right ureter.   Extensive inflammatory changes are seen in the region of the stone, extending superiorly surrounding a calcification, which may reflect a previously ruptured stone. Bilateral intrarenal calculi as described.   Bilateral renal cysts.  - As per Urology: cystoscopy/stent will not be successful due to pts prior surgical history. cs IR for PCN  - As per IR: Reverse Eliquis as patient has poor renal function -> Kcentra given. Last dose was 3/27 at 9pm Renal interventions such as a nephrostomy tube placement is a high risk  bleeding procedure.  - Anesthesia consult placed     #LORRAINE post renal  - Baseline crea: 1.2    #Chronic A fib  #HTN  - on Eliquis -> Holding for procedure   - Home Metoprolol succ 50 QD on hold for low BP  - On admission: Trop: 0.1  - Trend trops, f/u repeat trop        Pulmonary: HOB @ 45 degrees                         NC as needed    Cardiovascular :                              CE top 0.1                             Keep MAP >65   on levophed  s/p 5 liters fluids in ED  Central line in place  KCENTRA given for eliquis reversal for procedure  hold eliquis and HTN meds    GI : GI prophylaxis - none          DIET- NPO    Renal : follow up lytes, correct as needed  LORRAINE postrenal  Urology and IR on board. pending PCN placement after eliquis reversal  IV fluids      Infectious Disease : Antibiotics - Cefepime/Vanc                                     Follow up cultures ; descelate when cultures back    Hematological : DVT ppx - none    Endocrine : insulin scale    Musculoskeletal : Bedrest    Admit to ICU    Code status - full  Prognosis - gaurded                 ROSA MARIA CASEY, 82y, Male; MRN# [id number]  Hospital Stay: 1d.    Patient is a 82y old Male who presents with a chief complaint of septic stone (29 Mar 2023 08:01)  Currently admitted to the ICU with the primary diagnosis of Kidney stone      SUBJECTIVE:  Interval/Overnight events: Discussion with patient regarding intervention timing was done. Pt is refusing intervention for now bcz of increased risk of bleeding. Last dose of eliquis was given 3/27 at 9pm. t aware of risks of sepsis     REVIEW OF SYSTEMS:  As per HPI  OBJECTIVE:  VITALS:  T(F): 97.4 (03-29-23 @ 08:00), Max: 104.4 (03-28-23 @ 15:10)  HR: 84 (03-29-23 @ 09:00) (68 - 150)  BP: 124/65 (03-29-23 @ 09:00) (65/50 - 156/72)  ABP: --  ABP(mean): --  RR: 20 (03-29-23 @ 09:00) (3 - 35)  SpO2: 95% (03-29-23 @ 09:00) (93% - 98%)    Vent Settings    Blood Gas    Drips  dextrose 5%. 1000 milliLiter(s) (100 mL/Hr) IV Continuous <Continuous>  dextrose 5%. 1000 milliLiter(s) (50 mL/Hr) IV Continuous <Continuous>  lactated ringers. 1000 milliLiter(s) (75 mL/Hr) IV Continuous <Continuous>  norepinephrine Infusion 0.05 MICROgram(s)/kG/Min (12.1 mL/Hr) IV Continuous <Continuous>    I&Os:    03-28-23 @ 07:01  -  03-29-23 @ 07:00  --------------------------------------------------------  IN: 699.7 mL / OUT: 750 mL / NET: -50.3 mL    03-29-23 @ 07:01  -  03-29-23 @ 11:02  --------------------------------------------------------  IN: 0 mL / OUT: 550 mL / NET: -550 mL      PHYSICAL EXAM:    ACTIVE MEDICATIONS:  Standing  dextrose 5%. 1000 milliLiter(s) (100 mL/Hr) IV Continuous <Continuous>  dextrose 5%. 1000 milliLiter(s) (50 mL/Hr) IV Continuous <Continuous>  dextrose 50% Injectable 25 Gram(s) IV Push once  dextrose 50% Injectable 12.5 Gram(s) IV Push once  dextrose 50% Injectable 25 Gram(s) IV Push once  glucagon  Injectable 1 milliGRAM(s) IntraMuscular once  insulin lispro (ADMELOG) corrective regimen sliding scale   SubCutaneous three times a day before meals  lactated ringers. 1000 milliLiter(s) (75 mL/Hr) IV Continuous <Continuous>  meropenem  IVPB      meropenem  IVPB 500 milliGRAM(s) IV Intermittent once  meropenem  IVPB 500 milliGRAM(s) IV Intermittent every 12 hours  norepinephrine Infusion 0.05 MICROgram(s)/kG/Min (12.1 mL/Hr) IV Continuous <Continuous>  pantoprazole  Injectable 40 milliGRAM(s) IV Push daily  vancomycin  IVPB 1000 milliGRAM(s) IV Intermittent every 12 hours    PRN  acetaminophen     Tablet .. 650 milliGRAM(s) Oral every 6 hours PRN  dextrose Oral Gel 15 Gram(s) Oral once PRN    LABS:  03-29-23 @ 05:10  WBC 21.71<H>, H/H 14.4/44.7, plt 145  Na 132<L>, K 4.3, Cl 97<L>, CO2 19, BUN 46<H>, Cr 2.6<H>, Glu 171<H>    Ca 8.9, Mg 1.8, Phosphorus --    Tot Protein 5.7<L>, Alb 3.3<L>, T. Bili 1.7<H>, D. Bili --  AST 63<H>, ALT 15, Alk phos 59    03-29-23 @ 00:10  WBC 22.10<H>, H/H 14.6/43.5, plt 141  Na 134<L>, K 4.2, Cl 97<L>, CO2 18, BUN 49<H>, Cr 2.7<H>, Glu 161<H>    Ca 8.5, Mg 1.4<L>, Phosphorus --    Tot Protein 5.8<L>, Alb 3.5, T. Bili 1.9<H>, D. Bili --  AST 41, ALT 11, Alk phos 53    03-28-23 @ 16:56  WBC --, H/H --/--, plt --  Na 133<L>, K 3.9, Cl 98, CO2 18, BUN 50<H>, Cr 2.8<H>, Glu 141<H>    Ca 8.6, Mg --, Phosphorus --    Tot Protein 6.0, Alb 3.6, T. Bili 1.6<H>, D. Bili --  AST 24, ALT 11, Alk phos 97    03-28-23 @ 15:35  WBC 3.89<L>, H/H 15.7/49.6, plt 160  Na --, K --, Cl --, CO2 --, BUN --, Cr --, Glu --    Ca --, Mg --, Phosphorus --    Tot Protein --, Alb --, T. Bili --, D. Bili --  AST --, ALT --, Alk phos --      03-29-23 @ 05:10:  PT 17.00<H>, INR 1.47<H>, aPTT 30.1  03-29-23 @ 00:10:  PT 17.50<H>, INR 1.52<H>, aPTT 33.5  03-28-23 @ 15:35:  PT 18.00<H>, INR 1.56<H>, aPTT 30.4        03-29-23 @ 05:10:  Hgb A1c 9.0<H>% | Mean plasma glucose 212<H>    03-29-23 @ 05:10:  Total Chol 106 | Non-HDL 65 | HDL 41  LDL-Direct -- | LDL-Calc 5 | <H>    03-28-23 @ 15:35:  Troponin T 0.10<HH>, BNP --          CULTURES:    Culture - Blood (collected 03-28-23 @ 15:35)  Source: .Blood Blood-Peripheral  Gram Stain (03-29-23 @ 06:01):    Growth in anaerobic bottle: Gram Negative Rods    Growth in aerobic bottle: Gram Negative Rods  Preliminary Report (03-29-23 @ 06:01):    Growth in anaerobic bottle: Gram Negative Rods    Growth in aerobic bottle: Gram Negative Rods    Culture - Blood (collected 03-28-23 @ 15:35)  Source: .Blood Blood-Peripheral  Gram Stain (03-29-23 @ 06:33):    Growth in anaerobic bottle: Gram Negative Rods    Growth in aerobic bottle: Gram Negative Rods  Preliminary Report (03-29-23 @ 06:34):    Growth in aerobic bottle: Gram Negative Rods    Growth in anaerobic bottle: Gram Negative Rods    ***Blood Panel PCR results on this specimen are available    approximately 3 hours after the Gram stain result.***    Gram stain, PCR, and/or culture results may not always    correspond due to difference in methodologies.    ************************************************************    This PCR assay was performed by multiplex PCR. This    Assay tests for 66 bacterial and resistance gene targets.    Please refer to the [facility] Labs test directory    at [email or web address] for details.  Organism: Blood Culture PCR (03-29-23 @ 06:46)  Organism: Blood Culture PCR (03-29-23 @ 06:46)      Method Type: PCR      -  Escherichia coli: Detec      PENDING:  Culture - Blood: AM Sched. Collection:30-Mar-2023 04:30  Specimen Source: Blood (03-29-23 @ 10:01)  Troponin T, Serum: 11:00 (03-29-23 @ 09:57)  Lactate, Blood: 11:00 (03-29-23 @ 09:57)  Lactate, Blood: STAT  Addl Info: Draw repeat Lactate, STAT (03-29-23 @ 08:36)  Culture - Urine: Routine  Specimen Source: Kidney  Addl Info: Urine collected from urostomy bag,     If indwelling urinary catheter > 14 days, obtain an (03-29-23 @ 05:49)  Bilirubin - Total and Direct: AM Sched. Collection: 29-Mar-2023 04:30 (03-29-23 @ 03:30)  Lactate, Blood: STAT  Addl Info: Draw repeat Lactate, STAT (03-29-23 @ 02:43)  Blood Gas Profile w/Lytes - Venous: STAT (03-28-23 @ 18:42)  Type + Screen: STAT (03-28-23 @ 18:40)    IMAGING:  Latest imaging reviewed.  Xray Chest 1 View- PORTABLE-Routine: AM   Indication: sepsis  Transport: Portable,  w/ Monitor (03-29-23 @ 09:57)  Xray Chest 1 View-PORTABLE IMMEDIATE: IMMEDIATE   Indication: central line  Transport: Portable  Exam Completed  Provider's Contact #: [phone] (03-28-23 @ 20:14)  Xray Chest 1 View- PORTABLE-Urgent: Urgent   Indication: Sepsis  Transport: Portable  Exam Completed (03-28-23 @ 15:33)    ECHO:    A&P:    82y year old Male with history of   HTN, DM, DL, Afib ( on eliquis), bladder CA s/p cystectomy and urostomy.   BIBEMS for  septic stone    Impression :  Septic stone  HTN  DM  DL  Afib  Bladder ca s/p Cystectomy/penectomy and ileal conduit      #Urosepsis 2/2 to obstructive stone, with hydronephrosis   #Hx Bladder Ca s/p cystectomy and urostomy  - s/p 5 L in Ed  - On admission: Lactate 8.3 -> improving ->  trend lactate   - CT abdomen and pelvis with IV con  Right-sided hydronephrosis and right-sided hydroureter.   Findings secondary to a stone within the mid aspect of the right ureter.   Extensive inflammatory changes are seen in the region of the stone, extending superiorly surrounding a calcification, which may reflect a previously ruptured stone. Bilateral intrarenal calculi as described.   Bilateral renal cysts.  - As per Urology: cystoscopy/stent will not be successful due to pts prior surgical history. cs IR for PCN  - As per IR: Reverse Eliquis as patient has poor renal function -> Kcentra given. Last dose was 3/27 at 9pm Renal interventions such as a nephrostomy tube placement is a high risk  bleeding procedure.  - Anesthesia consult placed   - BCx   - c/w Meropenem and vanco  - f/u ID cs     #LORRAINE post renal  - Baseline crea: 1.2    #Chronic A fib  #HTN  - on Eliquis -> Holding for procedure   - Home Metoprolol succ 50 QD on hold for low BP  - On admission: Trop: 0.1  - Trend trops, f/u repeat trop        Pulmonary: HOB @ 45 degrees                         NC as needed    Cardiovascular :                              CE top 0.1                             Keep MAP >65   on levophed  s/p 5 liters fluids in ED  Central line in place  KCENTRA given for eliquis reversal for procedure  hold eliquis and HTN meds    GI : GI prophylaxis - none          DIET- NPO    Renal : follow up lytes, correct as needed  LORRAINE postrenal  Urology and IR on board. pending PCN placement after eliquis reversal  IV fluids      Infectious Disease : Antibiotics - Cefepime/Vanc                                     Follow up cultures ; descelate when cultures back    Hematological : DVT ppx - none    Endocrine : insulin scale    Musculoskeletal : Bedrest    Admit to ICU    Code status - full  Prognosis - gaurded                 ROSA MARIA CASEY, 82y, Male; MRN# [id number]  Hospital Stay: 1d.    Patient is a 82y old Male who presents with a chief complaint of septic stone (29 Mar 2023 08:01)  Currently admitted to the ICU with the primary diagnosis of Kidney stone      SUBJECTIVE:  Interval/Overnight events: Discussion with patient regarding intervention timing was done. Pt is refusing intervention for now bcz of increased risk of bleeding. Last dose of eliquis was given 3/27 at 9pm. t aware of risks of sepsis     REVIEW OF SYSTEMS:  As per HPI  OBJECTIVE:  VITALS:  T(F): 97.4 (03-29-23 @ 08:00), Max: 104.4 (03-28-23 @ 15:10)  HR: 84 (03-29-23 @ 09:00) (68 - 150)  BP: 124/65 (03-29-23 @ 09:00) (65/50 - 156/72)  ABP: --  ABP(mean): --  RR: 20 (03-29-23 @ 09:00) (3 - 35)  SpO2: 95% (03-29-23 @ 09:00) (93% - 98%)    Vent Settings    Blood Gas    Drips  dextrose 5%. 1000 milliLiter(s) (100 mL/Hr) IV Continuous <Continuous>  dextrose 5%. 1000 milliLiter(s) (50 mL/Hr) IV Continuous <Continuous>  lactated ringers. 1000 milliLiter(s) (75 mL/Hr) IV Continuous <Continuous>  norepinephrine Infusion 0.05 MICROgram(s)/kG/Min (12.1 mL/Hr) IV Continuous <Continuous>    I&Os:    03-28-23 @ 07:01  -  03-29-23 @ 07:00  --------------------------------------------------------  IN: 699.7 mL / OUT: 750 mL / NET: -50.3 mL    03-29-23 @ 07:01  -  03-29-23 @ 11:02  --------------------------------------------------------  IN: 0 mL / OUT: 550 mL / NET: -550 mL      PHYSICAL EXAM:    ACTIVE MEDICATIONS:  Standing  dextrose 5%. 1000 milliLiter(s) (100 mL/Hr) IV Continuous <Continuous>  dextrose 5%. 1000 milliLiter(s) (50 mL/Hr) IV Continuous <Continuous>  dextrose 50% Injectable 25 Gram(s) IV Push once  dextrose 50% Injectable 12.5 Gram(s) IV Push once  dextrose 50% Injectable 25 Gram(s) IV Push once  glucagon  Injectable 1 milliGRAM(s) IntraMuscular once  insulin lispro (ADMELOG) corrective regimen sliding scale   SubCutaneous three times a day before meals  lactated ringers. 1000 milliLiter(s) (75 mL/Hr) IV Continuous <Continuous>  meropenem  IVPB      meropenem  IVPB 500 milliGRAM(s) IV Intermittent once  meropenem  IVPB 500 milliGRAM(s) IV Intermittent every 12 hours  norepinephrine Infusion 0.05 MICROgram(s)/kG/Min (12.1 mL/Hr) IV Continuous <Continuous>  pantoprazole  Injectable 40 milliGRAM(s) IV Push daily  vancomycin  IVPB 1000 milliGRAM(s) IV Intermittent every 12 hours    PRN  acetaminophen     Tablet .. 650 milliGRAM(s) Oral every 6 hours PRN  dextrose Oral Gel 15 Gram(s) Oral once PRN    LABS:  03-29-23 @ 05:10  WBC 21.71<H>, H/H 14.4/44.7, plt 145  Na 132<L>, K 4.3, Cl 97<L>, CO2 19, BUN 46<H>, Cr 2.6<H>, Glu 171<H>    Ca 8.9, Mg 1.8, Phosphorus --    Tot Protein 5.7<L>, Alb 3.3<L>, T. Bili 1.7<H>, D. Bili --  AST 63<H>, ALT 15, Alk phos 59    03-29-23 @ 00:10  WBC 22.10<H>, H/H 14.6/43.5, plt 141  Na 134<L>, K 4.2, Cl 97<L>, CO2 18, BUN 49<H>, Cr 2.7<H>, Glu 161<H>    Ca 8.5, Mg 1.4<L>, Phosphorus --    Tot Protein 5.8<L>, Alb 3.5, T. Bili 1.9<H>, D. Bili --  AST 41, ALT 11, Alk phos 53    03-28-23 @ 16:56  WBC --, H/H --/--, plt --  Na 133<L>, K 3.9, Cl 98, CO2 18, BUN 50<H>, Cr 2.8<H>, Glu 141<H>    Ca 8.6, Mg --, Phosphorus --    Tot Protein 6.0, Alb 3.6, T. Bili 1.6<H>, D. Bili --  AST 24, ALT 11, Alk phos 97    03-28-23 @ 15:35  WBC 3.89<L>, H/H 15.7/49.6, plt 160  Na --, K --, Cl --, CO2 --, BUN --, Cr --, Glu --    Ca --, Mg --, Phosphorus --    Tot Protein --, Alb --, T. Bili --, D. Bili --  AST --, ALT --, Alk phos --      03-29-23 @ 05:10:  PT 17.00<H>, INR 1.47<H>, aPTT 30.1  03-29-23 @ 00:10:  PT 17.50<H>, INR 1.52<H>, aPTT 33.5  03-28-23 @ 15:35:  PT 18.00<H>, INR 1.56<H>, aPTT 30.4        03-29-23 @ 05:10:  Hgb A1c 9.0<H>% | Mean plasma glucose 212<H>    03-29-23 @ 05:10:  Total Chol 106 | Non-HDL 65 | HDL 41  LDL-Direct -- | LDL-Calc 5 | <H>    03-28-23 @ 15:35:  Troponin T 0.10<HH>, BNP --          CULTURES:    Culture - Blood (collected 03-28-23 @ 15:35)  Source: .Blood Blood-Peripheral  Gram Stain (03-29-23 @ 06:01):    Growth in anaerobic bottle: Gram Negative Rods    Growth in aerobic bottle: Gram Negative Rods  Preliminary Report (03-29-23 @ 06:01):    Growth in anaerobic bottle: Gram Negative Rods    Growth in aerobic bottle: Gram Negative Rods    Culture - Blood (collected 03-28-23 @ 15:35)  Source: .Blood Blood-Peripheral  Gram Stain (03-29-23 @ 06:33):    Growth in anaerobic bottle: Gram Negative Rods    Growth in aerobic bottle: Gram Negative Rods  Preliminary Report (03-29-23 @ 06:34):    Growth in aerobic bottle: Gram Negative Rods    Growth in anaerobic bottle: Gram Negative Rods    ***Blood Panel PCR results on this specimen are available    approximately 3 hours after the Gram stain result.***    Gram stain, PCR, and/or culture results may not always    correspond due to difference in methodologies.    ************************************************************    This PCR assay was performed by multiplex PCR. This    Assay tests for 66 bacterial and resistance gene targets.    Please refer to the [facility] Labs test directory    at [email or web address] for details.  Organism: Blood Culture PCR (03-29-23 @ 06:46)  Organism: Blood Culture PCR (03-29-23 @ 06:46)      Method Type: PCR      -  Escherichia coli: Detec      PENDING:  Culture - Blood: AM Sched. Collection:30-Mar-2023 04:30  Specimen Source: Blood (03-29-23 @ 10:01)  Troponin T, Serum: 11:00 (03-29-23 @ 09:57)  Lactate, Blood: 11:00 (03-29-23 @ 09:57)  Lactate, Blood: STAT  Addl Info: Draw repeat Lactate, STAT (03-29-23 @ 08:36)  Culture - Urine: Routine  Specimen Source: Kidney  Addl Info: Urine collected from urostomy bag,     If indwelling urinary catheter > 14 days, obtain an (03-29-23 @ 05:49)  Bilirubin - Total and Direct: AM Sched. Collection: 29-Mar-2023 04:30 (03-29-23 @ 03:30)  Lactate, Blood: STAT  Addl Info: Draw repeat Lactate, STAT (03-29-23 @ 02:43)  Blood Gas Profile w/Lytes - Venous: STAT (03-28-23 @ 18:42)  Type + Screen: STAT (03-28-23 @ 18:40)    IMAGING:  Latest imaging reviewed.  Xray Chest 1 View- PORTABLE-Routine: AM   Indication: sepsis  Transport: Portable,  w/ Monitor (03-29-23 @ 09:57)  Xray Chest 1 View-PORTABLE IMMEDIATE: IMMEDIATE   Indication: central line  Transport: Portable  Exam Completed  Provider's Contact #: [phone] (03-28-23 @ 20:14)  Xray Chest 1 View- PORTABLE-Urgent: Urgent   Indication: Sepsis  Transport: Portable  Exam Completed (03-28-23 @ 15:33)    ECHO:    A&P:    82y year old Male with history of   HTN, DM, DL, Afib ( on eliquis), bladder CA s/p cystectomy and urostomy.   BIBEMS for  septic stone    Impression :  Septic stone  HTN  DM  DL  Afib  Bladder ca s/p Cystectomy/penectomy and ileal conduit      #Urosepsis 2/2 to obstructive stone, with hydronephrosis   #Hx Bladder Ca s/p cystectomy and urostomy  - s/p 5 L in Ed  - On admission: Lactate 8.3 -> improving ->  trend lactate   - Off levo now   - on 2 L NC  - CT abdomen and pelvis with IV con  Right-sided hydronephrosis and right-sided hydroureter.   Findings secondary to a stone within the mid aspect of the right ureter.   Extensive inflammatory changes are seen in the region of the stone, extending superiorly surrounding a calcification, which may reflect a previously ruptured stone. Bilateral intrarenal calculi as described.   Bilateral renal cysts.  - As per Urology: cystoscopy/stent will not be successful due to pts prior surgical history. cs IR for PCN  - As per IR: Reverse Eliquis as patient has poor renal function -> Kcentra given. Last dose was 3/27 at 9pm Renal interventions such as a nephrostomy tube placement is a high risk  bleeding procedure.  - Anesthesia consult placed   - BCx 3/28: E coli   - UA positive   - c/w Meropenem and vanco  - f/u ID cs     #LORRAINE post renal  - Baseline crea: 1.2    #Chronic A fib  #HTN  - on Eliquis -> Holding for procedure   - Home Metoprolol succ 50 QD on hold for low BP  - On admission: Trop: 0.1  - Trend trops, f/u repeat trop  - f/u Echo    #DM:  - Home glimepiride 2 mg 2.5 tb QD  - Inpt: Sliding Scale    #DVT ppx: None  #GI ppx: None  #Code status: Full code

## 2023-03-29 NOTE — ASU PREOP CHECKLIST - AS BP NONINV SITE
left upper arm A-T Advancement Flap Text: The defect edges were debeveled with a #15c scalpel blade.  Given the location of the defect, shape of the defect and the proximity to free margins an A-T advancement flap was deemed most appropriate.  Using a sterile surgical marker, an appropriate advancement flap was drawn incorporating the defect and placing the expected incisions within the relaxed skin tension lines where possible.    The area thus outlined was incised deep to adipose tissue with a #15 scalpel blade.  The skin margins were undermined to an appropriate distance in all directions utilizing iris scissors.

## 2023-03-29 NOTE — CONSULT NOTE ADULT - ATTENDING COMMENTS
IMPRESSION:  Sepsis POA  Septic shock   E Coli bacteremia   Pyelonephritis  Right side obstructing stone  LORRAINE.  Likely prerenal / ATN / Obstructive   HO Chronic A fib was on Eliquis  H/o bladder cancer s/p urostomy  H/o HTN      Plan as outlined above

## 2023-03-29 NOTE — PRE-ANESTHESIA EVALUATION ADULT - NSANTHOSAYNRD_GEN_A_CORE
No. JARETH screening performed.  STOP BANG Legend: 0-2 = LOW Risk; 3-4 = INTERMEDIATE Risk; 5-8 = HIGH Risk
No. JARETH screening performed.  STOP BANG Legend: 0-2 = LOW Risk; 3-4 = INTERMEDIATE Risk; 5-8 = HIGH Risk

## 2023-03-29 NOTE — PHARMACOTHERAPY INTERVENTION NOTE - COMMENTS
As per policy, ordered a vancomycin trough for 3/30 with AM labs since patient would be due for a trough prior to the fourth dose of vancomycin.    Nanette Del Real, PharmD, [facility]  Clinical Pharmacy Specialist, Infectious Diseases  Tele-Antimicrobial Stewardship Program (Tele-ASP)  Tele-ASP Phone: [phone]   As per policy, ordered a vancomycin trough for 3/30 with AM labs in order to assist with vancomycin pharmacokinetic monitoring.     Nanette Del Real, PharmD, [facility]  Clinical Pharmacy Specialist, Infectious Diseases  Tele-Antimicrobial Stewardship Program (Tele-ASP)  Tele-ASP Phone: [phone]

## 2023-03-29 NOTE — PROGRESS NOTE ADULT - SUBJECTIVE AND OBJECTIVE BOX
Interventional Radiology Brief- Operative Note    Procedure: Image guided right PCN placment    Pre-Op Diagnosis: obstructing ureteral stone     Post-Op Diagnosis: same    Attending: Amira    Resident: Marychuy    Anesthesia (type):  [ ] General Anesthesia  [x ] Sedation provided by anesthesia  [ ] Spinal Anesthesia  [ x] Local/Regional    Contrast: 5 cc    Estimated Blood Loss: < 5 cc    Condition:   [ ] Critical  [ ] Serious  [ ] Fair   [x ] Good    Findings/Follow up Plan of Care: US revealed mild calyceal dilatation with predominantly central pelvic dilatation. Access was obtained under ultrasound guidance. Contrast was injected to confirm location. Wire was advanced to the level of the ureter and tract was dilated up to 8Fr. 8Fr pigtail catheter was placed. Clear urine visualized in the bag.    Specimens Removed: None    Implants: None    Complications: None immediate    Condition/Disposition: Ok to return to unit      Please call Interventional Radiology [phone] with any questions, concerns, or issues.

## 2023-03-29 NOTE — CONSULT NOTE ADULT - SUBJECTIVE AND OBJECTIVE BOX
Patient is a 82y old  Male who presents with a chief complaint of septic stone (28 Mar 2023 23:17)      HPI:  81 yo M with history of HTN, DM, DL, Afib ( on eliquis), bladder CA s/p cystectomy and urostomy. Patient presenting with right sided ureteral stone with evidence of obstruction. Patient started having back pain yesterday right sided. urine in urostomy bag was cloudy and dark in color. He was going to his PCP but developed high fever and chills so he came [facility]. in [facility] site patient was hypotensive and tachycardic requiring 5 liters fluids and was started on pressors. cultures were taken and he was given broad spectrum antibiotics. His HD status improved. CT abdomen showed Right-sided hydronephrosis and right-sided hydroureter. Findings secondary to a stone within the mid aspect of the right ureter. Extensive inflammatory changes are seen in the region of the stone,   extending superiorly surrounding a calcification, which may reflect a previously ruptured stone. Bilateral intrarenal calculi as described. Lactate 8 on  went down to 6. He was transferred to north site for eliquis reversal before intervention by IR for PCN (28 Mar 2023 22:54)      PAST MEDICAL & SURGICAL HISTORY:  Hypertension      Hyperlipidemia      Diabetes mellitus, type 2      History of atrial fibrillation      Bladder cancer  secondary to exposure at [employer]  s/p cystectomy with urostomy      Chronic kidney disease (CKD)  stage 3A, baseline creatinine 1.4      DVT, lower extremity      History of total hip replacement, bilateral      History of total knee replacement, bilateral      History of total cystectomy  with resultant urostomy          SOCIAL HX:   Smoking no               FAMILY HISTORY:  :  No known cardiovascular family hisotry     Review Of Systems:     All ROS are negative except per HPI       Allergies    No Known Allergies    Intolerances          PHYSICAL EXAM    ICU Vital Signs Last 24 Hrs  T(C): 37.2 (29 Mar 2023 04:00), Max: 40.2 (28 Mar 2023 15:10)  T(F): 99 (29 Mar 2023 04:00), Max: 104.4 (28 Mar 2023 15:10)  HR: 89 (29 Mar 2023 07:00) (68 - 150)  BP: 106/56 (29 Mar 2023 07:00) (65/50 - 156/72)  BP(mean): 74 (29 Mar 2023 07:00) (65 - 103)  ABP: --  ABP(mean): --  RR: 18 (29 Mar 2023 07:00) (3 - 35)  SpO2: 97% (29 Mar 2023 07:00) (93% - 98%)    O2 Parameters below as of 29 Mar 2023 06:00  Patient On (Oxygen Delivery Method): nasal cannula  O2 Flow (L/min): 3          CONSTITUTIONAL:  NAD    ENT:   Airway patent,   Mouth with normal mucosa.   No thrush    EYES:   pupils equal,   round and reactive to light.    CARDIAC:   Normal rate,   Regular rhythm.    Heart sounds S1, S2.     RESPIRATORY:   No wheezing   Normal chest expansion  No use of accessory muscles    GASTROINTESTINAL:  Abdomen soft   Non-tender,   No guarding,   + BS    MUSCULOSKELETAL:   Range of motion is not limited,  Nno clubbing, cyanosis    NEUROLOGICAL:   Alert and oriented   No motor deficits.    SKIN:   Skin normal color for race,   Warm and dry  No evidence of rash.    PSYCHIATRIC:   No apparent risk to self or others.              23 @ 07:01  -  23 @ 07:00  --------------------------------------------------------  IN:    Lactated Ringers: 600 mL    Norepinephrine: 99.7 mL  Total IN: 699.7 mL    OUT:    Urostomy (mL): 750 mL  Total OUT: 750 mL    Total NET: -50.3 mL          LABS:                          14.4   21.71 )-----------( 145      ( 29 Mar 2023 05:10 )             44.7                                                   132<L>  |  97<L>  |  46<H>  ----------------------------<  171<H>  4.3   |  19  |  2.6<H>    Ca    8.9      29 Mar 2023 05:10  Mg     1.8         TPro  5.7<L>  /  Alb  3.3<L>  /  TBili  1.7<H>  /  DBili  x   /  AST  63<H>  /  ALT  15  /  AlkPhos  59        PT/INR - ( 29 Mar 2023 05:10 )   PT: 17.00 sec;   INR: 1.47 ratio         PTT - ( 29 Mar 2023 05:10 )  PTT:30.1 sec                                       Urinalysis Basic - ( 28 Mar 2023 18:10 )    Color: Yellow / Appearance: Slightly Cloudy / S.010 / pH: x  Gluc: x / Ketone: Trace  / Bili: Negative / Urobili: 0.2 mg/dL   Blood: x / Protein: 100 mg/dL / Nitrite: Negative   Leuk Esterase: Small / RBC: 6-10 /HPF / WBC 10-25 /HPF   Sq Epi: x / Non Sq Epi: Few /HPF / Bacteria: Many        CARDIAC MARKERS ( 28 Mar 2023 15:35 )  x     / 0.10 ng/mL / x     / x     / x                                                LIVER FUNCTIONS - ( 29 Mar 2023 05:10 )  Alb: 3.3 g/dL / Pro: 5.7 g/dL / ALK PHOS: 59 U/L / ALT: 15 U/L / AST: 63 U/L / GGT: x                                                  Culture - Blood (collected 28 Mar 2023 15:35)  Source: .Blood Blood-Peripheral  Gram Stain (29 Mar 2023 06:01):    Growth in anaerobic bottle: Gram Negative Rods    Growth in aerobic bottle: Gram Negative Rods  Preliminary Report (29 Mar 2023 06:01):    Growth in anaerobic bottle: Gram Negative Rods    Growth in aerobic bottle: Gram Negative Rods    Culture - Blood (collected 28 Mar 2023 15:35)  Source: .Blood Blood-Peripheral  Gram Stain (29 Mar 2023 06:33):    Growth in anaerobic bottle: Gram Negative Rods    Growth in aerobic bottle: Gram Negative Rods  Preliminary Report (29 Mar 2023 06:34):    Growth in aerobic bottle: Gram Negative Rods    Growth in anaerobic bottle: Gram Negative Rods    ***Blood Panel PCR results on this specimen are available    approximately 3 hours after the Gram stain result.***    Gram stain, PCR, and/or culture results may not always    correspond due to difference in methodologies.    ************************************************************    This PCR assay was performed by multiplex PCR. This    Assay tests for 66 bacterial and resistance gene targets.    Please refer to the [facility] test directory    at [email or web address] for details.  Organism: Blood Culture PCR (29 Mar 2023 06:46)  Organism: Blood Culture PCR (29 Mar 2023 06:46)                                                                                           X-Rays reviewed:                                                                                    ECHO    CXR interpreted by me:    MEDICATIONS  (STANDING):  cefepime   IVPB 1000 milliGRAM(s) IV Intermittent every 12 hours  dextrose 5%. 1000 milliLiter(s) (100 mL/Hr) IV Continuous <Continuous>  dextrose 5%. 1000 milliLiter(s) (50 mL/Hr) IV Continuous <Continuous>  dextrose 50% Injectable 25 Gram(s) IV Push once  dextrose 50% Injectable 12.5 Gram(s) IV Push once  dextrose 50% Injectable 25 Gram(s) IV Push once  glucagon  Injectable 1 milliGRAM(s) IntraMuscular once  insulin lispro (ADMELOG) corrective regimen sliding scale   SubCutaneous three times a day before meals  lactated ringers. 1000 milliLiter(s) (75 mL/Hr) IV Continuous <Continuous>  norepinephrine Infusion 0.05 MICROgram(s)/kG/Min (12.1 mL/Hr) IV Continuous <Continuous>  pantoprazole  Injectable 40 milliGRAM(s) IV Push daily  vancomycin  IVPB 1000 milliGRAM(s) IV Intermittent every 12 hours    MEDICATIONS  (PRN):  acetaminophen     Tablet .. 650 milliGRAM(s) Oral every 6 hours PRN Temp greater or equal to 38C (100.4F)  dextrose Oral Gel 15 Gram(s) Oral once PRN Blood Glucose LESS THAN 70 milliGRAM(s)/deciliter         Patient is a 82y old  Male who presents with a chief complaint of septic stone (28 Mar 2023 23:17)      HPI:  81 yo M with history of HTN, DM, DL, Afib ( on eliquis), bladder CA s/p cystectomy and urostomy. Patient presenting with right sided ureteral stone with evidence of obstruction. Patient started having back pain yesterday right sided. urine in urostomy bag was cloudy and dark in color. He was going to his PCP but developed high fever and chills so he came to ED [facility]. in [facility] site patient was hypotensive and tachycardic requiring 5 liters fluids and was started on pressors. cultures were taken and he was given broad spectrum antibiotics. His HD status improved. CT abdomen showed Right-sided hydronephrosis and right-sided hydroureter. Findings secondary to a stone within the mid aspect of the right ureter. Extensive inflammatory changes are seen in the region of the stone,   extending superiorly surrounding a calcification, which may reflect a previously ruptured stone. Bilateral intrarenal calculi as described. Lactate 8 on [facility] went down to 6. He was transferred to north site for eliquis reversal before intervention by IR for PCN (28 Mar 2023 22:54)      PAST MEDICAL & SURGICAL HISTORY:  Hypertension      Hyperlipidemia      Diabetes mellitus, type 2      History of atrial fibrillation      Bladder cancer  secondary to exposure at [employer]  s/p cystectomy with urostomy      Chronic kidney disease (CKD)  stage 3A, baseline creatinine 1.4      DVT, lower extremity      History of total hip replacement, bilateral      History of total knee replacement, bilateral      History of total cystectomy  with resultant urostomy          SOCIAL HX:   Smoking no               FAMILY HISTORY:  :  No known cardiovascular family history     Review Of Systems:     All ROS are negative except per HPI       Allergies    No Known Allergies    Intolerances          PHYSICAL EXAM    ICU Vital Signs Last 24 Hrs  T(C): 37.2 (29 Mar 2023 04:00), Max: 40.2 (28 Mar 2023 15:10)  T(F): 99 (29 Mar 2023 04:00), Max: 104.4 (28 Mar 2023 15:10)  HR: 89 (29 Mar 2023 07:00) (68 - 150)  BP: 106/56 (29 Mar 2023 07:00) (65/50 - 156/72)  BP(mean): 74 (29 Mar 2023 07:00) (65 - 103)  ABP: --  ABP(mean): --  RR: 18 (29 Mar 2023 07:00) (3 - 35)  SpO2: 97% (29 Mar 2023 07:00) (93% - 98%)    O2 Parameters below as of 29 Mar 2023 06:00  Patient On (Oxygen Delivery Method): nasal cannula  O2 Flow (L/min): 3          CONSTITUTIONAL:  NAD    ENT:   Airway patent,   Mouth with normal mucosa.       EYES:   pupils equal,   round and reactive to light.    CARDIAC:   Normal rate,   Regular rhythm.    Heart sounds S1, S2.     RESPIRATORY:   No wheezing   Normal chest expansion  No use of accessory muscles    GASTROINTESTINAL:  Abdomen soft   Non-tender,   No guarding,   + BS    MUSCULOSKELETAL:   Range of motion is not limited,  No clubbing, cyanosis    NEUROLOGICAL:   Alert and oriented   No motor deficits.    SKIN:   Skin normal color for race,   Warm and dry  No evidence of rash.    PSYCHIATRIC:   No apparent risk to self or others.              23 @ 07:01  -  23 @ 07:00  --------------------------------------------------------  IN:    Lactated Ringers: 600 mL    Norepinephrine: 99.7 mL  Total IN: 699.7 mL    OUT:    Urostomy (mL): 750 mL  Total OUT: 750 mL    Total NET: -50.3 mL          LABS:                          14.4   21.71 )-----------( 145      ( 29 Mar 2023 05:10 )             44.7                                                   132<L>  |  97<L>  |  46<H>  ----------------------------<  171<H>  4.3   |  19  |  2.6<H>    Creatinine Trend  BUN 46, Cr 2.6, (23 @ 05:10)  Creatinine Trend  BUN 49, Cr 2.7, (23 @ 00:10)  Creatinine Trend  BUN 50, Cr 2.8, (23 @ 16:56)      Ca    8.9      29 Mar 2023 05:10  Mg     1.8         TPro  5.7<L>  /  Alb  3.3<L>  /  TBili  1.7<H>  /  DBili  x   /  AST  63<H>  /  ALT  15  /  AlkPhos  59  03-29      PT/INR - ( 29 Mar 2023 05:10 )   PT: 17.00 sec;   INR: 1.47 ratio         PTT - ( 29 Mar 2023 05:10 )  PTT:30.1 sec                                       Urinalysis Basic - ( 28 Mar 2023 18:10 )    Color: Yellow / Appearance: Slightly Cloudy / S.010 / pH: x  Gluc: x / Ketone: Trace  / Bili: Negative / Urobili: 0.2 mg/dL   Blood: x / Protein: 100 mg/dL / Nitrite: Negative   Leuk Esterase: Small / RBC: 6-10 /HPF / WBC 10-25 /HPF   Sq Epi: x / Non Sq Epi: Few /HPF / Bacteria: Many        CARDIAC MARKERS ( 28 Mar 2023 15:35 )  x     / 0.10 ng/mL / x     / x     / x                                                LIVER FUNCTIONS - ( 29 Mar 2023 05:10 )  Alb: 3.3 g/dL / Pro: 5.7 g/dL / ALK PHOS: 59 U/L / ALT: 15 U/L / AST: 63 U/L / GGT: x                                                  Culture - Blood (collected 28 Mar 2023 15:35)  Source: .Blood Blood-Peripheral  Gram Stain (29 Mar 2023 06:01):    Growth in anaerobic bottle: Gram Negative Rods    Growth in aerobic bottle: Gram Negative Rods  Preliminary Report (29 Mar 2023 06:01):    Growth in anaerobic bottle: Gram Negative Rods    Growth in aerobic bottle: Gram Negative Rods    Culture - Blood (collected 28 Mar 2023 15:35)  Source: .Blood Blood-Peripheral  Gram Stain (29 Mar 2023 06:33):    Growth in anaerobic bottle: Gram Negative Rods    Growth in aerobic bottle: Gram Negative Rods  Preliminary Report (29 Mar 2023 06:34):    Growth in aerobic bottle: Gram Negative Rods    Growth in anaerobic bottle: Gram Negative Rods    ***Blood Panel PCR results on this specimen are available    approximately 3 hours after the Gram stain result.***    Gram stain, PCR, and/or culture results may not always    correspond due to difference in methodologies.    ************************************************************    This PCR assay was performed by multiplex PCR. This    Assay tests for 66 bacterial and resistance gene targets.    Please refer to the [facility] test directory    at [email or web address] for details.  Organism: Blood Culture PCR (29 Mar 2023 06:46)  Organism: Blood Culture PCR (29 Mar 2023 06:46)                                                                                           X-Rays reviewed:                                                                                    ECHO    CXR interpreted by me:    MEDICATIONS  (STANDING):  cefepime   IVPB 1000 milliGRAM(s) IV Intermittent every 12 hours  dextrose 5%. 1000 milliLiter(s) (100 mL/Hr) IV Continuous <Continuous>  dextrose 5%. 1000 milliLiter(s) (50 mL/Hr) IV Continuous <Continuous>  dextrose 50% Injectable 25 Gram(s) IV Push once  dextrose 50% Injectable 12.5 Gram(s) IV Push once  dextrose 50% Injectable 25 Gram(s) IV Push once  glucagon  Injectable 1 milliGRAM(s) IntraMuscular once  insulin lispro (ADMELOG) corrective regimen sliding scale   SubCutaneous three times a day before meals  lactated ringers. 1000 milliLiter(s) (75 mL/Hr) IV Continuous <Continuous>  norepinephrine Infusion 0.05 MICROgram(s)/kG/Min (12.1 mL/Hr) IV Continuous <Continuous>  pantoprazole  Injectable 40 milliGRAM(s) IV Push daily  vancomycin  IVPB 1000 milliGRAM(s) IV Intermittent every 12 hours    MEDICATIONS  (PRN):  acetaminophen     Tablet .. 650 milliGRAM(s) Oral every 6 hours PRN Temp greater or equal to 38C (100.4F)  dextrose Oral Gel 15 Gram(s) Oral once PRN Blood Glucose LESS THAN 70 milliGRAM(s)/deciliter

## 2023-03-30 LAB
-  AMIKACIN: SIGNIFICANT CHANGE UP
-  AMPICILLIN/SULBACTAM: SIGNIFICANT CHANGE UP
-  AMPICILLIN: SIGNIFICANT CHANGE UP
-  AZTREONAM: SIGNIFICANT CHANGE UP
-  CEFAZOLIN: SIGNIFICANT CHANGE UP
-  CEFEPIME: SIGNIFICANT CHANGE UP
-  CEFOXITIN: SIGNIFICANT CHANGE UP
-  CEFTRIAXONE: SIGNIFICANT CHANGE UP
-  CIPROFLOXACIN: SIGNIFICANT CHANGE UP
-  ERTAPENEM: SIGNIFICANT CHANGE UP
-  GENTAMICIN: SIGNIFICANT CHANGE UP
-  IMIPENEM: SIGNIFICANT CHANGE UP
-  LEVOFLOXACIN: SIGNIFICANT CHANGE UP
-  MEROPENEM: SIGNIFICANT CHANGE UP
-  PIPERACILLIN/TAZOBACTAM: SIGNIFICANT CHANGE UP
-  TOBRAMYCIN: SIGNIFICANT CHANGE UP
-  TRIMETHOPRIM/SULFAMETHOXAZOLE: SIGNIFICANT CHANGE UP
ALBUMIN SERPL ELPH-MCNC: 3 G/DL — LOW (ref 3.5–5.2)
ALP SERPL-CCNC: 54 U/L — SIGNIFICANT CHANGE UP (ref 30–115)
ALT FLD-CCNC: 15 U/L — SIGNIFICANT CHANGE UP (ref 0–41)
ANION GAP SERPL CALC-SCNC: 11 MMOL/L — SIGNIFICANT CHANGE UP (ref 7–14)
AST SERPL-CCNC: 53 U/L — HIGH (ref 0–41)
BILIRUB DIRECT SERPL-MCNC: 0.3 MG/DL — SIGNIFICANT CHANGE UP (ref 0–0.3)
BILIRUB INDIRECT FLD-MCNC: 1 MG/DL — SIGNIFICANT CHANGE UP (ref 0.2–1.2)
BILIRUB SERPL-MCNC: 1.3 MG/DL — HIGH (ref 0.2–1.2)
BILIRUB SERPL-MCNC: 1.3 MG/DL — HIGH (ref 0.2–1.2)
BUN SERPL-MCNC: 38 MG/DL — HIGH (ref 10–20)
CALCIUM SERPL-MCNC: 8.4 MG/DL — SIGNIFICANT CHANGE UP (ref 8.4–10.5)
CHLORIDE SERPL-SCNC: 104 MMOL/L — SIGNIFICANT CHANGE UP (ref 98–110)
CO2 SERPL-SCNC: 22 MMOL/L — SIGNIFICANT CHANGE UP (ref 17–32)
CREAT SERPL-MCNC: 1.7 MG/DL — HIGH (ref 0.7–1.5)
EGFR: 40 ML/MIN/1.73M2 — LOW
GLUCOSE BLDC GLUCOMTR-MCNC: 146 MG/DL — HIGH (ref 70–99)
GLUCOSE BLDC GLUCOMTR-MCNC: 153 MG/DL — HIGH (ref 70–99)
GLUCOSE BLDC GLUCOMTR-MCNC: 175 MG/DL — HIGH (ref 70–99)
GLUCOSE BLDC GLUCOMTR-MCNC: 184 MG/DL — HIGH (ref 70–99)
GLUCOSE SERPL-MCNC: 161 MG/DL — HIGH (ref 70–99)
HCT VFR BLD CALC: 40.1 % — LOW (ref 42–52)
HGB BLD-MCNC: 13.4 G/DL — LOW (ref 14–18)
MAGNESIUM SERPL-MCNC: 2.1 MG/DL — SIGNIFICANT CHANGE UP (ref 1.8–2.4)
MCHC RBC-ENTMCNC: 28.3 PG — SIGNIFICANT CHANGE UP (ref 27–31)
MCHC RBC-ENTMCNC: 33.4 G/DL — SIGNIFICANT CHANGE UP (ref 32–37)
MCV RBC AUTO: 84.6 FL — SIGNIFICANT CHANGE UP (ref 80–94)
METHOD TYPE: SIGNIFICANT CHANGE UP
NRBC # BLD: 0 /100 WBCS — SIGNIFICANT CHANGE UP (ref 0–0)
PLATELET # BLD AUTO: 103 K/UL — LOW (ref 130–400)
POTASSIUM SERPL-MCNC: 3.9 MMOL/L — SIGNIFICANT CHANGE UP (ref 3.5–5)
POTASSIUM SERPL-SCNC: 3.9 MMOL/L — SIGNIFICANT CHANGE UP (ref 3.5–5)
PROT SERPL-MCNC: 5.3 G/DL — LOW (ref 6–8)
RBC # BLD: 4.74 M/UL — SIGNIFICANT CHANGE UP (ref 4.7–6.1)
RBC # FLD: 16.2 % — HIGH (ref 11.5–14.5)
SODIUM SERPL-SCNC: 137 MMOL/L — SIGNIFICANT CHANGE UP (ref 135–146)
VANCOMYCIN TROUGH SERPL-MCNC: 10.8 UG/ML — HIGH (ref 5–10)
WBC # BLD: 13.08 K/UL — HIGH (ref 4.8–10.8)
WBC # FLD AUTO: 13.08 K/UL — HIGH (ref 4.8–10.8)

## 2023-03-30 PROCEDURE — 99233 SBSQ HOSP IP/OBS HIGH 50: CPT

## 2023-03-30 PROCEDURE — 71045 X-RAY EXAM CHEST 1 VIEW: CPT | Mod: 26

## 2023-03-30 PROCEDURE — 99232 SBSQ HOSP IP/OBS MODERATE 35: CPT

## 2023-03-30 RX ORDER — CEFTRIAXONE 500 MG/1
2000 INJECTION, POWDER, FOR SOLUTION INTRAMUSCULAR; INTRAVENOUS EVERY 12 HOURS
Refills: 0 | Status: DISCONTINUED | OUTPATIENT
Start: 2023-03-30 | End: 2023-03-30

## 2023-03-30 RX ORDER — CEFTRIAXONE 500 MG/1
2000 INJECTION, POWDER, FOR SOLUTION INTRAMUSCULAR; INTRAVENOUS EVERY 24 HOURS
Refills: 0 | Status: DISCONTINUED | OUTPATIENT
Start: 2023-03-31 | End: 2023-04-01

## 2023-03-30 RX ORDER — CHLORHEXIDINE GLUCONATE 213 G/1000ML
1 SOLUTION TOPICAL
Refills: 0 | Status: DISCONTINUED | OUTPATIENT
Start: 2023-03-30 | End: 2023-04-01

## 2023-03-30 RX ORDER — SODIUM CHLORIDE 9 MG/ML
1000 INJECTION, SOLUTION INTRAVENOUS
Refills: 0 | Status: DISCONTINUED | OUTPATIENT
Start: 2023-03-30 | End: 2023-04-01

## 2023-03-30 RX ORDER — SODIUM BICARBONATE 1 MEQ/ML
0 SYRINGE (ML) INTRAVENOUS
Refills: 0 | DISCHARGE

## 2023-03-30 RX ORDER — ROSUVASTATIN CALCIUM 5 MG/1
1 TABLET ORAL
Refills: 0 | DISCHARGE

## 2023-03-30 RX ORDER — OXYCODONE AND ACETAMINOPHEN 5; 325 MG/1; MG/1
1 TABLET ORAL
Refills: 0 | DISCHARGE

## 2023-03-30 RX ADMIN — MEROPENEM 100 MILLIGRAM(S): 1 INJECTION INTRAVENOUS at 05:09

## 2023-03-30 RX ADMIN — PANTOPRAZOLE SODIUM 40 MILLIGRAM(S): 20 TABLET, DELAYED RELEASE ORAL at 11:34

## 2023-03-30 RX ADMIN — Medication 1: at 17:42

## 2023-03-30 RX ADMIN — SODIUM CHLORIDE 125 MILLILITER(S): 9 INJECTION, SOLUTION INTRAVENOUS at 09:00

## 2023-03-30 RX ADMIN — CHLORHEXIDINE GLUCONATE 1 APPLICATION(S): 213 SOLUTION TOPICAL at 05:09

## 2023-03-30 RX ADMIN — Medication 1: at 12:13

## 2023-03-30 RX ADMIN — CEFTRIAXONE 100 MILLIGRAM(S): 500 INJECTION, POWDER, FOR SOLUTION INTRAMUSCULAR; INTRAVENOUS at 11:35

## 2023-03-30 NOTE — DIETITIAN INITIAL EVALUATION ADULT - ORAL INTAKE PTA/DIET HISTORY
Hx obtained from pt at bedside. Reports good PO intake PTA. No food allergy/intolerance. No therapeutic diet or dietary restrictions. No supplement use. No chewing/swallowing issues. -285lbs. Height 6'. Hx obtained from pt at bedside. Reports good PO intake PTA. No food allergy/intolerance. No therapeutic diet or dietary restrictions. No supplement use. No chewing/swallowing issues. Height 6'. -285lbs, but reports he lost a significant amount of weight due to COVID a year ago. Weight trends in EMR (kg): 124.7 (4/26/21)  120.2 (12/21/21)  121.6 (1/19/22)  95.2 (3/29/23)

## 2023-03-30 NOTE — PHYSICAL THERAPY INITIAL EVALUATION ADULT - PERTINENT HX OF CURRENT PROBLEM, REHAB EVAL
81 yo M with history of HTN, DM, DL, Afib ( on eliquis), bladder CA s/p cystectomy and urostomy. Patient presenting with right sided ureteral stone with evidence of obstruction. Patient started having back pain yesterday right sided.   CT abdomen showed Right-sided hydronephrosis and right-sided hydroureter. Findings secondary to a stone within the mid aspect of the right ureter. Extensive inflammatory changes are seen in the region of the stone, extending superiorly surrounding a calcification, which may reflect a previously ruptured stone. Bilateral intrarenal calculi.

## 2023-03-30 NOTE — DIETITIAN INITIAL EVALUATION ADULT - PERSON TAUGHT/METHOD
encouraged PO intake; PO supplement if needed/verbal instruction/patient instructed encouraged PO intake; PO supplement if needed; pt declined further diet instructions, specifically for hx DM and HTN./verbal instruction/patient instructed

## 2023-03-30 NOTE — PROGRESS NOTE ADULT - SUBJECTIVE AND OBJECTIVE BOX
ROSA MARIA CASEY, 82y, Male; MRN# [id number]  Hospital Stay: 2d.    Patient is a 82y old Male who presents with a chief complaint of septic stone (29 Mar 2023 18:20)  Currently admitted to the ICU with the primary diagnosis of Kidney stone      SUBJECTIVE:  Interval/Overnight events:     REVIEW OF SYSTEMS:  As per HPI  OBJECTIVE:  VITALS:  T(F): 98 (03-30-23 @ 04:00), Max: 100.3 (03-30-23 @ 00:00)  HR: 97 (03-30-23 @ 07:00) (81 - 139)  BP: 111/58 (03-30-23 @ 07:00) (84/54 - 142/60)  ABP: --  ABP(mean): --  RR: 19 (03-30-23 @ 07:00) (15 - 42)  SpO2: 93% (03-30-23 @ 07:00) (92% - 98%)    Vent Settings    Blood Gas    Drips  dextrose 5%. 1000 milliLiter(s) (100 mL/Hr) IV Continuous <Continuous>  dextrose 5%. 1000 milliLiter(s) (50 mL/Hr) IV Continuous <Continuous>    I&Os:    03-29-23 @ 07:01  -  03-30-23 @ 07:00  --------------------------------------------------------  IN: 975 mL / OUT: 3275 mL / NET: -2300 mL      PHYSICAL EXAM:    ACTIVE MEDICATIONS:  Standing  chlorhexidine 2% Cloths 1 Application(s) Topical <User Schedule>  dextrose 5%. 1000 milliLiter(s) (100 mL/Hr) IV Continuous <Continuous>  dextrose 5%. 1000 milliLiter(s) (50 mL/Hr) IV Continuous <Continuous>  dextrose 50% Injectable 25 Gram(s) IV Push once  dextrose 50% Injectable 12.5 Gram(s) IV Push once  dextrose 50% Injectable 25 Gram(s) IV Push once  glucagon  Injectable 1 milliGRAM(s) IntraMuscular once  insulin lispro (ADMELOG) corrective regimen sliding scale   SubCutaneous three times a day before meals  meropenem  IVPB      meropenem  IVPB 500 milliGRAM(s) IV Intermittent every 12 hours  pantoprazole  Injectable 40 milliGRAM(s) IV Push daily    PRN  acetaminophen     Tablet .. 650 milliGRAM(s) Oral every 6 hours PRN  dextrose Oral Gel 15 Gram(s) Oral once PRN    LABS:  03-30-23 @ 05:25  WBC 13.08<H>, H/H 13.4<L>/40.1<L>, plt 103<L>  Na 137, K 3.9, Cl 104, CO2 22, BUN 38<H>, Cr 1.7<H>, Glu 161<H>    Ca 8.4, Mg 2.1, Phosphorus --    Tot Protein 5.3<L>, Alb 3.0<L>, T. Bili 1.3<H>, D. Bili 0.3  AST 53<H>, ALT 15, Alk phos 54      03-29-23 @ 05:10:  PT 17.00<H>, INR 1.47<H>, aPTT 30.1  03-29-23 @ 00:10:  PT 17.50<H>, INR 1.52<H>, aPTT 33.5  03-28-23 @ 15:35:  PT 18.00<H>, INR 1.56<H>, aPTT 30.4        03-29-23 @ 05:10:  Hgb A1c 9.0<H>% | Mean plasma glucose 212<H>    03-29-23 @ 05:10:  Total Chol 106 | Non-HDL 65 | HDL 41  LDL-Direct -- | LDL-Calc 5 | <H>    03-28-23 @ 15:35:  Troponin T 0.10<HH>, BNP --          CULTURES:    Culture - Blood (collected 03-28-23 @ 15:35)  Source: .Blood Blood-Peripheral  Gram Stain (03-29-23 @ 06:01):    Growth in anaerobic bottle: Gram Negative Rods    Growth in aerobic bottle: Gram Negative Rods  Preliminary Report (03-29-23 @ 21:32):    Growth in aerobic and anaerobic bottles: Escherichia coli    See previous culture [id number]    Culture - Blood (collected 03-28-23 @ 15:35)  Source: .Blood Blood-Peripheral  Gram Stain (03-29-23 @ 06:33):    Growth in anaerobic bottle: Gram Negative Rods    Growth in aerobic bottle: Gram Negative Rods  Preliminary Report (03-29-23 @ 21:33):    Growth in aerobic and anaerobic bottles: Escherichia coli    ***Blood Panel PCR results on this specimen are available    approximately 3 hours after the Gram stain result.***    Gram stain, PCR, and/or culture results may not always    correspond due to difference in methodologies.    ************************************************************    This PCR assay was performed by multiplex PCR. This    Assay tests for 66 bacterial and resistance gene targets.    Please refer to the [facility] Labs test directory    at [email or web address] for details.  Organism: Blood Culture PCR (03-29-23 @ 06:46)  Organism: Blood Culture PCR (03-29-23 @ 06:46)      Method Type: PCR      -  Escherichia coli: Detec      PENDING:  Troponin T, Serum: 11:00 (03-30-23 @ 07:38)  IR Procedure: Routine  Transport: Wheelchair,  w/ Monitor  Exam Completed  Provider's Contact #: [phone] (03-29-23 @ 17:16)  Culture - Blood: AM Sched. Collection:30-Mar-2023 04:30  Specimen Source: Blood (03-29-23 @ 10:01)  Xray Chest 1 View- PORTABLE-Routine: AM   Indication: sepsis  Transport: Portable,  w/ Monitor  Exam Completed (03-29-23 @ 09:57)  Troponin T, Serum: 11:00 (03-29-23 @ 09:57)  Lactate, Blood: 11:00 (03-29-23 @ 09:57)  Lactate, Blood: STAT  Addl Info: Draw repeat Lactate, STAT (03-29-23 @ 08:36)    IMAGING:  Latest imaging reviewed.  Xray Chest 1 View- PORTABLE-Routine: AM   Indication: sepsis  Transport: Portable,  w/ Monitor  Exam Completed (03-29-23 @ 09:57)    ECHO:  TTE Echo Complete w/o Contrast w/ Doppler:   Transport: Portable  Monitor: w/ Monitor (03-29-23 @ 09:55)    A&P:    82y year old Male with history of   HTN, DM, DL, Afib ( on eliquis), bladder CA s/p cystectomy and urostomy.   BIBEMS for  septic stone    Impression :  Septic stone  HTN  DM  DL  Afib  Bladder ca s/p Cystectomy/penectomy and ileal conduit      #Urosepsis 2/2 to obstructive stone, with hydronephrosis   #Hx Bladder Ca s/p cystectomy and urostomy  - s/p 5 L in Ed  - On admission: Lactate 8.3 -> improving 2.6   - Off levo now   - on 2 L NC  - CT abdomen and pelvis with IV con  Right-sided hydronephrosis and right-sided hydroureter.   Findings secondary to a stone within the mid aspect of the right ureter.   Extensive inflammatory changes are seen in the region of the stone, extending superiorly surrounding a calcification, which may reflect a previously ruptured stone. Bilateral intrarenal calculi as described.   Bilateral renal cysts.  - As per Urology: cystoscopy/stent will not be successful due to pts prior surgical history. cs IR for PCN  - As per IR: Reverse Eliquis as patient has poor renal function -> Kcentra given. Last dose was 3/27 at 9pm Renal interventions such as a nephrostomy tube placement is a high risk  bleeding procedure. -> PCN done on 3/29  - Anesthesia consult placed   - BCx 3/28: E coli   - UA positive   - c/w Meropenem and vanco  - f/u ID cs     #LORRAINE post renal  - Baseline crea: 1.2    #Chronic A fib  #HTN  - on Eliquis -> Holding for procedure   - Home Metoprolol succ 50 QD on hold for low BP  - On admission: Trop: 0.1  - Trend trops, f/u repeat trop  - Echo 3/29:  EF of 60 %, pulmonary artery systolic pressure is 35.5 mmHg      #DM:  - Home glimepiride 2 mg 2.5 tb QD  - Inpt: Sliding Scale    #DVT ppx: None  #GI ppx: None  #Code status: Full code               ROSA MARIA CASEY, 82y, Male; MRN# [id number]  Hospital Stay: 2d.    Patient is a 82y old Male who presents with a chief complaint of septic stone (29 Mar 2023 18:20)  Currently admitted to the ICU with the primary diagnosis of Kidney stone      SUBJECTIVE:  Interval/Overnight events:     REVIEW OF SYSTEMS:  As per HPI  OBJECTIVE:  VITALS:  T(F): 98 (03-30-23 @ 04:00), Max: 100.3 (03-30-23 @ 00:00)  HR: 97 (03-30-23 @ 07:00) (81 - 139)  BP: 111/58 (03-30-23 @ 07:00) (84/54 - 142/60)  ABP: --  ABP(mean): --  RR: 19 (03-30-23 @ 07:00) (15 - 42)  SpO2: 93% (03-30-23 @ 07:00) (92% - 98%)    Vent Settings    Blood Gas    Drips  dextrose 5%. 1000 milliLiter(s) (100 mL/Hr) IV Continuous <Continuous>  dextrose 5%. 1000 milliLiter(s) (50 mL/Hr) IV Continuous <Continuous>    I&Os:    03-29-23 @ 07:01  -  03-30-23 @ 07:00  --------------------------------------------------------  IN: 975 mL / OUT: 3275 mL / NET: -2300 mL      PHYSICAL EXAM:    ACTIVE MEDICATIONS:  Standing  chlorhexidine 2% Cloths 1 Application(s) Topical <User Schedule>  dextrose 5%. 1000 milliLiter(s) (100 mL/Hr) IV Continuous <Continuous>  dextrose 5%. 1000 milliLiter(s) (50 mL/Hr) IV Continuous <Continuous>  dextrose 50% Injectable 25 Gram(s) IV Push once  dextrose 50% Injectable 12.5 Gram(s) IV Push once  dextrose 50% Injectable 25 Gram(s) IV Push once  glucagon  Injectable 1 milliGRAM(s) IntraMuscular once  insulin lispro (ADMELOG) corrective regimen sliding scale   SubCutaneous three times a day before meals  meropenem  IVPB      meropenem  IVPB 500 milliGRAM(s) IV Intermittent every 12 hours  pantoprazole  Injectable 40 milliGRAM(s) IV Push daily    PRN  acetaminophen     Tablet .. 650 milliGRAM(s) Oral every 6 hours PRN  dextrose Oral Gel 15 Gram(s) Oral once PRN    LABS:  03-30-23 @ 05:25  WBC 13.08<H>, H/H 13.4<L>/40.1<L>, plt 103<L>  Na 137, K 3.9, Cl 104, CO2 22, BUN 38<H>, Cr 1.7<H>, Glu 161<H>    Ca 8.4, Mg 2.1, Phosphorus --    Tot Protein 5.3<L>, Alb 3.0<L>, T. Bili 1.3<H>, D. Bili 0.3  AST 53<H>, ALT 15, Alk phos 54      03-29-23 @ 05:10:  PT 17.00<H>, INR 1.47<H>, aPTT 30.1  03-29-23 @ 00:10:  PT 17.50<H>, INR 1.52<H>, aPTT 33.5  03-28-23 @ 15:35:  PT 18.00<H>, INR 1.56<H>, aPTT 30.4        03-29-23 @ 05:10:  Hgb A1c 9.0<H>% | Mean plasma glucose 212<H>    03-29-23 @ 05:10:  Total Chol 106 | Non-HDL 65 | HDL 41  LDL-Direct -- | LDL-Calc 5 | <H>    03-28-23 @ 15:35:  Troponin T 0.10<HH>, BNP --          CULTURES:    Culture - Blood (collected 03-28-23 @ 15:35)  Source: .Blood Blood-Peripheral  Gram Stain (03-29-23 @ 06:01):    Growth in anaerobic bottle: Gram Negative Rods    Growth in aerobic bottle: Gram Negative Rods  Preliminary Report (03-29-23 @ 21:32):    Growth in aerobic and anaerobic bottles: Escherichia coli    See previous culture [id number]    Culture - Blood (collected 03-28-23 @ 15:35)  Source: .Blood Blood-Peripheral  Gram Stain (03-29-23 @ 06:33):    Growth in anaerobic bottle: Gram Negative Rods    Growth in aerobic bottle: Gram Negative Rods  Preliminary Report (03-29-23 @ 21:33):    Growth in aerobic and anaerobic bottles: Escherichia coli    ***Blood Panel PCR results on this specimen are available    approximately 3 hours after the Gram stain result.***    Gram stain, PCR, and/or culture results may not always    correspond due to difference in methodologies.    ************************************************************    This PCR assay was performed by multiplex PCR. This    Assay tests for 66 bacterial and resistance gene targets.    Please refer to the [facility] Labs test directory    at [email or web address] for details.  Organism: Blood Culture PCR (03-29-23 @ 06:46)  Organism: Blood Culture PCR (03-29-23 @ 06:46)      Method Type: PCR      -  Escherichia coli: Detec      PENDING:  Troponin T, Serum: 11:00 (03-30-23 @ 07:38)  IR Procedure: Routine  Transport: Wheelchair,  w/ Monitor  Exam Completed  Provider's Contact #: [phone] (03-29-23 @ 17:16)  Culture - Blood: AM Sched. Collection:30-Mar-2023 04:30  Specimen Source: Blood (03-29-23 @ 10:01)  Xray Chest 1 View- PORTABLE-Routine: AM   Indication: sepsis  Transport: Portable,  w/ Monitor  Exam Completed (03-29-23 @ 09:57)  Troponin T, Serum: 11:00 (03-29-23 @ 09:57)  Lactate, Blood: 11:00 (03-29-23 @ 09:57)  Lactate, Blood: STAT  Addl Info: Draw repeat Lactate, STAT (03-29-23 @ 08:36)    IMAGING:  Latest imaging reviewed.  Xray Chest 1 View- PORTABLE-Routine: AM   Indication: sepsis  Transport: Portable,  w/ Monitor  Exam Completed (03-29-23 @ 09:57)    ECHO:  TTE Echo Complete w/o Contrast w/ Doppler:   Transport: Portable  Monitor: w/ Monitor (03-29-23 @ 09:55)    A&P:    82y year old Male with history of   HTN, DM, DL, Afib ( on eliquis), bladder CA s/p cystectomy and urostomy.   BIBEMS for  septic stone    Impression :  Septic stone  HTN  DM  DL  Afib  Bladder ca s/p Cystectomy/penectomy and ileal conduit      #Urosepsis 2/2 to obstructive stone, with hydronephrosis   #Hx Bladder Ca s/p cystectomy and urostomy  - s/p 5 L in Ed  - On admission: Lactate 8.3 -> improving 2.6   - Off levo now   - on 2 L NC  - CT abdomen and pelvis with IV con  Right-sided hydronephrosis and right-sided hydroureter.   Findings secondary to a stone within the mid aspect of the right ureter.   Extensive inflammatory changes are seen in the region of the stone, extending superiorly surrounding a calcification, which may reflect a previously ruptured stone. Bilateral intrarenal calculi as described.   Bilateral renal cysts.  - As per Urology: cystoscopy/stent will not be successful due to pts prior surgical history. cs IR for PCN  - As per IR: Reverse Eliquis as patient has poor renal function -> Kcentra given. Last dose was 3/27 at 9pm Renal interventions such as a nephrostomy tube placement is a high risk  bleeding procedure. -> PCN done on 3/29  - Anesthesia consult placed   - As per IR: Restart Eliquis 48 hr after procedure (it will be 3/31 afternoon), Make sure clear urine draining   - BCx 3/28: E coli   - UA positive   - c/w Meropenem  - c/w fluids   - f/u ID cs     #LORRAINE post renal  - Baseline crea: 1.2  - Home Lasix 20 mg QD on Hold   - Home meds Sodium bicarb 650 mg BID and Potassium Citrate 10 meq QD     #Chronic A fib  #HTN  - on Eliquis -> Holding for procedure (restart 48 hr after procedure)  - Home Metoprolol succ 50 QD on hold for low BP  - On admission: Trop: 0.1  - Trend trops, f/u repeat trop  - Echo 3/29:  EF of 60 %, pulmonary artery systolic pressure is 35.5 mmHg    #DM:  - Home glimepiride 2 mg 2.5 tb QD  - Inpt: Sliding Scale    #DVT ppx: None  #GI ppx: None  #Code status: Full code

## 2023-03-30 NOTE — DIETITIAN INITIAL EVALUATION ADULT - OTHER INFO
Pt presented with cloudy and dark urostomy bag, found to have sepsis, septic shock, E Coli bacteremia, Pyelonephritis and Right side obstructing stone. S/p right nephrostomy.   Pt presented with cloudy and dark urostomy bag, found to have sepsis, septic shock, E Coli bacteremia, Pyelonephritis and Right side obstructing stone. S/p right nephrostomy.

## 2023-03-30 NOTE — DIETITIAN INITIAL EVALUATION ADULT - NUTRITIONGOAL OUTCOME1
Pt will meet >50% estimated needs in 3-5 days. RD to follow as per high risk protocol.  Monitor diet order, kcal intake, body composition, NFPE, labs  (lytes, BG, renal indices)

## 2023-03-30 NOTE — CONSULT NOTE ADULT - SUBJECTIVE AND OBJECTIVE BOX
ROSA MARIA CASEY  82y, Male  Allergy: No Known Allergies      All historical available data reviewed.    HPI:  83 yo M with history of HTN, DM, DL, Afib ( on eliquis), bladder CA s/p cystectomy and urostomy. Patient presenting with right sided ureteral stone with evidence of obstruction. Patient started having back pain yesterday right sided. urine in urostomy bag was cloudy and dark in color. He was going to his PCP but developed high fever and chills so he came to ED [facility]. in [facility] site patient was hypotensive and tachycardic requiring 5 liters fluids and was started on pressors. cultures were taken and he was given broad spectrum antibiotics. His HD status improved. CT abdomen showed Right-sided hydronephrosis and right-sided hydroureter. Findings secondary to a stone within the mid aspect of the right ureter. Extensive inflammatory changes are seen in the region of the stone,   extending superiorly surrounding a calcification, which may reflect a previously ruptured stone. Bilateral intrarenal calculi as described. Lactate 8 on [facility] went down to 6. [place] was transferred [place] site for eliquis reversal before intervention by IR for PCN (28 Mar 2023 22:54)  ID called for UTI    FAMILY HISTORY:    PAST MEDICAL & SURGICAL HISTORY:  Hypertension      Hyperlipidemia      Diabetes mellitus, type 2      History of atrial fibrillation      Bladder cancer  secondary to exposure at [employer]  s/p cystectomy with urostomy      Chronic kidney disease (CKD)  stage 3A, baseline creatinine 1.4      DVT, lower extremity      History of total hip replacement, bilateral      History of total knee replacement, bilateral      History of total cystectomy  with resultant urostomy            VITALS:  T(F): 98, Max: 100.3 (23 @ 00:00)  HR: 97  BP: 111/58  RR: 19Vital Signs Last 24 Hrs  T(C): 36.7 (30 Mar 2023 04:00), Max: 37.9 (30 Mar 2023 00:00)  T(F): 98 (30 Mar 2023 04:00), Max: 100.3 (30 Mar 2023 00:00)  HR: 97 (30 Mar 2023 07:00) (93 - 139)  BP: 111/58 (30 Mar 2023 07:00) (84/54 - 135/74)  BP(mean): 79 (30 Mar 2023 07:00) (63 - 97)  RR: 19 (30 Mar 2023 07:00) (15 - 33)  SpO2: 93% (30 Mar 2023 07:00) (92% - 97%)    Parameters below as of 30 Mar 2023 06:00  Patient On (Oxygen Delivery Method): nasal cannula  O2 Flow (L/min): 2      TESTS & MEASUREMENTS:                        13.4   13.08 )-----------( 103      ( 30 Mar 2023 05:25 )             40.1         137  |  104  |  38<H>  ----------------------------<  161<H>  3.9   |  22  |  1.7<H>    Ca    8.4      30 Mar 2023 05:25  Mg     2.1         TPro  5.3<L>  /  Alb  3.0<L>  /  TBili  1.3<H>  /  DBili  0.3  /  AST  53<H>  /  ALT  15  /  AlkPhos  54      LIVER FUNCTIONS - ( 30 Mar 2023 05:25 )  Alb: 3.0 g/dL / Pro: 5.3 g/dL / ALK PHOS: 54 U/L / ALT: 15 U/L / AST: 53 U/L / GGT: x             Culture - Blood (collected 23 @ 15:35)  Source: .Blood Blood-Peripheral  Gram Stain (23 @ 06:01):    Growth in anaerobic bottle: Gram Negative Rods    Growth in aerobic bottle: Gram Negative Rods  Preliminary Report (23 @ 21:32):    Growth in aerobic and anaerobic bottles: Escherichia coli    See previous culture [id number]    Culture - Blood (collected 23 @ 15:35)  Source: .Blood Blood-Peripheral  Gram Stain (23 @ 06:33):    Growth in anaerobic bottle: Gram Negative Rods    Growth in aerobic bottle: Gram Negative Rods  Preliminary Report (23 @ 21:33):    Growth in aerobic and anaerobic bottles: Escherichia coli    ***Blood Panel PCR results on this specimen are available    approximately 3 hours after the Gram stain result.***    Gram stain, PCR, and/or culture results may not always    correspond due to difference in methodologies.    ************************************************************    This PCR assay was performed by multiplex PCR. This    Assay tests for 66 bacterial and resistance gene targets.    Please refer to the [facility] Labs test directory    at [email or web address] for details.  Organism: Blood Culture PCR (23 @ 06:46)  Organism: Blood Culture PCR (23 @ 06:46)      Method Type: PCR      -  Escherichia coli: Detec      Urinalysis Basic - ( 28 Mar 2023 18:10 )    Color: Yellow / Appearance: Slightly Cloudy / S.010 / pH: x  Gluc: x / Ketone: Trace  / Bili: Negative / Urobili: 0.2 mg/dL   Blood: x / Protein: 100 mg/dL / Nitrite: Negative   Leuk Esterase: Small / RBC: 6-10 /HPF / WBC 10-25 /HPF   Sq Epi: x / Non Sq Epi: Few /HPF / Bacteria: Many          RADIOLOGY & ADDITIONAL TESTS:  Personal review of radiological diagnostics performed  Echo and EKG results noted when applicable.     MEDICATIONS:  acetaminophen     Tablet .. 650 milliGRAM(s) Oral every 6 hours PRN  chlorhexidine 2% Cloths 1 Application(s) Topical <User Schedule>  dextrose 5%. 1000 milliLiter(s) IV Continuous <Continuous>  dextrose 5%. 1000 milliLiter(s) IV Continuous <Continuous>  dextrose 50% Injectable 25 Gram(s) IV Push once  dextrose 50% Injectable 12.5 Gram(s) IV Push once  dextrose 50% Injectable 25 Gram(s) IV Push once  dextrose Oral Gel 15 Gram(s) Oral once PRN  glucagon  Injectable 1 milliGRAM(s) IntraMuscular once  insulin lispro (ADMELOG) corrective regimen sliding scale   SubCutaneous three times a day before meals  lactated ringers. 1000 milliLiter(s) IV Continuous <Continuous>  meropenem  IVPB      meropenem  IVPB 500 milliGRAM(s) IV Intermittent every 12 hours  pantoprazole  Injectable 40 milliGRAM(s) IV Push daily      ANTIBIOTICS:  meropenem  IVPB      meropenem  IVPB 500 milliGRAM(s) IV Intermittent every 12 hours

## 2023-03-30 NOTE — PROGRESS NOTE ADULT - SUBJECTIVE AND OBJECTIVE BOX
Patient is a 82y old  Male who presents with a chief complaint of septic stone (30 Mar 2023 07:42)        Over Night Events:  Feels better.  on NC O2.  Off pressors.  SP right nephrostomy         ROS:     All ROS are negative except HPI         PHYSICAL EXAM    ICU Vital Signs Last 24 Hrs  T(C): 36.7 (30 Mar 2023 04:00), Max: 37.9 (30 Mar 2023 00:00)  T(F): 98 (30 Mar 2023 04:00), Max: 100.3 (30 Mar 2023 00:00)  HR: 97 (30 Mar 2023 07:00) (81 - 139)  BP: 111/58 (30 Mar 2023 07:00) (84/54 - 142/60)  BP(mean): 79 (30 Mar 2023 07:00) (63 - 97)  ABP: --  ABP(mean): --  RR: 19 (30 Mar 2023 07:00) (15 - 34)  SpO2: 93% (30 Mar 2023 07:00) (92% - 98%)    O2 Parameters below as of 30 Mar 2023 06:00  Patient On (Oxygen Delivery Method): nasal cannula  O2 Flow (L/min): 2          CONSTITUTIONAL:  IN NAD    ENT:   Airway patent,   Mouth with normal mucosa.       EYES:   Pupils equal,   Round and reactive to light.    CARDIAC:   Normal rate,   Regular rhythm.      RESPIRATORY:   No wheezing  Bilateral BS  Normal chest expansion  Not tachypneic,  No use of accessory muscles    GASTROINTESTINAL:  Abdomen soft,   Non-tender,   No guarding,   + BS    MUSCULOSKELETAL:   Range of motion is not limited,  No clubbing, cyanosis    NEUROLOGICAL:   Alert and oriented   No motor  deficits.    SKIN:   Skin normal color for race,   No evidence of rash.    PSYCHIATRIC:   No apparent risk to self or others.          23 @ 07:01  -  23 @ 07:00  --------------------------------------------------------  IN:    IV PiggyBack: 150 mL    Lactated Ringers: 825 mL  Total IN: 975 mL    OUT:    Nephrostomy Tube (mL): 525 mL    Norepinephrine: 0 mL    Urostomy (mL): 2750 mL  Total OUT: 3275 mL    Total NET: -2300 mL          LABS:                            13.4   13.08 )-----------( 103      ( 30 Mar 2023 05:25 )             40.1                                               0330    137  |  104  |  38<H>  ----------------------------<  161<H>  3.9   |  22  |  1.7<H>        Ca    8.4      30 Mar 2023 05:25  Mg     2.1     30    TPro  5.3<L>  /  Alb  3.0<L>  /  TBili  1.3<H>  /  DBili  0.3  /  AST  53<H>  /  ALT  15  /  AlkPhos  54  30      PT/INR - ( 29 Mar 2023 05:10 )   PT: 17.00 sec;   INR: 1.47 ratio         PTT - ( 29 Mar 2023 05:10 )  PTT:30.1 sec                                       Urinalysis Basic - ( 28 Mar 2023 18:10 )    Color: Yellow / Appearance: Slightly Cloudy / S.010 / pH: x  Gluc: x / Ketone: Trace  / Bili: Negative / Urobili: 0.2 mg/dL   Blood: x / Protein: 100 mg/dL / Nitrite: Negative   Leuk Esterase: Small / RBC: 6-10 /HPF / WBC 10-25 /HPF   Sq Epi: x / Non Sq Epi: Few /HPF / Bacteria: Many        CARDIAC MARKERS ( 28 Mar 2023 15:35 )  x     / 0.10 ng/mL / x     / x     / x                                                LIVER FUNCTIONS - ( 30 Mar 2023 05:25 )  Alb: 3.0 g/dL / Pro: 5.3 g/dL / ALK PHOS: 54 U/L / ALT: 15 U/L / AST: 53 U/L / GGT: x                                                  Culture - Blood (collected 28 Mar 2023 15:35)  Source: .Blood Blood-Peripheral  Gram Stain (29 Mar 2023 06:01):    Growth in anaerobic bottle: Gram Negative Rods    Growth in aerobic bottle: Gram Negative Rods  Preliminary Report (29 Mar 2023 21:32):    Growth in aerobic and anaerobic bottles: Escherichia coli    See previous culture [id number]    Culture - Blood (collected 28 Mar 2023 15:35)  Source: .Blood Blood-Peripheral  Gram Stain (29 Mar 2023 06:33):    Growth in anaerobic bottle: Gram Negative Rods    Growth in aerobic bottle: Gram Negative Rods  Preliminary Report (29 Mar 2023 21:33):    Growth in aerobic and anaerobic bottles: Escherichia coli    ***Blood Panel PCR results on this specimen are available    approximately 3 hours after the Gram stain result.***    Gram stain, PCR, and/or culture results may not always    correspond due to difference in methodologies.    ************************************************************    This PCR assay was performed by multiplex PCR. This    Assay tests for 66 bacterial and resistance gene targets.    Please refer to the [facility] Labs test directory    at [email or web address] for details.  Organism: Blood Culture PCR (29 Mar 2023 06:46)  Organism: Blood Culture PCR (29 Mar 2023 06:46)                                                                                           MEDICATIONS  (STANDING):  chlorhexidine 2% Cloths 1 Application(s) Topical <User Schedule>  dextrose 5%. 1000 milliLiter(s) (100 mL/Hr) IV Continuous <Continuous>  dextrose 5%. 1000 milliLiter(s) (50 mL/Hr) IV Continuous <Continuous>  dextrose 50% Injectable 25 Gram(s) IV Push once  dextrose 50% Injectable 12.5 Gram(s) IV Push once  dextrose 50% Injectable 25 Gram(s) IV Push once  glucagon  Injectable 1 milliGRAM(s) IntraMuscular once  insulin lispro (ADMELOG) corrective regimen sliding scale   SubCutaneous three times a day before meals  meropenem  IVPB      meropenem  IVPB 500 milliGRAM(s) IV Intermittent every 12 hours  pantoprazole  Injectable 40 milliGRAM(s) IV Push daily    MEDICATIONS  (PRN):  acetaminophen     Tablet .. 650 milliGRAM(s) Oral every 6 hours PRN Temp greater or equal to 38C (100.4F)  dextrose Oral Gel 15 Gram(s) Oral once PRN Blood Glucose LESS THAN 70 milliGRAM(s)/deciliter      New X-rays reviewed:                                                                                  ECHO

## 2023-03-30 NOTE — DIETITIAN INITIAL EVALUATION ADULT - NSICDXPASTMEDICALHX_GEN_ALL_CORE_FT
PAST MEDICAL HISTORY:  Bladder cancer secondary to exposure at [employer] 9/11 s/p cystectomy with urostomy    Chronic kidney disease (CKD) stage 3A, baseline creatinine 1.4    Diabetes mellitus, type 2     DVT, lower extremity     History of atrial fibrillation     Hyperlipidemia     Hypertension

## 2023-03-30 NOTE — CONSULT NOTE ADULT - ASSESSMENT
83 yo M with history of HTN, DM, DL, Afib ( on eliquis), bladder CA s/p cystectomy and urostomy. Patient presenting with right sided ureteral stone with evidence of obstruction. Patient started having back pain yesterday right sided.   CT abdomen showed Right-sided hydronephrosis and right-sided hydroureter. Findings secondary to a stone within the mid aspect of the right ureter. Extensive inflammatory changes are seen in the region of the stone, extending superiorly surrounding a calcification, which may reflect a previously ruptured stone. Bilateral intrarenal calculi.    IMPRESSION/RECOMMENDATIONS   Septic shock on admission secondary to acute right pyelonephritis with right-sided hydronephrosis and right-sided hydroureter secondary to a stone within the mid aspect of the right ureter.  E coli bacteremia  3/29 IVR s/p R PCN  3/28 BCx E coli  -UCX  -monitor WBC  -Rocephin 2 gm iv q12h for 2 dosis then 2 gm iv q24h  -d/c other ABx

## 2023-03-30 NOTE — DIETITIAN INITIAL EVALUATION ADULT - NSFNSGIIOFT_GEN_A_CORE
I&O's Detail    29 Mar 2023 07:01  -  30 Mar 2023 07:00  --------------------------------------------------------  IN:    IV PiggyBack: 150 mL    Lactated Ringers: 825 mL  Total IN: 975 mL    OUT:    Nephrostomy Tube (mL): 525 mL    Norepinephrine: 0 mL    Urostomy (mL): 2750 mL  Total OUT: 3275 mL    Total NET: -2300 mL

## 2023-03-30 NOTE — DIETITIAN INITIAL EVALUATION ADULT - TIME FRAME
Educated in great detail about safe sex practices. Stressed importance of compliance with birth control and condoms and educated about pregnancy and STIs including HIV that can occur if safe sex practices are not utilized. As well, also educated that even with safe sex practices there may be a chance of STIs and pregnancy, patient expressed understanding.   Educated about clinics available  Labs today, will call when we have results  Educated about reasons to contact clinic/go to the er  Follow-up with primary care provider for wcc or earlier if needed    Addendum: please see telephone calls about patient lab results  
14 months

## 2023-03-30 NOTE — DIETITIAN INITIAL EVALUATION ADULT - NS FNS DIET ORDER
Diet, Soft and Bite Sized:   Consistent Carbohydrate {No Snacks}  DASH/TLC {Sodium & Cholesterol Restricted} (03-29-23 @ 19:11)

## 2023-03-30 NOTE — PHYSICAL THERAPY INITIAL EVALUATION ADULT - ADDITIONAL COMMENTS
Pt resides with daughter in a private house using 1 indoor step and no stairs to negotiate outdoors. Pt used to be independent with overall functional mobility, able to ambulate without AD.

## 2023-03-30 NOTE — DIETITIAN INITIAL EVALUATION ADULT - PERTINENT LABORATORY DATA
03-30    137  |  104  |  38<H>  ----------------------------<  161<H>  3.9   |  22  |  1.7<H>    Ca    8.4      30 Mar 2023 05:25  Mg     2.1     03-30    TPro  5.3<L>  /  Alb  3.0<L>  /  TBili  1.3<H>  /  DBili  0.3  /  AST  53<H>  /  ALT  15  /  AlkPhos  54  03-30    CAPILLARY BLOOD GLUCOSE  POCT Blood Glucose.: 146 mg/dL (30 Mar 2023 06:03)  POCT Blood Glucose.: 149 mg/dL (29 Mar 2023 17:06)  POCT Blood Glucose.: 153 mg/dL (29 Mar 2023 12:34)    A1C with Estimated Average Glucose Result: 9.0 % (03-29-23 @ 05:10)

## 2023-03-30 NOTE — DIETITIAN INITIAL EVALUATION ADULT - ENERGY INTAKE
Pt had just received his first tray when RD visited. States his tray intake in his prolonged hospitalization a year ago varied, depending on what he received. No report of GI issues.  Pt had just received his first tray when RD visited. States his tray intake in his prolonged hospitalization a year ago varied, depending on what he received. No report of GI issues. Pt noted without S+S eval done this admission, and did not require IDDSI diet in previous admission.

## 2023-03-30 NOTE — DIETITIAN INITIAL EVALUATION ADULT - ORAL NUTRITION SUPPLEMENTS
Glucerna not indicated as pt requires higher kcal and protein supplementation -> Ensure Plus High Protein (350kcal, 20g protein each) 1can 2x/day. Cont with lispro for glycemic control and strengthen insulin regimen as warranted

## 2023-03-30 NOTE — PHYSICAL THERAPY INITIAL EVALUATION ADULT - GENERAL OBSERVATIONS, REHAB EVAL
14:00-14:45. chart reviewed. Pt received semi-rizvi at B/S, alert, oriented, able to follow one step instructions son at B/S and agreeable to PT evaluation.  + nephrostomy, + urostomy, + O2 2 L via NC, VSS, -ve orthostatic, + IV, NAD

## 2023-03-30 NOTE — DIETITIAN INITIAL EVALUATION ADULT - WEIGHT CHANGE
Contacted patient informed him Alexey Nails MD  P Devaughn S Im Nurse Msg Pool  Please inform pt calcium score is 3   Very good   Keep it up
yes

## 2023-03-30 NOTE — DIETITIAN INITIAL EVALUATION ADULT - PERTINENT MEDS FT
MEDICATIONS  (STANDING):  chlorhexidine 2% Cloths 1 Application(s) Topical <User Schedule>  dextrose 5%. 1000 milliLiter(s) (100 mL/Hr) IV Continuous <Continuous>  dextrose 5%. 1000 milliLiter(s) (50 mL/Hr) IV Continuous <Continuous>  dextrose 50% Injectable 25 Gram(s) IV Push once  dextrose 50% Injectable 12.5 Gram(s) IV Push once  dextrose 50% Injectable 25 Gram(s) IV Push once  glucagon  Injectable 1 milliGRAM(s) IntraMuscular once  insulin lispro (ADMELOG) corrective regimen sliding scale   SubCutaneous three times a day before meals  lactated ringers. 1000 milliLiter(s) (125 mL/Hr) IV Continuous <Continuous>  meropenem  IVPB      meropenem  IVPB 500 milliGRAM(s) IV Intermittent every 12 hours  pantoprazole  Injectable 40 milliGRAM(s) IV Push daily    MEDICATIONS  (PRN):  acetaminophen     Tablet .. 650 milliGRAM(s) Oral every 6 hours PRN Temp greater or equal to 38C (100.4F)  dextrose Oral Gel 15 Gram(s) Oral once PRN Blood Glucose LESS THAN 70 milliGRAM(s)/deciliter   MEDICATIONS  (STANDING):  chlorhexidine 2% Cloths 1 Application(s) Topical <User Schedule>  dextrose 5%. 1000 milliLiter(s) (100 mL/Hr) IV Continuous <Continuous>  dextrose 5%. 1000 milliLiter(s) (50 mL/Hr) IV Continuous <Continuous>  dextrose 50% Injectable 25 Gram(s) IV Push once  dextrose 50% Injectable 12.5 Gram(s) IV Push once  dextrose 50% Injectable 25 Gram(s) IV Push once  glucagon  Injectable 1 milliGRAM(s) IntraMuscular once  insulin lispro (ADMELOG) corrective regimen sliding scale   SubCutaneous three times a day before meals  lactated ringers. 1000 milliLiter(s) (125 mL/Hr) IV Continuous <Continuous>  meropenem  IVPB 500 milliGRAM(s) IV Intermittent every 12 hours  pantoprazole  Injectable 40 milliGRAM(s) IV Push daily    MEDICATIONS  (PRN):  acetaminophen     Tablet .. 650 milliGRAM(s) Oral every 6 hours PRN Temp greater or equal to 38C (100.4F)  dextrose Oral Gel 15 Gram(s) Oral once PRN Blood Glucose LESS THAN 70 milliGRAM(s)/deciliter

## 2023-03-31 ENCOUNTER — TRANSCRIPTION ENCOUNTER (OUTPATIENT)
Age: 83
End: 2023-03-31

## 2023-03-31 LAB
ALBUMIN SERPL ELPH-MCNC: 3.2 G/DL — LOW (ref 3.5–5.2)
ALP SERPL-CCNC: 74 U/L — SIGNIFICANT CHANGE UP (ref 30–115)
ALT FLD-CCNC: 17 U/L — SIGNIFICANT CHANGE UP (ref 0–41)
ANION GAP SERPL CALC-SCNC: 12 MMOL/L — SIGNIFICANT CHANGE UP (ref 7–14)
AST SERPL-CCNC: 33 U/L — SIGNIFICANT CHANGE UP (ref 0–41)
BILIRUB SERPL-MCNC: 1.3 MG/DL — HIGH (ref 0.2–1.2)
BUN SERPL-MCNC: 27 MG/DL — HIGH (ref 10–20)
CALCIUM SERPL-MCNC: 8.2 MG/DL — LOW (ref 8.4–10.5)
CHLORIDE SERPL-SCNC: 103 MMOL/L — SIGNIFICANT CHANGE UP (ref 98–110)
CO2 SERPL-SCNC: 22 MMOL/L — SIGNIFICANT CHANGE UP (ref 17–32)
CREAT SERPL-MCNC: 1.3 MG/DL — SIGNIFICANT CHANGE UP (ref 0.7–1.5)
EGFR: 56 ML/MIN/1.73M2 — LOW
GLUCOSE BLDC GLUCOMTR-MCNC: 168 MG/DL — HIGH (ref 70–99)
GLUCOSE BLDC GLUCOMTR-MCNC: 182 MG/DL — HIGH (ref 70–99)
GLUCOSE BLDC GLUCOMTR-MCNC: 193 MG/DL — HIGH (ref 70–99)
GLUCOSE BLDC GLUCOMTR-MCNC: 207 MG/DL — HIGH (ref 70–99)
GLUCOSE SERPL-MCNC: 150 MG/DL — HIGH (ref 70–99)
HCT VFR BLD CALC: 42.1 % — SIGNIFICANT CHANGE UP (ref 42–52)
HGB BLD-MCNC: 13.8 G/DL — LOW (ref 14–18)
MAGNESIUM SERPL-MCNC: 1.9 MG/DL — SIGNIFICANT CHANGE UP (ref 1.8–2.4)
MCHC RBC-ENTMCNC: 27.6 PG — SIGNIFICANT CHANGE UP (ref 27–31)
MCHC RBC-ENTMCNC: 32.8 G/DL — SIGNIFICANT CHANGE UP (ref 32–37)
MCV RBC AUTO: 84.2 FL — SIGNIFICANT CHANGE UP (ref 80–94)
NRBC # BLD: 0 /100 WBCS — SIGNIFICANT CHANGE UP (ref 0–0)
PLATELET # BLD AUTO: 109 K/UL — LOW (ref 130–400)
POTASSIUM SERPL-MCNC: 4.1 MMOL/L — SIGNIFICANT CHANGE UP (ref 3.5–5)
POTASSIUM SERPL-SCNC: 4.1 MMOL/L — SIGNIFICANT CHANGE UP (ref 3.5–5)
PROT SERPL-MCNC: 5.7 G/DL — LOW (ref 6–8)
RBC # BLD: 5 M/UL — SIGNIFICANT CHANGE UP (ref 4.7–6.1)
RBC # FLD: 16.1 % — HIGH (ref 11.5–14.5)
SODIUM SERPL-SCNC: 137 MMOL/L — SIGNIFICANT CHANGE UP (ref 135–146)
WBC # BLD: 9.37 K/UL — SIGNIFICANT CHANGE UP (ref 4.8–10.8)
WBC # FLD AUTO: 9.37 K/UL — SIGNIFICANT CHANGE UP (ref 4.8–10.8)

## 2023-03-31 PROCEDURE — 99233 SBSQ HOSP IP/OBS HIGH 50: CPT

## 2023-03-31 PROCEDURE — 99232 SBSQ HOSP IP/OBS MODERATE 35: CPT

## 2023-03-31 RX ORDER — APIXABAN 2.5 MG/1
5 TABLET, FILM COATED ORAL
Refills: 0 | Status: DISCONTINUED | OUTPATIENT
Start: 2023-03-31 | End: 2023-04-01

## 2023-03-31 RX ORDER — METOPROLOL TARTRATE 50 MG
50 TABLET ORAL DAILY
Refills: 0 | Status: DISCONTINUED | OUTPATIENT
Start: 2023-03-31 | End: 2023-04-01

## 2023-03-31 RX ORDER — LANOLIN ALCOHOL/MO/W.PET/CERES
5 CREAM (GRAM) TOPICAL AT BEDTIME
Refills: 0 | Status: DISCONTINUED | OUTPATIENT
Start: 2023-03-31 | End: 2023-04-01

## 2023-03-31 RX ADMIN — Medication 1: at 08:09

## 2023-03-31 RX ADMIN — Medication 1: at 17:29

## 2023-03-31 RX ADMIN — APIXABAN 5 MILLIGRAM(S): 2.5 TABLET, FILM COATED ORAL at 17:33

## 2023-03-31 RX ADMIN — Medication 2: at 11:58

## 2023-03-31 RX ADMIN — PANTOPRAZOLE SODIUM 40 MILLIGRAM(S): 20 TABLET, DELAYED RELEASE ORAL at 12:03

## 2023-03-31 RX ADMIN — CHLORHEXIDINE GLUCONATE 1 APPLICATION(S): 213 SOLUTION TOPICAL at 05:10

## 2023-03-31 RX ADMIN — CEFTRIAXONE 100 MILLIGRAM(S): 500 INJECTION, POWDER, FOR SOLUTION INTRAMUSCULAR; INTRAVENOUS at 05:11

## 2023-03-31 RX ADMIN — Medication 50 MILLIGRAM(S): at 17:33

## 2023-03-31 RX ADMIN — Medication 5 MILLIGRAM(S): at 22:38

## 2023-03-31 NOTE — DISCHARGE NOTE PROVIDER - NSDCCPCAREPLAN_GEN_ALL_CORE_FT
PRINCIPAL DISCHARGE DIAGNOSIS  Diagnosis: Kidney stone  Assessment and Plan of Treatment: You came in with severe septic shock which was due to infection in the kidney caused by kidney stones. You were started on pressors to elevate the BP. You were also started on broad spectrum antibiotics. You were admitted to ICU. IR team did the percutanous nephrostomy on the left side and the tube was placed to drain urine. Procedure went well and you condition improved. Urology also saw you and you need to follow up with them outpatient in 1 week. Take all meds as prescribed including antibiotics.      SECONDARY DISCHARGE DIAGNOSES  Diagnosis: Hypotension  Assessment and Plan of Treatment:     Diagnosis: Septic shock  Assessment and Plan of Treatment:     Diagnosis: LORRAINE (acute kidney injury)  Assessment and Plan of Treatment:      PRINCIPAL DISCHARGE DIAGNOSIS  Diagnosis: Kidney stone  Assessment and Plan of Treatment: You came in with severe septic shock which was due to infection in the kidney caused by kidney stones. You were started on pressors to elevate the BP. You were also started on broad spectrum antibiotics. You were admitted to ICU. IR team did the percutanous nephrostomy on the left side and the tube was placed to drain urine. Procedure went well and you condition improved. Urology also saw you and you need to follow up with them outpatient in 1 week as well as interventional radiology. Take all meds as prescribed including antibiotics.      SECONDARY DISCHARGE DIAGNOSES  Diagnosis: Hypotension  Assessment and Plan of Treatment:     Diagnosis: Septic shock  Assessment and Plan of Treatment:     Diagnosis: LORRAINE (acute kidney injury)  Assessment and Plan of Treatment:

## 2023-03-31 NOTE — PROGRESS NOTE ADULT - PROVIDER SPECIALTY LIST ADULT
Critical Care
Hospitalist
Intervent Radiology
Critical Care
Critical Care
Internal Medicine
Urology
Infectious Disease

## 2023-03-31 NOTE — DISCHARGE NOTE PROVIDER - NPI NUMBER (FOR SYSADMIN USE ONLY) :
[[id number] [[id number]],[[id number] [[id number]]

## 2023-03-31 NOTE — DISCHARGE NOTE PROVIDER - NSDCMRMEDTOKEN_GEN_ALL_CORE_FT
Eliquis 5 mg oral tablet: 1 tab(s) orally 2 times a day  glimepiride 2 mg oral tablet: 1.5 tab(s) orally once a day  Lasix 20 mg oral tablet: 1 orally once a day  metoprolol succinate 50 mg oral tablet, extended release: 1 tab(s) orally once a day  potassium citrate 10 mEq oral tablet, extended release: 1 orally once a day  sodium bicarbonate 650 mg oral tablet: 1 orally 2 times a day   ciprofloxacin 750 mg oral tablet: 1 tab(s) orally 2 times a day  Eliquis 5 mg oral tablet: 1 tab(s) orally 2 times a day  glimepiride 2 mg oral tablet: 1.5 tab(s) orally once a day  Lasix 20 mg oral tablet: 1 orally once a day  metoprolol succinate 50 mg oral tablet, extended release: 1 tab(s) orally once a day  potassium citrate 10 mEq oral tablet, extended release: 1 orally once a day  sodium bicarbonate 650 mg oral tablet: 1 orally 2 times a day   ciprofloxacin 750 mg oral tablet: 1 tab(s) orally 2 times a day  Eliquis 5 mg oral tablet: 1 tab(s) orally 2 times a day  glimepiride 2 mg oral tablet: 1.5 tab(s) orally once a day  Lasix 20 mg oral tablet: 1 orally once a day  metoprolol succinate 50 mg oral tablet, extended release: 1 tab(s) orally once a day  potassium citrate 10 mEq oral tablet, extended release: 1 orally once a day

## 2023-03-31 NOTE — PROGRESS NOTE ADULT - SUBJECTIVE AND OBJECTIVE BOX
Patient is a 82y old  Male who presents with a chief complaint of septic stone (31 Mar 2023 14:23)    Patient was seen and examined.  Pt is a transfer from ICU  no new complaints    PAST MEDICAL & SURGICAL HISTORY:  Hypertension  Hyperlipidemia  Diabetes mellitus, type 2  History of atrial fibrillation  Bladder cancer, secondary to exposure at [employer] 9/11 s/p cystectomy with urostomy  Chronic kidney disease (CKD) stage 3A, baseline creatinine 1.4  DVT, lower extremity  History of total hip replacement, bilateral  History of total knee replacement, bilateral  History of total cystectomy with resultant urostomy    Allergies  No Known Allergies    MEDICATIONS  (STANDING):  apixaban 5 milliGRAM(s) Oral two times a day  cefTRIAXone   IVPB 2000 milliGRAM(s) IV Intermittent every 24 hours  insulin lispro (ADMELOG) corrective regimen sliding scale   SubCutaneous three times a day before meals  lactated ringers. 1000 milliLiter(s) (125 mL/Hr) IV Continuous <Continuous>  melatonin 5 milliGRAM(s) Oral at bedtime  pantoprazole  Injectable 40 milliGRAM(s) IV Push daily    MEDICATIONS  (PRN):  acetaminophen     Tablet .. 650 milliGRAM(s) Oral every 6 hours PRN Temp greater or equal to 38C (100.4F)  dextrose Oral Gel 15 Gram(s) Oral once PRN Blood Glucose LESS THAN 70 milliGRAM(s)/deciliter    Vital Signs Last 24 Hrs  T(C): 36.7  T(F): 98.1  HR: 96  BP: 141/77  BP(mean): 110  RR: 19  SpO2: 95%    O/E:  Awake, alert, not in distress.  HEENT: atraumatic, EOMI.  Chest: clear.  CVS: SIS2 +, irregular+no murmur.  P/A: Soft, BS+, right nephrostomy bag +, urostomy bag+  CNS: awake, alert  Ext: no edema feet.  All systems reviewed positive findings as above.    POCT Blood Glucose.: 207 mg/dL (31 Mar 2023 11:12)  POCT Blood Glucose.: 168 mg/dL (31 Mar 2023 07:52)  POCT Blood Glucose.: 184 mg/dL (30 Mar 2023 21:28)  POCT Blood Glucose.: 175 mg/dL (30 Mar 2023 17:17)                          13.8<L>  9.37  )-----------( 109<L>    ( 31 Mar 2023 06:34 )             42.1                         13.4<L>  13.08<H> )-----------( 103<L>    ( 30 Mar 2023 05:25 )             40.1<L>    03-31    137  |  103  |  27<H>  ----------------------------<  150<H>  4.1   |  22  |  1.3  03-30    137  |  104  |  38<H>  ----------------------------<  161<H>  3.9   |  22  |  1.7<H>    Ca    8.2<L>      31 Mar 2023 06:34  Ca    8.4      30 Mar 2023 05:25  Mg     1.9     03-31    TPro  5.7<L>  /  Alb  3.2<L>  /  TBili  1.3<H>  /  DBili  x   /  AST  33  /  ALT  17  /  AlkPhos  74  03-31  TPro  5.3<L>  /  Alb  3.0<L>  /  TBili  1.3<H>  /  DBili  0.3  /  AST  53<H>  /  ALT  15  /  AlkPhos  54  03-30                Culture - Blood (collected 30 Mar 2023 04:56)  Source: .Blood None  Preliminary Report (31 Mar 2023 14:01):    No growth to date.    Culture - Blood (collected 28 Mar 2023 15:35)  Source: .Blood Blood-Peripheral  Gram Stain (29 Mar 2023 06:01):    Growth in anaerobic bottle: Gram Negative Rods    Growth in aerobic bottle: Gram Negative Rods  Preliminary Report (29 Mar 2023 21:32):    Growth in aerobic and anaerobic bottles: Escherichia coli    See previous culture [id number]    Culture - Blood (collected 28 Mar 2023 15:35)  Source: .Blood Blood-Peripheral  Gram Stain (29 Mar 2023 06:33):    Growth in anaerobic bottle: Gram Negative Rods    Growth in aerobic bottle: Gram Negative Rods  Preliminary Report (29 Mar 2023 21:33):    Growth in aerobic and anaerobic bottles: Escherichia coli    ***Blood Panel PCR results on this specimen are available    approximately 3 hours after the Gram stain result.***    Gram stain, PCR, and/or culture results may not always    correspond due to difference in methodologies.    ************************************************************    This PCR assay was performed by multiplex PCR. This    Assay tests for 66 bacterial and resistance gene targets.    Please refer to the [facility] Labs test directory    at [email or web address] for details.  Organism: Blood Culture PCR  Escherichia coli (30 Mar 2023 16:17)  Organism: Escherichia coli (30 Mar 2023 16:17)      Method Type: HALI      -  Amikacin: S <=16      -  Ampicillin: S <=8 These ampicillin results predict results for amoxicillin      -  Ampicillin/Sulbactam: S <=4/2 Enterobacter, Klebsiella aerogenes, Citrobacter, and Serratia may develop resistance during prolonged therapy (3-4 days)      -  Aztreonam: S <=4      -  Cefazolin: S <=2 Enterobacter, Klebsiella aerogenes, Citrobacter, and Serratia may develop resistance during prolonged therapy (3-4 days)      -  Cefepime: S <=2      -  Cefoxitin: S <=8      -  Ceftriaxone: S <=1 Enterobacter, Klebsiella aerogenes, Citrobacter, and Serratia may develop resistance during prolonged therapy      -  Ciprofloxacin: S <=0.25      -  Ertapenem: S <=0.5      -  Gentamicin: S <=2      -  Imipenem: S <=1      -  Levofloxacin: S <=0.5      -  Meropenem: S <=1      -  Piperacillin/Tazobactam: S <=8      -  Tobramycin: S <=2      -  Trimethoprim/Sulfamethoxazole: S 2/38  Organism: Blood Culture PCR (29 Mar 2023 06:46)      Method Type: PCR      -  Escherichia coli: Detec

## 2023-03-31 NOTE — DISCHARGE NOTE NURSING/CASE MANAGEMENT/SOCIAL WORK - NSDCPEFALRISK_GEN_ALL_CORE
For information on Fall & Injury Prevention, visit: [email or web address] OR  [email or web address] OR  https://www.cdc.gov/steadi/patient.html

## 2023-03-31 NOTE — DISCHARGE NOTE PROVIDER - PROVIDER TOKENS
PROVIDER:[TOKEN:[[id number]]],PROVIDER:[TOKEN:[[id number]]] PROVIDER:[TOKEN:[[id number]],FOLLOWUP:[1 week]],PROVIDER:[TOKEN:[[id number]]],PROVIDER:[TOKEN:[[id number]],FOLLOWUP:[1 week]] PROVIDER:[TOKEN:[[id number]],FOLLOWUP:[1 week]],PROVIDER:[TOKEN:[[id number]],FOLLOWUP:[1 week]],PROVIDER:[TOKEN:[[id number]],FOLLOWUP:[2 weeks]]

## 2023-03-31 NOTE — PROGRESS NOTE ADULT - SUBJECTIVE AND OBJECTIVE BOX
UROLOGY PROGRESS NOTE    Pt is a 82y M found to have mild R hydro 2/2 a 5mm stone POD#2 s/p R PCN placement by IR. Pt seen and examined at bedside with Dr. Aaron; son at bedside. Pt sitting up in chair. No complaints at this time.     MEDICATIONS  (STANDING):  apixaban 5 milliGRAM(s) Oral two times a day  cefTRIAXone   IVPB 2000 milliGRAM(s) IV Intermittent every 24 hours  chlorhexidine 2% Cloths 1 Application(s) Topical <User Schedule>  dextrose 5%. 1000 milliLiter(s) (100 mL/Hr) IV Continuous <Continuous>  dextrose 5%. 1000 milliLiter(s) (50 mL/Hr) IV Continuous <Continuous>  dextrose 50% Injectable 25 Gram(s) IV Push once  dextrose 50% Injectable 12.5 Gram(s) IV Push once  dextrose 50% Injectable 25 Gram(s) IV Push once  glucagon  Injectable 1 milliGRAM(s) IntraMuscular once  insulin lispro (ADMELOG) corrective regimen sliding scale   SubCutaneous three times a day before meals  lactated ringers. 1000 milliLiter(s) (125 mL/Hr) IV Continuous <Continuous>  melatonin 5 milliGRAM(s) Oral at bedtime  pantoprazole  Injectable 40 milliGRAM(s) IV Push daily    MEDICATIONS  (PRN):  acetaminophen     Tablet .. 650 milliGRAM(s) Oral every 6 hours PRN Temp greater or equal to 38C (100.4F)  dextrose Oral Gel 15 Gram(s) Oral once PRN Blood Glucose LESS THAN 70 milliGRAM(s)/deciliter      REVIEW OF SYSTEMS   [x] A ten-point review of systems was otherwise negative except as noted.    Vital Signs Last 24 Hrs  T(C): 36.7 (31 Mar 2023 08:01), Max: 36.8 (30 Mar 2023 16:00)  T(F): 98.1 (31 Mar 2023 08:01), Max: 98.2 (30 Mar 2023 16:00)  HR: 96 (31 Mar 2023 08:01) (96 - 99)  BP: 141/77 (31 Mar 2023 08:01) (136/76 - 164/77)  BP(mean): 110 (30 Mar 2023 18:00) (95 - 110)  RR: 19 (31 Mar 2023 08:01) (18 - 31)  SpO2: 95% (31 Mar 2023 08:01) (95% - 98%)    Parameters below as of 31 Mar 2023 08:01  Patient On (Oxygen Delivery Method): nasal cannula      PHYSICAL EXAM:  GEN: NAD; sitting up in chair   NEURO: Awake and alert   SKIN: Good color, non diaphoretic  RESP: +nasal canula in place   ABDO: Soft, non-tender       I&O's Summary    30 Mar 2023 07:01  -  31 Mar 2023 07:00  --------------------------------------------------------  IN: 1850 mL / OUT: 2325 mL / NET: -475 mL    31 Mar 2023 07:01  -  31 Mar 2023 15:12  --------------------------------------------------------  IN: 1100 mL / OUT: 1500 mL / NET: -400 mL      LABS:                        13.8   9.37  )-----------( 109      ( 31 Mar 2023 06:34 )             42.1     03-31    137  |  103  |  27<H>  ----------------------------<  150<H>  4.1   |  22  |  1.3    Ca    8.2<L>      31 Mar 2023 06:34  Mg     1.9     03-31    TPro  5.7<L>  /  Alb  3.2<L>  /  TBili  1.3<H>  /  DBili  x   /  AST  33  /  ALT  17  /  AlkPhos  74  03-31

## 2023-03-31 NOTE — DISCHARGE NOTE PROVIDER - CARE PROVIDER_API CALL
Cabrera Mcneal)  Urology  [address] [place] [address]  Phone: [phone]  Fax: [phone]  Follow Up Time:     Evan Bull)  Internal Medicine  [address] [place] [address], [facility] [address]  [place] [address]  Phone: [phone]  Fax: [phone]  Follow Up Time:    Cabrera Mcneal)  Urology  [address] [place] [address]  Phone: [phone]  Fax: [phone]  Follow Up Time: 1 week    Evan Bull)  Internal Medicine  [address] [place] [address], [facility] [address]  [place] [address]  Phone: [phone]  Fax: [phone]  Follow Up Time:     Zaki Collier (DO)  Interventional Radiology and Diagnostic Radiology  [address] [place] [address]  Phone: [phone]  Fax: [phone]  Follow Up Time: 1 week   Evan Bull)  Internal Medicine  [address] [facility], [address]  [place] [address]  Phone: [phone]  Fax: [phone]  Follow Up Time: 1 week    Zaki Collier (DO)  Interventional Radiology and Diagnostic Radiology  [address] [place] [address]  Phone: [phone]  Fax: [phone]  Follow Up Time: 1 week    Mat Banks)  Urology  [address] [place] [address]  Phone: [phone]  Fax: [phone]  Follow Up Time: 2 weeks

## 2023-03-31 NOTE — PROGRESS NOTE ADULT - SUBJECTIVE AND OBJECTIVE BOX
TIANNAROSA MARIA CUMMINS  82y, Male    All available historical data reviewed    OVERNIGHT EVENTS:  feels well and has no new complaints  No fevers   no phillips  no  issues    ROS:  General: Denies rigors, nightsweats  HEENT: Denies headache, rhinorrhea, sore throat, eye pain  CV: Denies CP, palpitations  PULM: Denies wheezing, hemoptysis  GI: Denies hematemesis, hematochezia, melena  : Denies discharge, hematuria  MSK: Denies arthralgias, myalgias  SKIN: Denies rash, lesions  NEURO: Denies paresthesias, weakness  PSYCH: Denies depression, anxiety    VITALS:  T(F): 98.1, Max: 98.2 (03-30-23 @ 16:00)  HR: 96  BP: 141/77  RR: 19Vital Signs Last 24 Hrs  T(C): 36.7 (31 Mar 2023 08:01), Max: 36.8 (30 Mar 2023 16:00)  T(F): 98.1 (31 Mar 2023 08:01), Max: 98.2 (30 Mar 2023 16:00)  HR: 96 (31 Mar 2023 08:01) (96 - 123)  BP: 141/77 (31 Mar 2023 08:01) (129/66 - 164/77)  BP(mean): 110 (30 Mar 2023 18:00) (88 - 110)  RR: 19 (31 Mar 2023 08:01) (18 - 31)  SpO2: 95% (31 Mar 2023 08:01) (92% - 98%)    Parameters below as of 31 Mar 2023 08:01  Patient On (Oxygen Delivery Method): nasal cannula        TESTS & MEASUREMENTS:                        13.8   9.37  )-----------( 109      ( 31 Mar 2023 06:34 )             42.1     03-31    137  |  103  |  27<H>  ----------------------------<  150<H>  4.1   |  22  |  1.3    Ca    8.2<L>      31 Mar 2023 06:34  Mg     1.9     03-31    TPro  5.7<L>  /  Alb  3.2<L>  /  TBili  1.3<H>  /  DBili  x   /  AST  33  /  ALT  17  /  AlkPhos  74  03-31    LIVER FUNCTIONS - ( 31 Mar 2023 06:34 )  Alb: 3.2 g/dL / Pro: 5.7 g/dL / ALK PHOS: 74 U/L / ALT: 17 U/L / AST: 33 U/L / GGT: x             Culture - Blood (collected 03-28-23 @ 15:35)  Source: .Blood Blood-Peripheral  Gram Stain (03-29-23 @ 06:01):    Growth in anaerobic bottle: Gram Negative Rods    Growth in aerobic bottle: Gram Negative Rods  Preliminary Report (03-29-23 @ 21:32):    Growth in aerobic and anaerobic bottles: Escherichia coli    See previous culture [id number]    Culture - Blood (collected 03-28-23 @ 15:35)  Source: .Blood Blood-Peripheral  Gram Stain (03-29-23 @ 06:33):    Growth in anaerobic bottle: Gram Negative Rods    Growth in aerobic bottle: Gram Negative Rods  Preliminary Report (03-29-23 @ 21:33):    Growth in aerobic and anaerobic bottles: Escherichia coli    ***Blood Panel PCR results on this specimen are available    approximately 3 hours after the Gram stain result.***    Gram stain, PCR, and/or culture results may not always    correspond due to difference in methodologies.    ************************************************************    This PCR assay was performed by multiplex PCR. This    Assay tests for 66 bacterial and resistance gene targets.    Please refer to the [facility] test directory    at [email or web address] for details.  Organism: Blood Culture PCR  Escherichia coli (03-30-23 @ 16:17)  Organism: Escherichia coli (03-30-23 @ 16:17)      Method Type: HALI      -  Amikacin: S <=16      -  Ampicillin: S <=8 These ampicillin results predict results for amoxicillin      -  Ampicillin/Sulbactam: S <=4/2 Enterobacter, Klebsiella aerogenes, Citrobacter, and Serratia may develop resistance during prolonged therapy (3-4 days)      -  Aztreonam: S <=4      -  Cefazolin: S <=2 Enterobacter, Klebsiella aerogenes, Citrobacter, and Serratia may develop resistance during prolonged therapy (3-4 days)      -  Cefepime: S <=2      -  Cefoxitin: S <=8      -  Ceftriaxone: S <=1 Enterobacter, Klebsiella aerogenes, Citrobacter, and Serratia may develop resistance during prolonged therapy      -  Ciprofloxacin: S <=0.25      -  Ertapenem: S <=0.5      -  Gentamicin: S <=2      -  Imipenem: S <=1      -  Levofloxacin: S <=0.5      -  Meropenem: S <=1      -  Piperacillin/Tazobactam: S <=8      -  Tobramycin: S <=2      -  Trimethoprim/Sulfamethoxazole: S 2/38  Organism: Blood Culture PCR (03-29-23 @ 06:46)      Method Type: PCR      -  Escherichia coli: Detec            RADIOLOGY & ADDITIONAL TESTS:  Personal review of radiological diagnostics performed  Echo and EKG results noted when applicable.     MEDICATIONS:  acetaminophen     Tablet .. 650 milliGRAM(s) Oral every 6 hours PRN  cefTRIAXone   IVPB 2000 milliGRAM(s) IV Intermittent every 24 hours  chlorhexidine 2% Cloths 1 Application(s) Topical <User Schedule>  dextrose 5%. 1000 milliLiter(s) IV Continuous <Continuous>  dextrose 5%. 1000 milliLiter(s) IV Continuous <Continuous>  dextrose 50% Injectable 25 Gram(s) IV Push once  dextrose 50% Injectable 12.5 Gram(s) IV Push once  dextrose 50% Injectable 25 Gram(s) IV Push once  dextrose Oral Gel 15 Gram(s) Oral once PRN  glucagon  Injectable 1 milliGRAM(s) IntraMuscular once  insulin lispro (ADMELOG) corrective regimen sliding scale   SubCutaneous three times a day before meals  lactated ringers. 1000 milliLiter(s) IV Continuous <Continuous>  melatonin 5 milliGRAM(s) Oral at bedtime  pantoprazole  Injectable 40 milliGRAM(s) IV Push daily      ANTIBIOTICS:  cefTRIAXone   IVPB 2000 milliGRAM(s) IV Intermittent every 24 hours

## 2023-03-31 NOTE — PROGRESS NOTE ADULT - REASON FOR ADMISSION
septic stone

## 2023-03-31 NOTE — DISCHARGE NOTE PROVIDER - DISCHARGE SERVICE FOR PATIENT
VEEG
on the discharge service for the patient. I have reviewed and made amendments to the documentation where necessary.

## 2023-03-31 NOTE — PROGRESS NOTE ADULT - NS ATTEND AMEND GEN_ALL_CORE FT
Pt seen  and films including CT reviewed. Questions answered. Once stable can be discharge with f/u with outside urologist. Pt feels much better.
- plan as noted above

## 2023-03-31 NOTE — DISCHARGE NOTE PROVIDER - ATTENDING DISCHARGE PHYSICAL EXAMINATION:
T(C): 37.1 (04-01-23 @ 07:50), Max: 37.1 (04-01-23 @ 07:50)  HR: 67 (04-01-23 @ 07:50) (67 - 101)  BP: 149/77 (04-01-23 @ 07:50) (136/72 - 149/77)  RR: 18 (04-01-23 @ 07:50) (18 - 20)  SpO2: 97% (04-01-23 @ 07:50) (94% - 97%)  O/E:  Awake, alert, not in distress.  HEENT: atraumatic, EOMI.  Chest: clear.  CVS: SIS2 +, irregular+no murmur.  P/A: Soft, BS+, right nephrostomy bag +, urostomy bag+  CNS: awake, alert  Ext: no edema feet.  All systems reviewed positive findings as above.

## 2023-03-31 NOTE — DISCHARGE NOTE PROVIDER - CARE PROVIDERS DIRECT ADDRESSES
[email or web address],DirectAddress_Unknown [email or web address],DirectAddress_Unknown,DirectAddress_Unknown

## 2023-03-31 NOTE — PROGRESS NOTE ADULT - ASSESSMENT
82 y.o M with PMH of prostate/ bladder / urethral CA with creation of urostomy on eliquis for AFIB transferred from [facility] to [facility] with Sepsis, Right flank pain with CT A/P findings of Right-sided hydronephrosis and right-sided hydroureter. Findings secondary to a stone within the mid aspect of the right ureter. Extensive inflammatory changes are seen in the region of the stone, extending superiorly surrounding a calcification, which may reflect a previously ruptured stone. Bilateral intrarenal calculi as described. Bilateral renal cysts.   Conventional method of treatment with cystoscopy/stent will not be successful due to pts prior surgical history.  The ureteral orifice will not be able to be cannulated for stent placement. Therefore Dr Maciel and Dr Tijerina are requesting Interventional Radiolgy consult for PCN. Pt. was transferred for emergent Right PCNT by IR.   IR recommended, reversal of Eliquis, Anesthesia evaluation, ICU admission for post-op management.   T max 104.4 yesterday at 15:10 PM, currently afebrile.   Blood Cx 3/28/23  preliminary - E. Coli       Plan:  Awaiting IR procedure   Analgesia PRN x pain   Cont. IV Abx as per ID recommendations   F/U official Blood cx , Urine cx   Trend WBC, BUN/Cr  Cont. MICU management   Will d/w  attending   Urology will F/U   
82y year old Male with history of   HTN, DM, DL, Afib ( on eliquis), bladder CA s/p cystectomy and urostomy.   BIBEMS for  septic stone    #Urosepsis 2/2 to obstructive stone, with hydronephrosis   #Hx Bladder Ca s/p cystectomy and urostomy  - s/p 5 L in Ed  - On admission: Lactate 8.3 -> improving 2.6   - Off levo now   - on 2 L NC  - CT abdomen and pelvis with IV con  Right-sided hydronephrosis and right-sided hydroureter.   Findings secondary to a stone within the mid aspect of the right ureter.   Extensive inflammatory changes are seen in the region of the stone, extending superiorly surrounding a calcification, which may reflect a previously ruptured stone. Bilateral intrarenal calculi as described.   Bilateral renal cysts.  - As per Urology: cystoscopy/stent will not be successful due to pts prior surgical history. cs IR for PCN  - As per IR: Reverse Eliquis as patient has poor renal function -> Kcentra given. Last dose was 3/27 at 9pm Renal interventions such as a nephrostomy tube placement is a high risk  bleeding procedure. -> PCN done on 3/29  - Anesthesia consult placed   - As per IR: Restart Eliquis 48 hr after procedure (it will be 3/31 afternoon), Make sure clear urine draining   - BCx 3/28: E coli   - UA positive   - dc Meropenem; c/w Rocephin, and dc on Levofloxacin  - ID following    #LORRAINE post renal  - Baseline crea: 1.2  - Home Lasix 20 mg QD on Hold   - Home meds Sodium bicarb 650 mg BID and Potassium Citrate 10 meq QD     #Chronic A fib  #HTN  - on Eliquis -> restart on 03/31  - Home Metoprolol succ 50 QD on hold for low BP  - On admission: Trop: 0.1  - Trend trops, f/u repeat trop  - Echo 3/29:  EF of 60 %, pulmonary artery systolic pressure is 35.5 mmHg    #DM:  - Home glimepiride 2 mg 2.5 tb QD  - Inpt: Sliding Scale    #DVT ppx: None  #GI ppx: None  #Code status: Full code
Pt is a 82y M found to have mild R hydro 2/2 a 5mm stone POD#2 s/p R PCN placement by IR.     WBC 9.37  Cr 1.3  BCx 3/28: E. coli  BCx 3/30: NGTD    -No further urologic intervention at this point in time  -ID evaluation appreciated--Rocephin 2 gm iv q24h> could change to po Ciprofloxacin 750 mg q12h starting 4/1 till 4/14   -Restart anticoagulation as medically indicated  -Follow-up outpatient with  (Dr. Banks) for definitive stone management. Pt and pt's son aware to follow-up.   -Recall  prn
IMPRESSION:    Sepsis POA  Septic shock   E Coli bacteremia   Pyelonephritis  Right side obstructing stone SP Right PCN   LORRAINE.  Likely prerenal / ATN / Obstructive improving   HO Chronic A fib was on Eliquis  H/o bladder cancer s/p urostomy  H/o HTN      PLAN:    CNS: Avoid depressants    HEENT: Oral care    PULMONARY: Wean oxygen, goal target 92-95%. HOB @ 45 degrees.  Aspiration precautions.  Incentive spiromtery     CARDIOVASCULAR: Avoid overload.  Echo noted.  LR to match UO     GI: Feeding.  Bowel regimen as needed     RENAL: Trend lytes.  Correct as needed.  Monitor UO     INFECTIOUS DISEASE: Continue ABX.  Follow up cultures.  Rpeat BC.      HEMATOLOGICAL:  DVT prophylaxis.  Eliquis per IR.      ENDOCRINE:  Follow up FS.  Insulin protocol if needed.    MUSCULOSKELETAL: OOB to chair.  PT OT     DISPO: DGTF     DC TLC         
Pt is a 82y Male admitted with septic shock, found to have mild right hydro secondary to a 5mm stone now s/p right PCN by IR yesterday.     PLAN:  Patient seen and examined with Dr. Mcneal, plan as per attending below:  -No further urologic intervention at this point in time  -BCx w/ E. coli, f/u UCx  -C/w abx per ID -- currently on Rocephin  -Flomax if not contraindicated  -WBC & Cr downtrending  -Monitor urine output & monitor for fevers  -Restart anticoagulation as medically indicated  -Follow-up outpatient with  for definitive stone management
81 yo M with history of HTN, DM, DL, Afib ( on eliquis), bladder CA s/p cystectomy and urostomy. Patient presenting with right sided ureteral stone with evidence of obstruction. Patient started having back pain yesterday right sided. urine in urostomy bag was cloudy and dark in color. He was going to his PCP but developed high fever and chills so he came to ED [facility]. in [facility] site patient was hypotensive and tachycardic requiring 5 liters fluids and was started on pressors. cultures were taken and he was given broad spectrum antibiotics. His HD status improved. CT abdomen showed Right-sided hydronephrosis and right-sided hydroureter. Findings secondary to a stone within the mid aspect of the right ureter. Extensive inflammatory changes are seen in the region of the stone, extending superiorly surrounding a calcification, which may reflect a previously ruptured stone. Bilateral intrarenal calculi as described. Lactate 8 on [facility] went down to 6. He was transferred to north site for eliquis reversal before intervention by IR for PCN (28 Mar 2023 22:54)    # Sepsis POA, septic shock- sec to pyelonephritis- resolved  # Lactic acidosis resolved  # Bacteremia with E.coli  # Right-sided hydronephrosis and right-sided hydroureter sec to right ureter stone  # H/o Bladder Ca s/p cystectomy and urostomy  -  CT Abdomen and Pelvis w/ IV Cont (03.28.23 @ 16:24) >Right-sided hydronephrosis and right-sidedhydroureter.  Findings secondary to a stone within the mid aspect of the right ureter. Extensive inflammatory changes are seen in the region of the stone, extending superiorly surrounding a calcification, which may reflect a previously ruptured stone. Bilateral intrarenal calculi as described. Bilateral renal cysts.  - blood Culture Results: Growth in aerobic and anaerobic bottles: Escherichia coli (03.28.23 @ 15:35)  Culture Results:   - s/p  IV fluids, pressors  - s/p right PCN by IR on 3/29.  Follow-up outpatient with  for definitive stone management  - ID eval: Rocephin 2 gm iv q24h> could change to po Ciprofloxacin 750 mg q12h starting 4/1 till 4/14    # Acute kidney injury , post renal-resolved    # Chronic afib  # Hypertension  - restart metoprolol, eliquis    # DM type 2  - A1C with Estimated Average Glucose Result: 9.0  (03.29.23 @ 05:10)  - c/w insulin    # Full code    # Pending: [facility]OSTON haines   # Discussed with patient and his son  # Disposition: probably home in AM
· Assessment	  81 yo M with history of HTN, DM, DL, Afib ( on eliquis), bladder CA s/p cystectomy and urostomy. Patient presenting with right sided ureteral stone with evidence of obstruction. Patient started having back pain yesterday right sided.   CT abdomen showed Right-sided hydronephrosis and right-sided hydroureter. Findings secondary to a stone within the mid aspect of the right ureter. Extensive inflammatory changes are seen in the region of the stone, extending superiorly surrounding a calcification, which may reflect a previously ruptured stone. Bilateral intrarenal calculi.    IMPRESSION/RECOMMENDATIONS   Resolved septic shock on admission secondary to acute right pyelonephritis with right-sided hydronephrosis and right-sided hydroureter secondary to a stone within the mid aspect of the right ureter.  E coli bacteremia  3/28 BCx E coli  WBC 21.7>9.3  LORRAINE > resolved  -Rocephin 2 gm iv q24h> could change to po Ciprofloxacin 750 mg q12h starting 4/1 till 4/14 ( give rocephin today )  Please do not hesitate to recall ID if any questions arise either through [id number] or through [facility]

## 2023-03-31 NOTE — PROGRESS NOTE ADULT - SUBJECTIVE AND OBJECTIVE BOX
ROSA MARIA CASEY 82y Male  MRN#: [id number]   Hospital Day: 3d    SUBJECTIVE  Patient is a 82y old Male who presents with a chief complaint of septic stone (31 Mar 2023 10:33)  Currently admitted to medicine with the primary diagnosis of Kidney stone      INTERVAL HPI AND OVERNIGHT EVENTS:  Patient was examined and seen at bedside. This morning he is resting comfortably in bed. No issues or overnight events.    OBJECTIVE  PAST MEDICAL & SURGICAL HISTORY  Hypertension    Hyperlipidemia    Diabetes mellitus, type 2    History of atrial fibrillation    Bladder cancer  secondary to exposure at [employer] 9/11 s/p cystectomy with urostomy    Chronic kidney disease (CKD)  stage 3A, baseline creatinine 1.4    DVT, lower extremity    History of total hip replacement, bilateral    History of total knee replacement, bilateral    History of total cystectomy  with resultant urostomy      ALLERGIES:  No Known Allergies    MEDICATIONS:  STANDING MEDICATIONS  cefTRIAXone   IVPB 2000 milliGRAM(s) IV Intermittent every 24 hours  chlorhexidine 2% Cloths 1 Application(s) Topical <User Schedule>  dextrose 5%. 1000 milliLiter(s) IV Continuous <Continuous>  dextrose 5%. 1000 milliLiter(s) IV Continuous <Continuous>  dextrose 50% Injectable 25 Gram(s) IV Push once  dextrose 50% Injectable 12.5 Gram(s) IV Push once  dextrose 50% Injectable 25 Gram(s) IV Push once  glucagon  Injectable 1 milliGRAM(s) IntraMuscular once  insulin lispro (ADMELOG) corrective regimen sliding scale   SubCutaneous three times a day before meals  lactated ringers. 1000 milliLiter(s) IV Continuous <Continuous>  melatonin 5 milliGRAM(s) Oral at bedtime  pantoprazole  Injectable 40 milliGRAM(s) IV Push daily    PRN MEDICATIONS  acetaminophen     Tablet .. 650 milliGRAM(s) Oral every 6 hours PRN  dextrose Oral Gel 15 Gram(s) Oral once PRN      VITAL SIGNS: Last 24 Hours  T(C): 36.7 (31 Mar 2023 08:01), Max: 36.8 (30 Mar 2023 16:00)  T(F): 98.1 (31 Mar 2023 08:01), Max: 98.2 (30 Mar 2023 16:00)  HR: 96 (31 Mar 2023 08:01) (96 - 123)  BP: 141/77 (31 Mar 2023 08:01) (136/76 - 164/77)  BP(mean): 110 (30 Mar 2023 18:00) (95 - 110)  RR: 19 (31 Mar 2023 08:01) (18 - 31)  SpO2: 95% (31 Mar 2023 08:01) (95% - 98%)    LABS:                        13.8   9.37  )-----------( 109      ( 31 Mar 2023 06:34 )             42.1     03-31    137  |  103  |  27<H>  ----------------------------<  150<H>  4.1   |  22  |  1.3    Ca    8.2<L>      31 Mar 2023 06:34  Mg     1.9     03-31    TPro  5.7<L>  /  Alb  3.2<L>  /  TBili  1.3<H>  /  DBili  x   /  AST  33  /  ALT  17  /  AlkPhos  74  03-31              Culture - Blood (collected 30 Mar 2023 04:56)  Source: .Blood None  Preliminary Report (31 Mar 2023 14:01):    No growth to date.    Culture - Blood (collected 28 Mar 2023 15:35)  Source: .Blood Blood-Peripheral  Gram Stain (29 Mar 2023 06:01):    Growth in anaerobic bottle: Gram Negative Rods    Growth in aerobic bottle: Gram Negative Rods  Preliminary Report (29 Mar 2023 21:32):    Growth in aerobic and anaerobic bottles: Escherichia coli    See previous culture [id number]    Culture - Blood (collected 28 Mar 2023 15:35)  Source: .Blood Blood-Peripheral  Gram Stain (29 Mar 2023 06:33):    Growth in anaerobic bottle: Gram Negative Rods    Growth in aerobic bottle: Gram Negative Rods  Preliminary Report (29 Mar 2023 21:33):    Growth in aerobic and anaerobic bottles: Escherichia coli    ***Blood Panel PCR results on this specimen are available    approximately 3 hours after the Gram stain result.***    Gram stain, PCR, and/or culture results may not always    correspond due to difference in methodologies.    ************************************************************    This PCR assay was performed by multiplex PCR. This    Assay tests for 66 bacterial and resistance gene targets.    Please refer to the [facility] test directory    at [email or web address] for details.  Organism: Blood Culture PCR  Escherichia coli (30 Mar 2023 16:17)  Organism: Escherichia coli (30 Mar 2023 16:17)  Organism: Blood Culture PCR (29 Mar 2023 06:46)        Physical Exam:  CONSTITUTIONAL: No acute distress, alert and following commands  HEAD: Atraumatic, normocephalic  EYES: EOM intact, PERRLA, conjunctiva and sclera clear  PULMONARY: clear  CARDIOVASCULAR: Regular rate and rhythm  GASTROINTESTINAL: Soft, non-tender, non-distended; bowel sounds present  NEUROLOGY: non-focal

## 2023-03-31 NOTE — DISCHARGE NOTE NURSING/CASE MANAGEMENT/SOCIAL WORK - PATIENT PORTAL LINK FT
You can access the FollowMyHealth Patient Portal offered by [facility] by registering at the following website: [email or web address]. By joining [employer]’s FollowMyHealth portal, you will also be able to view your health information using other applications (apps) compatible with our system.

## 2023-03-31 NOTE — DISCHARGE NOTE PROVIDER - NSDCHHNEEDSERVICE_GEN_ALL_CORE
Ostomy care and management Observation and assessment/Ostomy care and management/Rehabilitation services

## 2023-03-31 NOTE — PROGRESS NOTE ADULT - TIME BILLING
Counseled patient about diagnostic testing and treatment plan. All questions answered. Abnormal lab/radiographical/microbiological data reviewed.
Direct patient care. Discussed on rounds with Housestaff
I was physically present for the key portions of the evaluation and management [ E/M] service provided and agree with the plan which i have reviewed and edited where appropriate

## 2023-04-01 VITALS
RESPIRATION RATE: 18 BRPM | TEMPERATURE: 99 F | HEART RATE: 67 BPM | DIASTOLIC BLOOD PRESSURE: 77 MMHG | OXYGEN SATURATION: 97 % | SYSTOLIC BLOOD PRESSURE: 149 MMHG

## 2023-04-01 LAB
-  AMIKACIN: SIGNIFICANT CHANGE UP
-  AMPICILLIN/SULBACTAM: SIGNIFICANT CHANGE UP
-  AMPICILLIN: SIGNIFICANT CHANGE UP
-  AZTREONAM: SIGNIFICANT CHANGE UP
-  CEFAZOLIN: SIGNIFICANT CHANGE UP
-  CEFEPIME: SIGNIFICANT CHANGE UP
-  CEFOXITIN: SIGNIFICANT CHANGE UP
-  CEFTRIAXONE: SIGNIFICANT CHANGE UP
-  CIPROFLOXACIN: SIGNIFICANT CHANGE UP
-  ERTAPENEM: SIGNIFICANT CHANGE UP
-  GENTAMICIN: SIGNIFICANT CHANGE UP
-  IMIPENEM: SIGNIFICANT CHANGE UP
-  LEVOFLOXACIN: SIGNIFICANT CHANGE UP
-  MEROPENEM: SIGNIFICANT CHANGE UP
-  PIPERACILLIN/TAZOBACTAM: SIGNIFICANT CHANGE UP
-  TOBRAMYCIN: SIGNIFICANT CHANGE UP
-  TRIMETHOPRIM/SULFAMETHOXAZOLE: SIGNIFICANT CHANGE UP
ALBUMIN SERPL ELPH-MCNC: 3.2 G/DL — LOW (ref 3.5–5.2)
ALP SERPL-CCNC: 96 U/L — SIGNIFICANT CHANGE UP (ref 30–115)
ALT FLD-CCNC: 20 U/L — SIGNIFICANT CHANGE UP (ref 0–41)
ANION GAP SERPL CALC-SCNC: 14 MMOL/L — SIGNIFICANT CHANGE UP (ref 7–14)
AST SERPL-CCNC: 28 U/L — SIGNIFICANT CHANGE UP (ref 0–41)
BILIRUB SERPL-MCNC: 1 MG/DL — SIGNIFICANT CHANGE UP (ref 0.2–1.2)
BUN SERPL-MCNC: 25 MG/DL — HIGH (ref 10–20)
CALCIUM SERPL-MCNC: 8.2 MG/DL — LOW (ref 8.4–10.5)
CHLORIDE SERPL-SCNC: 101 MMOL/L — SIGNIFICANT CHANGE UP (ref 98–110)
CO2 SERPL-SCNC: 21 MMOL/L — SIGNIFICANT CHANGE UP (ref 17–32)
CREAT SERPL-MCNC: 1.2 MG/DL — SIGNIFICANT CHANGE UP (ref 0.7–1.5)
CULTURE RESULTS: SIGNIFICANT CHANGE UP
EGFR: 61 ML/MIN/1.73M2 — SIGNIFICANT CHANGE UP
GLUCOSE BLDC GLUCOMTR-MCNC: 154 MG/DL — HIGH (ref 70–99)
GLUCOSE SERPL-MCNC: 178 MG/DL — HIGH (ref 70–99)
GRAM STN FLD: SIGNIFICANT CHANGE UP
GRAM STN FLD: SIGNIFICANT CHANGE UP
HCT VFR BLD CALC: 42.3 % — SIGNIFICANT CHANGE UP (ref 42–52)
HGB BLD-MCNC: 13.8 G/DL — LOW (ref 14–18)
MAGNESIUM SERPL-MCNC: 1.8 MG/DL — SIGNIFICANT CHANGE UP (ref 1.8–2.4)
MCHC RBC-ENTMCNC: 27.8 PG — SIGNIFICANT CHANGE UP (ref 27–31)
MCHC RBC-ENTMCNC: 32.6 G/DL — SIGNIFICANT CHANGE UP (ref 32–37)
MCV RBC AUTO: 85.1 FL — SIGNIFICANT CHANGE UP (ref 80–94)
METHOD TYPE: SIGNIFICANT CHANGE UP
NRBC # BLD: 0 /100 WBCS — SIGNIFICANT CHANGE UP (ref 0–0)
ORGANISM # SPEC MICROSCOPIC CNT: SIGNIFICANT CHANGE UP
PLATELET # BLD AUTO: 94 K/UL — LOW (ref 130–400)
POTASSIUM SERPL-MCNC: 4 MMOL/L — SIGNIFICANT CHANGE UP (ref 3.5–5)
POTASSIUM SERPL-SCNC: 4 MMOL/L — SIGNIFICANT CHANGE UP (ref 3.5–5)
PROT SERPL-MCNC: 5.7 G/DL — LOW (ref 6–8)
RBC # BLD: 4.97 M/UL — SIGNIFICANT CHANGE UP (ref 4.7–6.1)
RBC # FLD: 16.2 % — HIGH (ref 11.5–14.5)
SODIUM SERPL-SCNC: 136 MMOL/L — SIGNIFICANT CHANGE UP (ref 135–146)
SPECIMEN SOURCE: SIGNIFICANT CHANGE UP
WBC # BLD: 6.64 K/UL — SIGNIFICANT CHANGE UP (ref 4.8–10.8)
WBC # FLD AUTO: 6.64 K/UL — SIGNIFICANT CHANGE UP (ref 4.8–10.8)

## 2023-04-01 PROCEDURE — 99239 HOSP IP/OBS DSCHRG MGMT >30: CPT

## 2023-04-01 RX ORDER — SODIUM BICARBONATE 1 MEQ/ML
1 SYRINGE (ML) INTRAVENOUS
Refills: 0 | DISCHARGE

## 2023-04-01 RX ORDER — CIPROFLOXACIN LACTATE 400MG/40ML
1 VIAL (ML) INTRAVENOUS
Qty: 28 | Refills: 0
Start: 2023-04-01 | End: 2023-04-14

## 2023-04-01 RX ADMIN — APIXABAN 5 MILLIGRAM(S): 2.5 TABLET, FILM COATED ORAL at 06:31

## 2023-04-01 RX ADMIN — CHLORHEXIDINE GLUCONATE 1 APPLICATION(S): 213 SOLUTION TOPICAL at 05:33

## 2023-04-01 RX ADMIN — CEFTRIAXONE 100 MILLIGRAM(S): 500 INJECTION, POWDER, FOR SOLUTION INTRAMUSCULAR; INTRAVENOUS at 06:31

## 2023-04-01 RX ADMIN — Medication 50 MILLIGRAM(S): at 06:31

## 2023-04-03 LAB
-  AMIKACIN: SIGNIFICANT CHANGE UP
-  AMPICILLIN/SULBACTAM: SIGNIFICANT CHANGE UP
-  AMPICILLIN: SIGNIFICANT CHANGE UP
-  AZTREONAM: SIGNIFICANT CHANGE UP
-  CEFAZOLIN: SIGNIFICANT CHANGE UP
-  CEFEPIME: SIGNIFICANT CHANGE UP
-  CEFOXITIN: SIGNIFICANT CHANGE UP
-  CEFTRIAXONE: SIGNIFICANT CHANGE UP
-  CIPROFLOXACIN: SIGNIFICANT CHANGE UP
-  ERTAPENEM: SIGNIFICANT CHANGE UP
-  GENTAMICIN: SIGNIFICANT CHANGE UP
-  IMIPENEM: SIGNIFICANT CHANGE UP
-  LEVOFLOXACIN: SIGNIFICANT CHANGE UP
-  MEROPENEM: SIGNIFICANT CHANGE UP
-  PIPERACILLIN/TAZOBACTAM: SIGNIFICANT CHANGE UP
-  TOBRAMYCIN: SIGNIFICANT CHANGE UP
-  TRIMETHOPRIM/SULFAMETHOXAZOLE: SIGNIFICANT CHANGE UP
CULTURE RESULTS: SIGNIFICANT CHANGE UP
METHOD TYPE: SIGNIFICANT CHANGE UP
ORGANISM # SPEC MICROSCOPIC CNT: SIGNIFICANT CHANGE UP
ORGANISM # SPEC MICROSCOPIC CNT: SIGNIFICANT CHANGE UP
SPECIMEN SOURCE: SIGNIFICANT CHANGE UP

## 2023-04-04 LAB
CULTURE RESULTS: SIGNIFICANT CHANGE UP
SPECIMEN SOURCE: SIGNIFICANT CHANGE UP

## 2023-04-05 LAB
CULTURE RESULTS: SIGNIFICANT CHANGE UP
SPECIMEN SOURCE: SIGNIFICANT CHANGE UP

## 2023-04-12 DIAGNOSIS — N18.31 CHRONIC KIDNEY DISEASE, STAGE 3A: ICD-10-CM

## 2023-04-12 DIAGNOSIS — Z96.653 PRESENCE OF ARTIFICIAL KNEE JOINT, BILATERAL: ICD-10-CM

## 2023-04-12 DIAGNOSIS — C66.9 MALIGNANT NEOPLASM OF UNSPECIFIED URETER: ICD-10-CM

## 2023-04-12 DIAGNOSIS — Z79.84 LONG TERM (CURRENT) USE OF ORAL HYPOGLYCEMIC DRUGS: ICD-10-CM

## 2023-04-12 DIAGNOSIS — I48.20 CHRONIC ATRIAL FIBRILLATION, UNSPECIFIED: ICD-10-CM

## 2023-04-12 DIAGNOSIS — N17.0 ACUTE KIDNEY FAILURE WITH TUBULAR NECROSIS: ICD-10-CM

## 2023-04-12 DIAGNOSIS — A41.51 SEPSIS DUE TO ESCHERICHIA COLI [E. COLI]: ICD-10-CM

## 2023-04-12 DIAGNOSIS — Z79.01 LONG TERM (CURRENT) USE OF ANTICOAGULANTS: ICD-10-CM

## 2023-04-12 DIAGNOSIS — Z98.890 OTHER SPECIFIED POSTPROCEDURAL STATES: ICD-10-CM

## 2023-04-12 DIAGNOSIS — Z86.718 PERSONAL HISTORY OF OTHER VENOUS THROMBOSIS AND EMBOLISM: ICD-10-CM

## 2023-04-12 DIAGNOSIS — E87.20 ACIDOSIS, UNSPECIFIED: ICD-10-CM

## 2023-04-12 DIAGNOSIS — I12.9 HYPERTENSIVE CHRONIC KIDNEY DISEASE WITH STAGE 1 THROUGH STAGE 4 CHRONIC KIDNEY DISEASE, OR UNSPECIFIED CHRONIC KIDNEY DISEASE: ICD-10-CM

## 2023-04-12 DIAGNOSIS — N10 ACUTE PYELONEPHRITIS: ICD-10-CM

## 2023-04-12 DIAGNOSIS — E11.22 TYPE 2 DIABETES MELLITUS WITH DIABETIC CHRONIC KIDNEY DISEASE: ICD-10-CM

## 2023-04-12 DIAGNOSIS — I35.0 NONRHEUMATIC AORTIC (VALVE) STENOSIS: ICD-10-CM

## 2023-04-12 DIAGNOSIS — Z85.46 PERSONAL HISTORY OF MALIGNANT NEOPLASM OF PROSTATE: ICD-10-CM

## 2023-04-12 DIAGNOSIS — Z85.51 PERSONAL HISTORY OF MALIGNANT NEOPLASM OF BLADDER: ICD-10-CM

## 2023-04-12 DIAGNOSIS — R65.21 SEVERE SEPSIS WITH SEPTIC SHOCK: ICD-10-CM

## 2023-04-12 DIAGNOSIS — N28.1 CYST OF KIDNEY, ACQUIRED: ICD-10-CM

## 2023-04-12 DIAGNOSIS — N13.6 PYONEPHROSIS: ICD-10-CM

## 2023-04-12 DIAGNOSIS — Z93.59 OTHER CYSTOSTOMY STATUS: ICD-10-CM

## 2023-04-12 DIAGNOSIS — Z96.643 PRESENCE OF ARTIFICIAL HIP JOINT, BILATERAL: ICD-10-CM

## 2023-04-29 ENCOUNTER — INPATIENT (INPATIENT)
Facility: HOSPITAL | Age: 83
LOS: 4 days | Discharge: HOME | DRG: 698 | End: 2023-05-04
Attending: INTERNAL MEDICINE | Admitting: HOSPITALIST
Payer: MEDICARE

## 2023-04-29 VITALS
DIASTOLIC BLOOD PRESSURE: 61 MMHG | HEART RATE: 80 BPM | SYSTOLIC BLOOD PRESSURE: 134 MMHG | TEMPERATURE: 101 F | HEIGHT: 72 IN | WEIGHT: 285.06 LBS | OXYGEN SATURATION: 98 % | RESPIRATION RATE: 18 BRPM

## 2023-04-29 DIAGNOSIS — Z85.51 PERSONAL HISTORY OF MALIGNANT NEOPLASM OF BLADDER: ICD-10-CM

## 2023-04-29 DIAGNOSIS — I12.9 HYPERTENSIVE CHRONIC KIDNEY DISEASE WITH STAGE 1 THROUGH STAGE 4 CHRONIC KIDNEY DISEASE, OR UNSPECIFIED CHRONIC KIDNEY DISEASE: ICD-10-CM

## 2023-04-29 DIAGNOSIS — Z86.718 PERSONAL HISTORY OF OTHER VENOUS THROMBOSIS AND EMBOLISM: ICD-10-CM

## 2023-04-29 DIAGNOSIS — M16.0 BILATERAL PRIMARY OSTEOARTHRITIS OF HIP: ICD-10-CM

## 2023-04-29 DIAGNOSIS — Z96.643 PRESENCE OF ARTIFICIAL HIP JOINT, BILATERAL: Chronic | ICD-10-CM

## 2023-04-29 DIAGNOSIS — Z79.84 LONG TERM (CURRENT) USE OF ORAL HYPOGLYCEMIC DRUGS: ICD-10-CM

## 2023-04-29 DIAGNOSIS — T83.512A INFECTION AND INFLAMMATORY REACTION DUE TO NEPHROSTOMY CATHETER, INITIAL ENCOUNTER: ICD-10-CM

## 2023-04-29 DIAGNOSIS — Z86.16 PERSONAL HISTORY OF COVID-19: ICD-10-CM

## 2023-04-29 DIAGNOSIS — Z87.01 PERSONAL HISTORY OF PNEUMONIA (RECURRENT): ICD-10-CM

## 2023-04-29 DIAGNOSIS — G47.33 OBSTRUCTIVE SLEEP APNEA (ADULT) (PEDIATRIC): ICD-10-CM

## 2023-04-29 DIAGNOSIS — N18.31 CHRONIC KIDNEY DISEASE, STAGE 3A: ICD-10-CM

## 2023-04-29 DIAGNOSIS — N39.0 URINARY TRACT INFECTION, SITE NOT SPECIFIED: ICD-10-CM

## 2023-04-29 DIAGNOSIS — T83.022A DISPLACEMENT OF NEPHROSTOMY CATHETER, INITIAL ENCOUNTER: ICD-10-CM

## 2023-04-29 DIAGNOSIS — Z79.01 LONG TERM (CURRENT) USE OF ANTICOAGULANTS: ICD-10-CM

## 2023-04-29 DIAGNOSIS — Z96.653 PRESENCE OF ARTIFICIAL KNEE JOINT, BILATERAL: Chronic | ICD-10-CM

## 2023-04-29 DIAGNOSIS — N20.1 CALCULUS OF URETER: ICD-10-CM

## 2023-04-29 DIAGNOSIS — Z87.440 PERSONAL HISTORY OF URINARY (TRACT) INFECTIONS: ICD-10-CM

## 2023-04-29 DIAGNOSIS — Z90.6 ACQUIRED ABSENCE OF OTHER PARTS OF URINARY TRACT: Chronic | ICD-10-CM

## 2023-04-29 DIAGNOSIS — Z96.643 PRESENCE OF ARTIFICIAL HIP JOINT, BILATERAL: ICD-10-CM

## 2023-04-29 DIAGNOSIS — Z96.653 PRESENCE OF ARTIFICIAL KNEE JOINT, BILATERAL: ICD-10-CM

## 2023-04-29 DIAGNOSIS — A41.01 SEPSIS DUE TO METHICILLIN SUSCEPTIBLE STAPHYLOCOCCUS AUREUS: ICD-10-CM

## 2023-04-29 DIAGNOSIS — E11.22 TYPE 2 DIABETES MELLITUS WITH DIABETIC CHRONIC KIDNEY DISEASE: ICD-10-CM

## 2023-04-29 DIAGNOSIS — M17.0 BILATERAL PRIMARY OSTEOARTHRITIS OF KNEE: ICD-10-CM

## 2023-04-29 DIAGNOSIS — Z90.79 ACQUIRED ABSENCE OF OTHER GENITAL ORGAN(S): ICD-10-CM

## 2023-04-29 DIAGNOSIS — I48.20 CHRONIC ATRIAL FIBRILLATION, UNSPECIFIED: ICD-10-CM

## 2023-04-29 DIAGNOSIS — Z90.6 ACQUIRED ABSENCE OF OTHER PARTS OF URINARY TRACT: ICD-10-CM

## 2023-04-29 DIAGNOSIS — Z65.5 EXPOSURE TO DISASTER, WAR AND OTHER HOSTILITIES: ICD-10-CM

## 2023-04-29 DIAGNOSIS — Z93.6 OTHER ARTIFICIAL OPENINGS OF URINARY TRACT STATUS: ICD-10-CM

## 2023-04-29 DIAGNOSIS — F32.A DEPRESSION, UNSPECIFIED: ICD-10-CM

## 2023-04-29 LAB
ALBUMIN SERPL ELPH-MCNC: 3.9 G/DL — SIGNIFICANT CHANGE UP (ref 3.5–5.2)
ALP SERPL-CCNC: 69 U/L — SIGNIFICANT CHANGE UP (ref 30–115)
ALT FLD-CCNC: 12 U/L — SIGNIFICANT CHANGE UP (ref 0–41)
ANION GAP SERPL CALC-SCNC: 13 MMOL/L — SIGNIFICANT CHANGE UP (ref 7–14)
APPEARANCE UR: ABNORMAL
AST SERPL-CCNC: 13 U/L — SIGNIFICANT CHANGE UP (ref 0–41)
BACTERIA # UR AUTO: ABNORMAL
BASOPHILS # BLD AUTO: 0.07 K/UL — SIGNIFICANT CHANGE UP (ref 0–0.2)
BASOPHILS NFR BLD AUTO: 0.7 % — SIGNIFICANT CHANGE UP (ref 0–1)
BILIRUB SERPL-MCNC: 1 MG/DL — SIGNIFICANT CHANGE UP (ref 0.2–1.2)
BILIRUB UR-MCNC: NEGATIVE — SIGNIFICANT CHANGE UP
BUN SERPL-MCNC: 27 MG/DL — HIGH (ref 10–20)
CALCIUM SERPL-MCNC: 9.1 MG/DL — SIGNIFICANT CHANGE UP (ref 8.4–10.5)
CHLORIDE SERPL-SCNC: 100 MMOL/L — SIGNIFICANT CHANGE UP (ref 98–110)
CO2 SERPL-SCNC: 24 MMOL/L — SIGNIFICANT CHANGE UP (ref 17–32)
COLOR SPEC: YELLOW — SIGNIFICANT CHANGE UP
CREAT SERPL-MCNC: 1.3 MG/DL — SIGNIFICANT CHANGE UP (ref 0.7–1.5)
DIFF PNL FLD: ABNORMAL
EGFR: 56 ML/MIN/1.73M2 — LOW
EOSINOPHIL # BLD AUTO: 0.33 K/UL — SIGNIFICANT CHANGE UP (ref 0–0.7)
EOSINOPHIL NFR BLD AUTO: 3.2 % — SIGNIFICANT CHANGE UP (ref 0–8)
EPI CELLS # UR: 1 /HPF — SIGNIFICANT CHANGE UP (ref 0–5)
FLUAV AG NPH QL: SIGNIFICANT CHANGE UP
FLUBV AG NPH QL: SIGNIFICANT CHANGE UP
GLUCOSE SERPL-MCNC: 106 MG/DL — HIGH (ref 70–99)
GLUCOSE UR QL: NEGATIVE — SIGNIFICANT CHANGE UP
HCT VFR BLD CALC: 41.9 % — LOW (ref 42–52)
HGB BLD-MCNC: 13.5 G/DL — LOW (ref 14–18)
HYALINE CASTS # UR AUTO: 4 /LPF — SIGNIFICANT CHANGE UP (ref 0–7)
IMM GRANULOCYTES NFR BLD AUTO: 0.4 % — HIGH (ref 0.1–0.3)
KETONES UR-MCNC: NEGATIVE — SIGNIFICANT CHANGE UP
LACTATE SERPL-SCNC: 1.4 MMOL/L — SIGNIFICANT CHANGE UP (ref 0.7–2)
LEUKOCYTE ESTERASE UR-ACNC: ABNORMAL
LYMPHOCYTES # BLD AUTO: 0.78 K/UL — LOW (ref 1.2–3.4)
LYMPHOCYTES # BLD AUTO: 7.7 % — LOW (ref 20.5–51.1)
MCHC RBC-ENTMCNC: 28 PG — SIGNIFICANT CHANGE UP (ref 27–31)
MCHC RBC-ENTMCNC: 32.2 G/DL — SIGNIFICANT CHANGE UP (ref 32–37)
MCV RBC AUTO: 86.9 FL — SIGNIFICANT CHANGE UP (ref 80–94)
MONOCYTES # BLD AUTO: 1.09 K/UL — HIGH (ref 0.1–0.6)
MONOCYTES NFR BLD AUTO: 10.7 % — HIGH (ref 1.7–9.3)
NEUTROPHILS # BLD AUTO: 7.86 K/UL — HIGH (ref 1.4–6.5)
NEUTROPHILS NFR BLD AUTO: 77.3 % — HIGH (ref 42.2–75.2)
NITRITE UR-MCNC: POSITIVE
NRBC # BLD: 0 /100 WBCS — SIGNIFICANT CHANGE UP (ref 0–0)
PH UR: 6.5 — SIGNIFICANT CHANGE UP (ref 5–8)
PLATELET # BLD AUTO: 156 K/UL — SIGNIFICANT CHANGE UP (ref 130–400)
PMV BLD: 10.6 FL — HIGH (ref 7.4–10.4)
POTASSIUM SERPL-MCNC: 4.2 MMOL/L — SIGNIFICANT CHANGE UP (ref 3.5–5)
POTASSIUM SERPL-SCNC: 4.2 MMOL/L — SIGNIFICANT CHANGE UP (ref 3.5–5)
PROT SERPL-MCNC: 6.6 G/DL — SIGNIFICANT CHANGE UP (ref 6–8)
PROT UR-MCNC: ABNORMAL
RBC # BLD: 4.82 M/UL — SIGNIFICANT CHANGE UP (ref 4.7–6.1)
RBC # FLD: 16.4 % — HIGH (ref 11.5–14.5)
RBC CASTS # UR COMP ASSIST: 67 /HPF — HIGH (ref 0–4)
RSV RNA NPH QL NAA+NON-PROBE: SIGNIFICANT CHANGE UP
SARS-COV-2 RNA SPEC QL NAA+PROBE: SIGNIFICANT CHANGE UP
SODIUM SERPL-SCNC: 137 MMOL/L — SIGNIFICANT CHANGE UP (ref 135–146)
SP GR SPEC: 1.01 — SIGNIFICANT CHANGE UP (ref 1.01–1.03)
UROBILINOGEN FLD QL: SIGNIFICANT CHANGE UP
WBC # BLD: 10.17 K/UL — SIGNIFICANT CHANGE UP (ref 4.8–10.8)
WBC # FLD AUTO: 10.17 K/UL — SIGNIFICANT CHANGE UP (ref 4.8–10.8)
WBC UR QL: >720 /HPF — HIGH (ref 0–5)

## 2023-04-29 PROCEDURE — C1729: CPT

## 2023-04-29 PROCEDURE — 99285 EMERGENCY DEPT VISIT HI MDM: CPT

## 2023-04-29 PROCEDURE — 36415 COLL VENOUS BLD VENIPUNCTURE: CPT

## 2023-04-29 PROCEDURE — C1887: CPT

## 2023-04-29 PROCEDURE — 80053 COMPREHEN METABOLIC PANEL: CPT

## 2023-04-29 PROCEDURE — 85730 THROMBOPLASTIN TIME PARTIAL: CPT

## 2023-04-29 PROCEDURE — 50435 EXCHANGE NEPHROSTOMY CATH: CPT | Mod: RT

## 2023-04-29 PROCEDURE — 71045 X-RAY EXAM CHEST 1 VIEW: CPT | Mod: 26

## 2023-04-29 PROCEDURE — C1769: CPT

## 2023-04-29 PROCEDURE — 74177 CT ABD & PELVIS W/CONTRAST: CPT | Mod: 26,MA

## 2023-04-29 PROCEDURE — 85610 PROTHROMBIN TIME: CPT

## 2023-04-29 PROCEDURE — 86901 BLOOD TYPING SEROLOGIC RH(D): CPT

## 2023-04-29 PROCEDURE — 85025 COMPLETE CBC W/AUTO DIFF WBC: CPT

## 2023-04-29 PROCEDURE — 86900 BLOOD TYPING SEROLOGIC ABO: CPT

## 2023-04-29 PROCEDURE — 82962 GLUCOSE BLOOD TEST: CPT

## 2023-04-29 PROCEDURE — 86850 RBC ANTIBODY SCREEN: CPT

## 2023-04-29 PROCEDURE — 99223 1ST HOSP IP/OBS HIGH 75: CPT

## 2023-04-29 PROCEDURE — 83735 ASSAY OF MAGNESIUM: CPT

## 2023-04-29 PROCEDURE — C1894: CPT

## 2023-04-29 PROCEDURE — 81001 URINALYSIS AUTO W/SCOPE: CPT

## 2023-04-29 RX ORDER — SODIUM CHLORIDE 9 MG/ML
1000 INJECTION INTRAMUSCULAR; INTRAVENOUS; SUBCUTANEOUS ONCE
Refills: 0 | Status: COMPLETED | OUTPATIENT
Start: 2023-04-29 | End: 2023-04-29

## 2023-04-29 RX ORDER — CEFTRIAXONE 500 MG/1
1000 INJECTION, POWDER, FOR SOLUTION INTRAMUSCULAR; INTRAVENOUS ONCE
Refills: 0 | Status: COMPLETED | OUTPATIENT
Start: 2023-04-29 | End: 2023-04-29

## 2023-04-29 RX ORDER — ACETAMINOPHEN 500 MG
650 TABLET ORAL ONCE
Refills: 0 | Status: COMPLETED | OUTPATIENT
Start: 2023-04-29 | End: 2023-04-29

## 2023-04-29 RX ADMIN — CEFTRIAXONE 100 MILLIGRAM(S): 500 INJECTION, POWDER, FOR SOLUTION INTRAMUSCULAR; INTRAVENOUS at 22:40

## 2023-04-29 RX ADMIN — SODIUM CHLORIDE 1000 MILLILITER(S): 9 INJECTION INTRAMUSCULAR; INTRAVENOUS; SUBCUTANEOUS at 20:17

## 2023-04-29 NOTE — H&P ADULT - NSHPREVIEWOFSYSTEMS_GEN_ALL_CORE
REVIEW OF SYSTEMS:    CONSTITUTIONAL: No weakness. + fevers  EYES/ENT: No visual changes;  No vertigo or throat pain   NECK: No pain or stiffness  RESPIRATORY: + cough. No wheezing, hemoptysis; No shortness of breath  CARDIOVASCULAR: No chest pain or palpitations  GASTROINTESTINAL: No abdominal or epigastric pain. No nausea, vomiting, or hematemesis; No diarrhea or constipation. No melena or hematochezia.  GENITOURINARY: No dysuria, frequency or hematuria  NEUROLOGICAL: No numbness or weakness  SKIN: No itching, rashes

## 2023-04-29 NOTE — H&P ADULT - NSHPPHYSICALEXAM_GEN_ALL_CORE
T(C): 36.9 (04-29-23 @ 19:24), Max: 38.3 (04-29-23 @ 17:31)  HR: 74 (04-29-23 @ 19:24) (74 - 80)  BP: 130/56 (04-29-23 @ 19:24) (130/56 - 134/61)  RR: 18 (04-29-23 @ 19:24) (18 - 18)  SpO2: 98% (04-29-23 @ 19:24) (98% - 98%)    PHYSICAL EXAM:  GENERAL: patient appears well, no acute distress, appropriate, pleasant  EYES: sclera clear, no exudates  ENMT: oropharynx clear without erythema, no exudates, moist mucous membranes  NECK: supple, soft, no thyromegaly noted  LUNGS: good air entry bilaterally, clear to auscultation, symmetric breath sounds, no wheezing or rhonchi appreciated  HEART: soft S1/S2, regular rate and rhythm, 1/6 systolic murmur. bilateral 1+ pitting edema (chronic as per pt)  GASTROINTESTINAL: abdomen is soft, nontender, nondistended, normoactive bowel sounds, no palpable masses  INTEGUMENT: good skin turgor, no lesions noted  MUSCULOSKELETAL: no clubbing or cyanosis, no obvious deformity  NEUROLOGIC: awake, alert, oriented x3, good muscle tone in 4 extremities, no obvious sensory deficits  PSYCHIATRIC: mood is good, affect is congruent, linear and logical thought process  HEME/LYMPH: no palpable supraclavicular nodules, no obvious ecchymosis or petechiae

## 2023-04-29 NOTE — ED PROVIDER NOTE - NSICDXPASTMEDICALHX_GEN_ALL_CORE_FT
PAST MEDICAL HISTORY:  Bladder cancer secondary to exposure at [employer] 9/11 s/p cystectomy with urostomy    Chronic kidney disease (CKD) stage 3A, baseline creatinine 1.4    Diabetes mellitus, type 2     DVT, lower extremity     History of atrial fibrillation     Hyperlipidemia     Hypertension

## 2023-04-29 NOTE — ED ADULT TRIAGE NOTE - CHIEF COMPLAINT QUOTE
patient presents o ED c/o fever. Patient reports urostomy to right kidney that has stopped draining urine for the past 3 days. Patient reports taking 2 Tylenol at home prior to arrival

## 2023-04-29 NOTE — ED PROVIDER NOTE - CARE PLAN
1 Principal Discharge DX:	Acute UTI  Secondary Diagnosis:	Fever  Secondary Diagnosis:	Nephrostomy status

## 2023-04-29 NOTE — H&P ADULT - NSHPLABSRESULTS_GEN_ALL_CORE
13.5   10.17 )-----------( 156      ( 2023 19:50 )             41.9           137  |  100  |  27<H>  ----------------------------<  106<H>  4.2   |  24  |  1.3    Ca    9.1      2023 19:50    TPro  6.6  /  Alb  3.9  /  TBili  1.0  /  DBili  x   /  AST  13  /  ALT  12  /  AlkPhos  69                Urinalysis Basic - ( 2023 20:54 )    Color: Yellow / Appearance: Turbid / S.013 / pH: x  Gluc: x / Ketone: Negative  / Bili: Negative / Urobili: <2 mg/dL   Blood: x / Protein: 100 mg/dL / Nitrite: Positive   Leuk Esterase: Large / RBC: 67 /HPF / WBC >720 /HPF   Sq Epi: x / Non Sq Epi: x / Bacteria: Many            Lactate Trend   @ 19:50 Lactate:1.4             CAPILLARY BLOOD GLUCOSE            Culture Results:   No Growth Final ( @ 06:34)    < from: CT Abdomen and Pelvis w/ IV Cont (23 @ 19:55) >        IMPRESSION:  Since 3/28/2023:  1.  Interval placement of a percutaneous right nephrostomy. At present   the intrarenal portion of the pigtail is within the upper pole calyx,   possibly needs to be repositioned into the renalpelvis. No   hydronephrosis. Previously seen right distal ureteral obstructing stone   just prior to the ileal conduit is no longer identified.  2.  Bilateral intrarenal calyceal and pelvic nonobstructing calculi.    --- End of Report ---      < end of copied text >

## 2023-04-29 NOTE — ED PROVIDER NOTE - CLINICAL SUMMARY MEDICAL DECISION MAKING FREE TEXT BOX
Laboratory results reviewed and discussed with patient. CBC shows normal WBC count, Hb/Hct and platelet count are WNL. BMP shows electrolytes WNL and elevated BUN/Cr.    Chest X-rays reviewed by me, and shows no actue findings. No Pneumothorax, no free air, no effusions, and these findings discussed with patient.  Patient remained hemodynamically stable during the course of ED stay. Discussed with patient about the results of the diagnostic studies. Discussed with admitting physician and MAR, patient is admitted to Medicine for further evaluation and care.

## 2023-04-29 NOTE — ED ADULT NURSE NOTE - INTERVENTIONS DEFINITIONS
[place] to call system/Call bell, personal items and telephone within reach/Instruct patient to call for assistance/Room bathroom lighting operational/Non-slip footwear when patient is off stretcher/Physically safe environment: no spills, clutter or unnecessary equipment/Stretcher in lowest position, wheels locked, appropriate side rails in place/Monitor gait and stability/Monitor for mental status changes and reorient to person, place, and time/Review medications for side effects contributing to fall risk/Reinforce activity limits and safety measures with patient and family

## 2023-04-29 NOTE — ED PROVIDER NOTE - OBJECTIVE STATEMENT
82 year old M with hx of htm, dm, dld, a fib on eliquis, bladder ca s/p cystectomy and urostomy recent admission 03/28-04/01 for septic stone s/p rt nephrostomy tube placement  presenting to er with fever x 1 day tmax 102F. Pt sts since yesterday has had no output through nephrostomy tube. Notes mild cough and nasal congestion. No assoc chest pain, sob, nausea, vomiting, diarrhea, decreased output through urostomy, hematuria, sick contacts, flank pain, skin changes.

## 2023-04-29 NOTE — H&P ADULT - ATTENDING COMMENTS
Patient seen and examined at bedside independently of the residents. I read the resident's note and agree with the plan with the additions and corrections as noted below. My note supersedes the resident's note.     REVIEW OF SYSTEMS:  CONSTITUTIONAL: No weakness. + fever.   EYES/ENT: No visual changes;  No vertigo or throat pain   NECK: No pain or stiffness  RESPIRATORY: No cough, wheezing, hemoptysis; No shortness of breath  CARDIOVASCULAR: No chest pain or palpitations  GASTROINTESTINAL: No abdominal or epigastric pain. No nausea, vomiting, or hematemesis; No diarrhea or constipation. No melena or hematochezia.  GENITOURINARY: No dysuria, frequency or hematuria  NEUROLOGICAL: No numbness or weakness  MSK: No pain. No weakness.   SKIN: No itching, rashes.     PMH: HTN, DM, HLD, Afib on Eliquis, bladder CA s/p cystectomy and permanent urostomy and recent admission in 2023 for septic stone r/p R nephrostomy tube placement     FHx: Reviewed. No fhx of asthma/copd, No fhx of liver and pulmonary disease. No fhx of hematological disorder.     Physical Exam:  GEN: No acute distress. Awake, Alert and oriented x 3.   Head: Atraumatic, Normocephalic.   Eye: PEERLA. No sclera icterus. EOMI.   ENT: Normal oropharynx, no thyromegaly, no mass, no lymphadenopathy.   LUNGS: Clear to auscultation bilaterally. No wheeze/rales/crackles.   HEART: Normal. S1/S2 present. RRR. No murmur/gallops.   ABD: Soft, non-tender, non-distended. Bowel sounds present. + urostomy bag. + right nephrostomy tube without erythema/swelling/tenderness around the site.   EXT: 2+ pitting edema. No erythema. No tenderness.  Integumentary: No rash, No sore, No petechia.   NEURO: CN III-XII intact. Strength: 5/5 b/l ULE. Sensory intact b/l ULE.     Vital Signs Last 24 Hrs  T(C): 36.9 (2023 19:24), Max: 38.3 (2023 17:31)  T(F): 98.4 (2023 19:24), Max: 100.9 (2023 17:31)  HR: 74 (2023 19:24) (74 - 80)  BP: 130/56 (2023 19:24) (130/56 - 134/61)  BP(mean): --  RR: 18 (2023 19:24) (18 - 18)  SpO2: 98% (2023 19:24) (98% - 98%)    Parameters below as of 2023 17:31  Patient On (Oxygen Delivery Method): room air      Please see the above notes for Labs and radiology.     Assessment and Plan:     81 yo M, with PMHx of HTN, DM, HLD, Afib on Eliquis, bladder CA s/p cystectomy and permanent urostomy and recent admission in 2023 for septic stone r/p R nephrostomy tube placement by IR and discharged on PO cipro until , presented to the ED for 1 day history of fevers, Tmax 102.     Fever likely 2/2 complicated UTI   - UA grossly positive.   - Previous shows pansensitive E.coli. Will start on ceftriaxone. f/u UCx.   - Urology consult.     Nephrostomy tube malpositioning  - CT abdomen shows Interval placement of a percutaneous right nephrostomy. At present the intrarenal portion of the pigtail is within the upper pole calyx, possibly needs to be repositioned into the renal pelvis. No hydronephrosis. Previously seen right distal ureteral obstructing stone just prior to the ileal conduit is no longer identified. Bilateral intrarenal calyceal and pelvic nonobstructive calculi.  - will get IR to assess nephrostomy tube.     Chronic A.fib   - hold eliquis for possible urological intervention. Start on Lovenox BID.   - c/w Toprol     HTN - on lasix.   DM II - monitor FS AC HS. Insulin sliding scale.     DVT ppx: Lovenox SC  GI ppx:  not indicated.   Diet: DASH/CC diet   Activity: as tolerated.     Date seen by the attendin2023  Total time spent: 75 minutes.

## 2023-04-29 NOTE — ED PROVIDER NOTE - ATTENDING APP SHARED VISIT CONTRIBUTION OF CARE
82-year-old male with past medical history of diabetes, A-fib, hyperlipidemia, hypertension, CKD, bladder CA status post resection has a urostomy, recent septic stone on the right status post nephrostomy, presents with complaint of fever, vomiting, weakness.  Family and patient says they have noticed a decrease in nephrostomy output for about a week.  Family at the bedside says she has noticed that he has putting out more on the urostomy.  Patient admits also having coughing, congestion for a few days.  Patient follows up with Dr. Banks for urology, but does not know what is the ultimate plan for the stone in the right ureter.  Patient and family were unaware of whether the nephrostomy was supposed to continue same amount of output or supposed to decrease over time.  Patient denies any abdominal pain or flank pain.  Patient denies any chest pain or shortness of breath.  IR Dr. Collier was the one to put in the nephrostomy.  Exam: nad, ncat, perrl, eomi, mmm, rrr, ctab, abd soft, nt, nd, urostomy in place, right nephrostomy in place, aox3, impression: Patient with decreased nephrostomy output, now with fever and vomiting.  Also having URI symptoms.  Will check labs, UA, chest x-ray and CT abdomen pelvis

## 2023-04-29 NOTE — H&P ADULT - ASSESSMENT
Pt is an 83 yo M, with PMHx of HTN, DM, HLD, Afib on Eliquis, bladder CA s/p cystectomy and urostomy and recent admission in March 2023 for septic stone r/p R nephrostomy tube placement by IR and discharged on PO cipro until 4/14, presented to the ED for 1 day history of fevers, Tmax 102.       #UTI   #h/o bladder CA s/p cystectomy and urostomy  #prior admission for septic stone s/p R nephrostomy tube placement and discharged on PO abx  - no sepsis POA, lactate 1.4  - U/A grossly positive  - CT abdomen/pelvis: 1.  Interval placement of a percutaneous right nephrostomy. At present the intrarenal portion of the pigtail is within the upper pole calyx, possibly needs to be repositioned into the renal pelvis. No hydronephrosis. Previously seen right distal ureteral obstructing stone just prior to the ileal conduit is no longer identified.  2.  Bilateral intrarenal calyceal and pelvic nonobstructing calculi.    - s/p ceftriaxone x 1 in the ED  - follow up urine culture, blood cultures  - tylenol PRN for fevers  - continue with   - urology consult      #Afib on Eliquis  #HTN  #HLD  #DM    #Misc:  - DVT ppx: eliquis  - Diet: DASH/CC  - Activity: IAT  - Dispo: mediicne Pt is an 83 yo M, with PMHx of HTN, DM, HLD, Afib on Eliquis, bladder CA s/p cystectomy and permanent urostomy and recent admission in March 2023 for septic stone r/p R nephrostomy tube placement by IR and discharged on PO cipro until 4/14, presented to the ED for 1 day history of fevers, Tmax 102.     #complicated UTI in setting of calculi  #h/o bladder CA s/p cystectomy and urostomy  #prior admission for septic stone s/p R nephrostomy tube placement and discharged on PO abx  - no sepsis POA, lactate 1.4  - U/A grossly positive  - CT abdomen/pelvis: 1.  Interval placement of a percutaneous right nephrostomy. At present the intrarenal portion of the pigtail is within the upper pole calyx, possibly needs to be repositioned into the renal pelvis. No hydronephrosis. Previously seen right distal ureteral obstructing stone just prior to the ileal conduit is no longer identified.  2.  Bilateral intrarenal calyceal and pelvic nonobstructing calculi.    - s/p ceftriaxone x 1 in the ED  - follow up urine culture, blood cultures  - tylenol PRN for fevers  - continue with ceftriaxone, broaden abx if pt continues to spike fevers or increasing leukocytosis  - urology consult  - IR consult to assess nephrostomy tube (currently with zero output and possibly malpositioned as seen on CT)      #Afib on Eliquis  - continue with toprol  - hold Eliquis for now for any possible interventions, start therapeutic Lovenox BID    #HTN  - BP acceptable  - continue with lasix    #HLD  - lipid panel 3/2023: cholesterol 106, LDL 5  - off statins    #DM  - on home glimepiride  - monitor FS  - start basal/bolus if FS persistently>180    #Misc:  - DVT ppx: lovenox  - Diet: DASH/CC  - Activity: IAT  - Dispo: medicine

## 2023-04-29 NOTE — ED PROVIDER NOTE - PHYSICAL EXAMINATION
CONSTITUTIONAL: Well-appearing; well-nourished; in no apparent distress.   EYES: PERRL; EOM intact.   NECK: Supple; non-tender; no cervical lymphadenopathy.  CARDIOVASCULAR: Normal S1, S2; no murmurs, rubs, or gallops.   RESPIRATORY: Normal chest excursion with respiration; breath sounds clear and equal bilaterally; no wheezes, rhonchi, or rales.  GI/: Normal bowel sounds; non-distended; non-tender; no palpable organomegaly. + urostomy with clear urine  MS: No evidence of trauma or deformity. Normal ROM in all four extremities; non-tender to palpation; distal pulses are normal.   SKIN: Normal for age and race; warm; dry; good turgor; no apparent lesions or exudate. nephrostomy tube site clean dry and intact  NEURO/PSYCH: A & O x 4; grossly unremarkable. mood and manner are appropriate. Grooming and personal hygiene are appropriate.

## 2023-04-29 NOTE — H&P ADULT - HISTORY OF PRESENT ILLNESS
Pt is an 81 yo M, with PMHx of HTN, DM, HLD, Afib on Eliquis, bladder CA s/p cystectomy and urostomy and recent admission in March 2023 for septic stone r/p R nephrostomy tube placement by IR and discharged on PO cipro until 4/14, presented to the ED for 1 day history of fevers, Tmax 102.     In the ED:  · BP Systolic	134 mm Hg  · BP Diastolic	61 mm Hg  · Heart Rate	80 /min  · Respiration Rate (breaths/min)	18 /min  · Temp (F)	 100.9 Degrees F  · Temp (C)	 38.3 Degrees C  · Temp site	oral  · SpO2 (%)	98 %  · O2 Delivery/Oxygen Delivery Method	room air    Labs notable for: wbc 10k, Cr 1.3, lactate 1.4  U/A grossly positive  RVP negative  CT abdomen/pelvis: 1.  Interval placement of a percutaneous right nephrostomy. At present the intrarenal portion of the pigtail is within the upper pole calyx, possibly needs to be repositioned into the renal pelvis. No hydronephrosis. Previously seen right distal ureteral obstructing stone just prior to the ileal conduit is no longer identified.  2.  Bilateral intrarenal calyceal and pelvic nonobstructing calculi.   Pt is an 83 yo M, with PMHx of HTN, DM, HLD, Afib on Eliquis, bladder CA s/p cystectomy and permanent urostomy and recent admission in March 2023 for septic stone r/p R nephrostomy tube placement by IR and discharged on PO cipro until 4/14, presented to the ED for 1 day history of fevers, Tmax 102. Pt followed up with his urologist recently and deferred further interventions as per IR. He was suppose to have a follow up appointment with Dr. Collier next week. However, the pt then spiked a fever today and son noticed that nephrostomy bag had no output. His son called the urologist and was told to come into the ED immediately. He reports recently having congestion and dry cough and rhinorrhea. Otherwise, denies any chest pain, palpitations, nausea, vomiting, SOB, abdominal pain, flank pain, numbness, tingling or weakness.     In the ED:  · BP Systolic	134 mm Hg  · BP Diastolic	61 mm Hg  · Heart Rate	80 /min  · Respiration Rate (breaths/min)	18 /min  · Temp (F)	 100.9 Degrees F  · Temp (C)	 38.3 Degrees C  · Temp site	oral  · SpO2 (%)	98 %  · O2 Delivery/Oxygen Delivery Method	room air    Labs notable for: wbc 10k, Cr 1.3, lactate 1.4  U/A grossly positive  RVP negative  CT abdomen/pelvis: 1.  Interval placement of a percutaneous right nephrostomy. At present the intrarenal portion of the pigtail is within the upper pole calyx, possibly needs to be repositioned into the renal pelvis. No hydronephrosis. Previously seen right distal ureteral obstructing stone just prior to the ileal conduit is no longer identified.  2.  Bilateral intrarenal calyceal and pelvic nonobstructing calculi.

## 2023-04-29 NOTE — H&P ADULT - NSICDXPASTMEDICALHX_GEN_ALL_CORE_FT
PAST MEDICAL HISTORY:  Bladder cancer secondary to exposure at [employer] 9/11 s/p cystectomy with urostomy    Chronic kidney disease (CKD) stage 3A, baseline creatinine 1.4    Diabetes mellitus, type 2     DVT, lower extremity     History of atrial fibrillation     Hyperlipidemia     Hypertension

## 2023-04-29 NOTE — ED PROVIDER NOTE - DIFFERENTIAL DIAGNOSIS
Differential Diagnosis Pancreatitis, hepatic failure, obstructive uropathy, diverticulitis, colitis, abscess, bowel obstruction, bowel perforation. Electrolyte abnormalities, LORRAINE, and GI bleeding.

## 2023-04-30 ENCOUNTER — TRANSCRIPTION ENCOUNTER (OUTPATIENT)
Age: 83
End: 2023-04-30

## 2023-04-30 DIAGNOSIS — N39.0 URINARY TRACT INFECTION, SITE NOT SPECIFIED: ICD-10-CM

## 2023-04-30 LAB
ALBUMIN SERPL ELPH-MCNC: 3.8 G/DL — SIGNIFICANT CHANGE UP (ref 3.5–5.2)
ALP SERPL-CCNC: 62 U/L — SIGNIFICANT CHANGE UP (ref 30–115)
ALT FLD-CCNC: 11 U/L — SIGNIFICANT CHANGE UP (ref 0–41)
ANION GAP SERPL CALC-SCNC: 14 MMOL/L — SIGNIFICANT CHANGE UP (ref 7–14)
APPEARANCE UR: ABNORMAL
AST SERPL-CCNC: 14 U/L — SIGNIFICANT CHANGE UP (ref 0–41)
BACTERIA # UR AUTO: ABNORMAL
BASOPHILS # BLD AUTO: 0.05 K/UL — SIGNIFICANT CHANGE UP (ref 0–0.2)
BASOPHILS NFR BLD AUTO: 0.5 % — SIGNIFICANT CHANGE UP (ref 0–1)
BILIRUB SERPL-MCNC: 1.7 MG/DL — HIGH (ref 0.2–1.2)
BILIRUB UR-MCNC: NEGATIVE — SIGNIFICANT CHANGE UP
BUN SERPL-MCNC: 20 MG/DL — SIGNIFICANT CHANGE UP (ref 10–20)
CALCIUM SERPL-MCNC: 8.7 MG/DL — SIGNIFICANT CHANGE UP (ref 8.4–10.5)
CHLORIDE SERPL-SCNC: 102 MMOL/L — SIGNIFICANT CHANGE UP (ref 98–110)
CO2 SERPL-SCNC: 22 MMOL/L — SIGNIFICANT CHANGE UP (ref 17–32)
COLOR SPEC: SIGNIFICANT CHANGE UP
CREAT SERPL-MCNC: 1.3 MG/DL — SIGNIFICANT CHANGE UP (ref 0.7–1.5)
DIFF PNL FLD: ABNORMAL
EGFR: 56 ML/MIN/1.73M2 — LOW
EOSINOPHIL # BLD AUTO: 0.28 K/UL — SIGNIFICANT CHANGE UP (ref 0–0.7)
EOSINOPHIL NFR BLD AUTO: 2.9 % — SIGNIFICANT CHANGE UP (ref 0–8)
EPI CELLS # UR: 0 /HPF — SIGNIFICANT CHANGE UP (ref 0–5)
GLUCOSE BLDC GLUCOMTR-MCNC: 117 MG/DL — HIGH (ref 70–99)
GLUCOSE BLDC GLUCOMTR-MCNC: 152 MG/DL — HIGH (ref 70–99)
GLUCOSE BLDC GLUCOMTR-MCNC: 165 MG/DL — HIGH (ref 70–99)
GLUCOSE BLDC GLUCOMTR-MCNC: 201 MG/DL — HIGH (ref 70–99)
GLUCOSE SERPL-MCNC: 162 MG/DL — HIGH (ref 70–99)
GLUCOSE UR QL: NEGATIVE — SIGNIFICANT CHANGE UP
HCT VFR BLD CALC: 41.1 % — LOW (ref 42–52)
HGB BLD-MCNC: 13.5 G/DL — LOW (ref 14–18)
HYALINE CASTS # UR AUTO: 1 /LPF — SIGNIFICANT CHANGE UP (ref 0–7)
IMM GRANULOCYTES NFR BLD AUTO: 0.4 % — HIGH (ref 0.1–0.3)
KETONES UR-MCNC: NEGATIVE — SIGNIFICANT CHANGE UP
LEUKOCYTE ESTERASE UR-ACNC: ABNORMAL
LYMPHOCYTES # BLD AUTO: 0.82 K/UL — LOW (ref 1.2–3.4)
LYMPHOCYTES # BLD AUTO: 8.5 % — LOW (ref 20.5–51.1)
MAGNESIUM SERPL-MCNC: 2 MG/DL — SIGNIFICANT CHANGE UP (ref 1.8–2.4)
MCHC RBC-ENTMCNC: 28.5 PG — SIGNIFICANT CHANGE UP (ref 27–31)
MCHC RBC-ENTMCNC: 32.8 G/DL — SIGNIFICANT CHANGE UP (ref 32–37)
MCV RBC AUTO: 86.9 FL — SIGNIFICANT CHANGE UP (ref 80–94)
MONOCYTES # BLD AUTO: 0.98 K/UL — HIGH (ref 0.1–0.6)
MONOCYTES NFR BLD AUTO: 10.2 % — HIGH (ref 1.7–9.3)
NEUTROPHILS # BLD AUTO: 7.47 K/UL — HIGH (ref 1.4–6.5)
NEUTROPHILS NFR BLD AUTO: 77.5 % — HIGH (ref 42.2–75.2)
NITRITE UR-MCNC: POSITIVE
NRBC # BLD: 0 /100 WBCS — SIGNIFICANT CHANGE UP (ref 0–0)
PH UR: 6 — SIGNIFICANT CHANGE UP (ref 5–8)
PLATELET # BLD AUTO: 151 K/UL — SIGNIFICANT CHANGE UP (ref 130–400)
PMV BLD: 10.3 FL — SIGNIFICANT CHANGE UP (ref 7.4–10.4)
POTASSIUM SERPL-MCNC: 3.8 MMOL/L — SIGNIFICANT CHANGE UP (ref 3.5–5)
POTASSIUM SERPL-SCNC: 3.8 MMOL/L — SIGNIFICANT CHANGE UP (ref 3.5–5)
PROT SERPL-MCNC: 6.4 G/DL — SIGNIFICANT CHANGE UP (ref 6–8)
PROT UR-MCNC: ABNORMAL
RBC # BLD: 4.73 M/UL — SIGNIFICANT CHANGE UP (ref 4.7–6.1)
RBC # FLD: 16.6 % — HIGH (ref 11.5–14.5)
RBC CASTS # UR COMP ASSIST: 194 /HPF — HIGH (ref 0–4)
SODIUM SERPL-SCNC: 138 MMOL/L — SIGNIFICANT CHANGE UP (ref 135–146)
SP GR SPEC: 1.01 — SIGNIFICANT CHANGE UP (ref 1.01–1.03)
UROBILINOGEN FLD QL: SIGNIFICANT CHANGE UP
WBC # BLD: 9.64 K/UL — SIGNIFICANT CHANGE UP (ref 4.8–10.8)
WBC # FLD AUTO: 9.64 K/UL — SIGNIFICANT CHANGE UP (ref 4.8–10.8)
WBC UR QL: 218 /HPF — HIGH (ref 0–5)

## 2023-04-30 RX ORDER — SODIUM CHLORIDE 9 MG/ML
1000 INJECTION, SOLUTION INTRAVENOUS
Refills: 0 | Status: DISCONTINUED | OUTPATIENT
Start: 2023-04-30 | End: 2023-05-04

## 2023-04-30 RX ORDER — GLUCAGON INJECTION, SOLUTION 0.5 MG/.1ML
1 INJECTION, SOLUTION SUBCUTANEOUS ONCE
Refills: 0 | Status: DISCONTINUED | OUTPATIENT
Start: 2023-04-30 | End: 2023-05-04

## 2023-04-30 RX ORDER — SENNA PLUS 8.6 MG/1
2 TABLET ORAL AT BEDTIME
Refills: 0 | Status: DISCONTINUED | OUTPATIENT
Start: 2023-04-30 | End: 2023-05-04

## 2023-04-30 RX ORDER — DEXTROSE 50 % IN WATER 50 %
25 SYRINGE (ML) INTRAVENOUS ONCE
Refills: 0 | Status: DISCONTINUED | OUTPATIENT
Start: 2023-04-30 | End: 2023-05-04

## 2023-04-30 RX ORDER — POLYETHYLENE GLYCOL 3350 17 G/17G
17 POWDER, FOR SOLUTION ORAL DAILY
Refills: 0 | Status: DISCONTINUED | OUTPATIENT
Start: 2023-04-30 | End: 2023-05-04

## 2023-04-30 RX ORDER — DEXTROSE 50 % IN WATER 50 %
12.5 SYRINGE (ML) INTRAVENOUS ONCE
Refills: 0 | Status: DISCONTINUED | OUTPATIENT
Start: 2023-04-30 | End: 2023-05-04

## 2023-04-30 RX ORDER — CEFTRIAXONE 500 MG/1
1000 INJECTION, POWDER, FOR SOLUTION INTRAMUSCULAR; INTRAVENOUS EVERY 24 HOURS
Refills: 0 | Status: DISCONTINUED | OUTPATIENT
Start: 2023-05-01 | End: 2023-05-03

## 2023-04-30 RX ORDER — METOPROLOL TARTRATE 50 MG
50 TABLET ORAL DAILY
Refills: 0 | Status: DISCONTINUED | OUTPATIENT
Start: 2023-04-30 | End: 2023-05-04

## 2023-04-30 RX ORDER — INSULIN LISPRO 100/ML
VIAL (ML) SUBCUTANEOUS
Refills: 0 | Status: DISCONTINUED | OUTPATIENT
Start: 2023-04-30 | End: 2023-05-04

## 2023-04-30 RX ORDER — ENOXAPARIN SODIUM 100 MG/ML
130 INJECTION SUBCUTANEOUS EVERY 12 HOURS
Refills: 0 | Status: COMPLETED | OUTPATIENT
Start: 2023-04-30 | End: 2023-05-02

## 2023-04-30 RX ORDER — FUROSEMIDE 40 MG
20 TABLET ORAL DAILY
Refills: 0 | Status: DISCONTINUED | OUTPATIENT
Start: 2023-04-30 | End: 2023-05-04

## 2023-04-30 RX ORDER — DEXTROSE 50 % IN WATER 50 %
15 SYRINGE (ML) INTRAVENOUS ONCE
Refills: 0 | Status: DISCONTINUED | OUTPATIENT
Start: 2023-04-30 | End: 2023-05-04

## 2023-04-30 RX ORDER — ACETAMINOPHEN 500 MG
650 TABLET ORAL EVERY 6 HOURS
Refills: 0 | Status: DISCONTINUED | OUTPATIENT
Start: 2023-04-30 | End: 2023-05-04

## 2023-04-30 RX ORDER — LANOLIN ALCOHOL/MO/W.PET/CERES
1 CREAM (GRAM) TOPICAL AT BEDTIME
Refills: 0 | Status: DISCONTINUED | OUTPATIENT
Start: 2023-04-30 | End: 2023-05-04

## 2023-04-30 RX ADMIN — ENOXAPARIN SODIUM 130 MILLIGRAM(S): 100 INJECTION SUBCUTANEOUS at 05:22

## 2023-04-30 RX ADMIN — Medication 50 MILLIGRAM(S): at 05:21

## 2023-04-30 RX ADMIN — Medication 1 MILLIGRAM(S): at 22:00

## 2023-04-30 RX ADMIN — Medication 1: at 08:20

## 2023-04-30 RX ADMIN — Medication 20 MILLIGRAM(S): at 05:21

## 2023-04-30 RX ADMIN — Medication 2: at 11:26

## 2023-04-30 RX ADMIN — ENOXAPARIN SODIUM 130 MILLIGRAM(S): 100 INJECTION SUBCUTANEOUS at 17:07

## 2023-04-30 RX ADMIN — SENNA PLUS 2 TABLET(S): 8.6 TABLET ORAL at 21:28

## 2023-04-30 NOTE — DISCHARGE NOTE NURSING/CASE MANAGEMENT/SOCIAL WORK - PATIENT PORTAL LINK FT
You can access the FollowMyHealth Patient Portal offered by [facility] by registering at the following website: [email or web address]. By joining [employer]’s FollowMyHealth portal, you will also be able to view your health information using other applications (apps) compatible with our system.

## 2023-04-30 NOTE — PROGRESS NOTE ADULT - ASSESSMENT
Pt with recent hosp  3/23 for complicated UTI/sepsis and R ureteral stone with Hx of cystectomy and ilioconduit was given R nephrostomy tube and Cipro on D/C till 4/14 comes for fever and  T to 102 and dec urine in collecting bag.  R nephostomy tube in upper calyx and needs readjustment or to be removed.  The pt is cultured and placed on Ceftriaxone IV.    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  R nephostomy tube in upper calyx and needs readjustment or to be removed.  The pt is cultured and placed on Ceftriaxone IV.    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, mobility dysfunction  Hx of venous insuff, varicose veins of lower ext, sp ;power ext DVT  Hx of Covid PNA and RF 2022  Hx of depression    pt cultured and placed on Ceftriaxone, awaiting UCX  CXR reviewed and shows no infiltrate  pt with cough and rhinorrhea, tested neg for Covid, FLU A&B, RSV  CT of abd/pelvis reviewed:  BL renal calculi, no hydro, R nephrostomy tube in upper calyx, prior R ureteral calculi no longer  seen, + ilioconduit exiting in RLQ  Urology consult  IR consult  given the fact that there is no hydro,  prior R ureteral stone no longer seen, do we need to readjust R nephrostomy tube or can we remove it as pt has ilioconduit  maintain home meds  Rehab  Social [facility] consult

## 2023-04-30 NOTE — PROGRESS NOTE ADULT - SUBJECTIVE AND OBJECTIVE BOX
ROSA MARIA CASEY 82y Male brought from home for fever /T 102 X 24 hrs with  dec urine out put from the R  nephrostomy tube.  The pt denies CP, SOB, NV or abd pain.  The pt had recent ad for complicated UTI due to R ureteral stone.  The pt has Hx of bladder ca with cystectomy and thus had R nephrostomy tube placed by IR with po Cipro on D/C till .  The pt returns with complicated UTI and  R nephrostomy in upper calyx req adjustment to the renal pelvis.  THe pt was cultured and started on Ceftriaxone.  Consults placed for IR and Uro.   The pt aslos c/o dry cough and rhinorrhea.  The pt tested negative for Covid/FLU A&B and RSV.    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       INTERVAL HPI/OVERNIGHT EVENTS:    MEDICATIONS  (STANDING):  dextrose 5%. 1000 milliLiter(s) (50 mL/Hr) IV Continuous <Continuous>  dextrose 5%. 1000 milliLiter(s) (100 mL/Hr) IV Continuous <Continuous>  dextrose 50% Injectable 25 Gram(s) IV Push once  dextrose 50% Injectable 25 Gram(s) IV Push once  dextrose 50% Injectable 12.5 Gram(s) IV Push once  enoxaparin Injectable 130 milliGRAM(s) SubCutaneous every 12 hours  furosemide    Tablet 20 milliGRAM(s) Oral daily  glucagon  Injectable 1 milliGRAM(s) IntraMuscular once  insulin lispro (ADMELOG) corrective regimen sliding scale   SubCutaneous three times a day before meals  metoprolol succinate ER 50 milliGRAM(s) Oral daily    MEDICATIONS  (PRN):  acetaminophen     Tablet .. 650 milliGRAM(s) Oral every 6 hours PRN Temp greater or equal to 38C (100.4F), Mild Pain (1 - 3)  dextrose Oral Gel 15 Gram(s) Oral once PRN Blood Glucose LESS THAN 70 milliGRAM(s)/deciliter      Allergies    No Known Allergies    Intolerances        REVIEW OF SYSTEMS      General: gen weakness, easy fatigue, fever	  	  ENMT:	nasal and sinus congestion, secretions/clear    Respiratory and Thorax: cough, dry  	  Cardiovascular:	Hx of afib, denies CP    Gastrointestinal:	 poor appetite    Genitourinary: " I had bladder cancer" , "  they took my bladder and prostate out"    Musculoskeletal:	  back and leg pain, diff walking  	    Vital Signs Last 24 Hrs  T(C): 36.6 (2023 07:56), Max: 38.3 (2023 17:31)  T(F): 97.8 (2023 07:56), Max: 100.9 (2023 17:31)  HR: 77 (2023 07:56) (72 - 80)  BP: 121/59 (2023 07:56) (121/59 - 144/65)  BP(mean): --  RR: 18 (2023 07:56) (18 - 18)  SpO2: 97% (2023 02:40) (97% - 98%)    Parameters below as of 2023 02:40  Patient On (Oxygen Delivery Method): room air        PHYSICAL EXAM:      Constitutional: A&O, elderly weak, debilitated and ch ill looking but in NAD    Eyes: nonicteric    ENMT: dental defects, dry oral mucosa    Neck: short and thick, supple, no JVD, bruit or stridor    Back: TH kyphosis    Respiratory: shallow resp, scattered rhonchi    Cardiovascular: S1S2     Gastrointestinal: globose, soft and benign    Genitourinary: + ilioconduit, R nephrostomy tube    Extremities: moves all ext,dec motor strenght lower >upper, arthritic changes, hyperpigmentation of lower ext skin    Vascular: dec pedal pulses    Neurological: nonfocal    Skin: no rash    Lymph Nodes: not enlarged    Musculoskeletal: nl mm tone     Psychiatric: depressed but stable        LABS:                        13.5   9.64  )-----------( 151      ( 2023 08:05 )             41.1     04-30    138  |  102  |  20  ----------------------------<  162<H>  3.8   |  22  |  1.3  GFR 56  Ca    8.7        Mg     2.0  Covid neg  FLU A&B neg  RSV neg      TPro  6.4  /  Alb  3.8  /  TBili  1.7<H>  /  DBili  x   /  AST  14  /  ALT  11  /  AlkPhos  62  04-30      Urinalysis Basic - ( 2023 11:08 )    Color: Light Yellow / Appearance: Slightly Turbid / S.013 / pH: x  Gluc: x / Ketone: Negative  / Bili: Negative / Urobili: <2 mg/dL   Blood: x / Protein: 30 mg/dL / Nitrite: Positive   Leuk Esterase: Large / RBC: 194 /HPF /  /HPF   Sq Epi: x / Non Sq Epi: x / Bacteria: Many        RADIOLOGY & ADDITIONAL TESTS:  CXR:  no infiltrates  CT abd/pelvis:  lower lung atelectasis, hepatobiliary, spleen, pancreas unremarkable,  R percutaneous nephrostomy intra renal pig tail in upper clyx  ( need readjustment to renal pelvis),  L renal cyst, prior R ureteral sone no longer seen,sp cystoprostatectomy and ileoconduit exiting RLAQ, L renal cyst, no bowel obs, no LN     ROSA MARIA CASEY 82y Male brought from home for fever /T 102 X 24 hrs with  dec urine out put from the R  nephrostomy tube.  The pt denies CP, SOB, NV or abd pain.  The pt had recent ad for complicated UTI due to R ureteral stone.  The pt has Hx of bladder ca with cystectomy and thus had R nephrostomy tube placed by IR with po Cipro on D/C till .  The pt returns with complicated UTI and  R nephrostomy in upper calyx req adjustment to the renal pelvis.  THe pt was cultured and started on Ceftriaxone.  Consults placed for IR and Uro.   The pt aslos c/o dry cough and rhinorrhea.  The pt tested negative for Covid/FLU A&B and RSV.    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       INTERVAL HPI/OVERNIGHT EVENTS:    MEDICATIONS  (STANDING):  dextrose 5%. 1000 milliLiter(s) (50 mL/Hr) IV Continuous <Continuous>  dextrose 5%. 1000 milliLiter(s) (100 mL/Hr) IV Continuous <Continuous>  dextrose 50% Injectable 25 Gram(s) IV Push once  dextrose 50% Injectable 25 Gram(s) IV Push once  dextrose 50% Injectable 12.5 Gram(s) IV Push once  enoxaparin Injectable 130 milliGRAM(s) SubCutaneous every 12 hours  furosemide    Tablet 20 milliGRAM(s) Oral daily  glucagon  Injectable 1 milliGRAM(s) IntraMuscular once  insulin lispro (ADMELOG) corrective regimen sliding scale   SubCutaneous three times a day before meals  metoprolol succinate ER 50 milliGRAM(s) Oral daily    MEDICATIONS  (PRN):  acetaminophen     Tablet .. 650 milliGRAM(s) Oral every 6 hours PRN Temp greater or equal to 38C (100.4F), Mild Pain (1 - 3)  dextrose Oral Gel 15 Gram(s) Oral once PRN Blood Glucose LESS THAN 70 milliGRAM(s)/deciliter      Allergies    No Known Allergies    Intolerances        REVIEW OF SYSTEMS      General: gen weakness, easy fatigue, fever	  	  ENMT:	nasal and sinus congestion, secretions/clear    Respiratory and Thorax: cough, dry  	  Cardiovascular:	Hx of afib, denies CP    Gastrointestinal:	 poor appetite    Genitourinary: " I had bladder cancer" , "  they took my bladder and prostate out"    Musculoskeletal:	  back and leg pain, diff walking, pt ambulates few steps, cane, walker, has wheelchair  	    Vital Signs Last 24 Hrs  T(C): 36.6 (2023 07:56), Max: 38.3 (2023 17:31)  T(F): 97.8 (2023 07:56), Max: 100.9 (2023 17:31)  HR: 77 (2023 07:56) (72 - 80)  BP: 121/59 (2023 07:56) (121/59 - 144/65)  BP(mean): --  RR: 18 (2023 07:56) (18 - 18)  SpO2: 97% (2023 02:40) (97% - 98%)    Parameters below as of 2023 02:40  Patient On (Oxygen Delivery Method): room air        PHYSICAL EXAM:      Constitutional: A&O, elderly weak, debilitated and ch ill looking but in NAD    Eyes: nonicteric    ENMT: dental defects, dry oral mucosa    Neck: short and thick, supple, no JVD, bruit or stridor    Back: TH kyphosis    Respiratory: shallow resp, scattered rhonchi    Cardiovascular: S1S2     Gastrointestinal: globose, soft and benign    Genitourinary: + ilioconduit, R nephrostomy tube    Extremities: moves all ext,dec motor strenght lower >upper, arthritic changes, hyperpigmentation of lower ext skin    Vascular: dec pedal pulses    Neurological: nonfocal    Skin: no rash    Lymph Nodes: not enlarged    Musculoskeletal: nl mm tone     Psychiatric: depressed but stable        LABS:                        13.5   9.64  )-----------( 151      ( 2023 08:05 )             41.1     04-30    138  |  102  |  20  ----------------------------<  162<H>  3.8   |  22  |  1.3  GFR 56  Ca    8.7        Mg     2.0  Covid neg  FLU A&B neg  RSV neg      TPro  6.4  /  Alb  3.8  /  TBili  1.7<H>  /  DBili  x   /  AST  14  /  ALT  11  /  AlkPhos  62  04-30      Urinalysis Basic - ( 2023 11:08 )    Color: Light Yellow / Appearance: Slightly Turbid / S.013 / pH: x  Gluc: x / Ketone: Negative  / Bili: Negative / Urobili: <2 mg/dL   Blood: x / Protein: 30 mg/dL / Nitrite: Positive   Leuk Esterase: Large / RBC: 194 /HPF /  /HPF   Sq Epi: x / Non Sq Epi: x / Bacteria: Many        RADIOLOGY & ADDITIONAL TESTS:  CXR:  no infiltrates  CT abd/pelvis:  lower lung atelectasis, hepatobiliary, spleen, pancreas unremarkable,  R percutaneous nephrostomy intra renal pig tail in upper clyx  ( need readjustment to renal pelvis),  L renal cyst, prior R ureteral sone no longer seen,sp cystoprostatectomy and ileoconduit exiting RLAQ, L renal cyst, no bowel obs, no LN

## 2023-04-30 NOTE — DISCHARGE NOTE NURSING/CASE MANAGEMENT/SOCIAL WORK - NSDCPEFALRISK_GEN_ALL_CORE
For information on Fall & Injury Prevention, visit: [email or web address] OR  [email or web address] OR  https://www.cdc.gov/steadi/patient.html

## 2023-04-30 NOTE — PATIENT PROFILE ADULT - FALL HARM RISK - HARM RISK INTERVENTIONS

## 2023-04-30 NOTE — PROGRESS NOTE ADULT - SUBJECTIVE AND OBJECTIVE BOX
ROSA MARIA CASEY 82y Male  MRN#: [id number]   CODE STATUS:________    Hospital Day: 1d    Pt is currently admitted with the primary diagnosis of     SUBJECTIVE  Hospital course   Pt is an 81 yo M, with PMHx of HTN, DM, HLD, Afib on Eliquis, bladder CA s/p cystectomy and permanent urostomy and recent admission in 2023 for septic stone r/p R nephrostomy tube placement by IR and discharged on PO cipro until , presented to the ED for 1 day history of fevers, Tmax 102. Pt followed up with his urologist recently and deferred further interventions as per IR. He was suppose to have a follow up appointment with Dr. Collier next week. However, the pt then spiked a fever today and son noticed that nephrostomy bag had no output. His son called the urologist and was told to come into the ED immediately. He reports recently having congestion and dry cough and rhinorrhea. Otherwise, denies any chest pain, palpitations, nausea, vomiting, SOB, abdominal pain, flank pain, numbness, tingling or weakness.   Overnight events: none    Subjective complaints: none    Present Today:   - Chavez:  No [x  ], Yes [   ] : Indication: Nephrostomy    - Type of IV Access:       .. CVC/Piccline:  No [  x], Yes [   ] : Indication:       .. Midline: No [x  ], Yes [   ] : Indication:                                             ----------------------------------------------------------  OBJECTIVE  PAST MEDICAL & SURGICAL HISTORY  Hypertension    Hyperlipidemia    Diabetes mellitus, type 2    History of atrial fibrillation    Bladder cancer  secondary to exposure at [employer]  s/p cystectomy with urostomy    Chronic kidney disease (CKD)  stage 3A, baseline creatinine 1.4    DVT, lower extremity    History of total hip replacement, bilateral    History of total knee replacement, bilateral    History of total cystectomy  with resultant urostomy                                              -----------------------------------------------------------  ALLERGIES:  No Known Allergies                                            ------------------------------------------------------------    HOME MEDICATIONS  Home Medications:  Eliquis 5 mg oral tablet: 1 tab(s) orally 2 times a day (:)  glimepiride 2 mg oral tablet: 1.5 tab(s) orally once a day ()  Lasix 20 mg oral tablet: 1 orally once a day ()  metoprolol succinate 50 mg oral tablet, extended release: 1 tab(s) orally once a day ()  Percocet 7.5 mg-325 mg oral tablet: 1 orally every 6 hours as needed for  severe pain ()  potassium citrate 10 mEq oral tablet, extended release: 1 orally once a day ()  sodium bicarbonate 650 mg oral tablet: 1 orally 2 times a day (:)                           MEDICATIONS:  STANDING MEDICATIONS  dextrose 5%. 1000 milliLiter(s) IV Continuous <Continuous>  dextrose 5%. 1000 milliLiter(s) IV Continuous <Continuous>  dextrose 50% Injectable 25 Gram(s) IV Push once  dextrose 50% Injectable 25 Gram(s) IV Push once  dextrose 50% Injectable 12.5 Gram(s) IV Push once  enoxaparin Injectable 130 milliGRAM(s) SubCutaneous every 12 hours  furosemide    Tablet 20 milliGRAM(s) Oral daily  glucagon  Injectable 1 milliGRAM(s) IntraMuscular once  insulin lispro (ADMELOG) corrective regimen sliding scale   SubCutaneous three times a day before meals  metoprolol succinate ER 50 milliGRAM(s) Oral daily    PRN MEDICATIONS  acetaminophen     Tablet .. 650 milliGRAM(s) Oral every 6 hours PRN  dextrose Oral Gel 15 Gram(s) Oral once PRN                                            ------------------------------------------------------------    23:01  -  23 @ 07:00  --------------------------------------------------------  IN: 0 mL / OUT: 1650 mL / NET: -1650 mL    23 @ 07:01  -  23 @ 10:12  --------------------------------------------------------  IN: 0 mL / OUT: 500 mL / NET: -500 mL                                                 --------------------------------------------------------------  LABS:                        13.5   9.64  )-----------( 151      ( 2023 08:05 )             41.1         138  |  102  |  20  ----------------------------<  162<H>  3.8   |  22  |  1.3    Ca    8.7      2023 08:05  Mg     2.0         TPro  6.4  /  Alb  3.8  /  TBili  1.7<H>  /  DBili  x   /  AST  14  /  ALT  11  /  AlkPhos  62        Urinalysis Basic - ( 2023 20:54 )    Color: Yellow / Appearance: Turbid / S.013 / pH: x  Gluc: x / Ketone: Negative  / Bili: Negative / Urobili: <2 mg/dL   Blood: x / Protein: 100 mg/dL / Nitrite: Positive   Leuk Esterase: Large / RBC: 67 /HPF / WBC >720 /HPF   Sq Epi: x / Non Sq Epi: x / Bacteria: Many        Lactate, Blood: 1.4 mmol/L (23 @ 19:50)                                                        -------------------------------------------------------------  RADIOLOGY:  CTAP 1.  Interval placement of a percutaneous right nephrostomy. At present   the intrarenal portion of the pigtail is within the upper pole calyx,   possibly needs to be repositioned into the renalpelvis. No   hydronephrosis. Previously seen right distal ureteral obstructing stone   just prior to the ileal conduit is no longer identified.  2.  Bilateral intrarenal calyceal and pelvic nonobstructing calculi.                                            --------------------------------------------------------------  VITAL SIGNS: Last 24 Hours  T(C): 36.6 (2023 07:56), Max: 38.3 (2023 17:31)  T(F): 97.8 (2023 07:56), Max: 100.9 (2023 17:31)  HR: 77 (2023 07:56) (72 - 80)  BP: 121/59 (2023 07:56) (121/59 - 144/65)  BP(mean): --  RR: 18 (2023 07:56) (18 - 18)  SpO2: 97% (2023 02:40) (97% - 98%)    PHYSICAL EXAM:  General: Awake, oriented and resting comfortably, no acute distress  Head: normocephalic, atraumatic  ENT:  moist mucous membranes  Cardiac: regular rate and rhythm, normal S1 and S2, no murmurs, rubs or gallops  Respiratory: clear to auscultation bilaterally, no wheezes, crackles or rhonchi, unlabored respirations  Abdomen: normoactive bowel sounds x4, non tender, nondistended  Ext:  3+ pitting edema   Neuro: A&O x3, no focal neurological deficits                                                --------------------------------------------------------------    ASSESSMENT & PLAN    Past medical history and hospital course     Pt is an 81 yo M, with PMHx of HTN, DM, HLD, Afib on Eliquis, bladder CA s/p cystectomy and permanent urostomy and recent admission in 2023 for septic stone r/p R nephrostomy tube placement by IR and discharged on PO cipro until , presented to the ED for 1 day history of fevers, Tmax 102.     #complicated UTI in setting of calculi  #h/o bladder CA s/p cystectomy and urostomy  #prior admission for septic stone s/p R nephrostomy tube placement and discharged on PO abx  - no sepsis POA, lactate 1.4  - U/A grossly positive  - CT abdomen/pelvis: 1.  Interval placement of a percutaneous right nephrostomy. At present the intrarenal portion of the pigtail is within the upper pole calyx, possibly needs to be repositioned into the renal pelvis. No hydronephrosis. Previously seen right distal ureteral obstructing stone just prior to the ileal conduit is no longer identified.  2.  Bilateral intrarenal calyceal and pelvic nonobstructing calculi.    - s/p ceftriaxone x 1 in the ED  - follow up urine culture, blood cultures  - tylenol PRN for fevers  - continue with ceftriaxone, broaden abx if pt continues to spike fevers or increasing leukocytosis  - urology consult  - IR consult to assess nephrostomy tube (currently with zero output and possibly malpositioned as seen on CT)      #Afib on Eliquis  - continue with toprol  - hold Eliquis for now for any possible interventions, start therapeutic Lovenox BID    #HTN  - BP acceptable  - continue with lasix    #HLD  - lipid panel 3/2023: cholesterol 106, LDL 5  - off statins    #DM  - on home glimepiride  - monitor FS  - start basal/bolus if FS persistently>180    #Misc:  - DVT ppx: lovenox  - Diet: DASH/CC  - Activity: IAT  - Dispo: medicine        # Handoff

## 2023-04-30 NOTE — PATIENT PROFILE ADULT - NSPROPTRIGHTCAREGIVER_GEN_A_NUR
Pt ambulatory to ED triage on one crutch with complaints of right ankle and foot pain after falling off a ladder around 1300 yesterday. Pt reports it has swelling as well. Pt denies any other pain.     Pt has a mask changed out upon arrival and this RN is wearing mask.   yes

## 2023-05-01 LAB
ALBUMIN SERPL ELPH-MCNC: 3.5 G/DL — SIGNIFICANT CHANGE UP (ref 3.5–5.2)
ALP SERPL-CCNC: 59 U/L — SIGNIFICANT CHANGE UP (ref 30–115)
ALT FLD-CCNC: 14 U/L — SIGNIFICANT CHANGE UP (ref 0–41)
ANION GAP SERPL CALC-SCNC: 13 MMOL/L — SIGNIFICANT CHANGE UP (ref 7–14)
AST SERPL-CCNC: 15 U/L — SIGNIFICANT CHANGE UP (ref 0–41)
BASOPHILS # BLD AUTO: 0.05 K/UL — SIGNIFICANT CHANGE UP (ref 0–0.2)
BASOPHILS NFR BLD AUTO: 0.6 % — SIGNIFICANT CHANGE UP (ref 0–1)
BILIRUB SERPL-MCNC: 1.4 MG/DL — HIGH (ref 0.2–1.2)
BUN SERPL-MCNC: 21 MG/DL — HIGH (ref 10–20)
CALCIUM SERPL-MCNC: 8.4 MG/DL — SIGNIFICANT CHANGE UP (ref 8.4–10.5)
CHLORIDE SERPL-SCNC: 102 MMOL/L — SIGNIFICANT CHANGE UP (ref 98–110)
CO2 SERPL-SCNC: 25 MMOL/L — SIGNIFICANT CHANGE UP (ref 17–32)
CREAT SERPL-MCNC: 1.4 MG/DL — SIGNIFICANT CHANGE UP (ref 0.7–1.5)
EGFR: 51 ML/MIN/1.73M2 — LOW
EOSINOPHIL # BLD AUTO: 0.34 K/UL — SIGNIFICANT CHANGE UP (ref 0–0.7)
EOSINOPHIL NFR BLD AUTO: 4.3 % — SIGNIFICANT CHANGE UP (ref 0–8)
GLUCOSE BLDC GLUCOMTR-MCNC: 116 MG/DL — HIGH (ref 70–99)
GLUCOSE BLDC GLUCOMTR-MCNC: 138 MG/DL — HIGH (ref 70–99)
GLUCOSE BLDC GLUCOMTR-MCNC: 164 MG/DL — HIGH (ref 70–99)
GLUCOSE BLDC GLUCOMTR-MCNC: 204 MG/DL — HIGH (ref 70–99)
GLUCOSE SERPL-MCNC: 131 MG/DL — HIGH (ref 70–99)
HCT VFR BLD CALC: 38.8 % — LOW (ref 42–52)
HGB BLD-MCNC: 12.3 G/DL — LOW (ref 14–18)
IMM GRANULOCYTES NFR BLD AUTO: 0.4 % — HIGH (ref 0.1–0.3)
LYMPHOCYTES # BLD AUTO: 1.08 K/UL — LOW (ref 1.2–3.4)
LYMPHOCYTES # BLD AUTO: 13.6 % — LOW (ref 20.5–51.1)
MAGNESIUM SERPL-MCNC: 2 MG/DL — SIGNIFICANT CHANGE UP (ref 1.8–2.4)
MCHC RBC-ENTMCNC: 28 PG — SIGNIFICANT CHANGE UP (ref 27–31)
MCHC RBC-ENTMCNC: 31.7 G/DL — LOW (ref 32–37)
MCV RBC AUTO: 88.4 FL — SIGNIFICANT CHANGE UP (ref 80–94)
MONOCYTES # BLD AUTO: 0.9 K/UL — HIGH (ref 0.1–0.6)
MONOCYTES NFR BLD AUTO: 11.3 % — HIGH (ref 1.7–9.3)
NEUTROPHILS # BLD AUTO: 5.57 K/UL — SIGNIFICANT CHANGE UP (ref 1.4–6.5)
NEUTROPHILS NFR BLD AUTO: 69.8 % — SIGNIFICANT CHANGE UP (ref 42.2–75.2)
NRBC # BLD: 0 /100 WBCS — SIGNIFICANT CHANGE UP (ref 0–0)
PLATELET # BLD AUTO: 145 K/UL — SIGNIFICANT CHANGE UP (ref 130–400)
PMV BLD: 10.2 FL — SIGNIFICANT CHANGE UP (ref 7.4–10.4)
POTASSIUM SERPL-MCNC: 3.7 MMOL/L — SIGNIFICANT CHANGE UP (ref 3.5–5)
POTASSIUM SERPL-SCNC: 3.7 MMOL/L — SIGNIFICANT CHANGE UP (ref 3.5–5)
PROT SERPL-MCNC: 5.9 G/DL — LOW (ref 6–8)
RBC # BLD: 4.39 M/UL — LOW (ref 4.7–6.1)
RBC # FLD: 16.6 % — HIGH (ref 11.5–14.5)
SODIUM SERPL-SCNC: 140 MMOL/L — SIGNIFICANT CHANGE UP (ref 135–146)
WBC # BLD: 7.97 K/UL — SIGNIFICANT CHANGE UP (ref 4.8–10.8)
WBC # FLD AUTO: 7.97 K/UL — SIGNIFICANT CHANGE UP (ref 4.8–10.8)

## 2023-05-01 RX ADMIN — Medication 50 MILLIGRAM(S): at 06:02

## 2023-05-01 RX ADMIN — Medication 20 MILLIGRAM(S): at 06:02

## 2023-05-01 RX ADMIN — ENOXAPARIN SODIUM 130 MILLIGRAM(S): 100 INJECTION SUBCUTANEOUS at 17:38

## 2023-05-01 RX ADMIN — POLYETHYLENE GLYCOL 3350 17 GRAM(S): 17 POWDER, FOR SOLUTION ORAL at 11:39

## 2023-05-01 RX ADMIN — Medication 2: at 11:41

## 2023-05-01 RX ADMIN — CEFTRIAXONE 100 MILLIGRAM(S): 500 INJECTION, POWDER, FOR SOLUTION INTRAMUSCULAR; INTRAVENOUS at 09:54

## 2023-05-01 RX ADMIN — Medication 1 MILLIGRAM(S): at 21:42

## 2023-05-01 RX ADMIN — ENOXAPARIN SODIUM 130 MILLIGRAM(S): 100 INJECTION SUBCUTANEOUS at 05:59

## 2023-05-01 NOTE — PROGRESS NOTE ADULT - SUBJECTIVE AND OBJECTIVE BOX
ROSA MARIA CASEY 82y Male  MRN#: [id number]   Hospital Day: 2d    SUBJECTIVE  Patient is a 82y old Male who presents with a chief complaint of UTI (2023 19:45)  Currently admitted to medicine with the primary diagnosis of Acute UTI      INTERVAL HPI AND OVERNIGHT EVENTS:  Patient was examined and seen at bedside. This morning he is resting comfortably in bed and reports no issues or overnight events.    REVIEW OF SYMPTOMS:  Denies all.     OBJECTIVE  PAST MEDICAL & SURGICAL HISTORY  Hypertension    Hyperlipidemia    Diabetes mellitus, type 2    History of atrial fibrillation    Bladder cancer  secondary to exposure at [employer]  s/p cystectomy with urostomy    Chronic kidney disease (CKD)  stage 3A, baseline creatinine 1.4    DVT, lower extremity    History of total hip replacement, bilateral    History of total knee replacement, bilateral    History of total cystectomy  with resultant urostomy      ALLERGIES:  No Known Allergies    MEDICATIONS:  STANDING MEDICATIONS  cefTRIAXone   IVPB 1000 milliGRAM(s) IV Intermittent every 24 hours  dextrose 5%. 1000 milliLiter(s) IV Continuous <Continuous>  dextrose 5%. 1000 milliLiter(s) IV Continuous <Continuous>  dextrose 50% Injectable 25 Gram(s) IV Push once  dextrose 50% Injectable 12.5 Gram(s) IV Push once  dextrose 50% Injectable 25 Gram(s) IV Push once  enoxaparin Injectable 130 milliGRAM(s) SubCutaneous every 12 hours  furosemide    Tablet 20 milliGRAM(s) Oral daily  glucagon  Injectable 1 milliGRAM(s) IntraMuscular once  insulin lispro (ADMELOG) corrective regimen sliding scale   SubCutaneous three times a day before meals  melatonin 1 milliGRAM(s) Oral at bedtime  metoprolol succinate ER 50 milliGRAM(s) Oral daily  polyethylene glycol 3350 17 Gram(s) Oral daily  senna 2 Tablet(s) Oral at bedtime    PRN MEDICATIONS  acetaminophen     Tablet .. 650 milliGRAM(s) Oral every 6 hours PRN  dextrose Oral Gel 15 Gram(s) Oral once PRN      VITAL SIGNS: Last 24 Hours  T(C): 36.3 (01 May 2023 07:00), Max: 36.7 (01 May 2023 00:20)  T(F): 97.3 (01 May 2023 07:00), Max: 98 (01 May 2023 00:20)  HR: 68 (01 May 2023 07:00) (68 - 70)  BP: 115/60 (01 May 2023 07:00) (115/60 - 148/66)  BP(mean): --  RR: 18 (01 May 2023 07:00) (18 - 18)  SpO2: 97% (2023 16:14) (97% - 97%)    LABS:                        12.3   7.97  )-----------( 145      ( 01 May 2023 05:53 )             38.8     05-01    140  |  102  |  21<H>  ----------------------------<  131<H>  3.7   |  25  |  1.4    Ca    8.4      01 May 2023 05:53  Mg     2.0     05-    TPro  5.9<L>  /  Alb  3.5  /  TBili  1.4<H>  /  DBili  x   /  AST  15  /  ALT  14  /  AlkPhos  59  05-      Urinalysis Basic - ( 2023 11:08 )    Color: Light Yellow / Appearance: Slightly Turbid / S.013 / pH: x  Gluc: x / Ketone: Negative  / Bili: Negative / Urobili: <2 mg/dL   Blood: x / Protein: 30 mg/dL / Nitrite: Positive   Leuk Esterase: Large / RBC: 194 /HPF /  /HPF   Sq Epi: x / Non Sq Epi: x / Bacteria: Many      Culture - Blood (collected 2023 20:08)  Source: .Blood Blood  Preliminary Report (01 May 2023 01:03):    No growth to date.    Culture - Blood (collected 2023 20:08)  Source: .Blood Blood  Preliminary Report (01 May 2023 01:03):    No growth to date.      PHYSICAL EXAM:  CONSTITUTIONAL: No acute distress, well-developed, well-groomed, AAOx3  HEAD: Atraumatic, normocephalic  EYES: EOM intact, PERRLA, conjunctiva and sclera clear  ENT: No JVD, moist mucous membranes  PULMONARY: Clear to auscultation bilaterally  CARDIOVASCULAR: Regular rate and rhythm  GASTROINTESTINAL: Soft, non-tender, non-distended; bowel sounds present  MUSCULOSKELETAL: 2+ peripheral pulses; +1 lower extremity edema  SKIN: No rashes or lesions; warm and dry    ASSESSMENT & PLAN:  Pt is an 81 yo M, with PMHx of HTN, DM, HLD, Afib on Eliquis, bladder CA s/p cystectomy and permanent urostomy and recent admission in 2023 for septic stone r/p R nephrostomy tube placement by IR and discharged on PO cipro until , presented to the ED for 1 day history of fevers, Tmax 102.     #complicated UTI in setting of calculi  #h/o bladder CA s/p cystectomy and urostomy  #prior admission for septic stone s/p R nephrostomy tube placement and discharged on PO abx  - no sepsis POA, lactate 1.4  - U/A grossly positive  - CT abdomen/pelvis:  1.  Interval placement of a percutaneous right nephrostomy. At present the intrarenal portion of the pigtail is within the upper pole calyx, possibly needs to be repositioned into the renal pelvis. No hydronephrosis. Previously seen right distal ureteral obstructing stone just prior to the ileal conduit is no longer identified.  2.  Bilateral intrarenal calyceal and pelvic nonobstructing calculi.    - s/p ceftriaxone x 1 in the ED    Plan:   - follow up urine culture, blood cultures  - tylenol PRN for fevers  - continue with ceftriaxone, broaden abx if pt continues to spike fevers or increasing leukocytosis  - urology consult  - IR consult to assess nephrostomy tube      #Afib on Eliquis  - continue with toprol  - hold Eliquis for now for any possible interventions  - start therapeutic Lovenox BID    #HTN  - BP acceptable  - continue with lasix    #HLD  - lipid panel 3/2023: cholesterol 106, LDL 5  - off statins    #DM  - on home glimepiride  - monitor FS  - start basal/bolus if FS persistently>180      #Misc  - DVT Prophylaxis: Lovenox  - GI Prophylaxis: None  - Diet: Dash   - Activity: As tolerated   - IV Fluids: None  - Code Status: Full code     Dispo: Acute

## 2023-05-01 NOTE — PROGRESS NOTE ADULT - SUBJECTIVE AND OBJECTIVE BOX
ROSA MARIA CASEY 82y Male brought from home for fever /T 102 X 24 hrs with  dec urine out put from the R  nephrostomy tube.  The pt denies CP, SOB, NV or abd pain.  The pt had recent ad for complicated UTI due to R ureteral stone.  The pt has Hx of bladder ca with cystectomy and thus had R nephrostomy tube placed by IR with po Cipro on D/C till .  The pt returns with complicated UTI and  R nephrostomy in upper calyx req adjustment to the renal pelvis.  THe pt was cultured and started on Ceftriaxone.  Consults placed for IR and Uro.   The pt aslos c/o dry cough and rhinorrhea.  The pt tested negative for Covid/FLU A&B and RSV.    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       INTERVAL HPI/OVERNIGHT EVENTS: afebrile overnight, WBC 7.9,  on Ceftriaxone for UTI, BC&S neg x2 and urine P, Uro states no intervention, UIR for possible removal of R nephrostomy tube as prior R ureteral stone no longer seen and no hydro    MEDICATIONS  (STANDING):  dextrose 5%. 1000 milliLiter(s) (50 mL/Hr) IV Continuous <Continuous>  dextrose 5%. 1000 milliLiter(s) (100 mL/Hr) IV Continuous <Continuous>  dextrose 50% Injectable 25 Gram(s) IV Push once  dextrose 50% Injectable 25 Gram(s) IV Push once  dextrose 50% Injectable 12.5 Gram(s) IV Push once  enoxaparin Injectable 130 milliGRAM(s) SubCutaneous every 12 hours  furosemide    Tablet 20 milliGRAM(s) Oral daily  glucagon  Injectable 1 milliGRAM(s) IntraMuscular once  insulin lispro (ADMELOG) corrective regimen sliding scale   SubCutaneous three times a day before meals  metoprolol succinate ER 50 milliGRAM(s) Oral daily    MEDICATIONS  (PRN):  acetaminophen     Tablet .. 650 milliGRAM(s) Oral every 6 hours PRN Temp greater or equal to 38C (100.4F), Mild Pain (1 - 3)  dextrose Oral Gel 15 Gram(s) Oral once PRN Blood Glucose LESS THAN 70 milliGRAM(s)/deciliter      Allergies    No Known Allergies    Intolerances        REVIEW OF SYSTEMS      General: gen weakness, easy fatigue, fever	  	  ENMT:	nasal and sinus congestion, secretions/clear    Respiratory and Thorax: cough, dry  	  Cardiovascular:	Hx of afib, denies CP    Gastrointestinal:	 poor appetite    Genitourinary: " I had bladder cancer" , "  they took my bladder and prostate out"    Musculoskeletal:	  back and leg pain, diff walking, pt ambulates few steps, cane, walker, has wheelchair  	    Vital Signs Last 24 Hrs    T(F): 97.3  HR:  68  BP: 115/60  BP(mean): --  RR: 18   SpO2: 97%       PHYSICAL EXAM:      Constitutional: A&O, elderly weak, debilitated and ch ill looking but in NAD    Eyes: nonicteric    ENMT: dental defects, dry oral mucosa    Neck: short and thick, supple, no JVD, bruit or stridor    Back: TH kyphosis    Respiratory: shallow resp, scattered rhonchi    Cardiovascular: S1S2     Gastrointestinal: globose, soft and benign    Genitourinary: + ilioconduit, R nephrostomy tube    Extremities: moves all ext,dec motor strenght lower >upper, arthritic changes, hyperpigmentation of lower ext skin    Vascular: dec pedal pulses    Neurological: nonfocal    Skin: no rash    Lymph Nodes: not enlarged    Musculoskeletal: nl mm tone     Psychiatric: depressed but stable        LABS:                        12  7.9 )-----------( 145             38      140  |  102  |  21  ----------------------------<  32  3.7   |  25  |  1.4  GFR 56, 51  Ca    8.7        Mg     2.0  Covid neg  FLU A&B neg  RSV neg  BXC&S neg x 2    TPro  6.4  /  Alb  3.8  /  TBili  1.7<H>  /  DBili  x   /  AST  14  /  ALT  11  /  AlkPhos  62  04-30      Urinalysis Basic - ( 2023 11:08 )    Color: Light Yellow / Appearance: Slightly Turbid / S.013 / pH: x  Gluc: x / Ketone: Negative  / Bili: Negative / Urobili: <2 mg/dL   Blood: x / Protein: 30 mg/dL / Nitrite: Positive   Leuk Esterase: Large / RBC: 194 /HPF /  /HPF   Sq Epi: x / Non Sq Epi: x / Bacteria: Many        RADIOLOGY & ADDITIONAL TESTS:  CXR:  no infiltrates  CT abd/pelvis:  lower lung atelectasis, hepatobiliary, spleen, pancreas unremarkable,  R percutaneous nephrostomy intra renal pig tail in upper clyx  ( need readjustment to renal pelvis),  L renal cyst, prior R ureteral sone no longer seen,sp cystoprostatectomy and ileoconduit exiting RLAQ, L renal cyst, no bowel obs, no LN     ROSA MARIA CASEY 82y Male brought from home for fever /T 102 X 24 hrs with  dec urine out put from the R  nephrostomy tube.  The pt denies CP, SOB, NV or abd pain.  The pt had recent ad for complicated UTI due to R ureteral stone.  The pt has Hx of bladder ca with cystectomy and thus had R nephrostomy tube placed by IR with po Cipro on D/C till .  The pt returns with complicated UTI and  R nephrostomy in upper calyx req adjustment to the renal pelvis.  THe pt was cultured and started on Ceftriaxone.  Consults placed for IR and Uro.   The pt aslos c/o dry cough and rhinorrhea.  The pt tested negative for Covid/FLU A&B and RSV.    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       INTERVAL HPI/OVERNIGHT EVENTS: afebrile overnight, WBC 7.9,  on Ceftriaxone for UTI, BC&S neg x2 and urine P, Uro states no intervention, IR for possible removal vs adjustment of R nephrostomy tube as prior R ureteral stone no longer seen and no hydro    MEDICATIONS  (STANDING):  dextrose 5%. 1000 milliLiter(s) (50 mL/Hr) IV Continuous <Continuous>  dextrose 5%. 1000 milliLiter(s) (100 mL/Hr) IV Continuous <Continuous>  dextrose 50% Injectable 25 Gram(s) IV Push once  dextrose 50% Injectable 25 Gram(s) IV Push once  dextrose 50% Injectable 12.5 Gram(s) IV Push once  enoxaparin Injectable 130 milliGRAM(s) SubCutaneous every 12 hours  furosemide    Tablet 20 milliGRAM(s) Oral daily  glucagon  Injectable 1 milliGRAM(s) IntraMuscular once  insulin lispro (ADMELOG) corrective regimen sliding scale   SubCutaneous three times a day before meals  metoprolol succinate ER 50 milliGRAM(s) Oral daily    MEDICATIONS  (PRN):  acetaminophen     Tablet .. 650 milliGRAM(s) Oral every 6 hours PRN Temp greater or equal to 38C (100.4F), Mild Pain (1 - 3)  dextrose Oral Gel 15 Gram(s) Oral once PRN Blood Glucose LESS THAN 70 milliGRAM(s)/deciliter      Allergies    No Known Allergies    Intolerances        REVIEW OF SYSTEMS      General: gen weakness, easy fatigue, fever	  	  ENMT:	nasal and sinus congestion, secretions/clear    Respiratory and Thorax: cough, dry  	  Cardiovascular:	Hx of afib, denies CP    Gastrointestinal:	 poor appetite    Genitourinary: " I had bladder cancer" , "  they took my bladder and prostate out"    Musculoskeletal:	  back and leg pain, diff walking, pt ambulates few steps, cane, walker, has wheelchair  	    Vital Signs Last 24 Hrs    T(F): 97.3  HR:  68  BP: 115/60  BP(mean): --  RR: 18   SpO2: 97%       PHYSICAL EXAM:      Constitutional: A&O, elderly weak, debilitated and ch ill looking but in NAD    Eyes: nonicteric    ENMT: dental defects, dry oral mucosa    Neck: short and thick, supple, no JVD, bruit or stridor    Back: TH kyphosis    Respiratory: shallow resp, scattered rhonchi    Cardiovascular: S1S2     Gastrointestinal: globose, soft and benign    Genitourinary: + ilioconduit, R nephrostomy tube    Extremities: moves all ext,dec motor strenght lower >upper, arthritic changes, hyperpigmentation of lower ext skin    Vascular: dec pedal pulses    Neurological: nonfocal    Skin: no rash    Lymph Nodes: not enlarged    Musculoskeletal: nl mm tone     Psychiatric: depressed but stable        LABS:                        12  7.9 )-----------( 145             38      140  |  102  |  21  ----------------------------<  131  3.7   |  25  |  1.4  GFR 56, 51  Ca    8.7        Mg     2.0  Covid neg  FLU A&B neg  RSV neg  BXC&S neg x 2    TPro  6.4  /  Alb  3.8  /  TBili  1.7<H>  /  DBili  x   /  AST  14  /  ALT  11  /  AlkPhos  62  04-30      Urinalysis Basic - ( 2023 11:08 )    Color: Light Yellow / Appearance: Slightly Turbid / S.013 / pH: x  Gluc: x / Ketone: Negative  / Bili: Negative / Urobili: <2 mg/dL   Blood: x / Protein: 30 mg/dL / Nitrite: Positive   Leuk Esterase: Large / RBC: 194 /HPF /  /HPF   Sq Epi: x / Non Sq Epi: x / Bacteria: Many        RADIOLOGY & ADDITIONAL TESTS:  CXR:  no infiltrates  CT abd/pelvis:  lower lung atelectasis, hepatobiliary, spleen, pancreas unremarkable,  R percutaneous nephrostomy intra renal pig tail in upper clyx  ( need readjustment to renal pelvis),  L renal cyst, prior R ureteral sone no longer seen,sp cystoprostatectomy and ileoconduit exiting RLAQ, L renal cyst, no bowel obs, no LN

## 2023-05-02 LAB
-  AMPICILLIN/SULBACTAM: SIGNIFICANT CHANGE UP
-  CEFAZOLIN: SIGNIFICANT CHANGE UP
-  GENTAMICIN: SIGNIFICANT CHANGE UP
-  OXACILLIN: SIGNIFICANT CHANGE UP
-  RIFAMPIN: SIGNIFICANT CHANGE UP
-  TETRACYCLINE: SIGNIFICANT CHANGE UP
-  TRIMETHOPRIM/SULFAMETHOXAZOLE: SIGNIFICANT CHANGE UP
-  VANCOMYCIN: SIGNIFICANT CHANGE UP
ALBUMIN SERPL ELPH-MCNC: 3.7 G/DL — SIGNIFICANT CHANGE UP (ref 3.5–5.2)
ALP SERPL-CCNC: 76 U/L — SIGNIFICANT CHANGE UP (ref 30–115)
ALT FLD-CCNC: 37 U/L — SIGNIFICANT CHANGE UP (ref 0–41)
ANION GAP SERPL CALC-SCNC: 12 MMOL/L — SIGNIFICANT CHANGE UP (ref 7–14)
AST SERPL-CCNC: 30 U/L — SIGNIFICANT CHANGE UP (ref 0–41)
BILIRUB SERPL-MCNC: 0.6 MG/DL — SIGNIFICANT CHANGE UP (ref 0.2–1.2)
BUN SERPL-MCNC: 21 MG/DL — HIGH (ref 10–20)
CALCIUM SERPL-MCNC: 9.1 MG/DL — SIGNIFICANT CHANGE UP (ref 8.4–10.4)
CHLORIDE SERPL-SCNC: 103 MMOL/L — SIGNIFICANT CHANGE UP (ref 98–110)
CO2 SERPL-SCNC: 23 MMOL/L — SIGNIFICANT CHANGE UP (ref 17–32)
CREAT SERPL-MCNC: 1.2 MG/DL — SIGNIFICANT CHANGE UP (ref 0.7–1.5)
CULTURE RESULTS: SIGNIFICANT CHANGE UP
EGFR: 61 ML/MIN/1.73M2 — SIGNIFICANT CHANGE UP
GLUCOSE BLDC GLUCOMTR-MCNC: 145 MG/DL — HIGH (ref 70–99)
GLUCOSE BLDC GLUCOMTR-MCNC: 162 MG/DL — HIGH (ref 70–99)
GLUCOSE BLDC GLUCOMTR-MCNC: 167 MG/DL — HIGH (ref 70–99)
GLUCOSE BLDC GLUCOMTR-MCNC: 198 MG/DL — HIGH (ref 70–99)
GLUCOSE SERPL-MCNC: 174 MG/DL — HIGH (ref 70–99)
METHOD TYPE: SIGNIFICANT CHANGE UP
ORGANISM # SPEC MICROSCOPIC CNT: SIGNIFICANT CHANGE UP
ORGANISM # SPEC MICROSCOPIC CNT: SIGNIFICANT CHANGE UP
POTASSIUM SERPL-MCNC: 4.3 MMOL/L — SIGNIFICANT CHANGE UP (ref 3.5–5)
POTASSIUM SERPL-SCNC: 4.3 MMOL/L — SIGNIFICANT CHANGE UP (ref 3.5–5)
PROT SERPL-MCNC: 6.6 G/DL — SIGNIFICANT CHANGE UP (ref 6–8)
SODIUM SERPL-SCNC: 138 MMOL/L — SIGNIFICANT CHANGE UP (ref 135–146)
SPECIMEN SOURCE: SIGNIFICANT CHANGE UP

## 2023-05-02 RX ADMIN — Medication 1: at 16:58

## 2023-05-02 RX ADMIN — Medication 1: at 08:32

## 2023-05-02 RX ADMIN — CEFTRIAXONE 100 MILLIGRAM(S): 500 INJECTION, POWDER, FOR SOLUTION INTRAMUSCULAR; INTRAVENOUS at 10:58

## 2023-05-02 RX ADMIN — Medication 1 MILLIGRAM(S): at 22:09

## 2023-05-02 RX ADMIN — POLYETHYLENE GLYCOL 3350 17 GRAM(S): 17 POWDER, FOR SOLUTION ORAL at 12:17

## 2023-05-02 RX ADMIN — Medication 1: at 11:55

## 2023-05-02 RX ADMIN — Medication 50 MILLIGRAM(S): at 05:09

## 2023-05-02 RX ADMIN — ENOXAPARIN SODIUM 130 MILLIGRAM(S): 100 INJECTION SUBCUTANEOUS at 05:08

## 2023-05-02 RX ADMIN — Medication 20 MILLIGRAM(S): at 05:09

## 2023-05-02 RX ADMIN — ENOXAPARIN SODIUM 130 MILLIGRAM(S): 100 INJECTION SUBCUTANEOUS at 18:00

## 2023-05-02 NOTE — PROGRESS NOTE ADULT - ASSESSMENT
Pt with recent hosp  3/23 for complicated UTI/sepsis and R ureteral stone with Hx of cystectomy and ilioconduit was given R nephrostomy tube and Cipro on D/C till 4/14 comes for fever and  T to 102 and dec urine in collecting bag.  R nephostomy tube in upper calyx and needs readjustment or to be removed.  The pt is cultured and placed on Ceftriaxone IV.    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 BC&S neg  ID consult for need and choice of ABx   CXR reviewed and shows no infiltrate  pt with cough and rhinorrhea, tested neg for Covid, FLU A&B, RSV  CT of abd/pelvis reviewed:  BL renal calculi, no hydro, R nephrostomy tube in upper calyx, prior R ureteral calculi no longer  seen, + ilioconduit exiting in RLQ  Urology consult:  no Uro intervention req  IR consult:  R nephrostogram tomorrow, NPO after midnight and hold Lovenox    maintain home meds  Rehab  Social SVC consult       Pt with recent hosp  3/23 for complicated UTI/sepsis and R ureteral stone with Hx of cystectomy and ilioconduit was given R nephrostomy tube and Cipro on D/C till 4/14 comes for fever and  T to 102 and dec urine in collecting bag.  R nephostomy tube in upper calyx and needs readjustment or to be removed.  The pt is cultured and placed on Ceftriaxone IV.    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 BC&S neg  ID consult for need and choice of ABx   CXR reviewed and shows no infiltrate  pt with cough and rhinorrhea, tested neg for Covid, FLU A&B, RSV  CT of abd/pelvis reviewed:  BL renal calculi, no hydro, R nephrostomy tube in upper calyx, prior R ureteral calculi no longer  seen, + ilioconduit exiting in RLQ  Urology consult:  no Uro intervention req  IR consult:  R nephrostogram tomorrow, NPO after midnight and hold Lovenox    maintain home meds  Rehab  Social SVC consult

## 2023-05-02 NOTE — PROGRESS NOTE ADULT - SUBJECTIVE AND OBJECTIVE BOX
ROSA MARIA CASEY 82y Male brought from home for fever /T 102 X 24 hrs with  dec urine out put from the R  nephrostomy tube.  The pt denies CP, SOB, NV or abd pain.  The pt had recent ad for complicated UTI due to R ureteral stone.  The pt has Hx of bladder ca with cystectomy and thus had R nephrostomy tube placed by IR with po Cipro on D/C till .  The pt returns with complicated UTI and  R nephrostomy in upper calyx req adjustment to the renal pelvis.  THe pt was cultured and started on Ceftriaxone.  Consults placed for IR and Uro.   The pt aslos c/o dry cough and rhinorrhea.  The pt tested negative for Covid/FLU A&B and RSV.    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       INTERVAL HPI/OVERNIGHT EVENTS: afebrile, on Ceftriaxone, BC&S neg, UC&S Staph aureus, ID consult P, IR for nephrostogram and removal of R nephrostomy tube tomorrow, NPO after midnight, hold Lovenox    MEDICATIONS  (STANDING):  dextrose 5%. 1000 milliLiter(s) (50 mL/Hr) IV Continuous <Continuous>  dextrose 5%. 1000 milliLiter(s) (100 mL/Hr) IV Continuous <Continuous>  dextrose 50% Injectable 25 Gram(s) IV Push once  dextrose 50% Injectable 25 Gram(s) IV Push once  dextrose 50% Injectable 12.5 Gram(s) IV Push once  enoxaparin Injectable 130 milliGRAM(s) SubCutaneous every 12 hours  furosemide    Tablet 20 milliGRAM(s) Oral daily  glucagon  Injectable 1 milliGRAM(s) IntraMuscular once  insulin lispro (ADMELOG) corrective regimen sliding scale   SubCutaneous three times a day before meals  metoprolol succinate ER 50 milliGRAM(s) Oral daily    MEDICATIONS  (PRN):  acetaminophen     Tablet .. 650 milliGRAM(s) Oral every 6 hours PRN Temp greater or equal to 38C (100.4F), Mild Pain (1 - 3)  dextrose Oral Gel 15 Gram(s) Oral once PRN Blood Glucose LESS THAN 70 milliGRAM(s)/deciliter      Allergies    No Known Allergies      REVIEW OF SYSTEMS      General: gen weakness, easy fatigue, fever	  	  ENMT:	nasal and sinus congestion, secretions/clear    Respiratory and Thorax: cough, dry  	  Cardiovascular:	Hx of afib, denies CP    Gastrointestinal:	 poor appetite    Genitourinary: " I had bladder cancer" , "  they took my bladder and prostate out"    Musculoskeletal:	  back and leg pain, diff walking, pt ambulates few steps, cane, walker, has wheelchair  	    Vital Signs Last 24 Hrs    T(F): 97.7  HR:  61  BP: 152/68  BP(mean): --  RR: 18   SpO2: 98%       PHYSICAL EXAM:      Constitutional: A&O, elderly weak, debilitated and ch ill looking but in NAD    Eyes: nonicteric    ENMT: dental defects, dry oral mucosa    Neck: short and thick, supple, no JVD, bruit or stridor    Back: TH kyphosis    Respiratory: shallow resp, scattered rhonchi    Cardiovascular: S1S2     Gastrointestinal: globose, soft and benign    Genitourinary: + ilioconduit, R nephrostomy tube    Extremities: moves all ext,dec motor strenght lower >upper, arthritic changes, hyperpigmentation of lower ext skin    Vascular: dec pedal pulses    Neurological: nonfocal    Skin: no rash    Lymph Nodes: not enlarged    Musculoskeletal: nl mm tone     Psychiatric: depressed but stable        LABS:   /                     12  7.9 )-----------( 145             38      140  |  102  |  21  ----------------------------<  32  3.7   |  25  |  1.4  GFR 56, 51  Ca    8.7        Mg     2.0  Covid neg  FLU A&B neg  RSV neg  BXC&S neg x 2  UC&S staph aureus >100,000CFU  TPro  6.4  /  Alb  3.8  /  TBili  1.7<H>  /  DBili  x   /  AST  14  /  ALT  11  /  AlkPhos  62  04-30      Urinalysis Basic - ( 2023 11:08 )    Color: Light Yellow / Appearance: Slightly Turbid / S.013 / pH: x  Gluc: x / Ketone: Negative  / Bili: Negative / Urobili: <2 mg/dL   Blood: x / Protein: 30 mg/dL / Nitrite: Positive   Leuk Esterase: Large / RBC: 194 /HPF /  /HPF   Sq Epi: x / Non Sq Epi: x / Bacteria: Many        RADIOLOGY & ADDITIONAL TESTS:  CXR:  no infiltrates  CT abd/pelvis:  lower lung atelectasis, hepatobiliary, spleen, pancreas unremarkable,  R percutaneous nephrostomy intra renal pig tail in upper clyx  ( need readjustment to renal pelvis),  L renal cyst, prior R ureteral sone no longer seen,sp cystoprostatectomy and ileoconduit exiting RLAQ, L renal cyst, no bowel obs, no LN     ROSA MARIA CASEY 82y Male brought from home for fever /T 102 X 24 hrs with  dec urine out put from the R  nephrostomy tube.  The pt denies CP, SOB, NV or abd pain.  The pt had recent ad for complicated UTI due to R ureteral stone.  The pt has Hx of bladder ca with cystectomy and thus had R nephrostomy tube placed by IR with po Cipro on D/C till .  The pt returns with complicated UTI and  R nephrostomy in upper calyx req adjustment to the renal pelvis.  THe pt was cultured and started on Ceftriaxone.  Consults placed for IR and Uro.   The pt aslos c/o dry cough and rhinorrhea.  The pt tested negative for Covid/FLU A&B and RSV.    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       INTERVAL HPI/OVERNIGHT EVENTS: afebrile, on Ceftriaxone, BC&S neg, UC&S Staph aureus, ID consult P, IR for nephrostogram and possible removal of R nephrostomy tube tomorrow, NPO after midnight, hold Lovenox    MEDICATIONS  (STANDING):  dextrose 5%. 1000 milliLiter(s) (50 mL/Hr) IV Continuous <Continuous>  dextrose 5%. 1000 milliLiter(s) (100 mL/Hr) IV Continuous <Continuous>  dextrose 50% Injectable 25 Gram(s) IV Push once  dextrose 50% Injectable 25 Gram(s) IV Push once  dextrose 50% Injectable 12.5 Gram(s) IV Push once  enoxaparin Injectable 130 milliGRAM(s) SubCutaneous every 12 hours  furosemide    Tablet 20 milliGRAM(s) Oral daily  glucagon  Injectable 1 milliGRAM(s) IntraMuscular once  insulin lispro (ADMELOG) corrective regimen sliding scale   SubCutaneous three times a day before meals  metoprolol succinate ER 50 milliGRAM(s) Oral daily    MEDICATIONS  (PRN):  acetaminophen     Tablet .. 650 milliGRAM(s) Oral every 6 hours PRN Temp greater or equal to 38C (100.4F), Mild Pain (1 - 3)  dextrose Oral Gel 15 Gram(s) Oral once PRN Blood Glucose LESS THAN 70 milliGRAM(s)/deciliter      Allergies    No Known Allergies      REVIEW OF SYSTEMS      General: gen weakness, easy fatigue, fever	  	  ENMT:	nasal and sinus congestion, secretions/clear    Respiratory and Thorax: cough, dry  	  Cardiovascular:	Hx of afib, denies CP    Gastrointestinal:	 poor appetite    Genitourinary: " I had bladder cancer" , "  they took my bladder and prostate out"    Musculoskeletal:	  back and leg pain, diff walking, pt ambulates few steps, cane, walker, has wheelchair  	    Vital Signs Last 24 Hrs    T(F): 97.7  HR:  61  BP: 152/68  BP(mean): --  RR: 18   SpO2: 98%       PHYSICAL EXAM:      Constitutional: A&O, elderly weak, debilitated and ch ill looking but in NAD    Eyes: nonicteric    ENMT: dental defects, dry oral mucosa    Neck: short and thick, supple, no JVD, bruit or stridor    Back: TH kyphosis    Respiratory: shallow resp, scattered rhonchi    Cardiovascular: S1S2     Gastrointestinal: globose, soft and benign    Genitourinary: + ilioconduit, R nephrostomy tube    Extremities: moves all ext,dec motor strenght lower >upper, arthritic changes, hyperpigmentation of lower ext skin    Vascular: dec pedal pulses    Neurological: nonfocal    Skin: no rash    Lymph Nodes: not enlarged    Musculoskeletal: nl mm tone     Psychiatric: depressed but stable        LABS:                        12  7.9 )-----------( 145             38      140  |  102  |  21  ----------------------------<  131  3.7   |  25  |  1.4  GFR 56, 51  Ca    8.7        Mg     2.0  Covid neg  FLU A&B neg  RSV neg  BXC&S neg x 2  UC&S staph aureus >100,000CFU  TPro  6.4  /  Alb  3.8  /  TBili  1.7<H>  /  DBili  x   /  AST  14  /  ALT  11  /  AlkPhos  62  04-30      Urinalysis Basic - ( 2023 11:08 )    Color: Light Yellow / Appearance: Slightly Turbid / S.013 / pH: x  Gluc: x / Ketone: Negative  / Bili: Negative / Urobili: <2 mg/dL   Blood: x / Protein: 30 mg/dL / Nitrite: Positive   Leuk Esterase: Large / RBC: 194 /HPF /  /HPF   Sq Epi: x / Non Sq Epi: x / Bacteria: Many        RADIOLOGY & ADDITIONAL TESTS:  CXR:  no infiltrates  CT abd/pelvis:  lower lung atelectasis, hepatobiliary, spleen, pancreas unremarkable,  R percutaneous nephrostomy intra renal pig tail in upper clyx  ( need readjustment to renal pelvis),  L renal cyst, prior R ureteral sone no longer seen,sp cystoprostatectomy and ileoconduit exiting RLAQ, L renal cyst, no bowel obs, no LN

## 2023-05-02 NOTE — PROGRESS NOTE ADULT - SUBJECTIVE AND OBJECTIVE BOX
ROSA MARIA CASEY 82y Male  MRN#: [id number]   Hospital Day: 3d    SUBJECTIVE  Patient is a 82y old Male who presents with a chief complaint of UTI (01 May 2023 13:04)  Currently admitted to medicine with the primary diagnosis of Acute UTI      INTERVAL HPI AND OVERNIGHT EVENTS:  Patient was examined and seen at bedside. This morning he is resting comfortably in bed and reports no issues or overnight events.    REVIEW OF SYMPTOMS:  Denies all.     OBJECTIVE  PAST MEDICAL & SURGICAL HISTORY  Hypertension    Hyperlipidemia    Diabetes mellitus, type 2    History of atrial fibrillation    Bladder cancer  secondary to exposure at [employer]  s/p cystectomy with urostomy    Chronic kidney disease (CKD)  stage 3A, baseline creatinine 1.4    DVT, lower extremity    History of total hip replacement, bilateral    History of total knee replacement, bilateral    History of total cystectomy  with resultant urostomy      ALLERGIES:  No Known Allergies    MEDICATIONS:  STANDING MEDICATIONS  cefTRIAXone   IVPB 1000 milliGRAM(s) IV Intermittent every 24 hours  dextrose 5%. 1000 milliLiter(s) IV Continuous <Continuous>  dextrose 5%. 1000 milliLiter(s) IV Continuous <Continuous>  dextrose 50% Injectable 25 Gram(s) IV Push once  dextrose 50% Injectable 12.5 Gram(s) IV Push once  dextrose 50% Injectable 25 Gram(s) IV Push once  enoxaparin Injectable 130 milliGRAM(s) SubCutaneous every 12 hours  furosemide    Tablet 20 milliGRAM(s) Oral daily  glucagon  Injectable 1 milliGRAM(s) IntraMuscular once  insulin lispro (ADMELOG) corrective regimen sliding scale   SubCutaneous three times a day before meals  melatonin 1 milliGRAM(s) Oral at bedtime  metoprolol succinate ER 50 milliGRAM(s) Oral daily  polyethylene glycol 3350 17 Gram(s) Oral daily  senna 2 Tablet(s) Oral at bedtime    PRN MEDICATIONS  acetaminophen     Tablet .. 650 milliGRAM(s) Oral every 6 hours PRN  dextrose Oral Gel 15 Gram(s) Oral once PRN      VITAL SIGNS: Last 24 Hours  T(C): 36.5 (02 May 2023 08:10), Max: 36.7 (01 May 2023 16:05)  T(F): 97.7 (02 May 2023 08:10), Max: 98 (01 May 2023 16:05)  HR: 61 (02 May 2023 08:10) (61 - 84)  BP: 152/68 (02 May 2023 08:10) (123/60 - 152/68)  BP(mean): --  RR: 18 (02 May 2023 08:10) (18 - 18)  SpO2: 98% (02 May 2023 08:10) (95% - 98%)    LABS:                        12.3   7.97  )-----------( 145      ( 01 May 2023 05:53 )             38.8     05-01    140  |  102  |  21<H>  ----------------------------<  131<H>  3.7   |  25  |  1.4    Ca    8.4      01 May 2023 05:53  Mg     2.0     05-    TPro  5.9<L>  /  Alb  3.5  /  TBili  1.4<H>  /  DBili  x   /  AST  15  /  ALT  14  /  AlkPhos  59  05-      Urinalysis Basic - ( 2023 11:08 )    Color: Light Yellow / Appearance: Slightly Turbid / S.013 / pH: x  Gluc: x / Ketone: Negative  / Bili: Negative / Urobili: <2 mg/dL   Blood: x / Protein: 30 mg/dL / Nitrite: Positive   Leuk Esterase: Large / RBC: 194 /HPF /  /HPF   Sq Epi: x / Non Sq Epi: x / Bacteria: Many            Culture - Urine (collected 2023 20:54)  Source: Clean Catch Clean Catch (Midstream)  Preliminary Report (01 May 2023 19:04):    >100,000 CFU/ml Staphylococcus aureus    Culture - Blood (collected 2023 20:08)  Source: .Blood Blood  Preliminary Report (01 May 2023 01:03):    No growth to date.    Culture - Blood (collected 2023 20:08)  Source: .Blood Blood  Preliminary Report (01 May 2023 01:03):    No growth to date.      PHYSICAL EXAM:  CONSTITUTIONAL: No acute distress, well-developed, well-groomed, AAOx3  HEAD: Atraumatic, normocephalic  EYES: EOM intact, PERRLA, conjunctiva and sclera clear  ENT: No JVD; moist mucous membranes  PULMONARY: Clear to auscultation bilaterally  CARDIOVASCULAR: Regular rate and rhythm  GASTROINTESTINAL: Soft, non-tender, non-distended; bowel sounds present  MUSCULOSKELETAL: 2+ peripheral pulses; +1 pitting edema in the lower extremities   SKIN: No rashes or lesions; warm and dry    ASSESSMENT & PLAN:  Pt is an 81 yo M, with PMHx of HTN, DM, HLD, Afib on Eliquis, bladder CA s/p cystectomy and permanent urostomy and recent admission in 2023 for septic stone r/p R nephrostomy tube placement by IR and discharged on PO cipro until , presented to the ED for 1 day history of fevers, Tmax 102.     #complicated UTI in setting of calculi  #h/o bladder CA s/p cystectomy and urostomy  #prior admission for septic stone s/p R nephrostomy tube placement and discharged on PO abx  - no sepsis POA, lactate 1.4  - U/A grossly positive  - CT abdomen/pelvis:  1.  Interval placement of a percutaneous right nephrostomy. At present the intrarenal portion of the pigtail is within the upper pole calyx, possibly needs to be repositioned into the renal pelvis. No hydronephrosis. Previously seen right distal ureteral obstructing stone just prior to the ileal conduit is no longer identified.  2.  Bilateral intrarenal calyceal and pelvic nonobstructing calculi.  - s/p ceftriaxone x 1 in the ED    Plan:   - Urine culture growing MSSA- ID consult   - tylenol PRN for fevers  - continue with ceftriaxone, broaden abx if pt continues to spike fevers or increasing leukocytosis  - urology consult- defer to IR   - IR consult to assess nephrostomy tube- Plan for R nephrostogram wednesday 5/3      #Afib on Eliquis  - continue with toprol  - hold Eliquis for now for any possible interventions  - start therapeutic Lovenox BID    #HTN  - BP acceptable  - continue with lasix    #HLD  - lipid panel 3/2023: cholesterol 106, LDL 5  - off statins    #DM  - on home glimepiride  - monitor FS  - start basal/bolus if FS persistently>180      #Misc  - DVT Prophylaxis: Lovenox  - GI Prophylaxis: None  - Diet: Dash   - Activity: As tolerated   - IV Fluids: None  - Code Status: Full code     Dispo: Acute

## 2023-05-03 LAB
ALBUMIN SERPL ELPH-MCNC: 3.6 G/DL — SIGNIFICANT CHANGE UP (ref 3.5–5.2)
ALP SERPL-CCNC: 74 U/L — SIGNIFICANT CHANGE UP (ref 30–115)
ALT FLD-CCNC: 36 U/L — SIGNIFICANT CHANGE UP (ref 0–41)
ANION GAP SERPL CALC-SCNC: 11 MMOL/L — SIGNIFICANT CHANGE UP (ref 7–14)
APTT BLD: 42.4 SEC — HIGH (ref 27–39.2)
AST SERPL-CCNC: 27 U/L — SIGNIFICANT CHANGE UP (ref 0–41)
BASOPHILS # BLD AUTO: 0.06 K/UL — SIGNIFICANT CHANGE UP (ref 0–0.2)
BASOPHILS # BLD AUTO: 0.07 K/UL — SIGNIFICANT CHANGE UP (ref 0–0.2)
BASOPHILS NFR BLD AUTO: 1 % — SIGNIFICANT CHANGE UP (ref 0–1)
BASOPHILS NFR BLD AUTO: 1 % — SIGNIFICANT CHANGE UP (ref 0–1)
BILIRUB SERPL-MCNC: 0.6 MG/DL — SIGNIFICANT CHANGE UP (ref 0.2–1.2)
BLD GP AB SCN SERPL QL: SIGNIFICANT CHANGE UP
BUN SERPL-MCNC: 22 MG/DL — HIGH (ref 10–20)
CALCIUM SERPL-MCNC: 8.9 MG/DL — SIGNIFICANT CHANGE UP (ref 8.4–10.5)
CHLORIDE SERPL-SCNC: 103 MMOL/L — SIGNIFICANT CHANGE UP (ref 98–110)
CO2 SERPL-SCNC: 27 MMOL/L — SIGNIFICANT CHANGE UP (ref 17–32)
CREAT SERPL-MCNC: 1.3 MG/DL — SIGNIFICANT CHANGE UP (ref 0.7–1.5)
EGFR: 56 ML/MIN/1.73M2 — LOW
EOSINOPHIL # BLD AUTO: 0.38 K/UL — SIGNIFICANT CHANGE UP (ref 0–0.7)
EOSINOPHIL # BLD AUTO: 0.46 K/UL — SIGNIFICANT CHANGE UP (ref 0–0.7)
EOSINOPHIL NFR BLD AUTO: 6.2 % — SIGNIFICANT CHANGE UP (ref 0–8)
EOSINOPHIL NFR BLD AUTO: 6.6 % — SIGNIFICANT CHANGE UP (ref 0–8)
GLUCOSE BLDC GLUCOMTR-MCNC: 133 MG/DL — HIGH (ref 70–99)
GLUCOSE BLDC GLUCOMTR-MCNC: 145 MG/DL — HIGH (ref 70–99)
GLUCOSE BLDC GLUCOMTR-MCNC: 149 MG/DL — HIGH (ref 70–99)
GLUCOSE BLDC GLUCOMTR-MCNC: 149 MG/DL — HIGH (ref 70–99)
GLUCOSE SERPL-MCNC: 131 MG/DL — HIGH (ref 70–99)
HCT VFR BLD CALC: 38.4 % — LOW (ref 42–52)
HCT VFR BLD CALC: 40.2 % — LOW (ref 42–52)
HGB BLD-MCNC: 12.2 G/DL — LOW (ref 14–18)
HGB BLD-MCNC: 12.9 G/DL — LOW (ref 14–18)
IMM GRANULOCYTES NFR BLD AUTO: 0.2 % — SIGNIFICANT CHANGE UP (ref 0.1–0.3)
IMM GRANULOCYTES NFR BLD AUTO: 0.6 % — HIGH (ref 0.1–0.3)
INR BLD: 1.04 RATIO — SIGNIFICANT CHANGE UP (ref 0.65–1.3)
LYMPHOCYTES # BLD AUTO: 1.21 K/UL — SIGNIFICANT CHANGE UP (ref 1.2–3.4)
LYMPHOCYTES # BLD AUTO: 1.38 K/UL — SIGNIFICANT CHANGE UP (ref 1.2–3.4)
LYMPHOCYTES # BLD AUTO: 19.7 % — LOW (ref 20.5–51.1)
LYMPHOCYTES # BLD AUTO: 19.8 % — LOW (ref 20.5–51.1)
MAGNESIUM SERPL-MCNC: 2.1 MG/DL — SIGNIFICANT CHANGE UP (ref 1.8–2.4)
MAGNESIUM SERPL-MCNC: 2.1 MG/DL — SIGNIFICANT CHANGE UP (ref 1.8–2.4)
MCHC RBC-ENTMCNC: 28.1 PG — SIGNIFICANT CHANGE UP (ref 27–31)
MCHC RBC-ENTMCNC: 28.4 PG — SIGNIFICANT CHANGE UP (ref 27–31)
MCHC RBC-ENTMCNC: 31.8 G/DL — LOW (ref 32–37)
MCHC RBC-ENTMCNC: 32.1 G/DL — SIGNIFICANT CHANGE UP (ref 32–37)
MCV RBC AUTO: 88.5 FL — SIGNIFICANT CHANGE UP (ref 80–94)
MCV RBC AUTO: 88.5 FL — SIGNIFICANT CHANGE UP (ref 80–94)
MONOCYTES # BLD AUTO: 0.67 K/UL — HIGH (ref 0.1–0.6)
MONOCYTES # BLD AUTO: 0.68 K/UL — HIGH (ref 0.1–0.6)
MONOCYTES NFR BLD AUTO: 11.1 % — HIGH (ref 1.7–9.3)
MONOCYTES NFR BLD AUTO: 9.6 % — HIGH (ref 1.7–9.3)
NEUTROPHILS # BLD AUTO: 3.81 K/UL — SIGNIFICANT CHANGE UP (ref 1.4–6.5)
NEUTROPHILS # BLD AUTO: 4.35 K/UL — SIGNIFICANT CHANGE UP (ref 1.4–6.5)
NEUTROPHILS NFR BLD AUTO: 61.8 % — SIGNIFICANT CHANGE UP (ref 42.2–75.2)
NEUTROPHILS NFR BLD AUTO: 62.4 % — SIGNIFICANT CHANGE UP (ref 42.2–75.2)
NRBC # BLD: 0 /100 WBCS — SIGNIFICANT CHANGE UP (ref 0–0)
NRBC # BLD: 0 /100 WBCS — SIGNIFICANT CHANGE UP (ref 0–0)
PLATELET # BLD AUTO: 193 K/UL — SIGNIFICANT CHANGE UP (ref 130–400)
PLATELET # BLD AUTO: 195 K/UL — SIGNIFICANT CHANGE UP (ref 130–400)
PMV BLD: 10.3 FL — SIGNIFICANT CHANGE UP (ref 7.4–10.4)
PMV BLD: 9.9 FL — SIGNIFICANT CHANGE UP (ref 7.4–10.4)
POTASSIUM SERPL-MCNC: 4.7 MMOL/L — SIGNIFICANT CHANGE UP (ref 3.5–5)
POTASSIUM SERPL-SCNC: 4.7 MMOL/L — SIGNIFICANT CHANGE UP (ref 3.5–5)
PROT SERPL-MCNC: 6.2 G/DL — SIGNIFICANT CHANGE UP (ref 6–8)
PROTHROM AB SERPL-ACNC: 11.9 SEC — SIGNIFICANT CHANGE UP (ref 9.95–12.87)
RBC # BLD: 4.34 M/UL — LOW (ref 4.7–6.1)
RBC # BLD: 4.54 M/UL — LOW (ref 4.7–6.1)
RBC # FLD: 15.9 % — HIGH (ref 11.5–14.5)
RBC # FLD: 16 % — HIGH (ref 11.5–14.5)
SODIUM SERPL-SCNC: 141 MMOL/L — SIGNIFICANT CHANGE UP (ref 135–146)
WBC # BLD: 6.15 K/UL — SIGNIFICANT CHANGE UP (ref 4.8–10.8)
WBC # BLD: 6.97 K/UL — SIGNIFICANT CHANGE UP (ref 4.8–10.8)
WBC # FLD AUTO: 6.15 K/UL — SIGNIFICANT CHANGE UP (ref 4.8–10.8)
WBC # FLD AUTO: 6.97 K/UL — SIGNIFICANT CHANGE UP (ref 4.8–10.8)

## 2023-05-03 PROCEDURE — 50435 EXCHANGE NEPHROSTOMY CATH: CPT | Mod: RT

## 2023-05-03 RX ORDER — CEFAZOLIN SODIUM 1 G
2000 VIAL (EA) INJECTION EVERY 8 HOURS
Refills: 0 | Status: DISCONTINUED | OUTPATIENT
Start: 2023-05-03 | End: 2023-05-04

## 2023-05-03 RX ORDER — CEFAZOLIN SODIUM 1 G
VIAL (EA) INJECTION
Refills: 0 | Status: DISCONTINUED | OUTPATIENT
Start: 2023-05-03 | End: 2023-05-03

## 2023-05-03 RX ADMIN — Medication 50 MILLIGRAM(S): at 06:01

## 2023-05-03 RX ADMIN — CEFTRIAXONE 100 MILLIGRAM(S): 500 INJECTION, POWDER, FOR SOLUTION INTRAMUSCULAR; INTRAVENOUS at 10:02

## 2023-05-03 RX ADMIN — Medication 20 MILLIGRAM(S): at 06:01

## 2023-05-03 RX ADMIN — Medication 1 MILLIGRAM(S): at 21:13

## 2023-05-03 RX ADMIN — Medication 100 MILLIGRAM(S): at 21:11

## 2023-05-03 RX ADMIN — Medication 100 MILLIGRAM(S): at 15:50

## 2023-05-03 NOTE — PROGRESS NOTE ADULT - SUBJECTIVE AND OBJECTIVE BOX
UROLOGY DAILY PROGRESS NOTE    Pt is a 82y Male s/p R PCN exchange by IR on 5/3. Patient seen and examined at bedside. Patient doing well, offers no complaints at this time. Denies any fever, chills, SOB/difficulty breathing, abdominal pain, flank pain. RIGHT PCN in place draining yellow urine w/ slight debris.     MEDICATIONS  (STANDING):  ceFAZolin   IVPB 2000 milliGRAM(s) IV Intermittent every 8 hours  dextrose 5%. 1000 milliLiter(s) (50 mL/Hr) IV Continuous <Continuous>  dextrose 5%. 1000 milliLiter(s) (100 mL/Hr) IV Continuous <Continuous>  dextrose 50% Injectable 25 Gram(s) IV Push once  dextrose 50% Injectable 12.5 Gram(s) IV Push once  dextrose 50% Injectable 25 Gram(s) IV Push once  furosemide    Tablet 20 milliGRAM(s) Oral daily  glucagon  Injectable 1 milliGRAM(s) IntraMuscular once  insulin lispro (ADMELOG) corrective regimen sliding scale   SubCutaneous three times a day before meals  melatonin 1 milliGRAM(s) Oral at bedtime  metoprolol succinate ER 50 milliGRAM(s) Oral daily  polyethylene glycol 3350 17 Gram(s) Oral daily  senna 2 Tablet(s) Oral at bedtime    MEDICATIONS  (PRN):  acetaminophen     Tablet .. 650 milliGRAM(s) Oral every 6 hours PRN Temp greater or equal to 38C (100.4F), Mild Pain (1 - 3)  dextrose Oral Gel 15 Gram(s) Oral once PRN Blood Glucose LESS THAN 70 milliGRAM(s)/deciliter      REVIEW OF SYSTEMS   [x] A ten-point review of systems was otherwise negative except as noted.    Vital Signs Last 24 Hrs  T(C): 35.7 (03 May 2023 15:12), Max: 36.6 (02 May 2023 23:23)  T(F): 96.3 (03 May 2023 15:12), Max: 97.8 (02 May 2023 23:23)  HR: 61 (03 May 2023 15:12) (60 - 93)  BP: 133/63 (03 May 2023 15:12) (103/64 - 170/99)  BP(mean): 105 (03 May 2023 11:29) (105 - 105)  RR: 18 (03 May 2023 15:12) (18 - 20)  SpO2: 95% (03 May 2023 15:12) (95% - 98%)    Parameters below as of 03 May 2023 15:12  Patient On (Oxygen Delivery Method): room air        PHYSICAL EXAM:    GEN: NAD, well-developed, awake and alert.  SKIN: Good color, non diaphoretic.  HEENT: NC/AT.  RESP: No dyspnea, non-labored breathing. No use of accessory muscles.  CARDIO: +S1/S2  ABDO: Soft, NT/ND, no palpable bladder, no suprapubic tenderness/ +Ileal conduit Urostomy in RLQ draining clear yellow urine   BACK: No CVAT B/L, +R PCN in place drainining minimal yellow urine       I&O's Summary    02 May 2023 07:01  -  03 May 2023 07:00  --------------------------------------------------------  IN: 460 mL / OUT: 0 mL / NET: 460 mL    03 May 2023 07:01  -  03 May 2023 18:20  --------------------------------------------------------  IN: 0 mL / OUT: 800 mL / NET: -800 mL        LABS:                        12.2   6.15  )-----------( 193      ( 03 May 2023 05:49 )             38.4     05-03    141  |  103  |  22<H>  ----------------------------<  131<H>  4.7   |  27  |  1.3    Ca    8.9      03 May 2023 01:50  Mg     2.1     05-03    TPro  6.2  /  Alb  3.6  /  TBili  0.6  /  DBili  x   /  AST  27  /  ALT  36  /  AlkPhos  74  05-03    PT/INR - ( 03 May 2023 01:50 )   PT: 11.90 sec;   INR: 1.04 ratio         PTT - ( 03 May 2023 01:50 )  PTT:42.4 sec          RADIOLOGY & ADDITIONAL STUDIES:

## 2023-05-03 NOTE — CONSULT NOTE ADULT - EYES
PERRL/EOMI/conjunctiva clear/normal
attempted to give report to floor RN stated she will call me back

## 2023-05-03 NOTE — CONSULT NOTE ADULT - SUBJECTIVE AND OBJECTIVE BOX
INTERVENTIONAL RADIOLOGY CONSULT:     Procedure Requested: R PCN evaluation    HPI:  Pt is an 83 yo M, with PMHx of HTN, DM, HLD, Afib on Eliquis, bladder CA s/p cystectomy and permanent urostomy and recent admission in March 2023 for septic stone r/p R nephrostomy tube placement by IR and discharged on PO cipro until 4/14, presented to the ED for 1 day history of fevers, Tmax 102.      PAST MEDICAL & SURGICAL HISTORY:  Hypertension      Hyperlipidemia      Diabetes mellitus, type 2      History of atrial fibrillation      Bladder cancer  secondary to exposure at [employer] 9/11 s/p cystectomy with urostomy      Chronic kidney disease (CKD)  stage 3A, baseline creatinine 1.4      DVT, lower extremity      History of total hip replacement, bilateral      History of total knee replacement, bilateral      History of total cystectomy  with resultant urostomy      MEDICATIONS  (STANDING):  cefTRIAXone   IVPB 1000 milliGRAM(s) IV Intermittent every 24 hours  dextrose 5%. 1000 milliLiter(s) (50 mL/Hr) IV Continuous <Continuous>  dextrose 5%. 1000 milliLiter(s) (100 mL/Hr) IV Continuous <Continuous>  dextrose 50% Injectable 25 Gram(s) IV Push once  dextrose 50% Injectable 12.5 Gram(s) IV Push once  dextrose 50% Injectable 25 Gram(s) IV Push once  enoxaparin Injectable 130 milliGRAM(s) SubCutaneous every 12 hours  furosemide    Tablet 20 milliGRAM(s) Oral daily  glucagon  Injectable 1 milliGRAM(s) IntraMuscular once  insulin lispro (ADMELOG) corrective regimen sliding scale   SubCutaneous three times a day before meals  melatonin 1 milliGRAM(s) Oral at bedtime  metoprolol succinate ER 50 milliGRAM(s) Oral daily  polyethylene glycol 3350 17 Gram(s) Oral daily  senna 2 Tablet(s) Oral at bedtime    MEDICATIONS  (PRN):  acetaminophen     Tablet .. 650 milliGRAM(s) Oral every 6 hours PRN Temp greater or equal to 38C (100.4F), Mild Pain (1 - 3)  dextrose Oral Gel 15 Gram(s) Oral once PRN Blood Glucose LESS THAN 70 milliGRAM(s)/deciliter      Allergies    No Known Allergies      Physical Exam:   Vital Signs Last 24 Hrs  T(C): 36.3 (01 May 2023 07:00), Max: 36.7 (01 May 2023 00:20)  T(F): 97.3 (01 May 2023 07:00), Max: 98 (01 May 2023 00:20)  HR: 68 (01 May 2023 07:00) (68 - 70)  BP: 115/60 (01 May 2023 07:00) (115/60 - 148/66)  BP(mean): --  RR: 18 (01 May 2023 07:00) (18 - 18)  SpO2: 97% (30 Apr 2023 16:14) (97% - 97%)    Parameters below as of 30 Apr 2023 16:14  Patient On (Oxygen Delivery Method): room air      Pertinent labs:                      12.3   7.97  )-----------( 145      ( 01 May 2023 05:53 )             38.8       05-01    140  |  102  |  21<H>  ----------------------------<  131<H>  3.7   |  25  |  1.4    Ca    8.4      01 May 2023 05:53  Mg     2.0     05-01    TPro  5.9<L>  /  Alb  3.5  /  TBili  1.4<H>  /  DBili  x   /  AST  15  /  ALT  14  /  AlkPhos  59  05-01          Radiology & Additional Studies:     Radiology imaging reviewed.       ASSESSMENT/ PLAN:     Pt is an 83 yo M, with PMHx of HTN, DM, HLD, Afib on Eliquis, bladder CA s/p cystectomy and permanent urostomy and recent admission in March 2023 for septic stone r/p R nephrostomy tube placement by IR and discharged on PO cipro until 4/14, presented to the ED for 1 day history of fevers, Tmax 102. IR was consulted to evaluated R PCN.    - Plan for R nephrostogram wednesday 5/3  - Please hold anticoagulation/antiplatelet agents and make NPO at midnight prior to procedure.
Patient is a 82y old  Male who presents with a chief complaint of UTI, malalignment of R nephrostomy tube,  Hx of bladder ca,sp cystecatomy and ilioconduit, recent UTI  due to renal stone (2023 15:47)      HPI:  Pt is an 83 yo M, with PMHx of HTN, DM, HLD, Afib on Eliquis, bladder CA s/p cystectomy and permanent urostomy and recent admission in 2023 for septic stone r/p R nephrostomy tube placement by IR and discharged on PO cipro until , presented to the ED for 1 day history of fevers, Tmax 102. Pt followed up with his urologist recently and deferred further interventions as per IR. He was suppose to have a follow up appointment with Dr. Collier next week. However, the pt then spiked a fever today and son noticed that nephrostomy bag had no output. His son called the urologist and was told to come into the ED immediately. He reports recently having congestion and dry cough and rhinorrhea. Otherwise, denies any chest pain, palpitations, nausea, vomiting, SOB, abdominal pain, flank pain, numbness, tingling or weakness.     In the ED:  · BP Systolic	134 mm Hg  · BP Diastolic	61 mm Hg  · Heart Rate	80 /min  · Respiration Rate (breaths/min)	18 /min  · Temp (F)	 100.9 Degrees F  · Temp (C)	 38.3 Degrees C  · Temp site	oral  · SpO2 (%)	98 %  · O2 Delivery/Oxygen Delivery Method	room air    Labs notable for: wbc 10k, Cr 1.3, lactate 1.4  U/A grossly positive  RVP negative  CT abdomen/pelvis: 1.  Interval placement of a percutaneous right nephrostomy. At present the intrarenal portion of the pigtail is within the upper pole calyx, possibly needs to be repositioned into the renal pelvis. No hydronephrosis. Previously seen right distal ureteral obstructing stone just prior to the ileal conduit is no longer identified.  2.  Bilateral intrarenal calyceal and pelvic nonobstructing calculi.   (2023 23:07)      PAST MEDICAL & SURGICAL HISTORY:  Hypertension      Hyperlipidemia      Diabetes mellitus, type 2      History of atrial fibrillation      Bladder cancer  secondary to exposure at [employer]  s/p cystectomy with urostomy      Chronic kidney disease (CKD)  stage 3A, baseline creatinine 1.4      DVT, lower extremity      History of total hip replacement, bilateral      History of total knee replacement, bilateral      History of total cystectomy  with resultant urostomy          REVIEW OF SYSTEMS:    CONSTITUTIONAL:  fevers or chills  HEENT: No visual changes  ENDO: No sweating  NECK: No pain or stiffness  MUSCULOSKELETAL: No back pain, no joint pain  RESPIRATORY: No shortness of breath  CARDIOVASCULAR: No chest pain  GASTROINTESTINAL: No abdominal or epigastric pain. No nausea, vomiting,  No diarrhea or constipation.   NEUROLOGICAL: No mental status changes  PSYCH: No depression, no mood changes  SKIN: No itching      MEDICATIONS  (STANDING):  dextrose 5%. 1000 milliLiter(s) (50 mL/Hr) IV Continuous <Continuous>  dextrose 5%. 1000 milliLiter(s) (100 mL/Hr) IV Continuous <Continuous>  dextrose 50% Injectable 25 Gram(s) IV Push once  dextrose 50% Injectable 25 Gram(s) IV Push once  dextrose 50% Injectable 12.5 Gram(s) IV Push once  enoxaparin Injectable 130 milliGRAM(s) SubCutaneous every 12 hours  furosemide    Tablet 20 milliGRAM(s) Oral daily  glucagon  Injectable 1 milliGRAM(s) IntraMuscular once  insulin lispro (ADMELOG) corrective regimen sliding scale   SubCutaneous three times a day before meals  metoprolol succinate ER 50 milliGRAM(s) Oral daily  polyethylene glycol 3350 17 Gram(s) Oral daily    MEDICATIONS  (PRN):  acetaminophen     Tablet .. 650 milliGRAM(s) Oral every 6 hours PRN Temp greater or equal to 38C (100.4F), Mild Pain (1 - 3)  dextrose Oral Gel 15 Gram(s) Oral once PRN Blood Glucose LESS THAN 70 milliGRAM(s)/deciliter      Allergies    No Known Allergies    Intolerances        SOCIAL HISTORY: No illicit drug use    FAMILY HISTORY:      Vital Signs Last 24 Hrs  T(C): 36.1 (2023 16:14), Max: 36.8 (2023 02:40)  T(F): 96.9 (2023 16:14), Max: 98.3 (2023 02:40)  HR: 70 (2023 16:14) (70 - 77)  BP: 148/66 (2023 16:14) (121/59 - 148/66)  BP(mean): --  RR: 18 (2023 16:14) (18 - 18)  SpO2: 97% (2023 16:14) (97% - 97%)    Parameters below as of 2023 16:14  Patient On (Oxygen Delivery Method): room air        PHYSICAL EXAM:    Constitutional: NAD, well-developed  HEENT: EOMI  Neck: no pain  Back: No CVA tenderness, Rt PCN drain no drainage, no output  Respiratory: No accessory respiratory muscle use  Abd: Soft, NT/ND  no organomegally  no hernia, Urostomy draining well, no hematuria  : No scrotal edema  Extremities: no edema  Neurological: A/O x 3  Psychiatric: Normal mood, normal affect  Skin: No rashes    I&O's Summary    2023 07:01  -  2023 07:00  --------------------------------------------------------  IN: 0 mL / OUT: 1650 mL / NET: -1650 mL    2023 07:01  -  2023 19:46  --------------------------------------------------------  IN: 0 mL / OUT: 1700 mL / NET: -1700 mL        LABS:                        13.5   9.64  )-----------( 151      ( 2023 08:05 )             41.1         138  |  102  |  20  ----------------------------<  162<H>  3.8   |  22  |  1.3    Ca    8.7      2023 08:05  Mg     2.0         TPro  6.4  /  Alb  3.8  /  TBili  1.7<H>  /  DBili  x   /  AST  14  /  ALT  11  /  AlkPhos  62        Urinalysis Basic - ( 2023 11:08 )    Color: Light Yellow / Appearance: Slightly Turbid / S.013 / pH: x  Gluc: x / Ketone: Negative  / Bili: Negative / Urobili: <2 mg/dL   Blood: x / Protein: 30 mg/dL / Nitrite: Positive   Leuk Esterase: Large / RBC: 194 /HPF /  /HPF   Sq Epi: x / Non Sq Epi: x / Bacteria: Many        RADIOLOGY & ADDITIONAL STUDIES:  < from: CT Abdomen and Pelvis w/ IV Cont (23 @ 19:55) >    ACC: [id number] EXAM:  CT ABDOMEN AND PELVIS IC   ORDERED BY: PRAVEENA FAIRBANKS     PROCEDURE DATE:  2023          INTERPRETATION:  CLINICAL STATEMENT: Fever. No output from right   nephrostomy    TECHNIQUE: Contiguous axial CT images were obtained from the lower chest   to the pubic symphysis following administration of 100cc Optiray 320   intravenous contrast.  Oral contrast was not administered.  Reformatted   images in the coronal and sagittal planes were acquired.    COMPARISON CT: 3/28/2023    OTHER STUDIES USED FOR CORRELATION: Interventional procedure fluoroscopic   images from 3/29/2023 during right nephrostomy placement demonstrating   pigtail in the right renal pelvis.      FINDINGS:    LOWER CHEST: Coronary artery calcifications. Aortic valve calcifications.   Bibasilar subsegmental atelectasis.    Patient's arms are within the field of view resulting in streak artifact   which limits evaluation.    HEPATOBILIARY: Unremarkable.    SPLEEN: Unremarkable.    PANCREAS: Unremarkable.    ADRENAL GLANDS: Unremarkable.    KIDNEYS: Symmetric renal enhancement. No hydronephrosis. Bilateral   nephrolithiasis. Right percutaneous nephrostomy, intrarenal pigtail   appears to be within the upper pole calyx. Left renal cyst. Previously   seen right distal ureter stone just proximal to the ileal conduit is no   longer identified.    ABDOMINOPELVIC NODES: No definite lymphadenopathy.    PELVIC ORGANS: Pelvic organs difficult to evaluate due to streak artifact   from bilateral hip prosthesis. Patient appears to be status post   cystoprostatectomy with creation of an ileal conduit exiting the right   lower quadrant with associated postsurgical changes.    PERITONEUM/MESENTERY/BOWEL: No bowel obstruction, free fluid or free air  again seen is a large ventral hernia containing nonobstructed bowel   loops. Diverticulosis without evidence of acute diverticulitis.    BONES/SOFT TISSUES: Bones are osteopenic with degenerative changes along   the vertebral column. Bilateral total hip arthroplasties.      IMPRESSION:  Since 3/28/2023:  1.  Interval placement of a percutaneous right nephrostomy. At present   the intrarenal portion of the pigtail is within the upper pole calyx,   possibly needs to be repositioned into the renalpelvis. No   hydronephrosis. Previously seen right distal ureteral obstructing stone   just prior to the ileal conduit is no longer identified.  2.  Bilateral intrarenal calyceal and pelvic nonobstructing calculi.    --- End of Report ---            ELLI FRANKEL MD; Attending Radiologist  This document has been electronically signed. 2023  8:45PM    < end of copied text >  
  ROSA MARIA CASEY  82y, Male  Allergy: No Known Allergies      All historical available data reviewed.    HPI:  Pt is an 81 yo M, with PMHx of HTN, DM, HLD, Afib on Eliquis, bladder CA s/p cystectomy and permanent urostomy and recent admission in March 2023 for septic stone r/p R nephrostomy tube placement by IR and discharged on PO cipro until 4/14, presented to the ED for 1 day history of fevers, Tmax 102. Pt followed up with his urologist recently and deferred further interventions as per IR. He was suppose to have a follow up appointment with Dr. Collier next week. However, the pt then spiked a fever today and son noticed that nephrostomy bag had no output. His son called the urologist and was told to come into the ED immediately. He reports recently having congestion and dry cough and rhinorrhea. Otherwise, denies any chest pain, palpitations, nausea, vomiting, SOB, abdominal pain, flank pain, numbness, tingling or weakness.       CT abdomen/pelvis: 1.  Interval placement of a percutaneous right nephrostomy. At present the intrarenal portion of the pigtail is within the upper pole calyx, possibly needs to be repositioned into the renal pelvis. No hydronephrosis. Previously seen right distal ureteral obstructing stone just prior to the ileal conduit is no longer identified.  2.  Bilateral intrarenal calyceal and pelvic nonobstructing calculi.   (29 Apr 2023 23:07)    FAMILY HISTORY:    PAST MEDICAL & SURGICAL HISTORY:  Hypertension      Hyperlipidemia      Diabetes mellitus, type 2      History of atrial fibrillation      Bladder cancer  secondary to exposure at [employer] 9/11 s/p cystectomy with urostomy      Chronic kidney disease (CKD)  stage 3A, baseline creatinine 1.4      DVT, lower extremity      History of total hip replacement, bilateral      History of total knee replacement, bilateral      History of total cystectomy  with resultant urostomy            VITALS:  T(F): 97.5, Max: 97.8 (05-02-23 @ 23:23)  HR: 60  BP: 103/64  RR: 20Vital Signs Last 24 Hrs  T(C): 36.4 (03 May 2023 13:28), Max: 36.6 (02 May 2023 23:23)  T(F): 97.5 (03 May 2023 13:28), Max: 97.8 (02 May 2023 23:23)  HR: 60 (03 May 2023 14:30) (60 - 93)  BP: 103/64 (03 May 2023 14:30) (102/59 - 170/99)  BP(mean): 105 (03 May 2023 11:29) (105 - 105)  RR: 20 (03 May 2023 14:30) (18 - 20)  SpO2: 96% (03 May 2023 14:30) (95% - 98%)    Parameters below as of 03 May 2023 14:30  Patient On (Oxygen Delivery Method): room air        TESTS & MEASUREMENTS:                        12.2   6.15  )-----------( 193      ( 03 May 2023 05:49 )             38.4     05-03    141  |  103  |  22<H>  ----------------------------<  131<H>  4.7   |  27  |  1.3    Ca    8.9      03 May 2023 01:50  Mg     2.1     05-03    TPro  6.2  /  Alb  3.6  /  TBili  0.6  /  DBili  x   /  AST  27  /  ALT  36  /  AlkPhos  74  05-03    LIVER FUNCTIONS - ( 03 May 2023 01:50 )  Alb: 3.6 g/dL / Pro: 6.2 g/dL / ALK PHOS: 74 U/L / ALT: 36 U/L / AST: 27 U/L / GGT: x             Culture - Urine (collected 04-29-23 @ 20:54)  Source: Clean Catch Clean Catch (Midstream)  Final Report (05-02-23 @ 22:04):    >100,000 CFU/ml Staphylococcus aureus  Organism: Staphylococcus aureus (05-02-23 @ 22:04)  Organism: Staphylococcus aureus (05-02-23 @ 22:04)      Method Type: HALI      -  Ampicillin/Sulbactam: S <=8/4      -  Cefazolin: S <=4      -  Gentamicin: S <=1 Should not be used as monotherapy      -  Oxacillin: S <=0.25 Oxacillin predicts susceptibility for dicloxacillin, methicillin, and nafcillin      -  Rifampin: S <=1 Should not be used as monotherapy      -  Tetracycline: S <=1      -  Trimethoprim/Sulfamethoxazole: S <=0.5/9.5      -  Vancomycin: S 2    Culture - Blood (collected 04-29-23 @ 20:08)  Source: .Blood Blood  Preliminary Report (05-01-23 @ 01:03):    No growth to date.    Culture - Blood (collected 04-29-23 @ 20:08)  Source: .Blood Blood  Preliminary Report (05-01-23 @ 01:03):    No growth to date.            RADIOLOGY & ADDITIONAL TESTS:  Personal review of radiological diagnostics performed  Echo and EKG results noted when applicable.     MEDICATIONS:  acetaminophen     Tablet .. 650 milliGRAM(s) Oral every 6 hours PRN  cefTRIAXone   IVPB 1000 milliGRAM(s) IV Intermittent every 24 hours  dextrose 5%. 1000 milliLiter(s) IV Continuous <Continuous>  dextrose 5%. 1000 milliLiter(s) IV Continuous <Continuous>  dextrose 50% Injectable 25 Gram(s) IV Push once  dextrose 50% Injectable 12.5 Gram(s) IV Push once  dextrose 50% Injectable 25 Gram(s) IV Push once  dextrose Oral Gel 15 Gram(s) Oral once PRN  furosemide    Tablet 20 milliGRAM(s) Oral daily  glucagon  Injectable 1 milliGRAM(s) IntraMuscular once  insulin lispro (ADMELOG) corrective regimen sliding scale   SubCutaneous three times a day before meals  melatonin 1 milliGRAM(s) Oral at bedtime  metoprolol succinate ER 50 milliGRAM(s) Oral daily  polyethylene glycol 3350 17 Gram(s) Oral daily  senna 2 Tablet(s) Oral at bedtime      ANTIBIOTICS:  cefTRIAXone   IVPB 1000 milliGRAM(s) IV Intermittent every 24 hours

## 2023-05-03 NOTE — PROGRESS NOTE ADULT - SUBJECTIVE AND OBJECTIVE BOX
Interventional Radiology Brief- Operative Note    Procedure: exchange of right PCN    Pre-Op Diagnosis: malpositioned right PCN, suspected passage of prior ureteral stone, plan for future PCNL    Post-Op Diagnosis: same    Attending: Amira    Resident: Marychuy    Anesthesia (type):  [ ] General Anesthesia  [x ] Sedation provided by anesthesia  [ ] Spinal Anesthesia  [x ] Local/Regional    Contrast: 10 cc    Estimated Blood Loss: 5 cc    Condition:   [ ] Critical  [ ] Serious  [ ] Fair   [ x] Good    Findings/Follow up Plan of Care: Image guided exchange of 8 Fr right PCN. Follow up with urology for possible PCNL.    Specimens Removed: None    Implants: None    Complications: None immediate    Condition/Disposition: ok to return to floor      Please call Interventional Radiology [phone] with any questions, concerns, or issues.

## 2023-05-03 NOTE — CONSULT NOTE ADULT - ASSESSMENT
83 yo M, with PMHx of HTN, DM, HLD, Afib on Eliquis, bladder CA s/p cystectomy and permanent urostomy and recent admission in March 2023 for septic stone r/p R nephrostomy tube placement by IR and discharged on PO cipro until 4/14, presented to the ED for 1 day history of fevers, Tmax 102. Pt followed up with his urologist recently and deferred further interventions as per IR. He was suppose to have a follow up appointment with Dr. Collier next week. However, the pt then spiked a fever today and son noticed that nephrostomy bag had no output.    CT abdomen/pelvis: 1.  Interval placement of a percutaneous right nephrostomy. At present the intrarenal portion of the pigtail is within the upper pole calyx, possibly needs to be repositioned into the renal pelvis. No hydronephrosis. Previously seen right distal ureteral obstructing stone just prior to the ileal conduit is no longer identified.  2.  Bilateral intrarenal calyceal and pelvic nonobstructing calculi.    IMPRESSION/RECOMMENDATIONS  Sepsis on admission secondary to acute pyelonephritis with JEMAL  5/3 IVR R PCN  4/29 BCx NG  4/29 UCx JEMAL    -ancef 2 gm iv q8h  - for timeline of PCNL

## 2023-05-03 NOTE — PROGRESS NOTE ADULT - SUBJECTIVE AND OBJECTIVE BOX
ROSA MARIA CASEY 82y Male  MRN#: [id number]   Hospital Day: 4d    SUBJECTIVE  Patient is a 82y old Male who presents with a chief complaint of UTI (02 May 2023 11:26)  Currently admitted to medicine with the primary diagnosis of Acute UTI      INTERVAL HPI AND OVERNIGHT EVENTS:  Patient was examined and seen at bedside. This morning he is resting comfortably in bed and reports no issues or overnight events.    REVIEW OF SYMPTOMS:  Denies all .    OBJECTIVE  PAST MEDICAL & SURGICAL HISTORY  Hypertension    Hyperlipidemia    Diabetes mellitus, type 2    History of atrial fibrillation    Bladder cancer  secondary to exposure at [email or web address] 9/11 s/p cystectomy with urostomy    Chronic kidney disease (CKD)  stage 3A, baseline creatinine 1.4    DVT, lower extremity    History of total hip replacement, bilateral    History of total knee replacement, bilateral    History of total cystectomy  with resultant urostomy      ALLERGIES:  No Known Allergies    MEDICATIONS:  STANDING MEDICATIONS  cefTRIAXone   IVPB 1000 milliGRAM(s) IV Intermittent every 24 hours  dextrose 5%. 1000 milliLiter(s) IV Continuous <Continuous>  dextrose 5%. 1000 milliLiter(s) IV Continuous <Continuous>  dextrose 50% Injectable 25 Gram(s) IV Push once  dextrose 50% Injectable 12.5 Gram(s) IV Push once  dextrose 50% Injectable 25 Gram(s) IV Push once  furosemide    Tablet 20 milliGRAM(s) Oral daily  glucagon  Injectable 1 milliGRAM(s) IntraMuscular once  insulin lispro (ADMELOG) corrective regimen sliding scale   SubCutaneous three times a day before meals  melatonin 1 milliGRAM(s) Oral at bedtime  metoprolol succinate ER 50 milliGRAM(s) Oral daily  polyethylene glycol 3350 17 Gram(s) Oral daily  senna 2 Tablet(s) Oral at bedtime    PRN MEDICATIONS  acetaminophen     Tablet .. 650 milliGRAM(s) Oral every 6 hours PRN  dextrose Oral Gel 15 Gram(s) Oral once PRN      VITAL SIGNS: Last 24 Hours  T(C): 36.4 (03 May 2023 08:06), Max: 36.6 (02 May 2023 23:23)  T(F): 97.5 (03 May 2023 08:06), Max: 97.8 (02 May 2023 23:23)  HR: 69 (03 May 2023 08:06) (62 - 74)  BP: 159/81 (03 May 2023 08:06) (102/59 - 159/81)  BP(mean): 105 (03 May 2023 07:03) (105 - 105)  RR: 18 (03 May 2023 08:06) (18 - 18)  SpO2: 97% (03 May 2023 08:06) (97% - 98%)    LABS:                        12.2   6.15  )-----------( 193      ( 03 May 2023 05:49 )             38.4     05-03    141  |  103  |  22<H>  ----------------------------<  131<H>  4.7   |  27  |  1.3    Ca    8.9      03 May 2023 01:50  Mg     2.1     05-03    TPro  6.2  /  Alb  3.6  /  TBili  0.6  /  DBili  x   /  AST  27  /  ALT  36  /  AlkPhos  74  05-03    PT/INR - ( 03 May 2023 01:50 )   PT: 11.90 sec;   INR: 1.04 ratio         PTT - ( 03 May 2023 01:50 )  PTT:42.4 sec      PHYSICAL EXAM:  CONSTITUTIONAL: No acute distress, well-developed, well-groomed, AAOx3  HEAD: Atraumatic, normocephalic  EYES: EOM intact, PERRLA, conjunctiva and sclera clear  ENT: No JVD; moist mucous membranes  PULMONARY: Clear to auscultation bilaterally  CARDIOVASCULAR: Regular rate and rhythm  GASTROINTESTINAL: Soft, non-tender, non-distended; bowel sounds present  MUSCULOSKELETAL: 2+ peripheral pulses; +1 pitting edema in the lower extremities   SKIN: No rashes or lesions; warm and dry    ASSESSMENT & PLAN:  Pt is an 81 yo M, with PMHx of HTN, DM, HLD, Afib on Eliquis, bladder CA s/p cystectomy and permanent urostomy and recent admission in March 2023 for septic stone r/p R nephrostomy tube placement by IR and discharged on PO cipro until 4/14, presented to the ED for 1 day history of fevers, Tmax 102.     #complicated UTI in setting of calculi  #h/o bladder CA s/p cystectomy and urostomy  #prior admission for septic stone s/p R nephrostomy tube placement and discharged on PO abx  - no sepsis POA, lactate 1.4  - U/A grossly positive  - CT abdomen/pelvis:  1.  Interval placement of a percutaneous right nephrostomy. At present the intrarenal portion of the pigtail is within the upper pole calyx, possibly needs to be repositioned into the renal pelvis. No hydronephrosis. Previously seen right distal ureteral obstructing stone just prior to the ileal conduit is no longer identified.  2.  Bilateral intrarenal calyceal and pelvic nonobstructing calculi.  - s/p ceftriaxone x 1 in the ED    Plan:   - Urine culture growing MSSA- ID consult   - tylenol PRN for fevers  - continue with ceftriaxone, broaden abx if pt continues to spike fevers or increasing leukocytosis  - urology consult- defer to IR   - IR consult to assess nephrostomy tube- Plan for R nephrostogram wednesday 5/3      #Afib on Eliquis  - continue with toprol  - hold Eliquis for now for any possible interventions  - start therapeutic Lovenox BID    #HTN  - BP acceptable  - continue with lasix    #HLD  - lipid panel 3/2023: cholesterol 106, LDL 5  - off statins    #DM  - on home glimepiride  - monitor FS  - start basal/bolus if FS persistently>180      #Misc  - DVT Prophylaxis: Lovenox  - GI Prophylaxis: None  - Diet: Dash   - Activity: As tolerated   - IV Fluids: None  - Code Status: Full code     Dispo: Acute

## 2023-05-03 NOTE — PROGRESS NOTE ADULT - ASSESSMENT
Pt with recent hosp  3/23 for complicated UTI/sepsis and R ureteral stone with Hx of cystectomy and ilioconduit was given R nephrostomy tube and Cipro on D/C till 4/14 comes for fever and  T to 102 and dec urine in collecting bag.  R nephostomy tube in upper calyx and needs readjustment or to be removed.  The pt is cultured and placed on Ceftriaxone IV.    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, mobility dysfunction  Hx of venous insuff, varicose veins of lower ext, sp ;power ext DVT  Hx of Covid PNA and RF 2022  Hx of depression    pt afebrile,  pt cultured and placed on Ceftriaxone, UCX grew Staph aureus, JEMAL,  BC&S neg  ID : +JEMAL change Ceftriaxone to Ancef 2gms q24, as per ID + Sepsis on ad  CXR reviewed and shows no infiltrate  pt with cough and rhinorrhea, tested neg for Covid, FLU A&B, RSV  CT of abd/pelvis reviewed:  BL renal calculi, no hydro, R nephrostomy tube in upper calyx, prior R ureteral calculi no longer  seen, + ilioconduit exiting in RLQ  Urology consult:  no Uro intervention req  IR, exchange of  R PCN, suspected passage of prior ureteral stone, future PCNL    maintain home meds  Rehab  Social [facility] consult

## 2023-05-03 NOTE — PROGRESS NOTE ADULT - ASSESSMENT
Pt is a 82y Male s/p R PCN exchange by IR on 5/3- found w/ residual stone in Right ureter.      Plan:   - Continue PCN care   - I&Os per primary team   - F/u ID recommendation of PO antibiotics   - No acute  intervention at this time  - F/u OP with patient private urologist or Dr. Harris for definitive stone management (i.e PCNL) . Discussed plan with patient, aware and verbally agreeable. All question asked and answered.   - Will d/w attending

## 2023-05-03 NOTE — PROGRESS NOTE ADULT - SUBJECTIVE AND OBJECTIVE BOX
ROSA MARIA CASEY 82y Male brought from home for fever /T 102 X 24 hrs with  dec urine out put from the R  nephrostomy tube.  The pt denies CP, SOB, NV or abd pain.  The pt had recent ad for complicated UTI due to R ureteral stone.  The pt has Hx of bladder ca with cystectomy and thus had R nephrostomy tube placed by IR with po Cipro on D/C till .  The pt returns with complicated UTI and  R nephrostomy in upper calyx req adjustment to the renal pelvis.  THe pt was cultured and started on Ceftriaxone.  Consults placed for IR and Uro.   The pt aslos c/o dry cough and rhinorrhea.  The pt tested negative for Covid/FLU A&B and RSV.    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       INTERVAL HPI/OVERNIGHT EVENTS: afebrile, UC&S JMEAL, ID changed ABx to Ancef 2gms q24, uro no intervention, IR changed the R PCN, pt tolerated the procedure well    MEDICATIONS  (STANDING):  dextrose 5%. 1000 milliLiter(s) (50 mL/Hr) IV Continuous <Continuous>  dextrose 5%. 1000 milliLiter(s) (100 mL/Hr) IV Continuous <Continuous>  dextrose 50% Injectable 25 Gram(s) IV Push once  dextrose 50% Injectable 25 Gram(s) IV Push once  dextrose 50% Injectable 12.5 Gram(s) IV Push once  enoxaparin Injectable 130 milliGRAM(s) SubCutaneous every 12 hours  furosemide    Tablet 20 milliGRAM(s) Oral daily  glucagon  Injectable 1 milliGRAM(s) IntraMuscular once  insulin lispro (ADMELOG) corrective regimen sliding scale   SubCutaneous three times a day before meals  metoprolol succinate ER 50 milliGRAM(s) Oral daily    MEDICATIONS  (PRN):  acetaminophen     Tablet .. 650 milliGRAM(s) Oral every 6 hours PRN Temp greater or equal to 38C (100.4F), Mild Pain (1 - 3)  dextrose Oral Gel 15 Gram(s) Oral once PRN Blood Glucose LESS THAN 70 milliGRAM(s)/deciliter      Allergies    No Known Allergies      REVIEW OF SYSTEMS      General: gen weakness, easy fatigue, fever	  	  ENMT:	nasal and sinus congestion, secretions/clear    Respiratory and Thorax: cough, dry  	  Cardiovascular:	Hx of afib, denies CP    Gastrointestinal:	 poor appetite    Genitourinary: " I had bladder cancer" , "  they took my bladder and prostate out"    Musculoskeletal:	  back and leg pain, diff walking, pt ambulates few steps, cane, walker, has wheelchair  	    Vital Signs Last 24 Hrs    T(F): 96.3  HR:  61  BP: 133/63  BP(mean): --  RR: 18   SpO2: 95%       PHYSICAL EXAM:      Constitutional: A&O, elderly weak, debilitated and ch ill looking but in NAD    Eyes: nonicteric    ENMT: dental defects, dry oral mucosa    Neck: short and thick, supple, no JVD, bruit or stridor    Back: TH kyphosis    Respiratory: shallow resp, scattered rhonchi    Cardiovascular: S1S2     Gastrointestinal: globose, soft and benign    Genitourinary: + ilioconduit, R nephrostomy tube    Extremities: moves all ext, dec motor strength lower >upper, arthritic changes, hyperpigmentation of lower ext skin    Vascular: dec pedal pulses    Neurological: nonfocal    Skin: no rash    Lymph Nodes: not enlarged    Musculoskeletal: nl mm tone     Psychiatric: depressed but stable        LABS:                      12  6 )-----------( 193             38      141  |  103  |  22  ----------------------------<  131  3.7   |  25  |  1.4  GFR 56, 51, 56  Ca    8.7        Mg     2.0  Covid neg  FLU A&B neg  RSV neg  BXC&S neg x 2  UC&S staph aureus >100,000CFU, JEMAL  TPro  6.4  /  Alb  3.8  /  TBili  1.7<H>  /  DBili  x   /  AST  14  /  ALT  11  /  AlkPhos  62  04-30      Urinalysis Basic - ( 2023 11:08 )    Color: Light Yellow / Appearance: Slightly Turbid / S.013 / pH: x  Gluc: x / Ketone: Negative  / Bili: Negative / Urobili: <2 mg/dL   Blood: x / Protein: 30 mg/dL / Nitrite: Positive   Leuk Esterase: Large / RBC: 194 /HPF /  /HPF   Sq Epi: x / Non Sq Epi: x / Bacteria: Many        RADIOLOGY & ADDITIONAL TESTS:  CXR:  no infiltrates  CT abd/pelvis:  lower lung atelectasis, hepatobiliary, spleen, pancreas unremarkable,  R percutaneous nephrostomy intra renal pig tail in upper clyx  ( need readjustment to renal pelvis),  L renal cyst, prior R ureteral sone no longer seen,sp cystoprostatectomy and ileoconduit exiting RLAQ, L renal cyst, no bowel obs, no LN    5/3 IR exhange of R PCN

## 2023-05-03 NOTE — CHART NOTE - NSCHARTNOTEFT_GEN_A_CORE
JOHNATHON    Consider keeping right nephrostomy tube for possible PCNL for remaining stones  will speak with attending
PACU ANESTHESIA ADMISSION NOTE      Procedure: Right Nephrogram, nephrostomy tube exchange  Post op diagnosis:  Kidney stones s/p nephrostomy    ____  Intubated  TV:______       Rate: ______      FiO2: ______    _x___  Patent Airway    __x__  Full return of protective reflexes    _x__  Full recovery from anesthesia / back to baseline     Vitals:   T: 98          R:  16                BP:   175/82               Sat: 98%                   P: 65      Mental Status:  __x__ Awake   _____ Alert   _____ Drowsy   _____ Sedated    Nausea/Vomiting:  __x__ NO  ______Yes,   See Post - Op Orders          Pain Scale (0-10):  _____    Treatment: ____ None    _x___ See Post - Op/PCA Orders    Post - Operative Fluids:   ____ Oral   __x__ See Post - Op Orders    Plan: Discharge:   ____Home       ___x__Floor     _____Critical Care    _____  Other:_________________    Comments:

## 2023-05-04 ENCOUNTER — TRANSCRIPTION ENCOUNTER (OUTPATIENT)
Age: 83
End: 2023-05-04

## 2023-05-04 VITALS
OXYGEN SATURATION: 97 % | HEART RATE: 61 BPM | DIASTOLIC BLOOD PRESSURE: 69 MMHG | RESPIRATION RATE: 18 BRPM | SYSTOLIC BLOOD PRESSURE: 149 MMHG | TEMPERATURE: 97 F

## 2023-05-04 LAB — GLUCOSE BLDC GLUCOMTR-MCNC: 152 MG/DL — HIGH (ref 70–99)

## 2023-05-04 RX ORDER — ACETAMINOPHEN 500 MG
2 TABLET ORAL
Qty: 0 | Refills: 0 | DISCHARGE
Start: 2023-05-04

## 2023-05-04 RX ORDER — CEPHALEXIN 500 MG
1 CAPSULE ORAL
Qty: 48 | Refills: 0
Start: 2023-05-04 | End: 2023-05-15

## 2023-05-04 RX ORDER — APIXABAN 2.5 MG/1
5 TABLET, FILM COATED ORAL
Refills: 0 | Status: DISCONTINUED | OUTPATIENT
Start: 2023-05-04 | End: 2023-05-04

## 2023-05-04 RX ORDER — CEPHALEXIN 500 MG
500 CAPSULE ORAL
Refills: 0 | Status: DISCONTINUED | OUTPATIENT
Start: 2023-05-04 | End: 2023-05-04

## 2023-05-04 RX ADMIN — Medication 50 MILLIGRAM(S): at 05:52

## 2023-05-04 RX ADMIN — Medication 1: at 07:47

## 2023-05-04 RX ADMIN — Medication 500 MILLIGRAM(S): at 11:16

## 2023-05-04 RX ADMIN — Medication 20 MILLIGRAM(S): at 05:53

## 2023-05-04 RX ADMIN — APIXABAN 5 MILLIGRAM(S): 2.5 TABLET, FILM COATED ORAL at 08:32

## 2023-05-04 RX ADMIN — Medication 100 MILLIGRAM(S): at 05:52

## 2023-05-04 NOTE — PROGRESS NOTE ADULT - ASSESSMENT
· Assessment	  81 yo M, with PMHx of HTN, DM, HLD, Afib on Eliquis, bladder CA s/p cystectomy and permanent urostomy and recent admission in March 2023 for septic stone r/p R nephrostomy tube placement by IR and discharged on PO cipro until 4/14, presented to the ED for 1 day history of fevers, Tmax 102. Pt followed up with his urologist recently and deferred further interventions as per IR. He was suppose to have a follow up appointment with Dr. Collier next week. However, the pt then spiked a fever today and son noticed that nephrostomy bag had no output.    CT abdomen/pelvis: 1.  Interval placement of a percutaneous right nephrostomy. At present the intrarenal portion of the pigtail is within the upper pole calyx, possibly needs to be repositioned into the renal pelvis. No hydronephrosis. Previously seen right distal ureteral obstructing stone just prior to the ileal conduit is no longer identified.  2.  Bilateral intrarenal calyceal and pelvic nonobstructing calculi.    IMPRESSION/RECOMMENDATIONS  Resolved sepsis on admission secondary to acute pyelonephritis with JEMAL  5/3 IVR R PCN  PCNL planned   4/29 BCx NG  4/29 UCx JEMAL    -ancef 2 gm iv q8h> change today to po Keflex 500 mg q6h for 12 more days  -If PCNL delayed beyond that period would recommend starting Keflex 48h prior to PCNL procedure and iv Ancef on day of the procedure    Please do not hesitate to recall ID if any questions arise either through [employer] or through [employer]

## 2023-05-04 NOTE — DISCHARGE NOTE PROVIDER - NSDCMRMEDTOKEN_GEN_ALL_CORE_FT
acetaminophen 325 mg oral tablet: 2 tab(s) orally every 6 hours As needed Temp greater or equal to 38C (100.4F), Mild Pain (1 - 3)  cephalexin 500 mg oral capsule: 1 cap(s) orally 4 times a day  Eliquis 5 mg oral tablet: 1 tab(s) orally 2 times a day  glimepiride 2 mg oral tablet: 1.5 tab(s) orally once a day  Lasix 20 mg oral tablet: 1 orally once a day  metoprolol succinate 50 mg oral tablet, extended release: 1 tab(s) orally once a day  Percocet 7.5 mg-325 mg oral tablet: 1 orally every 6 hours as needed for  severe pain  potassium citrate 10 mEq oral tablet, extended release: 1 orally once a day  sodium bicarbonate 650 mg oral tablet: 1 orally 2 times a day

## 2023-05-04 NOTE — DISCHARGE NOTE PROVIDER - CARE PROVIDER_API CALL
Geni Molina)  Urology  [address] [place] [address] [address] [place] [id number]  Phone: [phone]  Fax: [phone]  Follow Up Time: 1 week

## 2023-05-04 NOTE — PROGRESS NOTE ADULT - PROVIDER SPECIALTY LIST ADULT
Internal Medicine
Urology
Internal Medicine
Intervent Radiology
Urology
Infectious Disease

## 2023-05-04 NOTE — PROGRESS NOTE ADULT - SUBJECTIVE AND OBJECTIVE BOX
UROLOGY DAILY PROGRESS NOTE    Pt is a 83 y/o Male admitted with dislodged nephrostomy tube. S/p replacement by IR yesterday.  Pt doing well, being d/c'd today. Pt going to f/u as OP for definitive stone manaement.    MEDICATIONS  (STANDING):  apixaban 5 milliGRAM(s) Oral two times a day  cephalexin 500 milliGRAM(s) Oral four times a day  dextrose 5%. 1000 milliLiter(s) (50 mL/Hr) IV Continuous <Continuous>  dextrose 5%. 1000 milliLiter(s) (100 mL/Hr) IV Continuous <Continuous>  dextrose 50% Injectable 25 Gram(s) IV Push once  dextrose 50% Injectable 12.5 Gram(s) IV Push once  dextrose 50% Injectable 25 Gram(s) IV Push once  furosemide    Tablet 20 milliGRAM(s) Oral daily  glucagon  Injectable 1 milliGRAM(s) IntraMuscular once  insulin lispro (ADMELOG) corrective regimen sliding scale   SubCutaneous three times a day before meals  melatonin 1 milliGRAM(s) Oral at bedtime  metoprolol succinate ER 50 milliGRAM(s) Oral daily  polyethylene glycol 3350 17 Gram(s) Oral daily  senna 2 Tablet(s) Oral at bedtime    MEDICATIONS  (PRN):  acetaminophen     Tablet .. 650 milliGRAM(s) Oral every 6 hours PRN Temp greater or equal to 38C (100.4F), Mild Pain (1 - 3)  dextrose Oral Gel 15 Gram(s) Oral once PRN Blood Glucose LESS THAN 70 milliGRAM(s)/deciliter      REVIEW OF SYSTEMS   [x] A ten-point review of systems was otherwise negative except as noted.    Vital Signs Last 24 Hrs  T(C): 36.3 (04 May 2023 08:22), Max: 36.4 (03 May 2023 13:18)  T(F): 97.4 (04 May 2023 08:22), Max: 97.5 (03 May 2023 13:18)  HR: 61 (04 May 2023 08:22) (60 - 93)  BP: 149/69 (04 May 2023 08:22) (103/64 - 170/99)  BP(mean): 105 (03 May 2023 11:29) (105 - 105)  RR: 18 (04 May 2023 08:22) (18 - 20)  SpO2: 97% (04 May 2023 08:22) (95% - 97%)    Parameters below as of 03 May 2023 23:29  Patient On (Oxygen Delivery Method): room air        PHYSICAL EXAM:    GEN: NAD, awake and alert.  SKIN: Good color, non diaphoretic.  RESP: Non-labored breathing. No use of accessory muscles.  ABDO: Soft, NT/ND, no palpable bladder, no suprapubic tenderness  ileo conduit draining well  BACK: No CVAT B/L  nephrotstomy draining clear yellow urine  : s/p cystectomy/urethrectomy   EXT: CASON x 4      I&O's Summary    03 May 2023 07:01  -  04 May 2023 07:00  --------------------------------------------------------  IN: 0 mL / OUT: 1150 mL / NET: -1150 mL        LABS:                        12.2   6.15  )-----------( 193      ( 03 May 2023 05:49 )             38.4     05-03    141  |  103  |  22<H>  ----------------------------<  131<H>  4.7   |  27  |  1.3    Ca    8.9      03 May 2023 01:50  Mg     2.1     05-03    TPro  6.2  /  Alb  3.6  /  TBili  0.6  /  DBili  x   /  AST  27  /  ALT  36  /  AlkPhos  74  05-03    PT/INR - ( 03 May 2023 01:50 )   PT: 11.90 sec;   INR: 1.04 ratio         PTT - ( 03 May 2023 01:50 )  PTT:42.4 sec

## 2023-05-04 NOTE — DISCHARGE NOTE PROVIDER - NSDCCPCAREPLAN_GEN_ALL_CORE_FT
PRINCIPAL DISCHARGE DIAGNOSIS  Diagnosis: Acute UTI  Assessment and Plan of Treatment: You were noted either on arrival or during this hospitalization to have a Urinary Tract Infection. You may have already been treated and completed the antibiotics, please refer to the list of medications present on your discharge paperwork. If you notice that there are antibiotics listed, these may be to treat your infection, be sure to complete taking the full course, whether you have symptoms or not, as prescribed.  While taking antibiotics, you may benefit from taking a probiotic such as florastore to help to try and prevent an infectious type of diarrhea known as C Diff. If you notice that you begin having severe watery diarrhea, more than 4-5 episodes a day, please see your Primary Care Doctor or come to the ER to have your stool tested for this infection.   It is not necessary to repeat a urine test to see if the infection is gone, it is assumed that after treatment it should have resolved. However, if you continue to have symptoms, please see your Primary Care doctor or return to the ER.        SECONDARY DISCHARGE DIAGNOSES  Diagnosis: Fever  Assessment and Plan of Treatment:     Diagnosis: Nephrostomy status  Assessment and Plan of Treatment:

## 2023-05-04 NOTE — PROGRESS NOTE ADULT - SUBJECTIVE AND OBJECTIVE BOX
ROSA MARIA CASEY 82y Male  MRN#: [id number]   Hospital Day: 5d    SUBJECTIVE  Patient is a 82y old Male who presents with a chief complaint of UTI (04 May 2023 08:00)  Currently admitted to medicine with the primary diagnosis of Acute UTI      INTERVAL HPI AND OVERNIGHT EVENTS:  Patient was examined and seen at bedside. This morning he is resting comfortably in bed and reports no issues or overnight events.    REVIEW OF SYMPTOMS:  Denies all.    OBJECTIVE  PAST MEDICAL & SURGICAL HISTORY  Hypertension    Hyperlipidemia    Diabetes mellitus, type 2    History of atrial fibrillation    Bladder cancer  secondary to exposure at [employer] 9/11 s/p cystectomy with urostomy    Chronic kidney disease (CKD)  stage 3A, baseline creatinine 1.4    DVT, lower extremity    History of total hip replacement, bilateral    History of total knee replacement, bilateral    History of total cystectomy  with resultant urostomy      ALLERGIES:  No Known Allergies    MEDICATIONS:  STANDING MEDICATIONS  apixaban 5 milliGRAM(s) Oral two times a day  cephalexin 500 milliGRAM(s) Oral four times a day  dextrose 5%. 1000 milliLiter(s) IV Continuous <Continuous>  dextrose 5%. 1000 milliLiter(s) IV Continuous <Continuous>  dextrose 50% Injectable 25 Gram(s) IV Push once  dextrose 50% Injectable 12.5 Gram(s) IV Push once  dextrose 50% Injectable 25 Gram(s) IV Push once  furosemide    Tablet 20 milliGRAM(s) Oral daily  glucagon  Injectable 1 milliGRAM(s) IntraMuscular once  insulin lispro (ADMELOG) corrective regimen sliding scale   SubCutaneous three times a day before meals  melatonin 1 milliGRAM(s) Oral at bedtime  metoprolol succinate ER 50 milliGRAM(s) Oral daily  polyethylene glycol 3350 17 Gram(s) Oral daily  senna 2 Tablet(s) Oral at bedtime    PRN MEDICATIONS  acetaminophen     Tablet .. 650 milliGRAM(s) Oral every 6 hours PRN  dextrose Oral Gel 15 Gram(s) Oral once PRN      VITAL SIGNS: Last 24 Hours  T(C): 36.2 (03 May 2023 23:29), Max: 36.4 (03 May 2023 13:18)  T(F): 97.2 (03 May 2023 23:29), Max: 97.5 (03 May 2023 13:18)  HR: 67 (04 May 2023 05:30) (60 - 93)  BP: 147/60 (04 May 2023 05:30) (103/64 - 170/99)  BP(mean): 105 (03 May 2023 11:29) (105 - 105)  RR: 18 (03 May 2023 23:29) (18 - 20)  SpO2: 95% (03 May 2023 23:29) (95% - 96%)    LABS:                        12.2   6.15  )-----------( 193      ( 03 May 2023 05:49 )             38.4     05-03    141  |  103  |  22<H>  ----------------------------<  131<H>  4.7   |  27  |  1.3    Ca    8.9      03 May 2023 01:50  Mg     2.1     05-03    TPro  6.2  /  Alb  3.6  /  TBili  0.6  /  DBili  x   /  AST  27  /  ALT  36  /  AlkPhos  74  05-03    PT/INR - ( 03 May 2023 01:50 )   PT: 11.90 sec;   INR: 1.04 ratio         PTT - ( 03 May 2023 01:50 )  PTT:42.4 sec      PHYSICAL EXAM:  CONSTITUTIONAL: No acute distress, well-developed, well-groomed, AAOx3  HEAD: Atraumatic, normocephalic  EYES: EOM intact, PERRLA, conjunctiva and sclera clear  ENT: No JVD; moist mucous membranes  PULMONARY: Clear to auscultation bilaterally  CARDIOVASCULAR: Regular rate and rhythm  GASTROINTESTINAL: Soft, non-tender, non-distended; bowel sounds present  MUSCULOSKELETAL: 2+ peripheral pulses; +1 pitting edema in the lower extremities   SKIN: No rashes or lesions; warm and dry    ASSESSMENT & PLAN:  Pt is an 81 yo M, with PMHx of HTN, DM, HLD, Afib on Eliquis, bladder CA s/p cystectomy and permanent urostomy and recent admission in March 2023 for septic stone r/p R nephrostomy tube placement by IR and discharged on PO cipro until 4/14, presented to the ED for 1 day history of fevers, Tmax 102.     #complicated UTI in setting of calculi  #h/o bladder CA s/p cystectomy and urostomy  #prior admission for septic stone s/p R nephrostomy tube placement and discharged on PO abx  - no sepsis POA, lactate 1.4  - U/A grossly positive  - CT abdomen/pelvis:  1.  Interval placement of a percutaneous right nephrostomy. At present the intrarenal portion of the pigtail is within the upper pole calyx, possibly needs to be repositioned into the renal pelvis. No hydronephrosis. Previously seen right distal ureteral obstructing stone just prior to the ileal conduit is no longer identified.  2.  Bilateral intrarenal calyceal and pelvic nonobstructing calculi.  - s/p ceftriaxone x 1 in the ED  - s/p nephrostogram and nephrostomy tube exchange 5/3/2023 with IR     Plan:   - Urine culture growing MSSA  - tylenol PRN for fevers  - ID following  - Keflex 500mg q4 hours for 12 more days   - urology consult- Outpatient follow up     #Afib on Eliquis  - continue with toprol  - Restart Eliquis today     #HTN  - BP acceptable  - continue with lasix    #HLD  - lipid panel 3/2023: cholesterol 106, LDL 5  - off statins    #DM  - on home glimepiride  - monitor FS  - start basal/bolus if FS persistently>180    #Misc  - DVT Prophylaxis: Lovenox  - GI Prophylaxis: None  - Diet: Dash   - Activity: As tolerated   - IV Fluids: None  - Code Status: Full code     Dispo: Acute ROSA MARIA CASEY 82y Male  MRN#: [id number]   Hospital Day: 5d    SUBJECTIVE  Patient is a 82y old Male who presents with a chief complaint of UTI (04 May 2023 08:00)  Currently admitted to medicine with the primary diagnosis of Acute UTI      INTERVAL HPI AND OVERNIGHT EVENTS:  Patient was examined and seen at bedside. This morning he is resting comfortably in bed and reports no issues or overnight events.    REVIEW OF SYMPTOMS:  Denies all.    OBJECTIVE  PAST MEDICAL & SURGICAL HISTORY  Hypertension    Hyperlipidemia    Diabetes mellitus, type 2    History of atrial fibrillation    Bladder cancer  secondary to exposure at [employer] 9/11 s/p cystectomy with urostomy    Chronic kidney disease (CKD)  stage 3A, baseline creatinine 1.4    DVT, lower extremity    History of total hip replacement, bilateral    History of total knee replacement, bilateral    History of total cystectomy  with resultant urostomy      ALLERGIES:  No Known Allergies    MEDICATIONS:  STANDING MEDICATIONS  apixaban 5 milliGRAM(s) Oral two times a day  cephalexin 500 milliGRAM(s) Oral four times a day  dextrose 5%. 1000 milliLiter(s) IV Continuous <Continuous>  dextrose 5%. 1000 milliLiter(s) IV Continuous <Continuous>  dextrose 50% Injectable 25 Gram(s) IV Push once  dextrose 50% Injectable 12.5 Gram(s) IV Push once  dextrose 50% Injectable 25 Gram(s) IV Push once  furosemide    Tablet 20 milliGRAM(s) Oral daily  glucagon  Injectable 1 milliGRAM(s) IntraMuscular once  insulin lispro (ADMELOG) corrective regimen sliding scale   SubCutaneous three times a day before meals  melatonin 1 milliGRAM(s) Oral at bedtime  metoprolol succinate ER 50 milliGRAM(s) Oral daily  polyethylene glycol 3350 17 Gram(s) Oral daily  senna 2 Tablet(s) Oral at bedtime    PRN MEDICATIONS  acetaminophen     Tablet .. 650 milliGRAM(s) Oral every 6 hours PRN  dextrose Oral Gel 15 Gram(s) Oral once PRN      VITAL SIGNS: Last 24 Hours  T(C): 36.2 (03 May 2023 23:29), Max: 36.4 (03 May 2023 13:18)  T(F): 97.2 (03 May 2023 23:29), Max: 97.5 (03 May 2023 13:18)  HR: 67 (04 May 2023 05:30) (60 - 93)  BP: 147/60 (04 May 2023 05:30) (103/64 - 170/99)  BP(mean): 105 (03 May 2023 11:29) (105 - 105)  RR: 18 (03 May 2023 23:29) (18 - 20)  SpO2: 95% (03 May 2023 23:29) (95% - 96%)    LABS:                        12.2   6.15  )-----------( 193      ( 03 May 2023 05:49 )             38.4     05-03    141  |  103  |  22<H>  ----------------------------<  131<H>  4.7   |  27  |  1.3    Ca    8.9      03 May 2023 01:50  Mg     2.1     05-03    TPro  6.2  /  Alb  3.6  /  TBili  0.6  /  DBili  x   /  AST  27  /  ALT  36  /  AlkPhos  74  05-03    PT/INR - ( 03 May 2023 01:50 )   PT: 11.90 sec;   INR: 1.04 ratio         PTT - ( 03 May 2023 01:50 )  PTT:42.4 sec      PHYSICAL EXAM:  CONSTITUTIONAL: No acute distress, well-developed, well-groomed, AAOx3  HEAD: Atraumatic, normocephalic  EYES: EOM intact, PERRLA, conjunctiva and sclera clear  ENT: No JVD; moist mucous membranes  PULMONARY: Clear to auscultation bilaterally  CARDIOVASCULAR: Regular rate and rhythm  GASTROINTESTINAL: Soft, non-tender, non-distended; bowel sounds present  MUSCULOSKELETAL: 2+ peripheral pulses; +1 pitting edema in the lower extremities   SKIN: No rashes or lesions; warm and dry    ASSESSMENT & PLAN:  Pt is an 83 yo M, with PMHx of HTN, DM, HLD, Afib on Eliquis, bladder CA s/p cystectomy and permanent urostomy and recent admission in March 2023 for septic stone r/p R nephrostomy tube placement by IR and discharged on PO cipro until 4/14, presented to the ED for 1 day history of fevers, Tmax 102.     #complicated UTI in setting of calculi  #h/o bladder CA s/p cystectomy and urostomy  #prior admission for septic stone s/p R nephrostomy tube placement and discharged on PO abx  - no sepsis POA, lactate 1.4  - U/A grossly positive  - CT abdomen/pelvis:  1.  Interval placement of a percutaneous right nephrostomy. At present the intrarenal portion of the pigtail is within the upper pole calyx, possibly needs to be repositioned into the renal pelvis. No hydronephrosis. Previously seen right distal ureteral obstructing stone just prior to the ileal conduit is no longer identified.  2.  Bilateral intrarenal calyceal and pelvic nonobstructing calculi.  - s/p ceftriaxone x 1 in the ED  - s/p nephrostogram and nephrostomy tube exchange 5/3/2023 with IR     Plan:   - Urine culture growing MSSA  - tylenol PRN for fevers  - ID following  - Keflex 500mg q4 hours for 12 more days   - urology consult- Outpatient follow up     #Afib on Eliquis  - continue with toprol  - Restart Eliquis today     #HTN  - BP acceptable  - continue with lasix    #HLD  - lipid panel 3/2023: cholesterol 106, LDL 5  - off statins    #DM  - on home glimepiride  - monitor FS  - start basal/bolus if FS persistently>180    #Misc  - DVT Prophylaxis: Lovenox  - GI Prophylaxis: None  - Diet: Dash   - Activity: As tolerated   - IV Fluids: None  - Code Status: Full code     Dispo: Possible DC today

## 2023-05-04 NOTE — PROGRESS NOTE ADULT - ASSESSMENT
81 y/o Male admitted with dislodged nephrostomy tube. S/p replacement by IR yesterday    A) Stable s/p replacement of nephrostomy tube right    P) OP f/u with Dr. Molina as per pt request  5/8/23/at 3pm  recall PRN

## 2023-05-04 NOTE — PROGRESS NOTE ADULT - REASON FOR ADMISSION
UTI
UTI, malalignment of R nephrostomy tube,  Hx of bladder ca,sp cystecatomy and ilioconduit, recent UTI  due to renal stone
UTI

## 2023-05-04 NOTE — PROGRESS NOTE ADULT - SUBJECTIVE AND OBJECTIVE BOX
CARMELAROSA MARIA  82y, Male    All available historical data reviewed    OVERNIGHT EVENTS:  feels well and has no new complaints  No fevers     ROS:  General: Denies rigors, nightsweats  HEENT: Denies headache, rhinorrhea, sore throat, eye pain  CV: Denies CP, palpitations  PULM: Denies wheezing, hemoptysis  GI: Denies hematemesis, hematochezia, melena  : Denies discharge, hematuria  MSK: Denies arthralgias, myalgias  SKIN: Denies rash, lesions  NEURO: Denies paresthesias, weakness  PSYCH: Denies depression, anxiety    VITALS:  T(F): 97.2, Max: 97.5 (05-03-23 @ 08:06)  HR: 67  BP: 147/60  RR: 18Vital Signs Last 24 Hrs  T(C): 36.2 (03 May 2023 23:29), Max: 36.4 (03 May 2023 08:06)  T(F): 97.2 (03 May 2023 23:29), Max: 97.5 (03 May 2023 08:06)  HR: 67 (04 May 2023 05:30) (60 - 93)  BP: 147/60 (04 May 2023 05:30) (103/64 - 170/99)  BP(mean): 105 (03 May 2023 11:29) (105 - 105)  RR: 18 (03 May 2023 23:29) (18 - 20)  SpO2: 95% (03 May 2023 23:29) (95% - 97%)    Parameters below as of 03 May 2023 23:29  Patient On (Oxygen Delivery Method): room air        TESTS & MEASUREMENTS:                        12.2   6.15  )-----------( 193      ( 03 May 2023 05:49 )             38.4     05-03    141  |  103  |  22<H>  ----------------------------<  131<H>  4.7   |  27  |  1.3    Ca    8.9      03 May 2023 01:50  Mg     2.1     05-03    TPro  6.2  /  Alb  3.6  /  TBili  0.6  /  DBili  x   /  AST  27  /  ALT  36  /  AlkPhos  74  05-03    LIVER FUNCTIONS - ( 03 May 2023 01:50 )  Alb: 3.6 g/dL / Pro: 6.2 g/dL / ALK PHOS: 74 U/L / ALT: 36 U/L / AST: 27 U/L / GGT: x             Culture - Urine (collected 04-29-23 @ 20:54)  Source: Clean Catch Clean Catch (Midstream)  Final Report (05-02-23 @ 22:04):    >100,000 CFU/ml Staphylococcus aureus  Organism: Staphylococcus aureus (05-02-23 @ 22:04)  Organism: Staphylococcus aureus (05-02-23 @ 22:04)      Method Type: HALI      -  Ampicillin/Sulbactam: S <=8/4      -  Cefazolin: S <=4      -  Gentamicin: S <=1 Should not be used as monotherapy      -  Oxacillin: S <=0.25 Oxacillin predicts susceptibility for dicloxacillin, methicillin, and nafcillin      -  Rifampin: S <=1 Should not be used as monotherapy      -  Tetracycline: S <=1      -  Trimethoprim/Sulfamethoxazole: S <=0.5/9.5      -  Vancomycin: S 2    Culture - Blood (collected 04-29-23 @ 20:08)  Source: .Blood Blood  Preliminary Report (05-01-23 @ 01:03):    No growth to date.    Culture - Blood (collected 04-29-23 @ 20:08)  Source: .Blood Blood  Preliminary Report (05-01-23 @ 01:03):    No growth to date.            RADIOLOGY & ADDITIONAL TESTS:  Personal review of radiological diagnostics performed  Echo and EKG results noted when applicable.     MEDICATIONS:  acetaminophen     Tablet .. 650 milliGRAM(s) Oral every 6 hours PRN  ceFAZolin   IVPB 2000 milliGRAM(s) IV Intermittent every 8 hours  dextrose 5%. 1000 milliLiter(s) IV Continuous <Continuous>  dextrose 5%. 1000 milliLiter(s) IV Continuous <Continuous>  dextrose 50% Injectable 25 Gram(s) IV Push once  dextrose 50% Injectable 12.5 Gram(s) IV Push once  dextrose 50% Injectable 25 Gram(s) IV Push once  dextrose Oral Gel 15 Gram(s) Oral once PRN  furosemide    Tablet 20 milliGRAM(s) Oral daily  glucagon  Injectable 1 milliGRAM(s) IntraMuscular once  insulin lispro (ADMELOG) corrective regimen sliding scale   SubCutaneous three times a day before meals  melatonin 1 milliGRAM(s) Oral at bedtime  metoprolol succinate ER 50 milliGRAM(s) Oral daily  polyethylene glycol 3350 17 Gram(s) Oral daily  senna 2 Tablet(s) Oral at bedtime      ANTIBIOTICS:  ceFAZolin   IVPB 2000 milliGRAM(s) IV Intermittent every 8 hours

## 2023-05-04 NOTE — DISCHARGE NOTE PROVIDER - HOSPITAL COURSE
Pt is an 83 yo M, with PMHx of HTN, DM, HLD, Afib on Eliquis, bladder CA s/p cystectomy and permanent urostomy and recent admission in March 2023 for septic stone r/p R nephrostomy tube placement by IR and discharged on PO cipro until 4/14, presented to the ED for 1 day history of fevers, Tmax 102. Pt followed up with his urologist recently and deferred further interventions as per IR. He was suppose to have a follow up appointment with Dr. Collier next week. However, the pt then spiked a fever today and son noticed that nephrostomy bag had no output. His son called the urologist and was told to come into the ED immediately. He reports recently having congestion and dry cough and rhinorrhea. Otherwise, denies any chest pain, palpitations, nausea, vomiting, SOB, abdominal pain, flank pain, numbness, tingling or weakness.     In the ED:  · BP Systolic	134 mm Hg  · BP Diastolic	61 mm Hg  · Heart Rate	80 /min  · Respiration Rate (breaths/min)	18 /min  · Temp (F)	 100.9 Degrees F  · Temp (C)	 38.3 Degrees C  · Temp site	oral  · SpO2 (%)	98 %  · O2 Delivery/Oxygen Delivery Method	room air    Labs notable for: wbc 10k, Cr 1.3, lactate 1.4  U/A grossly positive  RVP negative  CT abdomen/pelvis: 1.  Interval placement of a percutaneous right nephrostomy. At present the intrarenal portion of the pigtail is within the upper pole calyx, possibly needs to be repositioned into the renal pelvis. No hydronephrosis. Previously seen right distal ureteral obstructing stone just prior to the ileal conduit is no longer identified.  2.  Bilateral intrarenal calyceal and pelvic non- obstructing calculi    Urology evaluated the patient, recommending repositioning of the nephrostomy tube and then outpatient follow up.     Urine grew MSSA. Patient was evaluated by ID, recommending Keflex 500mg four times a day for 12 more days.     Patient underwent a nephrostogram with IR. Nephrostomy tube was exchanged, patient clinically improving, okay for DC

## 2023-05-05 LAB
CULTURE RESULTS: SIGNIFICANT CHANGE UP
CULTURE RESULTS: SIGNIFICANT CHANGE UP
SPECIMEN SOURCE: SIGNIFICANT CHANGE UP
SPECIMEN SOURCE: SIGNIFICANT CHANGE UP

## 2023-05-08 ENCOUNTER — APPOINTMENT (OUTPATIENT)
Dept: UROLOGY | Facility: CLINIC | Age: 83
End: 2023-05-08
Payer: MEDICARE

## 2023-05-08 VITALS
HEIGHT: 72 IN | OXYGEN SATURATION: 94 % | BODY MASS INDEX: 38.67 KG/M2 | HEART RATE: 67 BPM | RESPIRATION RATE: 16 BRPM | SYSTOLIC BLOOD PRESSURE: 143 MMHG | TEMPERATURE: 98.9 F | WEIGHT: 285.5 LBS | DIASTOLIC BLOOD PRESSURE: 73 MMHG

## 2023-05-08 DIAGNOSIS — Z78.9 OTHER SPECIFIED HEALTH STATUS: ICD-10-CM

## 2023-05-08 DIAGNOSIS — Z80.3 FAMILY HISTORY OF MALIGNANT NEOPLASM OF BREAST: ICD-10-CM

## 2023-05-08 DIAGNOSIS — Z82.49 FAMILY HISTORY OF ISCHEMIC HEART DISEASE AND OTHER DISEASES OF THE CIRCULATORY SYSTEM: ICD-10-CM

## 2023-05-08 PROCEDURE — 99215 OFFICE O/P EST HI 40 MIN: CPT

## 2023-05-08 NOTE — LETTER HEADER
[FreeTextEntry3] : Geni Molina M.D.\par Director Emeritus of Urology\par [facility]\par [address] [place] [address], [address]\[place] [address]

## 2023-05-08 NOTE — ASSESSMENT
[FreeTextEntry1] : Ellis is an 82-year-old man who has large stone burden on both sides with the right side being septic beginning to take care of this.  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  He understands that placing percutaneous tubes into the kidney which has transitional cell tissue is at risk of spreading it with his ileal conduit its going to be extremely difficult to get intubated from below and clearly.\par \par He already has a right nephrostomy but it is in the wrong calyx\par \par I will speak with interventional radiology and once the infection is cleared, he is on cephalexin for Staph aureus infection in his urine, I will then have them replaced the right getting it into the lower pole at the same time accessed the left side through the lower pole, clean out the right side with the shock pulse and a 24 [place] access sheath and then try the next week to get him into [facility] to do the left side with the clear [place] sheath and including laser

## 2023-05-08 NOTE — HISTORY OF PRESENT ILLNESS
[FreeTextEntry1] : Ellis is an 82-year-old male born October 29, 1940 who was in the hospital last week with infection secondary to an obstructed right system with a right distal ureteral stone.  He had a nephrostomy placed, is difficult to get backwards through the ileal conduit to get up to the stone unfortunately it dislodged he ended up having it replaced on May 3 was discharged home and is now here to discuss definitive treatment.  Of interest is he has a large stone burden on both sides.  He has a rather complex urologic history having had a radical cystectomy ileal conduit about 7 or 8 years ago he reportedly is SHEILA and follows up with Dr. Kwong at [place].  He understands that placing tubes through the skin into the collecting system is at risk of spreading any transitional cell carcinoma that he might have been getting up from the low especially with such large stones is also not a good option.  The question is when we are and how do we take care of his stones and then how to we prevent them from forming in the future as as much fun as he had with these hospital stays he like to avoid it in the future

## 2023-05-08 NOTE — PHYSICAL EXAM
[General Appearance - Well Developed] : well developed [General Appearance - Well Nourished] : well nourished [Normal Appearance] : normal appearance [Well Groomed] : well groomed [General Appearance - In No Acute Distress] : no acute distress [Respiration, Rhythm And Depth] : normal respiratory rhythm and effort [Exaggerated Use Of Accessory Muscles For Inspiration] : no accessory muscle use [Abdomen Tenderness] : non-tender [Normal Station and Gait] : the gait and station were normal for the patient's age [] : no rash [No Focal Deficits] : no focal deficits [Oriented To Time, Place, And Person] : oriented to person, place, and time [Affect] : the affect was normal [Mood] : the mood was normal [Not Anxious] : not anxious [FreeTextEntry1] : scarred with poor hygiene

## 2023-05-08 NOTE — LETTER BODY
[Dear  ___] : Dear  [unfilled], [Consult Letter:] : I had the pleasure of evaluating your patient, [unfilled]. [Please see my note below.] : Please see my note below. [Consult Closing:] : Thank you very much for allowing me to participate in the care of this patient.  If you have any questions, please do not hesitate to contact me. [Sincerely,] : Sincerely, [FreeTextEntry2] : Evan Bull MD\par [facility] [address]\[place] [address]

## 2023-05-10 ENCOUNTER — NON-APPOINTMENT (OUTPATIENT)
Age: 83
End: 2023-05-10

## 2023-05-10 NOTE — CDI QUERY NOTE - NSCDINOTEDOCCLARIFICATION_GEN_A_CORE
PLEASE INCLUDE MORE SPECIFIC DOCUMENTATION IN YOUR PROGRESS NOTE AND DISCHARGE SUMMARY.  The documentation in this patient's medical record requires additional clarification to ensure that we accurately capture the patients diagnosis(es), treatment and/or severity of illness. Please document to the greatest level of specificity all corresponding diagnoses (either known or suspected) and/or treatment associated with the clinical information described below. Simponi Pregnancy And Lactation Text: The risk during pregnancy and breastfeeding is uncertain with this medication.

## 2023-05-10 NOTE — CDI QUERY NOTE - NSCDIOTHERTXTBX_GEN_ALL_CORE_HH
Based on your professional judgment and the clinical indicators, please clarify if the UTI can be further specified as:    • UTI associated with malpositioned nephrostomy could not be ruled out at time of discharge  • UTI unrelated to malpositioned nephrostomy  • Other (please specify):  • Clinically unable to determine    CLINICAL INDICATORS  4/30 Consult Note Adult-Urology PA: … CT abdomen/pelvis: 1.  Interval placement of a percutaneous right nephrostomy. At present the intrarenal portion of the pigtail is within the upper pole calyx, possibly needs to be repositioned into the renal pelvis … Rt PCN malpositioned. No Hydronephrosis. No obstructing renal calculi … Problem: Acute UTI. Recommendation: Treat UTI    5/3 Progress Note Adult-Intervent Radiology Resident: … Procedure: exchange of right PCN. Pre-Op Diagnosis: malpositioned right PCN, suspected passage of prior ureteral stone, plan for future PCNL. Post-Op Diagnosis: same    5/3 Progress Note Adult-Internal Medicine Attending: … The pt returns with complicated UTI and  R nephrostomy in upper calyx req adjustment to the renal pelvis.  THe pt was cultured and started on Ceftriaxone.  Consults placed for IR and Uro … Fever, Sepsis on ad due to pyelonephritis. R nephrostomy tube malposition    5/3 Progress Note Adult-Infectious Disease Attending: ... Resolved sepsis on admission secondary to acute pyelonephritis with JEMAL. 5/3 IVR R PCN. PCNL planned     5/4 Discharge Note Provider: … Urology evaluated the patient, recommending repositioning of the nephrostomy tube and then outpatient follow up … Patient underwent a nephrostogram with IR. Nephrostomy tube was exchanged … PRINCIPAL DISCHARGE DIAGNOSIS: Acute UTI    Thank you,  Avani Lazo RN [facility]  [phone]

## 2023-05-11 ENCOUNTER — NON-APPOINTMENT (OUTPATIENT)
Age: 83
End: 2023-05-11

## 2023-05-11 ENCOUNTER — APPOINTMENT (OUTPATIENT)
Dept: UROLOGY | Facility: CLINIC | Age: 83
End: 2023-05-11
Payer: MEDICARE

## 2023-05-11 LAB — URINE CULTURE <10: NORMAL

## 2023-05-11 PROCEDURE — 99215 OFFICE O/P EST HI 40 MIN: CPT

## 2023-05-11 NOTE — ASSESSMENT
[FreeTextEntry1] : His nephrostomy tube was flushed and a large amount of mucus was extracted.  This was sent for culture.  His tube was flushed until clear and hooked back up to the bag with drainage noted going into the bag.  He is feeling well and denies any issues at this time.\par \par He will follow-up as scheduled.  ER precautions were again reviewed including fever, nausea/vomiting, severe abdominal/flank pain, or other constitutional symptoms.

## 2023-05-11 NOTE — LETTER HEADER
[FreeTextEntry3] : Geni Molina M.D.\par Director Emeritus of Urology\par [facility]\par [address] [place] [address], [address]\[place] [address]

## 2023-05-11 NOTE — HISTORY OF PRESENT ILLNESS
[FreeTextEntry1] : Ellis is an 82-year-old male, born [birth year] last seen on May 8, 2023 with a history of bladder cancer status post radical cystectomy and ileal conduit placement around 8 years ago, who we have been following for right distal ureteral stone, right renal stones with a nephrostomy placed earlier when the ureteral stone was causing obstruction as he has an ileal conduit.  He had a somewhat complex course as his nephrostomy tube became dislodged and he had a septic episode.  It was replaced in the upper pole of his stone burden currently appears to be located in the lower pole\par \par He presents today unexpectedly complaining of nondraining nephrostomy.  He states that his daughter drained this morning without any issues and sometime afterwards it stopped draining.  He may be having increased drainage from the ileal conduit is not sure.  He has no pain no fever and feels fine [None] : no symptoms

## 2023-05-11 NOTE — LETTER BODY
[Dear  ___] : Dear  [unfilled], [Please see my note below.] : Please see my note below. [Sincerely,] : Sincerely, [Courtesy Letter:] : I had the pleasure of seeing your patient, [unfilled], in my office today. [FreeTextEntry2] : Evan uBll MD\par [facility] [address], [address]\[place] [address]

## 2023-05-11 NOTE — PHYSICAL EXAM
[General Appearance - Well Developed] : well developed [General Appearance - Well Nourished] : well nourished [Normal Appearance] : normal appearance [Well Groomed] : well groomed [General Appearance - In No Acute Distress] : no acute distress [Abdomen Soft] : soft [Abdomen Tenderness] : non-tender [Costovertebral Angle Tenderness] : no ~M costovertebral angle tenderness [] : no respiratory distress [Respiration, Rhythm And Depth] : normal respiratory rhythm and effort [Exaggerated Use Of Accessory Muscles For Inspiration] : no accessory muscle use [Oriented To Time, Place, And Person] : oriented to person, place, and time [Affect] : the affect was normal [Mood] : the mood was normal [Not Anxious] : not anxious [Normal Station and Gait] : the gait and station were normal for the patient's age [No Focal Deficits] : no focal deficits [FreeTextEntry1] : Bilateral lower extremity edema

## 2023-05-15 ENCOUNTER — NON-APPOINTMENT (OUTPATIENT)
Age: 83
End: 2023-05-15

## 2023-05-15 LAB — URINE CULTURE <10: NORMAL

## 2023-05-16 ENCOUNTER — NON-APPOINTMENT (OUTPATIENT)
Age: 83
End: 2023-05-16

## 2023-05-17 ENCOUNTER — OUTPATIENT (OUTPATIENT)
Dept: OUTPATIENT SERVICES | Facility: HOSPITAL | Age: 83
LOS: 1 days | Discharge: HOME | End: 2023-05-17
Payer: MEDICARE

## 2023-05-17 ENCOUNTER — TRANSCRIPTION ENCOUNTER (OUTPATIENT)
Age: 83
End: 2023-05-17

## 2023-05-17 VITALS
HEART RATE: 61 BPM | SYSTOLIC BLOOD PRESSURE: 148 MMHG | OXYGEN SATURATION: 97 % | HEIGHT: 72 IN | RESPIRATION RATE: 16 BRPM | WEIGHT: 285.06 LBS | TEMPERATURE: 98 F | DIASTOLIC BLOOD PRESSURE: 69 MMHG

## 2023-05-17 VITALS
TEMPERATURE: 98 F | HEART RATE: 66 BPM | SYSTOLIC BLOOD PRESSURE: 156 MMHG | RESPIRATION RATE: 18 BRPM | OXYGEN SATURATION: 100 % | DIASTOLIC BLOOD PRESSURE: 70 MMHG

## 2023-05-17 DIAGNOSIS — Z46.6 ENCOUNTER FOR FITTING AND ADJUSTMENT OF URINARY DEVICE: ICD-10-CM

## 2023-05-17 DIAGNOSIS — N20.0 CALCULUS OF KIDNEY: ICD-10-CM

## 2023-05-17 DIAGNOSIS — N13.9 OBSTRUCTIVE AND REFLUX UROPATHY, UNSPECIFIED: ICD-10-CM

## 2023-05-17 DIAGNOSIS — Z90.6 ACQUIRED ABSENCE OF OTHER PARTS OF URINARY TRACT: Chronic | ICD-10-CM

## 2023-05-17 DIAGNOSIS — Z96.653 PRESENCE OF ARTIFICIAL KNEE JOINT, BILATERAL: Chronic | ICD-10-CM

## 2023-05-17 DIAGNOSIS — Z96.643 PRESENCE OF ARTIFICIAL HIP JOINT, BILATERAL: Chronic | ICD-10-CM

## 2023-05-17 PROCEDURE — C1769: CPT

## 2023-05-17 PROCEDURE — 50435 EXCHANGE NEPHROSTOMY CATH: CPT | Mod: RT

## 2023-05-17 PROCEDURE — C1729: CPT

## 2023-05-17 RX ORDER — CEFTRIAXONE 500 MG/1
1000 INJECTION, POWDER, FOR SOLUTION INTRAMUSCULAR; INTRAVENOUS ONCE
Refills: 0 | Status: DISCONTINUED | OUTPATIENT
Start: 2023-05-17 | End: 2023-05-17

## 2023-05-17 NOTE — ASU PATIENT PROFILE, ADULT - PRO ARRIVE FROM
fingers/toes warm to touch/no paresthesia/no swelling/no cyanosis of extremity/capillary refill time < 2 seconds home

## 2023-05-17 NOTE — ASU PATIENT PROFILE, ADULT - NSICDXPASTMEDICALHX_GEN_ALL_CORE_FT
PAST MEDICAL HISTORY:  Bladder cancer secondary to exposure at [employer] 9/11 s/p cystectomy with urostomy    Chronic kidney disease (CKD) stage 3A, baseline creatinine 1.4    Diabetes mellitus, type 2     DVT, lower extremity     History of atrial fibrillation     Hyperlipidemia     Hypertension

## 2023-05-18 ENCOUNTER — APPOINTMENT (OUTPATIENT)
Dept: UROLOGY | Facility: CLINIC | Age: 83
End: 2023-05-18
Payer: MEDICARE

## 2023-05-18 VITALS
TEMPERATURE: 98 F | HEIGHT: 72 IN | SYSTOLIC BLOOD PRESSURE: 147 MMHG | DIASTOLIC BLOOD PRESSURE: 67 MMHG | BODY MASS INDEX: 38.6 KG/M2 | WEIGHT: 285 LBS | HEART RATE: 61 BPM

## 2023-05-18 PROCEDURE — 99212 OFFICE O/P EST SF 10 MIN: CPT

## 2023-05-18 NOTE — HISTORY OF PRESENT ILLNESS
[FreeTextEntry1] : Ellis is a 82-year-old male With history of bladder cancer status post cystectomy and ileal conduit placement 8 years ago, who we have been following for right obstructing distal ureteral stone status post right nephrostomy placement.\par \par His nephrostomy tube has been clogged over the past few days and he had a irrigated here on 5/11/2023.  He went to another episode where it was not draining.  He presented to the emergency room where interventional radiology replaced his tube as it was not in the correct place.  It is now draining well without any issues.\par \par Visiting nurse is being scheduled to change his tubing.  He is currently on antibiotics given to him by interventional radiology

## 2023-05-18 NOTE — ASSESSMENT
[FreeTextEntry1] : His tube is draining and he is doing well.  He is currently on antibiotics.  We will follow-up next Tuesday for culture from his nephrostomy as well as conduit in preparation for right PCNL on 5/30/2023

## 2023-05-18 NOTE — PHYSICAL EXAM
[General Appearance - Well Developed] : well developed [General Appearance - Well Nourished] : well nourished [Normal Appearance] : normal appearance [Well Groomed] : well groomed [General Appearance - In No Acute Distress] : no acute distress [Abdomen Soft] : soft [Costovertebral Angle Tenderness] : no ~M costovertebral angle tenderness [Abdomen Tenderness] : non-tender [] : no respiratory distress [Respiration, Rhythm And Depth] : normal respiratory rhythm and effort [Exaggerated Use Of Accessory Muscles For Inspiration] : no accessory muscle use [Oriented To Time, Place, And Person] : oriented to person, place, and time [Affect] : the affect was normal [Mood] : the mood was normal [Not Anxious] : not anxious [Normal Station and Gait] : the gait and station were normal for the patient's age [No Focal Deficits] : no focal deficits [FreeTextEntry1] : Ileal conduit draining clear urine

## 2023-05-19 ENCOUNTER — OUTPATIENT (OUTPATIENT)
Dept: OUTPATIENT SERVICES | Facility: HOSPITAL | Age: 83
LOS: 1 days | End: 2023-05-19
Payer: MEDICARE

## 2023-05-19 VITALS
OXYGEN SATURATION: 98 % | TEMPERATURE: 97 F | HEIGHT: 72.05 IN | SYSTOLIC BLOOD PRESSURE: 140 MMHG | RESPIRATION RATE: 16 BRPM | HEART RATE: 66 BPM | DIASTOLIC BLOOD PRESSURE: 84 MMHG | WEIGHT: 284.4 LBS

## 2023-05-19 DIAGNOSIS — Z96.653 PRESENCE OF ARTIFICIAL KNEE JOINT, BILATERAL: Chronic | ICD-10-CM

## 2023-05-19 DIAGNOSIS — Z96.643 PRESENCE OF ARTIFICIAL HIP JOINT, BILATERAL: Chronic | ICD-10-CM

## 2023-05-19 DIAGNOSIS — N20.0 CALCULUS OF KIDNEY: ICD-10-CM

## 2023-05-19 DIAGNOSIS — Z90.6 ACQUIRED ABSENCE OF OTHER PARTS OF URINARY TRACT: Chronic | ICD-10-CM

## 2023-05-19 DIAGNOSIS — Z01.818 ENCOUNTER FOR OTHER PREPROCEDURAL EXAMINATION: ICD-10-CM

## 2023-05-19 DIAGNOSIS — Z95.828 PRESENCE OF OTHER VASCULAR IMPLANTS AND GRAFTS: Chronic | ICD-10-CM

## 2023-05-19 LAB
A1C WITH ESTIMATED AVERAGE GLUCOSE RESULT: 7.5 % — HIGH (ref 4–5.6)
ALBUMIN SERPL ELPH-MCNC: 4.7 G/DL — SIGNIFICANT CHANGE UP (ref 3.5–5.2)
ALP SERPL-CCNC: 88 U/L — SIGNIFICANT CHANGE UP (ref 30–115)
ALT FLD-CCNC: 14 U/L — SIGNIFICANT CHANGE UP (ref 0–41)
ANION GAP SERPL CALC-SCNC: 15 MMOL/L — HIGH (ref 7–14)
APTT BLD: 38.2 SEC — SIGNIFICANT CHANGE UP (ref 27–39.2)
AST SERPL-CCNC: 18 U/L — SIGNIFICANT CHANGE UP (ref 0–41)
BASOPHILS # BLD AUTO: 0.1 K/UL — SIGNIFICANT CHANGE UP (ref 0–0.2)
BASOPHILS NFR BLD AUTO: 1.1 % — HIGH (ref 0–1)
BILIRUB SERPL-MCNC: 1.2 MG/DL — SIGNIFICANT CHANGE UP (ref 0.2–1.2)
BUN SERPL-MCNC: 26 MG/DL — HIGH (ref 10–20)
CALCIUM SERPL-MCNC: 10.4 MG/DL — SIGNIFICANT CHANGE UP (ref 8.4–10.5)
CHLORIDE SERPL-SCNC: 95 MMOL/L — LOW (ref 98–110)
CO2 SERPL-SCNC: 26 MMOL/L — SIGNIFICANT CHANGE UP (ref 17–32)
CREAT SERPL-MCNC: 1.5 MG/DL — SIGNIFICANT CHANGE UP (ref 0.7–1.5)
EGFR: 47 ML/MIN/1.73M2 — LOW
EOSINOPHIL # BLD AUTO: 0.44 K/UL — SIGNIFICANT CHANGE UP (ref 0–0.7)
EOSINOPHIL NFR BLD AUTO: 4.8 % — SIGNIFICANT CHANGE UP (ref 0–8)
ESTIMATED AVERAGE GLUCOSE: 169 MG/DL — HIGH (ref 68–114)
GLUCOSE SERPL-MCNC: 75 MG/DL — SIGNIFICANT CHANGE UP (ref 70–99)
HCT VFR BLD CALC: 47.1 % — SIGNIFICANT CHANGE UP (ref 42–52)
HGB BLD-MCNC: 15.2 G/DL — SIGNIFICANT CHANGE UP (ref 14–18)
IMM GRANULOCYTES NFR BLD AUTO: 0.4 % — HIGH (ref 0.1–0.3)
INR BLD: 1.2 RATIO — SIGNIFICANT CHANGE UP (ref 0.65–1.3)
LYMPHOCYTES # BLD AUTO: 1.67 K/UL — SIGNIFICANT CHANGE UP (ref 1.2–3.4)
LYMPHOCYTES # BLD AUTO: 18.4 % — LOW (ref 20.5–51.1)
MCHC RBC-ENTMCNC: 28.2 PG — SIGNIFICANT CHANGE UP (ref 27–31)
MCHC RBC-ENTMCNC: 32.3 G/DL — SIGNIFICANT CHANGE UP (ref 32–37)
MCV RBC AUTO: 87.4 FL — SIGNIFICANT CHANGE UP (ref 80–94)
MONOCYTES # BLD AUTO: 0.87 K/UL — HIGH (ref 0.1–0.6)
MONOCYTES NFR BLD AUTO: 9.6 % — HIGH (ref 1.7–9.3)
NEUTROPHILS # BLD AUTO: 5.97 K/UL — SIGNIFICANT CHANGE UP (ref 1.4–6.5)
NEUTROPHILS NFR BLD AUTO: 65.7 % — SIGNIFICANT CHANGE UP (ref 42.2–75.2)
NRBC # BLD: 0 /100 WBCS — SIGNIFICANT CHANGE UP (ref 0–0)
PLATELET # BLD AUTO: 264 K/UL — SIGNIFICANT CHANGE UP (ref 130–400)
PMV BLD: 10.7 FL — HIGH (ref 7.4–10.4)
POTASSIUM SERPL-MCNC: 4.3 MMOL/L — SIGNIFICANT CHANGE UP (ref 3.5–5)
POTASSIUM SERPL-SCNC: 4.3 MMOL/L — SIGNIFICANT CHANGE UP (ref 3.5–5)
PROT SERPL-MCNC: 7.8 G/DL — SIGNIFICANT CHANGE UP (ref 6–8)
PROTHROM AB SERPL-ACNC: 13.8 SEC — HIGH (ref 9.95–12.87)
RBC # BLD: 5.39 M/UL — SIGNIFICANT CHANGE UP (ref 4.7–6.1)
RBC # FLD: 15.7 % — HIGH (ref 11.5–14.5)
SODIUM SERPL-SCNC: 136 MMOL/L — SIGNIFICANT CHANGE UP (ref 135–146)
WBC # BLD: 9.09 K/UL — SIGNIFICANT CHANGE UP (ref 4.8–10.8)
WBC # FLD AUTO: 9.09 K/UL — SIGNIFICANT CHANGE UP (ref 4.8–10.8)

## 2023-05-19 PROCEDURE — 93010 ELECTROCARDIOGRAM REPORT: CPT

## 2023-05-19 PROCEDURE — 36415 COLL VENOUS BLD VENIPUNCTURE: CPT

## 2023-05-19 PROCEDURE — 80053 COMPREHEN METABOLIC PANEL: CPT

## 2023-05-19 PROCEDURE — 99214 OFFICE O/P EST MOD 30 MIN: CPT | Mod: 25

## 2023-05-19 PROCEDURE — 85730 THROMBOPLASTIN TIME PARTIAL: CPT

## 2023-05-19 PROCEDURE — 85610 PROTHROMBIN TIME: CPT

## 2023-05-19 PROCEDURE — 93005 ELECTROCARDIOGRAM TRACING: CPT

## 2023-05-19 PROCEDURE — 83036 HEMOGLOBIN GLYCOSYLATED A1C: CPT

## 2023-05-19 PROCEDURE — 85025 COMPLETE CBC W/AUTO DIFF WBC: CPT

## 2023-05-19 RX ORDER — OXYCODONE AND ACETAMINOPHEN 5; 325 MG/1; MG/1
1 TABLET ORAL
Refills: 0 | DISCHARGE

## 2023-05-19 NOTE — H&P PST ADULT - TEMPERATURE IN FAHRENHEIT (DEGREES F)
[facility] [address] GENERAL SURGERY  General Surgery Postoperative Progress Note    Pt Name: Cynthia Cloud  Medical Record Number: [id number]  Date of Birth 1981   Today's Date: 2020  ASSESSMENT   1. POD # 1 L/S cholecystectomy for acute cholecystitis and cholelithiasis   has a past medical history of Abnormal Pap smear of cervix, Hypertension, Postpartum depression, Thrombocytopenia ([address] and Thyroid disease. PLAN   1. Increase diet and activity  2. If tolerated, then discharge home  3. Low fat diet  4. Rx Norco, Phenergan  5. Local wound care   6. No heavy lifting  7. F/U 2 weeks  Gene Mancera is doing well. She has minor nausea no vomiting, has not passed flatus or had a bowel movement. She is tolerating a DIET GENERAL;. Her pain is well controlled on current medications. She has been ambulating in the room. CURRENT MEDICATIONS   Scheduled Meds:   buPROPion  150 mg Oral QAM    levothyroxine  100 mcg Oral Daily    sodium chloride flush  10 mL Intravenous 2 times per day    lisinopril  40 mg Oral Daily    And    hydroCHLOROthiazide  25 mg Oral Daily     Continuous Infusions:   sodium chloride Stopped (20 0850)     PRN Meds:.sodium chloride flush, morphine, HYDROcodone 5 mg - acetaminophen  OBJECTIVE   CURRENT VITALS:  height is 5' 5\" (1.651 m) and weight is 170 lb (77.1 kg). Her oral temperature is 98.1 °F (36.7 °C). Her blood pressure is 119/79 and her pulse is 86. Her respiration is 18 and oxygen saturation is 97%.    Temperature Range (24h):Temp: 98.1 °F (36.7 °C) Temp  Av.8 °F (36 °C)  Min: 96.1 °F (35.6 °C)  Max: 98.7 °F (37.1 °C)  BP Range (89R): Systolic (16XVI), TBN:659 , Min:108 , EAL:211     Diastolic (47GCA), XIB:56, Min:62, Max:101    Pulse Range (24h): Pulse  Av.7  Min: 67  Max: 101  Respiration Range (24h): Resp  Av.7  Min: 3  Max: 21  Current Pulse Ox (24h):  SpO2: 97 %  Pulse Ox Range (24h):  SpO2  Av.2 %  Min: 92 %  Max: 100 %  Oxygen Amount and Delivery: O2 Flow Rate (L/min): 2 L/min  Incentive Spirometry Tx:   Treatment Tolerance: Well Incentive Spirometry Goal (mL): 1000 mL Incentive Spirometry Achieved (mL): 1000 mL    GENERAL: alert, no distress  LUNGS: clear to ausculation, without wheezes, rales or rhonci  HEART: normal rate and regular rhythm  ABDOMEN: non-distended, soft, incisional tenderness, bowel sounds present in all 4 quadrants and no guarding or peritoneal signs  INCISION: healing well, no significant drainage, no significant erythema  EXTREMITY: no cyanosis, clubbing or edema  In: 1929.8 [P.O.:200; I.V.:1729.8]  Out: 2900 [Urine:2900]     Date 04/30/20 0000 - 04/30/20 2359   Shift 0912-4652 2839-1222 2555-6935 24 Hour Total   INTAKE   I.V.(mL/kg) 841. 8(10.9)   841. 8(10.9)   Shift Total(mL/kg) 841. 8(10.9)   841. 8(10.9)   OUTPUT   Urine(mL/kg/hr) 1550(2.5)   1550   Shift Total(mL/kg) 1550(20.1)   1550(20.1)   Weight (kg) 77.1 77.1 77.1 77.1     LABS     Recent Labs     04/28/20  1340 04/28/20  1345 04/28/20  2130   WBC  --  12.4* 16.1*   HGB  --  13.4 14.4   HCT  --  39.4 42.9   PLT  --  247 322     --  134*   K 3.4*  --  3.4*   *  --  98   CO2 25  --  24   BUN 14  --  12   CREATININE 0.74  --  0.8   CALCIUM 9.30  --  9.5      No results for input(s): PTT, INR in the last 72 hours. Invalid input(s): PT  Recent Labs     04/28/20  1340 04/28/20  2130   AST 13* 19   ALT 16 20   BILITOT 0.4 0.5   BILIDIR 0.1  --    LIPASE 14* 21.8     No results for input(s): TROPONINT in the last 72 hours. RADIOLOGY     CT ABDOMEN PELVIS W IV CONTRAST Additional Contrast? None   Final Result   Mildly distended gallbladder with small gallstones and wall thickening. Correlate clinically for acute cholecystitis. No biliary dilatation. Hepatomegaly. Possible gastroduodenitis. Correlate clinically. Possible terminal ileitis/Crohn's disease. Correlate clinically. Tiny amount of physiologically normal fluid in the pelvis.    Stable 2.1 x 1.6 cm left adrenal mass. Recommend follow-up abdomen pelvis CT with contrast in 6-12 months. **This report has been created using voice recognition software. It may contain minor errors which are inherent in voice recognition technology. **      Final report electronically signed by Dr. Amira Beavers on 4/28/2020 11:25 PM            Electronically signed by Michelle Cunningham DO on 4/30/2020 at 9:48 AM 97.1

## 2023-05-19 NOTE — H&P PST ADULT - NSICDXPASTMEDICALHX_GEN_ALL_CORE_FT
PAST MEDICAL HISTORY:  Bladder cancer secondary to exposure at [employer] 9/11 s/p cystectomy with urostomy    Chronic kidney disease (CKD) stage 3A, baseline creatinine 1.4    Diabetes mellitus, type 2     DVT, lower extremity     History of atrial fibrillation     Hyperlipidemia     Hypertension

## 2023-05-19 NOTE — H&P PST ADULT - HISTORY OF PRESENT ILLNESS
83 Y/O MALE PT TO PAST WITH HX              PT NOW FOR SCHEDULED PROCEDURE. PT DENIES ANY CP SOB PALP COUGH DYSURIA FEVER URI. PT ABLE TO AZALEA 1-2 FOS W/O SOB  Anesthesia Alert  NO--Difficult Airway  NO--History of neck surgery or radiation  NO--Limited ROM of neck  NO--History of Malignant hyperthermia  NO--Personal or family history of Pseudocholinesterase deficiency.  NO--Prior Anesthesia Complication  NO--Latex Allergy  NO--Loose teeth  NO--History of Rheumatoid Arthritis  NO--JARETH  NO--Bleeding risk  NO--Other_____   83 Y/O MALE PT TO PAST WITH C/O RENAL COLIC. PT C/O RIGHT FLANK PAIN, OBSTRUCTION TO NEPHROSTOMY TUBE APPROX 1 MO AGO  PT NOW FOR SCHEDULED PROCEDURE ( RIGHT PERCUTANEOUS NEPHROLITHOTRIPSY WITH ANTEGRADE CONTRAST STUDY, PLACEMENT NEPHROSTOMY, URETEROSCOPY, POSS PLACEMENT OF NEPHROURETERAL STENT) . PT DENIES ANY CP SOB PALP COUGH DYSURIA FEVER URI. PT ABLE TO AZALEA 1-2 FOS W/O SOB  PT ALSO SCHEDULED FOR LEFT PERC. NEPHROLITHOTRIPSY WITH STENT 6/7/23   Anesthesia Alert  CLASS IV  NO--History of neck surgery or radiation  NO--Limited ROM of neck  NO--History of Malignant hyperthermia  NO--Personal or family history of Pseudocholinesterase deficiency.  NO--Prior Anesthesia Complication  NO--Latex Allergy  NO--Loose teeth  NO--History of Rheumatoid Arthritis  NO--JARETH  NO--Bleeding risk  NO--Other_____   83 Y/O MALE PT TO PAST WITH C/O RENAL COLIC. PT C/O RIGHT FLANK PAIN, OBSTRUCTION TO NEPHROSTOMY TUBE APPROX 1 MO AGO  PT NOW FOR SCHEDULED PROCEDURE ( RIGHT PERCUTANEOUS NEPHROLITHOTRIPSY WITH ANTEGRADE CONTRAST STUDY, PLACEMENT NEPHROSTOMY, URETEROSCOPY, POSS PLACEMENT OF NEPHROURETERAL STENT) . PT DENIES ANY CP SOB PALP COUGH DYSURIA FEVER URI. PT ABLE TO AZALEA 1-2 FOS W/O SOB  PT ALSO SCHEDULED FOR LEFT PERC. NEPHROLITHOTRIPSY WITH STENT 6/7/23   Anesthesia Alert  CLASS IV  NO--History of neck surgery or radiation  NO--Limited ROM of neck  NO--History of Malignant hyperthermia  NO--Personal or family history of Pseudocholinesterase deficiency.  NO--Prior Anesthesia Complication  NO--Latex Allergy  NO--Loose teeth  NO--History of Rheumatoid Arthritis  NO--JARETH  NO--Bleeding risk

## 2023-05-19 NOTE — H&P PST ADULT - ASSESSMENT
npo over night  stopped eliquis 3 day ago  nkda  last culture = vre I spoke to IR and he will get linezolide if OK with ID

## 2023-05-19 NOTE — H&P PST ADULT - NSICDXPASTSURGICALHX_GEN_ALL_CORE_FT
PAST SURGICAL HISTORY:  History of total cystectomy with resultant urostomy    History of total hip replacement, bilateral     History of total knee replacement, bilateral      PAST SURGICAL HISTORY:  History of total cystectomy with resultant urostomy    History of total hip replacement, bilateral     History of total knee replacement, bilateral     S/P IVC filter

## 2023-05-20 DIAGNOSIS — Z01.818 ENCOUNTER FOR OTHER PREPROCEDURAL EXAMINATION: ICD-10-CM

## 2023-05-20 DIAGNOSIS — N20.0 CALCULUS OF KIDNEY: ICD-10-CM

## 2023-05-23 ENCOUNTER — APPOINTMENT (OUTPATIENT)
Dept: UROLOGY | Facility: CLINIC | Age: 83
End: 2023-05-23
Payer: MEDICARE

## 2023-05-23 VITALS
SYSTOLIC BLOOD PRESSURE: 155 MMHG | DIASTOLIC BLOOD PRESSURE: 78 MMHG | HEIGHT: 72 IN | BODY MASS INDEX: 38.6 KG/M2 | WEIGHT: 285 LBS | HEART RATE: 69 BPM

## 2023-05-23 PROCEDURE — 99213 OFFICE O/P EST LOW 20 MIN: CPT

## 2023-05-23 NOTE — ASSESSMENT
[FreeTextEntry1] : His tube is draining well without any issues.  Culture obtained from nephrostomy preparation for right PCNL on 5/30/2023\par

## 2023-05-23 NOTE — HISTORY OF PRESENT ILLNESS
[FreeTextEntry1] : Ellis is a 82-year-old male With history of bladder cancer status post cystectomy and ileal conduit placement 8 years ago, who we have been following for right obstructing distal ureteral stone status post right nephrostomy placement.  He is status post nephrostomy tube revision, as it was not in the correct place, on 5/17/2023.  Draining well now without any issues.  Visiting nurse has been changing dressing.  Presents today for urine culture from nephrostomy tube in preparation for surgery.\par

## 2023-05-23 NOTE — ADDENDUM
[FreeTextEntry1] : Agree with the above documentation as outlined by the PA which I have addended as necessary.\par \par This is a patient of Dr Molina seen today by the PA while I was in the office, however Dr Molina will resume care once he is back.

## 2023-05-25 ENCOUNTER — NON-APPOINTMENT (OUTPATIENT)
Age: 83
End: 2023-05-25

## 2023-05-30 ENCOUNTER — NON-APPOINTMENT (OUTPATIENT)
Age: 83
End: 2023-05-30

## 2023-05-30 ENCOUNTER — APPOINTMENT (OUTPATIENT)
Dept: UROLOGY | Facility: HOSPITAL | Age: 83
End: 2023-05-30

## 2023-05-30 ENCOUNTER — TRANSCRIPTION ENCOUNTER (OUTPATIENT)
Age: 83
End: 2023-05-30

## 2023-05-30 ENCOUNTER — RESULT REVIEW (OUTPATIENT)
Age: 83
End: 2023-05-30

## 2023-05-30 ENCOUNTER — OUTPATIENT (OUTPATIENT)
Dept: INPATIENT UNIT | Facility: HOSPITAL | Age: 83
LOS: 1 days | Discharge: HOME | End: 2023-05-30
Payer: MEDICARE

## 2023-05-30 VITALS
RESPIRATION RATE: 17 BRPM | WEIGHT: 285.06 LBS | DIASTOLIC BLOOD PRESSURE: 68 MMHG | HEART RATE: 64 BPM | OXYGEN SATURATION: 93 % | TEMPERATURE: 99 F | SYSTOLIC BLOOD PRESSURE: 143 MMHG

## 2023-05-30 VITALS
DIASTOLIC BLOOD PRESSURE: 67 MMHG | RESPIRATION RATE: 14 BRPM | HEART RATE: 61 BPM | TEMPERATURE: 98 F | OXYGEN SATURATION: 98 % | SYSTOLIC BLOOD PRESSURE: 142 MMHG

## 2023-05-30 DIAGNOSIS — N20.0 CALCULUS OF KIDNEY: ICD-10-CM

## 2023-05-30 DIAGNOSIS — Z96.653 PRESENCE OF ARTIFICIAL KNEE JOINT, BILATERAL: Chronic | ICD-10-CM

## 2023-05-30 DIAGNOSIS — Z96.643 PRESENCE OF ARTIFICIAL HIP JOINT, BILATERAL: Chronic | ICD-10-CM

## 2023-05-30 DIAGNOSIS — Z95.828 PRESENCE OF OTHER VASCULAR IMPLANTS AND GRAFTS: Chronic | ICD-10-CM

## 2023-05-30 DIAGNOSIS — Z90.6 ACQUIRED ABSENCE OF OTHER PARTS OF URINARY TRACT: Chronic | ICD-10-CM

## 2023-05-30 LAB
GLUCOSE BLDC GLUCOMTR-MCNC: 155 MG/DL — HIGH (ref 70–99)
URINE CULTURE <10: ABNORMAL

## 2023-05-30 PROCEDURE — C1758: CPT

## 2023-05-30 PROCEDURE — 50433 PLMT NEPHROURETERAL CATHETER: CPT | Mod: RT

## 2023-05-30 PROCEDURE — 50432 PLMT NEPHROSTOMY CATHETER: CPT | Mod: LT

## 2023-05-30 PROCEDURE — 74018 RADEX ABDOMEN 1 VIEW: CPT

## 2023-05-30 PROCEDURE — 50080 PERQ NL/PL LITHOTRP SMPL<2CM: CPT | Mod: RT

## 2023-05-30 PROCEDURE — C9399: CPT

## 2023-05-30 PROCEDURE — C2617: CPT

## 2023-05-30 PROCEDURE — C1769: CPT

## 2023-05-30 PROCEDURE — 50387 CHANGE NEPHROURETERAL CATH: CPT | Mod: RT

## 2023-05-30 PROCEDURE — 50951 ENDOSCOPY OF URETER: CPT | Mod: RT

## 2023-05-30 PROCEDURE — C1887: CPT

## 2023-05-30 PROCEDURE — 88300 SURGICAL PATH GROSS: CPT

## 2023-05-30 PROCEDURE — C1894: CPT

## 2023-05-30 PROCEDURE — 52332 CYSTOSCOPY AND TREATMENT: CPT

## 2023-05-30 PROCEDURE — 82962 GLUCOSE BLOOD TEST: CPT

## 2023-05-30 PROCEDURE — 50684 INJECTION FOR URETER X-RAY: CPT | Mod: RT,59

## 2023-05-30 PROCEDURE — 74425 UROGRAPHY ANTEGRADE RS&I: CPT | Mod: 26,RT

## 2023-05-30 PROCEDURE — C1729: CPT

## 2023-05-30 PROCEDURE — 88300 SURGICAL PATH GROSS: CPT | Mod: 26

## 2023-05-30 PROCEDURE — C1726: CPT

## 2023-05-30 PROCEDURE — 82365 CALCULUS SPECTROSCOPY: CPT

## 2023-05-30 RX ORDER — SODIUM CHLORIDE 9 MG/ML
1000 INJECTION, SOLUTION INTRAVENOUS
Refills: 0 | Status: DISCONTINUED | OUTPATIENT
Start: 2023-05-30 | End: 2023-05-30

## 2023-05-30 RX ORDER — HYDROMORPHONE HYDROCHLORIDE 2 MG/ML
0.5 INJECTION INTRAMUSCULAR; INTRAVENOUS; SUBCUTANEOUS
Refills: 0 | Status: DISCONTINUED | OUTPATIENT
Start: 2023-05-30 | End: 2023-05-30

## 2023-05-30 RX ORDER — ONDANSETRON 8 MG/1
4 TABLET, FILM COATED ORAL ONCE
Refills: 0 | Status: DISCONTINUED | OUTPATIENT
Start: 2023-05-30 | End: 2023-05-30

## 2023-05-30 RX ORDER — CEFAZOLIN SODIUM 1 G
1000 VIAL (EA) INJECTION ONCE
Refills: 0 | Status: DISCONTINUED | OUTPATIENT
Start: 2023-05-30 | End: 2023-05-30

## 2023-05-30 RX ORDER — LINEZOLID 600 MG/300ML
600 INJECTION, SOLUTION INTRAVENOUS ONCE
Refills: 0 | Status: DISCONTINUED | OUTPATIENT
Start: 2023-05-30 | End: 2023-05-30

## 2023-05-30 RX ORDER — OXYCODONE AND ACETAMINOPHEN 5; 325 MG/1; MG/1
1 TABLET ORAL ONCE
Refills: 0 | Status: DISCONTINUED | OUTPATIENT
Start: 2023-05-30 | End: 2023-05-30

## 2023-05-30 RX ORDER — MORPHINE SULFATE 50 MG/1
2 CAPSULE, EXTENDED RELEASE ORAL
Refills: 0 | Status: DISCONTINUED | OUTPATIENT
Start: 2023-05-30 | End: 2023-05-30

## 2023-05-30 NOTE — ASU DISCHARGE PLAN (ADULT/PEDIATRIC) - CARE PROVIDER_API CALL
Juancho Henry  Interventional Radiology and Diagnostic Radiology  [address] [place] [id number]  Phone: [phone]  Fax: [phone]  Established Patient  Scheduled Appointment: 05/31/2023 09:00 AM    Geni Molina  Urology  [address] [place] [address], [address] [address]  Phone: [phone]  Fax: [phone]  Established Patient  Follow Up Time:

## 2023-05-30 NOTE — ASU DISCHARGE PLAN (ADULT/PEDIATRIC) - PROVIDER TOKENS
PROVIDER:[TOKEN:[[id number]],SCHEDULEDAPPT:[05/31/2023],SCHEDULEDAPPTTIME:[09:00 AM],ESTABLISHEDPATIENT:[T]],PROVIDER:[TOKEN:[[id number]],ESTABLISHEDPATIENT:[T]]

## 2023-05-30 NOTE — PROGRESS NOTE ADULT - SUBJECTIVE AND OBJECTIVE BOX
Interventional Radiology Brief- Operative Note    Procedure: bilateral lower pole percutaneous nephrostomy tube placement    Pre-Op Diagnosis: patient scheduled for PNL today on the right and next week on the left with Dr. Molina, urology    Post-Op Diagnosis: same    Attending: Carl    Resident:  Marychuy    Anesthesia (type):  [ ] General Anesthesia  [x ] Sedation provided by anesthesia  [ ] Spinal Anesthesia  [x ] Local/Regional    Contrast: 20 cc    Estimated Blood Loss: 5 cc    Condition:   [ ] Critical  [ ] Serious  [ ] Fair   [x ] Good    Findings/Follow up Plan of Care: Image guided access obtained into the left lower pole. Multiple unsuccessful attempts were made to place a PCNU without success secondary to tortuous ureter into neobladder and patient desaturating in prone positioning. Attempts were then made to place glide Berenstein to the level of the bladder; however, patient persistently moving with oxygen destaturations and decision was made to place nephrostomy tube. The attention was then turned to the right kidney and a lower pole access was obtained. A 5 Fr [place] Berenstein was placed with the distal tip in the bladder.    Specimens Removed: None    Implants: None    Complications: None immediate    Condition/Disposition: ok to go to PACU      Please call Interventional Radiology [phone] with any questions, concerns, or issues.

## 2023-05-30 NOTE — ASU DISCHARGE PLAN (ADULT/PEDIATRIC) - NS MD DC FALL RISK RISK
For information on Fall & Injury Prevention, visit: [email or web address] OR  [email or web address] OR  https://www.cdc.gov/steadi/patient.html

## 2023-05-30 NOTE — BRIEF OPERATIVE NOTE - NSICDXBRIEFPROCEDURE_GEN_ALL_CORE_FT
PROCEDURES:  Percutaneous nephrostolithotomy with lithotripsy of small calculus 30-May-2023 15:36:05 right Geni Molina  Antegrade ureteroscopy 30-May-2023 15:36:30 right Geni Molina  Change external ureteral stent 30-May-2023 15:37:03 right Geni Molina  Nephrostogram 30-May-2023 15:37:30 right Geni Molina  Antegrade ureterography 30-May-2023 15:37:53 right Geni Molina

## 2023-05-31 ENCOUNTER — RESULT REVIEW (OUTPATIENT)
Age: 83
End: 2023-05-31

## 2023-05-31 ENCOUNTER — TRANSCRIPTION ENCOUNTER (OUTPATIENT)
Age: 83
End: 2023-05-31

## 2023-05-31 ENCOUNTER — OUTPATIENT (OUTPATIENT)
Dept: OUTPATIENT SERVICES | Facility: HOSPITAL | Age: 83
LOS: 1 days | Discharge: HOME | End: 2023-05-31
Payer: MEDICARE

## 2023-05-31 VITALS
OXYGEN SATURATION: 97 % | SYSTOLIC BLOOD PRESSURE: 138 MMHG | HEART RATE: 65 BPM | RESPIRATION RATE: 18 BRPM | DIASTOLIC BLOOD PRESSURE: 60 MMHG

## 2023-05-31 VITALS
HEIGHT: 72 IN | DIASTOLIC BLOOD PRESSURE: 65 MMHG | TEMPERATURE: 99 F | OXYGEN SATURATION: 97 % | WEIGHT: 285.06 LBS | RESPIRATION RATE: 18 BRPM | SYSTOLIC BLOOD PRESSURE: 156 MMHG | HEART RATE: 68 BPM

## 2023-05-31 DIAGNOSIS — Z95.828 PRESENCE OF OTHER VASCULAR IMPLANTS AND GRAFTS: Chronic | ICD-10-CM

## 2023-05-31 DIAGNOSIS — Z96.653 PRESENCE OF ARTIFICIAL KNEE JOINT, BILATERAL: Chronic | ICD-10-CM

## 2023-05-31 DIAGNOSIS — N20.2 CALCULUS OF KIDNEY WITH CALCULUS OF URETER: ICD-10-CM

## 2023-05-31 DIAGNOSIS — Z96.643 PRESENCE OF ARTIFICIAL HIP JOINT, BILATERAL: Chronic | ICD-10-CM

## 2023-05-31 DIAGNOSIS — Z46.6 ENCOUNTER FOR FITTING AND ADJUSTMENT OF URINARY DEVICE: ICD-10-CM

## 2023-05-31 DIAGNOSIS — N20.0 CALCULUS OF KIDNEY: ICD-10-CM

## 2023-05-31 LAB
GLUCOSE BLDC GLUCOMTR-MCNC: 187 MG/DL — HIGH (ref 70–99)
SURGICAL PATHOLOGY STUDY: SIGNIFICANT CHANGE UP

## 2023-05-31 PROCEDURE — C1894: CPT

## 2023-05-31 PROCEDURE — 74176 CT ABD & PELVIS W/O CONTRAST: CPT | Mod: 26

## 2023-05-31 PROCEDURE — 74176 CT ABD & PELVIS W/O CONTRAST: CPT

## 2023-05-31 PROCEDURE — C1769: CPT

## 2023-05-31 PROCEDURE — 82962 GLUCOSE BLOOD TEST: CPT

## 2023-05-31 PROCEDURE — 50389 REMOVE RENAL TUBE W/FLUORO: CPT | Mod: RT

## 2023-05-31 PROCEDURE — C2617: CPT

## 2023-05-31 PROCEDURE — 50434 CONVERT NEPHROSTOMY CATHETER: CPT | Mod: LT

## 2023-05-31 NOTE — ASU PATIENT PROFILE, ADULT - TEACHING/LEARNING CULTURAL CONSIDERATIONS
Subjective:          Chief Complaint: Sanjeev Gipson is a 75 y.o. male who had concerns including Post-op Evaluation of the Left Knee.    Patient here for follow-up. Pt doing well. Will begin PT at [facility]. Is currently doing HHPT.    DATE OF PROCEDURE:  3/22/2018     ATTENDING SURGEON: Surgeon(s) and Role:     * Jennifer David MD - Primary     FIRST ASSISTANT: Khurram Payne PA-C      No resident or fellow was available throughout the entire procedure as a result it was medically necessary for Khurram Payne PA-C to perform first assistant duties.        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   HENT: Negative for hearing loss.    Eyes: Negative for blurred vision and visual disturbance.   Cardiovascular: Negative for chest pain and leg swelling.   Respiratory: Negative for shortness of breath.    Endocrine: Negative for polyuria.   Hematologic/Lymphatic: Negative for bleeding problem.   Skin: Negative for rash.   Musculoskeletal: Positive for joint pain. Negative for back pain, joint swelling, muscle cramps and muscle weakness.   Gastrointestinal: Negative for melena.   Genitourinary: Negative for hematuria.   Neurological: Negative for loss of balance, numbness and paresthesias.   Psychiatric/Behavioral: Negative for altered mental status.       Pain Related Questions  Over the past 3 days, what was your average pain during activity? (I.e. running, jogging, walking, climbing stairs, getting dressed, ect.): 8  Over the past 3 days, what was your highest pain level?: 8  Over the past 3 days, what was your lowest pain level? : 0    Other  How  many nights a week are you awakened by your affected body part?: 3  Was the patient's HEIGHT measured or patient reported?: Patient Reported  Was the patient's WEIGHT measured or patient reported?: Measured      Objective:        General: Sanjeev is well-developed, well-nourished, appears stated age, in no acute distress, alert and oriented to time, place and person.     General    Vitals reviewed.  Constitutional: He is oriented to person, place, and time. He appears well-developed and well-nourished. No distress.   HENT:   Mouth/Throat: No oropharyngeal exudate.   Eyes: Right eye exhibits no discharge. Left eye exhibits no discharge.   Neck: Normal range of motion.   Pulmonary/Chest: Effort normal and breath sounds normal. No respiratory distress.   Neurological: He is alert and oriented to person, place, and time. He has normal reflexes. No cranial nerve deficit. Coordination normal.   Psychiatric: He has a normal mood and affect. His behavior is normal. Judgment and thought content normal.     General Musculoskeletal Exam   Gait: normal       Right Knee Exam     Inspection   Erythema: absent  Scars: present  Swelling: absent  Effusion: effusion  Deformity: deformity  Bruising: absent    Tenderness   The patient is experiencing no tenderness.         Range of Motion   Extension: 0   Flexion: 130     Tests   Meniscus   Jael:  Medial - negative Lateral - negative  Ligament Examination Lachman: normal (-1 to 2mm) PCL-Posterior Drawer: normal (0 to 2mm)     MCL - Valgus: normal (0 to 2mm)  LCL - Varus: normalPivot Shift: normal (Equal)Reverse Pivot Shift: normal (Equal)Dial Test at 30 degrees: normal (< 5 degrees)Dial Test at 90 degrees: normal (< 5 degrees)  Posterior Sag Test: negative  Posterolateral Corner: unstable (>15 degrees difference)  Patella   Patellar Apprehension: negative  Passive Patellar Tilt: neutral  Patellar Tracking: normal  Patellar Glide (quadrants): Lateral - 1   Medial - 2  Q-Angle at 90  degrees: normal  Patellar Grind: negative  J-Sign: none    Other   Meniscal Cyst: absent  Popliteal (Baker's) Cyst: absent  Sensation: normal    Left Knee Exam     Inspection   Erythema: absent  Scars: absent  Swelling: absent  Effusion: absent  Deformity: deformity  Bruising: absent    Tenderness   The patient tender to palpation of the medial joint line.    Range of Motion   Extension: 0   Flexion: 130     Tests   Meniscus   Jael:  Medial - positive Lateral - negative  Stability Lachman: normal (-1 to 2mm) PCL-Posterior Drawer: normal (0 to 2mm)  MCL - Valgus: normal (0 to 2mm)  LCL - Varus: normal (0 to 2mm)Pivot Shift: normal (Equal)Reverse Pivot Shift: normal (Equal)Dial Test at 30 degrees: normal (< 5 degrees)Dial Test at 90 degrees: normal (< 5 degrees)  Posterior Sag Test: negative  Posterolateral Corner: unstable (>15 degrees difference)  Patella   Patellar Apprehension: negative  Passive Patellar Tilt: neutral  Patellar Tracking: normal  Patellar Glide (Quadrants): Lateral - 1 Medial - 2  Q-Angle at 90 degrees: normal  Patellar Grind: negative  J-Sign: J sign absent    Other   Meniscal Cyst: absent  Popliteal (Baker's) Cyst: absent  Sensation: normal    Right Hip Exam     Tests   Cynthia: negative  Left Hip Exam     Tests   Cynthia: negative          Muscle Strength   Right Lower Extremity   Hip Abduction: 5/5   Quadriceps:  5/5   Hamstrin/5   Left Lower Extremity   Hip Abduction: 5/5   Quadriceps:  5/5   Hamstrin/5     Reflexes     Left Side  Quadriceps:  2+  Achilles:  2+    Right Side   Quadriceps:  2+  Achilles:  2+    Vascular Exam     Right Pulses  Dorsalis Pedis:      2+  Posterior Tibial:      2+        Left Pulses  Dorsalis Pedis:      2+  Posterior Tibial:      2+        XRAY AP LAT and Merchant views taken and reviewed personally by me show end-stage osteoarthritis of left knee;  There is no evidence of fracture or dislocation  EXAMINATION:  XR KNEE 1 OR 2 VIEW LEFT    CLINICAL  HISTORY:  Pain in left knee    TECHNIQUE:  AP and lateral views of the left knee are performed.    COMPARISON:  03/22/2018.    FINDINGS:  There are stable postoperative changes from a total left knee arthroplasty, without dislocations or loosening of the prosthesis or significant joint effusion.  Resurfacing of the articular surface of the patella also remains without significant change.  The drain within the joint space has been removed since the previous study.    There is minimal prepatellar soft tissue swelling.   Impression       Stable postoperative changes from total left knee arthroplasty as above.             Assessment:       Encounter Diagnoses   Name Primary?    Surgical aftercare, musculoskeletal system Yes    Left knee pain, unspecified chronicity     H/O total knee replacement, left           Plan:       1. IKDC, SF-12 and KOOS was not filled out today in clinic.     RTC in 4 weeks with Dr. Jennifer David Patient will not fill out IKDC, SF-12 and KOOS on return.    2. Cont PT per protocol    3. jobst stocking      [facility] patient questionnaires have been collected today.     none

## 2023-05-31 NOTE — ASU PATIENT PROFILE, ADULT - NSICDXPASTMEDICALHX_GEN_ALL_CORE_FT
PAST MEDICAL HISTORY:  Bladder cancer secondary to exposure at [employer] 9/11 s/p cystectomy with urostomy    Chronic kidney disease (CKD) stage 3A, baseline creatinine 1.4    Diabetes mellitus, type 2     DVT, lower extremity     History of atrial fibrillation     Hyperlipidemia     Hypertension

## 2023-05-31 NOTE — CHART NOTE - NSCHARTNOTEFT_GEN_A_CORE
PACU ANESTHESIA ADMISSION NOTE      Procedure:   Post op diagnosis:      ____  Intubated  TV:______       Rate: ______      FiO2: ______    _x___  Patent Airway    _x___  Full return of protective reflexes    _x___  Full recovery from anesthesia / back to baseline status    Vitals:  T(C): 37.4  HR: 72  BP: 115/62  RR: 16  SpO2: 97  Mental Status:  _x___ Awake   _____ Alert   _____ Drowsy   _____ Sedated    Nausea/Vomiting:  _x___  NO       ______Yes,   See Post - Op Orders         Pain Scale (0-10):  __0___    Treatment: _x___ None    ____ See Post - Op/PCA Orders    Post - Operative Fluids:   __x__ Oral   ____ See Post - Op Orders    Plan: Discharge:   _x___Home       _____Floor     _____Critical Care    _____  Other:_________________    Comments:  No anesthesia issues or complications noted.  Discharge when criteria met.

## 2023-05-31 NOTE — ASU PATIENT PROFILE, ADULT - NSICDXPASTSURGICALHX_GEN_ALL_CORE_FT
PAST SURGICAL HISTORY:  History of total cystectomy with resultant urostomy    History of total hip replacement, bilateral     History of total knee replacement, bilateral     S/P IVC filter

## 2023-05-31 NOTE — ASU DISCHARGE PLAN (ADULT/PEDIATRIC) - NS MD DC FALL RISK RISK
For information on Fall & Injury Prevention, visit: [email or web address] OR  [email or web address] OR  https://www.cdc.gov/steadi/patient.html

## 2023-06-01 ENCOUNTER — NON-APPOINTMENT (OUTPATIENT)
Age: 83
End: 2023-06-01

## 2023-06-01 DIAGNOSIS — I12.9 HYPERTENSIVE CHRONIC KIDNEY DISEASE WITH STAGE 1 THROUGH STAGE 4 CHRONIC KIDNEY DISEASE, OR UNSPECIFIED CHRONIC KIDNEY DISEASE: ICD-10-CM

## 2023-06-01 DIAGNOSIS — E11.22 TYPE 2 DIABETES MELLITUS WITH DIABETIC CHRONIC KIDNEY DISEASE: ICD-10-CM

## 2023-06-01 DIAGNOSIS — N20.0 CALCULUS OF KIDNEY: ICD-10-CM

## 2023-06-01 DIAGNOSIS — Z79.84 LONG TERM (CURRENT) USE OF ORAL HYPOGLYCEMIC DRUGS: ICD-10-CM

## 2023-06-01 DIAGNOSIS — F17.210 NICOTINE DEPENDENCE, CIGARETTES, UNCOMPLICATED: ICD-10-CM

## 2023-06-01 DIAGNOSIS — Z96.653 PRESENCE OF ARTIFICIAL KNEE JOINT, BILATERAL: ICD-10-CM

## 2023-06-01 DIAGNOSIS — E78.00 PURE HYPERCHOLESTEROLEMIA, UNSPECIFIED: ICD-10-CM

## 2023-06-01 DIAGNOSIS — I10 ESSENTIAL (PRIMARY) HYPERTENSION: ICD-10-CM

## 2023-06-01 DIAGNOSIS — N18.9 CHRONIC KIDNEY DISEASE, UNSPECIFIED: ICD-10-CM

## 2023-06-01 DIAGNOSIS — Z96.643 PRESENCE OF ARTIFICIAL HIP JOINT, BILATERAL: ICD-10-CM

## 2023-06-02 ENCOUNTER — APPOINTMENT (OUTPATIENT)
Dept: UROLOGY | Facility: CLINIC | Age: 83
End: 2023-06-02
Payer: MEDICARE

## 2023-06-02 VITALS
HEIGHT: 72 IN | HEART RATE: 77 BPM | SYSTOLIC BLOOD PRESSURE: 148 MMHG | TEMPERATURE: 98.6 F | RESPIRATION RATE: 15 BRPM | BODY MASS INDEX: 38.6 KG/M2 | OXYGEN SATURATION: 98 % | WEIGHT: 285 LBS | DIASTOLIC BLOOD PRESSURE: 74 MMHG

## 2023-06-02 DIAGNOSIS — R31.0 GROSS HEMATURIA: ICD-10-CM

## 2023-06-02 PROCEDURE — 99213 OFFICE O/P EST LOW 20 MIN: CPT | Mod: 24

## 2023-06-02 NOTE — ASSESSMENT
[FreeTextEntry1] : As far as the right side it is done, the blood in the conduit is likely coming from the right kidney and though theoretically this could be a developing pseudoaneurysm is more likely just the increased bleeding do expect from Eliquis.  His cardiologist really does not want him off the Eliquis any longer that he has to be and I can always transfuse him a lot more easily than I could take care of a heart attack or stroke.  He and the family are aware that this is not normal but there is not much I can do about it at present.  If he begins to get clots he will give a call.  They understand that Dr. Maciel is on over the weekend I will have my phone with me and that if there is a problem but they feel it could wait until Monday I will be in the office by 830 Monday morning\par \par We then discussed the left side.  I got a culture from the nephrostomy using sterile technique, we will send that off as a special 10 and I should have the results by Monday.  Depending on the results I will discuss with infectious disease with antibiotics to use.  He is scheduled for Wednesday at [facility] and I expect to use the petri sheath which will be a little bit smaller though obviously it does not stop the risk of bleeding it should decrease it.  He knows to stop the Eliquis on Sunday and if he continues to bleed I may get a CBC on Monday and be prepared to transfuse him

## 2023-06-02 NOTE — LETTER HEADER
[FreeTextEntry3] : Geni Molina M.D.\par Director Emeritus of Urology\par [facility]\par [address] [place] [address], [address]\[place] [address]

## 2023-06-02 NOTE — LETTER BODY
[Dear  ___] : Dear  [unfilled], [Courtesy Letter:] : I had the pleasure of seeing your patient, [unfilled], in my office today. [Please see my note below.] : Please see my note below. [Sincerely,] : Sincerely, [FreeTextEntry2] : Evan Bull MD\par [facility] [address]\[place] [address]

## 2023-06-02 NOTE — PHYSICAL EXAM
[General Appearance - Well Developed] : well developed [General Appearance - Well Nourished] : well nourished [Normal Appearance] : normal appearance [Well Groomed] : well groomed [General Appearance - In No Acute Distress] : no acute distress [Abdomen Soft] : soft [Abdomen Tenderness] : non-tender [Costovertebral Angle Tenderness] : no ~M costovertebral angle tenderness [FreeTextEntry1] : The right flank access incision is closed at the left side access site is clean [Oriented To Time, Place, And Person] : oriented to person, place, and time [Affect] : the affect was normal [Mood] : the mood was normal [Not Anxious] : not anxious [Normal Station and Gait] : the gait and station were normal for the patient's age

## 2023-06-02 NOTE — HISTORY OF PRESENT ILLNESS
[FreeTextEntry1] : Ellis is an 82-year-old male who on October 29, 1940 who underwent a right PCNL May 30, 2023 with all of the right-sided hardware removed yesterday with the left-sided hardware adjusted and now he has access down into the ileal conduit.  He restarted the Eliquis and he is bleeding, not heavily with clots but enough, make the urine will close up to a more low.  The left drainage bag is a little more anton while the conduit is close to the temporal lobe.  He is feeling okay there is no dizziness with stable vital signs.  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 [Hematuria - Gross] : gross hematuria

## 2023-06-05 ENCOUNTER — NON-APPOINTMENT (OUTPATIENT)
Age: 83
End: 2023-06-05

## 2023-06-05 LAB — URINE CULTURE <10: NORMAL

## 2023-06-07 ENCOUNTER — APPOINTMENT (OUTPATIENT)
Dept: UROLOGY | Facility: HOSPITAL | Age: 83
End: 2023-06-07

## 2023-06-07 ENCOUNTER — RESULT REVIEW (OUTPATIENT)
Age: 83
End: 2023-06-07

## 2023-06-07 ENCOUNTER — TRANSCRIPTION ENCOUNTER (OUTPATIENT)
Age: 83
End: 2023-06-07

## 2023-06-07 ENCOUNTER — OUTPATIENT (OUTPATIENT)
Dept: INPATIENT UNIT | Facility: HOSPITAL | Age: 83
LOS: 1 days | Discharge: HOME | End: 2023-06-07
Payer: MEDICARE

## 2023-06-07 VITALS
HEIGHT: 72 IN | SYSTOLIC BLOOD PRESSURE: 159 MMHG | WEIGHT: 285.06 LBS | OXYGEN SATURATION: 97 % | RESPIRATION RATE: 17 BRPM | HEART RATE: 69 BPM | TEMPERATURE: 96 F | DIASTOLIC BLOOD PRESSURE: 72 MMHG

## 2023-06-07 VITALS — DIASTOLIC BLOOD PRESSURE: 71 MMHG | SYSTOLIC BLOOD PRESSURE: 160 MMHG | RESPIRATION RATE: 17 BRPM | HEART RATE: 61 BPM

## 2023-06-07 DIAGNOSIS — I65.23 OCCLUSION AND STENOSIS OF BILATERAL CAROTID ARTERIES: ICD-10-CM

## 2023-06-07 DIAGNOSIS — I48.91 UNSPECIFIED ATRIAL FIBRILLATION: ICD-10-CM

## 2023-06-07 DIAGNOSIS — Z96.643 PRESENCE OF ARTIFICIAL HIP JOINT, BILATERAL: Chronic | ICD-10-CM

## 2023-06-07 DIAGNOSIS — Z95.828 PRESENCE OF OTHER VASCULAR IMPLANTS AND GRAFTS: Chronic | ICD-10-CM

## 2023-06-07 DIAGNOSIS — I35.1 NONRHEUMATIC AORTIC (VALVE) INSUFFICIENCY: ICD-10-CM

## 2023-06-07 DIAGNOSIS — N20.0 CALCULUS OF KIDNEY: ICD-10-CM

## 2023-06-07 DIAGNOSIS — Z90.6 ACQUIRED ABSENCE OF OTHER PARTS OF URINARY TRACT: Chronic | ICD-10-CM

## 2023-06-07 DIAGNOSIS — I34.0 NONRHEUMATIC MITRAL (VALVE) INSUFFICIENCY: ICD-10-CM

## 2023-06-07 DIAGNOSIS — Z98.890 OTHER SPECIFIED POSTPROCEDURAL STATES: Chronic | ICD-10-CM

## 2023-06-07 DIAGNOSIS — I36.0 NONRHEUMATIC TRICUSPID (VALVE) STENOSIS: ICD-10-CM

## 2023-06-07 DIAGNOSIS — Z96.653 PRESENCE OF ARTIFICIAL KNEE JOINT, BILATERAL: Chronic | ICD-10-CM

## 2023-06-07 DIAGNOSIS — M79.89 OTHER SPECIFIED SOFT TISSUE DISORDERS: ICD-10-CM

## 2023-06-07 LAB — GLUCOSE BLDC GLUCOMTR-MCNC: 122 MG/DL — HIGH (ref 70–99)

## 2023-06-07 PROCEDURE — C1889: CPT

## 2023-06-07 PROCEDURE — 50951 ENDOSCOPY OF URETER: CPT | Mod: 58,LT

## 2023-06-07 PROCEDURE — 74018 RADEX ABDOMEN 1 VIEW: CPT | Mod: 26

## 2023-06-07 PROCEDURE — C2617: CPT

## 2023-06-07 PROCEDURE — 50080 PERQ NL/PL LITHOTRP SMPL<2CM: CPT | Mod: 58,LT

## 2023-06-07 PROCEDURE — 82962 GLUCOSE BLOOD TEST: CPT

## 2023-06-07 PROCEDURE — 50684 INJECTION FOR URETER X-RAY: CPT | Mod: 58,59,LT

## 2023-06-07 PROCEDURE — 74425 UROGRAPHY ANTEGRADE RS&I: CPT | Mod: 26,LT

## 2023-06-07 PROCEDURE — 50387 CHANGE NEPHROURETERAL CATH: CPT | Mod: 58,LT

## 2023-06-07 PROCEDURE — C9399: CPT

## 2023-06-07 PROCEDURE — 50081 PERQ NL/PL LITHOTRP CPLX>2CM: CPT | Mod: 58,XS,LT

## 2023-06-07 PROCEDURE — 82365 CALCULUS SPECTROSCOPY: CPT

## 2023-06-07 PROCEDURE — C1769: CPT

## 2023-06-07 PROCEDURE — 74018 RADEX ABDOMEN 1 VIEW: CPT

## 2023-06-07 RX ORDER — SODIUM BICARBONATE 1 MEQ/ML
1 SYRINGE (ML) INTRAVENOUS
Refills: 0 | DISCHARGE

## 2023-06-07 RX ORDER — SODIUM CHLORIDE 9 MG/ML
1000 INJECTION, SOLUTION INTRAVENOUS
Refills: 0 | Status: DISCONTINUED | OUTPATIENT
Start: 2023-06-07 | End: 2023-06-07

## 2023-06-07 RX ORDER — FUROSEMIDE 40 MG
1 TABLET ORAL
Refills: 0 | DISCHARGE

## 2023-06-07 RX ORDER — GLIMEPIRIDE 1 MG
1.5 TABLET ORAL
Refills: 0 | DISCHARGE

## 2023-06-07 RX ORDER — LINEZOLID 600 MG/300ML
600 INJECTION, SOLUTION INTRAVENOUS ONCE
Refills: 0 | Status: COMPLETED | OUTPATIENT
Start: 2023-06-07 | End: 2023-06-07

## 2023-06-07 RX ORDER — HYDROMORPHONE HYDROCHLORIDE 2 MG/ML
0.5 INJECTION INTRAMUSCULAR; INTRAVENOUS; SUBCUTANEOUS
Refills: 0 | Status: DISCONTINUED | OUTPATIENT
Start: 2023-06-07 | End: 2023-06-07

## 2023-06-07 RX ORDER — POTASSIUM CITRATE MONOHYDRATE 100 %
1 POWDER (GRAM) MISCELLANEOUS
Refills: 0 | DISCHARGE

## 2023-06-07 RX ORDER — SEMAGLUTIDE 0.68 MG/ML
1 INJECTION, SOLUTION SUBCUTANEOUS
Refills: 0 | DISCHARGE

## 2023-06-07 RX ORDER — ONDANSETRON 8 MG/1
4 TABLET, FILM COATED ORAL ONCE
Refills: 0 | Status: DISCONTINUED | OUTPATIENT
Start: 2023-06-07 | End: 2023-06-07

## 2023-06-07 RX ORDER — OXYCODONE HYDROCHLORIDE 5 MG/1
10 TABLET ORAL ONCE
Refills: 0 | Status: DISCONTINUED | OUTPATIENT
Start: 2023-06-07 | End: 2023-06-07

## 2023-06-07 RX ORDER — APIXABAN 2.5 MG/1
1 TABLET, FILM COATED ORAL
Qty: 0 | Refills: 0 | DISCHARGE

## 2023-06-07 RX ADMIN — LINEZOLID 300 MILLIGRAM(S): 600 INJECTION, SOLUTION INTRAVENOUS at 11:00

## 2023-06-07 NOTE — ASU PATIENT PROFILE, ADULT - NSICDXPASTMEDICALHX_GEN_ALL_CORE_FT
Principal Discharge DX:	Pain in both lower extremities PAST MEDICAL HISTORY:  Bladder cancer secondary to exposure at [employer] 9/11 s/p cystectomy with urostomy    Chronic kidney disease (CKD) stage 3A, baseline creatinine 1.4    Diabetes mellitus, type 2     DVT, lower extremity     History of atrial fibrillation     Hyperlipidemia     Hypertension

## 2023-06-07 NOTE — ASU PATIENT PROFILE, ADULT - NSICDXPASTSURGICALHX_GEN_ALL_CORE_FT
PAST SURGICAL HISTORY:  H/O ureteroileostomy     History of total cystectomy with resultant urostomy    History of total hip replacement, bilateral     History of total knee replacement, bilateral     S/P IVC filter

## 2023-06-07 NOTE — BRIEF OPERATIVE NOTE - NSICDXBRIEFPOSTOP_GEN_ALL_CORE_FT
POST-OP DIAGNOSIS:  Left renal stone 07-Jun-2023 14:09:11  Geni Molina  Left ureteral stone 07-Jun-2023 14:09:26  Geni Molina

## 2023-06-07 NOTE — ASU DISCHARGE PLAN (ADULT/PEDIATRIC) - PROVIDER TOKENS
PROVIDER:[TOKEN:[[id number]],SCHEDULEDAPPT:[06/08/2023],SCHEDULEDAPPTTIME:[09:00 AM],ESTABLISHEDPATIENT:[T]],PROVIDER:[TOKEN:[[id number]],ESTABLISHEDPATIENT:[T]]

## 2023-06-07 NOTE — ASU DISCHARGE PLAN (ADULT/PEDIATRIC) - CARE PROVIDER_API CALL
Zaki Collier [facility]  Interventional Radiology and Diagnostic Radiology  [address] [place] [address]  Phone: [phone]  Fax: [phone]  Established Patient  Scheduled Appointment: 06/08/2023 09:00 AM    Geni Molina  Urology  [address] [place] [address], [address] [address]  Phone: [phone]  Fax: [phone]  Established Patient  Follow Up Time:

## 2023-06-07 NOTE — BRIEF OPERATIVE NOTE - NSICDXBRIEFPROCEDURE_GEN_ALL_CORE_FT
PROCEDURES:  Percutaneous removal of calculus of kidney, 2 cm or more in diameter 07-Jun-2023 14:05:57  Geni Molina  Removal, calculus, kidney, percutaneous, less than 2 cm in diameter 07-Jun-2023 14:06:23  Geni Molina  Antegrade ureteroscopy 07-Jun-2023 14:07:05  Geni Molina  Change external ureteral stent 07-Jun-2023 14:07:43  Geni Molina  Nephrostogram 07-Jun-2023 14:08:15  Geni Molina  Injection for left ureterography 07-Jun-2023 14:08:45  Geni Molina

## 2023-06-07 NOTE — BRIEF OPERATIVE NOTE - OPERATION/FINDINGS
there was a 10 mm stone in the mid left ureter too big too basket and needed to broken with the laser then basketed or pushed into the ileal conduit  left lower pole with numerous stones basketed with flex ureteroscope and dimension 2.4 / 3.0  anterior calyx emptied with mini scope and annie 15/16

## 2023-06-07 NOTE — PRE-ANESTHESIA EVALUATION ADULT - NSATTENDATTESTRD_GEN_ALL_CORE
The patient has been re-examined and I agree with the above assessment or I updated with my findings. 2-point gait

## 2023-06-08 ENCOUNTER — RESULT REVIEW (OUTPATIENT)
Age: 83
End: 2023-06-08

## 2023-06-08 ENCOUNTER — TRANSCRIPTION ENCOUNTER (OUTPATIENT)
Age: 83
End: 2023-06-08

## 2023-06-08 ENCOUNTER — OUTPATIENT (OUTPATIENT)
Dept: OUTPATIENT SERVICES | Facility: HOSPITAL | Age: 83
LOS: 1 days | Discharge: HOME | End: 2023-06-08
Payer: MEDICARE

## 2023-06-08 VITALS
OXYGEN SATURATION: 99 % | HEIGHT: 72 IN | RESPIRATION RATE: 18 BRPM | SYSTOLIC BLOOD PRESSURE: 160 MMHG | TEMPERATURE: 99 F | HEART RATE: 65 BPM | DIASTOLIC BLOOD PRESSURE: 70 MMHG | WEIGHT: 285.06 LBS

## 2023-06-08 VITALS
OXYGEN SATURATION: 100 % | HEART RATE: 62 BPM | DIASTOLIC BLOOD PRESSURE: 70 MMHG | SYSTOLIC BLOOD PRESSURE: 146 MMHG | RESPIRATION RATE: 18 BRPM | TEMPERATURE: 97 F

## 2023-06-08 DIAGNOSIS — N20.0 CALCULUS OF KIDNEY: ICD-10-CM

## 2023-06-08 DIAGNOSIS — Z90.6 ACQUIRED ABSENCE OF OTHER PARTS OF URINARY TRACT: Chronic | ICD-10-CM

## 2023-06-08 DIAGNOSIS — Z95.828 PRESENCE OF OTHER VASCULAR IMPLANTS AND GRAFTS: Chronic | ICD-10-CM

## 2023-06-08 DIAGNOSIS — Z96.653 PRESENCE OF ARTIFICIAL KNEE JOINT, BILATERAL: Chronic | ICD-10-CM

## 2023-06-08 DIAGNOSIS — Z98.890 OTHER SPECIFIED POSTPROCEDURAL STATES: Chronic | ICD-10-CM

## 2023-06-08 DIAGNOSIS — Z96.643 PRESENCE OF ARTIFICIAL HIP JOINT, BILATERAL: Chronic | ICD-10-CM

## 2023-06-08 LAB
CELL MATERIAL STONE EST-MCNT: SIGNIFICANT CHANGE UP
LABORATORY COMMENT REPORT: SIGNIFICANT CHANGE UP
NIDUS STONE QN: SIGNIFICANT CHANGE UP

## 2023-06-08 PROCEDURE — 74176 CT ABD & PELVIS W/O CONTRAST: CPT | Mod: MG

## 2023-06-08 PROCEDURE — C1894: CPT

## 2023-06-08 PROCEDURE — 50389 REMOVE RENAL TUBE W/FLUORO: CPT | Mod: LT

## 2023-06-08 PROCEDURE — C1769: CPT

## 2023-06-08 PROCEDURE — C9399: CPT

## 2023-06-08 PROCEDURE — G1004: CPT

## 2023-06-08 PROCEDURE — 74176 CT ABD & PELVIS W/O CONTRAST: CPT | Mod: 26,MG

## 2023-06-08 NOTE — ASU DISCHARGE PLAN (ADULT/PEDIATRIC) - NS MD DC FALL RISK RISK
For information on Fall & Injury Prevention, visit: [email or web address] OR  [email or web address] OR  https://www.cdc.gov/steadi/patient.html

## 2023-06-08 NOTE — PROGRESS NOTE ADULT - SUBJECTIVE AND OBJECTIVE BOX
Interventional Radiology Outpatient Documentation    PREOPERATIVE DAY OF PROCEDURE EVALUATION:     I have personally seen and examined this patient. I agree with the history and physical which I have reviewed and noted any changes below:     Plan is for left over-the wire nephroureterogram with sedation administered by Anesthesiology.    Procedure/ risks/ benefits/ goals/ alternatives were explained. All questions answered. Informed content obtained from patient. Consent placed in chart.     POSTOPERATIVE PROCEDURAL EVALUATION:     Procedure:  Left over-the-wire nephroureterogram, removal of catheter    Pre-Op Diagnosis:  s/p left PCNL    Post-Op Diagnosis: same    Attending: Amira  Resident: None    Anesthesia (type):  [ ] General Anesthesia  [ x] Sedation administered by Anesthesiology  [ ] Spinal Anesthesia  [ x] Local/Regional    Contrast: 10 cc     Estimated Blood Loss:  None    Condition:   [ ] Critical  [ ] Serious  [ ] Fair   [x ] Good    Findings/Follow up Plan of Care:  No evidence of obstruction or residual stones.  Catheter was removed.    See full report in pacs    Complications: None    Disposition: Recovery then home.

## 2023-06-08 NOTE — CHART NOTE - NSCHARTNOTEFT_GEN_A_CORE
PACU ANESTHESIA ADMISSION NOTE      Procedure:   Post op diagnosis:      ____  Intubated  TV:______       Rate: ______      FiO2: ______    __x__  Patent Airway    __x__  Full return of protective reflexes    __x__  Full recovery from anesthesia / back to baseline status    Vitals:  T(C): 37 (06-08-23 @ 12:02), Max: 37 (06-08-23 @ 10:55)  HR: 65 (06-08-23 @ 12:02) (65 - 65)  BP: 160/70 (06-08-23 @ 12:02) (160/70 - 160/70)  RR: 18 (06-08-23 @ 12:02) (18 - 18)  SpO2: 99% (06-08-23 @ 12:02) (99% - 99%)    Mental Status:  __x__ Awake   ___x__ Alert   _____ Drowsy   _____ Sedated    Nausea/Vomiting:  __x__ NO  ______Yes,   See Post - Op Orders          Pain Scale (0-10):  _____    Treatment: ____ None    __x__ See Post - Op/PCA Orders    Post - Operative Fluids:   ____ Oral   __x__ See Post - Op Orders    Plan: Discharge:   __x__Home       _____Floor     _____Critical Care    _____  Other:_________________    Comments: Patient had smooth intraoperative event, no anesthesia complication.  PACU Vital signs: HR:   63         BP:    121    /  62        RR: 17            O2 Sat:  99     %     Temp 97.9

## 2023-06-09 ENCOUNTER — APPOINTMENT (OUTPATIENT)
Dept: UROLOGY | Facility: CLINIC | Age: 83
End: 2023-06-09
Payer: MEDICARE

## 2023-06-09 VITALS
HEART RATE: 64 BPM | WEIGHT: 282 LBS | RESPIRATION RATE: 16 BRPM | TEMPERATURE: 97.1 F | OXYGEN SATURATION: 98 % | HEIGHT: 72 IN | SYSTOLIC BLOOD PRESSURE: 141 MMHG | DIASTOLIC BLOOD PRESSURE: 79 MMHG | BODY MASS INDEX: 38.19 KG/M2

## 2023-06-09 DIAGNOSIS — Z00.00 ENCOUNTER FOR GENERAL ADULT MEDICAL EXAMINATION W/OUT ABNORMAL FINDINGS: ICD-10-CM

## 2023-06-09 DIAGNOSIS — N20.0 CALCULUS OF KIDNEY: ICD-10-CM

## 2023-06-09 DIAGNOSIS — A49.1 STREPTOCOCCAL INFECTION, UNSPECIFIED SITE: ICD-10-CM

## 2023-06-09 DIAGNOSIS — Z16.21 STREPTOCOCCAL INFECTION, UNSPECIFIED SITE: ICD-10-CM

## 2023-06-09 PROCEDURE — 99214 OFFICE O/P EST MOD 30 MIN: CPT | Mod: 24

## 2023-06-09 NOTE — PHYSICAL EXAM
[General Appearance - Well Developed] : well developed [General Appearance - Well Nourished] : well nourished [Normal Appearance] : normal appearance [Well Groomed] : well groomed [General Appearance - In No Acute Distress] : no acute distress [Abdomen Soft] : soft [Abdomen Tenderness] : non-tender [Costovertebral Angle Tenderness] : no ~M costovertebral angle tenderness [] : no respiratory distress [Respiration, Rhythm And Depth] : normal respiratory rhythm and effort [Exaggerated Use Of Accessory Muscles For Inspiration] : no accessory muscle use [Oriented To Time, Place, And Person] : oriented to person, place, and time [Affect] : the affect was normal [Mood] : the mood was normal [Not Anxious] : not anxious [Normal Station and Gait] : the gait and station were normal for the patient's age [FreeTextEntry1] : urine is dark tea in color without clots

## 2023-06-09 NOTE — LETTER BODY
[Dear  ___] : Dear  [unfilled], [Courtesy Letter:] : I had the pleasure of seeing your patient, [unfilled], in my office today. [Please see my note below.] : Please see my note below. [Sincerely,] : Sincerely, [FreeTextEntry2] : Evan Bull MD\par [facility] [address]\[place] [address]

## 2023-06-09 NOTE — ADDENDUM
[FreeTextEntry1] : Diet modification for stone includes:\par \par Increasing fluid intake to produce 2 to 2.5 liters of urine per day (approx 3 liters intake), and should be primarily water. \par \par Calcium intake should be approximately 1000 mg per day. \par \par Oxalate intake should be reduced- most common sources in diet which have very high levels include peanuts/nuts, tea, coffee, chocolate, spinach, beets, rhubarb, swiss chard. I've also given/directed to a list with other high oxalate containing foods.\par  \par Animal flesh protein should be controlled- approx 4 to 6 ounces per day, with some vegetarian days included in the week. Studies have shown that vegetarians have half the risk of stones of people who eat only 4 ounces per day of meat or fish of any kind (beef, chicken, fish, shellfish, pork, etc), indicating that a vegetarian lifestyle can decrease future stone risks.\par \par Finally, salt intake should be reduced as high levels in the diet will increase urinary calcium. <2400 mg per day on a low sodium diet is strongly recommended.\par \par Citrate is a benefit; lisa and limes with most citrate and least sugar- recommend 'a lemon or lime a day'; easiest with concentrate, mixed into water or other beverages.\par \par www.[employer].com ---> in the lower left column there is a link for "diet resources"

## 2023-06-09 NOTE — LETTER HEADER
[FreeTextEntry3] : Geni Molina M.D.\par Director Emeritus of Urology\par [facility]\par [address] [place] [address], [address]\[place] [address]

## 2023-06-09 NOTE — ASSESSMENT
[FreeTextEntry1] : He has tiny flecks of stones on both sides, but his stones were uric acid.  On the positive side it means that we should be able to dissolve the remaining flexible white granulated sugar and healthy.  On the negative side as it means it probably diet related and if Ellis had trouble controlling his diet he would not have a BMI of 38 in the first place.\par \par We discussed stone prevention in general that includes\par Volume–we want to have a urine output 2-1/2 L a day if we can\par What to avoid which includes calcium, in his case animal protein which includes chicken fish and meat, salt and salt increase his calcium excretion, oxalate which can divides with the calcium to form calcium oxalate crystals which can act as a nidus of which uric acid can grow\par What to increase the inhibitor such as citrate which will keep his pH up which will help keep the uric acid in solution as well as magnesium and potassium which are inhibitors\par \par Generalities are nice but we like patient focused care so we are going to give him about 6 to 8 weeks to get back to his regular activities of daily living and we will then do a metabolic work-up once on the weekend once on a weekday.  At that time we will get an ultrasound to make sure there is no delayed silent obstruction I will see him back in follow-up.  If there are any specifics that we could advise him on we will and then wait a month repeat the metabolic work-up and see if he is following instructions.  If everything is going well overall and good if not we will involve subspecialty consultation with nephrology which may lead to him taking additional medication I would leave that up to the nephrologist.  If we can get him to a dietitian with expertise in stone prevention that would be great unfortunately I do not know of any in [place] that are focusing on that and the general dietitian may be helpful for his obesity but they do not usually do well for stone prevention.  He may contact his insurance company and see if they have someone they can recommend.\par \par To summarize then we will get a urinalysis and culture next week when he changes his stoma bag to see if the VRE is gone\par We will get a metabolic work-up with a renal ultrasound in about 6 to 8 weeks\par I will see him again in 2 to 3 months to go over the data make specific suggestions and see if it works\par He is staying on the Eliquis as bleeding is treatable stroke or heart attack is not to the same extent and if he runs into trouble he will call me.  He will stay extra hydrated to the extent that his heart can tolerate it at least until the bleeding stops

## 2023-06-09 NOTE — HISTORY OF PRESENT ILLNESS
[FreeTextEntry1] : Ellis is an 82-year-old man born October 29, 1940 who had a right PCNL May 30 the left PCNL June 7 is here now to follow-up on the procedure done 2 days ago and it is more important to discuss stone prevention as as much fun as we have had he does not want to go through this again.  He had the CT stentogram done yesterday the hardware was removed he started the Eliquis last night and the urine is pink /somewhat dark tea in color without clots.

## 2023-06-12 ENCOUNTER — LABORATORY RESULT (OUTPATIENT)
Age: 83
End: 2023-06-12

## 2023-06-13 DIAGNOSIS — Z96.643 PRESENCE OF ARTIFICIAL HIP JOINT, BILATERAL: ICD-10-CM

## 2023-06-13 DIAGNOSIS — I12.9 HYPERTENSIVE CHRONIC KIDNEY DISEASE WITH STAGE 1 THROUGH STAGE 4 CHRONIC KIDNEY DISEASE, OR UNSPECIFIED CHRONIC KIDNEY DISEASE: ICD-10-CM

## 2023-06-13 DIAGNOSIS — E78.5 HYPERLIPIDEMIA, UNSPECIFIED: ICD-10-CM

## 2023-06-13 DIAGNOSIS — I48.91 UNSPECIFIED ATRIAL FIBRILLATION: ICD-10-CM

## 2023-06-13 DIAGNOSIS — N20.0 CALCULUS OF KIDNEY: ICD-10-CM

## 2023-06-13 DIAGNOSIS — C67.9 MALIGNANT NEOPLASM OF BLADDER, UNSPECIFIED: ICD-10-CM

## 2023-06-13 DIAGNOSIS — Z79.01 LONG TERM (CURRENT) USE OF ANTICOAGULANTS: ICD-10-CM

## 2023-06-13 DIAGNOSIS — Z96.653 PRESENCE OF ARTIFICIAL KNEE JOINT, BILATERAL: ICD-10-CM

## 2023-06-13 DIAGNOSIS — Z46.6 ENCOUNTER FOR FITTING AND ADJUSTMENT OF URINARY DEVICE: ICD-10-CM

## 2023-06-13 DIAGNOSIS — E11.22 TYPE 2 DIABETES MELLITUS WITH DIABETIC CHRONIC KIDNEY DISEASE: ICD-10-CM

## 2023-08-03 ENCOUNTER — APPOINTMENT (OUTPATIENT)
Dept: UROLOGY | Facility: CLINIC | Age: 83
End: 2023-08-03
Payer: MEDICARE

## 2023-08-03 VITALS
DIASTOLIC BLOOD PRESSURE: 80 MMHG | TEMPERATURE: 98.2 F | HEART RATE: 71 BPM | RESPIRATION RATE: 18 BRPM | HEIGHT: 72 IN | OXYGEN SATURATION: 99 % | SYSTOLIC BLOOD PRESSURE: 154 MMHG | BODY MASS INDEX: 37.79 KG/M2 | WEIGHT: 279 LBS

## 2023-08-03 PROCEDURE — 99214 OFFICE O/P EST MOD 30 MIN: CPT | Mod: 24

## 2023-08-03 RX ORDER — LINEZOLID 600 MG/1
600 TABLET, FILM COATED ORAL
Qty: 6 | Refills: 0 | Status: COMPLETED | COMMUNITY
Start: 2023-06-07 | End: 2023-08-03

## 2023-08-03 RX ORDER — LINEZOLID 600 MG/1
600 TABLET, FILM COATED ORAL
Qty: 6 | Refills: 0 | Status: COMPLETED | COMMUNITY
Start: 2023-05-30 | End: 2023-08-03

## 2023-08-03 RX ORDER — CEPHALEXIN 500 MG/1
500 CAPSULE ORAL
Refills: 0 | Status: COMPLETED | COMMUNITY
End: 2023-08-03

## 2023-08-03 NOTE — LETTER HEADER
[FreeTextEntry3] : Geni Molina M.D.\par  Director Emeritus of Urology\par  [facility]\par  [address] [place] [address], [address]\[place] [address]

## 2023-08-03 NOTE — PHYSICAL EXAM
[General Appearance - Well Developed] : well developed [General Appearance - Well Nourished] : well nourished [Normal Appearance] : normal appearance [Well Groomed] : well groomed [General Appearance - In No Acute Distress] : no acute distress [Abdomen Soft] : soft [Abdomen Tenderness] : non-tender [Costovertebral Angle Tenderness] : no ~M costovertebral angle tenderness [FreeTextEntry1] : The access sites are both clean and closed [] : no respiratory distress [Respiration, Rhythm And Depth] : normal respiratory rhythm and effort [Exaggerated Use Of Accessory Muscles For Inspiration] : no accessory muscle use [Oriented To Time, Place, And Person] : oriented to person, place, and time [Affect] : the affect was normal [Mood] : the mood was normal [Not Anxious] : not anxious [Normal Station and Gait] : the gait and station were normal for the patient's age

## 2023-08-03 NOTE — LETTER BODY
[Dear  ___] : Dear  [unfilled], [Courtesy Letter:] : I had the pleasure of seeing your patient, [unfilled], in my office today. [Please see my note below.] : Please see my note below. [Sincerely,] : Sincerely, [FreeTextEntry2] : Evan Bull MD\par  [facility] [address]\[place] [address]

## 2023-08-03 NOTE — ASSESSMENT
[FreeTextEntry1] : I showed him the orders which had wanted a metabolic work-up to be done the third week in July and he tells me that based on our discussion he has significantly changed his dietary habits unfortunately he did not metabolic collection before he did the change.  We will repeat it x1 and see if the new Ellis is better than the old Ellis.  If so all well and good if not he will see nephrology and see what they want to do  As far as the mild hydro on the ultrasound we will get a repeat the ultrasound in 3 months and see if it is progressive or not.  I am concerned with the 2 new stones on the right side that have already formed.  He had just left after the PCNL and now he has a 4 to 5 mm stone which in such a short time is really a very poor prognostic finding.  If the ultrasound shows persistent stones then we will order a CAT scan and see what we have to do.  Hopefully if they are small enough we might be able to do this ureteroscopically or even with shockwave lithotripsy.  To summarize we will repeat the metabolic work-up any point in the next couple of weeks, have him come in to review it, we will get an ultrasound in early November and see him after the ultrasound

## 2023-08-03 NOTE — HISTORY OF PRESENT ILLNESS
[FreeTextEntry1] : Ellis is an 82-year-old man born October 29, 1940 last seen June 9, 2023 who had a right PCNL May 30 the left PCNL June 7 who had the hardware removed postop day #1 and at that visit we discussed stone prevention.  We arranged to him to get with diet if he feels comfortable, I want to repeat urinalysis and culture because he did have VRE and after waiting 6 to 8 weeks for him to get to bed habits we will get a get a metabolic work-up and a renal ultrasound. Metabolic work-up, was started in June instead of waiting and the ultrasound was done a month ago.  The ultrasound done July 6, 2023 at [facility] radiology shows right side having 6 and 5 mm stones, the left side being cleared with mild bilateral hydronephrosis On the right side, he had an 18 mm upper and a 10 mm lower pole cyst  a 27 mm upper pole cyst On the left side he had  a 27 mm upper pole cyst. .Please note on the CAT scan done postoperatively only had left for punctate stones/fragments on both sides that implies that these new stones on the right are relatively rapid growth if real.  Metabolic work-up was done June 2017 June 28, 2023 The volume was good at 2.5 and 3 L Calcium was okay at 173 they are high at 256 Oxalate was good at 22 and 23 Citrate was terrible at 336 and 374 giving him a pH of 5.7 and 5.6 which is low this coupled with his Uric acid of 862 and 901 gave him Uric acid supersaturation of 1.2 and 1.2 despite these high volumes Sodium was poor at 192 and 221 with good potassium and magnesium at 71 and 75/63 and 91 Phosphorus was high normal to high at 1.16 and 1.48 implying that his uric acid is from 8 which is also fairly consistent with his PCR that was 1.0 and 1.2. His creatinine excretion was 1.6 and 1.8 g for 24 hours which is appropriate.  He brought in blood test from the nephrologist dated July 7, 2023 Hemoglobin was 13.8 with a platelet count of 248,000 The metabolic profile had a glucose of 127 and a creatinine of 1.4 His hemoglobin A1c was 6.9 The urine analysis had trace blood large leukocytes but the microscopic exam showing 22 white cells 2 red cells moderate bacteria but had 5 epithelial cells He did not have a culture  Urine test done from us on June 14, 2023 and no growth culture

## 2023-09-06 NOTE — DISCHARGE NOTE NURSING/CASE MANAGEMENT/SOCIAL WORK - NSDCPETBCESMAN_GEN_ALL_CORE
If you are a smoker, it is important for your health to stop smoking. Please be aware that second hand smoke is also harmful. Xerosis Aggressive Treatment: I recommended application of Cetaphil or CeraVe numerous times a day going to bed to all dry areas. I also prescribed a topical steroid for twice daily use.

## 2023-09-07 ENCOUNTER — APPOINTMENT (OUTPATIENT)
Dept: UROLOGY | Facility: CLINIC | Age: 83
End: 2023-09-07
Payer: MEDICARE

## 2023-09-07 VITALS
TEMPERATURE: 98.1 F | WEIGHT: 282 LBS | HEART RATE: 64 BPM | SYSTOLIC BLOOD PRESSURE: 129 MMHG | BODY MASS INDEX: 38.19 KG/M2 | HEIGHT: 72 IN | DIASTOLIC BLOOD PRESSURE: 74 MMHG

## 2023-09-07 PROCEDURE — 99213 OFFICE O/P EST LOW 20 MIN: CPT

## 2023-09-07 NOTE — PHYSICAL EXAM
[General Appearance - Well Developed] : well developed [General Appearance - Well Nourished] : well nourished [Normal Appearance] : normal appearance [Well Groomed] : well groomed [General Appearance - In No Acute Distress] : no acute distress [FreeTextEntry1] : BMI= 38.25 [Abdomen Soft] : soft [Abdomen Tenderness] : non-tender [Costovertebral Angle Tenderness] : no ~M costovertebral angle tenderness [] : no respiratory distress [Respiration, Rhythm And Depth] : normal respiratory rhythm and effort [Exaggerated Use Of Accessory Muscles For Inspiration] : no accessory muscle use [Oriented To Time, Place, And Person] : oriented to person, place, and time [Affect] : the affect was normal [Mood] : the mood was normal [Not Anxious] : not anxious [Normal Station and Gait] : the gait and station were normal for the patient's age [No Focal Deficits] : no focal deficits

## 2023-09-07 NOTE — HISTORY OF PRESENT ILLNESS
[FreeTextEntry1] : Ellis is an 82-year-old man born October 29, 1940 last seen August 3, 2023 who had a right PCNL May 30 and a left PCNL June 7  We have discussed changes he should make he did What he could A repeat metabolic work-up was done August17 2023 the  volume was good at 3.04 L Calcium was okay at 198 Oxalate was good at 88 Citrate was better at 484 and I cannot explain the pH of 8.5 however with that despite his Uric acid of 670 he had a Uric acid supersaturation of  0 Sodium was still poor in fact worse at 234 with good  potassium at 69 but with a poor  magnesium at 28 Phosphorus was high normal to high at 1.136 with his  PCR better at 0.9 His creatinine excretion was 1. 34  please note his ammonia was high at 203 and that coupled with the very high pH implies that he may have an infection something we will need to look into

## 2023-09-07 NOTE — LETTER HEADER
[FreeTextEntry3] : Geni Molina MD  [address] [place] [address], [address] [place] [id number]

## 2023-09-07 NOTE — ASSESSMENT
[FreeTextEntry1] : His numbers are better but some of them do not make sense I am going to have him take this report to his nephrologist Dr. Cope and let them work on it.  From an anatomical viewpoint the last ultrasound in July showed some residual stones in the right lower pole now up to 5 and 6 mm.  In June 2023 had only showed punctate stones on both sides.  What we will do is get a CAT scan in the new year have him come back in we will review it and see if this is really an over read on the heart or the ultrasound or if he is in fact growing new stones this quickly and is so decide what to do to take care of it before it becomes big enough that we have to go through his back.

## 2023-09-12 ENCOUNTER — LABORATORY RESULT (OUTPATIENT)
Age: 83
End: 2023-09-12

## 2023-09-21 LAB
APPEARANCE: CLEAR
BILIRUBIN URINE: NEGATIVE
BLOOD URINE: ABNORMAL
COLOR: YELLOW
GLUCOSE QUALITATIVE U: NEGATIVE MG/DL
KETONES URINE: NEGATIVE MG/DL
LEUKOCYTE ESTERASE URINE: ABNORMAL
NITRITE URINE: POSITIVE
PH URINE: 8.5
PROTEIN URINE: NORMAL MG/DL
SPECIFIC GRAVITY URINE: 1.01
UROBILINOGEN URINE: 0.2 MG/DL

## 2023-11-20 ENCOUNTER — APPOINTMENT (OUTPATIENT)
Dept: UROLOGY | Facility: CLINIC | Age: 83
End: 2023-11-20
Payer: MEDICARE

## 2023-11-20 VITALS
WEIGHT: 285 LBS | TEMPERATURE: 97.3 F | DIASTOLIC BLOOD PRESSURE: 85 MMHG | HEIGHT: 72 IN | HEART RATE: 72 BPM | SYSTOLIC BLOOD PRESSURE: 170 MMHG | BODY MASS INDEX: 38.6 KG/M2

## 2023-11-20 DIAGNOSIS — N20.0 CALCULUS OF KIDNEY: ICD-10-CM

## 2023-11-20 DIAGNOSIS — Z00.00 ENCOUNTER FOR GENERAL ADULT MEDICAL EXAMINATION W/OUT ABNORMAL FINDINGS: ICD-10-CM

## 2023-11-20 PROCEDURE — 99213 OFFICE O/P EST LOW 20 MIN: CPT

## 2023-11-21 ENCOUNTER — RESULT CHARGE (OUTPATIENT)
Age: 83
End: 2023-11-21

## 2023-11-21 ENCOUNTER — APPOINTMENT (OUTPATIENT)
Dept: ORTHOPEDIC SURGERY | Facility: CLINIC | Age: 83
End: 2023-11-21
Payer: MEDICARE

## 2023-11-21 DIAGNOSIS — Z96.641 PRESENCE OF RIGHT ARTIFICIAL HIP JOINT: ICD-10-CM

## 2023-11-21 DIAGNOSIS — Z96.651 PRESENCE OF RIGHT ARTIFICIAL KNEE JOINT: ICD-10-CM

## 2023-11-21 DIAGNOSIS — Z96.652 PRESENCE OF LEFT ARTIFICIAL KNEE JOINT: ICD-10-CM

## 2023-11-21 DIAGNOSIS — Z96.642 PRESENCE OF LEFT ARTIFICIAL HIP JOINT: ICD-10-CM

## 2023-11-21 PROCEDURE — 99203 OFFICE O/P NEW LOW 30 MIN: CPT

## 2023-11-21 PROCEDURE — 73560 X-RAY EXAM OF KNEE 1 OR 2: CPT | Mod: 50

## 2024-01-13 ENCOUNTER — NON-APPOINTMENT (OUTPATIENT)
Age: 84
End: 2024-01-13

## 2024-01-19 ENCOUNTER — APPOINTMENT (OUTPATIENT)
Dept: ORTHOPEDIC SURGERY | Facility: CLINIC | Age: 84
End: 2024-01-19

## 2024-01-22 ENCOUNTER — APPOINTMENT (OUTPATIENT)
Dept: UROLOGY | Facility: CLINIC | Age: 84
End: 2024-01-22

## 2024-02-09 NOTE — PROGRESS NOTE ADULT - ASSESSMENT
IMPRESSION:  Sepsis present on admission  Acute hypoxemic respiratory failure improving   Severe COVID-19 PNA SP Toci   Doubt superimposed bacterial infection  LORRAINE on CKD 3, resolved  Probable JARETH    PLAN:    CNS: Avoid CNS depressants    HEENT: Oral care    PULMONARY:  HOB @ 45 degrees.  Aspiration precautions. Target SpO2>94%, wean oxygen as tolerated.  Dexamethasone 6mg Q12 today.  Possible Q 24 in am.  Repeat CXR     CARDIOVASCULAR: Avoid volume overload.     GI: GI prophylaxis. Feeding as tolerated  Bowel regimen.  GI consult appreciated      RENAL:  Follow up lytes.  Correct as needed. Renal eval.      INFECTIOUS DISEASE: FU PanCx.  Repeat Procal     HEMATOLOGICAL:  DVT prophylaxis. LMWH.  FU CBC     ENDOCRINE:  Follow up FS.     MUSCULOSKELETAL: OOB to chair     Step Down Unit monitoring for now.     PT OT     Prognosis poor  [id number]

## 2024-04-01 NOTE — H&P PST ADULT - NS HP PST ANES REACTION
Received request via: Patient    Was the patient seen in the last year in this department? Yes    Does the patient have an active prescription (recently filled or refills available) for medication(s) requested? No    Pharmacy Name:     Does the patient have care home Plus and need 100 day supply (blood pressure, diabetes and cholesterol meds only)? Patient does not have SCP   No

## 2024-04-22 NOTE — PROGRESS NOTE ADULT - ASSESSMENT
Message sent to call with appt and patient will be schedule for battery change procedure   79yo M c PMHx chronic afib on noac, htn, dm2, ckd3 bladder ca s/p urostomy, b/l hip/ knee replacements obesity, here with acute hypoxic respiratory failure 2/2 severe covid 19 viral pneumonia, lorraine on ckd3 now better, elevated ddimer    Sepsis on admission  Covid viral PNA. acute hypoxic RF  underlying JARETH, [place]  LORRAINE on CKD  Transaminitis  Anemia sp transfusion  E coli UTI  Hx of HTN, ASHD, Afib/Eliquis  DM II, CKD  DLD  Bladder ca, sp urostomy, sp cystoprostatectomy, exposure to 911  OA, DDD, DJD, sp BL hip and knee surgeries, mobility dysfunction  GERD, HH, diverticulosis, bowel resection and reanastomosis      pt in CEU, transitionedto 5L  today 3LNC, 98% sat  drop in H/H, yesterday, transfused total of 2u 4/8, IV heparin D/C  GI EGD today  CT of abd/pelvis non contrast  to R/O retroperitoneal hematoma  IR, sp IVC filter for DVT    improvement of renal function and LFTs, cont to monitor    ID sp sepsis, Maldonado completed, D/C caspofungin as fungitell nl, pt of Abx    pul-critical care ff    CXR shows stable  BL opacities  spirometry  elevated DDIMER, CT angio negative for PE  guarded state but improving resp status

## 2024-11-13 NOTE — ASU PATIENT PROFILE, ADULT - WILL THE PATIENT ACCEPT THE PFIZER COVID-19 VACCINE IF ELIGIBLE AND IT IS AVAILABLE?
Goal Outcome Evaluation:      Plan of Care Reviewed With: parent    Overall Patient Progress: no changeOverall Patient Progress: no change       Arrived to floor at 1045 for schd admission. EEG applied, no seizure activity observed or reported this shift. Neuros intact. No s/s pain observed. Good PO intake. Pt and family oriented to unit. Parents at bedside and attentive to pt. Safety rounds completed.      No

## 2024-11-15 NOTE — ASU DISCHARGE PLAN (ADULT/PEDIATRIC) - HISTORY OF COVID-19 VACCINATION
Quality 47: Advance Care Plan: Advance care planning not documented, reason not otherwise specified. Detail Level: Detailed Quality 402: Tobacco Use And Help With Quitting Among Adolescents: Patient screened for tobacco and never smoked Quality 130: Documentation Of Current Medications In The Medical Record: Current Medications Documented Quality 226: Preventive Care And Screening: Tobacco Use: Screening And Cessation Intervention: Patient screened for tobacco use and is an ex/non-smoker Quality 431: Preventive Care And Screening: Unhealthy Alcohol Use - Screening: Patient not identified as an unhealthy alcohol user when screened for unhealthy alcohol use using a systematic screening method Yes

## 2024-12-27 NOTE — H&P ADULT - ASSESSMENT
Intubation    Date/Time: 12/26/2024 6:07 PM    Performed by: Meri Durbin CRNA  Authorized by: Siddhartha Worley MD    Intubation:     Induction:  Rapid sequence induction    Intubated:  Postinduction    Mask Ventilation:  Not attempted    Attempts:  1    Attempted By:  CRNA    Method of Intubation:  Video laryngoscopy    Blade:  Holm 3    Laryngeal View Grade: Grade I - full view of cords      Difficult Airway Encountered?: No      Complications:  None    Airway Device:  Oral endotracheal tube    Airway Device Size:  7.0    Style/Cuff Inflation:  Cuffed (inflated to minimal occlusive pressure)    Tube secured:  22    Secured at:  The lips    Placement Verified By:  Capnometry    Complicating Factors:  None    Findings Post-Intubation:  BS equal bilateral and atraumatic/condition of teeth unchanged          ASSESMENT&PLAN:  A 82y year old Male with history of   HTN, DM, DL, Afib ( on eliquis), bladder CA s/p cystectomy and urostomy.   BIBEMS for  septic stone      Impression :  Septic stone  HTN  DM  DL  Afib  Bladder ca s/p cystectomy and urostomy      Plan-   CNS : avoid depressants    HEENT:Oral Care    Pulmonary: HOB @ 45 degrees                         NC as needed    Cardiovascular :                              CE top 0.1                             Keep MAP >65   on levophed  s/p 5 liters fluids in ED  Central line in place  KCENTRA given for eliquis reversal for procedure  hold eliquis and HTN meds    GI : GI prophylaxis - none          DIET- NPO    Renal : follow up lytes, correct as needed  LORRAINE postrenal  Urology and IR on board. pending PCN placement after eliquis reversal  IV fluids      Infectious Disease : Antibiotics - Cefepime/Vanc                                     Follow up cultures ; descelate when cultures back    Hematological : DVT ppx - none    Endocrine : insulin scale    Musculoskeletal : Bedrest    Admit to ICU    Code status - full  Prognosis - gaurded
